# Patient Record
Sex: FEMALE | Employment: OTHER | URBAN - METROPOLITAN AREA
[De-identification: names, ages, dates, MRNs, and addresses within clinical notes are randomized per-mention and may not be internally consistent; named-entity substitution may affect disease eponyms.]

---

## 2017-07-12 PROBLEM — C92.12 CML (CHRONIC MYELOID LEUKEMIA) IN RELAPSE (HCC): Status: ACTIVE | Noted: 2017-07-12

## 2018-10-16 ENCOUNTER — HOSPITAL ENCOUNTER (OUTPATIENT)
Dept: NON INVASIVE DIAGNOSTICS | Age: 68
Discharge: HOME | End: 2018-10-16
Payer: MEDICARE

## 2018-10-16 LAB
LV EF: 63 %
LVEF MODALITY: NORMAL

## 2018-10-16 PROCEDURE — 93306 TTE W/DOPPLER COMPLETE: CPT

## 2018-10-19 ENCOUNTER — HOSPITAL ENCOUNTER (OUTPATIENT)
Dept: ULTRASOUND IMAGING | Age: 68
Discharge: HOME | End: 2018-10-19
Payer: MEDICARE

## 2018-10-19 DIAGNOSIS — C92.12 CHRONIC MYELOID LEUKEMIA IN RELAPSE (HCC): ICD-10-CM

## 2018-10-19 PROCEDURE — 76705 ECHO EXAM OF ABDOMEN: CPT

## 2019-09-17 ENCOUNTER — OFFICE VISIT (OUTPATIENT)
Dept: FAMILY MEDICINE CLINIC | Age: 69
End: 2019-09-17
Payer: MEDICARE

## 2019-09-17 VITALS
HEART RATE: 57 BPM | HEIGHT: 63 IN | WEIGHT: 147 LBS | TEMPERATURE: 98.7 F | DIASTOLIC BLOOD PRESSURE: 68 MMHG | OXYGEN SATURATION: 97 % | BODY MASS INDEX: 26.05 KG/M2 | SYSTOLIC BLOOD PRESSURE: 104 MMHG

## 2019-09-17 DIAGNOSIS — K64.9 HEMORRHOIDS, UNSPECIFIED HEMORRHOID TYPE: ICD-10-CM

## 2019-09-17 DIAGNOSIS — Z00.00 PHYSICAL EXAM: Primary | ICD-10-CM

## 2019-09-17 DIAGNOSIS — C92.10 CML (CHRONIC MYELOCYTIC LEUKEMIA) (HCC): ICD-10-CM

## 2019-09-17 PROBLEM — C80.1 CANCER (HCC): Status: ACTIVE | Noted: 2019-09-17

## 2019-09-17 PROBLEM — E78.5 HYPERLIPIDEMIA: Status: ACTIVE | Noted: 2019-09-17

## 2019-09-17 PROBLEM — K22.70 BARRETT ESOPHAGUS: Status: ACTIVE | Noted: 2019-09-17

## 2019-09-17 PROBLEM — R10.30 LOWER ABDOMINAL PAIN: Status: ACTIVE | Noted: 2017-08-02

## 2019-09-17 PROBLEM — T37.0X5A ADVERSE REACTION TO SULFA ANTIBIOTIC: Status: ACTIVE | Noted: 2017-05-02

## 2019-09-17 PROBLEM — Z85.828 HISTORY OF SKIN CANCER: Status: ACTIVE | Noted: 2019-05-10

## 2019-09-17 PROBLEM — K44.9 HIATAL HERNIA: Status: ACTIVE | Noted: 2019-09-17

## 2019-09-17 PROBLEM — E78.00 PURE HYPERCHOLESTEROLEMIA: Status: ACTIVE | Noted: 2017-08-11

## 2019-09-17 PROBLEM — E04.1 THYROID NODULE: Status: ACTIVE | Noted: 2019-09-17

## 2019-09-17 PROBLEM — E03.9 ACQUIRED HYPOTHYROIDISM: Status: ACTIVE | Noted: 2018-09-10

## 2019-09-17 PROBLEM — I50.33 ACUTE ON CHRONIC DIASTOLIC HEART FAILURE (HCC): Status: ACTIVE | Noted: 2017-04-11

## 2019-09-17 PROBLEM — K28.9 ANASTOMOTIC ULCER: Status: ACTIVE | Noted: 2019-09-17

## 2019-09-17 PROBLEM — R07.9 CHEST PAIN: Status: ACTIVE | Noted: 2017-08-02

## 2019-09-17 PROCEDURE — 1123F ACP DISCUSS/DSCN MKR DOCD: CPT | Performed by: NURSE PRACTITIONER

## 2019-09-17 PROCEDURE — 4040F PNEUMOC VAC/ADMIN/RCVD: CPT | Performed by: NURSE PRACTITIONER

## 2019-09-17 PROCEDURE — 99203 OFFICE O/P NEW LOW 30 MIN: CPT | Performed by: NURSE PRACTITIONER

## 2019-09-17 PROCEDURE — G8400 PT W/DXA NO RESULTS DOC: HCPCS | Performed by: NURSE PRACTITIONER

## 2019-09-17 PROCEDURE — 3017F COLORECTAL CA SCREEN DOC REV: CPT | Performed by: NURSE PRACTITIONER

## 2019-09-17 PROCEDURE — G8419 CALC BMI OUT NRM PARAM NOF/U: HCPCS | Performed by: NURSE PRACTITIONER

## 2019-09-17 PROCEDURE — 1036F TOBACCO NON-USER: CPT | Performed by: NURSE PRACTITIONER

## 2019-09-17 PROCEDURE — G8427 DOCREV CUR MEDS BY ELIG CLIN: HCPCS | Performed by: NURSE PRACTITIONER

## 2019-09-17 PROCEDURE — 1090F PRES/ABSN URINE INCON ASSESS: CPT | Performed by: NURSE PRACTITIONER

## 2019-09-17 RX ORDER — VITAMIN B COMPLEX
1 CAPSULE ORAL DAILY
COMMUNITY
End: 2019-10-14

## 2019-09-17 RX ORDER — METHYLPREDNISOLONE 4 MG
500 TABLET, DOSE PACK ORAL DAILY
COMMUNITY
End: 2020-06-05 | Stop reason: ALTCHOICE

## 2019-09-17 RX ORDER — ASCORBIC ACID 1000 MG
TABLET ORAL 2 TIMES DAILY
COMMUNITY
End: 2022-05-02

## 2019-09-17 SDOH — HEALTH STABILITY: PHYSICAL HEALTH: ON AVERAGE, HOW MANY MINUTES DO YOU ENGAGE IN EXERCISE AT THIS LEVEL?: 30 MIN

## 2019-09-17 SDOH — HEALTH STABILITY: PHYSICAL HEALTH: ON AVERAGE, HOW MANY DAYS PER WEEK DO YOU ENGAGE IN MODERATE TO STRENUOUS EXERCISE (LIKE A BRISK WALK)?: 3 DAYS

## 2019-09-17 ASSESSMENT — ENCOUNTER SYMPTOMS
CONSTIPATION: 1
SORE THROAT: 1
SHORTNESS OF BREATH: 1

## 2019-09-17 NOTE — PROGRESS NOTES
Patient ID: Robb Levi is a 77 y.o. female who presents today for a Physical Exam.      HPI-this is a 77-year-old female patient new to the clinic presenting today for a physical exam.  She has a history of CML in which she takes chemotherapy for that was in remission but now it is not. Her previous PCP wanted her to go on hospice and she refuses. She drives she is alert she is oriented she exercises at least 3 times a week works out in the yard has a garden so I encouraged her to keep moving and keep fighting. She also sees a homeopathic doctor and I encouraged her to continue with him if that is what she wants to do. Besides the cancer diagnosis she states she has IBS with constipation but manages this with MiraLAX and a Ducosate. She also states she has issues with hemorrhoids and she takes a suppository for this. She also stated she does have a history of anal fissure. She is wanting a GI consult to be looked at for the hemorrhoids and she has had this for years she states. She denies no active bleeding at this time. She told me that she leaves for [place] for the winter and that she gets back end of March 1 April. I put future labs in for that time and then I told her we would follow-up at that time. We discussed her health maintenance she stated she has had her mammogram this year so she is not due until next year she is unable to get the shingles vaccine due to the cancer diagnosis. And she states she had a colonoscopy a couple years ago  Noted in care everywhere she is had her hep C screen back in August 2018 and she stated she is already had her flu shot for this year. Depression screening and SUZANNE essentially negative.   She has a very positive attitude and is not willing to go on hospice and I encouraged her to keep fighting  Past Medical History:   Diagnosis Date    Anal fissure     Anemia     Arthritis     Asthma     Bowel dysfunction     Cancer ([address]     Hyperlipidemia     reviewed and are negative. Physical Exam   Constitutional: She is oriented to person, place, and time. She appears well-developed and well-nourished. HENT:   Head: Normocephalic. Right Ear: External ear normal.   Left Ear: External ear normal.   Nose: Nose normal.   Mouth/Throat: Oropharynx is clear and moist.   Eyes: Pupils are equal, round, and reactive to light. Conjunctivae and EOM are normal.   Cataracts present    Neck: Normal range of motion. Neck supple. Carotid bruit is not present. No thyromegaly present. Tenderness lymph glands   Cardiovascular: Normal rate, regular rhythm and normal heart sounds. Carotid negative for bruits bilateral   Pulmonary/Chest: Effort normal and breath sounds normal.   Abdominal: Soft. Bowel sounds are normal. There is tenderness. Musculoskeletal: Normal range of motion. She exhibits no edema. Lymphadenopathy:     She has cervical adenopathy. Neurological: She is alert and oriented to person, place, and time. Skin: Skin is warm and dry. Rash noted. Psychiatric: She has a normal mood and affect. Her behavior is normal. Judgment and thought content normal.   Nursing note and vitals reviewed. Assessment:  Encounter Diagnoses   Name Primary?  Physical exam Yes    Hemorrhoids, unspecified hemorrhoid type        Controlled Substances Monitoring:  NA    Plan:  1. Physical exam  - Lipid Panel; Future  - Comprehensive Metabolic Panel; Future  - Hemoglobin A1C; Future  - TSH with Reflex; Future  - Vitamin D 25 Hydroxy; Future  - Vitamin B12 & Folate; Future  RTC in 6 months for lab draw and I will see you after lab draw    2. Hemorrhoids, unspecified hemorrhoid type  - Annemarie Crisostomo MD, Gastroenterology, [facility]    3.) CML  Follow up with Oncologist and continue with Chemo as previously directed by them    Encouraged healthy diet, regular exercise and multivitamin daily. Labs checked per orders. Optho visit q 1-2 years.

## 2019-09-20 ENCOUNTER — OFFICE VISIT (OUTPATIENT)
Dept: GASTROENTEROLOGY | Age: 69
End: 2019-09-20
Payer: MEDICARE

## 2019-09-20 VITALS
WEIGHT: 148 LBS | SYSTOLIC BLOOD PRESSURE: 120 MMHG | HEIGHT: 63 IN | BODY MASS INDEX: 26.22 KG/M2 | DIASTOLIC BLOOD PRESSURE: 74 MMHG

## 2019-09-20 DIAGNOSIS — K59.00 CONSTIPATION, UNSPECIFIED CONSTIPATION TYPE: ICD-10-CM

## 2019-09-20 DIAGNOSIS — Z80.0 FAMILY HISTORY OF COLON CANCER: ICD-10-CM

## 2019-09-20 DIAGNOSIS — Z86.010 PERSONAL HISTORY OF COLONIC POLYPS: Primary | ICD-10-CM

## 2019-09-20 DIAGNOSIS — K62.5 RECTAL BLEEDING: ICD-10-CM

## 2019-09-20 PROCEDURE — 3017F COLORECTAL CA SCREEN DOC REV: CPT | Performed by: INTERNAL MEDICINE

## 2019-09-20 PROCEDURE — 1036F TOBACCO NON-USER: CPT | Performed by: INTERNAL MEDICINE

## 2019-09-20 PROCEDURE — 4040F PNEUMOC VAC/ADMIN/RCVD: CPT | Performed by: INTERNAL MEDICINE

## 2019-09-20 PROCEDURE — G8419 CALC BMI OUT NRM PARAM NOF/U: HCPCS | Performed by: INTERNAL MEDICINE

## 2019-09-20 PROCEDURE — 99204 OFFICE O/P NEW MOD 45 MIN: CPT | Performed by: INTERNAL MEDICINE

## 2019-09-20 PROCEDURE — 1123F ACP DISCUSS/DSCN MKR DOCD: CPT | Performed by: INTERNAL MEDICINE

## 2019-09-20 PROCEDURE — G8427 DOCREV CUR MEDS BY ELIG CLIN: HCPCS | Performed by: INTERNAL MEDICINE

## 2019-09-20 PROCEDURE — 1090F PRES/ABSN URINE INCON ASSESS: CPT | Performed by: INTERNAL MEDICINE

## 2019-09-20 PROCEDURE — G8400 PT W/DXA NO RESULTS DOC: HCPCS | Performed by: INTERNAL MEDICINE

## 2019-09-20 RX ORDER — POLYETHYLENE GLYCOL 3350 17 G/17G
238 POWDER ORAL DAILY
Qty: 255 G | Refills: 0 | Status: ON HOLD | OUTPATIENT
Start: 2019-09-20 | End: 2019-10-18 | Stop reason: HOSPADM

## 2019-09-20 NOTE — PROGRESS NOTES
[address] [facility],  [address] [place] [address] Ave  [place], [address] [place] [address]  Phone: [phone]   [facility]     Chief Complaint   Patient presents with    New Patient     constipation/ hemorrhoids       HPI     Thank you DANTE Olivares - CNP for asking me to see Juanpablochris Ba in consultation. She is a white 77 y.o. Danya Calhoun female seen independently who presents with the following GI complaints:  . Jodi Phelan  Complains of Complains of painless intermittent rectal bleeding x months. Blood is mixed with the stool. Blood is present with wiping. Denies rectal pain or swelling. Has constipation (including straining, hard/large stools, incomplete evacuation) but no diarrhea. She takes colace and often a single dose of miralax daily. Says it is a chore the have a BM and takes a long time. No associated abdominal pain. Rectal exam has not been performed and reported. She reportedly has hemorrhoids and h/o fissures. She has had colon polyps in the past.  Her father and sister had colon cancer. There is not a history of rectal injury. Patient has had similar episodes of rectal bleeding in the past.   She has not had a transfusion. High-risk GI Bleeding History: The patient has a known history of: no known risk factors   She is not on Blood thinners and is not On NSAIDs for pain/inflammation in the last 6 months? Living with CML indicating she nearly  related to chemo related pleural effusion [facility]. States she won't go back to that hospital.  Not anemic and cbc normal at [place] 2 weeks ago. HPI elements: location, severity, timing, modifying factors, quality, duration, context and associated signs/symptoms. Last Encounter Reviewed:   Pertinent PMH, FH, SH is reviewed below.   Last EGD: 2017 in [address] [address], [address] [facility] [place], operated by [facility],  2015 portal gastropathy, fundic gland polyps, hiatal hernia, irregular GEJ, schatski's ring dilated  Last Colonoscopy: 2015

## 2019-09-20 NOTE — LETTER
Coumadin may be held 4 days prior to the procedure unless:        Mechanical mitral valve replacement (requires heparin bridge while Coumadin held and is managed by pharmacy)      Pradaxa, Xarelto, Eliquis may be held 2-3 days prior to procedure. According to pharmacokinetics of the drug, package insert, cardiology practice patterns, and T1/2 of theses drugs (12 hrs), Eliquis and Xarelto are held 48hrs prior to any procedure, including major surgical procedures w/o       increased bleeding.  That is usually the standard of care, as coagulation would/should be normalized at 48hrs. Every attempt should be made to maintain ASA 81mg per day throughout the desi-operative period in patients with diagnosis of ASHD. These recommendations may need to be modified by the provider/ based on risk /benefit analysis of the procedure and the patients history. If anticoagulation can not be held because recent cardiac stent, elective endoscopic procedures should be delayed until they have received the minimum duration of recommended antiplatlet therapy and it can safely be held. Again if unsure, patient should discuss with prescribing physician/service. If anticoagulation can not be stopped, endoscopic procedures can still be performed either diagnostically at a somewhat higher risk. Understand that any therapeutic procedure where anything beyond looking is performed, carries higher risks. For this reason without overt bleeding other testing       such as cologuard may be more appropriate. High risk endoscopic procedures that require stopping antiplatelet and anticoagulation therapy include polypectomy, biliary or pancreatic sphincterotomy, pneumatic or bougie dilation, PEG placement, therapeutic balloon-assisted enteroscopy, EUS and FNA, tumor ablation by any technique,       cystogastrostomy,and treatment of varices.

## 2019-09-23 ENCOUNTER — TELEPHONE (OUTPATIENT)
Dept: GASTROENTEROLOGY | Age: 69
End: 2019-09-23

## 2019-09-23 NOTE — TELEPHONE ENCOUNTER
[phone] (home)     Spoke with pt. Informed her of Dr. Tasha Mchugh reasoning for the colonoscopy. Pt stated she is still trying to find a ride/ person to bring her for the procedure. Stated she would call back if she needed to cancel or reschedule.

## 2019-09-23 NOTE — TELEPHONE ENCOUNTER
Yes I am recommending it because of the bleeding and rectal complaints. Please request last report. She told me in the office it was 2017 but not [facility].

## 2019-09-23 NOTE — TELEPHONE ENCOUNTER
[phone] ([place])     Pt called in stating that her last colonoscopy was 9/13/2017 and was NOT in 2014. States that her colonoscopy was completed by a Dr. Jose Crisostomo with [facility]. States that she did have 3 small polyps that were precancerous. Wants to know if Dr. Garcia Ortiz would still like her to have a colonoscopy on 10/18/19 as scheduled. Please advise.

## 2019-10-02 ENCOUNTER — NURSE ONLY (OUTPATIENT)
Dept: FAMILY MEDICINE CLINIC | Age: 69
End: 2019-10-02
Payer: MEDICARE

## 2019-10-02 DIAGNOSIS — Z00.00 PHYSICAL EXAM: ICD-10-CM

## 2019-10-02 LAB
A/G RATIO: 1.7 (ref 1.1–2.2)
ALBUMIN SERPL-MCNC: 4.4 G/DL (ref 3.4–5)
ALP BLD-CCNC: 120 U/L (ref 40–129)
ALT SERPL-CCNC: 39 U/L (ref 10–40)
ANION GAP SERPL CALCULATED.3IONS-SCNC: 14 MMOL/L (ref 3–16)
AST SERPL-CCNC: 32 U/L (ref 15–37)
BILIRUB SERPL-MCNC: 1.2 MG/DL (ref 0–1)
BUN BLDV-MCNC: 17 MG/DL (ref 7–20)
CALCIUM SERPL-MCNC: 9.3 MG/DL (ref 8.3–10.6)
CHLORIDE BLD-SCNC: 103 MMOL/L (ref 99–110)
CHOLESTEROL, TOTAL: 214 MG/DL (ref 0–199)
CO2: 27 MMOL/L (ref 21–32)
CREAT SERPL-MCNC: 0.7 MG/DL (ref 0.6–1.2)
FOLATE: >20 NG/ML (ref 4.78–24.2)
GFR AFRICAN AMERICAN: >60
GFR NON-AFRICAN AMERICAN: >60
GLOBULIN: 2.6 G/DL
GLUCOSE BLD-MCNC: 108 MG/DL (ref 70–99)
HDLC SERPL-MCNC: 88 MG/DL (ref 40–60)
LDL CHOLESTEROL CALCULATED: 111 MG/DL
POTASSIUM SERPL-SCNC: 3.8 MMOL/L (ref 3.5–5.1)
SODIUM BLD-SCNC: 144 MMOL/L (ref 136–145)
T4 FREE: 1.1 NG/DL (ref 0.9–1.8)
TOTAL PROTEIN: 7 G/DL (ref 6.4–8.2)
TRIGL SERPL-MCNC: 77 MG/DL (ref 0–150)
TSH REFLEX: 7.29 UIU/ML (ref 0.27–4.2)
VITAMIN B-12: 1054 PG/ML (ref 211–911)
VITAMIN D 25-HYDROXY: 44.8 NG/ML
VLDLC SERPL CALC-MCNC: 15 MG/DL

## 2019-10-02 PROCEDURE — 36415 COLL VENOUS BLD VENIPUNCTURE: CPT | Performed by: NURSE PRACTITIONER

## 2019-10-03 LAB
ESTIMATED AVERAGE GLUCOSE: 114 MG/DL
HBA1C MFR BLD: 5.6 %

## 2019-10-18 ENCOUNTER — ANESTHESIA (OUTPATIENT)
Dept: ENDOSCOPY | Age: 69
End: 2019-10-18
Payer: MEDICARE

## 2019-10-18 ENCOUNTER — HOSPITAL ENCOUNTER (OUTPATIENT)
Age: 69
Setting detail: OUTPATIENT SURGERY
Discharge: HOSPICE | End: 2019-10-18
Attending: INTERNAL MEDICINE | Admitting: INTERNAL MEDICINE
Payer: MEDICARE

## 2019-10-18 ENCOUNTER — ANESTHESIA EVENT (OUTPATIENT)
Dept: ENDOSCOPY | Age: 69
End: 2019-10-18
Payer: MEDICARE

## 2019-10-18 VITALS
WEIGHT: 148 LBS | HEIGHT: 62 IN | HEART RATE: 58 BPM | OXYGEN SATURATION: 100 % | BODY MASS INDEX: 27.23 KG/M2 | DIASTOLIC BLOOD PRESSURE: 68 MMHG | TEMPERATURE: 97 F | SYSTOLIC BLOOD PRESSURE: 172 MMHG | RESPIRATION RATE: 18 BRPM

## 2019-10-18 VITALS — SYSTOLIC BLOOD PRESSURE: 135 MMHG | OXYGEN SATURATION: 100 % | DIASTOLIC BLOOD PRESSURE: 52 MMHG

## 2019-10-18 DIAGNOSIS — K59.00 CONSTIPATION, UNSPECIFIED CONSTIPATION TYPE: ICD-10-CM

## 2019-10-18 DIAGNOSIS — K62.5 RECTAL BLEEDING: ICD-10-CM

## 2019-10-18 PROCEDURE — 7100000010 HC PHASE II RECOVERY - FIRST 15 MIN: Performed by: INTERNAL MEDICINE

## 2019-10-18 PROCEDURE — 88305 TISSUE EXAM BY PATHOLOGIST: CPT

## 2019-10-18 PROCEDURE — 45380 COLONOSCOPY AND BIOPSY: CPT | Performed by: INTERNAL MEDICINE

## 2019-10-18 PROCEDURE — 2500000003 HC RX 250 WO HCPCS: Performed by: NURSE ANESTHETIST, CERTIFIED REGISTERED

## 2019-10-18 PROCEDURE — 3609010700 HC COLONOSCOPY POLYPECTOMY REMOVAL SNARE/STOMA: Performed by: INTERNAL MEDICINE

## 2019-10-18 PROCEDURE — 3700000000 HC ANESTHESIA ATTENDED CARE: Performed by: INTERNAL MEDICINE

## 2019-10-18 PROCEDURE — 3700000001 HC ADD 15 MINUTES (ANESTHESIA): Performed by: INTERNAL MEDICINE

## 2019-10-18 PROCEDURE — 7100000011 HC PHASE II RECOVERY - ADDTL 15 MIN: Performed by: INTERNAL MEDICINE

## 2019-10-18 PROCEDURE — 2580000003 HC RX 258: Performed by: ANESTHESIOLOGY

## 2019-10-18 PROCEDURE — 6360000002 HC RX W HCPCS: Performed by: NURSE ANESTHETIST, CERTIFIED REGISTERED

## 2019-10-18 PROCEDURE — 2580000003 HC RX 258: Performed by: INTERNAL MEDICINE

## 2019-10-18 PROCEDURE — 2709999900 HC NON-CHARGEABLE SUPPLY: Performed by: INTERNAL MEDICINE

## 2019-10-18 PROCEDURE — 45385 COLONOSCOPY W/LESION REMOVAL: CPT | Performed by: INTERNAL MEDICINE

## 2019-10-18 RX ORDER — LIDOCAINE HYDROCHLORIDE 10 MG/ML
0.3 INJECTION, SOLUTION EPIDURAL; INFILTRATION; INTRACAUDAL; PERINEURAL
Status: DISCONTINUED | OUTPATIENT
Start: 2019-10-18 | End: 2019-10-18 | Stop reason: HOSPADM

## 2019-10-18 RX ORDER — SODIUM CHLORIDE, SODIUM LACTATE, POTASSIUM CHLORIDE, CALCIUM CHLORIDE 600; 310; 30; 20 MG/100ML; MG/100ML; MG/100ML; MG/100ML
INJECTION, SOLUTION INTRAVENOUS CONTINUOUS
Status: DISCONTINUED | OUTPATIENT
Start: 2019-10-18 | End: 2019-10-18 | Stop reason: HOSPADM

## 2019-10-18 RX ORDER — PROPOFOL 10 MG/ML
INJECTION, EMULSION INTRAVENOUS PRN
Status: DISCONTINUED | OUTPATIENT
Start: 2019-10-18 | End: 2019-10-18 | Stop reason: SDUPTHER

## 2019-10-18 RX ORDER — OXYCODONE HYDROCHLORIDE AND ACETAMINOPHEN 5; 325 MG/1; MG/1
2 TABLET ORAL PRN
Status: DISCONTINUED | OUTPATIENT
Start: 2019-10-18 | End: 2019-10-18 | Stop reason: HOSPADM

## 2019-10-18 RX ORDER — HYDRALAZINE HYDROCHLORIDE 20 MG/ML
5 INJECTION INTRAMUSCULAR; INTRAVENOUS EVERY 10 MIN PRN
Status: DISCONTINUED | OUTPATIENT
Start: 2019-10-18 | End: 2019-10-18 | Stop reason: HOSPADM

## 2019-10-18 RX ORDER — ONDANSETRON 2 MG/ML
4 INJECTION INTRAMUSCULAR; INTRAVENOUS
Status: DISCONTINUED | OUTPATIENT
Start: 2019-10-18 | End: 2019-10-18 | Stop reason: HOSPADM

## 2019-10-18 RX ORDER — DIPHENHYDRAMINE HYDROCHLORIDE 50 MG/ML
12.5 INJECTION INTRAMUSCULAR; INTRAVENOUS
Status: DISCONTINUED | OUTPATIENT
Start: 2019-10-18 | End: 2019-10-18 | Stop reason: HOSPADM

## 2019-10-18 RX ORDER — MORPHINE SULFATE 2 MG/ML
1 INJECTION, SOLUTION INTRAMUSCULAR; INTRAVENOUS EVERY 5 MIN PRN
Status: DISCONTINUED | OUTPATIENT
Start: 2019-10-18 | End: 2019-10-18 | Stop reason: HOSPADM

## 2019-10-18 RX ORDER — LABETALOL HYDROCHLORIDE 5 MG/ML
5 INJECTION, SOLUTION INTRAVENOUS EVERY 10 MIN PRN
Status: DISCONTINUED | OUTPATIENT
Start: 2019-10-18 | End: 2019-10-18 | Stop reason: HOSPADM

## 2019-10-18 RX ORDER — PROMETHAZINE HYDROCHLORIDE 25 MG/ML
6.25 INJECTION, SOLUTION INTRAMUSCULAR; INTRAVENOUS
Status: DISCONTINUED | OUTPATIENT
Start: 2019-10-18 | End: 2019-10-18 | Stop reason: HOSPADM

## 2019-10-18 RX ORDER — MORPHINE SULFATE 2 MG/ML
2 INJECTION, SOLUTION INTRAMUSCULAR; INTRAVENOUS EVERY 5 MIN PRN
Status: DISCONTINUED | OUTPATIENT
Start: 2019-10-18 | End: 2019-10-18 | Stop reason: HOSPADM

## 2019-10-18 RX ORDER — LIDOCAINE HYDROCHLORIDE 10 MG/ML
INJECTION, SOLUTION INFILTRATION; PERINEURAL PRN
Status: DISCONTINUED | OUTPATIENT
Start: 2019-10-18 | End: 2019-10-18 | Stop reason: SDUPTHER

## 2019-10-18 RX ORDER — SODIUM CHLORIDE 0.9 % (FLUSH) 0.9 %
10 SYRINGE (ML) INJECTION EVERY 12 HOURS SCHEDULED
Status: DISCONTINUED | OUTPATIENT
Start: 2019-10-18 | End: 2019-10-18 | Stop reason: HOSPADM

## 2019-10-18 RX ORDER — SODIUM CHLORIDE 0.9 % (FLUSH) 0.9 %
10 SYRINGE (ML) INJECTION PRN
Status: DISCONTINUED | OUTPATIENT
Start: 2019-10-18 | End: 2019-10-18 | Stop reason: HOSPADM

## 2019-10-18 RX ORDER — OXYCODONE HYDROCHLORIDE AND ACETAMINOPHEN 5; 325 MG/1; MG/1
1 TABLET ORAL PRN
Status: DISCONTINUED | OUTPATIENT
Start: 2019-10-18 | End: 2019-10-18 | Stop reason: HOSPADM

## 2019-10-18 RX ORDER — MEPERIDINE HYDROCHLORIDE 50 MG/ML
12.5 INJECTION INTRAMUSCULAR; INTRAVENOUS; SUBCUTANEOUS EVERY 5 MIN PRN
Status: DISCONTINUED | OUTPATIENT
Start: 2019-10-18 | End: 2019-10-18 | Stop reason: HOSPADM

## 2019-10-18 RX ADMIN — SODIUM CHLORIDE, POTASSIUM CHLORIDE, SODIUM LACTATE AND CALCIUM CHLORIDE: 600; 310; 30; 20 INJECTION, SOLUTION INTRAVENOUS at 09:12

## 2019-10-18 RX ADMIN — LIDOCAINE HYDROCHLORIDE 60 MG: 10 INJECTION, SOLUTION INFILTRATION; PERINEURAL at 09:15

## 2019-10-18 RX ADMIN — PROPOFOL 70 MG: 10 INJECTION, EMULSION INTRAVENOUS at 09:15

## 2019-10-18 RX ADMIN — LIDOCAINE HYDROCHLORIDE 60 MG: 10 INJECTION, SOLUTION INFILTRATION; PERINEURAL at 09:27

## 2019-10-18 RX ADMIN — SODIUM CHLORIDE, POTASSIUM CHLORIDE, SODIUM LACTATE AND CALCIUM CHLORIDE: 600; 310; 30; 20 INJECTION, SOLUTION INTRAVENOUS at 08:51

## 2019-10-18 RX ADMIN — LIDOCAINE HYDROCHLORIDE 60 MG: 10 INJECTION, SOLUTION INFILTRATION; PERINEURAL at 09:21

## 2019-10-18 ASSESSMENT — PAIN SCALES - GENERAL
PAINLEVEL_OUTOF10: 0
PAINLEVEL_OUTOF10: 0
PAINLEVEL_OUTOF10: 2

## 2019-10-18 ASSESSMENT — PAIN - FUNCTIONAL ASSESSMENT: PAIN_FUNCTIONAL_ASSESSMENT: 0-10

## 2019-10-18 ASSESSMENT — PAIN DESCRIPTION - LOCATION: LOCATION: ABDOMEN

## 2019-10-18 ASSESSMENT — PAIN DESCRIPTION - DESCRIPTORS
DESCRIPTORS: CRAMPING
DESCRIPTORS: CRAMPING

## 2019-10-18 ASSESSMENT — ENCOUNTER SYMPTOMS: SHORTNESS OF BREATH: 1

## 2019-11-13 ENCOUNTER — OFFICE VISIT (OUTPATIENT)
Dept: FAMILY MEDICINE CLINIC | Age: 69
End: 2019-11-13
Payer: MEDICARE

## 2019-11-13 VITALS
WEIGHT: 149 LBS | TEMPERATURE: 98.2 F | OXYGEN SATURATION: 98 % | DIASTOLIC BLOOD PRESSURE: 76 MMHG | HEART RATE: 66 BPM | SYSTOLIC BLOOD PRESSURE: 128 MMHG | BODY MASS INDEX: 26.4 KG/M2 | HEIGHT: 63 IN

## 2019-11-13 DIAGNOSIS — H04.129 DRY EYE: Primary | ICD-10-CM

## 2019-11-13 PROCEDURE — 99213 OFFICE O/P EST LOW 20 MIN: CPT | Performed by: NURSE PRACTITIONER

## 2019-11-13 RX ORDER — CYCLOSPORINE 0.5 MG/ML
1 EMULSION OPHTHALMIC EVERY 12 HOURS
Qty: 30 VIAL | Refills: 2 | Status: SHIPPED | OUTPATIENT
Start: 2019-11-13 | End: 2020-06-04 | Stop reason: SDUPTHER

## 2019-11-13 ASSESSMENT — PATIENT HEALTH QUESTIONNAIRE - PHQ9
SUM OF ALL RESPONSES TO PHQ9 QUESTIONS 1 & 2: 0
2. FEELING DOWN, DEPRESSED OR HOPELESS: 0
SUM OF ALL RESPONSES TO PHQ QUESTIONS 1-9: 0
SUM OF ALL RESPONSES TO PHQ QUESTIONS 1-9: 0
1. LITTLE INTEREST OR PLEASURE IN DOING THINGS: 0

## 2019-11-15 ENCOUNTER — TELEPHONE (OUTPATIENT)
Dept: FAMILY MEDICINE CLINIC | Age: 69
End: 2019-11-15

## 2019-11-25 PROBLEM — H40.033 ANATOMICAL NARROW ANGLE, BILATERAL: Status: ACTIVE | Noted: 2019-11-25

## 2019-11-25 PROBLEM — H25.13 NUCLEAR SENILE CATARACT OF BOTH EYES: Status: ACTIVE | Noted: 2019-11-25

## 2020-06-03 ENCOUNTER — TELEPHONE (OUTPATIENT)
Dept: FAMILY MEDICINE CLINIC | Age: 70
End: 2020-06-03

## 2020-06-04 RX ORDER — CYCLOSPORINE 0.5 MG/ML
1 EMULSION OPHTHALMIC EVERY 12 HOURS
Qty: 30 VIAL | Refills: 2 | Status: SHIPPED | OUTPATIENT
Start: 2020-06-04 | End: 2020-06-05 | Stop reason: SDUPTHER

## 2020-06-04 RX ORDER — EPINEPHRINE 0.3 MG/.3ML
INJECTION SUBCUTANEOUS
Qty: 1 EACH | Refills: 0 | Status: SHIPPED | OUTPATIENT
Start: 2020-06-04 | End: 2020-06-05 | Stop reason: SDUPTHER

## 2020-06-05 ENCOUNTER — OFFICE VISIT (OUTPATIENT)
Dept: FAMILY MEDICINE CLINIC | Age: 70
End: 2020-06-05
Payer: MEDICARE

## 2020-06-05 VITALS
DIASTOLIC BLOOD PRESSURE: 85 MMHG | BODY MASS INDEX: 24.59 KG/M2 | TEMPERATURE: 97.9 F | SYSTOLIC BLOOD PRESSURE: 148 MMHG | HEART RATE: 66 BPM | HEIGHT: 63 IN | OXYGEN SATURATION: 98 % | WEIGHT: 138.8 LBS

## 2020-06-05 PROCEDURE — 1090F PRES/ABSN URINE INCON ASSESS: CPT | Performed by: FAMILY MEDICINE

## 2020-06-05 PROCEDURE — 99214 OFFICE O/P EST MOD 30 MIN: CPT | Performed by: FAMILY MEDICINE

## 2020-06-05 PROCEDURE — G8400 PT W/DXA NO RESULTS DOC: HCPCS | Performed by: FAMILY MEDICINE

## 2020-06-05 PROCEDURE — G8420 CALC BMI NORM PARAMETERS: HCPCS | Performed by: FAMILY MEDICINE

## 2020-06-05 PROCEDURE — 4040F PNEUMOC VAC/ADMIN/RCVD: CPT | Performed by: FAMILY MEDICINE

## 2020-06-05 PROCEDURE — 1036F TOBACCO NON-USER: CPT | Performed by: FAMILY MEDICINE

## 2020-06-05 PROCEDURE — G8427 DOCREV CUR MEDS BY ELIG CLIN: HCPCS | Performed by: FAMILY MEDICINE

## 2020-06-05 PROCEDURE — 1123F ACP DISCUSS/DSCN MKR DOCD: CPT | Performed by: FAMILY MEDICINE

## 2020-06-05 PROCEDURE — 3017F COLORECTAL CA SCREEN DOC REV: CPT | Performed by: FAMILY MEDICINE

## 2020-06-05 RX ORDER — CYCLOSPORINE 0.5 MG/ML
1 EMULSION OPHTHALMIC EVERY 12 HOURS
Qty: 30 VIAL | Refills: 2 | Status: SHIPPED | OUTPATIENT
Start: 2020-06-05 | End: 2021-12-10 | Stop reason: SDUPTHER

## 2020-06-05 RX ORDER — EPINEPHRINE 0.3 MG/.3ML
INJECTION SUBCUTANEOUS
Qty: 1 EACH | Refills: 0 | Status: SHIPPED | OUTPATIENT
Start: 2020-06-05 | End: 2021-05-10 | Stop reason: SDUPTHER

## 2020-06-05 SDOH — HEALTH STABILITY: MENTAL HEALTH
STRESS IS WHEN SOMEONE FEELS TENSE, NERVOUS, ANXIOUS, OR CAN'T SLEEP AT NIGHT BECAUSE THEIR MIND IS TROUBLED. HOW STRESSED ARE YOU?: RATHER MUCH

## 2020-06-05 SDOH — SOCIAL STABILITY: SOCIAL NETWORK: IN A TYPICAL WEEK, HOW MANY TIMES DO YOU TALK ON THE PHONE WITH FAMILY, FRIENDS, OR NEIGHBORS?: TWICE A WEEK

## 2020-06-05 SDOH — ECONOMIC STABILITY: TRANSPORTATION INSECURITY
IN THE PAST 12 MONTHS, HAS LACK OF TRANSPORTATION KEPT YOU FROM MEETINGS, WORK, OR FROM GETTING THINGS NEEDED FOR DAILY LIVING?: NO

## 2020-06-05 SDOH — SOCIAL STABILITY: SOCIAL NETWORK: HOW OFTEN DO YOU ATTENT MEETINGS OF THE CLUB OR ORGANIZATION YOU BELONG TO?: MORE THAN 4 TIMES PER YEAR

## 2020-06-05 SDOH — ECONOMIC STABILITY: INCOME INSECURITY: HOW HARD IS IT FOR YOU TO PAY FOR THE VERY BASICS LIKE FOOD, HOUSING, MEDICAL CARE, AND HEATING?: SOMEWHAT HARD

## 2020-06-05 SDOH — SOCIAL STABILITY: SOCIAL NETWORK
DO YOU BELONG TO ANY CLUBS OR ORGANIZATIONS SUCH AS CHURCH GROUPS UNIONS, FRATERNAL OR ATHLETIC GROUPS, OR SCHOOL GROUPS?: YES

## 2020-06-05 SDOH — SOCIAL STABILITY: SOCIAL NETWORK: HOW OFTEN DO YOU ATTEND CHURCH OR RELIGIOUS SERVICES?: MORE THAN 4 TIMES PER YEAR

## 2020-06-05 SDOH — SOCIAL STABILITY: SOCIAL INSECURITY: WITHIN THE LAST YEAR, HAVE YOU BEEN AFRAID OF YOUR PARTNER OR EX-PARTNER?: PATIENT DECLINED

## 2020-06-05 SDOH — SOCIAL STABILITY: SOCIAL INSECURITY
WITHIN THE LAST YEAR, HAVE YOU BEEN KICKED, HIT, SLAPPED, OR OTHERWISE PHYSICALLY HURT BY YOUR PARTNER OR EX-PARTNER?: PATIENT DECLINED

## 2020-06-05 SDOH — ECONOMIC STABILITY: FOOD INSECURITY: WITHIN THE PAST 12 MONTHS, YOU WORRIED THAT YOUR FOOD WOULD RUN OUT BEFORE YOU GOT MONEY TO BUY MORE.: NEVER TRUE

## 2020-06-05 SDOH — SOCIAL STABILITY: SOCIAL NETWORK: ARE YOU MARRIED, WIDOWED, DIVORCED, SEPARATED, NEVER MARRIED, OR LIVING WITH A PARTNER?: DIVORCED

## 2020-06-05 SDOH — SOCIAL STABILITY: SOCIAL INSECURITY
WITHIN THE LAST YEAR, HAVE TO BEEN RAPED OR FORCED TO HAVE ANY KIND OF SEXUAL ACTIVITY BY YOUR PARTNER OR EX-PARTNER?: PATIENT DECLINED

## 2020-06-05 SDOH — SOCIAL STABILITY: SOCIAL NETWORK: HOW OFTEN DO YOU GET TOGETHER WITH FRIENDS OR RELATIVES?: TWICE A WEEK

## 2020-06-05 SDOH — ECONOMIC STABILITY: FOOD INSECURITY: WITHIN THE PAST 12 MONTHS, THE FOOD YOU BOUGHT JUST DIDN'T LAST AND YOU DIDN'T HAVE MONEY TO GET MORE.: NEVER TRUE

## 2020-06-05 SDOH — SOCIAL STABILITY: SOCIAL INSECURITY
WITHIN THE LAST YEAR, HAVE YOU BEEN HUMILIATED OR EMOTIONALLY ABUSED IN OTHER WAYS BY YOUR PARTNER OR EX-PARTNER?: PATIENT DECLINED

## 2020-06-05 SDOH — ECONOMIC STABILITY: TRANSPORTATION INSECURITY
IN THE PAST 12 MONTHS, HAS THE LACK OF TRANSPORTATION KEPT YOU FROM MEDICAL APPOINTMENTS OR FROM GETTING MEDICATIONS?: NO

## 2020-06-05 ASSESSMENT — PATIENT HEALTH QUESTIONNAIRE - PHQ9
1. LITTLE INTEREST OR PLEASURE IN DOING THINGS: 0
5. POOR APPETITE OR OVEREATING: 0
9. THOUGHTS THAT YOU WOULD BE BETTER OFF DEAD, OR OF HURTING YOURSELF: 0
8. MOVING OR SPEAKING SO SLOWLY THAT OTHER PEOPLE COULD HAVE NOTICED. OR THE OPPOSITE, BEING SO FIGETY OR RESTLESS THAT YOU HAVE BEEN MOVING AROUND A LOT MORE THAN USUAL: 1
10. IF YOU CHECKED OFF ANY PROBLEMS, HOW DIFFICULT HAVE THESE PROBLEMS MADE IT FOR YOU TO DO YOUR WORK, TAKE CARE OF THINGS AT HOME, OR GET ALONG WITH OTHER PEOPLE: 1
6. FEELING BAD ABOUT YOURSELF - OR THAT YOU ARE A FAILURE OR HAVE LET YOURSELF OR YOUR FAMILY DOWN: 0
SUM OF ALL RESPONSES TO PHQ9 QUESTIONS 1 & 2: 0
7. TROUBLE CONCENTRATING ON THINGS, SUCH AS READING THE NEWSPAPER OR WATCHING TELEVISION: 1
4. FEELING TIRED OR HAVING LITTLE ENERGY: 1
3. TROUBLE FALLING OR STAYING ASLEEP: 0
2. FEELING DOWN, DEPRESSED OR HOPELESS: 0
SUM OF ALL RESPONSES TO PHQ QUESTIONS 1-9: 3
SUM OF ALL RESPONSES TO PHQ QUESTIONS 1-9: 3

## 2020-06-05 ASSESSMENT — ENCOUNTER SYMPTOMS
BACK PAIN: 0
SORE THROAT: 0
BLOOD IN STOOL: 0
EYE DISCHARGE: 0
SHORTNESS OF BREATH: 1
EYE PAIN: 0
ABDOMINAL PAIN: 0
WHEEZING: 0
RHINORRHEA: 0
DIARRHEA: 0
CONSTIPATION: 0
COLOR CHANGE: 0

## 2020-06-05 NOTE — PROGRESS NOTES
Somewhat hard    Food insecurity     Worry: Never true     Inability: Never true    Transportation needs     Medical: No     Non-medical: No   Tobacco Use    Smoking status: Never Smoker    Smokeless tobacco: Never Used   Substance and Sexual Activity    Alcohol use: No    Drug use: No    Sexual activity: Not Currently   Lifestyle    Physical activity     Days per week: 3 days     Minutes per session: 30 min    Stress: Rather much   Relationships    Social connections     Talks on phone: Twice a week     Gets together: Twice a week     Attends [employer] service: More than 4 times per year     Active member of club or organization: Yes     Attends meetings of clubs or organizations: More than 4 times per year     Relationship status:     Intimate partner violence     Fear of current or ex partner: Patient refused     Emotionally abused: Patient refused     Physically abused: Patient refused     Forced sexual activity: Patient refused   Other Topics Concern    Not on file   Social History Narrative    Babysits    Son in [place] got  late in life    2 grandchildren 1y/o and 6 m/o    Loves to take care of them     Immunization History   Administered Date(s) Administered    Hepatitis A Adult (Havrix, Vaqta) 01/23/2015    Influenza Virus Vaccine 09/06/2017    Influenza, High Dose (Fluzone 65 yrs and older) 09/18/2015, 09/01/2016, 09/02/2019    Influenza, Quadv, IM, PF (6 mo and older Fluzone, Flulaval, Fluarix, and 3 yrs and older Afluria) 09/09/2014    Pneumococcal Conjugate 13-valent ([id number]) 04/23/2015    Pneumococcal Polysaccharide ([id number]) 04/19/2016    Tdap (Boostrix, Adacel) 11/04/2014     Past medical, surgical, and social history reviewed and updated. Medications, immunizations, and allergies reviewed and updated     Review of Systems   Constitutional: Negative for chills, fever and unexpected weight change. HENT: Negative for congestion, rhinorrhea and sore throat. Extraocular movements intact. Conjunctiva/sclera:      Right eye: Right conjunctiva is not injected. No chemosis or exudate. Left eye: Left conjunctiva is not injected. No chemosis or exudate. Pupils: Pupils are equal, round, and reactive to light. Funduscopic exam:     Right eye: No hemorrhage or exudate. Left eye: No hemorrhage or exudate. Neck:      Musculoskeletal: Normal range of motion and neck supple. Trachea: No tracheal deviation. Cardiovascular:      Rate and Rhythm: Normal rate and regular rhythm. Pulses: Normal pulses. Pulmonary:      Effort: Pulmonary effort is normal. No respiratory distress. Breath sounds: Normal breath sounds. No wheezing, rhonchi or rales. Abdominal:      General: Bowel sounds are normal. There is no distension. Palpations: Abdomen is soft. There is no hepatomegaly. Tenderness: There is no abdominal tenderness. Lymphadenopathy:      Cervical: No cervical adenopathy. Skin:     Findings: No rash. Neurological:      Mental Status: She is alert and oriented to person, place, and time. Cranial Nerves: No cranial nerve deficit. Psychiatric:         Thought Content: Thought content does not include homicidal or suicidal ideation. ASSESSMENT/PLAN:  Donell Olszewski is 67 y/o female with concerns for cataracts. Possibly dx in past, had issue with driving recently in past week or so. opthlamoscope today with no concerns. Refer to ophthalmology for time being. Exam unremarkable otherwise. Complex past medical history and will follow up in 2-3 months. Refilled epipen and eye drops for patient. 1. Encounter for medical examination to establish care  -chart/records reviewed  -full history and physical performed    2. Age-related cataract of both eyes, unspecified age-related cataract type  - [facility] - Luther Shin MD, Ophthalmology, [facility]    3.  Dry eye  - cycloSPORINE (RESTASIS) 0.05 % ophthalmic emulsion; Place 1 drop into both eyes every 12 hours  Dispense: 30 vial; Refill: 2    4. Anaphylaxis, sequela  - EPINEPHrine (EPIPEN 2-BRENNAN) 0.3 MG/0.3ML SOAJ injection; Use as directed for allergic reaction  Dispense: 1 each; Refill: 0    Spent >25 minutes of face to face interaction with patient counseling on diagnoses and treatment plan    Reviewed treatment plan with patient. Patient verbalized understanding to treatment plan and questions were answered. Return in about 3 months (around 9/5/2020) for blood pressure, med check, follow up. Osmin Soliz.  Teddy Rey.      6/5/2020

## 2020-06-05 NOTE — PATIENT INSTRUCTIONS
See the ophthalmologist for cataracts  [address], [place]  [address] [place] [address]., [address] [place] [address]   Phone: [phone]  Fax: [phone]    Return in 2 months for blood pressure and routine follow u    See your oncologist as planned in August and then come see us after that in august or opal

## 2020-06-08 ENCOUNTER — TELEPHONE (OUTPATIENT)
Dept: FAMILY MEDICINE CLINIC | Age: 70
End: 2020-06-08

## 2020-07-13 ENCOUNTER — OFFICE VISIT (OUTPATIENT)
Dept: PRIMARY CARE CLINIC | Age: 70
End: 2020-07-13
Payer: MEDICARE

## 2020-07-13 PROCEDURE — G8428 CUR MEDS NOT DOCUMENT: HCPCS | Performed by: NURSE PRACTITIONER

## 2020-07-13 PROCEDURE — 99211 OFF/OP EST MAY X REQ PHY/QHP: CPT | Performed by: NURSE PRACTITIONER

## 2020-07-13 PROCEDURE — G8420 CALC BMI NORM PARAMETERS: HCPCS | Performed by: NURSE PRACTITIONER

## 2020-07-13 NOTE — PROGRESS NOTES
Ruchi Flaherty received a viral test for COVID-19. They were educated on isolation and quarantine as appropriate. For any symptoms, they were directed to seek care from their PCP, given contact information to establish with a doctor, directed to an urgent care or the emergency room.

## 2020-07-14 LAB
SARS-COV-2: NOT DETECTED
SOURCE: NORMAL

## 2020-07-15 ENCOUNTER — TELEPHONE (OUTPATIENT)
Dept: FAMILY MEDICINE CLINIC | Age: 70
End: 2020-07-15

## 2020-07-15 NOTE — TELEPHONE ENCOUNTER
Patient calling for Covid 19 results. Kathryn read results, printed off results, I read to patient. She understood.

## 2020-07-17 ENCOUNTER — OFFICE VISIT (OUTPATIENT)
Dept: FAMILY MEDICINE CLINIC | Age: 70
End: 2020-07-17
Payer: MEDICARE

## 2020-07-17 VITALS
OXYGEN SATURATION: 97 % | HEART RATE: 64 BPM | BODY MASS INDEX: 25.69 KG/M2 | TEMPERATURE: 98.8 F | WEIGHT: 145 LBS | SYSTOLIC BLOOD PRESSURE: 154 MMHG | HEIGHT: 63 IN | DIASTOLIC BLOOD PRESSURE: 76 MMHG

## 2020-07-17 PROCEDURE — 1123F ACP DISCUSS/DSCN MKR DOCD: CPT | Performed by: PHYSICIAN ASSISTANT

## 2020-07-17 PROCEDURE — 4040F PNEUMOC VAC/ADMIN/RCVD: CPT | Performed by: PHYSICIAN ASSISTANT

## 2020-07-17 PROCEDURE — G8427 DOCREV CUR MEDS BY ELIG CLIN: HCPCS | Performed by: PHYSICIAN ASSISTANT

## 2020-07-17 PROCEDURE — 1090F PRES/ABSN URINE INCON ASSESS: CPT | Performed by: PHYSICIAN ASSISTANT

## 2020-07-17 PROCEDURE — 3017F COLORECTAL CA SCREEN DOC REV: CPT | Performed by: PHYSICIAN ASSISTANT

## 2020-07-17 PROCEDURE — 99213 OFFICE O/P EST LOW 20 MIN: CPT | Performed by: PHYSICIAN ASSISTANT

## 2020-07-17 PROCEDURE — G8400 PT W/DXA NO RESULTS DOC: HCPCS | Performed by: PHYSICIAN ASSISTANT

## 2020-07-17 PROCEDURE — 1036F TOBACCO NON-USER: CPT | Performed by: PHYSICIAN ASSISTANT

## 2020-07-17 PROCEDURE — G8417 CALC BMI ABV UP PARAM F/U: HCPCS | Performed by: PHYSICIAN ASSISTANT

## 2020-07-17 RX ORDER — LUTEIN 6 MG
TABLET ORAL
COMMUNITY
End: 2021-05-10

## 2020-07-17 RX ORDER — PREDNISOLONE ACETATE 10 MG/ML
SUSPENSION/ DROPS OPHTHALMIC
COMMUNITY
Start: 2020-07-10 | End: 2021-05-10

## 2020-07-17 RX ORDER — MOXIFLOXACIN 5 MG/ML
SOLUTION/ DROPS OPHTHALMIC
COMMUNITY
Start: 2020-07-10 | End: 2021-05-10

## 2020-07-17 NOTE — PROGRESS NOTES
[facility]   Pre-operative History and Physical      DIAGNOSIS:     Diagnosis Orders   1.  Preop examination     2. Age-related cataract of both eyes, unspecified age-related cataract type           PROCEDURE:  Cataract removal      History Obtained From:  patient    HISTORY OF PRESENT ILLNESS:    Sariah Soriano 1950 is a 68 y.o. female who presents for pre-operative evaluation     Past Medical History:    Past Medical History:   Diagnosis Date    Anemia     Arthritis     Asthma     Bowel dysfunction     Cancer ([address] 01/2006    leukemia    Hyperlipidemia     Hypertension     Obesity     Skin cancer of face     left cheek, squamous    Thyroid disease     TIA (transient ischemic attack)     mini ones-from chemo     Past Surgical History:    Past Surgical History:   Procedure Laterality Date    ANUS SURGERY  1998    fissure    BREAST BIOPSY      BREAST LUMPECTOMY      benign    COLONOSCOPY      numerous polyps    ELBOW SURGERY  1960    removal of foreign body ([place])    FINE NEEDLE ASPIRATION      PARACENTESIS      x 2 fluid in lung from Chemo    TONSILLECTOMY Bilateral        Current Medication  Current Outpatient Medications   Medication Sig Dispense Refill    cycloSPORINE (RESTASIS) 0.05 % ophthalmic emulsion Place 1 drop into both eyes every 12 hours 30 vial 2    Cholecalciferol (VITAMIN D3) 1000 units CAPS Take by mouth daily      UNABLE TO FIND Take by mouth daily thytrophin--1/2 pill      PHYTOSTEROLS PO Take by mouth daily      CINNAMON PO Take by mouth daily      TURMERIC CURCUMIN PO Take by mouth daily      CALCIUM-MAGNESIUM-ZINC PO Take by mouth daily      Coenzyme Q10 (CO Q 10) 10 MG CAPS Take by mouth 2 times daily       KRILL OIL PO Take by mouth daily       polyethylene glycol (GLYCOLAX) powder Take 17 g by mouth daily      nilotinib (TASIGNA) 150 MG CAPS capsule Take 2 capsules by mouth 2 times daily 120 capsule 3    aspirin 81 MG tablet Take 81 mg by mouth       Probiotic Product (PROBIOTIC & ACIDOPHILUS EX ST PO) Take by mouth daily       Lutein 20 MG TABS 1 tablet by oral route everyday      moxifloxacin (VIGAMOX) 0.5 % ophthalmic solution       prednisoLONE acetate (PRED FORTE) 1 % ophthalmic suspension       EPINEPHrine (EPIPEN 2-BRENNAN) 0.3 MG/0.3ML SOAJ injection Use as directed for allergic reaction (Patient not taking: Reported on 7/17/2020) 1 each 0    acetaminophen (TYLENOL) 325 MG tablet Take 2 tablets by mouth every 6 hours as needed for Pain (Patient not taking: Reported on 7/17/2020) 120 tablet 0     No current facility-administered medications for this visit. Allergies:  Latex; Krystina Pedro; Penicillin g; Shellfish-derived products; Sulfa antibiotics; Grapefruit concentrate; and Shrimp extract allergy skin test  History of allergic reaction to anesthesia:  No  Teeth: bridge    ** Latex Allergy    Social History:   Social History     Tobacco Use   Smoking Status Never Smoker   Smokeless Tobacco Never Used     The patient states she drinks 0 per week. Family History:   Family History   Problem Relation Age of Onset    Cancer Mother         non hodgkins lymphoma    Cancer Father         colon    Cancer Sister         colon    Cancer Brother         mesothelioma    Arrhythmia Sister        REVIEW OF SYSTEMS:    CONSTITUTIONAL:  negative  EYES:  negative  HEENT:  negative  RESPIRATORY:  negative  CARDIOVASCULAR:  negative  GASTROINTESTINAL:  negative  GENITOURINARY:  negative  INTEGUMENT/BREAST:  negative  HEMATOLOGIC/LYMPHATIC:  negative  ALLERGIC/IMMUNOLOGIC:  negative  ENDOCRINE:  negative  MUSCULOSKELETAL:  negative  NEUROLOGICAL:  negative    PHYSICAL EXAM:      BP (!) 154/76 (Site: Left Upper Arm, Position: Sitting)   Pulse 64   Temp 98.8 °F (37.1 °C)   Ht 5' 2.5\" (1.588 m)   Wt 145 lb (65.8 kg)   SpO2 97%   BMI 26.10 kg/m² Body mass index is 26.1 kg/m².     Eyes:  Lids and lashes normal, pupils equal, round and reactive to light, extra ocular muscles intact, sclera clear, conjunctiva normal  Head/ENT:  Normocephalic, without obvious abnormality, atraumatic, sinuses nontender on palpation, external ears without lesions, oral pharynx with moist mucus membranes, tonsils without erythema or exudates, gums normal and good dentition. Neck:  Supple, symmetrical, trachea midline, no adenopathy, thyroid symmetric, not enlarged and no tenderness, skin normal  Heart:  Normal apical impulse, regular rate and rhythm, normal S1 and S2, no S3 or S4, and no murmur noted  Lungs:  No increased work of breathing, good air exchange, clear to auscultation bilaterally, no crackles or wheezing  Abdomen:  No scars, normal bowel sounds, soft, non-distended, non-tender, no masses palpated, no hepatosplenomegally  Extremities:  No clubbing, cyanosis, or edema    DATA:  No PAT required     ASSESSMENT AND PLAN:    1. Patient is a 68 y.o. female with above specified procedure planned on 7/20/20 and 8/5/20 with Dr. Julia Hummel at [facility]. Martine Garnica is Medically stable for surgery. Report will be faxed.

## 2020-07-21 ENCOUNTER — TELEPHONE (OUTPATIENT)
Dept: FAMILY MEDICINE CLINIC | Age: 70
End: 2020-07-21

## 2020-07-21 NOTE — TELEPHONE ENCOUNTER
Patient called to see if you received her mammogram results from [facility]. E's that was done on 7-13-20. I did not see them in media, asked her to call and have them fax them. After hanging up I did see them in Care Everywhere.

## 2020-07-21 NOTE — TELEPHONE ENCOUNTER
I received it  Results area as follows  IMPRESSION:  Negative  (ACR-Category-1)  ~  RECOMMENDATION:  Routine screening mammogram in 1 year.   Tomosynthesis recommended    Please let pt know

## 2020-07-22 ENCOUNTER — TELEPHONE (OUTPATIENT)
Dept: FAMILY MEDICINE CLINIC | Age: 70
End: 2020-07-22

## 2020-07-22 NOTE — TELEPHONE ENCOUNTER
Patient would like to know what her weight should be? On her pre op it has that she is obese. She says she works out and watches what she eats. I went over her BMI from After Summary, but she would like a weight number.

## 2020-07-22 NOTE — TELEPHONE ENCOUNTER
Ht Readings from Last 3 Encounters:   07/17/20 5' 2.5\" (1.588 m)   06/05/20 5' 2.5\" (1.588 m)   11/13/19 5' 2.5\" (1.588 m)     Wt Readings from Last 3 Encounters:   07/17/20 145 lb (65.8 kg)   06/05/20 138 lb 12.8 oz (63 kg)   11/13/19 149 lb (67.6 kg)       Idealweight 136 lbs for bmi of 24.9    Last bmi 26.1 kg/m2 overweight not obese; was typo if so

## 2020-08-04 ENCOUNTER — OFFICE VISIT (OUTPATIENT)
Dept: FAMILY MEDICINE CLINIC | Age: 70
End: 2020-08-04
Payer: MEDICARE

## 2020-08-04 VITALS
HEIGHT: 63 IN | SYSTOLIC BLOOD PRESSURE: 142 MMHG | OXYGEN SATURATION: 98 % | DIASTOLIC BLOOD PRESSURE: 88 MMHG | WEIGHT: 143.2 LBS | TEMPERATURE: 98.2 F | HEART RATE: 67 BPM | BODY MASS INDEX: 25.37 KG/M2

## 2020-08-04 PROCEDURE — 1090F PRES/ABSN URINE INCON ASSESS: CPT | Performed by: PHYSICIAN ASSISTANT

## 2020-08-04 PROCEDURE — 99213 OFFICE O/P EST LOW 20 MIN: CPT | Performed by: PHYSICIAN ASSISTANT

## 2020-08-04 PROCEDURE — G8400 PT W/DXA NO RESULTS DOC: HCPCS | Performed by: PHYSICIAN ASSISTANT

## 2020-08-04 PROCEDURE — G8417 CALC BMI ABV UP PARAM F/U: HCPCS | Performed by: PHYSICIAN ASSISTANT

## 2020-08-04 PROCEDURE — G8427 DOCREV CUR MEDS BY ELIG CLIN: HCPCS | Performed by: PHYSICIAN ASSISTANT

## 2020-08-04 PROCEDURE — 1036F TOBACCO NON-USER: CPT | Performed by: PHYSICIAN ASSISTANT

## 2020-08-04 PROCEDURE — 1123F ACP DISCUSS/DSCN MKR DOCD: CPT | Performed by: PHYSICIAN ASSISTANT

## 2020-08-04 PROCEDURE — 3017F COLORECTAL CA SCREEN DOC REV: CPT | Performed by: PHYSICIAN ASSISTANT

## 2020-08-04 PROCEDURE — 4040F PNEUMOC VAC/ADMIN/RCVD: CPT | Performed by: PHYSICIAN ASSISTANT

## 2020-08-04 ASSESSMENT — ENCOUNTER SYMPTOMS: RESPIRATORY NEGATIVE: 1

## 2020-08-04 NOTE — PROGRESS NOTES
Subjective:      Patient ID: Dedra Washburn is a 68 y.o. female. HPI  Patient presents with right shoulder pain that started yesterday after reaching back behind her while in the car. She heard a pop in the shoulder and started with pain. Worse today even than yesterday. Restricted ROM, no weakness. The pain is constant but varied in intensity. Review of Systems   Constitutional: Positive for fatigue. Respiratory: Negative. Cardiovascular: Negative. Musculoskeletal:        Right shoulder pain       Objective:   Physical Exam  Constitutional:       Appearance: Normal appearance. Cardiovascular:      Rate and Rhythm: Normal rate. Pulmonary:      Breath sounds: Normal breath sounds. Musculoskeletal:      Right shoulder: She exhibits decreased range of motion, tenderness, pain and decreased strength. Neurological:      Mental Status: She is alert. Assessment:       Diagnosis Orders   1. Acute pain of right shoulder             Plan:      Referred to [facility] urgent care for more emergent treatment.          MCKAY Vasquez

## 2020-09-10 ENCOUNTER — VIRTUAL VISIT (OUTPATIENT)
Dept: FAMILY MEDICINE CLINIC | Age: 70
End: 2020-09-10
Payer: MEDICARE

## 2020-09-10 ENCOUNTER — TELEPHONE (OUTPATIENT)
Dept: FAMILY MEDICINE CLINIC | Age: 70
End: 2020-09-10

## 2020-09-10 VITALS — WEIGHT: 143 LBS | HEIGHT: 63 IN | TEMPERATURE: 98.2 F | BODY MASS INDEX: 25.34 KG/M2

## 2020-09-10 PROCEDURE — G0439 PPPS, SUBSEQ VISIT: HCPCS | Performed by: FAMILY MEDICINE

## 2020-09-10 PROCEDURE — 4040F PNEUMOC VAC/ADMIN/RCVD: CPT | Performed by: FAMILY MEDICINE

## 2020-09-10 PROCEDURE — 1123F ACP DISCUSS/DSCN MKR DOCD: CPT | Performed by: FAMILY MEDICINE

## 2020-09-10 PROCEDURE — 3017F COLORECTAL CA SCREEN DOC REV: CPT | Performed by: FAMILY MEDICINE

## 2020-09-10 RX ORDER — METHYLPREDNISOLONE 4 MG/1
TABLET ORAL
Qty: 21 TABLET | Refills: 0 | Status: SHIPPED | OUTPATIENT
Start: 2020-09-10 | End: 2020-09-16 | Stop reason: ALTCHOICE

## 2020-09-10 ASSESSMENT — PATIENT HEALTH QUESTIONNAIRE - PHQ9
SUM OF ALL RESPONSES TO PHQ QUESTIONS 1-9: 2
2. FEELING DOWN, DEPRESSED OR HOPELESS: 2
SUM OF ALL RESPONSES TO PHQ9 QUESTIONS 1 & 2: 2
1. LITTLE INTEREST OR PLEASURE IN DOING THINGS: 0
SUM OF ALL RESPONSES TO PHQ QUESTIONS 1-9: 2

## 2020-09-10 ASSESSMENT — LIFESTYLE VARIABLES: HOW OFTEN DO YOU HAVE A DRINK CONTAINING ALCOHOL: 0

## 2020-09-10 NOTE — TELEPHONE ENCOUNTER
Patient completed awv with lpn on phone states she has bee sting that is having a reaction is not getting any better, very itchy, rash broke out, also swollen eyes and itchy, unable to access my chart due to Internet issues, spoke to Dr Barb Perez he states will send in medication advised patient that it will be sent this afternoon.

## 2020-09-10 NOTE — PROGRESS NOTES
Medicare Annual Wellness Visit  Name: Germania First Date: 9/10/2020   MRN: <[id number]> Sex: Female   Age: 68 y.o. Ethnicity: Declined   : 1950 Race: Moses Jaime is here for Medicare AWV    Screenings for behavioral, psychosocial and functional/safety risks, and cognitive dysfunction are all negative except as indicated below. These results, as well as other patient data from the [address] form, are documented in Flowsheets linked to this Encounter. Allergies   Allergen Reactions    Latex Swelling and Rash     Rubber gloves    Mangifera Indica Anaphylaxis     [place]-- Throat closes    Penicillin G Hives    Shellfish-Derived Products Hives    Sulfa Antibiotics Hives    Grapefruit Concentrate      Due to chemo    Shrimp Extract Allergy Skin Test Rash       Prior to Visit Medications    Medication Sig Taking? Authorizing Provider   Lutein 20 MG TABS 1 tablet by oral route everyday Yes Historical Provider, MD   moxifloxacin (VIGAMOX) 0.5 % ophthalmic solution  Yes Historical Provider, MD   prednisoLONE acetate (PRED FORTE) 1 % ophthalmic suspension  Yes Historical Provider, MD   EPINEPHrine (EPIPEN 2-BRENNAN) 0.3 MG/0.3ML SOAJ injection Use as directed for allergic reaction Yes Cori Courtney. Jenifer Eli., DO   cycloSPORINE (RESTASIS) 0.05 % ophthalmic emulsion Place 1 drop into both eyes every 12 hours Yes Ivory Valdez.  Jenifer Eli., DO   Cholecalciferol (VITAMIN D3) 1000 units CAPS Take by mouth daily Yes Historical Provider, MD   UNABLE TO FIND Take by mouth daily thytrophin--1/2 pill Yes Historical Provider, MD   PHYTOSTEROLS PO Take by mouth daily Yes Historical Provider, MD   CINNAMON PO Take by mouth daily Yes Historical Provider, MD   TURMERIC CURCUMIN PO Take by mouth daily Yes Historical Provider, MD   CALCIUM-MAGNESIUM-ZINC PO Take by mouth daily Yes Historical Provider, MD   Coenzyme Q10 (CO Q 10) 10 MG CAPS Take by mouth 2 times daily  Yes Historical Provider, MD colonoscopy  10/18/2024    DTaP/Tdap/Td vaccine (2 - Td) 11/04/2024    Pneumococcal 65+ yrs at Risk Vaccine  Completed    Hepatitis C screen  Completed    Hepatitis A vaccine  Aged Out    Hepatitis B vaccine  Aged Out    Hib vaccine  Aged Out    Meningococcal (ACWY) vaccine  Aged Out     Recommendations for [place] Due: see orders and patient instructions/AVS.  . Recommended screening schedule for the next 5-10 years is provided to the patient in written form: see Patient Instructions/AVS.    IKrish LPN, 5/76/4132, performed the documented evaluation under the direct supervision of the attending physician. Ruchi Flaherty is a 68 y.o. female being evaluated by a Virtual Visit (video visit) encounter to address concerns as mentioned above. A caregiver was present when appropriate. Due to this being a TeleHealth encounter (During [facility]- public health emergency), evaluation of the following organ systems was limited: Vitals/Constitutional/EENT/Resp/CV/GI//MS/Neuro/Skin/Heme-Lymph-Imm. Pursuant to the emergency declaration under the [address] [place] [address], [address] [place] [address] authority and the [employer] Act, this Virtual Visit was conducted with patient's (and/or legal guardian's) consent, to reduce the patient's risk of exposure to COVID-19 and provide necessary medical care. The patient (and/or legal guardian) has also been advised to contact this office for worsening conditions or problems, and seek emergency medical treatment and/or call 911 if deemed necessary. Patient identification was verified at the start of the visit: Yes    Total time spent for this encounter: 10 minutes    Services were provided through a video synchronous discussion virtually to substitute for in-person clinic visit. Patient and provider were located at their individual homes.     --Krish Chung LPN on 9/58/8217 at 10:30 AM    An electronic signature was used to authenticate this note. Attending Supervising Physicians Attestation Statement  The patient met the criteria for direct supervision. I discussed the findings and plans with the LPN and agree as documented in her note . Electronically signed by Eufemia Lundy.  Andrea Dutton DO on 9/10/20 at 11:24 PM EDT

## 2020-09-10 NOTE — TELEPHONE ENCOUNTER
Medrol dose pack sent and recommend following      Local reaction treatment -- To reduce pain and swelling after an insect sting, you can try the following:    ? Apply a cold compress (a cold, damp washcloth or damp cloth wrapped around an ice pack) to the area. ?If you develop itching, you can take a nonprescription antihistamine, such as cetirizine (Zyrtec). ?A pain reliever, such as ibuprofen (sold as Advil, Motrin, and store brands), may help reduce pain.

## 2020-09-10 NOTE — PATIENT INSTRUCTIONS
Personalized Preventive Plan for Charline Lu - 9/10/2020  Medicare offers a range of preventive health benefits. Some of the tests and screenings are paid in full while other may be subject to a deductible, co-insurance, and/or copay. Some of these benefits include a comprehensive review of your medical history including lifestyle, illnesses that may run in your family, and various assessments and screenings as appropriate. After reviewing your medical record and screening and assessments performed today your provider may have ordered immunizations, labs, imaging, and/or referrals for you. A list of these orders (if applicable) as well as your Preventive Care list are included within your After Visit Summary for your review. Other Preventive Recommendations:    · A preventive eye exam performed by an eye specialist is recommended every 1-2 years to screen for glaucoma; cataracts, macular degeneration, and other eye disorders. · A preventive dental visit is recommended every 6 months. · Try to get at least 150 minutes of exercise per week or 10,000 steps per day on a pedometer . · Order or download the FREE \"Exercise & Physical Activity: Your Everyday Guide\" from [employer]. Call 9-519.287.3350 or search [employer] online. · You need 1296-2809 mg of calcium and 7642-4544 IU of vitamin D per day. It is possible to meet your calcium requirement with diet alone, but a vitamin D supplement is usually necessary to meet this goal.  · When exposed to the sun, use a sunscreen that protects against both UVA and UVB radiation with an SPF of 30 or greater. Reapply every 2 to 3 hours or after sweating, drying off with a towel, or swimming. · Always wear a seat belt when traveling in a car. Always wear a helmet when riding a bicycle or motorcycle. Heart-Healthy Diet   Sodium, Fat, and Cholesterol Controlled Diet       What Is a Heart Healthy Diet?    A heart-healthy diet is one that limits sodium , certain types of fat , and cholesterol . This type of diet is recommended for:   People with any form of cardiovascular disease (eg, coronary heart disease , peripheral vascular disease , previous heart attack , previous stroke )   People with risk factors for cardiovascular disease, such as high blood pressure , high cholesterol , or diabetes   Anyone who wants to lower their risk of developing cardiovascular disease   Sodium    Sodium is a mineral found in many foods. In general, most people consume much more sodium than they need. Diets high in sodium can increase blood pressure and lead to edema (water retention). On a heart-healthy diet, you should consume no more than 2,300 mg (milligrams) of sodium per dayabout the amount in one teaspoon of table salt. The foods highest in sodium include table salt (about 50% sodium), processed foods, convenience foods, and preserved foods. Cholesterol    Cholesterol is a fat-like, waxy substance in your blood. Our bodies make some cholesterol. It is also found in animal products, with the highest amounts in fatty meat, egg yolks, whole milk, cheese, shellfish, and organ meats. On a heart-healthy diet, you should limit your cholesterol intake to less than 200 mg per day. It is normal and important to have some cholesterol in your bloodstream. But too much cholesterol can cause plaque to build up within your arteries, which can eventually lead to a heart attack or stroke. The two types of cholesterol that are most commonly referred to are:   Low-density lipoprotein (LDL) cholesterol  Also known as bad cholesterol, this is the cholesterol that tends to build up along your arteries. Bad cholesterol levels are increased by eating fats that are saturated or hydrogenated. Optimal level of this cholesterol is less than 100. Over 130 starts to get risky for heart disease.    High-density lipoprotein (HDL) cholesterol  Also known as good cholesterol, this type of cholesterol actually carries cholesterol away from your arteries and may, therefore, help lower your risk of having a heart attack. You want this level to be high (ideally greater than 60). It is a risk to have a level less than 40. You can raise this good cholesterol by eating olive oil, canola oil, avocados, or nuts. Exercise raises this level, too. Fat    Fat is calorie dense and packs a lot of calories into a small amount of food. Even though fats should be limited due to their high calorie content, not all fats are bad. In fact, some fats are quite healthful. Fat can be broken down into four main types. The good-for-you fats are:   Monounsaturated fat  found in oils such as olive and canola, avocados, and nuts and natural nut butters; can decrease cholesterol levels, while keeping levels of HDL cholesterol high   Polyunsaturated fat  found in oils such as safflower, sunflower, soybean, corn, and sesame; can decrease total cholesterol and LDL cholesterol   Omega-3 fatty acids  particularly those found in fatty fish (such as salmon, trout, tuna, mackerel, herring, and sardines); can decrease risk of arrhythmias, decrease triglyceride levels, and slightly lower blood pressure   The fats that you want to limit are:   Saturated fat  found in animal products, many fast foods, and a few vegetables; increases total blood cholesterol, including LDL levels   Animal fats that are saturated include: butter, lard, whole-milk dairy products, meat fat, and poultry skin   Vegetable fats that are saturated include: hydrogenated shortening, palm oil, coconut oil, cocoa butter   Hydrogenated or trans fat  found in margarine and vegetable shortening, most shelf stable snack foods, and fried foods; increases LDL and decreases HDL     It is generally recommended that you limit your total fat for the day to less than 30% of your total calories.  If you follow an 1800-calorie heart healthy diet, for example, this would mean 60 grams of fat or less per day. Saturated fat and trans fat in your diet raises your blood cholesterol the most, much more than dietary cholesterol does. For this reason, on a heart-healthy diet, less than 7% of your calories should come from saturated fat and ideally 0% from trans fat. On an 1800-calorie diet, this translates into less than 14 grams of saturated fat per day, leaving 46 grams of fat to come from mono- and polyunsaturated fats.    Food Choices on a Heart Healthy Diet   Food Category   Foods Recommended   Foods to Avoid   Grains   Breads and rolls without salted tops Most dry and cooked cereals Unsalted crackers and breadsticks Low-sodium or homemade breadcrumbs or stuffing All rice and pastas   Breads, rolls, and crackers with salted tops High-fat baked goods (eg, muffins, donuts, pastries) Quick breads, self-rising flour, and biscuit mixes Regular bread crumbs Instant hot cereals Commercially prepared rice, pasta, or stuffing mixes   Vegetables   Most fresh, frozen, and low-sodium canned vegetables Low-sodium and salt-free vegetable juices Canned vegetables if unsalted or rinsed   Regular canned vegetables and juices, including sauerkraut and pickled vegetables Frozen vegetables with sauces Commercially prepared potato and vegetable mixes   Fruits   Most fresh, frozen, and canned fruits All fruit juices   Fruits processed with salt or sodium   Milk   Nonfat or low-fat (1%) milk Nonfat or low-fat yogurt Cottage cheese, low-fat ricotta, cheeses labeled as low-fat and low-sodium   Whole milk Reduced-fat (2%) milk Malted and chocolate milk Full fat yogurt Most cheeses (unless low-fat and low salt) Buttermilk (no more than 1 cup per week)   Meats and Beans   Lean cuts of fresh or frozen beef, veal, lamb, or pork (look for the word loin) Fresh or frozen poultry without the skin Fresh or frozen fish and some shellfish Egg whites and egg substitutes (Limit whole eggs to three per week) Tofu Nuts or seeds (unsalted, dry-roasted), low-sodium peanut butter Dried peas, beans, and lentils   Any smoked, cured, salted, or canned meat, fish, or poultry (including naranjo, chipped beef, cold cuts, hot dogs, sausages, sardines, and anchovies) Poultry skins Breaded and/or fried fish or meats Canned peas, beans, and lentils Salted nuts   Fats and Oils   Olive oil and canola oil Low-sodium, low-fat salad dressings and mayonnaise   Butter, margarine, coconut and palm oils, naranjo fat   Snacks, Sweets, and Condiments   Low-sodium or unsalted versions of broths, soups, soy sauce, and condiments Pepper, herbs, and spices; vinegar, lemon, or lime juice Low-fat frozen desserts (yogurt, sherbet, fruit bars) Sugar, cocoa powder, honey, syrup, jam, and preserves Low-fat, trans-fat free cookies, cakes, and pies Chu and animal crackers, fig bars, rula snaps   High-fat desserts Broth, soups, gravies, and sauces, made from instant mixes or other high-sodium ingredients Salted snack foods Canned olives Meat tenderizers, seasoning salt, and most flavored vinegars   Beverages   Low-sodium carbonated beverages Tea and coffee in moderation Soy milk   Commercially softened water   Suggestions   Make whole grains, fruits, and vegetables the base of your diet. Choose heart-healthy fats such as canola, olive, and flaxseed oil, and foods high in heart-healthy fats, such as nuts, seeds, soybeans, tofu, and fish. Eat fish at least twice per week; the fish highest in omega-3 fatty acids and lowest in mercury include salmon, herring, mackerel, sardines, and canned chunk light tuna. If you eat fish less than twice per week or have high triglycerides, talk to your doctor about taking fish oil supplements. Read food labels.    For products low in fat and cholesterol, look for fat free, low-fat, cholesterol free, saturated fat free, and trans fat freeAlso scan the Nutrition Facts Label, which lists saturated fat, trans fat, and cholesterol amounts. For products low in sodium, look for sodium free, very low sodium, low sodium, no added salt, and unsalted   Skip the salt when cooking or at the table; if food needs more flavor, get creative and try out different herbs and spices. Garlic and onion also add substantial flavor to foods. Trim any visible fat off meat and poultry before cooking, and drain the fat off after bonner. Use cooking methods that require little or no added fat, such as grilling, boiling, baking, poaching, broiling, roasting, steaming, stir-frying, and sauting. Avoid fast food and convenience food. They tend to be high in saturated and trans fat and have a lot of added salt. Talk to a registered dietitian for individualized diet advice. Last Reviewed: March 2011 Tatianna See MS, MPH, RD   Updated: 3/29/2011   ·     Keep Your Memory [place]       Many factors can affect your ability to remembera hectic lifestyle, aging, stress, chronic disease, and certain medicines. But, there are steps you can take to sharpen your mind and help preserve your memory. Challenge Your Brain   Regularly challenging your mind may help keeps it in top shape. Good mental exercises include:   Crossword puzzlesUse a dictionary if you need it; you will learn more that way. Brainteasers Try some! Crafts, such as wood working and sewing   Hobbies, such as gardening and building model airplanes   SocializingVisit old friends or join groups to meet new ones. Reading   Learning a new language   Taking a class, whether it be art history or denise chi   TravelingExperience the food, history, and culture of your destination   Learning to use a computer   Going to museums, the theater, or thought-provoking movies   Changing things in your daily life, such as reversing your pattern in the grocery store or brushing your teeth using your nondominant hand   Use Memory Aids   There is no need to remember every detail on your own.  These memory aids can help:   Calendars and day planners   Electronic organizers to store all sorts of helpful informationThese devices can \"beep\" to remind you of appointments. A book of days to record birthdays, anniversaries, and other occasions that occur on the same date every year   Detailed \"to-do\" lists and strategically placed sticky notes   Quick \"study\" sessionsBefore a gathering, review who will be there so their names will be fresh in your mind. Establish routinesFor example, keep your keys, wallet, and umbrella in the same place all the time or take medicine with your 8:00 AM glass of juice   Live a Healthy Life   Many actions that will keep your body strong will do the same for your mind. For example:   Talk to Your Doctor About Herbs and Supplements    Malnutrition and vitamin deficiencies can impair your mental function. For example, vitamin B12 deficiency can cause a range of symptoms, including confusion. But, what if your nutritional needs are being met? Can herbs and supplements still offer a benefit? Researchers have investigated a range of natural remedies, such as ginkgo , ginseng , and the supplement phosphatidylserine (PS). So far, though, the evidence is inconsistent as to whether these products can improve memory or thinking. If you are interested in taking herbs and supplements, talk to your doctor first because they may interact with other medicines that you are taking. Exercise Regularly    Among the many benefits of regular exercise are increased blood flow to the brain and decreased risk of certain diseases that can interfere with memory function. One study found that even moderate exercise has a beneficial effect. Examples of \"moderate\" exercise include:   Playing 18 holes of golf once a week, without a cart   Playing tennis twice a week   Walking one mile per day   Manage Stress    It can be tough to remember what is important when your mind is cluttered.  Make time for relaxation. Choose activities that calm you down, and make it routine. Manage Chronic Conditions    Side effects of high blood pressure , diabetes, and heart disease can interfere with mental function. Many of the lifestyle steps discussed here can help manage these conditions. Strive to eat a healthy diet, exercise regularly, get stress under control, and follow your doctor's advice for your condition. Minimize Medications    Talk to your doctor about the medicines that you take. Some may be unnecessary. Also, healthy lifestyle habits may lower the need for certain drugs.      Last Reviewed: April 2010 Alysha Guerrero MD   Updated: 4/13/2010   ·

## 2020-09-16 ENCOUNTER — OFFICE VISIT (OUTPATIENT)
Dept: FAMILY MEDICINE CLINIC | Age: 70
End: 2020-09-16
Payer: MEDICARE

## 2020-09-16 VITALS
WEIGHT: 146.4 LBS | TEMPERATURE: 98.2 F | OXYGEN SATURATION: 99 % | HEART RATE: 78 BPM | SYSTOLIC BLOOD PRESSURE: 155 MMHG | HEIGHT: 63 IN | BODY MASS INDEX: 25.94 KG/M2 | DIASTOLIC BLOOD PRESSURE: 78 MMHG

## 2020-09-16 LAB
A/G RATIO: 1.7 (ref 1.1–2.2)
ALBUMIN SERPL-MCNC: 4.4 G/DL (ref 3.4–5)
ALP BLD-CCNC: 127 U/L (ref 40–129)
ALT SERPL-CCNC: 42 U/L (ref 10–40)
ANION GAP SERPL CALCULATED.3IONS-SCNC: 11 MMOL/L (ref 3–16)
AST SERPL-CCNC: 27 U/L (ref 15–37)
BILIRUB SERPL-MCNC: 0.7 MG/DL (ref 0–1)
BUN BLDV-MCNC: 24 MG/DL (ref 7–20)
CALCIUM SERPL-MCNC: 10.3 MG/DL (ref 8.3–10.6)
CHLORIDE BLD-SCNC: 98 MMOL/L (ref 99–110)
CHOLESTEROL, TOTAL: 240 MG/DL (ref 0–199)
CO2: 32 MMOL/L (ref 21–32)
CREAT SERPL-MCNC: 0.9 MG/DL (ref 0.6–1.2)
GFR AFRICAN AMERICAN: >60
GFR NON-AFRICAN AMERICAN: >60
GLOBULIN: 2.6 G/DL
GLUCOSE BLD-MCNC: 144 MG/DL (ref 70–99)
HDLC SERPL-MCNC: 107 MG/DL (ref 40–60)
LDL CHOLESTEROL CALCULATED: 111 MG/DL
POTASSIUM SERPL-SCNC: 4.4 MMOL/L (ref 3.5–5.1)
SODIUM BLD-SCNC: 141 MMOL/L (ref 136–145)
T4 FREE: 1.1 NG/DL (ref 0.9–1.8)
TOTAL PROTEIN: 7 G/DL (ref 6.4–8.2)
TRIGL SERPL-MCNC: 108 MG/DL (ref 0–150)
TSH SERPL DL<=0.05 MIU/L-ACNC: 7.32 UIU/ML (ref 0.27–4.2)
VLDLC SERPL CALC-MCNC: 22 MG/DL

## 2020-09-16 PROCEDURE — 36415 COLL VENOUS BLD VENIPUNCTURE: CPT | Performed by: FAMILY MEDICINE

## 2020-09-16 PROCEDURE — G8417 CALC BMI ABV UP PARAM F/U: HCPCS | Performed by: FAMILY MEDICINE

## 2020-09-16 PROCEDURE — 90694 VACC AIIV4 NO PRSRV 0.5ML IM: CPT | Performed by: FAMILY MEDICINE

## 2020-09-16 PROCEDURE — 4040F PNEUMOC VAC/ADMIN/RCVD: CPT | Performed by: FAMILY MEDICINE

## 2020-09-16 PROCEDURE — G0008 ADMIN INFLUENZA VIRUS VAC: HCPCS | Performed by: FAMILY MEDICINE

## 2020-09-16 PROCEDURE — 3017F COLORECTAL CA SCREEN DOC REV: CPT | Performed by: FAMILY MEDICINE

## 2020-09-16 PROCEDURE — 99213 OFFICE O/P EST LOW 20 MIN: CPT | Performed by: FAMILY MEDICINE

## 2020-09-16 PROCEDURE — 1090F PRES/ABSN URINE INCON ASSESS: CPT | Performed by: FAMILY MEDICINE

## 2020-09-16 PROCEDURE — G8427 DOCREV CUR MEDS BY ELIG CLIN: HCPCS | Performed by: FAMILY MEDICINE

## 2020-09-16 PROCEDURE — 1123F ACP DISCUSS/DSCN MKR DOCD: CPT | Performed by: FAMILY MEDICINE

## 2020-09-16 PROCEDURE — 1036F TOBACCO NON-USER: CPT | Performed by: FAMILY MEDICINE

## 2020-09-16 PROCEDURE — G8400 PT W/DXA NO RESULTS DOC: HCPCS | Performed by: FAMILY MEDICINE

## 2020-09-16 ASSESSMENT — ENCOUNTER SYMPTOMS
SHORTNESS OF BREATH: 0
ABDOMINAL PAIN: 0

## 2020-09-16 NOTE — PATIENT INSTRUCTIONS
Consider hydrochlorothiazide for blood pressure control  Lifestyle modification with yard work and physical therapy  Low salt/sodium diet recommended  Follow up in next  6 months  DASH Diet: Care Instructions  Your Care Instructions     The DASH diet is an eating plan that can help lower your blood pressure. DASH stands for Dietary Approaches to Stop Hypertension. Hypertension is high blood pressure. The DASH diet focuses on eating foods that are high in calcium, potassium, and magnesium. These nutrients can lower blood pressure. The foods that are highest in these nutrients are fruits, vegetables, low-fat dairy products, nuts, seeds, and legumes. But taking calcium, potassium, and magnesium supplements instead of eating foods that are high in those nutrients does not have the same effect. The DASH diet also includes whole grains, fish, and poultry. The DASH diet is one of several lifestyle changes your doctor may recommend to lower your high blood pressure. Your doctor may also want you to decrease the amount of sodium in your diet. Lowering sodium while following the DASH diet can lower blood pressure even further than just the DASH diet alone. Follow-up care is a key part of your treatment and safety. Be sure to make and go to all appointments, and call your doctor if you are having problems. It's also a good idea to know your test results and keep a list of the medicines you take. How can you care for yourself at home? Following the DASH diet  · Eat 4 to 5 servings of fruit each day. A serving is 1 medium-sized piece of fruit, ½ cup chopped or canned fruit, 1/4 cup dried fruit, or 4 ounces (½ cup) of fruit juice. Choose fruit more often than fruit juice. · Eat 4 to 5 servings of vegetables each day. A serving is 1 cup of lettuce or raw leafy vegetables, ½ cup of chopped or cooked vegetables, or 4 ounces (½ cup) of vegetable juice. Choose vegetables more often than vegetable juice.   · Get 2 to 3 servings of low-fat and fat-free dairy each day. A serving is 8 ounces of milk, 1 cup of yogurt, or 1 ½ ounces of cheese. · Eat 6 to 8 servings of grains each day. A serving is 1 slice of bread, 1 ounce of dry cereal, or ½ cup of cooked rice, pasta, or cooked cereal. Try to choose whole-grain products as much as possible. · Limit lean meat, poultry, and fish to 2 servings each day. A serving is 3 ounces, about the size of a deck of cards. · Eat 4 to 5 servings of nuts, seeds, and legumes (cooked dried beans, lentils, and split peas) each week. A serving is 1/3 cup of nuts, 2 tablespoons of seeds, or ½ cup of cooked beans or peas. · Limit fats and oils to 2 to 3 servings each day. A serving is 1 teaspoon of vegetable oil or 2 tablespoons of salad dressing. · Limit sweets and added sugars to 5 servings or less a week. A serving is 1 tablespoon jelly or jam, ½ cup sorbet, or 1 cup of lemonade. · Eat less than 2,300 milligrams (mg) of sodium a day. If you limit your sodium to 1,500 mg a day, you can lower your blood pressure even more. Tips for success  · Start small. Do not try to make dramatic changes to your diet all at once. You might feel that you are missing out on your favorite foods and then be more likely to not follow the plan. Make small changes, and stick with them. Once those changes become habit, add a few more changes. · Try some of the following:  ? Make it a goal to eat a fruit or vegetable at every meal and at snacks. This will make it easy to get the recommended amount of fruits and vegetables each day. ? Try yogurt topped with fruit and nuts for a snack or healthy dessert. ? Add lettuce, tomato, cucumber, and onion to sandwiches. ? Combine a ready-made pizza crust with low-fat mozzarella cheese and lots of vegetable toppings. Try using tomatoes, squash, spinach, broccoli, carrots, cauliflower, and onions. ?  Have a variety of cut-up vegetables with a low-fat dip as an appetizer instead of chips and dip.  ? Sprinkle sunflower seeds or chopped almonds over salads. Or try adding chopped walnuts or almonds to cooked vegetables. ? Try some vegetarian meals using beans and peas. Add garbanzo or kidney beans to salads. Make burritos and tacos with mashed dyson beans or black beans. Where can you learn more? Go to https://[employer].BookingNest. org and sign in to your [employer] account. Enter B838 in the [facility] box to learn more about \"DASH Diet: Care Instructions. \"     If you do not have an account, please click on the \"Sign Up Now\" link. Current as of: December 16, 2019               Content Version: 12.5  © 9758-3184 [employer]. Care instructions adapted under license by [facility] ([facility]). If you have questions about a medical condition or this instruction, always ask your healthcare professional. [place] [address] any warranty or liability for your use of this information. Patient Education        hydrochlorothiazide  Pronunciation:  ALFREDO palacios  Brand:  Microzide  What is the most important information I should know about hydrochlorothiazide? You should not use this medicine if you are unable to urinate. What is hydrochlorothiazide? Hydrochlorothiazide is a thiazide diuretic (water pill) that helps prevent your body from absorbing too much salt, which can cause fluid retention. Hydrochlorothiazide is used to treat high blood pressure (hypertension). Hydrochlorothiazide may also be used for purposes not listed in this medication guide. What should I discuss with my healthcare provider before taking hydrochlorothiazide? You should not use hydrochlorothiazide if you are allergic to it, or if you are unable to urinate.   To make sure hydrochlorothiazide is safe for you, tell your doctor if you have:  · kidney disease;  · liver disease;  · gout;  · glaucoma;  · low levels of potassium or sodium in your blood;  · high levels of calcium in your blood;  · a parathyroid gland disorder;  · diabetes; or  · an allergy to sulfa drugs or penicillin. Hydrochlorothiazide is not expected to be harmful to an unborn baby. However, if you take this medicine during pregnancy, your  baby may develop jaundice or other problems. Tell your doctor if you are pregnant or plan to become pregnant while taking hydrochlorothiazide. Hydrochlorothiazide can pass into breast milk and may harm a nursing baby. You should not breast-feed while using this medicine. Hydrochlorothiazide is not approved for use by anyone younger than 25years old. How should I take hydrochlorothiazide? Follow all directions on your prescription label. Your doctor may occasionally change your dose to make sure you get the best results. Do not use this medicine in larger or smaller amounts or for longer than recommended. Hydrochlorothiazide is usually taken once per day. Follow your doctor's dosing instructions very carefully. Call your doctor if you have ongoing vomiting or diarrhea, or if you are sweating more than usual. You can easily become dehydrated while taking this medicine, which can lead to severely low blood pressure or a serious electrolyte imbalance. While using hydrochlorothiazide, you may need frequent medical tests and blood pressure be checks. Your blood and urine may both be tested if you have been vomiting or are dehydrated. If you need surgery, tell the surgeon ahead of time that you are using hydrochlorothiazide. You may need to stop using the medicine for a short time. Keep using this medicine as directed, even if you feel well. High blood pressure often has no symptoms. You may need to use blood pressure medicine for the rest of your life. Store at room temperature away from moisture, heat, and freezing. Keep the bottle tightly closed when not in use. What happens if I miss a dose? Take the missed dose as soon as you remember.  Skip the missed dose if it is almost time for your next scheduled dose. Do not take extra medicine to make up the missed dose. What happens if I overdose? Seek emergency medical attention or call the Poison Help line at 1-479.821.6082. Overdose symptoms may include nausea, weakness, dizziness, dry mouth, thirst, and muscle pain or weakness. What should I avoid while taking hydrochlorothiazide? Drinking alcohol with this medicine can cause side effects. Avoid becoming overheated or dehydrated during exercise, in hot weather, or by not drinking enough fluids. Follow your doctor's instructions about the type and amount of liquids you should drink. In some cases, drinking too much liquid can be as unsafe as not drinking enough. What are the possible side effects of hydrochlorothiazide? Get emergency medical help if you have signs of an allergic reaction: hives; difficulty breathing; swelling of your face, lips, tongue, or throat. Call your doctor at once if you have:  · a light-headed feeling, like you might pass out;  · eye pain, vision problems;  · jaundice (yellowing of the skin or eyes);  · pale skin, easy bruising, unusual bleeding (nose, mouth, vagina, or rectum);  · shortness of breath, wheezing, cough with foamy mucus, chest pain;  · signs of electrolyte imbalance --dry mouth, thirst, drowsiness, lack of energy, restlessness, muscle pain or weakness, fast heart rate, nausea and vomiting, little or no urine; or  · severe skin reaction --fever, sore throat, swelling in your face or tongue, burning in your eyes, skin pain followed by a red or purple skin rash that spreads (especially in the face or upper body) and causes blistering and peeling. Common side effects may include:  · nausea, vomiting, loss of appetite;  · diarrhea, constipation;  · muscle spasm; or  · dizziness, headache. This is not a complete list of side effects and others may occur. Call your doctor for medical advice about side effects.  You may report side effects to FDA at 0-159-FDA-2324. What other drugs will affect hydrochlorothiazide? Taking this medicine with other drugs that make you light-headed can worsen this effect. Ask your doctor before taking a sleeping pill, narcotic pain medicine, muscle relaxer, or medicine for anxiety, depression, or seizures. Tell your doctor about all your current medicines and any you start or stop using, especially:  · cholestyramine, colestipol;  · insulin or oral diabetes medicine;  · lithium;  · other blood pressure medications;  · steroid medicine; or  · NSAIDs (nonsteroidal anti-inflammatory drugs) --aspirin, ibuprofen (Advil, Motrin), naproxen (Aleve), celecoxib, diclofenac, indomethacin, meloxicam, and others. This list is not complete. Other drugs may interact with hydrochlorothiazide, including prescription and over-the-counter medicines, vitamins, and herbal products. Not all possible interactions are listed in this medication guide. Where can I get more information? Your pharmacist can provide more information about hydrochlorothiazide. Remember, keep this and all other medicines out of the reach of children, never share your medicines with others, and use this medication only for the indication prescribed. Every effort has been made to ensure that the information provided by [address] is accurate, up-to-date, and complete, but no guarantee is made to that effect. Drug information contained herein may be time sensitive. [facility] information has been compiled for use by healthcare practitioners and consumers in the [place] and therefore [facility] does not warrant that uses outside of the [place] are appropriate, unless specifically indicated otherwise. [facility]'s drug information does not endorse drugs, diagnose patients or recommend therapy.  [facility] drug information is an informational resource designed to assist licensed healthcare practitioners in caring for their patients and/or to serve consumers viewing this service as a supplement to, and not a substitute for, the expertise, skill, knowledge and judgment of healthcare practitioners. The absence of a warning for a given drug or drug combination in no way should be construed to indicate that the drug or drug combination is safe, effective or appropriate for any given patient. [facility] does not assume any responsibility for any aspect of healthcare administered with the aid of information [facility] provides. The information contained herein is not intended to cover all possible uses, directions, precautions, warnings, drug interactions, allergic reactions, or adverse effects. If you have questions about the drugs you are taking, check with your doctor, nurse or pharmacist.  Copyright 0714-2935 [address]: 12.01. Revision date: 8/9/2016. Care instructions adapted under license by [facility] ([facility]). If you have questions about a medical condition or this instruction, always ask your healthcare professional. [place] [address] any warranty or liability for your use of this information.

## 2020-09-16 NOTE — PROGRESS NOTES
Vaccine Information Sheet, \"Influenza - Inactivated\"  given to Nataliia Seen, or parent/legal guardian of  Nataliia Seen and verbalized understanding. Patient responses:    Have you ever had a reaction to a flu vaccine? No  Do you have any current illness? No  Have you ever had Guillian [place] Syndrome? No  Do you have a serious allergy to any of the follow: Neomycin, Polymyxin, Thimerosal, eggs or egg products? No    Flu vaccine given per order. Please see immunization tab. Risks and benefits explained. Current VIS given.

## 2020-09-16 NOTE — PROGRESS NOTES
SUBJECTIVE:  Chief Complaint   Patient presents with    Hypertension     3 MONTH F/U    Flu Vaccine    Health Maintenance    Hypothyroidism     HPI    Jl Singh is a 68 y. o.female that presents today for high blood pressure follow up:  SINCE LAST ov. Had been sting  Had right shoulder pain and tore right rotator cuff and going to physical therapy. Had cataract surgery  Moving back to [place] around [facility] for Winter and will return next Summer in June.     HTN:  -Diet/lifestyle controlled previously  -dries sensitive to light, will go away  -no vision change other ways  -has always had floaters  -bee sting had headache  -otherwise not issues  -had tightness in her chest while on medicine  - no excess salt  -no alcohol  -no soft drinks  -oatmeal, nuts, grains, fruits 1 day, eggs and veggies next day for breakfast  -lunch and supper meat and vegetable   -Weds eats out  -does exercise bike at home    Vitals:    09/16/20 1341 09/16/20 1411   BP: (!) 168/72 (!) 155/78   Site:  Right Upper Arm   Position:  Sitting   Cuff Size:  Medium Adult   Pulse: 68 78   Temp: 98.2 °F (36.8 °C)    TempSrc: Temporal    SpO2: 99%    Weight: 146 lb 6.4 oz (66.4 kg)    Height: 5' 2.5\" (1.588 m)      BP Readings from Last 3 Encounters:   09/16/20 (!) 155/78   08/04/20 (!) 142/88   07/17/20 (!) 154/76     Past Medical History:   Diagnosis Date    Anemia     Arthritis     Asthma     Bowel dysfunction     Cancer ([facility] [address] 01/2006    leukemia    Hyperlipidemia     Hypertension     Obesity     Skin cancer of face     left cheek, squamous    Thyroid disease     TIA (transient ischemic attack)     mini ones-from chemo     Past Surgical History:   Procedure Laterality Date    ANUS SURGERY  1998    fissure    BREAST BIOPSY      BREAST LUMPECTOMY      benign    COLONOSCOPY      numerous polyps    ELBOW SURGERY  1960    removal of foreign body ([place])    FINE NEEDLE ASPIRATION      PARACENTESIS      x 2 fluid in lung from Chemo    TONSILLECTOMY Bilateral        Family History   Problem Relation Age of Onset   [facility] Cancer Mother         non hodgkins lymphoma    Cancer Father         colon    Cancer Sister         colon    Cancer Brother         mesothelioma    Arrhythmia Sister      Current Outpatient Medications   Medication Sig Dispense Refill    Lutein 20 MG TABS 1 tablet by oral route everyday      moxifloxacin (VIGAMOX) 0.5 % ophthalmic solution       prednisoLONE acetate (PRED FORTE) 1 % ophthalmic suspension       EPINEPHrine (EPIPEN 2-BRENNAN) 0.3 MG/0.3ML SOAJ injection Use as directed for allergic reaction 1 each 0    cycloSPORINE (RESTASIS) 0.05 % ophthalmic emulsion Place 1 drop into both eyes every 12 hours 30 vial 2    Cholecalciferol (VITAMIN D3) 1000 units CAPS Take by mouth daily      UNABLE TO FIND Take by mouth daily thytrophin--1/2 pill      PHYTOSTEROLS PO Take by mouth daily      CINNAMON PO Take by mouth daily      TURMERIC CURCUMIN PO Take by mouth daily      CALCIUM-MAGNESIUM-ZINC PO Take by mouth daily      Coenzyme Q10 (CO Q 10) 10 MG CAPS Take by mouth 2 times daily       KRILL OIL PO Take by mouth daily       polyethylene glycol (GLYCOLAX) powder Take 17 g by mouth daily      acetaminophen (TYLENOL) 325 MG tablet Take 2 tablets by mouth every 6 hours as needed for Pain 120 tablet 0    nilotinib (TASIGNA) 150 MG CAPS capsule Take 2 capsules by mouth 2 times daily 120 capsule 3    aspirin 81 MG tablet Take 81 mg by mouth       Probiotic Product (PROBIOTIC & ACIDOPHILUS EX ST PO) Take by mouth daily        No current facility-administered medications for this visit.       Allergies   Allergen Reactions    Latex Swelling and Rash     Rubber gloves    Mangifera Indica Anaphylaxis     Francisco-- Throat closes    Penicillin G Hives    Shellfish-Derived Products Hives    Sulfa Antibiotics Hives    Grapefruit Concentrate      Due to chemo    Shrimp Extract Allergy Skin Test Rash     Social sounds: Normal heart sounds. No murmur. No friction rub. No gallop. Pulmonary:      Effort: Pulmonary effort is normal. No respiratory distress. Breath sounds: Normal breath sounds. No wheezing or rales. Abdominal:      General: Bowel sounds are normal. There is no distension. Palpations: Abdomen is soft. Tenderness: There is no abdominal tenderness. There is no rebound. Musculoskeletal: Normal range of motion. General: No tenderness. Lymphadenopathy:      Cervical: No cervical adenopathy. Skin:     General: Skin is warm and dry. Findings: No erythema or rash. Neurological:      Mental Status: She is alert and oriented to person, place, and time. Cranial Nerves: No cranial nerve deficit. Psychiatric:         Speech: Speech normal.         Thought Content: Thought content does not include homicidal or suicidal ideation. ASSESSMENT/PLAN:  Governessie Gaming is 64-year-old female here today for blood pressure follow-up after being elevated the last few office visits. Patient declines any medications that were offered today. Consider HCTZ. Given multiple handouts today on lowering blood pressure naturally, however I think she may require pharmacological aid in lowering her blood pressure to reduce risk of stroke or heart attack. Patient will consider. Patient with history of hypothyroidism but prefers not to take Synthroid. Takes over-the-counter thyroid supplement. Check thyroid today. Kidney liver electrolytes with blood pressure concerns and if we are to start medication. High-dose flu vaccine given to patient today as well. Follow-up in 6 months. 1. Benign essential HTN  -pt declines all Rx  -consider DASH diet/lifestyle changes, AFP handouts given to patient  - Comprehensive Metabolic Panel    2. Need for influenza vaccination  - INFLUENZA, QUADV, ADJUVANTED, 65 YRS =, IM, PF, PREFILL SYR, 0.5ML (FLUAD)    3.  Acquired hypothyroidism  -takes OTC replacement; prefers not to take synthroid due to labile thyroid levels in past when on Rx  - TSH without Reflex  - T4, Free    4. Lipid screening  - Lipid Panel    Reviewed treatment plan with patient. Patient verbalized understanding to treatment plan and questions were answered. Return in about 6 months (around 3/16/2021). Kameron Dallas.  Ena Urban.      9/16/2020

## 2020-11-04 RX ORDER — TRIAMCINOLONE ACETONIDE 1 MG/G
CREAM TOPICAL
Qty: 1 TUBE | Refills: 5 | Status: SHIPPED | OUTPATIENT
Start: 2020-11-04 | End: 2021-05-10

## 2020-11-04 NOTE — TELEPHONE ENCOUNTER
Due to patient having leukemia, she  uses this cream to make sure if she gets a scrape on her leg, she does not get an infection. Was written by previous physician. Udaycindy Sifuentes [phone] (home)    is requesting refill(s) of medication Triamcinolone .  1 % cream to preferred pharmacy [facility]    Last OV 09-16-20 (pertaining to medication)   Last refill ???   Written by previous physician   (per medication requested)  Next office visit scheduled or attempted Yes  Date 03-17-21  If No, reason made

## 2021-04-09 PROBLEM — Z87.2 HX OF ACTINIC KERATOSIS: Status: ACTIVE | Noted: 2020-09-17

## 2021-05-10 ENCOUNTER — VIRTUAL VISIT (OUTPATIENT)
Dept: PRIMARY CARE CLINIC | Age: 71
End: 2021-05-10
Payer: MEDICARE

## 2021-05-10 DIAGNOSIS — E78.00 PURE HYPERCHOLESTEROLEMIA: ICD-10-CM

## 2021-05-10 DIAGNOSIS — C92.10 CML (CHRONIC MYELOCYTIC LEUKEMIA) (HCC): ICD-10-CM

## 2021-05-10 DIAGNOSIS — E04.1 THYROID NODULE: ICD-10-CM

## 2021-05-10 DIAGNOSIS — C80.1 CANCER (HCC): ICD-10-CM

## 2021-05-10 DIAGNOSIS — M25.561 CHRONIC PAIN OF BOTH KNEES: ICD-10-CM

## 2021-05-10 DIAGNOSIS — I10 ESSENTIAL HYPERTENSION: ICD-10-CM

## 2021-05-10 DIAGNOSIS — E03.9 ACQUIRED HYPOTHYROIDISM: ICD-10-CM

## 2021-05-10 DIAGNOSIS — G89.29 CHRONIC PAIN OF BOTH KNEES: ICD-10-CM

## 2021-05-10 DIAGNOSIS — M17.0 PRIMARY OSTEOARTHRITIS OF BOTH KNEES: Primary | ICD-10-CM

## 2021-05-10 DIAGNOSIS — R73.9 HYPERGLYCEMIA: ICD-10-CM

## 2021-05-10 DIAGNOSIS — M25.562 CHRONIC PAIN OF BOTH KNEES: ICD-10-CM

## 2021-05-10 DIAGNOSIS — T78.2XXS ANAPHYLAXIS, SEQUELA: ICD-10-CM

## 2021-05-10 PROBLEM — I50.33 ACUTE ON CHRONIC DIASTOLIC HEART FAILURE (HCC): Status: RESOLVED | Noted: 2017-04-11 | Resolved: 2021-05-10

## 2021-05-10 PROCEDURE — G8400 PT W/DXA NO RESULTS DOC: HCPCS | Performed by: FAMILY MEDICINE

## 2021-05-10 PROCEDURE — 1090F PRES/ABSN URINE INCON ASSESS: CPT | Performed by: FAMILY MEDICINE

## 2021-05-10 PROCEDURE — 1123F ACP DISCUSS/DSCN MKR DOCD: CPT | Performed by: FAMILY MEDICINE

## 2021-05-10 PROCEDURE — 3017F COLORECTAL CA SCREEN DOC REV: CPT | Performed by: FAMILY MEDICINE

## 2021-05-10 PROCEDURE — 99213 OFFICE O/P EST LOW 20 MIN: CPT | Performed by: FAMILY MEDICINE

## 2021-05-10 PROCEDURE — G8427 DOCREV CUR MEDS BY ELIG CLIN: HCPCS | Performed by: FAMILY MEDICINE

## 2021-05-10 PROCEDURE — 4040F PNEUMOC VAC/ADMIN/RCVD: CPT | Performed by: FAMILY MEDICINE

## 2021-05-10 RX ORDER — EPINEPHRINE 0.3 MG/.3ML
INJECTION SUBCUTANEOUS
Qty: 1 EACH | Refills: 0 | Status: SHIPPED | OUTPATIENT
Start: 2021-05-10 | End: 2022-01-26

## 2021-05-10 ASSESSMENT — ENCOUNTER SYMPTOMS
CONSTIPATION: 0
ABDOMINAL PAIN: 0
RHINORRHEA: 1
CHEST TIGHTNESS: 0
COUGH: 0
VOMITING: 0
NAUSEA: 0
BLOOD IN STOOL: 1
DIARRHEA: 0
SHORTNESS OF BREATH: 1
ANAL BLEEDING: 1
WHEEZING: 1

## 2021-05-10 ASSESSMENT — PATIENT HEALTH QUESTIONNAIRE - PHQ9
SUM OF ALL RESPONSES TO PHQ QUESTIONS 1-9: 0
1. LITTLE INTEREST OR PLEASURE IN DOING THINGS: 0

## 2021-05-10 NOTE — PATIENT INSTRUCTIONS
-see Dr. Gelacio Rosario for your knees    -get blood work for thyroid and sugar levels    -consider seeing the colorectal surgeon to band or ligate the hemorrhoids    -for now POAL, sitz baths, fiber, miralax, as needed preparation h when flared    -see Dr. Nir Muhammad for your [facility] treatment/follow up    -I'll see you in about 6 weeks

## 2021-05-10 NOTE — PROGRESS NOTES
5/10/2021  TELEHEALTH EVALUATION -- Audio/Visual (During KYUGQ-48 public health emergency)  Chief Complaint   Patient presents with    6 Month Follow-Up    Knee Pain    Arthritis    Hypothyroidism    Leukemia    Hypertension    Hyperlipidemia    Hearing Problem    Hyperglycemia    Allergic Reaction     HPI:    Jovon Shah (:  1950) has requested an audio/video evaluation for the following concern(s):    Recently was .  to [facility] on [address] in [place], in [place]    -OA of knees:  Arthritis in her knees. Was diagnosed in [place] shortly before moving back to [place]  Not the type you can take medicine for it. Was told there are 2 types    -Cataracts: has surgery on both eyes in the past year; doing much much better    -CML: takes Tasigna (nilitonib) 200 mg BID  Sees Dr. Lazara Sanchez in [place]  and Dr. Reyna Dill in [place]  Dx 2006    -HTN: not at goal last 3 OV;  Patient declined RX    HPL:  Does krill oil  Lab Results   Component Value Date    CHOL 240 (H) 2020    CHOL 214 (H) 10/02/2019     Lab Results   Component Value Date    TRIG 108 2020    TRIG 77 10/02/2019     Lab Results   Component Value Date     (H) 2020    HDL 88 (H) 10/02/2019     Lab Results   Component Value Date    LDLCALC 111 (H) 2020    LDLCALC 111 (H) 10/02/2019     Lab Results   Component Value Date    LABVLDL 22 2020    LABVLDL 15 10/02/2019     No results found for: CHOLHDLRATIO    The ASCVD Risk score (Omaira Fuller et al., 2013) failed to calculate for the following reasons:     The valid HDL cholesterol range is 20 to 100 mg/dL    Unable to determine if patient is Non-     -Neuropathy: comes and goes    -Hypothyroidism: TSH 9 now; patient was taking thyroid supplement; would like rechecked    Past Medical History:   Diagnosis Date    Anemia     Arthritis     Asthma     Bowel dysfunction     CML (chronic myelocytic leukemia) ([facility] [address] 01/2006    leukemia    Hyperlipidemia     Hypertension     Obesity     Skin cancer of face     left cheek, squamous    Thyroid disease     TIA (transient ischemic attack)     mini ones-from chemo     Past Surgical History:   Procedure Laterality Date    ANUS SURGERY  1998    fissure    BREAST BIOPSY      BREAST LUMPECTOMY      benign    COLONOSCOPY      numerous polyps    ELBOW SURGERY  1960    removal of foreign body ([place])    FINE NEEDLE ASPIRATION      PARACENTESIS      x 2 fluid in lung from Chemo    TONSILLECTOMY Bilateral      Current Outpatient Medications   Medication Sig Dispense Refill    TASIGNA 200 MG capsule       triamcinolone (KENALOG) 0.1 % cream Apply topically 2 times daily.  1 Tube 5    Lutein 20 MG TABS 1 tablet by oral route everyday      moxifloxacin (VIGAMOX) 0.5 % ophthalmic solution       prednisoLONE acetate (PRED FORTE) 1 % ophthalmic suspension       EPINEPHrine (EPIPEN 2-BRENNAN) 0.3 MG/0.3ML SOAJ injection Use as directed for allergic reaction 1 each 0    cycloSPORINE (RESTASIS) 0.05 % ophthalmic emulsion Place 1 drop into both eyes every 12 hours 30 vial 2    Cholecalciferol (VITAMIN D3) 1000 units CAPS Take by mouth daily      UNABLE TO FIND Take by mouth daily thytrophin--1/2 pill      PHYTOSTEROLS PO Take by mouth daily      CINNAMON PO Take by mouth daily      TURMERIC CURCUMIN PO Take by mouth daily      CALCIUM-MAGNESIUM-ZINC PO Take by mouth daily      Coenzyme Q10 (CO Q 10) 10 MG CAPS Take by mouth 2 times daily       KRILL OIL PO Take by mouth daily       polyethylene glycol (GLYCOLAX) powder Take 17 g by mouth daily      acetaminophen (TYLENOL) 325 MG tablet Take 2 tablets by mouth every 6 hours as needed for Pain 120 tablet 0    nilotinib (TASIGNA) 150 MG CAPS capsule Take 2 capsules by mouth 2 times daily 120 capsule 3    aspirin 81 MG tablet Take 81 mg by mouth       Probiotic Product (PROBIOTIC & ACIDOPHILUS EX ST PO) Take by mouth daily        No current facility-administered medications for this visit.       Allergies   Allergen Reactions    Latex Swelling and Rash     Rubber gloves    Mangifera Indica Anaphylaxis     [place]-- Throat closes    Penicillin G Hives    Shellfish-Derived Products Hives    Sulfa Antibiotics Hives    Grapefruit Concentrate      Due to chemo    Shrimp Extract Allergy Skin Test Rash     Family History   Problem Relation Age of Onset    Cancer Mother         non hodgkins lymphoma    Cancer Father         colon    Cancer Sister         colon    Cancer Brother         mesothelioma    Arrhythmia Sister      Social History     Socioeconomic History    Marital status:      Spouse name: Not on file    Number of children: 2    Years of education: Not on file    Highest education level: Not on file   Occupational History     Employer: RETIRED    Occupation: [employer] | [employer]    Occupation: [employer]    Occupation: [employer]   Social Needs    Financial resource strain: Somewhat hard    Food insecurity     Worry: Never true     Inability: Never true    Transportation needs     Medical: No     Non-medical: No   Tobacco Use    Smoking status: Never Smoker    Smokeless tobacco: Never Used   Substance and Sexual Activity    Alcohol use: No    Drug use: No    Sexual activity: Not Currently   Lifestyle    Physical activity     Days per week: 3 days     Minutes per session: 30 min    Stress: Rather much   Relationships    Social connections     Talks on phone: Twice a week     Gets together: Twice a week     Attends Confucianist service: More than 4 times per year     Active member of club or organization: Yes     Attends meetings of clubs or organizations: More than 4 times per year     Relationship status:     Intimate partner violence     Fear of current or ex partner: Patient refused     Emotionally abused: Patient refused     Physically abused: Patient refused     Forced sexual activity: Patient refused   Other Topics Concern    Not on file   Social History Narrative    [de-identified]    Son in [place] got  late in life    2 grandchildren 1y/o and 6 m/o    Loves to take care of them       Review of Systems   Constitutional: Negative for activity change, appetite change, chills, fever and unexpected weight change. HENT: Positive for hearing loss and rhinorrhea. Negative for tinnitus. Eyes: Negative for visual disturbance. Respiratory: Positive for shortness of breath and wheezing. Negative for cough and chest tightness. Cardiovascular: Positive for chest pain. Gastrointestinal: Positive for anal bleeding and blood in stool. Negative for abdominal pain, constipation, diarrhea, nausea and vomiting. Endocrine: Negative for polydipsia and polyuria. Genitourinary: Negative for dysuria and hematuria. Musculoskeletal: Positive for arthralgias. Negative for joint swelling. Rotator cuff right arm; exercises   Skin: Negative for rash. Allergic/Immunologic: Negative for environmental allergies and food allergies. Neurological: Positive for numbness. Negative for weakness and headaches. Psychiatric/Behavioral: Negative for dysphoric mood and sleep disturbance. The patient is not nervous/anxious. Patient-Reported Vitals 5/10/2021   Patient-Reported Weight 148lb   Patient-Reported Height 5 2.5      Physical Exam  Constitutional:       Appearance: Normal appearance. She is not ill-appearing. HENT:      Head: Normocephalic and atraumatic. Eyes:      Extraocular Movements: Extraocular movements intact. Pulmonary:      Effort: Pulmonary effort is normal.   Neurological:      General: No focal deficit present. Mental Status: She is alert and oriented to person, place, and time. Mental status is at baseline. Psychiatric:         Mood and Affect: Mood normal.         Behavior: Behavior normal.         Thought Content:  Thought content normal.       ASSESSMENT/PLAN:  Mc Lemus is 64 y/o female who  was seen today virtually for 6 month follow-up, knee pain, arthritis, hypothyroidism, leukemia, hypertension, hyperlipidemia, hearing problem, hyperglycemia and allergic reaction. 1. Primary osteoarthritis of both knees  2. Chronic pain of both knees  -Pt told that she cannot take RX by Primary [place] [address]; Pt would like to see orthopedics to discuss options such as CSI, gels/synvisc/etc  -offered eval and tx in office, pt prefers to see orthopedics  - Shannan Patrick MD, Orthopedic Surgery, [facility]    3. CML (chronic myelocytic leukemia) ([facility] [address]  [facility]  -follows with [facility] Yeni [facility]avaleta locally [facility]  On Tasigna 200 mg BID    5. Thyroid nodule  6. Acquired hypothyroidism  Lab Results   Component Value Date    TSH 7.32 (H) 09/16/2020    T4FREE 1.1 09/16/2020     Pt not been on Rx synthroid; was taking naturopathic thyroid supplement; TSH in 9's in [address]; repeat and consider starting RX; now having symptoms of hypothyroid with temperature intolerance and hair loss  - TSH without Reflex; Future  - T4, FREE; Future    7. Essential hypertension  High at all OV; pt refused all RX  Consider RX at next visit    8. Pure hypercholesterolemia  Total >200 but LDL not >190  ; does krill oil currently  Lab Results   Component Value Date    CHOL 240 (H) 09/16/2020    CHOL 214 (H) 10/02/2019     Lab Results   Component Value Date    TRIG 108 09/16/2020    TRIG 77 10/02/2019     Lab Results   Component Value Date     (H) 09/16/2020    HDL 88 (H) 10/02/2019     Lab Results   Component Value Date    LDLCALC 111 (H) 09/16/2020    LDLCALC 111 (H) 10/02/2019     Lab Results   Component Value Date    LABVLDL 22 09/16/2020    LABVLDL 15 10/02/2019     No results found for: CHOLHDLRATIO      9. Hyperglycemia  - Hemoglobin A1C; Future    10.  Anaphylaxis, sequela  - EPINEPHrine (EPIPEN 2-BRENNAN) 0.3 MG/0.3ML SOAJ injection; Use as directed for allergic reaction  Dispense: 1 each; Refill: 0      Return in about 6 weeks (around 6/21/2021) for get blood work at same time as  appointment. [facility], was evaluated through a synchronous (real-time) audio-video encounter. The patient (or guardian if applicable) is aware that this is a billable service. Verbal consent to proceed has been obtained within the past 12 months. The visit was conducted pursuant to the emergency declaration under the [address] [place] [address], [address] [place] [address] authority and the [employer] General Act. Patient identification was verified, and a caregiver was present when appropriate. The patient was located in a state where the provider was credentialed to provide care. \"THIS VISIT WAS COMPLETED VIRTUALLY USING [employer] ME\"    Total time spent on this encounter: Not billed by time    --Nicole Staff. Chintan Barrow.,  on 5/10/2021 at 10:24 AM    An electronic signature was used to authenticate this note.

## 2021-05-17 ENCOUNTER — OFFICE VISIT (OUTPATIENT)
Dept: ORTHOPEDIC SURGERY | Age: 71
End: 2021-05-17
Payer: MEDICARE

## 2021-05-17 VITALS — TEMPERATURE: 97.6 F | WEIGHT: 140 LBS | BODY MASS INDEX: 24.8 KG/M2 | HEIGHT: 63 IN

## 2021-05-17 DIAGNOSIS — M25.562 LEFT KNEE PAIN, UNSPECIFIED CHRONICITY: ICD-10-CM

## 2021-05-17 DIAGNOSIS — M25.561 RIGHT KNEE PAIN, UNSPECIFIED CHRONICITY: ICD-10-CM

## 2021-05-17 DIAGNOSIS — M17.0 PRIMARY OSTEOARTHRITIS OF BOTH KNEES: Primary | ICD-10-CM

## 2021-05-17 PROCEDURE — 3017F COLORECTAL CA SCREEN DOC REV: CPT | Performed by: ORTHOPAEDIC SURGERY

## 2021-05-17 PROCEDURE — 4040F PNEUMOC VAC/ADMIN/RCVD: CPT | Performed by: ORTHOPAEDIC SURGERY

## 2021-05-17 PROCEDURE — 1090F PRES/ABSN URINE INCON ASSESS: CPT | Performed by: ORTHOPAEDIC SURGERY

## 2021-05-17 PROCEDURE — 99204 OFFICE O/P NEW MOD 45 MIN: CPT | Performed by: ORTHOPAEDIC SURGERY

## 2021-05-17 PROCEDURE — G8427 DOCREV CUR MEDS BY ELIG CLIN: HCPCS | Performed by: ORTHOPAEDIC SURGERY

## 2021-05-17 PROCEDURE — 1123F ACP DISCUSS/DSCN MKR DOCD: CPT | Performed by: ORTHOPAEDIC SURGERY

## 2021-05-17 PROCEDURE — G8417 CALC BMI ABV UP PARAM F/U: HCPCS | Performed by: ORTHOPAEDIC SURGERY

## 2021-05-17 PROCEDURE — G8400 PT W/DXA NO RESULTS DOC: HCPCS | Performed by: ORTHOPAEDIC SURGERY

## 2021-05-17 PROCEDURE — 1036F TOBACCO NON-USER: CPT | Performed by: ORTHOPAEDIC SURGERY

## 2021-05-17 NOTE — LETTER
Physical Therapy Rehabilitation Referral    Patient Name: Antoni German      YOB: 1950    Diagnosis:    Bilateral knee arthritis   Precautions:   Exercises exacerbating PF arthritis  Date of Prescription:  5/17/21    [x] Evaluate and Treat        Modalities:    [x] Of Choice        [] Ultrasound       [] Iontophoresis      [] Moist heat     [] Massage         [] Cryotherapy      [] Electrical stimulation     [] Paraffin  [] Whirlpool  [] TENS    [x] Home exercise program (copy to patient).         Perform exercises for:  15   minutes    2-3   times/day  [x] Supervised physical therapy  Frequency: []  1x week  [x] 2x week  [] 3x week  [] Other:   Duration: [] 2 weeks   [] 4 weeks  [x] 6 weeks  [] Other:     Additional Instructions:   Quad,abductor strength, hamstring/IT stretches    Lg Kauffman MD  Orthopaedic Fellow  [facility] and [address] [place] [address]

## 2021-05-17 NOTE — PROGRESS NOTES
Date:  2021    Name:  Gurpreet Deshpande  Address:  [address] [place] [address]    :  1950      Age:   65 y.o.    SSN:  [id number]      Medical Record Number:  [id number]    Reason for Visit:    Chief Complaint    Knee Pain (new patient bilateral knees )      DOS:2021     HPI: Noaln Dao is a 65 y.o. female here today for complaints of bilateral knee pain. She has had knee pain for approximately 2 to 3 months. She did not have any associated injury or falls. The right side she experiences popping and generalized soreness more anteriorly with ambulation. The left side she experiences anterior/lateral sided pain. At times she feels like her knee is going to give out on her. Stairs are bothersome for her, kneeling is bothersome as well. She has had no previous treatment, no physical therapy. She has had no steroid injections in the knees either. She is unable to take NSAIDs. Currently she is undergoing treatment for CML, oral treatment daily. Pain Assessment  Location of Pain: Knee  Location Modifiers: Left, Right  Severity of Pain: 2  Quality of Pain: Sharp, Aching  Duration of Pain: Persistent  Frequency of Pain: Intermittent  Aggravating Factors: Stairs  Limiting Behavior: Some  Relieving Factors: Rest  Result of Injury: No  Work-Related Injury: No  Are there other pain locations you wish to document?: No  ROS: All systems reviewed on patient intake form. Pertinent items are noted in HPI.         Past Medical History:   Diagnosis Date    Anemia     Arthritis     Asthma     Bowel dysfunction     CML (chronic myelocytic leukemia) ([facility] [address] 2006    leukemia    Hyperlipidemia     Hypertension     Obesity     Skin cancer of face     left cheek, squamous    Thyroid disease     TIA (transient ischemic attack)     mini ones-from chemo        Past Surgical History:   Procedure Laterality Date    ANUS SURGERY      fissure    BREAST BIOPSY      BREAST LUMPECTOMY and Family: Twice a week    Attends Confucianism Services: More than 4 times per year    Active Member of Clubs or Organizations: Yes    Attends Club or Organization Meetings: More than 4 times per year    Marital Status:    Intimate Partner Violence: Unknown    Fear of Current or Ex-Partner: Patient refused    Emotionally Abused: Patient refused    Physically Abused: Patient refused    Sexually Abused: Patient refused       Current Outpatient Medications   Medication Sig Dispense Refill    EPINEPHrine (EPIPEN 2-BRENNAN) 0.3 MG/0.3ML SOAJ injection Use as directed for allergic reaction 1 each 0    TASIGNA 200 MG capsule       cycloSPORINE (RESTASIS) 0.05 % ophthalmic emulsion Place 1 drop into both eyes every 12 hours 30 vial 2    Cholecalciferol (VITAMIN D3) 1000 units CAPS Take by mouth daily      UNABLE TO FIND Take by mouth daily thytrophin--1/2 pill      PHYTOSTEROLS PO Take by mouth daily      CINNAMON PO Take by mouth daily      TURMERIC CURCUMIN PO Take by mouth daily      CALCIUM-MAGNESIUM-ZINC PO Take by mouth daily      Coenzyme Q10 (CO Q 10) 10 MG CAPS Take by mouth 2 times daily       KRILL OIL PO Take by mouth daily       polyethylene glycol (GLYCOLAX) powder Take 17 g by mouth daily      acetaminophen (TYLENOL) 325 MG tablet Take 2 tablets by mouth every 6 hours as needed for Pain 120 tablet 0    aspirin 81 MG tablet Take 81 mg by mouth       Probiotic Product (PROBIOTIC & ACIDOPHILUS EX ST PO) Take by mouth daily        No current facility-administered medications for this visit.        Allergies   Allergen Reactions    Latex Swelling and Rash     Rubber gloves    Mangifera Indica Anaphylaxis     [place]-- Throat closes    Penicillin G Hives    Shellfish-Derived Products Hives    Sulfa Antibiotics Hives    Grapefruit Concentrate      Due to chemo    Shrimp Extract Allergy Skin Test Rash       Vital signs:  Temp 97.6 °F (36.4 °C)   Ht 5' 2.5\" (1.588 m)   Wt 140 lb (63.5 kg)   BMI 25.20 kg/m²        Neuro: Alert & oriented x 3,  normal,  no focal deficits noted. Normal affect. Eyes: sclera clear  Ears: Normal external ear  Mouth:  No perioral lesions  Pulm: Respirations unlabored and regular  Pulse: Regular rate    Skin: Warm, well perfused        L knee exam    Gait: No use of assistive devices. No antalgic gait. Alignment: normal alignment. Inspection/skin: Skin is intact without erythema or ecchymosis. No gross deformity. Palpation: PF crepitus. Lateral joint     Range of Motion: There is full range of motion. Strength: Normal quadriceps development. Effusion: No effusion or swelling present. Ligamentous stability: No cruciate or collateral ligament instability. Neurologic and vascular: Skin is warm and well-perfused. Sensation is intact to light-touch. Special tests: Negative Justo sign. Patellar grind +      R knee exam    Gait: No use of assistive devices. No antalgic gait. Alignment: normal alignment. Inspection/skin: Skin is intact without erythema or ecchymosis. No gross deformity. Palpation: no crepitus. no joint line tenderness present. Range of Motion: There is full range of motion. Strength: Normal quadriceps development. Effusion: No effusion or swelling present. Ligamentous stability: No cruciate or collateral ligament instability. Neurologic and vascular: Skin is warm and well-perfused. Sensation is intact to light-touch. Special tests: Negative Justo sign. Patellar grind with mild discomfort          Diagnostics:  Radiology:     Radiographs were obtained and reviewed in the office; 4 views: bilateral PA, bilateral Nash, bilateral Merchants AND bilateral lateral    Impression: No fractures. No dislocations. Patellofemoral joint space narrowing noted on the left. Maintained joint spaces noted otherwise. Mild medial joint space narrowing on the right knee.       Assessment:

## 2021-05-18 ENCOUNTER — HOSPITAL ENCOUNTER (OUTPATIENT)
Age: 71
Discharge: HOME | End: 2021-05-18
Payer: MEDICARE

## 2021-05-18 DIAGNOSIS — E04.1 THYROID NODULE: ICD-10-CM

## 2021-05-18 DIAGNOSIS — E03.9 ACQUIRED HYPOTHYROIDISM: ICD-10-CM

## 2021-05-18 DIAGNOSIS — R73.9 HYPERGLYCEMIA: ICD-10-CM

## 2021-05-18 DIAGNOSIS — I10 ESSENTIAL HYPERTENSION: Primary | ICD-10-CM

## 2021-05-18 LAB
ALBUMIN SERPL-MCNC: 4.4 G/DL (ref 3.4–5)
ALP BLD-CCNC: 84 U/L (ref 40–129)
ALT SERPL-CCNC: 24 U/L (ref 10–40)
AST SERPL-CCNC: 27 U/L (ref 15–37)
BILIRUB SERPL-MCNC: 1.3 MG/DL (ref 0–1)
BILIRUBIN DIRECT: <0.2 MG/DL (ref 0–0.3)
BILIRUBIN, INDIRECT: ABNORMAL MG/DL (ref 0–1)
T4 FREE: 0.9 NG/DL (ref 0.9–1.8)
TOTAL PROTEIN: 7.2 G/DL (ref 6.4–8.2)
TSH SERPL DL<=0.05 MIU/L-ACNC: 4.34 UIU/ML (ref 0.27–4.2)

## 2021-05-18 PROCEDURE — 84443 ASSAY THYROID STIM HORMONE: CPT

## 2021-05-18 PROCEDURE — 83036 HEMOGLOBIN GLYCOSYLATED A1C: CPT

## 2021-05-18 PROCEDURE — 84439 ASSAY OF FREE THYROXINE: CPT

## 2021-05-18 PROCEDURE — 36415 COLL VENOUS BLD VENIPUNCTURE: CPT

## 2021-05-18 PROCEDURE — 80076 HEPATIC FUNCTION PANEL: CPT

## 2021-05-19 LAB
ESTIMATED AVERAGE GLUCOSE: 108.3 MG/DL
HBA1C MFR BLD: 5.4 %

## 2021-07-29 ENCOUNTER — VIRTUAL VISIT (OUTPATIENT)
Dept: PRIMARY CARE CLINIC | Age: 71
End: 2021-07-29
Payer: MEDICARE

## 2021-07-29 DIAGNOSIS — R07.89 CHEST PAIN, ATYPICAL: ICD-10-CM

## 2021-07-29 DIAGNOSIS — E03.9 ACQUIRED HYPOTHYROIDISM: ICD-10-CM

## 2021-07-29 DIAGNOSIS — M17.0 BILATERAL PRIMARY OSTEOARTHRITIS OF KNEE: ICD-10-CM

## 2021-07-29 DIAGNOSIS — G89.29 CHRONIC PAIN OF BOTH KNEES: Primary | ICD-10-CM

## 2021-07-29 DIAGNOSIS — M25.561 CHRONIC PAIN OF BOTH KNEES: Primary | ICD-10-CM

## 2021-07-29 DIAGNOSIS — Z00.00 HEALTHCARE MAINTENANCE: ICD-10-CM

## 2021-07-29 DIAGNOSIS — M25.562 CHRONIC PAIN OF BOTH KNEES: Primary | ICD-10-CM

## 2021-07-29 DIAGNOSIS — C92.12 CML (CHRONIC MYELOID LEUKEMIA) IN RELAPSE (HCC): ICD-10-CM

## 2021-07-29 PROCEDURE — 99423 OL DIG E/M SVC 21+ MIN: CPT

## 2021-07-29 SDOH — ECONOMIC STABILITY: FOOD INSECURITY: WITHIN THE PAST 12 MONTHS, THE FOOD YOU BOUGHT JUST DIDN'T LAST AND YOU DIDN'T HAVE MONEY TO GET MORE.: NEVER TRUE

## 2021-07-29 SDOH — ECONOMIC STABILITY: FOOD INSECURITY: WITHIN THE PAST 12 MONTHS, YOU WORRIED THAT YOUR FOOD WOULD RUN OUT BEFORE YOU GOT MONEY TO BUY MORE.: NEVER TRUE

## 2021-07-29 ASSESSMENT — SOCIAL DETERMINANTS OF HEALTH (SDOH): HOW HARD IS IT FOR YOU TO PAY FOR THE VERY BASICS LIKE FOOD, HOUSING, MEDICAL CARE, AND HEATING?: NOT HARD AT ALL

## 2021-07-29 ASSESSMENT — ENCOUNTER SYMPTOMS
CHEST TIGHTNESS: 1
VOMITING: 0
TROUBLE SWALLOWING: 1
NAUSEA: 1
ABDOMINAL PAIN: 0
SHORTNESS OF BREATH: 0

## 2021-07-29 NOTE — PROGRESS NOTES
[facility] [facility]                                         [address] [place] [address]. [place] [address]         Phone: [phone]      Name:  Kathe Moreno  :    1950    Consultants:   Patient Care Team:  Margarite Lanes. Rodgers Bali., DO as PCP - General (Family Medicine)  Margarite Lanes. Rodgers Bali., DO as PCP - [facility]  René Luna MD as Referring Physician (Hematology and Oncology)  Uvaldo Michelle MD as Consulting Physician (Gastroenterology)    Chief Complaint:     Chief Complaint   Patient presents with    Knee Pain     bilateral, weakness and cramping     Kathe Moreno, was evaluated through a synchronous (real-time) audio-video encounter. The patient (or guardian if applicable) is aware that this is a billable service. Verbal consent to proceed has been obtained within the past 12 months. The visit was conducted pursuant to the emergency declaration under the [address] [place] [address] authority and the [employer] General Act. Patient identification was verified, and a caregiver was present when appropriate. The patient was located in a state where the provider was credentialed to provide care. Total time spent for this encounter: 40 minutes    --Zaina Barraza MD on 2021 at 11:54 AM    An electronic signature was used to authenticate this note. HPI:     Kathe Moreno is a 65 y.o. female with CML undergoing oral chemo treatment of tasigna, who presents in a video visit concerning her bilateral knee pain that has been going on since earlier this year. She thinks she has been taking this medication for the last 2 years. Bilateral Knee Pain:  She did not have any associated injuries or falls.  Both knees feel like they're not going to work and she hears a snap-like noise coming from both as she walks. She states she has a high pain tolerance and will typically ignore pain. She has a constant aching uneasiness pain in her legs that is 1-2 on pain scale. She has unpredictable cramping that is very sharp, 9 on pain scale. The sharpness doesn't last long, but will happen multiple times a day for days at a time. Then she may have a respite from cramping for 2-3 days before it comes back. She is unsure if these symptoms may be side effects of her Charlann Richvilma, as she has heard of various side effects, such as MS, with its use. She has trouble sleeping at times due to this. She has not been able to use anything to help her pain, she cannot take NSAIDs. She states she has a headache when she feels severe pain all over her body. They happen rarely as she is typically able to block out her pain. One of her biggest concerns are stairs. They are very bothersome to her, she lives in a home where her bedroom and bathroom are upstairs, but all other rooms are downstairs. She consulted with ortho in May and had imaging of both knees. She was diagnosed with bilateral knee primary osteoarthritis, left knee primarily patellofemoral arthritis, and possible left lateral meniscal tear. They recommended physical therapy, she has not attended this due to her busy schedule. She is a newlywed, doing household chores and yard work, both of which are difficult. She complains of chest pain 1 week ago that lasted for 8-10 minutes. She described this as tight, going down her right arm to her elbow, and then up to her right ear. She notes her SBP was 185, but did not go to the ED. No problems since.         Patient Active Problem List   Diagnosis    [facility]chronic myeloid leukemia) [facility] relapse ([facility]    Acquired hypothyroidism    Adverse reaction to sulfa antibiotic    Chemotherapy adverse reaction    Anastomotic ulcer    Anemia    Anxiety    Campbell esophagus    Cancer ([facility]    Cellulitis of left lower extremity    Chest pain    DNR (do not resuscitate)    Family history of colon cancer    Hiatal hernia    History of skin cancer    Hyperlipidemia    Localized edema    Lower abdominal pain    Lower extremity edema    Pericardial effusion    Pleural effusion    Pure hypercholesterolemia    SOB (shortness of breath)    Thyroid nodule    CML (chronic myelocytic leukemia) (HCC)    Rectal bleeding    Polyp of colon    Anatomical narrow angle, bilateral    Nuclear senile cataract of both eyes    Hx of actinic keratosis    Hypertension         Past Medical History:    Past Medical History:   Diagnosis Date    Anemia     Arthritis     Asthma     Bowel dysfunction     CML [facility]chronic myelocytic leukemia) ([facility] 01/2006    leukemia    Hyperlipidemia     Hypertension     Obesity     Skin cancer of face     left cheek, squamous    Thyroid disease     TIA (transient ischemic attack)     mini ones-from chemo       Past Surgical History:  Past Surgical History:   Procedure Laterality Date    ANUS SURGERY  1998    fissure    BREAST BIOPSY      BREAST LUMPECTOMY      benign    COLONOSCOPY      numerous polyps    ELBOW SURGERY  1960    removal of foreign body ([place])    FINE NEEDLE ASPIRATION      PARACENTESIS      x 2 fluid in lung from Chemo    TONSILLECTOMY Bilateral        Home Meds:  Prior to Visit Medications    Medication Sig Taking? Authorizing Provider   EPINEPHrine (EPIPEN 2-BRENNAN) 0.3 MG/0.3ML SOAJ injection Use as directed for allergic reaction  Mike Obando. Deann Li., DO   TASIGNA 200 MG capsule   Historical Provider, MD   cycloSPORINE (RESTASIS) 0.05 % ophthalmic emulsion Place 1 drop into both eyes every 12 hours  [facility] chong Li., DO   Cholecalciferol (VITAMIN D3) 1000 units CAPS Take by mouth daily  Historical Provider, MD   UNABLE TO FIND Take by mouth daily thytrophin--1/2 pill  Historical Provider, MD   PHYTOSTEROLS PO Take by mouth daily  Historical Provider, MD   CINNAMON PO Take by mouth daily  Historical Provider, MD   TURMERIC CURCUMIN PO Take by mouth daily  Historical Provider, MD   CALCIUM-MAGNESIUM-ZINC PO Take by mouth daily  Historical Provider, MD   Coenzyme Q10 (CO Q 10) 10 MG CAPS Take by mouth 2 times daily   Historical Provider, MD   KRILL OIL PO Take by mouth daily   Historical Provider, MD   polyethylene glycol (GLYCOLAX) powder Take 17 g by mouth daily  Historical Provider, MD   acetaminophen (TYLENOL) 325 MG tablet Take 2 tablets by mouth every 6 hours as needed for Pain  Lorri Mcgarry MD   aspirin 81 MG tablet Take 81 mg by mouth   Historical Provider, MD   Probiotic Product (PROBIOTIC & ACIDOPHILUS EX ST PO) Take by mouth daily   Historical Provider, MD       Allergies:    Latex, Mangifera indica, Penicillin g, Shellfish-derived products, Sulfa antibiotics, Grapefruit concentrate, and Shrimp extract allergy skin test    Family History:       Problem Relation Age of Onset    Cancer Mother         non hodgkins lymphoma    Cancer Father         colon    Cancer Sister         colon    Cancer Brother         mesothelioma    Arrhythmia Sister          Health Maintenance Completed:  Health Maintenance   Topic Date Due    COVID-19 Vaccine (1) Never done    Shingles Vaccine (1 of 2) Never done    Breast cancer screen  07/13/2021    DEXA (modify frequency per FRAX score)  11/15/2023 (Originally 12/24/2003)    Flu vaccine (1) 09/01/2021    Annual Wellness Visit (AWV)  09/11/2021    TSH testing  05/18/2022    Colon cancer screen colonoscopy  10/18/2024    DTaP/Tdap/Td vaccine (2 - Td or Tdap) 11/04/2024    Lipid screen  09/16/2025    Pneumococcal 65+ yrs at Risk Vaccine  Completed    Hepatitis C screen  Completed    Hepatitis A vaccine  Aged Out    Hepatitis B vaccine  Aged Out    Hib vaccine  Aged Out    Meningococcal (ACWY) vaccine  Aged [employer] History   Administered Date(s) Administered    Hepatitis A Adult (Havrix, Vaqta) 01/23/2015    Influenza Virus Vaccine 09/06/2017    Influenza, High Dose (Fluzone 65 yrs and older) 09/18/2015, 09/01/2016, 09/02/2019    Influenza, Quadv, IM, PF (6 mo and older Fluzone, Flulaval, Fluarix, and 3 yrs and older Afluria) 09/09/2014    Influenza, Quadv, adjuvanted, 65 yrs +, IM, PF (Fluad) 09/16/2020    Pneumococcal Conjugate 13-valent ([id number]) 04/23/2015    Pneumococcal Polysaccharide ([id number]) 04/19/2016    Tdap (Boostrix, Adacel) 11/04/2014         Review of Systems   Constitutional: Positive for activity change (secondary to pain). HENT: Positive for congestion and trouble swallowing. Negative for hearing loss. Eyes: Positive for visual disturbance (states her vision is worse, even with glasses). Respiratory: Positive for chest tightness. Negative for shortness of breath. Cardiovascular: Positive for chest pain. Gastrointestinal: Positive for nausea (side effect of chemo). Negative for abdominal pain and vomiting. Has had bowel issues all her life, no change in them   Genitourinary: Positive for frequency. Negative for difficulty urinating and dysuria.        +Nocturia   Musculoskeletal: Positive for arthralgias (bilateral knees), myalgias and neck pain (back of neck, last 2 weeks). Neurological: Positive for weakness (bilateral knees), numbness (bilateral legs) and headaches. Psychiatric/Behavioral: Positive for sleep disturbance (2/2 cramping pain at night). The patient is not nervous/anxious. Wt Readings from Last 3 Encounters:   05/17/21 140 lb (63.5 kg)   09/16/20 146 lb 6.4 oz (66.4 kg)   09/10/20 143 lb (64.9 kg)       BP Readings from Last 3 Encounters:   09/16/20 (!) 155/78   08/04/20 (!) 142/88   07/17/20 (!) 154/76       Physical Exam  Constitutional:       General: She is not in acute distress. Appearance: Normal appearance. She is not ill-appearing. HENT:      Head: Normocephalic and atraumatic.    Eyes:      Extraocular Movements: Extraocular movements intact. Pulmonary:      Effort: Pulmonary effort is normal.   Neurological:      General: No focal deficit present. Mental Status: She is alert and oriented to person, place, and time. Psychiatric:         Mood and Affect: Mood normal.         Behavior: Behavior normal.         Thought Content: Thought content normal.         Judgment: Judgment normal.        Assessment/Plan:  Birgit Diamond is a 65 y.o. female with CML undergoing oral chemo treatment of tasigna, who presents in a video visit concerning her bilateral knee pain. She thinks she has been taking this medication for the last 2 years. Diagnoses and all orders for this visit:    1. Chronic pain of both knees  2. Bilateral primary osteoarthritis of knee  Patient has seen by ortho in May who has recommended PT for her pain in her knees. Emphasized how the exercises can help with both her pain and weakness of the knees. She is open to this, but concerned of fitting in appointments with her busy schedule as a newlywed. Will refer to PT, may relocate to therapy close to home. - [facility]    3. CML (chronic myeloid leukemia) in relapse [facility])  Sees Dr. Kelly Contreras in [place]  and Dr. Charity Galindo in [facility] her tasigna 200 mg PO BID. Unsure if this medication has side effects contributing to her condition. Patient also has complaints of neck pain which she will need to be in office to be evaluated properly. 4. Acquired hypothyroidism  Thyroid labs taken in May:  TSH w/o Reflex: 4.34  T4, Free: 0.9  Will continue to monitor yearly    5. Chest pain, atypical  Patient has complained of chest pain in the past. Due to the nature of most recent chest pain, she has been recommended to come for a office visit to get an EKG.     6. Healthcare maintenance  Will address at in office visit      Health Maintenance Due:  Health Maintenance Due   Topic Date Due    COVID-19 Vaccine (1) Never done    Shingles Vaccine (1 of 2) Never done    Breast cancer screen  07/13/2021          Health Maintenance: (USPSTF Recommendations)  (F) Breast Cancer Screen: (40-49 (C), 50-74 biennial screening mammogram (B))  CRC/Colonoscopy Screening: (adults 39-53 (B), 50-75 (A))  Lung Ca Screening: Annual LDCT (+smoker age 49-80, smoked within 15 years, total of 20 pack yr history (B)):  DEXA Screen: (women >65 and older, <65 if at risk/postmenopausal (B))    RTC:  No follow-ups on file. EMR Dragon/transcription disclaimer:  Much of this encounter note is electronic transcription/translation of spoken language to printed texts. The electronic translation of spoken language may be erroneous, or at times, nonsensical words or phrases may be inadvertently transcribed.   Although I have reviewed the note for such errors, some may still exist.

## 2021-08-20 ENCOUNTER — TELEPHONE (OUTPATIENT)
Dept: PRIMARY CARE CLINIC | Age: 71
End: 2021-08-20

## 2021-08-20 DIAGNOSIS — K06.9: Primary | ICD-10-CM

## 2021-08-20 DIAGNOSIS — K06.8 GUMS, BLEEDING: ICD-10-CM

## 2021-08-20 NOTE — TELEPHONE ENCOUNTER
----- Message from Jose Alberto Snow sent at 8/20/2021 10:23 AM EDT -----  Subject: Referral Request    QUESTIONS   Reason for referral request? Pt needs referral from Dr Letty Mcghee to a dentist   due to her hurting gums-medicare advsd her to call her pcp for the   referral   Has the physician seen you for this condition before? No   Preferred Specialist (if applicable)? Do you already have an appointment scheduled? No  Additional Information for Provider?   ---------------------------------------------------------------------------  --------------  CALL BACK INFO  What is the best way for the office to contact you? OK to leave message on   voicemail  Preferred Call Back Phone Number?  [phone]

## 2021-08-20 NOTE — TELEPHONE ENCOUNTER
Medicare does not cover dental services primarily for the health of teeth, including but not limited to:  Routine checkups  Cleanings  Fillings  Dentures (complete or partial/bridge)  Tooth extractions (having your teeth pulled) in most cases  If gonzalez receive dental services, you will be responsible for the full cost of your care unless you have private dental coverage or are utilizing a low-cost dental resource. Again, Medicare will not pay for or reimburse you for dental services you receive primarily for the health of your teeth. Some Medicare Advantage Plans cover routine dental services, such as checkups or cleanings. If you have a Medicare Advantage Plan, contact your plan to learn about dental services that may be covered    1. Acute gingival disease  2. Gums, bleeding  I can put paper referral using CPT code  on Monday but cannot guarantee that medicare will cover any dental costs for patient.   She also may want office visit to discuss problem

## 2021-09-09 ENCOUNTER — OFFICE VISIT (OUTPATIENT)
Dept: PRIMARY CARE CLINIC | Age: 71
End: 2021-09-09
Payer: MEDICARE

## 2021-09-09 VITALS
BODY MASS INDEX: 26.72 KG/M2 | DIASTOLIC BLOOD PRESSURE: 76 MMHG | SYSTOLIC BLOOD PRESSURE: 134 MMHG | HEART RATE: 60 BPM | WEIGHT: 150.8 LBS | TEMPERATURE: 97.9 F | OXYGEN SATURATION: 96 % | HEIGHT: 63 IN

## 2021-09-09 DIAGNOSIS — E04.1 THYROID NODULE: ICD-10-CM

## 2021-09-09 DIAGNOSIS — Z13.220 LIPID SCREENING: ICD-10-CM

## 2021-09-09 DIAGNOSIS — C92.12 CML (CHRONIC MYELOID LEUKEMIA) IN RELAPSE (HCC): ICD-10-CM

## 2021-09-09 DIAGNOSIS — I10 ESSENTIAL HYPERTENSION: Primary | ICD-10-CM

## 2021-09-09 DIAGNOSIS — E03.9 ACQUIRED HYPOTHYROIDISM: ICD-10-CM

## 2021-09-09 DIAGNOSIS — M25.562 CHRONIC PAIN OF BOTH KNEES: ICD-10-CM

## 2021-09-09 DIAGNOSIS — M25.561 CHRONIC PAIN OF BOTH KNEES: ICD-10-CM

## 2021-09-09 DIAGNOSIS — G89.29 CHRONIC PAIN OF BOTH KNEES: ICD-10-CM

## 2021-09-09 DIAGNOSIS — Z23 NEED FOR INFLUENZA VACCINATION: ICD-10-CM

## 2021-09-09 PROBLEM — H25.13 NUCLEAR SENILE CATARACT OF BOTH EYES: Status: RESOLVED | Noted: 2019-11-25 | Resolved: 2021-09-09

## 2021-09-09 PROBLEM — R07.9 CHEST PAIN: Status: RESOLVED | Noted: 2017-08-02 | Resolved: 2021-09-09

## 2021-09-09 PROBLEM — E78.00 PURE HYPERCHOLESTEROLEMIA: Status: RESOLVED | Noted: 2017-08-11 | Resolved: 2021-09-09

## 2021-09-09 PROCEDURE — 3017F COLORECTAL CA SCREEN DOC REV: CPT | Performed by: FAMILY MEDICINE

## 2021-09-09 PROCEDURE — 1090F PRES/ABSN URINE INCON ASSESS: CPT | Performed by: FAMILY MEDICINE

## 2021-09-09 PROCEDURE — 90694 VACC AIIV4 NO PRSRV 0.5ML IM: CPT | Performed by: FAMILY MEDICINE

## 2021-09-09 PROCEDURE — 1036F TOBACCO NON-USER: CPT | Performed by: FAMILY MEDICINE

## 2021-09-09 PROCEDURE — 4040F PNEUMOC VAC/ADMIN/RCVD: CPT | Performed by: FAMILY MEDICINE

## 2021-09-09 PROCEDURE — G0008 ADMIN INFLUENZA VIRUS VAC: HCPCS | Performed by: FAMILY MEDICINE

## 2021-09-09 PROCEDURE — 1123F ACP DISCUSS/DSCN MKR DOCD: CPT | Performed by: FAMILY MEDICINE

## 2021-09-09 PROCEDURE — G8417 CALC BMI ABV UP PARAM F/U: HCPCS | Performed by: FAMILY MEDICINE

## 2021-09-09 PROCEDURE — G8400 PT W/DXA NO RESULTS DOC: HCPCS | Performed by: FAMILY MEDICINE

## 2021-09-09 PROCEDURE — G8427 DOCREV CUR MEDS BY ELIG CLIN: HCPCS | Performed by: FAMILY MEDICINE

## 2021-09-09 PROCEDURE — 99214 OFFICE O/P EST MOD 30 MIN: CPT | Performed by: FAMILY MEDICINE

## 2021-09-09 RX ORDER — HYDROCHLOROTHIAZIDE 12.5 MG/1
12.5 CAPSULE, GELATIN COATED ORAL EVERY MORNING
Qty: 90 CAPSULE | Refills: 1 | Status: SHIPPED | OUTPATIENT
Start: 2021-09-09 | End: 2021-09-28 | Stop reason: SDUPTHER

## 2021-09-09 ASSESSMENT — ENCOUNTER SYMPTOMS
WHEEZING: 0
CONSTIPATION: 0
SHORTNESS OF BREATH: 0
ABDOMINAL PAIN: 0
BACK PAIN: 0
RHINORRHEA: 0
DIARRHEA: 0
SORE THROAT: 0
COLOR CHANGE: 0
EYE DISCHARGE: 0
EYE PAIN: 0
BLOOD IN STOOL: 0

## 2021-09-09 NOTE — PROGRESS NOTES
Efrain Doroteo Phelan  1950    Consultants:   Patient Care Team:  Grisel Linder DO as PCP - General (Family Medicine)  Grisel Linder DO as PCP - [facility]  Faustino Springer MD as Referring Physician (Hematology and Oncology)  Khurram Menon MD as Consulting Physician (Gastroenterology)    Chief Complaint:     Chief Complaint   Patient presents with    Follow-up     6 week f/u    Knee Pain    Hypertension    Hypothyroidism    Hyperlipidemia    Leukemia    Health Maintenance    Immunizations       HPI:     Tejas Cruz is a 65 y.o. female  is an established patient who presents for knee pain, htn, hypothyroid, cancer, hyperlipidemia follow up, and need for flu shot    Knee pain/arthrtiis:  Diagnosed in [place]  Followed up with me in May and Dr. Claritza Stone in July  Participated in physical therapy for 3 weeks  Improved her pain and graduated    Hypothyroidism:  Does not like levothyyroxine due to lability of thyroid levels  Takes OTC thyroid supplement  Lab Results   Component Value Date    TSH 4.34 (H) 05/18/2021    T4FREE 0.9 05/18/2021       HPL:  No statin indicated  The ASCVD Risk score (Noelle Montez et al., 2013) failed to calculate for the following reasons:     The valid HDL cholesterol range is 20 to 100 mg/dL    Unable to determine if patient is Non-     Lab Results   Component Value Date    CHOL 240 (H) 09/16/2020    CHOL 214 (H) 10/02/2019     Lab Results   Component Value Date    TRIG 108 09/16/2020    TRIG 77 10/02/2019     Lab Results   Component Value Date     (H) 09/16/2020    HDL 88 (H) 10/02/2019     Lab Results   Component Value Date    LDLCALC 111 (H) 09/16/2020    LDLCALC 111 (H) 10/02/2019     Lab Results   Component Value Date    LABVLDL 22 09/16/2020    LABVLDL 15 10/02/2019     No results found for: CHOLHDLRATIO    HTN:  At goal today  Pt declined medications in past  Worried about side effects  Brother had bad side effects; he started exercising and improved, so she'd like to do that  Goes to [employer]  BP high at home, bring cuff to calibrate next time  Pt open to considering HCTZ if remains high in ambulatory setting    BP Readings from Last 3 Encounters:   09/09/21 134/76   09/16/20 (!) 155/78   08/04/20 (!) 142/88     CML:  followed by Dr. Isis Lucio and Dr. Darlene Velazquez in [place] and [place] respectively  Taking tasigna, states they decreased dose    On multivitamin/supplement regimen  Tylenol PRN for pain    Immunizations:  Flu shot today  Defers shingles vaccine    Patient Active Problem List   Diagnosis    CML (chronic myeloid leukemia) in relapse ([address]    Acquired hypothyroidism    Adverse reaction to sulfa antibiotic    Chemotherapy adverse reaction    Anastomotic ulcer    Anemia    Anxiety    Campbell esophagus    Cancer ([address]    Cellulitis of left lower extremity    Chest pain    DNR (do not resuscitate)    Family history of colon cancer    Hiatal hernia    History of skin cancer    Hyperlipidemia    Localized edema    Lower abdominal pain    Lower extremity edema    Pericardial effusion    Pleural effusion    Pure hypercholesterolemia    SOB (shortness of breath)    Thyroid nodule    CML (chronic myelocytic leukemia) ([address]    Rectal bleeding    Polyp of colon    Anatomical narrow angle, bilateral    Nuclear senile cataract of both eyes    Hx of actinic keratosis    Hypertension    Bilateral primary osteoarthritis of knee    Chronic pain of both knees         Past Medical History:    Past Medical History:   Diagnosis Date    Anemia     Arthritis     Asthma     Bowel dysfunction     CML (chronic myelocytic leukemia) ([address] 01/2006    leukemia    Hyperlipidemia     Hypertension     Obesity     Skin cancer of face     left cheek, squamous    Thyroid disease     TIA (transient ischemic attack)     mini ones-from chemo       Past Surgical History:  Past Surgical History:   Procedure Laterality Date    ANUS SURGERY  1998    fissure    BREAST BIOPSY      BREAST LUMPECTOMY      benign    COLONOSCOPY      numerous polyps    ELBOW SURGERY  1960    removal of foreign body ([place])    FINE NEEDLE ASPIRATION      PARACENTESIS      x 2 fluid in lung from Chemo    TONSILLECTOMY Bilateral        Home Meds:  Prior to Visit Medications    Medication Sig Taking? Authorizing Provider   EPINEPHrine (EPIPEN 2-BRENNAN) 0.3 MG/0.3ML SOAJ injection Use as directed for allergic reaction Yes Josee Leal File., DO   TASIGNA 200 MG capsule  Yes Historical Provider, MD   Cholecalciferol (VITAMIN D3) 1000 units CAPS Take by mouth daily Yes Historical Provider, MD   UNABLE TO FIND Take by mouth daily thytrophin--1/2 pill Yes Historical Provider, MD   PHYTOSTEROLS PO Take by mouth daily Yes Historical Provider, MD   CINNAMON PO Take by mouth daily Yes Historical Provider, MD   TURMERIC CURCUMIN PO Take by mouth daily Yes Historical Provider, MD   CALCIUM-MAGNESIUM-ZINC PO Take by mouth daily Yes Historical Provider, MD   Coenzyme Q10 (CO Q 10) 10 MG CAPS Take by mouth 2 times daily  Yes Historical Provider, MD   KRILL OIL PO Take by mouth daily  Yes Historical Provider, MD   polyethylene glycol (GLYCOLAX) powder Take 17 g by mouth daily Yes Historical Provider, MD   acetaminophen (TYLENOL) 325 MG tablet Take 2 tablets by mouth every 6 hours as needed for Pain Yes Suki Campbell MD   aspirin 81 MG tablet Take 81 mg by mouth  Yes Historical Provider, MD   Probiotic Product (PROBIOTIC & ACIDOPHILUS EX ST PO) Take by mouth daily  Yes Historical Provider, MD   cycloSPORINE (RESTASIS) 0.05 % ophthalmic emulsion Place 1 drop into both eyes every 12 hours  Josee Leal File., DO       Allergies:    Latex, Mangifera indica, Penicillin g, Shellfish-derived products, Sulfa antibiotics, Grapefruit concentrate, and Shrimp extract allergy skin test    Family History:       Problem Relation Age of Onset    Cancer Mother         non hodgkins lymphoma    Cancer Father         colon    Cancer Sister         colon    Cancer Brother         mesothelioma    Arrhythmia Sister        Social History:   Social History     Socioeconomic History    Marital status:      Spouse name: Kira Ardon    Number of children: 2    Years of education: Not on file    Highest education level: Not on file   Occupational History     Employer: RETIRED    Occupation: [employer] 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    Occupation: [employer] [place]   Tobacco Use    Smoking status: Never Smoker    Smokeless tobacco: Never Used   Vaping Use    Vaping Use: Never used   Substance and Sexual Activity    Alcohol use: No    Drug use: No    Sexual activity: Not Currently   Other Topics Concern    Not on file   Social History Narrative    [de-identified]    Son in [place] got  late in life    2 grandchildren    Bess Juarez 3 y/o    Williams Rosales 2 y/o    Loves to take care of them     Social Determinants of Health     Financial Resource Strain: Low Risk     Difficulty of Paying Living Expenses: Not hard at all   Food Insecurity: No Food Insecurity    Worried About [address] in the Last Year: Never true    [address] in the Last Year: Never true   Transportation Needs:     Lack of Transportation (Medical):      Lack of Transportation (Non-Medical):    Physical Activity:     Days of Exercise per Week:     Minutes of Exercise per Session:    Stress:     Feeling of Stress :    Social Connections:     Frequency of Communication with Friends and Family:     Frequency of Social Gatherings with Friends and Family:     Attends Druze Services:     Active Member of Clubs or Organizations:     Attends Club or Organization Meetings:     Marital Status:    Intimate Partner Violence:     Fear of Current or Ex-Partner:     Emotionally Abused:     Physically Abused:     Sexually Abused:        Health Maintenance Completed:  Health Maintenance   Topic Date Due    Shingles Vaccine (1 of 2) Never done   [employer] Visit (AWV)  09/11/2021    DEXA (modify frequency per FRAX score)  11/15/2023 (Originally 12/24/2003)    TSH testing  05/18/2022    Breast cancer screen  07/13/2022    Colon cancer screen colonoscopy  10/18/2024    DTaP/Tdap/Td vaccine (2 - Td or Tdap) 11/04/2024    Lipid screen  09/16/2025    Flu vaccine  Completed    Pneumococcal 65+ yrs at Risk Vaccine  Completed    COVID-19 Vaccine  Completed    Hepatitis C screen  Completed    Hepatitis A vaccine  Aged Out    Hepatitis B vaccine  Aged Out    Hib vaccine  Aged Out    Meningococcal (ACWY) vaccine  Aged [employer] History   Administered Date(s) Administered    COVID-19, Pfizer, PF, 30mcg/0.3mL 02/18/2021, 03/10/2021, 08/24/2021    Hepatitis A Adult (Havrix, Vaqta) 01/23/2015    Influenza Virus Vaccine 09/06/2017    Influenza, High Dose (Fluzone 65 yrs and older) 09/18/2015, 09/01/2016, 09/02/2019    Influenza, Quadv, IM, PF (6 mo and older Fluzone, Flulaval, Fluarix, and 3 yrs and older Afluria) 09/09/2014    Influenza, Quadv, adjuvanted, 65 yrs +, IM, PF (Fluad) 09/16/2020, 09/09/2021    Pneumococcal Conjugate 13-valent ([id number]) 04/23/2015    Pneumococcal Polysaccharide ([id number]) 04/19/2016    Tdap (Boostrix, Adacel) 11/04/2014     Review of Systems   Constitutional: Negative for chills, fever and unexpected weight change. HENT: Negative for congestion, rhinorrhea and sore throat. Eyes: Negative for pain, discharge and visual disturbance. Respiratory: Negative for shortness of breath and wheezing. Cardiovascular: Negative for chest pain and leg swelling. Gastrointestinal: Negative for abdominal pain, blood in stool, constipation and diarrhea. Endocrine: Positive for cold intolerance. Negative for polyuria. Genitourinary: Negative for dysuria and flank pain. Musculoskeletal: Positive for arthralgias.  Negative for back pain and neck pain. Skin: Negative for color change, rash and wound. Allergic/Immunologic: Negative for environmental allergies, food allergies and immunocompromised state. Neurological: Negative for dizziness, speech difficulty, weakness, light-headedness and headaches. Hematological: Negative for adenopathy. Does not bruise/bleed easily. Psychiatric/Behavioral: Negative for dysphoric mood and sleep disturbance. The patient is not nervous/anxious. Physical Exam:   Vitals:    09/09/21 1111   BP: 134/76   Pulse: 60   Temp: 97.9 °F (36.6 °C)   TempSrc: Temporal   SpO2: 96%   Weight: 150 lb 12.8 oz (68.4 kg)   Height: 5' 2.5\" (1.588 m)     Body mass index is 27.14 kg/m². Wt Readings from Last 3 Encounters:   09/09/21 150 lb 12.8 oz (68.4 kg)   05/17/21 140 lb (63.5 kg)   09/16/20 146 lb 6.4 oz (66.4 kg)       BP Readings from Last 3 Encounters:   09/09/21 134/76   09/16/20 (!) 155/78   08/04/20 (!) 142/88       Physical Exam  Constitutional:       General: She is not in acute distress. Appearance: She is well-developed. HENT:      Head: Normocephalic and atraumatic. Right Ear: Tympanic membrane normal.      Left Ear: Tympanic membrane normal.      Nose: Nose normal. No rhinorrhea. Mouth/Throat:      Pharynx: Uvula midline. Eyes:      Pupils: Pupils are equal, round, and reactive to light. Neck:      Thyroid: Thyroid mass and thyroid tenderness present. Trachea: No tracheal deviation. Cardiovascular:      Rate and Rhythm: Normal rate and regular rhythm. Heart sounds: Normal heart sounds. No murmur heard. No friction rub. No gallop. Pulmonary:      Effort: Pulmonary effort is normal. No respiratory distress. Breath sounds: Normal breath sounds. No wheezing or rales. Abdominal:      General: Bowel sounds are normal. There is no distension. Palpations: Abdomen is soft. Tenderness: There is no abdominal tenderness. There is no rebound. Musculoskeletal:         General: No tenderness. Normal range of motion. Lymphadenopathy:      Cervical: No cervical adenopathy. Skin:     General: Skin is warm and dry. Findings: No erythema or rash. Neurological:      Mental Status: She is alert and oriented to person, place, and time. Cranial Nerves: No cranial nerve deficit. Psychiatric:         Speech: Speech normal.         Thought Content: Thought content does not include homicidal or suicidal ideation. Lab Review:  Lab Results   Component Value Date    TSH 4.34 (H) 05/18/2021    T4FREE 0.9 05/18/2021     Lab Results   Component Value Date     09/16/2020    K 4.4 09/16/2020    CL 98 (L) 09/16/2020    CO2 32 09/16/2020    BUN 24 (H) 09/16/2020    CREATININE 0.9 09/16/2020    GLUCOSE 144 (H) 09/16/2020    CALCIUM 10.3 09/16/2020    PROT 7.2 05/18/2021    LABALBU 4.4 05/18/2021    BILITOT 1.3 (H) 05/18/2021    ALKPHOS 84 05/18/2021    AST 27 05/18/2021    ALT 24 05/18/2021    LABGLOM >60 09/16/2020    GFRAA >60 09/16/2020    AGRATIO 1.7 09/16/2020    GLOB 2.6 09/16/2020       Lab Results   Component Value Date    CHOL 240 (H) 09/16/2020    CHOL 214 (H) 10/02/2019     Lab Results   Component Value Date    TRIG 108 09/16/2020    TRIG 77 10/02/2019     Lab Results   Component Value Date     (H) 09/16/2020    HDL 88 (H) 10/02/2019     Lab Results   Component Value Date    LDLCALC 111 (H) 09/16/2020    LDLCALC 111 (H) 10/02/2019     Lab Results   Component Value Date    LABVLDL 22 09/16/2020    LABVLDL 15 10/02/2019     No results found for: CHOLHDLRATIO  The ASCVD Risk score (Cherri Lentz, et al., 2013) failed to calculate for the following reasons:     The valid HDL cholesterol range is 20 to 100 mg/dL    Unable to determine if patient is Non-       Assessment/Plan:  Chance Glover is 64 y/o female who was seen today for follow-up, knee pain, hypertension, hypothyroidism, hyperlipidemia, leukemia, health maintenance and immunizations. 1. Essential hypertension  At goal in office today, elevated/urgency in ambulatory setting  BP Readings from Last 3 Encounters:   09/09/21 134/76   09/16/20 (!) 155/78   08/04/20 (!) 142/88     Headache and chest pain associated when high  workedup in past  Pt declined RX in past; open to them today, discussed ARB, HCTZ, and CCB  Pt elected to take HCTZ if remains high in weeks to come at ambulatory/home monitoring  DASH DIET  150  MINS CV ACTIVITY  Avoid tobacco/ETOH    - hydroCHLOROthiazide (MICROZIDE) 12.5 MG capsule; Take 1 capsule by mouth every morning  Dispense: 90 capsule; Refill: 1    2. Acquired hypothyroidism  3. Thyroid nodule  Pt does not take synthroid; subclinical at this point, had FNA in past; discuss further treatment if TSH/FT4 abnormal  - TSH without Reflex; Future  - T4, Free; Future    4. Chronic pain of both knees  Improved with PT and tylenol    5. CML (chronic myeloid leukemia) in relapse (HCC)  Continue Tasigna and q 6 mo f/u with oncology    6. Lipid screening  - Lipid Panel; Future    7. Need for influenza vaccination  - INFLUENZA, QUADV, ADJUVANTED, 72 YRS =, IM, PF, PREFILL SYR, 0.5ML (FLUAD)      Health Maintenance Due:  Health Maintenance Due   Topic Date Due    Shingles Vaccine (1 of 2) Never done   [employer] Visit (AWV)  09/11/2021      Health Maintenance:  Immunizations:  FLU SHOT TODAY  Defers shingles vaccine      Spent 30-39 minutes of face to face interaction with patient counseling on diagnoses and treatment plan; including but not limited to pre visit planning, chart/lab review, new orders, instructions, charting    Return in about 6 weeks (around 10/21/2021) for AWV, then 6 months for check up. EMR Dragon/transcription disclaimer:  Much of this encounter note is electronic transcription/translation of spoken language to printed texts.   The electronic translation of spoken language may be erroneous, or at times,

## 2021-09-09 NOTE — PATIENT INSTRUCTIONS
Limit sodium to 1500 mg daily    Check blood pressure in the ambulatory setting sitting at rest first thing in the morning before you eat none recorded in the next 3 weeks  Greater than 140/90 consistently take hydrochlorothiazide 12.5 mg    Work and have faxed back over to our office [phone]      Number work including your living will advance care planning to our office at your earliest [address] time in [place]    Patient Education        DASH Diet: Care Instructions  Your Care Instructions     The DASH diet is an eating plan that can help lower your blood pressure. DASH stands for Dietary Approaches to Stop Hypertension. Hypertension is high blood pressure. The DASH diet focuses on eating foods that are high in calcium, potassium, and magnesium. These nutrients can lower blood pressure. The foods that are highest in these nutrients are fruits, vegetables, low-fat dairy products, nuts, seeds, and legumes. But taking calcium, potassium, and magnesium supplements instead of eating foods that are high in those nutrients does not have the same effect. The DASH diet also includes whole grains, fish, and poultry. The DASH diet is one of several lifestyle changes your doctor may recommend to lower your high blood pressure. Your doctor may also want you to decrease the amount of sodium in your diet. Lowering sodium while following the DASH diet can lower blood pressure even further than just the DASH diet alone. Follow-up care is a key part of your treatment and safety. Be sure to make and go to all appointments, and call your doctor if you are having problems. It's also a good idea to know your test results and keep a list of the medicines you take. How can you care for yourself at home? Following the DASH diet  · Eat 4 to 5 servings of fruit each day. A serving is 1 medium-sized piece of fruit, ½ cup chopped or canned fruit, 1/4 cup dried fruit, or 4 ounces (½ cup) of fruit juice.  Choose fruit more make it easy to get the recommended amount of fruits and vegetables each day. ? Try yogurt topped with fruit and nuts for a snack or healthy dessert. ? Add lettuce, tomato, cucumber, and onion to sandwiches. ? Combine a ready-made pizza crust with low-fat mozzarella cheese and lots of vegetable toppings. Try using tomatoes, squash, spinach, broccoli, carrots, cauliflower, and onions. ? Have a variety of cut-up vegetables with a low-fat dip as an appetizer instead of chips and dip. ? Sprinkle sunflower seeds or chopped almonds over salads. Or try adding chopped walnuts or almonds to cooked vegetables. ? Try some vegetarian meals using beans and peas. Add garbanzo or kidney beans to salads. Make burritos and tacos with mashed dyson beans or black beans. Where can you learn more? Go to https://ObjectVideopeReclog.Leiyoo. org and sign in to your [employer] account. Enter H339 in the Splashscore box to learn more about \"DASH Diet: Care Instructions. \"     If you do not have an account, please click on the \"Sign Up Now\" link. Current as of: August 31, 2020               Content Version: 12.9  © 7106-4491 Healthwise, Incorporated. Care instructions adapted under license by [facility] ([facility]). If you have questions about a medical condition or this instruction, always ask your healthcare professional. [place] [address] any warranty or liability for your use of this information.

## 2021-09-09 NOTE — Clinical Note
We missed pt AWV by 2 days; perhaps before 1/1/22 we could have Ty do this virtually with patient if agreeable, however patient moving to [place] until April; this was just a missed opportunity with AWV; if pt willing to come in again before moving back to [place] for winter I or resident can do AWV is due 9/11/21

## 2021-09-13 ENCOUNTER — TELEPHONE (OUTPATIENT)
Dept: PRIMARY CARE CLINIC | Age: 71
End: 2021-09-13

## 2021-09-13 DIAGNOSIS — Z91.89 RISK OF MYOCARDIAL INFARCTION OR STROKE 7.5% OR GREATER IN NEXT 10 YEARS: ICD-10-CM

## 2021-09-13 DIAGNOSIS — R74.01 TRANSAMINITIS: Primary | ICD-10-CM

## 2021-09-13 DIAGNOSIS — C92.10 CML (CHRONIC MYELOCYTIC LEUKEMIA) (HCC): ICD-10-CM

## 2021-09-13 DIAGNOSIS — I10 ESSENTIAL HYPERTENSION: ICD-10-CM

## 2021-09-13 NOTE — TELEPHONE ENCOUNTER
Patient is calling she had her blood work done at [address] and I going to have the results sent to us.  As well as patient would like to have her liver levels checked she states that she has had bad results in the past.  Please advise  Eleuterio Fairchild [phone] (home)

## 2021-09-15 PROBLEM — Z91.89 RISK OF MYOCARDIAL INFARCTION OR STROKE 7.5% OR GREATER IN NEXT 10 YEARS: Status: ACTIVE | Noted: 2021-09-15

## 2021-09-15 RX ORDER — ATORVASTATIN CALCIUM 40 MG/1
40 TABLET, FILM COATED ORAL DAILY
Qty: 90 TABLET | Refills: 1 | Status: SHIPPED | OUTPATIENT
Start: 2021-09-15 | End: 2022-05-02

## 2021-09-15 NOTE — TELEPHONE ENCOUNTER
PT calling again about results. She also wants orders put in to check her liver levels as they have been low before due to her chemo.      Please call PT when orders are put in. [phone]

## 2021-09-15 NOTE — TELEPHONE ENCOUNTER
TSH 5.7  ft4 0.97  Subclinical hypothyroidism no medication recommended      Total cholesterol 225  Triglycerides 111  HDL 71      ASCVD risk 12.7%    Please scan into chart      Recommend patient take statin medication Lipitor 40 mg daily    Can send to pharmacy if patient open to taking    Goal is to lower ASCVD risk to lower risk of stroke or heart attack      Will order CMP to check liver function for patient and can fax to [facility].  lab for her      1. Transaminitis  2. Essential hypertension  3. CML (chronic myelocytic leukemia) ([facility]    - Comprehensive Metabolic Panel; Future          Mark Posadas.  Arturo Bacon., DO  9/15/2021

## 2021-09-15 NOTE — TELEPHONE ENCOUNTER
Pt informed. Pt is ok with taking statin medication.   Medication cam be sent in to [address] listed in chart

## 2021-09-28 ENCOUNTER — TELEPHONE (OUTPATIENT)
Dept: PRIMARY CARE CLINIC | Age: 71
End: 2021-09-28

## 2021-09-28 DIAGNOSIS — I10 ESSENTIAL HYPERTENSION: ICD-10-CM

## 2021-09-28 RX ORDER — HYDROCHLOROTHIAZIDE 12.5 MG/1
12.5 CAPSULE, GELATIN COATED ORAL EVERY MORNING
Qty: 90 CAPSULE | Refills: 1 | Status: SHIPPED | OUTPATIENT
Start: 2021-09-28 | End: 2022-05-02

## 2021-09-28 NOTE — TELEPHONE ENCOUNTER
Smallest dose lipitor comes in is 10 mg  Pt could take half of a 10 mg tablets if she'd like but not having as much benefit from taking medication at that dose    Please advise/inquire

## 2021-09-28 NOTE — TELEPHONE ENCOUNTER
----- Message from Nile Gomes sent at 9/27/2021 10:55 AM EDT -----  Subject: Medication Problem    QUESTIONS  Name of Medication? atorvastatin (LIPITOR) 40 MG tablet  Patient-reported dosage and instructions? Take 1 tablet by mouth daily  What question or problem do you have with the medication? Patient wants to   know if the medication can be a 2.5mg RX. States it has a reaction with   her chemo, but they would approve it for a 2.5mg. Preferred Pharmacy? [facility] [address] [facility], [address] phone number (if available)? [phone]  Additional Information for Provider? If it can be done in 2hrs, please   send to [facility].  ---------------------------------------------------------------------------  --------------  CALL BACK INFO  What is the best way for the office to contact you? OK to leave message on   voicemail  Preferred Call Back Phone Number? [phone]  ---------------------------------------------------------------------------  --------------  SCRIPT ANSWERS  Relationship to Patient?  Self

## 2021-12-10 DIAGNOSIS — H04.129 DRY EYE: ICD-10-CM

## 2021-12-10 RX ORDER — CYCLOSPORINE 0.5 MG/ML
1 EMULSION OPHTHALMIC EVERY 12 HOURS
Qty: 8 EACH | Refills: 11 | Status: SHIPPED | OUTPATIENT
Start: 2021-12-10 | End: 2021-12-15 | Stop reason: SDUPTHER

## 2021-12-15 RX ORDER — CYCLOSPORINE 0.5 MG/ML
1 EMULSION OPHTHALMIC EVERY 12 HOURS
Qty: 8 EACH | Refills: 11 | Status: SHIPPED | OUTPATIENT
Start: 2021-12-15 | End: 2022-12-10

## 2021-12-15 NOTE — TELEPHONE ENCOUNTER
Sent to incorrect pharm. Please send to [facility] order.   Patient is wintering in [place] currently    Last office visit 9/9/2021     Last written sent to incorrect pharm    Next office visit scheduled Visit date not found    Requested Prescriptions     Pending Prescriptions Disp Refills    cycloSPORINE (RESTASIS) 0.05 % ophthalmic emulsion 8 each 11     Sig: Place 1 drop into both eyes every 12 hours     Signed Prescriptions Disp Refills    cycloSPORINE (RESTASIS) 0.05 % ophthalmic emulsion 8 each 11     Sig: Place 1 drop into both eyes every 12 hours     Authorizing Provider: Adriel Villaseñor.

## 2022-01-26 DIAGNOSIS — T78.2XXS ANAPHYLAXIS, SEQUELA: ICD-10-CM

## 2022-01-26 RX ORDER — EPINEPHRINE 0.3 MG/.3ML
INJECTION SUBCUTANEOUS
Qty: 2 EACH | Refills: 1 | Status: SHIPPED | OUTPATIENT
Start: 2022-01-26

## 2022-01-26 NOTE — TELEPHONE ENCOUNTER
Last OV - 9/9  Next OV - 5/2
Number Of Freeze-Thaw Cycles: 1 freeze-thaw cycle
Duration Of Freeze Thaw-Cycle (Seconds): 0
Detail Level: Detailed
Render Post-Care Instructions In Note?: no
Post-Care Instructions: I reviewed with the patient in detail post-care instructions. Patient is to wear sunprotection, and avoid picking at any of the treated lesions. Pt may apply Vaseline to crusted or scabbing areas.
Consent: The patient's consent was obtained including but not limited to risks of crusting, scabbing, blistering, scarring, darker or lighter pigmentary change, recurrence, incomplete removal and infection.

## 2022-04-08 ENCOUNTER — TELEPHONE (OUTPATIENT)
Dept: PRIMARY CARE CLINIC | Age: 72
End: 2022-04-08

## 2022-04-08 NOTE — TELEPHONE ENCOUNTER
----- Message from [address] [facility] sent at 4/8/2022  8:39 AM EDT -----  Subject: Appointment Request    Reason for Call: Routine Existing Condition Follow Up    QUESTIONS  Type of Appointment? Established Patient  Reason for appointment request? Available appointments did not meet   patient need  Additional Information for Provider? Pt is in [place] and will be back on   the 16th of April. Pt would like appt with Dr. Gracie Mitchell, she had a stroke   when she was in [place] on 03/27/2022. Pt states that they put her on new   meds so she needs lab work done and her glucose was high, they told her   she needs a glucose tolerance test. Please call pt with appt. She would   like 10am or later. ---------------------------------------------------------------------------  --------------  Peewee Blocker INFO  What is the best way for the office to contact you? OK to leave message on   voicemail  Preferred Call Back Phone Number? [phone]  ---------------------------------------------------------------------------  --------------  SCRIPT ANSWERS  Relationship to Patient? Self  Is this follow up request related to routine Diabetes Management? No  Have you been diagnosed with, awaiting test results for, or told that you   are suspected of having COVID-19 (Coronavirus)? (If patient has tested   negative or was tested as a requirement for work, school, or travel and   not based on symptoms, answer no)? No  Within the past 10 days have you developed any of the following symptoms   (answer no if symptoms have been present longer than 10 days or began   more than 10 days ago)? Fever or Chills, Cough, Shortness of breath or   difficulty breathing, Loss of taste or smell, Sore throat, Nasal   congestion, Sneezing or runny nose, Fatigue or generalized body aches   (answer no if pain is specific to a body part e.g. back pain), Diarrhea,   Headache? No  Have you had close contact with someone with COVID-19 in the last 7 days?    No  (Service Expert  click yes below to proceed with [employer] As Usual   Scheduling)?  Yes

## 2022-05-02 ENCOUNTER — OFFICE VISIT (OUTPATIENT)
Dept: PRIMARY CARE CLINIC | Age: 72
End: 2022-05-02
Payer: MEDICARE

## 2022-05-02 ENCOUNTER — HOSPITAL ENCOUNTER (OUTPATIENT)
Age: 72
Discharge: HOME | DRG: 682 | End: 2022-05-02
Payer: MEDICARE

## 2022-05-02 VITALS
TEMPERATURE: 97.3 F | HEIGHT: 63 IN | OXYGEN SATURATION: 96 % | RESPIRATION RATE: 16 BRPM | BODY MASS INDEX: 25.45 KG/M2 | SYSTOLIC BLOOD PRESSURE: 126 MMHG | WEIGHT: 143.6 LBS | HEART RATE: 61 BPM | DIASTOLIC BLOOD PRESSURE: 72 MMHG

## 2022-05-02 DIAGNOSIS — R31.9 HEMATURIA, UNSPECIFIED TYPE: ICD-10-CM

## 2022-05-02 DIAGNOSIS — I10 UNCONTROLLED HYPERTENSION: ICD-10-CM

## 2022-05-02 DIAGNOSIS — Z13.1 DIABETES MELLITUS SCREENING: ICD-10-CM

## 2022-05-02 DIAGNOSIS — Z91.89 RISK OF MYOCARDIAL INFARCTION OR STROKE 7.5% OR GREATER IN NEXT 10 YEARS: ICD-10-CM

## 2022-05-02 DIAGNOSIS — C92.10 CML (CHRONIC MYELOCYTIC LEUKEMIA) (HCC): ICD-10-CM

## 2022-05-02 DIAGNOSIS — N90.89 VULVAR LESION: ICD-10-CM

## 2022-05-02 DIAGNOSIS — I63.9 CEREBROVASCULAR ACCIDENT (CVA), UNSPECIFIED MECHANISM (HCC): Primary | ICD-10-CM

## 2022-05-02 DIAGNOSIS — E03.9 ACQUIRED HYPOTHYROIDISM: ICD-10-CM

## 2022-05-02 DIAGNOSIS — N31.9 NEUROGENIC BLADDER AS LATE EFFECT OF CEREBROVASCULAR ACCIDENT (CVA): ICD-10-CM

## 2022-05-02 DIAGNOSIS — N31.9 NEUROGENIC BLADDER DUE TO OLD STROKE: ICD-10-CM

## 2022-05-02 DIAGNOSIS — I69.398 NEUROGENIC BLADDER AS LATE EFFECT OF CEREBROVASCULAR ACCIDENT (CVA): ICD-10-CM

## 2022-05-02 DIAGNOSIS — R73.9 HYPERGLYCEMIA: ICD-10-CM

## 2022-05-02 DIAGNOSIS — E78.00 PURE HYPERCHOLESTEROLEMIA: ICD-10-CM

## 2022-05-02 DIAGNOSIS — N18.31 CHRONIC KIDNEY DISEASE, STAGE 3A (HCC): ICD-10-CM

## 2022-05-02 DIAGNOSIS — I63.9 CEREBROVASCULAR ACCIDENT (CVA), UNSPECIFIED MECHANISM (HCC): ICD-10-CM

## 2022-05-02 DIAGNOSIS — I69.398 NEUROGENIC BLADDER DUE TO OLD STROKE: ICD-10-CM

## 2022-05-02 DIAGNOSIS — C92.12 CML (CHRONIC MYELOID LEUKEMIA) IN RELAPSE (HCC): ICD-10-CM

## 2022-05-02 DIAGNOSIS — Z13.31 POSITIVE DEPRESSION SCREENING: ICD-10-CM

## 2022-05-02 LAB
A/G RATIO: 1.5 (ref 1.1–2.2)
ALBUMIN SERPL-MCNC: 4.4 G/DL (ref 3.4–5)
ALP BLD-CCNC: 129 U/L (ref 40–129)
ALT SERPL-CCNC: 152 U/L (ref 10–40)
ANION GAP SERPL CALCULATED.3IONS-SCNC: 15 MMOL/L (ref 3–16)
AST SERPL-CCNC: 227 U/L (ref 15–37)
BILIRUB SERPL-MCNC: 0.9 MG/DL (ref 0–1)
BILIRUBIN URINE: NEGATIVE
BLOOD, URINE: ABNORMAL
BUN BLDV-MCNC: 46 MG/DL (ref 7–20)
CALCIUM SERPL-MCNC: 9.7 MG/DL (ref 8.3–10.6)
CHLORIDE BLD-SCNC: 105 MMOL/L (ref 99–110)
CHOLESTEROL, TOTAL: 139 MG/DL (ref 0–199)
CLARITY: CLEAR
CO2: 26 MMOL/L (ref 21–32)
COARSE CASTS, UA: ABNORMAL /LPF (ref 0–2)
COLOR: YELLOW
CREAT SERPL-MCNC: 1.9 MG/DL (ref 0.6–1.2)
GFR AFRICAN AMERICAN: 31
GFR NON-AFRICAN AMERICAN: 26
GLUCOSE BLD-MCNC: 105 MG/DL (ref 70–99)
GLUCOSE URINE: NEGATIVE MG/DL
HDLC SERPL-MCNC: 46 MG/DL (ref 40–60)
KETONES, URINE: NEGATIVE MG/DL
LDL CHOLESTEROL CALCULATED: 64 MG/DL
LEUKOCYTE ESTERASE, URINE: NEGATIVE
MICROSCOPIC EXAMINATION: YES
NITRITE, URINE: NEGATIVE
PH UA: 5.5 (ref 5–8)
POTASSIUM SERPL-SCNC: 4 MMOL/L (ref 3.5–5.1)
PROTEIN UA: 100 MG/DL
RBC UA: ABNORMAL /HPF (ref 0–4)
RENAL EPITHELIAL, UA: ABNORMAL /HPF (ref 0–1)
SODIUM BLD-SCNC: 146 MMOL/L (ref 136–145)
SPECIFIC GRAVITY UA: 1.02 (ref 1–1.03)
T4 FREE: 1.2 NG/DL (ref 0.9–1.8)
TOTAL PROTEIN: 7.3 G/DL (ref 6.4–8.2)
TRIGL SERPL-MCNC: 144 MG/DL (ref 0–150)
TSH SERPL DL<=0.05 MIU/L-ACNC: 4.83 UIU/ML (ref 0.27–4.2)
URINE TYPE: ABNORMAL
UROBILINOGEN, URINE: 0.2 E.U./DL
VLDLC SERPL CALC-MCNC: 29 MG/DL
WBC UA: ABNORMAL /HPF (ref 0–5)

## 2022-05-02 PROCEDURE — 36415 COLL VENOUS BLD VENIPUNCTURE: CPT

## 2022-05-02 PROCEDURE — G8400 PT W/DXA NO RESULTS DOC: HCPCS | Performed by: FAMILY MEDICINE

## 2022-05-02 PROCEDURE — 81001 URINALYSIS AUTO W/SCOPE: CPT

## 2022-05-02 PROCEDURE — 3017F COLORECTAL CA SCREEN DOC REV: CPT | Performed by: FAMILY MEDICINE

## 2022-05-02 PROCEDURE — 84443 ASSAY THYROID STIM HORMONE: CPT

## 2022-05-02 PROCEDURE — G8427 DOCREV CUR MEDS BY ELIG CLIN: HCPCS | Performed by: FAMILY MEDICINE

## 2022-05-02 PROCEDURE — 80061 LIPID PANEL: CPT

## 2022-05-02 PROCEDURE — 80053 COMPREHEN METABOLIC PANEL: CPT

## 2022-05-02 PROCEDURE — 1036F TOBACCO NON-USER: CPT | Performed by: FAMILY MEDICINE

## 2022-05-02 PROCEDURE — 87086 URINE CULTURE/COLONY COUNT: CPT

## 2022-05-02 PROCEDURE — 1090F PRES/ABSN URINE INCON ASSESS: CPT | Performed by: FAMILY MEDICINE

## 2022-05-02 PROCEDURE — 4040F PNEUMOC VAC/ADMIN/RCVD: CPT | Performed by: FAMILY MEDICINE

## 2022-05-02 PROCEDURE — 84439 ASSAY OF FREE THYROXINE: CPT

## 2022-05-02 PROCEDURE — 1123F ACP DISCUSS/DSCN MKR DOCD: CPT | Performed by: FAMILY MEDICINE

## 2022-05-02 PROCEDURE — 83036 HEMOGLOBIN GLYCOSYLATED A1C: CPT

## 2022-05-02 PROCEDURE — 99215 OFFICE O/P EST HI 40 MIN: CPT | Performed by: FAMILY MEDICINE

## 2022-05-02 PROCEDURE — G8417 CALC BMI ABV UP PARAM F/U: HCPCS | Performed by: FAMILY MEDICINE

## 2022-05-02 RX ORDER — THIOTHIXENE 5 MG/1
5 CAPSULE ORAL 3 TIMES DAILY
COMMUNITY
End: 2022-05-02

## 2022-05-02 RX ORDER — LEVOTHYROXINE SODIUM 0.05 MG/1
TABLET ORAL
COMMUNITY
Start: 2022-04-06

## 2022-05-02 RX ORDER — POTASSIUM CHLORIDE 20 MEQ/1
20 TABLET, EXTENDED RELEASE ORAL
Status: ON HOLD | COMMUNITY
End: 2022-06-06 | Stop reason: HOSPADM

## 2022-05-02 RX ORDER — ROSUVASTATIN CALCIUM 40 MG/1
TABLET, COATED ORAL
Status: ON HOLD | COMMUNITY
Start: 2022-03-30 | End: 2022-06-06 | Stop reason: HOSPADM

## 2022-05-02 RX ORDER — LISINOPRIL 20 MG/1
20 TABLET ORAL DAILY
Status: ON HOLD | COMMUNITY
End: 2022-06-06 | Stop reason: HOSPADM

## 2022-05-02 RX ORDER — CLOPIDOGREL BISULFATE 75 MG/1
TABLET ORAL
COMMUNITY
Start: 2022-03-30

## 2022-05-02 RX ORDER — POTASSIUM CHLORIDE 20 MEQ/1
TABLET, EXTENDED RELEASE ORAL
COMMUNITY
Start: 2022-03-30 | End: 2022-05-02

## 2022-05-02 RX ORDER — ONDANSETRON HYDROCHLORIDE 8 MG/1
TABLET, FILM COATED ORAL
COMMUNITY
Start: 2022-04-28 | End: 2022-05-02

## 2022-05-02 RX ORDER — LOSARTAN POTASSIUM 50 MG/1
TABLET ORAL
COMMUNITY
Start: 2022-02-04 | End: 2022-05-02

## 2022-05-02 RX ORDER — OMEPRAZOLE 40 MG/1
CAPSULE, DELAYED RELEASE ORAL
COMMUNITY
Start: 2022-03-29 | End: 2022-05-02

## 2022-05-02 RX ORDER — DICYCLOMINE HYDROCHLORIDE 10 MG/1
CAPSULE ORAL
COMMUNITY
Start: 2022-03-01 | End: 2022-05-02

## 2022-05-02 RX ORDER — LISINOPRIL AND HYDROCHLOROTHIAZIDE 20; 12.5 MG/1; MG/1
TABLET ORAL
COMMUNITY
Start: 2022-03-30 | End: 2022-05-02

## 2022-05-02 RX ORDER — METOPROLOL SUCCINATE 25 MG/1
TABLET, EXTENDED RELEASE ORAL
COMMUNITY
Start: 2022-03-30 | End: 2022-05-02

## 2022-05-02 ASSESSMENT — PATIENT HEALTH QUESTIONNAIRE - PHQ9
SUM OF ALL RESPONSES TO PHQ QUESTIONS 1-9: 12
SUM OF ALL RESPONSES TO PHQ9 QUESTIONS 1 & 2: 3
9. THOUGHTS THAT YOU WOULD BE BETTER OFF DEAD, OR OF HURTING YOURSELF: 0
3. TROUBLE FALLING OR STAYING ASLEEP: 0
SUM OF ALL RESPONSES TO PHQ QUESTIONS 1-9: 12
1. LITTLE INTEREST OR PLEASURE IN DOING THINGS: 3
5. POOR APPETITE OR OVEREATING: 0
6. FEELING BAD ABOUT YOURSELF - OR THAT YOU ARE A FAILURE OR HAVE LET YOURSELF OR YOUR FAMILY DOWN: 2
2. FEELING DOWN, DEPRESSED OR HOPELESS: 0
8. MOVING OR SPEAKING SO SLOWLY THAT OTHER PEOPLE COULD HAVE NOTICED. OR THE OPPOSITE, BEING SO FIGETY OR RESTLESS THAT YOU HAVE BEEN MOVING AROUND A LOT MORE THAN USUAL: 3
SUM OF ALL RESPONSES TO PHQ QUESTIONS 1-9: 12
4. FEELING TIRED OR HAVING LITTLE ENERGY: 3
SUM OF ALL RESPONSES TO PHQ QUESTIONS 1-9: 12
7. TROUBLE CONCENTRATING ON THINGS, SUCH AS READING THE NEWSPAPER OR WATCHING TELEVISION: 1
10. IF YOU CHECKED OFF ANY PROBLEMS, HOW DIFFICULT HAVE THESE PROBLEMS MADE IT FOR YOU TO DO YOUR WORK, TAKE CARE OF THINGS AT HOME, OR GET ALONG WITH OTHER PEOPLE: 1

## 2022-05-02 ASSESSMENT — ANXIETY QUESTIONNAIRES
1. FEELING NERVOUS, ANXIOUS, OR ON EDGE: 1
7. FEELING AFRAID AS IF SOMETHING AWFUL MIGHT HAPPEN: 2
IF YOU CHECKED OFF ANY PROBLEMS ON THIS QUESTIONNAIRE, HOW DIFFICULT HAVE THESE PROBLEMS MADE IT FOR YOU TO DO YOUR WORK, TAKE CARE OF THINGS AT HOME, OR GET ALONG WITH OTHER PEOPLE: VERY DIFFICULT
6. BECOMING EASILY ANNOYED OR IRRITABLE: 0
GAD7 TOTAL SCORE: 6
4. TROUBLE RELAXING: 2
5. BEING SO RESTLESS THAT IT IS HARD TO SIT STILL: 0
3. WORRYING TOO MUCH ABOUT DIFFERENT THINGS: 1
2. NOT BEING ABLE TO STOP OR CONTROL WORRYING: 0

## 2022-05-02 ASSESSMENT — ENCOUNTER SYMPTOMS
ABDOMINAL PAIN: 1
SHORTNESS OF BREATH: 0
TROUBLE SWALLOWING: 1
BLOOD IN STOOL: 0
CONSTIPATION: 0
RHINORRHEA: 1
DIARRHEA: 0
CHEST TIGHTNESS: 1

## 2022-05-02 ASSESSMENT — LIFESTYLE VARIABLES: HOW OFTEN DO YOU HAVE A DRINK CONTAINING ALCOHOL: NEVER

## 2022-05-02 NOTE — Clinical Note
I think this patient would be someone that could benefit from care coordination; CVA left AMA no PT/OT/follow up in 6 weeks proceeding

## 2022-05-02 NOTE — PROGRESS NOTES
Kathe Moreno     1950    Consultants:     Patient Care Team:  Monica Mejia MD as PCP - General (Family Medicine)  René Luna MD as Referring Physician (Hematology and Oncology)  Uvaldo Michelle MD as Consulting Physician (Gastroenterology)    Chief Complaint:     Chief Complaint   Patient presents with    Cerebrovascular Accident    Hypertension     pt had a stroke last month    Chronic Kidney Disease    Hematuria    Incontinence    Hypothyroidism    Hyperlipidemia    Vaginal Pain     HPI:       Kathe Moreno is a 66 y.o. female  is an established patient who presents for hospital follow up for CVA 3/28/22, and for previously uncontrolled hypertesnion and CVA, new issue of urinary incontinence and urgency,     -CVA:  March 2022 was admitted to [facility], operated by [facility]  CT of head showed old stroke  MRI showed new nonhemorrhagic stroke  Treated with asa, plavix, lipitor 80 mg daily  Left hospital against medical advice due to competency of the hospital per patient. ..   Has since followed up with cardiology now at [facility] Dr. Kina Ortiz  Patient complaint of aphasia, difficulty swallowing, and weakness  After leaving AMA patient did not receive speech, occupational, or physical therapy  Patient is open to having these services  Patient is currently now taking:  Metoprolol 25 mg daily  Lisinopril 20 mg daily  Crestor 40 mg daily  Lisinopril 20 mg daily  ASA 81 mg daily  Plavix 75 mg daily  K+20 mEq 0.5 tablet BID    -Uncontrolled Hypertension:  Seeing cardiology now at [facility] Dr. Kina Ortiz  BP at goal today  BP Readings from Last 3 Encounters:   05/02/22 126/72   09/09/21 134/76   09/16/20 (!) 155/78   Metoprolol 25 mg daily  Lisinopril 20 mg daily  Crestor 40 mg daily  Lisinopril 20 mg daily  ASA 81 mg daily  Plavix 75 mg daily  K+20 mEq 0.5 tablet BID    -Urinary incontinence and Hematuria and Vaginal lesion:  Urge incontinence  Started after the CVA about 6 weeks ago  Increasing over past week  Bladder pain/spasm  Thinks she has some blood in her urine  Has pimple like lesion on her vulvar area or follicular lesion   used needle to ken  No fever or chills  No flank pain  Chronic abdominal pain secondary to abdominal cramping  History of interna hemorrhoids    Hypothryoidism:  Patient now taking Rx;  Levothyroxine 50 mcg daily for which she had declined at our previous office visits  Lab Results   Component Value Date    TSH 4.34 (H) 05/18/2021    T4FREE 0.9 05/18/2021       Depression:  New symptoms per patient  More disinterested because cannot do things she previously wanted to do due to her physical health deteriorating since the stroke in March 2022    PHQ-9 Total Score: 12 (5/2/2022 10:12 AM)  Thoughts that you would be better off dead, or of hurting yourself in some way: 0 (5/2/2022 10:12 AM)    SUZANNE 7 SCORE 5/2/2022   SUZANNE-7 Total Score 6       Health maintenance:  Recommend COVID 19 4th dose booster  Recommend shingrix vaccine  Due for AWV  Patient Active Problem List   Diagnosis    CML (chronic myeloid leukemia) in relapse ([facility] [address]    Acquired hypothyroidism    Adverse reaction to sulfa antibiotic    Chemotherapy adverse reaction    Anastomotic ulcer    Anemia    Anxiety    Campbell esophagus    Cancer ([facility] [address] [facility]    Cellulitis of left lower extremity    DNR (do not resuscitate)    Family history of colon cancer    Hiatal hernia    History of skin cancer    Hyperlipidemia    Localized edema    Lower abdominal pain    Lower extremity edema    Pleural effusion    Thyroid nodule    CML (chronic myelocytic leukemia) (HCC)    Rectal bleeding    Polyp of colon    Anatomical narrow angle, bilateral    Hx of actinic keratosis    Hypertension    Bilateral primary osteoarthritis of knee    Chronic pain of both knees    Risk of myocardial infarction or stroke 7.5% or greater in next 10 years         Past Medical History:    Past Medical History: Diagnosis Date    Anemia     Arthritis     Asthma     Bowel dysfunction     CML (chronic myelocytic leukemia) ([facility] [address] 01/2006    leukemia    Hyperlipidemia     Hypertension     Nuclear senile cataract of both eyes 11/25/2019    Obesity     Skin cancer of face     left cheek, squamous    Stroke (cerebrum) ([facility] [address] [facility]     Thyroid disease     TIA (transient ischemic attack)     mini ones-from chemo       Past Surgical History:  Past Surgical History:   Procedure Laterality Date    ANUS SURGERY  1998    fissure    BREAST BIOPSY      BREAST LUMPECTOMY      benign    COLONOSCOPY      numerous polyps    ELBOW SURGERY  1960    removal of foreign body ([place])    FINE NEEDLE ASPIRATION      PARACENTESIS      x 2 fluid in lung from Chemo    TONSILLECTOMY Bilateral        Home Meds:  Prior to Visit Medications    Medication Sig Taking?  Authorizing Provider   clopidogrel (PLAVIX) 75 MG tablet TAKE 1 TABLET BY MOUTH EVERY DAY Yes Historical Provider, MD   dicyclomine (BENTYL) 10 MG capsule TAKE 1 CAPSULE BY MOUTH THREE TIMES DAILY AS NEEDED FOR ABDOMINAL PAIN Yes Historical Provider, MD   levothyroxine (SYNTHROID) 50 MCG tablet TAKE 1 TABLET BY MOUTH EVERY DAY Yes Historical Provider, MD   lisinopril-hydroCHLOROthiazide (PRINZIDE;ZESTORETIC) 20-12.5 MG per tablet TAKE 1 TABLET BY MOUTH EVERY DAY IN THE MORNING Yes Historical Provider, MD   losartan (COZAAR) 50 MG tablet TAKE 1 TABLET BY MOUTH EVERY DAY Yes Historical Provider, MD   metoprolol succinate (TOPROL XL) 25 MG extended release tablet TAKE 1 TABLET BY MOUTH EVERY NIGHT AT BEDTIME Yes Historical Provider, MD   ondansetron (ZOFRAN) 8 MG tablet  Yes Historical Provider, MD   potassium chloride (KLOR-CON M) 20 MEQ extended release tablet TAKE 1 TABLET BY MOUTH EVERY DAY Yes Historical Provider, MD   rosuvastatin (CRESTOR) 40 MG tablet TAKE 1 TABLET BY MOUTH EVERY DAY Yes Historical Provider, MD   thiothixene (NAVANE) 5 MG capsule Take 5 mg by mouth 3 times daily Yes Historical Provider, MD   EPINEPHrine (EPIPEN) 0.3 MG/0.3ML SOAJ injection USE AS DIRECTED FOR ALLERGIC REACTION Yes Rosales Frye,    cycloSPORINE (RESTASIS) 0.05 % ophthalmic emulsion Place 1 drop into both eyes every 12 hours Yes Rosales Jones.,    hydroCHLOROthiazide (MICROZIDE) 12.5 MG capsule Take 1 capsule by mouth every morning Yes Rosales Frye DO   atorvastatin (LIPITOR) 40 MG tablet Take 1 tablet by mouth daily Yes Rosales Frye,    TASIGNA 200 MG capsule  Yes Historical Provider, MD   Cholecalciferol (VITAMIN D3) 1000 units CAPS Take by mouth daily Yes Historical Provider, MD   UNABLE TO FIND Take by mouth daily thytrophin--1/2 pill Yes Historical Provider, MD   PHYTOSTEROLS PO Take by mouth daily Yes Historical Provider, MD   CINNAMON PO Take by mouth daily Yes Historical Provider, MD   TURMERIC CURCUMIN PO Take by mouth daily Yes Historical Provider, MD   CALCIUM-MAGNESIUM-ZINC PO Take by mouth daily Yes Historical Provider, MD   Coenzyme Q10 (CO Q 10) 10 MG CAPS Take by mouth 2 times daily  Yes Historical Provider, MD   KRILL OIL PO Take by mouth daily  Yes Historical Provider, MD   polyethylene glycol (GLYCOLAX) powder Take 17 g by mouth daily Yes Historical Provider, MD   acetaminophen (TYLENOL) 325 MG tablet Take 2 tablets by mouth every 6 hours as needed for Pain Yes Britany Perez MD   aspirin 81 MG tablet Take 81 mg by mouth  Yes Historical Provider, MD   Probiotic Product (PROBIOTIC & ACIDOPHILUS EX ST PO) Take by mouth daily  Yes Historical Provider, MD       Allergies:    Latex, Mangifera indica, Penicillin g, Shellfish-derived products, Sulfa antibiotics, Grapefruit concentrate, Omeprazole, and Shrimp extract allergy skin test    Family History:       Problem Relation Age of Onset    Cancer Mother         non hodgkins lymphoma    Cancer Father         colon    Cancer Sister         colon    Cancer Brother         mesothelioma    Arrhythmia Sister        Social History:   Social History     Socioeconomic History    Marital status:      Spouse name: Austin Cote    Number of children: 2    Years of education: Not on file    Highest education level: Not on file   Occupational History     Employer: RETIRED    Occupation: [employer] 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    Occupation: [employer] [place]   Tobacco Use    Smoking status: Never Smoker    Smokeless tobacco: Never Used   Vaping Use    Vaping Use: Never used   Substance and Sexual Activity    Alcohol use: No    Drug use: No    Sexual activity: Not Currently   Other Topics Concern    Not on file   Social History Narrative    [de-identified]    Son in [place] got  late in life    2 grandchildren    Aresterling Baires 3 y/o    Clemente Bhandariing 2 y/o    Loves to take care of them     Social Determinants of Health     Financial Resource Strain: Low Risk     Difficulty of Paying Living Expenses: Not hard at all   Food Insecurity: No Food Insecurity    Worried About [address] [employer] [employer] in the Last Year: Never true    [address] in the Last Year: Never true   Transportation Needs: No Transportation Needs    Lack of Transportation (Medical): No    Lack of Transportation (Non-Medical):  No   Physical Activity:     Days of Exercise per Week: Not on file    Minutes of Exercise per Session: Not on file   Stress:     Feeling of Stress : Not on file   Social Connections:     Frequency of Communication with Friends and Family: Not on file    Frequency of Social Gatherings with Friends and Family: Not on file    Attends Mandaen Services: Not on file    Active Member of Clubs or Organizations: Not on file    Attends Club or Organization Meetings: Not on file    Marital Status: Not on file   Intimate Partner Violence:     Fear of Current or Ex-Partner: Not on file    Emotionally Abused: Not on file    Physically Abused: Not on file    Sexually Abused: Not on file   Housing Stability:     Unable to Pay for Housing in the Last Year: Not on file    Number of Places Lived in the Last Year: Not on file    Unstable Housing in the Last Year: Not on file       Health Maintenance Completed:  Health Maintenance   Topic Date Due    Shingles vaccine (1 of 2) Never done   [employer] Visit (AWV)  09/11/2021    COVID-19 Vaccine (4 - Booster for Pfizer series) 11/24/2021    Depression Monitoring  05/10/2022    DEXA (modify frequency per FRAX score)  11/15/2023 (Originally 12/24/2003)    Breast cancer screen  07/13/2022    Lipids  09/09/2022    TSH  09/09/2022    Potassium  09/15/2022    Creatinine  09/15/2022    Colorectal Cancer Screen  10/18/2024    DTaP/Tdap/Td vaccine (2 - Td or Tdap) 11/04/2024    Flu vaccine  Completed    Pneumococcal 65+ years Vaccine  Completed    Hepatitis C screen  Completed    Hepatitis A vaccine  Aged Out    Hepatitis B vaccine  Aged Out    Hib vaccine  Aged Out    Meningococcal (ACWY) vaccine  Aged Out          Immunization History   Administered Date(s) Administered    COVID-19, Pfizer Purple top, DILUTE for use, 12+ yrs, 30mcg/0.3mL dose 02/18/2021, 03/10/2021, 08/24/2021    Hepatitis A Adult (Havrix, Vaqta) 01/23/2015    Influenza Virus Vaccine 09/06/2017    Influenza, High Dose (Fluzone 65 yrs and older) 09/18/2015, 09/01/2016, 09/02/2019    Influenza, Quadv, IM, PF (6 mo and older Fluzone, Flulaval, Fluarix, and 3 yrs and older Afluria) 09/09/2014    Influenza, Quadv, adjuvanted, 65 yrs +, IM, PF (Fluad) 09/16/2020, 09/09/2021    Pneumococcal Conjugate 13-valent ([id number]) 04/23/2015    Pneumococcal Polysaccharide ([id number]) 04/19/2016    Tdap (Boostrix, Adacel) 11/04/2014     Review of Systems   Constitutional: Positive for activity change (less active) and fatigue. Negative for appetite change, chills, fever (98.3F) and unexpected weight change.    HENT: Positive for congestion, hearing loss, postnasal drip, rhinorrhea and trouble swallowing. Aphasia and trouble swallowing since the stroke   Eyes: Positive for visual disturbance (like screen coming down over her eyes). Respiratory: Positive for chest tightness. Negative for shortness of breath. Cardiovascular: Negative for chest pain. Gastrointestinal: Positive for abdominal pain (pain around wasteline). Negative for blood in stool, constipation and diarrhea. Endocrine: Negative for polyuria. Genitourinary: Positive for difficulty urinating and hematuria. Negative for dysuria. Urinary urge inctoninence   Musculoskeletal: Positive for arthralgias (weak, hard time standing/going down steps). Skin: Negative for rash. Lesion on vulva, looks like a little pimple;  lanced it with puss and blood   Neurological: Positive for speech difficulty and weakness. Negative for syncope, numbness and headaches. Psychiatric/Behavioral: Negative for dysphoric mood and sleep disturbance. The patient is not nervous/anxious. Vitals:    05/02/22 1037   BP: 126/72   Site: Left Upper Arm   Position: Sitting   Cuff Size: Medium Adult   Pulse: 61   Resp: 16   Temp: 97.3 °F (36.3 °C)   TempSrc: Infrared   SpO2: 96%   Weight: 143 lb 9.6 oz (65.1 kg)   Height: 5' 2.5\" (1.588 m)     Body mass index is 25.85 kg/m². Wt Readings from Last 3 Encounters:   05/02/22 143 lb 9.6 oz (65.1 kg)   09/09/21 150 lb 12.8 oz (68.4 kg)   05/17/21 140 lb (63.5 kg)       BP Readings from Last 3 Encounters:   05/02/22 126/72   09/09/21 134/76   09/16/20 (!) 155/78       Physical Exam  Chaperone present: Juan Russell MA. Constitutional:       General: She is not in acute distress. Appearance: She is well-developed. HENT:      Head: Normocephalic and atraumatic. Right Ear: Tympanic membrane normal.      Left Ear: Tympanic membrane normal.      Nose: Nose normal. No rhinorrhea. Mouth/Throat:      Pharynx: Uvula midline.    Eyes:      Pupils: Pupils are equal, round, and reactive to light. Neck:      Trachea: No tracheal deviation. Cardiovascular:      Rate and Rhythm: Normal rate and regular rhythm. Heart sounds: Normal heart sounds. No murmur heard. No friction rub. No gallop. Pulmonary:      Effort: Pulmonary effort is normal. No respiratory distress. Breath sounds: Normal breath sounds. No wheezing or rales. Abdominal:      General: Bowel sounds are normal. There is no distension. Palpations: Abdomen is soft. Tenderness: There is no abdominal tenderness. There is no rebound. Genitourinary:     Pubic Area: No rash. Labia:         Right: Rash (follicular pustule) present. Rectum: External hemorrhoid present. Musculoskeletal:         General: No tenderness. Normal range of motion. Lymphadenopathy:      Cervical: No cervical adenopathy. Skin:     General: Skin is warm and dry. Findings: No erythema or rash. Neurological:      Mental Status: She is alert and oriented to person, place, and time. Cranial Nerves: No cranial nerve deficit. Deep Tendon Reflexes:      Reflex Scores:       Tricep reflexes are 2+ on the right side and 2+ on the left side. Bicep reflexes are 2+ on the right side and 2+ on the left side. Brachioradialis reflexes are 2+ on the right side and 2+ on the left side. Patellar reflexes are 2+ on the right side and 2+ on the left side. Psychiatric:         Mood and Affect: Mood is depressed. Affect is flat. Speech: Speech normal.         Behavior: Behavior is slowed. Thought Content: Thought content does not include homicidal or suicidal ideation. Lab Review:   Reviewed hospital records including imaging, labs  See media tab     Assessment/Plan:  Addison Stubbs is 67 y/o female who was seen today for follow up of hospitalization/cerebrovascular accident, hypertension, CKD 3A, urinary incontinence, hematuria, vulvar esion(s) and hypothyroidism.     1. Cerebrovascular accident (CVA), unspecified mechanism ([facility] [facility]  Not at goal; needs carotid US, Echocardiogram still after 3/28/22 event; left hospital in [address]  PT/OT/home health ordered  Continue high intensity statin Crestor 40 mg daily  ASA 81 and Plavix 75 mg; determine need for continued dual anticoagulation  Lisinopril 20 mg and metoprolol 25 mg daily for BP control  - Lipid Panel; Future  - External Referral To Home Health    2. Acquired hypothyroidism  On levothyroxine 50 mcg daily since leaving hospital; previously would not take synthroid in my time caring for patient  Continue synthroid 50 mcg daily; titrate to goal  - TSH; Future  - T4, Free; Future    3. Uncontrolled hypertension  BP at goal; continue lisinopril 20 mg and Metoprolol 25 mg daily  F/u with cardiology as planned    4. Chronic kidney disease, stage 3a (HCC)  BUN and creatine normal in hospital  Has nephro appointment for next month  Not sure if necessary  GFR 55 during hospitalization  No true EDYTA  Avoid Nephrotoxins like NSAID's  Repeat kidney, liver, electrolyte panel; determine further work up from there, review imaging to see if hydronephrosis present during hospitalization for any reason  - Comprehensive Metabolic Panel; Future    5. Pure hypercholesterolemia  Continue crestor 40 mg daily  - Lipid Panel; Future    6. [facility]  7. CML (chronic myelocytic leukemia) (HCC)  Not at goal  Follow with Dr. Kelly Contreras here and oncology in [address] 200 mg daily    8. Hematuria, unspecified type  9. Neurogenic bladder as late effect of cerebrovascular accident (CVA)  Unclear etiology; UA/CX today; labial legion appears to be folliculitis; rule out infection with serology, clean/dry area and warm compress for vulvar lesion; consider gynecology or urology follow up if persists  - Urinalysis with Microscopic; Future  - Culture, Urine; Future    10.  Vulvar lesion  Appears to be folliculitis or pustule  Warm compress  Keep area clean with soap and water  Avoid home drainage as previously done as increases risk of infection    11. Positive depression screening  PHQ-9 Total Score: 12 (5/2/2022 10:12 AM)  Thoughts that you would be better off dead, or of hurting yourself in some way: 0 (5/2/2022 10:12 AM)    SUZANNE 7 SCORE 5/2/2022   SUZANNE-7 Total Score 6     On the basis of positive PHQ-9 screening (PHQ-9 Total Score: 12), the following plan was implemented: referral to social work provided and referral to PT/OT; due to to time constraints did not address starting anti-depressant; patient hopeful that if physical and occupational healht improves her mood will feel better. Patient will follow-up in 3 month(s) with PCP. 12. Hyperglycemia  13. Diabetes mellitus screening  - Hemoglobin A1C; Future  - Comprehensive Metabolic Panel; Future      Health Maintenance Due:  Health Maintenance Due   Topic Date Due    Shingles vaccine (1 of 2) Never done   [employer] Visit (AWV)  09/11/2021    COVID-19 Vaccine (4 - Booster for Pfizer series) 11/24/2021    Depression Monitoring  05/10/2022          -Health Maintenance:  Due for Shingrix and Covid 19 booster 4th dose  AWV in future    Return in about 3 months (around 8/2/2022). Spent >60  minutes of face to face interaction with patient counseling on diagnoses and treatment plan; including but not limited to pre visit planning, chart/lab review, new orders, instructions, charting    EMR Dragon/transcription disclaimer:  Much of this encounter note is electronic transcription/translation of spoken language to printed texts. The electronic translation of spoken language may be erroneous, or at times, nonsensical words or phrases may be inadvertently transcribed. Although I have reviewed the note for such errors, some may still exist.       May Palaicos.  Kimmie Gonzalez., DO  5/2/2022

## 2022-05-02 NOTE — PATIENT INSTRUCTIONS
-Warm compress for vulvar lesion and keep clean and dry    -Blood work and urinalysis today    -Continue current medication on list    -If urinary urge incontinence persists consider medication to help    -Would you be interested in occupational and physical therapy post stroke

## 2022-05-03 ENCOUNTER — CARE COORDINATION (OUTPATIENT)
Dept: CARE COORDINATION | Age: 72
End: 2022-05-03

## 2022-05-03 ENCOUNTER — APPOINTMENT (OUTPATIENT)
Dept: CT IMAGING | Age: 72
DRG: 682 | End: 2022-05-03
Payer: MEDICARE

## 2022-05-03 ENCOUNTER — APPOINTMENT (OUTPATIENT)
Dept: ULTRASOUND IMAGING | Age: 72
DRG: 682 | End: 2022-05-03
Payer: MEDICARE

## 2022-05-03 ENCOUNTER — HOSPITAL ENCOUNTER (INPATIENT)
Age: 72
LOS: 7 days | Discharge: NURSING, REHAB OR LONG-TERM CARE | DRG: 682 | End: 2022-05-10
Attending: EMERGENCY MEDICINE | Admitting: INTERNAL MEDICINE
Payer: MEDICARE

## 2022-05-03 DIAGNOSIS — T79.6XXA TRAUMATIC RHABDOMYOLYSIS, INITIAL ENCOUNTER (HCC): ICD-10-CM

## 2022-05-03 DIAGNOSIS — N17.9 AKI (ACUTE KIDNEY INJURY) (HCC): Primary | ICD-10-CM

## 2022-05-03 DIAGNOSIS — R74.01 TRANSAMINITIS: ICD-10-CM

## 2022-05-03 PROBLEM — M62.82 RHABDOMYOLYSIS: Status: ACTIVE | Noted: 2022-05-03

## 2022-05-03 PROBLEM — R77.8 TROPONIN LEVEL ELEVATED: Status: ACTIVE | Noted: 2022-05-03

## 2022-05-03 PROBLEM — R79.89 TROPONIN LEVEL ELEVATED: Status: ACTIVE | Noted: 2022-05-03

## 2022-05-03 LAB
A/G RATIO: 1.2 (ref 1.1–2.2)
ALBUMIN SERPL-MCNC: 4.4 G/DL (ref 3.4–5)
ALP BLD-CCNC: 129 U/L (ref 40–129)
ALT SERPL-CCNC: 205 U/L (ref 10–40)
AMORPHOUS: ABNORMAL /HPF
ANION GAP SERPL CALCULATED.3IONS-SCNC: 13 MMOL/L (ref 3–16)
AST SERPL-CCNC: 335 U/L (ref 15–37)
BASOPHILS ABSOLUTE: 0.1 K/UL (ref 0–0.2)
BASOPHILS RELATIVE PERCENT: 0.7 %
BILIRUB SERPL-MCNC: 0.9 MG/DL (ref 0–1)
BILIRUBIN URINE: NEGATIVE
BLOOD, URINE: ABNORMAL
BUN BLDV-MCNC: 50 MG/DL (ref 7–20)
CALCIUM SERPL-MCNC: 10.3 MG/DL (ref 8.3–10.6)
CHLORIDE BLD-SCNC: 102 MMOL/L (ref 99–110)
CLARITY: CLEAR
CO2: 28 MMOL/L (ref 21–32)
COARSE CASTS, UA: ABNORMAL /LPF (ref 0–2)
COLOR: YELLOW
CREAT SERPL-MCNC: 1.9 MG/DL (ref 0.6–1.2)
EOSINOPHILS ABSOLUTE: 0.4 K/UL (ref 0–0.6)
EOSINOPHILS RELATIVE PERCENT: 3.8 %
EPITHELIAL CELLS, UA: ABNORMAL /HPF (ref 0–5)
ESTIMATED AVERAGE GLUCOSE: 142.7 MG/DL
GFR AFRICAN AMERICAN: 31
GFR NON-AFRICAN AMERICAN: 26
GLUCOSE BLD-MCNC: 119 MG/DL (ref 70–99)
GLUCOSE BLD-MCNC: 139 MG/DL (ref 70–99)
GLUCOSE URINE: NEGATIVE MG/DL
HBA1C MFR BLD: 6.6 %
HCT VFR BLD CALC: 37.8 % (ref 36–48)
HEMOGLOBIN: 12.8 G/DL (ref 12–16)
HYALINE CASTS: ABNORMAL /LPF (ref 0–2)
KETONES, URINE: NEGATIVE MG/DL
LEUKOCYTE ESTERASE, URINE: ABNORMAL
LIPASE: 57 U/L (ref 13–60)
LYMPHOCYTES ABSOLUTE: 1.3 K/UL (ref 1–5.1)
LYMPHOCYTES RELATIVE PERCENT: 12.9 %
MAGNESIUM: 2.4 MG/DL (ref 1.8–2.4)
MCH RBC QN AUTO: 31.8 PG (ref 26–34)
MCHC RBC AUTO-ENTMCNC: 33.9 G/DL (ref 31–36)
MCV RBC AUTO: 93.6 FL (ref 80–100)
MICROSCOPIC EXAMINATION: YES
MONOCYTES ABSOLUTE: 0.7 K/UL (ref 0–1.3)
MONOCYTES RELATIVE PERCENT: 6.8 %
NEUTROPHILS ABSOLUTE: 7.7 K/UL (ref 1.7–7.7)
NEUTROPHILS RELATIVE PERCENT: 75.8 %
NITRITE, URINE: NEGATIVE
PDW BLD-RTO: 13 % (ref 12.4–15.4)
PERFORMED ON: ABNORMAL
PH UA: 5 (ref 5–8)
PLATELET # BLD: 189 K/UL (ref 135–450)
PMV BLD AUTO: 9.7 FL (ref 5–10.5)
POTASSIUM REFLEX MAGNESIUM: 3.4 MMOL/L (ref 3.5–5.1)
PROTEIN UA: 100 MG/DL
RBC # BLD: 4.04 M/UL (ref 4–5.2)
RBC UA: ABNORMAL /HPF (ref 0–4)
SODIUM BLD-SCNC: 143 MMOL/L (ref 136–145)
SPECIFIC GRAVITY UA: 1.02 (ref 1–1.03)
TOTAL CK: ABNORMAL U/L (ref 26–192)
TOTAL PROTEIN: 8 G/DL (ref 6.4–8.2)
TROPONIN: 0.05 NG/ML
URINE CULTURE, ROUTINE: NORMAL
URINE TYPE: ABNORMAL
UROBILINOGEN, URINE: 0.2 E.U./DL
WBC # BLD: 10.2 K/UL (ref 4–11)
WBC UA: ABNORMAL /HPF (ref 0–5)

## 2022-05-03 PROCEDURE — 2580000003 HC RX 258: Performed by: INTERNAL MEDICINE

## 2022-05-03 PROCEDURE — 84484 ASSAY OF TROPONIN QUANT: CPT

## 2022-05-03 PROCEDURE — 80053 COMPREHEN METABOLIC PANEL: CPT

## 2022-05-03 PROCEDURE — 99285 EMERGENCY DEPT VISIT HI MDM: CPT

## 2022-05-03 PROCEDURE — 2580000003 HC RX 258: Performed by: PHYSICIAN ASSISTANT

## 2022-05-03 PROCEDURE — 96360 HYDRATION IV INFUSION INIT: CPT

## 2022-05-03 PROCEDURE — 93005 ELECTROCARDIOGRAM TRACING: CPT | Performed by: INTERNAL MEDICINE

## 2022-05-03 PROCEDURE — 76705 ECHO EXAM OF ABDOMEN: CPT

## 2022-05-03 PROCEDURE — 82550 ASSAY OF CK (CPK): CPT

## 2022-05-03 PROCEDURE — 74176 CT ABD & PELVIS W/O CONTRAST: CPT

## 2022-05-03 PROCEDURE — 81001 URINALYSIS AUTO W/SCOPE: CPT

## 2022-05-03 PROCEDURE — 85025 COMPLETE CBC W/AUTO DIFF WBC: CPT

## 2022-05-03 PROCEDURE — 83735 ASSAY OF MAGNESIUM: CPT

## 2022-05-03 PROCEDURE — 6360000002 HC RX W HCPCS: Performed by: INTERNAL MEDICINE

## 2022-05-03 PROCEDURE — 1200000000 HC SEMI PRIVATE

## 2022-05-03 PROCEDURE — 6370000000 HC RX 637 (ALT 250 FOR IP): Performed by: INTERNAL MEDICINE

## 2022-05-03 PROCEDURE — 96361 HYDRATE IV INFUSION ADD-ON: CPT

## 2022-05-03 PROCEDURE — 83690 ASSAY OF LIPASE: CPT

## 2022-05-03 RX ORDER — SODIUM CHLORIDE 9 MG/ML
INJECTION, SOLUTION INTRAVENOUS PRN
Status: DISCONTINUED | OUTPATIENT
Start: 2022-05-03 | End: 2022-05-10 | Stop reason: HOSPADM

## 2022-05-03 RX ORDER — ACETAMINOPHEN 650 MG/1
650 SUPPOSITORY RECTAL EVERY 6 HOURS PRN
Status: DISCONTINUED | OUTPATIENT
Start: 2022-05-03 | End: 2022-05-10 | Stop reason: HOSPADM

## 2022-05-03 RX ORDER — SODIUM CHLORIDE 9 MG/ML
INJECTION, SOLUTION INTRAVENOUS CONTINUOUS
Status: DISCONTINUED | OUTPATIENT
Start: 2022-05-03 | End: 2022-05-08

## 2022-05-03 RX ORDER — HEPARIN SODIUM 5000 [USP'U]/ML
5000 INJECTION, SOLUTION INTRAVENOUS; SUBCUTANEOUS 3 TIMES DAILY
Status: DISCONTINUED | OUTPATIENT
Start: 2022-05-03 | End: 2022-05-10 | Stop reason: HOSPADM

## 2022-05-03 RX ORDER — LEVOTHYROXINE SODIUM 0.05 MG/1
50 TABLET ORAL DAILY
Status: DISCONTINUED | OUTPATIENT
Start: 2022-05-04 | End: 2022-05-10 | Stop reason: HOSPADM

## 2022-05-03 RX ORDER — ACETAMINOPHEN 325 MG/1
650 TABLET ORAL EVERY 6 HOURS PRN
Status: DISCONTINUED | OUTPATIENT
Start: 2022-05-03 | End: 2022-05-10 | Stop reason: HOSPADM

## 2022-05-03 RX ORDER — CLOPIDOGREL BISULFATE 75 MG/1
75 TABLET ORAL DAILY
Status: DISCONTINUED | OUTPATIENT
Start: 2022-05-04 | End: 2022-05-10 | Stop reason: HOSPADM

## 2022-05-03 RX ORDER — SODIUM CHLORIDE 0.9 % (FLUSH) 0.9 %
5-40 SYRINGE (ML) INJECTION EVERY 12 HOURS SCHEDULED
Status: DISCONTINUED | OUTPATIENT
Start: 2022-05-03 | End: 2022-05-10 | Stop reason: HOSPADM

## 2022-05-03 RX ORDER — ONDANSETRON 4 MG/1
4 TABLET, ORALLY DISINTEGRATING ORAL EVERY 8 HOURS PRN
Status: DISCONTINUED | OUTPATIENT
Start: 2022-05-03 | End: 2022-05-10 | Stop reason: HOSPADM

## 2022-05-03 RX ORDER — METOPROLOL SUCCINATE 50 MG/1
25 TABLET, EXTENDED RELEASE ORAL DAILY
Status: DISCONTINUED | OUTPATIENT
Start: 2022-05-04 | End: 2022-05-05

## 2022-05-03 RX ORDER — INSULIN LISPRO 100 [IU]/ML
0-3 INJECTION, SOLUTION INTRAVENOUS; SUBCUTANEOUS NIGHTLY
Status: DISCONTINUED | OUTPATIENT
Start: 2022-05-03 | End: 2022-05-10 | Stop reason: HOSPADM

## 2022-05-03 RX ORDER — SODIUM CHLORIDE 0.9 % (FLUSH) 0.9 %
5-40 SYRINGE (ML) INJECTION PRN
Status: DISCONTINUED | OUTPATIENT
Start: 2022-05-03 | End: 2022-05-10 | Stop reason: HOSPADM

## 2022-05-03 RX ORDER — ONDANSETRON 2 MG/ML
4 INJECTION INTRAMUSCULAR; INTRAVENOUS EVERY 6 HOURS PRN
Status: DISCONTINUED | OUTPATIENT
Start: 2022-05-03 | End: 2022-05-10 | Stop reason: HOSPADM

## 2022-05-03 RX ORDER — INSULIN LISPRO 100 [IU]/ML
0-6 INJECTION, SOLUTION INTRAVENOUS; SUBCUTANEOUS
Status: DISCONTINUED | OUTPATIENT
Start: 2022-05-04 | End: 2022-05-10 | Stop reason: HOSPADM

## 2022-05-03 RX ORDER — 0.9 % SODIUM CHLORIDE 0.9 %
1000 INTRAVENOUS SOLUTION INTRAVENOUS ONCE
Status: COMPLETED | OUTPATIENT
Start: 2022-05-03 | End: 2022-05-03

## 2022-05-03 RX ORDER — DEXTROSE MONOHYDRATE 50 MG/ML
100 INJECTION, SOLUTION INTRAVENOUS PRN
Status: DISCONTINUED | OUTPATIENT
Start: 2022-05-03 | End: 2022-05-10 | Stop reason: HOSPADM

## 2022-05-03 RX ORDER — ASPIRIN 81 MG/1
81 TABLET ORAL DAILY
Status: DISCONTINUED | OUTPATIENT
Start: 2022-05-04 | End: 2022-05-10 | Stop reason: HOSPADM

## 2022-05-03 RX ORDER — DEXTROSE MONOHYDRATE 25 G/50ML
12.5 INJECTION, SOLUTION INTRAVENOUS PRN
Status: DISCONTINUED | OUTPATIENT
Start: 2022-05-03 | End: 2022-05-03 | Stop reason: ALTCHOICE

## 2022-05-03 RX ORDER — CARBOXYMETHYLCELLULOSE SODIUM 10 MG/ML
1 GEL OPHTHALMIC EVERY 12 HOURS
Status: DISCONTINUED | OUTPATIENT
Start: 2022-05-03 | End: 2022-05-10 | Stop reason: HOSPADM

## 2022-05-03 RX ORDER — NICOTINE POLACRILEX 4 MG
15 LOZENGE BUCCAL PRN
Status: DISCONTINUED | OUTPATIENT
Start: 2022-05-03 | End: 2022-05-10 | Stop reason: HOSPADM

## 2022-05-03 RX ADMIN — HEPARIN SODIUM 5000 UNITS: 5000 INJECTION INTRAVENOUS; SUBCUTANEOUS at 22:22

## 2022-05-03 RX ADMIN — SODIUM CHLORIDE 1000 ML: 9 INJECTION, SOLUTION INTRAVENOUS at 17:43

## 2022-05-03 RX ADMIN — CARBOXYMETHYLCELLULOSE SODIUM 1 DROP: 10 GEL OPHTHALMIC at 22:22

## 2022-05-03 RX ADMIN — SODIUM CHLORIDE, PRESERVATIVE FREE 10 ML: 5 INJECTION INTRAVENOUS at 22:22

## 2022-05-03 RX ADMIN — SODIUM CHLORIDE: 9 INJECTION, SOLUTION INTRAVENOUS at 22:25

## 2022-05-03 ASSESSMENT — ENCOUNTER SYMPTOMS
COUGH: 0
SHORTNESS OF BREATH: 0
NAUSEA: 0
ABDOMINAL PAIN: 1
DIARRHEA: 0
EYES NEGATIVE: 1
BACK PAIN: 0
VOMITING: 0
COLOR CHANGE: 0

## 2022-05-03 ASSESSMENT — PAIN SCALES - GENERAL
PAINLEVEL_OUTOF10: 5
PAINLEVEL_OUTOF10: 6
PAINLEVEL_OUTOF10: 6

## 2022-05-03 ASSESSMENT — PAIN DESCRIPTION - DESCRIPTORS: DESCRIPTORS: ACHING

## 2022-05-03 ASSESSMENT — PAIN DESCRIPTION - LOCATION: LOCATION: ABDOMEN;FLANK

## 2022-05-03 ASSESSMENT — PAIN DESCRIPTION - ORIENTATION: ORIENTATION: RIGHT

## 2022-05-03 NOTE — PROGRESS NOTES
Hi Dr Diana Monahan,     I will call to see if I can offer care coordination and to see if there are any needs I can assist with.   Thank you,  Rachel ePrdue RN   Ambulatory Care Manager  [phone]

## 2022-05-03 NOTE — ED PROVIDER NOTES
I independently performed a history and physical on Mel Balderas. All diagnostic, treatment, and disposition decisions were made by myself in conjunction with the advanced practice provider. Patient sent in for abnormal labs, specifically mild elevation of LFTs as well as EDYTA. Ultimately found to be in rhabdomyolysis possibly due to recent high intensity statin medication. Plan for hospitalist admission. For further details of [address] [place] [address] emergency department encounter, please see Sammie BASHIR's documentation.              Sharon Lai MD  05/03/22 [id number]

## 2022-05-03 NOTE — ED PROVIDER NOTES
[address] [facility]  ED  EMERGENCY DEPARTMENT ENCOUNTER        Pt Name: Dane Wiggins  MRN: [id number]  [place] 1950  Date of evaluation: 5/3/2022  Provider: Jessy Mcneil PA-C  PCP: Bandar Palmer MD  ED Attending: Bienvenido Sorensen MD      This patient was seen by the attending provider Bienvenido Sorensen MD    History provided by the patient    CHIEF COMPLAINT:     Chief Complaint   Patient presents with    Abdominal Pain     abd pain under right breast, pain with palpation.  Discuss Labs     fmd called told to come in due to liver and kidney failure lab results. HISTORY OF PRESENT ILLNESS:      Dane Wiggins is a 66 y.o. female who arrives to the ED by private vehicle. Patient sent here by PCP after having lab work done as an outpatient yesterday. She was given a message that her kidney and liver function tests were abnormal.  Patient arrives to the ED after having been shopping at the grocery store with her . They received the call from the doctor and came straight here without going home. She admits to being generally fatigued and weak, but would not be here had her doctor not told her. Only on further direct questioning that she admits to some right upper quadrant pain. She states this has been going on for about a month. It is no worse than usual today. Her oral intake has been normal.  No vomiting or diarrhea. No fevers or chills. No other complaints or concerns. Nursing Notes were reviewed     REVIEW OF SYSTEMS:     Review of Systems   Constitutional: Negative for appetite change, chills and fever. HENT: Negative. Eyes: Negative. Respiratory: Negative for cough and shortness of breath. Cardiovascular: Negative for chest pain. Gastrointestinal: Positive for abdominal pain. Negative for diarrhea, nausea and vomiting. Genitourinary: Negative for difficulty urinating and dysuria.    Musculoskeletal: Negative for back pain, gait problem and neck pain. Skin: Negative for color change. Neurological: Negative for headaches. All other systems reviewed and are negative. Except as noted above in the ROS, all other systems were reviewed and negative.          PAST MEDICAL HISTORY:     Past Medical History:   Diagnosis Date    Anemia     Arthritis     Asthma     Bowel dysfunction     CML (chronic myelocytic leukemia) ([facility] [address] 01/2006    leukemia    Hyperlipidemia     Hypertension     Nuclear senile cataract of both eyes 11/25/2019    Obesity     Risk of myocardial infarction or stroke 7.5% or greater in next 10 years 9/15/2021    Skin cancer of face     left cheek, squamous    Stroke (cerebrum) ([facility] [address]     Thyroid disease     TIA (transient ischemic attack)     mini ones-from chemo         SURGICAL HISTORY:      Past Surgical History:   Procedure Laterality Date    ANUS SURGERY  1998    fissure    BREAST BIOPSY      BREAST LUMPECTOMY      benign    COLONOSCOPY      numerous polyps    ELBOW SURGERY  1960    removal of foreign body ([place])    FINE NEEDLE ASPIRATION      PARACENTESIS      x 2 fluid in lung from Chemo    TONSILLECTOMY Bilateral          CURRENT MEDICATIONS:       Previous Medications    ACETAMINOPHEN (TYLENOL) 325 MG TABLET    Take 2 tablets by mouth every 6 hours as needed for Pain    ASPIRIN 81 MG TABLET    Take 81 mg by mouth     CLOPIDOGREL (PLAVIX) 75 MG TABLET    TAKE 1 TABLET BY MOUTH EVERY DAY    CYCLOSPORINE (RESTASIS) 0.05 % OPHTHALMIC EMULSION    Place 1 drop into both eyes every 12 hours    EPINEPHRINE (EPIPEN) 0.3 MG/0.3ML SOAJ INJECTION    USE AS DIRECTED FOR ALLERGIC REACTION    LEVOTHYROXINE (SYNTHROID) 50 MCG TABLET    TAKE 1 TABLET BY MOUTH EVERY DAY    LISINOPRIL (PRINIVIL;ZESTRIL) 20 MG TABLET    Take 20 mg by mouth daily    METOPROLOL SUCCINATE 25 MG CS24    Take 25 mg by mouth daily    POLYETHYLENE GLYCOL (GLYCOLAX) POWDER    Take 17 g by mouth daily    POTASSIUM CHLORIDE (KLOR-CON M) 20 MEQ EXTENDED RELEASE TABLET    Take 20 mEq by mouth    ROSUVASTATIN (CRESTOR) 40 MG TABLET    TAKE 1 TABLET BY MOUTH EVERY DAY    TASIGNA 200 MG CAPSULE             ALLERGIES:    Latex, Mangifera indica, Penicillin g, Shellfish-derived products, Sulfa antibiotics, Grapefruit concentrate, Omeprazole, and Shrimp extract allergy skin test    FAMILY HISTORY:       Family History   Problem Relation Age of Onset    Cancer Mother         non hodgkins lymphoma    Cancer Father         colon    Cancer Sister         colon    Cancer Brother         mesothelioma    Arrhythmia Sister           SOCIAL HISTORY:       Social History     Socioeconomic History    Marital status:      Spouse name: Tali Strange    Number of children: 2    Years of education: None    Highest education level: None   Occupational History     Employer: RETIRED    Occupation: [address] [place] [address] 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    Occupation: [employer]   Tobacco Use    Smoking status: Never Smoker    Smokeless tobacco: Never Used   Vaping Use    Vaping Use: Never used   Substance and Sexual Activity    Alcohol use: No    Drug use: No    Sexual activity: Not Currently   Other Topics Concern    None   Social History Narrative    Babysits    Son in [place] got  late in life    2 grandchildren    Tobi Dahl 3 y/o    Christine Coffman 2 y/o    Loves to take care of them     Social Determinants of Health     Financial Resource Strain: Low Risk     Difficulty of Paying Living Expenses: Not hard at all   Food Insecurity: No Food Insecurity    Worried About [facility] in the Last Year: Never true    [facility] in the Last Year: Never true   Transportation Needs: No Transportation Needs    Lack of Transportation (Medical): No    Lack of Transportation (Non-Medical):  No   Physical Activity:     Days of Exercise per Week: Not on file    Minutes of Exercise per Session: Not on file   Stress:     Feeling of Stress : Not on file   Social Connections:     Frequency of Communication with Friends and Family: Not on file    Frequency of Social Gatherings with Friends and Family: Not on file    Attends Baptism Services: Not on file    Active Member of Clubs or Organizations: Not on file    Attends Club or Organization Meetings: Not on file    Marital Status: Not on file   Intimate Partner Violence:     Fear of Current or Ex-Partner: Not on file    Emotionally Abused: Not on file    Physically Abused: Not on file    Sexually Abused: Not on file   Housing Stability:     Unable to Pay for Housing in the Last Year: Not on file    Number of [place] in the Last Year: Not on file    Unstable Housing in the Last Year: Not on file       SCREENINGS:    Lon Coma Scale  Eye Opening: Spontaneous  Best Verbal Response: Oriented  Best Motor Response: Obeys commands  [place] Coma Scale Score: 15        PHYSICAL EXAM:       ED Triage Vitals   BP Temp Temp Source Pulse Resp SpO2 Height Weight   05/03/22 1615 05/03/22 1545 05/03/22 1545 05/03/22 1615 05/03/22 1615 05/03/22 1615 -- --   139/60 98.2 °F (36.8 °C) Oral 76 19 95 %         Physical Exam    CONSTITUTIONAL: Awake and alert. Cooperative. Well-developed. Well-nourished. Non-toxic. No acute distress. HENT: Normocephalic. Atraumatic. External ears normal, without discharge. No nasal discharge. Oropharynx clear. Mucous membranes moist.  EYES: Conjunctiva non-injected. No scleral icterus. PERRL. EOM's grossly intact. NECK: Supple. Normal ROM. CARDIOVASCULAR: RRR. No [place]. Intact distal pulses. PULMONARY/CHEST WALL: Effort normal. No tachypnea. Lungs clear to ausculation. ABDOMEN: Normal BS. Soft. Nondistended. RUQ tenderness to palpate. No guarding. /ANORECTAL: Not assessed  MUSKULOSKELETAL: Normal ROM. No acute deformities. No edema. No tenderness to palpate. SKIN: Warm and dry. No rash. NEUROLOGICAL: Alert and oriented x 3. GCS 15. CN II-XII grossly intact. Strength is 5/5 in all extremities and sensation is intact. Normal gait.    PSYCHIATRIC: Normal affect        DIAGNOSTICRESULTS:     LABS:    Results for orders placed or performed during the hospital encounter of 05/03/22   CBC with Auto Differential   Result Value Ref Range    WBC 10.2 4.0 - 11.0 K/uL    RBC 4.04 4.00 - 5.20 M/uL    Hemoglobin 12.8 12.0 - 16.0 g/dL    Hematocrit 37.8 36.0 - 48.0 %    MCV 93.6 80.0 - 100.0 fL    MCH 31.8 26.0 - 34.0 pg    MCHC 33.9 31.0 - 36.0 g/dL    RDW 13.0 12.4 - 15.4 %    Platelets 239 778 - 395 K/uL    MPV 9.7 5.0 - 10.5 fL    Neutrophils % 75.8 %    Lymphocytes % 12.9 %    Monocytes % 6.8 %    Eosinophils % 3.8 %    Basophils % 0.7 %    Neutrophils Absolute 7.7 1.7 - 7.7 K/uL    Lymphocytes Absolute 1.3 1.0 - 5.1 K/uL    Monocytes Absolute 0.7 0.0 - 1.3 K/uL    Eosinophils Absolute 0.4 0.0 - 0.6 K/uL    Basophils Absolute 0.1 0.0 - 0.2 K/uL   Comprehensive Metabolic Panel w/ Reflex to MG   Result Value Ref Range    Sodium 143 136 - 145 mmol/L    Potassium reflex Magnesium 3.4 (L) 3.5 - 5.1 mmol/L    Chloride 102 99 - 110 mmol/L    CO2 28 21 - 32 mmol/L    Anion Gap 13 3 - 16    Glucose 139 (H) 70 - 99 mg/dL    BUN 50 (H) 7 - 20 mg/dL    CREATININE 1.9 (H) 0.6 - 1.2 mg/dL    GFR Non-African American 26 (A) >60    GFR  31 (A) >60    Calcium 10.3 8.3 - 10.6 mg/dL    Total Protein 8.0 6.4 - 8.2 g/dL    Albumin 4.4 3.4 - 5.0 g/dL    Albumin/Globulin Ratio 1.2 1.1 - 2.2    Total Bilirubin 0.9 0.0 - 1.0 mg/dL    Alkaline Phosphatase 129 40 - 129 U/L     (H) 10 - 40 U/L     (H) 15 - 37 U/L   Lipase   Result Value Ref Range    Lipase 57.0 13.0 - 60.0 U/L   Magnesium   Result Value Ref Range    Magnesium 2.40 1.80 - 2.40 mg/dL   Urinalysis   Result Value Ref Range    Color, UA Yellow Straw/Yellow    Clarity, UA Clear Clear    Glucose, Ur Negative Negative mg/dL    Bilirubin Urine Negative Negative    Ketones, Urine Negative Negative mg/dL    Specific Gravity, UA 1.020 1.005 - 1.030    Blood, Urine LARGE (A) Negative    pH, UA 5.0 5.0 - 8.0    Protein,  (A) Negative mg/dL    Urobilinogen, Urine 0.2 <2.0 E.U./dL    Nitrite, Urine Negative Negative    Leukocyte Esterase, Urine TRACE (A) Negative    Microscopic Examination YES     Urine Type NotGiven    Microscopic Urinalysis   Result Value Ref Range    Hyaline Casts, UA 0-2 0 - 2 /LPF    Coarse Casts, UA 3-5 (A) 0 - 2 /LPF    WBC, UA 3-5 0 - 5 /HPF    RBC, UA 5-10 (A) 0 - 4 /HPF    Epithelial Cells, UA 2-5 0 - 5 /HPF    Amorphous, UA 4+ /HPF         RADIOLOGY:  All x-ray studies areviewed/reviewed by me. Formal interpretations per the radiologist are as follows:      CT ABDOMEN PELVIS WO CONTRAST Additional Contrast? None    Result Date: 5/3/2022  EXAMINATION: CT OF THE ABDOMEN AND PELVIS WITHOUT CONTRAST 5/3/2022 5:13 pm TECHNIQUE: CT of the abdomen and pelvis was performed without the administration of intravenous contrast. Multiplanar reformatted images are provided for review. Dose modulation, iterative reconstruction, and/or weight based adjustment of the mA/kV was utilized to reduce the radiation dose to as low as reasonably achievable. COMPARISON: None. HISTORY: ORDERING SYSTEM PROVIDED HISTORY: RUQ pain, transaminitis TECHNOLOGIST PROVIDED HISTORY: Reason for exam:->RUQ pain, transaminitis Additional Contrast?->None Decision Support Exception - unselect if not a suspected or confirmed emergency medical condition->Emergency Medical Condition (MA) Reason for Exam: RUQ Pain x 1 month-hematuria-primary DR sent patient in for liver/kidney failure Additional signs and symptoms: frequency-nausea-diarrhea Relevant Medical/Surgical History: no abd surg FINDINGS: Lower Chest: Trace pericardial and small bilateral pleural effusions are present. Old granulomatous disease is noted. Organs: The liver is homogeneous and normal in attenuation. No gallbladder stones are seen.   The pancreas, spleen and adrenal glands are unremarkable. The kidneys are symmetrical in size. There is a simple 2 cm right renal cyst; no follow-up recommended. No obstructing calculus or hydronephrosis. GI/Bowel: The stomach and small bowel are unremarkable. The appendix is normal.  No focal mural thickening of the colon is identified. There is incompletely formed stool in the rectum. Pelvis: Urinary bladder and uterus are unremarkable. Peritoneum/Retroperitoneum: There is mild aortic calcification. No adenopathy is identified. Bones/Soft Tissues: No acute osseous abnormality is appreciated. There is disproportionate spondylosis at T11-12. There is a small umbilical hernia with fat content. No acute abdominopelvic abnormality. Trace pericardial and small bilateral pleural effusions. US GALLBLADDER RUQ    Result Date: 5/3/2022  EXAMINATION: RIGHT UPPER QUADRANT ULTRASOUND 5/3/2022 4:20 pm COMPARISON: None. HISTORY: ORDERING SYSTEM PROVIDED HISTORY: RUQ pain TECHNOLOGIST PROVIDED HISTORY: Reason for exam:->RUQ pain FINDINGS: LIVER:  The liver demonstrates normal echogenicity without evidence of intrahepatic biliary ductal dilatation. BILIARY SYSTEM:  No gallbladder stones are visualized. The gallbladder wall is 1.2 mm. There is no pericholecystic fluid. There is focal tenderness is noted over the gallbladder. Common bile duct is within normal limits measuring 4.9 mm. RIGHT KIDNEY: The right kidney is grossly unremarkable without evidence of hydronephrosis. It measures 10.7 x 3.5 x 5.6 cm, with cortical thickness of 14 mm. There is a simple 2.2 cm right renal cyst; no follow-up recommended. PANCREAS:  Visualized portions of the pancreas are unremarkable. OTHER: No evidence of right upper quadrant ascites. No cholelithiasis or significant dilation of the common duct. Focal tenderness is noted over the gallbladder. Additional clinical correlation for cholecystitis recommended.        PROCEDURES:   N/A    CRITICAL CARE TIME: None      CONSULTS:  IP CONSULT TO HOSPITALIST      EMERGENCY DEPARTMENT COURSE and DIFFERENTIAL DIAGNOSIS/MDM:   Vitals:    Vitals:    05/03/22 1700 05/03/22 1715 05/03/22 1730 05/03/22 1757   BP: 139/60      Pulse: 63 57 67 59   Resp: 19 18 23 16   Temp:       TempSrc:       SpO2: 97% 99%  97%       Patient was given the following medications:  Medications   0.9 % sodium chloride bolus (1,000 mLs IntraVENous New Bag 5/3/22 0663)         Patient was evaluated by both myself and Jose Dickson MD.   Old records were reviewed. Patient sent by primary care after having abnormal renal function and LFTs on outpatient lab work done yesterday. Patient herself is without any specific complaints today but ultimately does admit to some right upper quadrant pain. No fevers. No GI upset. Work-up initiated on patient based on history provided. CBC normal white count of 10.2 with H&H 12.8 and 37.8  CMP reveals elevated BUN of 50, creatinine 1.9.  K to slightly low at 3.4. Mag normal 2.4. ALT is 205 and   Lipase 57  CK (which was added on by hospitalist) 10,308  Urinalysis large blood, 100 protein, trace leukocytes, 3-5 WBCs, 5-10 RBCs  Right upper quadrant ultrasound shows no cholelithiasis or significant dilatation of the common duct. Focal tenderness noted over gallbladder. Additional clinical correlation for cholecystitis recommended. CT abdomen and pelvis shows no acute abdominal pelvic abnormality. Trace pericardial and small bilateral pleural effusions. Patient will 1 L normal saline IV in the ED secondary to EDYTA. Her work-up consistent with acute kidney injury and transaminitis. She warrants hospitalization for further evaluation. While in the ED, patient has been hemodynamically. I spoke with Dr. Laz Moon. We thoroughly discussed the history, physical exam, laboratory and imaging studies, as well as, emergency department course.  Based upon that discussion, we've decided to admit David Hernandez Tapan for further observation and evaluation of Kenyon Phelan's EDYTA, transaminitis, rhabdo, abdominal pain. As I have deemed necessary from their history, physical and studies, I have considered and evaluated Tejas Cruz for the following diagnoses:  ACUTE APPENDICITIS, CHOLECYSTITIS, DIVERTICULITIS, PANCREATITIS, PYELONEPHRITIS, BOWEL OBSTRUCTION, INCARCERATED HERNIA, ISCHEMIC GUT, GI BLEED, PERFORATED BOWEL or ULCER. FINAL IMPRESSION:      1. EDYTA (acute kidney injury) ([facility] [address]    2. Transaminitis    3.  Traumatic rhabdomyolysis, initial encounter [facility])          DISPOSITION/PLAN:   DISPOSITION Decision To Admit                 (Please note thatportions of this note were completed with a voice recognition program.  Efforts were made to edit the dictations, but occasionally words are mis-transcribed.)    Rock Sharonda PA-C (electronicallysigned)              [place], [place]  05/03/22 3508

## 2022-05-03 NOTE — CARE COORDINATION
Ambulatory Care Coordination Note  5/3/2022  CM Risk Score: 1  [place] 10 Year Mortality Risk Score: 100%     ACC: Sherley Stevenson RN    Summary Note: ACM outreach to patient (PCP Referral) to introduce her to CC and discuss the role of the ACM. Patient is a very pleasant 66year old female who suffered a stroke 3-28. 22. Patient left the hospital AMA and did not get therapy. Patient is hopeful to get PT/OT and speech therapy at home. Patient noted that she lost her balance earlier today and almost fell. Patient does not have any DME at home. During the discussion the patient stated that PCP had just called and instructed her to go to the ED for abnormal kidney,liver labs and blood in her urine. ACM advised she will monitor and call back when the patient is released from the hospital.   Patient in agreement to this plan. Plan     Follow up when patient is released from the hospital  Complete CC protocol  PCAM  [facility]  Goals  Education      Ambulatory Care Coordination Assessment    Care Coordination Protocol  Referral from Primary Care Provider: No  Week 1 - Initial Assessment     Do you have all of your prescriptions and are they filled?: Yes  Barriers to medication adherence: None  Are you able to afford your medications?: Yes     Do you have Home O2 Therapy?: No      Ability to seek help/take action for Emergent Urgent situations i.e. fire, crime, inclement weather or health crisis. : Independent  Ability to ambulate to restroom: Independent  Ability handle personal hygeine needs (bathing/dressing/grooming): Independent  Ability to manage Medications: Independent  Ability to prepare Food Preparation: Independent  Ability to maintain home (clean home, laundry): Independent  Ability to drive and/or has transportation: Needs Assistance  Ability to do shopping: Independent  Ability to manage finances:  Independent  Is patient able to live independently?: Yes                    Suggested Interventions and [facility] Resources                  Prior to Admission medications    Medication Sig Start Date End Date Taking? Authorizing Provider   clopidogrel (PLAVIX) 75 MG tablet TAKE 1 TABLET BY MOUTH EVERY DAY 3/30/22   Historical Provider, MD   levothyroxine (SYNTHROID) 50 MCG tablet TAKE 1 TABLET BY MOUTH EVERY DAY 4/6/22   Historical Provider, MD   rosuvastatin (CRESTOR) 40 MG tablet TAKE 1 TABLET BY MOUTH EVERY DAY 3/30/22   Historical Provider, MD   lisinopril (PRINIVIL;ZESTRIL) 20 MG tablet Take 20 mg by mouth daily    Historical Provider, MD   Metoprolol Succinate 25 MG CS24 Take 25 mg by mouth daily    Historical Provider, MD   potassium chloride (KLOR-CON M) 20 MEQ extended release tablet Take 20 mEq by mouth    Historical Provider, MD   EPINEPHrine (EPIPEN) 0.3 MG/0.3ML SOAJ injection USE AS DIRECTED FOR ALLERGIC REACTION 1/26/22   Alexis Peguero., DO   cycloSPORINE (RESTASIS) 0.05 % ophthalmic emulsion Place 1 drop into both eyes every 12 hours 12/15/21 12/10/22  Alexis Peguero., DO   TASIGNA 200 MG capsule  3/26/21   Historical Provider, MD   polyethylene glycol (GLYCOLAX) powder Take 17 g by mouth daily    Historical Provider, MD   acetaminophen (TYLENOL) 325 MG tablet Take 2 tablets by mouth every 6 hours as needed for Pain 7/17/17   Debi Clemetn MD   aspirin 81 MG tablet Take 81 mg by mouth     Historical Provider, MD       Future Appointments   Date Time Provider [place]   8/2/2022 11:00 AM Janessa Jefferson MD [facility]

## 2022-05-04 ENCOUNTER — CARE COORDINATION (OUTPATIENT)
Dept: CARE COORDINATION | Age: 72
End: 2022-05-04

## 2022-05-04 ENCOUNTER — APPOINTMENT (OUTPATIENT)
Dept: CT IMAGING | Age: 72
DRG: 682 | End: 2022-05-04
Payer: MEDICARE

## 2022-05-04 LAB
ALBUMIN SERPL-MCNC: 3.2 G/DL (ref 3.4–5)
ALBUMIN SERPL-MCNC: 3.2 G/DL (ref 3.4–5)
ALP BLD-CCNC: 92 U/L (ref 40–129)
ALT SERPL-CCNC: 164 U/L (ref 10–40)
ANION GAP SERPL CALCULATED.3IONS-SCNC: 9 MMOL/L (ref 3–16)
AST SERPL-CCNC: 278 U/L (ref 15–37)
BILIRUB SERPL-MCNC: 0.7 MG/DL (ref 0–1)
BILIRUBIN DIRECT: <0.2 MG/DL (ref 0–0.3)
BILIRUBIN, INDIRECT: ABNORMAL MG/DL (ref 0–1)
BUN BLDV-MCNC: 46 MG/DL (ref 7–20)
CALCIUM SERPL-MCNC: 9.2 MG/DL (ref 8.3–10.6)
CHLORIDE BLD-SCNC: 108 MMOL/L (ref 99–110)
CO2: 25 MMOL/L (ref 21–32)
CREAT SERPL-MCNC: 2.1 MG/DL (ref 0.6–1.2)
EKG ATRIAL RATE: 62 BPM
EKG DIAGNOSIS: NORMAL
EKG P AXIS: 42 DEGREES
EKG P-R INTERVAL: 172 MS
EKG Q-T INTERVAL: 418 MS
EKG QRS DURATION: 96 MS
EKG QTC CALCULATION (BAZETT): 424 MS
EKG R AXIS: -18 DEGREES
EKG T AXIS: 121 DEGREES
EKG VENTRICULAR RATE: 62 BPM
GFR AFRICAN AMERICAN: 28
GFR NON-AFRICAN AMERICAN: 23
GLUCOSE BLD-MCNC: 111 MG/DL (ref 70–99)
GLUCOSE BLD-MCNC: 112 MG/DL (ref 70–99)
GLUCOSE BLD-MCNC: 114 MG/DL (ref 70–99)
GLUCOSE BLD-MCNC: 114 MG/DL (ref 70–99)
GLUCOSE BLD-MCNC: 146 MG/DL (ref 70–99)
HAV IGM SER IA-ACNC: NORMAL
HEPATITIS B CORE IGM ANTIBODY: NORMAL
HEPATITIS B SURFACE ANTIGEN INTERPRETATION: NORMAL
HEPATITIS C ANTIBODY INTERPRETATION: NORMAL
PERFORMED ON: ABNORMAL
PHOSPHORUS: 3.1 MG/DL (ref 2.5–4.9)
POTASSIUM SERPL-SCNC: 3.1 MMOL/L (ref 3.5–5.1)
SODIUM BLD-SCNC: 142 MMOL/L (ref 136–145)
TOTAL CK: 8215 U/L (ref 26–192)
TOTAL PROTEIN: 6.1 G/DL (ref 6.4–8.2)
TROPONIN: 0.05 NG/ML
TROPONIN: 0.05 NG/ML

## 2022-05-04 PROCEDURE — 83516 IMMUNOASSAY NONANTIBODY: CPT

## 2022-05-04 PROCEDURE — 82550 ASSAY OF CK (CPK): CPT

## 2022-05-04 PROCEDURE — 1200000000 HC SEMI PRIVATE

## 2022-05-04 PROCEDURE — 97535 SELF CARE MNGMENT TRAINING: CPT

## 2022-05-04 PROCEDURE — 97166 OT EVAL MOD COMPLEX 45 MIN: CPT

## 2022-05-04 PROCEDURE — 70450 CT HEAD/BRAIN W/O DYE: CPT

## 2022-05-04 PROCEDURE — 6370000000 HC RX 637 (ALT 250 FOR IP): Performed by: INTERNAL MEDICINE

## 2022-05-04 PROCEDURE — 93010 ELECTROCARDIOGRAM REPORT: CPT | Performed by: INTERNAL MEDICINE

## 2022-05-04 PROCEDURE — 92526 ORAL FUNCTION THERAPY: CPT

## 2022-05-04 PROCEDURE — 92610 EVALUATE SWALLOWING FUNCTION: CPT

## 2022-05-04 PROCEDURE — 2580000003 HC RX 258: Performed by: INTERNAL MEDICINE

## 2022-05-04 PROCEDURE — 36415 COLL VENOUS BLD VENIPUNCTURE: CPT

## 2022-05-04 PROCEDURE — 84484 ASSAY OF TROPONIN QUANT: CPT

## 2022-05-04 PROCEDURE — 6360000002 HC RX W HCPCS: Performed by: INTERNAL MEDICINE

## 2022-05-04 PROCEDURE — 80074 ACUTE HEPATITIS PANEL: CPT

## 2022-05-04 PROCEDURE — 80069 RENAL FUNCTION PANEL: CPT

## 2022-05-04 PROCEDURE — 80076 HEPATIC FUNCTION PANEL: CPT

## 2022-05-04 RX ORDER — FAMOTIDINE 20 MG/1
10 TABLET, FILM COATED ORAL DAILY
Status: DISCONTINUED | OUTPATIENT
Start: 2022-05-04 | End: 2022-05-10 | Stop reason: HOSPADM

## 2022-05-04 RX ADMIN — CARBOXYMETHYLCELLULOSE SODIUM 1 DROP: 10 GEL OPHTHALMIC at 22:13

## 2022-05-04 RX ADMIN — CLOPIDOGREL BISULFATE 75 MG: 75 TABLET, FILM COATED ORAL at 08:35

## 2022-05-04 RX ADMIN — SODIUM CHLORIDE: 9 INJECTION, SOLUTION INTRAVENOUS at 17:38

## 2022-05-04 RX ADMIN — SODIUM CHLORIDE: 9 INJECTION, SOLUTION INTRAVENOUS at 05:42

## 2022-05-04 RX ADMIN — CARBOXYMETHYLCELLULOSE SODIUM 1 DROP: 10 GEL OPHTHALMIC at 08:35

## 2022-05-04 RX ADMIN — ACETAMINOPHEN 650 MG: 325 TABLET ORAL at 15:33

## 2022-05-04 RX ADMIN — HEPARIN SODIUM 5000 UNITS: 5000 INJECTION INTRAVENOUS; SUBCUTANEOUS at 22:13

## 2022-05-04 RX ADMIN — POTASSIUM BICARBONATE 40 MEQ: 782 TABLET, EFFERVESCENT ORAL at 09:17

## 2022-05-04 RX ADMIN — HEPARIN SODIUM 5000 UNITS: 5000 INJECTION INTRAVENOUS; SUBCUTANEOUS at 08:35

## 2022-05-04 RX ADMIN — LEVOTHYROXINE SODIUM 50 MCG: 0.05 TABLET ORAL at 08:35

## 2022-05-04 RX ADMIN — SODIUM CHLORIDE: 9 INJECTION, SOLUTION INTRAVENOUS at 01:46

## 2022-05-04 RX ADMIN — ASPIRIN 81 MG: 81 TABLET, COATED ORAL at 08:35

## 2022-05-04 RX ADMIN — HEPARIN SODIUM 5000 UNITS: 5000 INJECTION INTRAVENOUS; SUBCUTANEOUS at 15:22

## 2022-05-04 RX ADMIN — FAMOTIDINE 10 MG: 20 TABLET, FILM COATED ORAL at 17:50

## 2022-05-04 RX ADMIN — ACETAMINOPHEN 650 MG: 325 TABLET ORAL at 00:55

## 2022-05-04 RX ADMIN — INSULIN LISPRO 1 UNITS: 100 INJECTION, SOLUTION INTRAVENOUS; SUBCUTANEOUS at 17:47

## 2022-05-04 RX ADMIN — SODIUM CHLORIDE, PRESERVATIVE FREE 10 ML: 5 INJECTION INTRAVENOUS at 22:13

## 2022-05-04 ASSESSMENT — PAIN SCALES - GENERAL
PAINLEVEL_OUTOF10: 6
PAINLEVEL_OUTOF10: 0

## 2022-05-04 ASSESSMENT — PAIN DESCRIPTION - LOCATION
LOCATION: ABDOMEN;BACK;NECK
LOCATION: ABDOMEN
LOCATION: ABDOMEN;BACK

## 2022-05-04 ASSESSMENT — PAIN DESCRIPTION - ONSET: ONSET: ON-GOING

## 2022-05-04 ASSESSMENT — PAIN DESCRIPTION - FREQUENCY: FREQUENCY: CONTINUOUS

## 2022-05-04 ASSESSMENT — PAIN DESCRIPTION - DESCRIPTORS: DESCRIPTORS: ACHING

## 2022-05-04 ASSESSMENT — PAIN DESCRIPTION - PAIN TYPE: TYPE: CHRONIC PAIN;ACUTE PAIN

## 2022-05-04 NOTE — H&P
Hospital Medicine History & Physical      PCP: Chidi Stallworth MD    Date of Admission: 5/3/2022    Date of Service: Pt seen/examined on 5/3/2022 and Admitted to Inpatient with expected LOS greater than two midnights due to medical therapy. Chief Complaint: Abnormal lab results, asked to come to the ED by PCP      History Of Present Illness:    66 y.o. female who presented to [facility] with above complaints  Patient with PMH of CML, HTN, HLD, hypothyroidism, who was recently diagnosed with a stroke when she was in [place] back in March 2022 presented to the ED today for abnormal lab results from blood test done yesterday by her PCP. Patient was admitted to a [facility] in [place] in late March with symptoms of slurred speech, CT scan showed old right basal ganglia stroke, this was confirmed on MRI with an acute infarct in the right corona radiata and right parietal lobe. She was treated with aspirin, Plavix and started on atorvastatin 80 mg. She ultimately left the hospital AM and came back to [address] [place] [address] in April. She has been taking her statin as prescribed including aspirin and Plavix. Currently taking Crestor 40 Mg daily. She had a PCP appointment yesterday and had routine labs done which revealed EDYTA and elevated liver enzymes and patient was asked to come to the ER today. She reports she has lower extremity weakness ever since the stroke. Some right shoulder pain that is chronic. She is still able to ambulate and perform her ADLs, but reports her vision and speech is still recovering from the stroke. She reports her urine has been dark in color, sometimes with blood in it. She had a boil in the vaginal area that her  lanced about 3 days ago. She reports that area has been healing well. Denied any fevers or chills. Denied any trauma or falls. She does complain of right upper quadrant pain over the last couple of weeks. Nonradiating, constant, dull aching in nature.   No particular aggravating or relieving factors. Past Medical History:          Diagnosis Date    Anemia     Arthritis     Asthma     Bowel dysfunction     CML (chronic myelocytic leukemia) ([facility] [address] 01/2006    leukemia    Hyperlipidemia     Hypertension     Nuclear senile cataract of both eyes 11/25/2019    Obesity     Risk of myocardial infarction or stroke 7.5% or greater in next 10 years 9/15/2021    Skin cancer of face     left cheek, squamous    Stroke (cerebrum) [facility] [address]     Thyroid disease     TIA (transient ischemic attack)     mini ones-from chemo       Past Surgical History:          Procedure Laterality Date    ANUS SURGERY  1998    fissure    BREAST BIOPSY      BREAST LUMPECTOMY      benign    COLONOSCOPY      numerous polyps    ELBOW SURGERY  1960    removal of foreign body ([place])    FINE NEEDLE ASPIRATION      PARACENTESIS      x 2 fluid in lung from Chemo    TONSILLECTOMY Bilateral        Medications Prior to Admission:      Prior to Admission medications    Medication Sig Start Date End Date Taking? Authorizing Provider   clopidogrel (PLAVIX) 75 MG tablet TAKE 1 TABLET BY MOUTH EVERY DAY 3/30/22   Historical Provider, MD   levothyroxine (SYNTHROID) 50 MCG tablet TAKE 1 TABLET BY MOUTH EVERY DAY 4/6/22   Historical Provider, MD   rosuvastatin (CRESTOR) 40 MG tablet TAKE 1 TABLET BY MOUTH EVERY DAY 3/30/22   Historical Provider, MD   lisinopril (PRINIVIL;ZESTRIL) 20 MG tablet Take 20 mg by mouth daily    Historical Provider, MD   Metoprolol Succinate 25 MG CS24 Take 25 mg by mouth daily    Historical Provider, MD   potassium chloride (KLOR-CON M) 20 MEQ extended release tablet Take 20 mEq by mouth    Historical Provider, MD   EPINEPHrine (EPIPEN) 0.3 MG/0.3ML SOAJ injection USE AS DIRECTED FOR ALLERGIC REACTION 1/26/22   Melissa Fish., DO   cycloSPORINE (RESTASIS) 0.05 % ophthalmic emulsion Place 1 drop into both eyes every 12 hours 12/15/21 12/10/22  Melissa Fish., DO   TASIGNA 200 MG capsule  3/26/21   Historical Provider, MD   polyethylene glycol (GLYCOLAX) powder Take 17 g by mouth daily    Historical Provider, MD   acetaminophen (TYLENOL) 325 MG tablet Take 2 tablets by mouth every 6 hours as needed for Pain 7/17/17   Leonel Luevano MD   aspirin 81 MG tablet Take 81 mg by mouth     Historical Provider, MD       Allergies:  Latex, Mangifera indica, Penicillin g, Shellfish-derived products, Sulfa antibiotics, Grapefruit concentrate, Omeprazole, and Shrimp extract allergy skin test    Social History:      The patient currently lives at home    TOBACCO:   reports that she has never smoked. She has never used smokeless tobacco.  ETOH:   reports no history of alcohol use. E-Cigarettes/Vaping Use     Questions Responses    E-Cigarette/Vaping Use Never User    Start Date     Passive Exposure     Quit Date     Counseling Given     Comments             Family History:       Positive as follows:        Problem Relation Age of Onset    Cancer Mother         non hodgkins lymphoma    Cancer Father         colon    Cancer Sister         colon    Cancer Brother         mesothelioma    Arrhythmia Sister        REVIEW OF SYSTEMS COMPLETED:   Pertinent positives as noted in the HPI. All other systems reviewed and negative. PHYSICAL EXAM PERFORMED:    BP (!) 134/55   Pulse 62   Temp 98.2 °F (36.8 °C) (Oral)   Resp 16   SpO2 96%     General appearance:  No apparent distress, appears stated age and cooperative. HEENT:  Normal cephalic, atraumatic without obvious deformity. Pupils equal, round, and reactive to light. Extra ocular muscles intact. Conjunctivae/corneas clear. Neck: Supple, with full range of motion. No jugular venous distention. Trachea midline. Respiratory:  Normal respiratory effort. Clear to auscultation, bilaterally without Rales/Wheezes/Rhonchi. Cardiovascular:  Regular rate and rhythm with normal S1/S2 without murmurs, rubs or gallops.   Abdomen: Soft, mild tenderness in the RUQ area, non-distended with normal bowel sounds. Musculoskeletal:  No clubbing, cyanosis or edema bilaterally. Full range of motion without deformity. Skin: Skin color, texture, turgor normal.  No rashes or lesions. Neurologic:  Neurovascularly intact without any focal sensory/motor deficits. Cranial nerves: II-XII intact, grossly non-focal.  Psychiatric:  Alert and oriented, thought content appropriate, normal insight  Capillary Refill: Brisk,3 seconds, normal  Peripheral Pulses: +2 palpable, equal bilaterally       Labs:     Recent Labs     05/03/22  1552   WBC 10.2   HGB 12.8   HCT 37.8        Recent Labs     05/02/22  1336 05/03/22  1552   * 143   K 4.0 3.4*    102   CO2 26 28   BUN 46* 50*   CREATININE 1.9* 1.9*   CALCIUM 9.7 10.3     Recent Labs     05/02/22  1336 05/03/22  1552   * 335*   * 205*   BILITOT 0.9 0.9   ALKPHOS 129 129     No results for input(s): INR in the last 72 hours. Recent Labs     05/03/22  1552 05/03/22  1848   CKTOTAL 10,308*  --    TROPONINI  --  0.05*       Urinalysis:      Lab Results   Component Value Date    NITRU Negative 05/03/2022    WBCUA 3-5 05/03/2022    RBCUA 5-10 05/03/2022    BLOODU LARGE 05/03/2022    SPECGRAV 1.020 05/03/2022    GLUCOSEU Negative 05/03/2022       Radiology:     I personally reviewed the CT abdomen pelvis, US gallbladder and reviewed radiologist reports        [address] [employer]   Final Result   No cholelithiasis or significant dilation of the common duct. Focal tenderness is noted over the gallbladder. Additional clinical   correlation for cholecystitis recommended. CT ABDOMEN PELVIS WO CONTRAST Additional Contrast? None   Final Result   No acute abdominopelvic abnormality. Trace pericardial and small bilateral pleural effusions. ASSESSMENT:PLAN:    Rhabdomyolysis   Likely due to statin induced myopathy, recently started on high intensity statin 6 weeks ago.   No history of trauma, falls or prolonged immobile state. UA showing large blood, but only 5-10 RBCs consistent with a urine myoglobinuria  CPK elevated at [address] with EDYTA, elevated liver enzymes and troponinemia consistent with rhabdomyolysis  -Aggressive IV fluids  -Monitor electrolytes, renal panel  -Check CK level daily  -Discontinue statin    EDYTA (acute kidney injury)   Likely ATN from pigment induced tubular injury from rhabdomyolysis  -Rx as outlined above  -Avoid nephrotoxic medications [hold statin, lisinopril, potassium]  -Monitor daily renal panel    Troponin level elevated   -Due to rhabdomyolysis. No anginal symptoms. Follow troponin trend. Will check EKG    Elevated liver enzymes  Due to rhabdomyolysis. Without hyperbilirubinemia. US gallbladder unremarkable. Hold statin  Check daily hepatic panel    Hx of stroke in March 2022  -Left AMA from [facility]  -Management per PCP, outpatient echo, carotid US ordered  -Continue DAPT  -Hold statin    HLD  5/2/2022 lipid panel showed total cholesterol 139, LDL 64. Within goal.  Continue to hold statin therapy and manage with diet control. Can consider adding fenofibrate/ezetimibe    DM type II -newly diagnosed  Hemoglobin A1c 6.6 checked on 5/2/2022  -Low-dose SSI while inpatient  -If renal functions improve, consider discharging on metformin  -Diabetic educator referral    Hypothyroidism -continue Synthroid. TSH slightly elevated but free T4 normal.    HTN -controlled. Continue metoprolol for now, holding lisinopril due to EDYTA and monitor BP    Hx of CML -on Tasigna daily currently on hold, followed by Dr. Jennifer Wu. DVT Prophylaxis: Heparin SQ  Diet: ADULT DIET; Regular  Code Status: Full Code    PT/OT Eval Status: Not consulted    Dispo -IP stay       Carlitos Gaspar MD    Thank you Dileep Conner MD for the opportunity to be involved in this patient's care.  If you have any questions or concerns please feel free to contact me at [phone].

## 2022-05-04 NOTE — ED NOTES
Pt transported via stretcher with all belongings in stable condition at this time     Ronal Dan, CARMEN  05/03/22 2043

## 2022-05-04 NOTE — CARE COORDINATION
Patient currently admitted to the hospital. [facility] will continue to monitor and follow up after DC.

## 2022-05-04 NOTE — PROGRESS NOTES
Occupational Therapy  Facility/Department: [place] [address] - MED SURG  Occupational Therapy Initial Assessment and Treatment Note     Name: German Mcguire  : 1950  MRN: [id number]  Date of Service: 2022    Discharge Recommendations:  IP Rehab      Patient Diagnosis(es): The primary encounter diagnosis was EDYTA (acute kidney injury) ([facility] [address] Diagnoses of Transaminitis and Traumatic rhabdomyolysis, initial encounter [facility]) were also pertinent to this visit. Past Medical History:  has a past medical history of Anemia, Arthritis, Asthma, Bowel dysfunction, CML (chronic myelocytic leukemia) ([facility] [address] Hyperlipidemia, Hypertension, Nuclear senile cataract of both eyes, Obesity, Risk of myocardial infarction or stroke 7.5% or greater in next 10 years, Skin cancer of face, Stroke (cerebrum) ([facility] [address] Thyroid disease, and TIA (transient ischemic attack). Past Surgical History:  has a past surgical history that includes Breast biopsy; fine needle aspiration; Breast lumpectomy; Tonsillectomy (Bilateral); Paracentesis; Anus surgery (); Elbow surgery (); and Colonoscopy. Assessment   Performance deficits / Impairments: Decreased functional mobility ; Decreased ADL status; Decreased safe awareness;Decreased cognition;Decreased balance;Decreased coordination;Decreased posture;Decreased endurance;Decreased vision/visual deficit; Decreased strength;Decreased fine motor control  Assessment: Pt admitted for rhabdomyolysis. She lives with spouse who has been her primary caregiver since she had a CVA in 3/2022. Pt did not receive therapy. Per pt, she requires extensive assist for ADLs and mobility. He holds her RUE as she ambulates in the house. Pt lacks L side visual perception. She is at high risk for falls and reports multiple \"near falls\". Prior to CVA, pt reports that she was independent. Highly recommend IP rehab to improve pt's level of function. cont OT in acute care.   Prognosis: Good  Decision Making: Medium Complexity  REQUIRES OT FOLLOW-UP: Yes  Activity Tolerance  Activity Tolerance: Patient limited by fatigue      Plan   Plan  Times per Week: 3-5x  Current Treatment Recommendations: Strengthening,ROM,Balance training,Functional mobility training,Endurance training,Safety education & training,Neuromuscular re-education,Patient/Caregiver education & training,Equipment evaluation, education, & procurement,Self-Care / ADL,Cognitive/Perceptual training     Restrictions  Restrictions/Precautions  Restrictions/Precautions: Fall Risk,Contact Precautions,Up as Tolerated  Position Activity Restriction  Other position/activity restrictions: Contact precautions--double glove d/t chemo tx. Subjective   General  Chart Reviewed: Yes  Patient assessed for rehabilitation services?: Yes  Additional Pertinent Hx: Patient was admitted to a [facility] in [place] in late March with symptoms of slurred speech, CT scan showed old right basal ganglia stroke, this was confirmed on MRI with an acute infarct in the right corona radiata and right parietal lobe  Family / Caregiver Present: Yes (spouse)  Referring Practitioner: Tigre Otero  Diagnosis: rhabdomyolysis  Subjective  Subjective: Pt agreeable to OT. Reports pain over R clavicle area and headache at 7/10. Pt just received pain med from RN. Pt repositioned in bed for comfort. No further c/o's. General Comment  Comments: RN approved therapy     Social/Functional History  Social/Functional History  Lives With: Spouse  Type of Home: House  Home Layout: One level (Finished level with family room, computer and laundry room. Pt uses lower level often.  Handrail on steps go 1/2 way down.)  Home Access: Stairs to enter without rails  Entrance Stairs - Number of Steps: 1  Bathroom Shower/Tub: Tub/Shower unit,Walk-in shower  Bathroom Toilet: Standard  Bathroom Equipment:  (no DME)  Home Equipment:  (no DME)  Has the patient had two or more falls in the past year or any fall with injury in the past year?: No (Pt reports multiple \"near falls\". )  ADL Assistance: Needs assistance (Pt has been sponge bathing with bath wipes. Spouse helps with bathing, dressing and toileting since CVA in 3/2022)  Homemaking Assistance: Needs assistance (Spouse has been performing IADLs since 3/2022.)  Ambulation Assistance: Needs assistance (Spouse holds her R arm for support as she walks.)  Transfer Assistance: Needs assistance  Active : No  Patient's  Info: Spouse drives. Pt was able to drive until CVA in 7/5470. Occupation: Retired  Type of Occupation: caretaker       Objective   Pulse: 58  Heart Rate Source: Monitor  BP: (!) 154/57  BP Location: Left upper arm  Patient Position: Semi fowlers  MAP (Calculated): 89.33  Resp: 16  SpO2: 94 %  O2 Device: None (Room air)  Vision Exceptions: Wears glasses at all times; Visual field cut (Pt c/o feeling like a curtain is over her L eye.)  Hearing: Within functional limits          Safety Devices  Type of Devices: All fall risk precautions in place; Bed alarm in place;Call light within reach; Left in bed;Nurse notified;Gait belt  Restraints  Restraints Initially in Place: No        AROM: Generally decreased, functional  PROM: Within functional limits  Strength: Generally decreased, functional (RUE is WFL. LUE 3+/5)  Coordination: Generally decreased, functional  Tone: Normal  Sensation: Impaired (Diminished sensation in L hand)  ADL  Grooming: Minimal assistance  Grooming Skilled Clinical Factors: wash hands  UE Dressing: Moderate assistance  LE Dressing: Maximum assistance  LE Dressing Skilled Clinical Factors: Limited reach to BLE to adjust socks.   Additional Comments: Limited use of LUE during ADLs d/t impaired coordination and strength         Transfers  Stand Step Transfers: Minimal assistance (Pt declined transfer to chair d/t headache and R clavicle discomfort.)  Sit to stand: Minimal assistance  Stand to sit: Minimal assistance     Cognition  Overall Cognitive Status: Exceptions  Arousal/Alertness: Appropriate responses to stimuli  Following Commands: Follows one step commands consistently  Attention Span: Attends with cues to redirect  Safety Judgement: Decreased awareness of need for safety  Problem Solving: Assistance required to correct errors made;Assistance required to identify errors made  Insights: Decreased awareness of deficits  Perception  Overall Perceptual Status: Impaired  Unilateral Attention: Cues to attend to left side of body;Cues to attend left visual field;Cues to maintain midline in sitting          Education Given To: Patient; Family  Education Provided: Role of Therapy;Plan of Care;ADL Adaptive Strategies  Education Method: Verbal  Barriers to Learning: Vision;Cognition  Education Outcome: Verbalized understanding;Continued education needed  Disease Specific Education: Pt educated on importance of OOB mobility, prevention of complications of bedrest, and general safety during hospitalization.  Pt verbalized understanding  Safety Device - Type of devices:  [x]  All fall risk precautions in place [x] Bed alarm in place  [x] Call light within reach [] Chair alarm in place [] Positioning belt [x] Gait belt [] Patient at risk for falls [x] Left in bed [] Left in chair [] Telesitter in use [] Sitter present [x] Nurse notified []  None    LUE AROM (degrees)  LUE General AROM: L shoulder 0-75* (not isolated flexion), L elbow 0-90*, Lforearm 0-40*, L hand WFL with vc's to fully close fingers  RUE AROM (degrees)  RUE AROM : WFL        Hand Dominance  Hand Dominance: Right     AM-PAC Score        AM-[facility] Inpatient Daily Activity Raw Score: 14 (05/04/22 1722)  AM-PAC Inpatient ADL T-Scale Score : 33.39 (05/04/22 1722)  ADL Inpatient CMS 0-100% Score: 59.67 (05/04/22 1722)  ADL Inpatient CMS G-Code Modifier : CK (05/04/22 1722)    Goals  Short Term Goals  Time Frame for Short term goals: 1 week (5/11) unless noted  Short Term Goal 1: Perform functional transfers with SBA and RW  Short Term Goal 2: Perform toileting with min A-ongoing 5/4  Short Term Goal 3: Perform LUE exercises 15x each to improve ADL skills by 5/8-ongoing 5/4  Short Term Goal 4: Locate 3/5 objects in L visual field with min cues-ongoing, max cues to locate 1 object 5/4  Patient Goals   Patient goals : \"Go home\"       Therapy Time   Individual Concurrent Group Co-treatment   Time In 1539         Time Out 1607         Minutes 28         Timed Code Treatment Minutes: 18 Minutes (10 min eval)    If pt is discharged prior to next OT session, this note will serve as the discharge summary.   Leandro Arauz OT

## 2022-05-04 NOTE — DISCHARGE INSTR - COC
Continuity of Care Form    Patient Name: Tatianna Bourgeois   :  1950  MRN:  [id number]    Admit date:  5/3/2022  Discharge date:  ***    Code Status Order: Full Code   Advance Directives:      Admitting Physician:  Ron Suarez MD  PCP: Wes Wilson MD    Discharging Nurse: [facility] Unit/Room#: 0820/4479-77  Discharging Unit Phone Number: ***    Emergency Contact:   Extended Emergency Contact Information  Primary Emergency Contact: Mariaelena [address] Phone: [phone]  Relation: Child  Secondary Emergency Contact: [address] [facility]  Mobile Phone: [phone]  Relation: Spouse  Preferred language: English   needed? No    Past Surgical History:  Past Surgical History:   Procedure Laterality Date    ANUS SURGERY  1998    fissure    BREAST BIOPSY      BREAST LUMPECTOMY      benign    COLONOSCOPY      numerous polyps    ELBOW SURGERY  1960    removal of foreign body ([place])    FINE NEEDLE ASPIRATION      PARACENTESIS      x 2 fluid in lung from Chemo    TONSILLECTOMY Bilateral        Immunization History:   Immunization History   Administered Date(s) Administered    COVID-19, Pfizer Purple top, DILUTE for use, 12+ yrs, 30mcg/0.3mL dose 2021, 03/10/2021, 2021    Hepatitis A Adult (Havrix, Vaqta) 2015    Influenza Virus Vaccine 2017    Influenza, High Dose (Fluzone 65 yrs and older) 2015, 2016, 2019    Influenza, Quadv, IM, PF (6 mo and older Fluzone, Flulaval, Fluarix, and 3 yrs and older Afluria) 2014    Influenza, Quadv, adjuvanted, 65 yrs +, IM, PF (Fluad) 2020, 2021    Pneumococcal Conjugate 13-valent ([id number]) 2015    Pneumococcal Polysaccharide ([id number]) 2016    Tdap (Boostrix, Adacel) 2014       Active Problems:  Patient Active Problem List   Diagnosis Code    Acquired hypothyroidism E03.9    Adverse reaction to sulfa antibiotic T37. 2H1O    Chemotherapy adverse reaction T45.1X5A    Anastomotic ulcer K28.9    Anemia D64.9    Anxiety F41.9    Campbell esophagus K22.70    Cancer (HCC) C80.1    Cellulitis of left lower extremity L03. 116    DNR (do not resuscitate) Z66    Family history of colon cancer Z80.0    Hiatal hernia K44.9    History of skin cancer Z85.828    Hyperlipidemia E78.5    Localized edema R60.0    Lower abdominal pain R10.30    Lower extremity edema R60.0    Pleural effusion J90    Thyroid nodule E04.1    CML (chronic myelocytic leukemia) ([facility]) C92.10    Rectal bleeding K62.5    Polyp of colon K63.5    Anatomical narrow angle, bilateral H40.033    Hx of actinic keratosis Z87.2    HTN (hypertension) I10    Bilateral primary osteoarthritis of knee M17.0    Chronic pain of both knees M25.561, M25.562, G89.29    Neurogenic bladder as late effect of cerebrovascular accident (CVA) I69.398, N31.9    Chronic kidney disease, stage 3a ([facility]) N18.31    Cerebrovascular accident (CVA) ([facility] [address] I63.9    Hematuria R31.9    Positive depression screening Z13.31    Rhabdomyolysis M62.82    EDYTA (acute kidney injury) ([facility] [address] N17.9    Troponin level elevated R77.8       Isolation/Infection:   Isolation            No Isolation          Patient Infection Status       None to display            Nurse Assessment:  Last Vital Signs: BP (!) 132/57   Pulse (!) 48   Temp 97.9 °F (36.6 °C) (Oral)   Resp 16   SpO2 95%     Last documented pain score (0-10 scale): Pain Level: 6  Last Weight:   Wt Readings from Last 1 Encounters:   22 143 lb 9.6 oz (65.1 kg)     Mental Status:  {IP PT MENTAL STATUS:87986}    IV Access:  { MO IV ACCESS:091495459}    Nursing Mobility/ADLs:  Walking   {CHP DME SHKD:189836301}  Transfer  {CHP DME UAHO:399717351}  Bathing  {CHP DME ODMO:668917344}  Dressing  {CHP DME SSVD:010460014}  Toileting  {CHP DME UJHL:038539678}  Feeding  {CHP DME FDC}  Med Admin  {CHP DME [facility]:845683038}  Med Delivery   { MO MED Delivery:248940513}    Wound Care Documentation and Therapy:        Elimination:  Continence:    Bowel: {YES / VX:73043}  Bladder: {YES / LZ:42603}  Urinary Catheter: {Urinary Catheter:011960743}   Colostomy/Ileostomy/Ileal Conduit: {YES / P}       Date of Last BM: ***    Intake/Output Summary (Last 24 hours) at 2022 1429  Last data filed at 2022 1204  Gross per 24 hour   Intake 120 ml   Output 845 ml   Net -725 ml     I/O last 3 completed shifts:  In: -   Out: 375 [Urine:375]    Safety Concerns:     [address] [employer] Safety Concerns:351707686}    Impairments/Disabilities:      [address] [employer] Impairments/Disabilities:119886688}    Nutrition Therapy:  Current Nutrition Therapy:   [employer] Diet List:890483468}    Routes of Feeding: {CHP DME Other Feedings:749992337}  Liquids: {Slp liquid thickness:25936}  Daily Fluid Restriction: {CHP DME Yes amt example:942498517}  Last Modified Barium Swallow with Video (Video Swallowing Test): {Done Not Done DODQ:921022325}    Treatments at the Time of Hospital Discharge:   Respiratory Treatments: ***  Oxygen Therapy:  {Therapy; copd oxygen:93573}  Ventilator:    {[facility] Vent UIIP:972442892}    Rehab Therapies: {THERAPEUTIC INTERVENTION:8050379934}  Weight Bearing Status/Restrictions: {[facility] Weight Bearin}  Other Medical Equipment (for information only, NOT a DME order):  {EQUIPMENT:010018367}  Other Treatments: ***    Patient's personal belongings (please select all that are sent with patient):  {CHP DME Belongings:797900002}    RN SIGNATURE:  {Esignature:222442953}    CASE MANAGEMENT/SOCIAL WORK SECTION    Inpatient Status Date: 5/3/22    Readmission Risk Assessment Score:  Readmission Risk              Risk of Unplanned Readmission:  19           Discharging to Facility/ Agency   Name:   Address:  Phone:  Fax:    / signature: {Esignature:163101435}    PHYSICIAN SECTION    Prognosis: Fair    Condition at Discharge: Stable    Rehab Potential (if transferring to Rehab): Fair    Recommended Labs or Other Treatments After Discharge: ARU to arrange f/up with PCP in 1 week at DC   -Resumed home dose of lisinopril at discharge. Continue blood pressure monitoring while in rehab and consider restarting Toprol-XL 25 mg daily if patient remains hypertensive. Physician Certification: I certify the above information and transfer of Kathe Moreno  is necessary for the continuing treatment of the diagnosis listed and that she requires Acute Rehab for LESS than  30 days.      Update Admission H&P: No change in H&P    PHYSICIAN SIGNATURE:  Electronically signed by Moshe Cuadra MD on 5/10/22 at 11:24 AM EDT

## 2022-05-04 NOTE — CARE COORDINATION
[facility]    Referral received from CM to follow for home care services.      Out of service area for [address] home care    Will find alternate agency; pt has no preference    [facility] out of service area    [facility] home care [phone] serves pt's zipcode  request referral fax for review  Fax [phone] attn: Cande Jimenez RN, BSN CTN  [facility] [phone]

## 2022-05-04 NOTE — PROGRESS NOTES
Hospitalist Progress Note      PCP: Wes Wilson MD    Date of Admission: 5/3/2022    Chief Complaint: Abnormal lab results, asked to come to the ED by PCP    Hospital Course: h and p reviewed       Subjective:   No new c/o  Feel tired   Over all weak   Left UE tremors   No N . V, D or fever       Medications:  Reviewed    Infusion Medications    sodium chloride      sodium chloride 150 mL/hr at 05/04/22 0542    dextrose       Scheduled Medications    potassium bicarb-citric acid  40 mEq Oral Once    aspirin  81 mg Oral Daily    clopidogrel  75 mg Oral Daily    carboxymethylcellulose PF  1 drop Both Eyes Q12H    levothyroxine  50 mcg Oral Daily    metoprolol succinate  25 mg Oral Daily    sodium chloride flush  5-40 mL IntraVENous 2 times per day    heparin (porcine)  5,000 Units SubCUTAneous TID    insulin lispro  0-6 Units SubCUTAneous TID WC    insulin lispro  0-3 Units SubCUTAneous Nightly     PRN Meds: sodium chloride flush, sodium chloride, ondansetron **OR** ondansetron, acetaminophen **OR** acetaminophen, glucose, glucagon (rDNA), dextrose, dextrose bolus (hypoglycemia) **OR** dextrose bolus (hypoglycemia)      Intake/Output Summary (Last 24 hours) at 5/4/2022 0916  Last data filed at 5/4/2022 0830  Gross per 24 hour   Intake --   Output 495 ml   Net -495 ml       Physical Exam Performed:    BP (!) 132/57   Pulse (!) 48   Temp 97.9 °F (36.6 °C) (Oral)   Resp 16   SpO2 95%     General appearance: No apparent distress, appears stated age and cooperative. Looks tired   HEENT: Pupils equal, round, and reactive to light. Conjunctivae/corneas clear. Neck: Supple, with full range of motion. No jugular venous distention. Trachea midline. Respiratory:  Normal respiratory effort. Clear to auscultation, bilaterally without Rales/Wheezes/Rhonchi. Cardiovascular: Regular rate and rhythm with normal S1/S2 without murmurs, rubs or gallops.   Abdomen: Soft, non-tender, non-distended with normal bowel sounds. Musculoskeletal: No clubbing, cyanosis or edema bilaterally. Full range of motion without deformity. Skin: Skin color, texture, turgor normal.  No rashes or lesions. Neurologic:   Mild wellness over LUE/ mild tremors , speech is little slurred but much better per pt      Psychiatric: Alert and oriented   Capillary Refill: Brisk,3 seconds, normal   Peripheral Pulses: +2 palpable, equal bilaterally       Labs:   Recent Labs     05/03/22  1552   WBC 10.2   HGB 12.8   HCT 37.8        Recent Labs     05/02/22  1336 05/03/22  1552 05/04/22  0540   * 143 142   K 4.0 3.4* 3.1*    102 108   CO2 26 28 25   BUN 46* 50* 46*   CREATININE 1.9* 1.9* 2.1*   CALCIUM 9.7 10.3 9.2   PHOS  --   --  3.1     Recent Labs     05/02/22  1336 05/03/22  1552   * 335*   * 205*   BILITOT 0.9 0.9   ALKPHOS 129 129     No results for input(s): INR in the last 72 hours. Recent Labs     05/03/22  1552 05/03/22  1848 05/04/22  0118 05/04/22  0539 05/04/22  0540   CKTOTAL 10,308*  --   --   --  8,215*   TROPONINI  --  0.05* 0.05* 0.05*  --        Urinalysis:      Lab Results   Component Value Date    NITRU Negative 05/03/2022    WBCUA 3-5 05/03/2022    RBCUA 5-10 05/03/2022    BLOODU LARGE 05/03/2022    SPECGRAV 1.020 05/03/2022    GLUCOSEU Negative 05/03/2022       Radiology:  US GALLBLADDER RUQ   Final Result   No cholelithiasis or significant dilation of the common duct. Focal tenderness is noted over the gallbladder. Additional clinical   correlation for cholecystitis recommended. CT ABDOMEN PELVIS WO CONTRAST Additional Contrast? None   Final Result   No acute abdominopelvic abnormality. Trace pericardial and small bilateral pleural effusions.                  Assessment/Plan:    Active Hospital Problems    Diagnosis     Rhabdomyolysis [M62.82]      Priority: Medium    EDYTA (acute kidney injury) ([facility] [address] [N17.9]      Priority: Medium    Troponin level elevated [R77.8] Priority: Medium    Cerebrovascular accident (CVA) ([facility] [address] [I63.9]      Priority: Medium    HTN (hypertension) [I10]     Hyperlipidemia [E78.5]     Acquired hypothyroidism [E03.9]      Rhabdomyolysis   Likely due to statin induced myopathy, recently started on high intensity statin 6 weeks ago. No history of trauma, falls or prolonged immobile state. UA showing large blood, but only 5-10 RBCs consistent with a urine myoglobinuria  CPK elevated at 10,308- rending down   Labs with EDYTA, elevated liver enzymes and troponinemia consistent with rhabdomyolysis( pt told that her kidney function was not normal when she was  in [address] [place] [address] 153 - month ago )  -Aggressive IV fluids- 150 cc/ hr   -Monitor electrolytes, renal panel  -Check CK level daily  -Discontinued statin     EDYTA (acute kidney injury)   Likely ATN from pigment induced tubular injury from rhabdomyolysis  -Rx as outlined above  -Avoid nephrotoxic medications [hold statin, lisinopril, potassium]  -Monitor daily renal panel  -consider Nephro     Troponin level elevated   -Due to rhabdomyolysis. No anginal symptoms. Follow troponin trend. no chest pain or ekg changes suggestive if acute ischemia      Elevated liver enzymes  Due to rhabdomyolysis. Without hyperbilirubinemia. US gallbladder unremarkable. Hold statin  Check daily hepatic panel     Hx of stroke in March 2022? Or APril  -Left AMA from [facility]  -Management per PCP, outpatient echo, carotid US ordered  -Continue DAPT  -Hold statin  - slurred speech and extremity weakness ? Left - improved - PT/ OT/SLP dangelo MONTILLA  5/2/2022 lipid panel showed total cholesterol 139, LDL 64. Within goal.  Continue to hold statin therapy and manage with diet control.   Can consider adding fenofibrate/ezetimibe     DM type II -newly diagnosed  Hemoglobin A1c 6.6 checked on 5/2/2022  -Low-dose SSI while inpatient  -If renal functions improve, consider discharging on metformin  -Diabetic educator -continue Synthroid. TSH slightly elevated but free T4 normal.     HTN -controlled. Continue metoprolol for now, holding lisinopril due to EDYTA and monitor BP     Hx of CML -on Tasigna daily currently on hold, followed by Dr. Kaci Ayala.      Hypokalemia - replace           DVT Prophylaxis: heparin   Diet: ADULT DIET; low CHO  Code Status: Full Code    PT/OT Eval Status: ordered     Dispo - pending improvement - 2 days     Deric Nieto MD

## 2022-05-04 NOTE — CONSULTS
Interval History and plan: On flight IV fluids 150 mL/h  Creatinine went up to 2.1-from 1.9 on admission  Plan:  Continue with IV fluids  Potassium supplement                   Assessment :     Acute Kidney Injury  EDYTA likely due to -rhabdomyolysis/poor p.o. intake  Cr on consultation 2.1  Baseline Cr-0.8 on 9/21  No recent baseline available-she was admitted to [facility] March 2022 with the stroke and was told that she has high creatinine    UA-5/22-large blood, trace LE  Renal Imaging:-5/22-right-10.7 cm, simple 2.2 cm right renal cyst  No mention of left kidney  Echo: 10/18-EF normal, no mention of diastolic dysfunction    Hypertension   BP: (132)/(57)  Pulse:  [48]   BP goal inpatient [id number] systolic inpatient    Rhabdomyolysis  CPK more than 10,000 on arrival-down to 8.2   Thought to be induced by statin  Has elevated AST and ALT    CML  Diabetes mellitus type 2 new    [facility] would like to thank Tatiana Willingham MD   for opportunity to serve this patient      Please call with questions at-   24 Hrs Answering service [phone] or  7 am- 5 pm via Perfect serve or cell phone  Raymon Anton MD          CC/reason for consult :     EDYTA     HPI :     Aron Casas is a 66 y.o. female presented to   the hospital on 5/3/2022 with abnormal lab result from blood test done by PCP the day before admission. Also complains of pain right upper quadrant. She had a basal ganglia stroke during March 2022 hospital for which she was admitted at [facility] from where she went [facility]. She has been taking Crestor 40 mg a day. Also complaining of lower extremity weakness. No falls are  In the ground for long time. No other complaints    She was found to have rhabdomyolysis and is getting fluids.   Her Crestor has been stopped  ROS:     Seen with-no family    positives in bold   Constitutional:  fever, chills, weakness, weight change, fatigue  Skin:  rash, pruritus, hair loss, bruising, dry skin, petechiae  Head, Face, Neck   headaches, swelling,  cervical adenopathy  Respiratory: shortness of breath, cough, or wheezing  Cardiovascular: chest pain, palpitations, dizzy, edema  Gastrointestinal: nausea, vomiting, diarrhea, constipation,belly pain    Yellow skin, blood in stool  Musculoskeletal:  back pain, muscle weakness, gait problems,       joint pain or swelling. Genitourinary:  dysuria, poor urine flow, flank pain, blood in urine  Neurologic:  vertigo, TIA'S, syncope, seizures, focal weakness  Psychosocial:  insomnia, anxiety, or depression. Additional positive findings:                    All other remaining systems are negative or unable to obtain        PMH/PSH/SH/Family History:     Past Medical History:   Diagnosis Date    Anemia     Arthritis     Asthma     Bowel dysfunction     CML (chronic myelocytic leukemia) ([facility] [address] 01/2006    leukemia    Hyperlipidemia     Hypertension     Nuclear senile cataract of both eyes 11/25/2019    Obesity     Risk of myocardial infarction or stroke 7.5% or greater in next 10 years 9/15/2021    Skin cancer of face     left cheek, squamous    Stroke (cerebrum) ([facility] [address]     Thyroid disease     TIA (transient ischemic attack)     mini ones-from chemo       Past Surgical History:   Procedure Laterality Date    ANUS SURGERY  1998    fissure    BREAST BIOPSY      BREAST LUMPECTOMY      benign    COLONOSCOPY      numerous polyps    ELBOW SURGERY  1960    removal of foreign body ([place])    FINE NEEDLE ASPIRATION      PARACENTESIS      x 2 fluid in lung from Chemo    TONSILLECTOMY Bilateral         reports that she has never smoked. She has never used smokeless tobacco. She reports that she does not drink alcohol and does not use drugs. family history includes Arrhythmia in her sister; Cancer in her brother, father, mother, and sister.          Medication:     Current Facility-Administered Medications: aspirin EC tablet 81 mg, 81 mg, Oral, Daily  clopidogrel (PLAVIX) tablet 75 mg, 75 mg, Oral, Daily  carboxymethylcellulose PF (THERATEARS) 1 % ophthalmic gel 1 drop, 1 drop, Both Eyes, Q12H  levothyroxine (SYNTHROID) tablet 50 mcg, 50 mcg, Oral, Daily  metoprolol succinate (TOPROL XL) extended release tablet 25 mg, 25 mg, Oral, Daily  sodium chloride flush 0.9 % injection 5-40 mL, 5-40 mL, IntraVENous, 2 times per day  sodium chloride flush 0.9 % injection 5-40 mL, 5-40 mL, IntraVENous, PRN  0.9 % sodium chloride infusion, , IntraVENous, PRN  ondansetron (ZOFRAN-ODT) disintegrating tablet 4 mg, 4 mg, Oral, Q8H PRN **OR** ondansetron (ZOFRAN) injection 4 mg, 4 mg, IntraVENous, Q6H PRN  acetaminophen (TYLENOL) tablet 650 mg, 650 mg, Oral, Q6H PRN **OR** acetaminophen (TYLENOL) suppository 650 mg, 650 mg, Rectal, Q6H PRN  0.9 % sodium chloride infusion, , IntraVENous, Continuous  heparin (porcine) injection 5,000 Units, 5,000 Units, SubCUTAneous, TID  glucose (GLUTOSE) 40 % oral gel 15 g, 15 g, Oral, PRN  glucagon (rDNA) injection 1 mg, 1 mg, IntraMUSCular, PRN  dextrose 5 % solution, 100 mL/hr, IntraVENous, PRN  insulin lispro (HUMALOG) injection vial 0-6 Units, 0-6 Units, SubCUTAneous, TID WC  insulin lispro (HUMALOG) injection vial 0-3 Units, 0-3 Units, SubCUTAneous, Nightly  dextrose bolus (hypoglycemia) 10% 125 mL, 125 mL, IntraVENous, PRN **OR** dextrose bolus (hypoglycemia) 10% 250 mL, 250 mL, IntraVENous, PRN       Vitals :     Vitals:    05/04/22 0831   BP: (!) 132/57   Pulse: (!) 48   Resp: 16   Temp: 97.9 °F (36.6 °C)   SpO2: 95%       I & O :       Intake/Output Summary (Last 24 hours) at 5/4/2022 1500  Last data filed at 5/4/2022 1204  Gross per 24 hour   Intake 120 ml   Output 845 ml   Net -725 ml        Physical Examination :     General appearance: Anxious- no, distressed- no, in good spirits-  Yes  HEENT: Lips- normal, teeth- ok , oral mucosa- moist  Neck : Mass- no, appears symmetrical, JVD- not visible  Respiratory: Respiratory effort- normal, wheeze- no, crackles -   Cardiovascular:  Ausculation- No M/R/G, Edema none  Abdomen: visible mass- no, distention- no, scar- no, tenderness- no                            hepatosplenomegaly-  no  Musculoskeletal:  clubbing no,cyanosis- no , digital ischemia- no                           muscle strength- grossly normal , tone - grossly normal  Skin: rashes- no , ulcers- no, induration- no, tightening - no  Psychiatric:  Judgement and insight- normal           AAO X 3  Additional finding:      LABS:     Recent Labs     05/03/22  1552   WBC 10.2   HGB 12.8   HCT 37.8        Recent Labs     05/02/22  1336 05/03/22  1552 05/04/22  0540   * 143 142   K 4.0 3.4* 3.1*    102 108   CO2 26 28 25   BUN 46* 50* 46*   CREATININE 1.9* 1.9* 2.1*   GLUCOSE 105* 139* 114*   MG  --  2.40  --    PHOS  --   --  3.1

## 2022-05-04 NOTE — CONSULTS
Thanks for consulting [facility] for the care of Tatianna Bourgeois. Full consult will follow  Please call with questions at-  24 Hrs Answering service [phone]  Perfect serve, or cell phone 7 am - [address] [facility], MD   [facility]  [facility]

## 2022-05-04 NOTE — PROGRESS NOTES
Speech Language Pathology  Facility/Department: [facility]   CLINICAL BEDSIDE SWALLOW EVALUATION    NAME: Renita Draper  : 1950  MRN: [id number]    ADMISSION DATE: 5/3/2022  ADMITTING DIAGNOSIS: has Acquired hypothyroidism; Adverse reaction to sulfa antibiotic; Chemotherapy adverse reaction; Anastomotic ulcer; Anemia; Anxiety; Campbell esophagus; Cancer ([facility] [address] Cellulitis of left lower extremity; DNR (do not resuscitate); Family history of colon cancer; Hiatal hernia; History of skin cancer; Hyperlipidemia; Localized edema; Lower abdominal pain; Lower extremity edema; Pleural effusion; Thyroid nodule; CML (chronic myelocytic leukemia) ([address] Rectal bleeding; Polyp of colon; Anatomical narrow angle, bilateral; Hx of actinic keratosis; HTN (hypertension); Bilateral primary osteoarthritis of knee; Chronic pain of both knees; Neurogenic bladder as late effect of cerebrovascular accident (CVA); Chronic kidney disease, stage 3a ([facility] [address] Cerebrovascular accident (CVA) ([facility] [address] Hematuria; Positive depression screening; Rhabdomyolysis; EDYTA (acute kidney injury) ([facility] [address] and Troponin level elevated on their problem list.  ONSET DATE: 5/3/22    Recent Chest Xray/CT of Chest:   None this admission    CT at OSH: old Right basal ganglia infarct, MRI at OSH (in [place] in 2022): acute Right corona radiata and Right parietal infarcts    Date of Eval: 2022  Evaluating Therapist: CYNTHIA Zaidi    Current Diet level:  Current Diet : Regular, thin liquids      History Of Present Illness per Dr Yuki Braxton 5/3/22:    66 y.o. female who presented to [facility] with above complaints  Patient with PMH of CML, HTN, HLD, hypothyroidism, who was recently diagnosed with a stroke when she was in [place] back in 2022 presented to the ED today for abnormal lab results from blood test done yesterday by her PCP.   Patient was admitted to a [facility] in [place] in late March with symptoms of slurred speech, CT scan showed old right basal ganglia stroke, this was confirmed on MRI with an acute infarct in the right corona radiata and right parietal lobe. She was treated with aspirin, Plavix and started on atorvastatin 80 mg. She ultimately left the hospital AMA and came back to [place] in April. She has been taking her statin as prescribed including aspirin and Plavix. Currently taking Crestor 40 Mg daily. She had a PCP appointment yesterday and had routine labs done which revealed EDYTA and elevated liver enzymes and patient was asked to come to the ER today. She reports she has lower extremity weakness ever since the stroke. Some right shoulder pain that is chronic. She is still able to ambulate and perform her ADLs, but reports her vision and speech is still recovering from the stroke. She reports her urine has been dark in color, sometimes with blood in it. She had a boil in the vaginal area that her  lanced about 3 days ago. She reports that area has been healing well. Denied any fevers or chills. Denied any trauma or falls. She does complain of right upper quadrant pain over the last couple of weeks. Nonradiating, constant, dull aching in nature. Pt reports vision changes since CVA, as well as acute Right facial tingling. Pt read her clock backwards (eg 4:00 vs 12:20). She omitted words on the Left side of printed material. She did not initially see her purse which was on her Left side. Speech is fluent without dysarthria or obvious word-finding errors. She is oriented to place and situation and able to share her medical hx. She left the hospital in [address] ([facility]) (per chart) and reports she did not receive any CVA rehabilitation.      Pain:  Pain Assessment: 0-10  Pain Level: 6  Patient's Stated Pain Goal: 2  Pain Location: Abdomen,Back,Neck  Pain Orientation: Right  Pain Descriptors: Aching  Pain Type: Chronic pain,Acute pain  Pain Frequency: Continuous  Pain Onset: On-going  Non-Pharmaceutical Pain Intervention(s): Emotional support,Therapeutic presence,None - Patient Satisfied  Response to Pain Intervention: Patient satisfied      Reason for Referral  Joe Oropeza was referred for a bedside swallow evaluation to assess the efficiency of her swallow function, identify signs and symptoms of aspiration and make recommendations regarding safe dietary consistencies, effective compensatory strategies, and safe eating environment. Impression  Dysphagia Diagnosis: Concerns for esophageal stage dysphagia  Dysphagia Impression : Primary esophageal dysphagia symptoms. Oropharyngeal swallow function supports continued regular diet textures. She may benefit from smaller meals, keeping HOB elevated at all times (at least 30 degrees with sleep and 90 degrees during PO and 1 hour following meals). Avoid straws to reduce belching. Trial crushing PO meds as pt reports baseline discomfort when swallowing them whole. Dysphagia Outcome Severity Scale: Level 6: Within functional limits/Modified independence     Treatment Plan  Requires SLP Intervention: Yes  Duration of Treatment: 1 week  D/C Recommendations: Ongoing speech therapy is recommended during this hospitalization   (ST will follow for cognitive-communication eval, diet tolerance)       Recommended Diet and Intervention   Regular diet, Thin liquids   Recommended Form of Meds: Crushed in puree as able     Therapeutic Interventions: Patient/Family education    Compensatory Swallowing Strategies  Compensatory Swallowing Strategies : Alternate solids and liquids;Upright as possible for all oral intake;Remain upright for 30-45 minutes after meals;Small bites/sips; Lingual sweep; No straws    Treatment/Goals  Short-term Goals  Timeframe for Short-term Goals: 5 days (5/9/22)  Goal 1: Pt will participate in cognitive-communication evaluation  Long-term Goals  Timeframe for Long-term Goals: 7 days (5/11/22)  Goal 1: Pt will demonstrate clinically safe swallow of regular textures and thin liquids with use of compensatory strategies as appropriate (reflux precautions, smaller meals, alternate bites/sips, small sips by cup)  Dysphagia Goals: The patient will tolerate recommended diet without observed clinical signs of aspiration; The patient will recall and perform compensatory strategies, with no cues. General  Chart Reviewed: Yes  Behavior/Cognition: Alert; Cooperative;Pleasant mood  Respiratory Status: Room air  O2 Device: None (Room air)  Communication Observation: Functional  Follows Directions: Complex  Dentition: Adequate  Patient Positioning: Upright in bed  Baseline Vocal Quality: Normal  Volitional Cough: Strong  Prior Dysphagia History: Hx esophageal dysphagia with Campbell's esophagus and hiatal hernia, previously felt she benefitted from omeprozale but reports she stopped due to interference with her chemotherapy (leukemia). Reports sensation of food \"coming up\" in her chest/mid esophageal area. Onset of Right facial tingling/numbness (upper and lower) since CVA in March 2022. Consistencies Administered: Regular; Thin - cup; Thin - straw;Pureed    Vision/Hearing  Vision  Vision: Impaired  Vision Exceptions: Wears glasses at all times; Visual field cut  Hearing  Hearing: Within functional limits    Oral Motor Deficits  Oral/Motor  Oral Hygiene: Moist;Clean    Oral Phase Dysfunction  Oral Phase  Oral Phase: WNL  Oral Phase  Oral Phase - Comment: Minimal bilateral food residues which fully cleared with cued oral swish/swallow. Right facial tingling but movement appears symmetrical with full ROM. Pt took small sips of liquid to avoid anterior liquid loss (not observed). Indicators of Pharyngeal Phase Dysfunction   Pharyngeal Phase   Pharyngeal Phase: Appears grossly timely and coordinated. Immediate onset of belching with use of straw even in small sips. Avoids straws at baseline re Campbell's esophagus.  Reports sensation of reduced esophageal clearance after small amount of PO (1 cracker and 3 tsp puree, ~2 oz liquid) in mid esophageal area.     Prognosis  Individuals consulted  Consulted and agree with results and recommendations: Patient;RN  RN Name: Oscar Santoyo    Education  Patient Education: SLP educated the patient re: Role of SLP, rationale for completion of assessment, reflux precautions, results of assessment, recommendations and POC    Patient Education Response: Verbalizes understanding;Needs reinforcement  Safety Devices in place: Yes  Type of devices: Left in bed;Call light within reach;Nurse notified       Therapy Time  SLP Individual Minutes  Time In: 1915  Time Out: [address]  Minutes: [address] [place], [place]  5/4/2022 12:45 PM

## 2022-05-04 NOTE — CARE COORDINATION
CASE MANAGEMENT INITIAL ASSESSMENT    Reviewed chart and completed assessment with patient at bedside  Family present:  Yes, spouse  Explained Case Management role/services. yes    Primary contact information: spouse    Health Care Decision Maker :   Primary Decision Maker: [address] - [phone]    Secondary Decision Maker: Michaela Zamora Child - [phone]        Admit date/status: IPA 5/3/22  Diagnosis: rhabdomyolysis     Is this a Readmission?:  No      Insurance: Medicare     Precert required for SNF: No       3 night stay required: No    Living arrangements, Adls, care needs, prior to admission: lives with spouse in ranch style home with basement. IPTA. Rarely drives. Spouse assist with adls    Durable Medical Equipment at home:  Pt denies    Services in the home and/or outpatient, prior to admission: none    Current PCP: Chadwick Mccollum MD         Medications: Prescription coverage? Yes Will pt require financial assistance with medications No     Transportation needs:  TBD      PT/OT recs: ordered and pending    Hospital Exemption Notification (HEN): not started    Barriers to discharge: pt adamently refusing SNF at this time. Appears very frail and weak with intense tremors when trying to use her cell phone    Plan/comments: pt only agreeable to home care services - spouse in agreement. No preference for agency. Referral to [facility] home care to find agency in network and where pt resides (in [place]). CM team will continue to follow.     ECOC on chart for MD zaina Michelle, RN

## 2022-05-05 ENCOUNTER — APPOINTMENT (OUTPATIENT)
Dept: CT IMAGING | Age: 72
DRG: 682 | End: 2022-05-05
Payer: MEDICARE

## 2022-05-05 PROBLEM — E11.29 TYPE 2 DIABETES MELLITUS WITH KIDNEY COMPLICATION, WITHOUT LONG-TERM CURRENT USE OF INSULIN (HCC): Status: ACTIVE | Noted: 2022-05-05

## 2022-05-05 PROBLEM — R74.8 ELEVATED LIVER ENZYMES: Status: ACTIVE | Noted: 2022-05-05

## 2022-05-05 LAB
ALBUMIN SERPL-MCNC: 3.3 G/DL (ref 3.4–5)
ALP BLD-CCNC: 94 U/L (ref 40–129)
ALT SERPL-CCNC: 173 U/L (ref 10–40)
ANION GAP SERPL CALCULATED.3IONS-SCNC: 9 MMOL/L (ref 3–16)
AST SERPL-CCNC: 282 U/L (ref 15–37)
BASE EXCESS VENOUS: 4.2 MMOL/L (ref -3–3)
BILIRUB SERPL-MCNC: 0.4 MG/DL (ref 0–1)
BILIRUBIN DIRECT: <0.2 MG/DL (ref 0–0.3)
BILIRUBIN, INDIRECT: ABNORMAL MG/DL (ref 0–1)
BUN BLDV-MCNC: 38 MG/DL (ref 7–20)
CALCIUM SERPL-MCNC: 9.1 MG/DL (ref 8.3–10.6)
CARBOXYHEMOGLOBIN: 0.3 % (ref 0–1.5)
CHLORIDE BLD-SCNC: 112 MMOL/L (ref 99–110)
CO2: 25 MMOL/L (ref 21–32)
CREAT SERPL-MCNC: 1.7 MG/DL (ref 0.6–1.2)
GFR AFRICAN AMERICAN: 36
GFR NON-AFRICAN AMERICAN: 29
GLUCOSE BLD-MCNC: 113 MG/DL (ref 70–99)
GLUCOSE BLD-MCNC: 120 MG/DL (ref 70–99)
GLUCOSE BLD-MCNC: 128 MG/DL (ref 70–99)
GLUCOSE BLD-MCNC: 136 MG/DL (ref 70–99)
GLUCOSE BLD-MCNC: 292 MG/DL (ref 70–99)
HCO3 VENOUS: 27.1 MMOL/L (ref 23–29)
MAGNESIUM: 1.9 MG/DL (ref 1.8–2.4)
METHEMOGLOBIN VENOUS: 0.6 %
O2 SAT, VEN: 90 %
O2 THERAPY: ABNORMAL
PCO2, VEN: 34.7 MMHG (ref 40–50)
PERFORMED ON: ABNORMAL
PH VENOUS: 7.51 (ref 7.35–7.45)
PO2, VEN: 51.1 MMHG (ref 25–40)
POTASSIUM REFLEX MAGNESIUM: 3.4 MMOL/L (ref 3.5–5.1)
SODIUM BLD-SCNC: 146 MMOL/L (ref 136–145)
TCO2 CALC VENOUS: 28 MMOL/L
TOTAL CK: 7780 U/L (ref 26–192)
TOTAL PROTEIN: 6.1 G/DL (ref 6.4–8.2)

## 2022-05-05 PROCEDURE — 2580000003 HC RX 258: Performed by: INTERNAL MEDICINE

## 2022-05-05 PROCEDURE — 80048 BASIC METABOLIC PNL TOTAL CA: CPT

## 2022-05-05 PROCEDURE — 92523 SPEECH SOUND LANG COMPREHEN: CPT

## 2022-05-05 PROCEDURE — 82550 ASSAY OF CK (CPK): CPT

## 2022-05-05 PROCEDURE — 6370000000 HC RX 637 (ALT 250 FOR IP): Performed by: INTERNAL MEDICINE

## 2022-05-05 PROCEDURE — 92526 ORAL FUNCTION THERAPY: CPT

## 2022-05-05 PROCEDURE — 97535 SELF CARE MNGMENT TRAINING: CPT

## 2022-05-05 PROCEDURE — 97530 THERAPEUTIC ACTIVITIES: CPT

## 2022-05-05 PROCEDURE — 1200000000 HC SEMI PRIVATE

## 2022-05-05 PROCEDURE — 6360000002 HC RX W HCPCS: Performed by: INTERNAL MEDICINE

## 2022-05-05 PROCEDURE — 6370000000 HC RX 637 (ALT 250 FOR IP)

## 2022-05-05 PROCEDURE — 70450 CT HEAD/BRAIN W/O DYE: CPT

## 2022-05-05 PROCEDURE — 83735 ASSAY OF MAGNESIUM: CPT

## 2022-05-05 PROCEDURE — 97162 PT EVAL MOD COMPLEX 30 MIN: CPT

## 2022-05-05 PROCEDURE — 97110 THERAPEUTIC EXERCISES: CPT

## 2022-05-05 PROCEDURE — 82803 BLOOD GASES ANY COMBINATION: CPT

## 2022-05-05 PROCEDURE — 36415 COLL VENOUS BLD VENIPUNCTURE: CPT

## 2022-05-05 PROCEDURE — 80076 HEPATIC FUNCTION PANEL: CPT

## 2022-05-05 RX ORDER — POLYETHYLENE GLYCOL 3350 17 G/17G
17 POWDER, FOR SOLUTION ORAL DAILY
Status: DISCONTINUED | OUTPATIENT
Start: 2022-05-05 | End: 2022-05-10 | Stop reason: HOSPADM

## 2022-05-05 RX ORDER — METOPROLOL SUCCINATE 50 MG/1
25 TABLET, EXTENDED RELEASE ORAL DAILY
Status: DISCONTINUED | OUTPATIENT
Start: 2022-05-05 | End: 2022-05-10 | Stop reason: HOSPADM

## 2022-05-05 RX ADMIN — FAMOTIDINE 10 MG: 20 TABLET, FILM COATED ORAL at 08:09

## 2022-05-05 RX ADMIN — ACETAMINOPHEN 650 MG: 325 TABLET ORAL at 21:49

## 2022-05-05 RX ADMIN — SODIUM CHLORIDE: 9 INJECTION, SOLUTION INTRAVENOUS at 09:45

## 2022-05-05 RX ADMIN — INSULIN LISPRO 3 UNITS: 100 INJECTION, SOLUTION INTRAVENOUS; SUBCUTANEOUS at 13:15

## 2022-05-05 RX ADMIN — POLYETHYLENE GLYCOL 3350 17 G: 17 POWDER, FOR SOLUTION ORAL at 09:43

## 2022-05-05 RX ADMIN — HEPARIN SODIUM 5000 UNITS: 5000 INJECTION INTRAVENOUS; SUBCUTANEOUS at 21:23

## 2022-05-05 RX ADMIN — CARBOXYMETHYLCELLULOSE SODIUM 1 DROP: 10 GEL OPHTHALMIC at 21:24

## 2022-05-05 RX ADMIN — POTASSIUM BICARBONATE 40 MEQ: 782 TABLET, EFFERVESCENT ORAL at 11:57

## 2022-05-05 RX ADMIN — CLOPIDOGREL BISULFATE 75 MG: 75 TABLET, FILM COATED ORAL at 08:09

## 2022-05-05 RX ADMIN — HEPARIN SODIUM 5000 UNITS: 5000 INJECTION INTRAVENOUS; SUBCUTANEOUS at 15:21

## 2022-05-05 RX ADMIN — SODIUM CHLORIDE: 9 INJECTION, SOLUTION INTRAVENOUS at 01:36

## 2022-05-05 RX ADMIN — ASPIRIN 81 MG: 81 TABLET, COATED ORAL at 08:09

## 2022-05-05 RX ADMIN — CARBOXYMETHYLCELLULOSE SODIUM 1 DROP: 10 GEL OPHTHALMIC at 08:09

## 2022-05-05 RX ADMIN — SODIUM CHLORIDE, PRESERVATIVE FREE 10 ML: 5 INJECTION INTRAVENOUS at 21:36

## 2022-05-05 RX ADMIN — SODIUM CHLORIDE: 9 INJECTION, SOLUTION INTRAVENOUS at 21:23

## 2022-05-05 RX ADMIN — METOPROLOL SUCCINATE 25 MG: 50 TABLET, EXTENDED RELEASE ORAL at 14:02

## 2022-05-05 RX ADMIN — LEVOTHYROXINE SODIUM 50 MCG: 0.05 TABLET ORAL at 08:09

## 2022-05-05 RX ADMIN — HEPARIN SODIUM 5000 UNITS: 5000 INJECTION INTRAVENOUS; SUBCUTANEOUS at 08:14

## 2022-05-05 ASSESSMENT — PAIN SCALES - GENERAL
PAINLEVEL_OUTOF10: 0
PAINLEVEL_OUTOF10: 0

## 2022-05-05 NOTE — PROGRESS NOTES
Speech Language Pathology  Facility/Department: [facility] B3 - MED SURG  Dysphagia Daily Treatment Note        Recommendations:  Solid Consistency: Downgrade to Soft and Bite Sized (IDDSI 6)  Liquid Consistency: Thin liquids (IDDSI 0)  Medication: Meds whole or crushed in puree  Pt will require set-up assistance with meals; significant left visual field cut      NAME: Renita Draper  : 1950  MRN: [id number]    Patient Diagnosis(es):   Patient Active Problem List    Diagnosis Date Noted    Rhabdomyolysis 2022    EDYTA (acute kidney injury) ([facility] [address] 2022    Troponin level elevated 2022    Neurogenic bladder as late effect of cerebrovascular accident (CVA) 2022    Chronic kidney disease, stage 3a ([address] 2022    Cerebrovascular accident (CVA) ([facility] [address] 2022    Hematuria 2022    Positive depression screening 2022    Bilateral primary osteoarthritis of knee 2021    Chronic pain of both knees 2021    HTN (hypertension)     Hx of actinic keratosis 2020    Anatomical narrow angle, bilateral 2019    Rectal bleeding     Polyp of colon     Anastomotic ulcer 2019    Campbell esophagus 2019    Cancer ([facility] [address] 2019    Hiatal hernia 2019    Hyperlipidemia 2019    Thyroid nodule 2019    History of skin cancer 05/10/2019    Acquired hypothyroidism 09/10/2018    Lower abdominal pain 2017    Adverse reaction to sulfa antibiotic 2017    Cellulitis of left lower extremity 10/21/2016    Localized edema 10/21/2016    Lower extremity edema 10/10/2016    Anemia 2016    Chemotherapy adverse reaction 2015    Pleural effusion 2015    CML (chronic myelocytic leukemia) ([facility] [address] 2015    Anxiety 2015    DNR (do not resuscitate) 2015    Family history of colon cancer 2015     Allergies:    Allergies   Allergen Reactions    Latex Swelling and Rash     Rubber gloves    Mangifera Indica Anaphylaxis     Francisco-- Throat closes    Penicillin G Hives    Shellfish-Derived Products Hives    Sulfa Antibiotics Hives    Grapefruit Concentrate      Due to chemo    Omeprazole Other (See Comments)     Due to chemo    Shrimp Extract Allergy Skin Test Rash     Subjective: Pt seen upright in bed, leaning to L side (requiring cues for midline positioning), alert and cooperative. RN OK'd SLP entry and therapy. Pain: no c/o pain    Current Diet: ADULT DIET; Dysphagia - Soft and Bite Sized; 3 carb choices (45 gm/meal)    Diet Tolerance:  Patient tolerating current diet level without signs/symptoms of penetration / aspiration per chart. P.O. Trials: Thin   x Thin liquid via consecutive straw sip   Nectar / Mildly Thick       Honey / Moderately Thick       Pudding / Extremely Thick       Puree       Solid   x Bites of omelette     Dysphagia Treatment and Impressions:  Pt on RA. Pt's breakfast tray present, however, untouched. Pt required set-up assistance from SLP, consistent max cues to scan to left side to find items on breakfast tray. SLP cut omelette into small, bite-sized pieces for pt. When asked about dysphagia/pt's previous meals pt stated that \"more of it ended up on me that I actually ate\". She reported improved swallow function with meds crushed in puree vs whole with water. Suspected left labial weakness with labial residue noted with solid that pt required cues from SLP to clear. Left-sided pocketing noted with bites of omelette with pt benefiting from intermittent cues for use of lingual sweep and liquid was to clear. No clinical s/s of aspiration noted throughout observed PO trials. Diet modified to soft and bite sized this date. Recommend set-up assistance with meals, cues for pt to scan to left side to ID target items on meal trays. ST to continue to follow.      Dysphagia Goals:  Timeframe for Long-term Goals: 7 days (5/11/22)  Goal 1: Pt will demonstrate clinically safe swallow of regular textures and thin liquids with use of compensatory strategies as appropriate (reflux precautions, smaller meals, alternate bites/sips, small sips by cup). 05/05: ongoing, progressing     Short-term Goals  Timeframe for Short-term Goals: 5 days (5/9/22)  GOAL MET, see separate eval note  Goal 2: The patient will tolerate recommended diet without observed clinical signs of aspiration. 05/05: ongoing, see above  2) The patient will recall and perform compensatory strategies, with no cues. 05/05: ongoing, pt needs continued reinforcement    Speech/Language/Cog Goals: speech/lang/cog evaluation completed this date, see separate eval note. Timeframe for Long-term Goals: 5 days (5/10/22)  Goal 1: The pt will effectively use compensatory visual strategies for improved attention to left side during structured tasks in 10/10 trials, min cues. Short-term Goals  Timeframe for Short-term Goals: 3 days (5/8/22)  Goal 1: The pt will effectively use compensatory visual strategies for improved attention to left side during structured tasks in 8/10 trials, mod cues. Goal 2: The pt will complete graded recall tasks with 90% accuracy, min cues. Goal 3: The pt will complete graded executive function tasks with 90% accuracy, min cues. Recommendations:  Solid Consistency: Downgrade to Soft and Bite Sized (IDDSI 6)  Liquid Consistency: Thin liquids (IDDSI 0)  Medication: Meds whole or crushed in puree  Pt will require set-up assistance with meals; significant left visual field cut    Patient/Family/Caregiver Education:  SLP re: compensatory swallow strategies, role of ST. Pt needs ongoing reinforcement. Compensatory Strategies:  HOB 90* and 30\" after meals; small bites/sips; alternate solids/liquids every 3-5 bites; oral care after every meal; lingual sweep; check for left-sided pocketing    Plan:    Continued Dysphagia treatment with goals per plan of care.     Discharge Recommendations: IP Rehab; per PT/OT recs    If pt discharges from hospital prior to Speech/Swallowing discharge, this note serves as tx and discharge summary.      Total Treatment Time / Charges     Time in Time out Total Time / units   Cognitive Tx         Speech Tx      Dysphagia Tx 0911 0939 10 min / 1 unit     Signature:  Lamont Rich M.S. [address] [facility], [place]  Speech-language pathologist  [id number]  Phone: [phone] / [phone]

## 2022-05-05 NOTE — PROGRESS NOTES
Interval History and plan: On flight IV fluids 150 mL/h  Cr down to 1.7- from peak of 2.1  No edema    Plan:  Decrease fluids  K supplement ordered                   Assessment :     Acute Kidney Injury  EDYTA likely due to -rhabdomyolysis/poor p.o. intake  Cr on consultation 2.1  Baseline Cr-0.8 on 9/21  No recent baseline available-she was admitted to [facility] March 2022 with the stroke and was told that she has high creatinine    UA-5/22-large blood, trace LE  Renal Imaging:-5/22-right-10.7 cm, simple 2.2 cm right renal cyst  No mention of left kidney  Echo: 10/18-EF normal, no mention of diastolic dysfunction    Hypertension   BP: (143)/(59)  Pulse:  [45]   BP goal inpatient 021-991 systolic inpatient    Rhabdomyolysis  CPK more than 10,000 on arrival-down to 8.2   Thought to be induced by statin  Has elevated AST and ALT    CML  Diabetes mellitus type 2 [facility] would like to thank James Llanos MD   for opportunity to serve this patient      Please call with questions at-   24 Hrs Answering service [phone] or  7 am- 5 pm via Perfect serve or cell phone  Luis Hardwick MD          CC/reason for consult :     EDYTA     HPI :     Kathe Moreno is a 66 y.o. female presented to   the hospital on 5/3/2022 with abnormal lab result from blood test done by PCP the day before admission. Also complains of pain right upper quadrant. She had a basal ganglia stroke during March 2022 hospital for which she was admitted at [facility] from where she went [facility]. She has been taking Crestor 40 mg a day. Also complaining of lower extremity weakness. No falls are  In the ground for long time. No other complaints    She was found to have rhabdomyolysis and is getting fluids.   Her Crestor has been stopped  ROS:     Seen with-no family    positives in bold   Constitutional:  fever, chills, weakness, weight change, fatigue  Skin:  rash, pruritus, hair loss, bruising, dry skin, petechiae  Head, Face, Neck   headaches, swelling,  cervical adenopathy  Respiratory: shortness of breath, cough, or wheezing  Cardiovascular: chest pain, palpitations, dizzy, edema  Gastrointestinal: nausea, vomiting, diarrhea, constipation,belly pain    Yellow skin, blood in stool  Musculoskeletal:  back pain, muscle weakness, gait problems,       joint pain or swelling. Genitourinary:  dysuria, poor urine flow, flank pain, blood in urine  Neurologic:  vertigo, TIA'S, syncope, seizures, focal weakness  Psychosocial:  insomnia, anxiety, or depression. Additional positive findings:                    All other remaining systems are negative or unable to obtain        PMH/PSH/SH/Family History:     Past Medical History:   Diagnosis Date    Anemia     Arthritis     Asthma     Bowel dysfunction     CML (chronic myelocytic leukemia) ([facility] [address] 01/2006    leukemia    Hyperlipidemia     Hypertension     Nuclear senile cataract of both eyes 11/25/2019    Obesity     Risk of myocardial infarction or stroke 7.5% or greater in next 10 years 9/15/2021    Skin cancer of face     left cheek, squamous    Stroke (cerebrum) ([facility] [address] [facility]     Thyroid disease     TIA (transient ischemic attack)     mini ones-from chemo       Past Surgical History:   Procedure Laterality Date    ANUS SURGERY  1998    fissure    BREAST BIOPSY      BREAST LUMPECTOMY      benign    COLONOSCOPY      numerous polyps    ELBOW SURGERY  1960    removal of foreign body ([place])    FINE NEEDLE ASPIRATION      PARACENTESIS      x 2 fluid in lung from Chemo    TONSILLECTOMY Bilateral         reports that she has never smoked. She has never used smokeless tobacco. She reports that she does not drink alcohol and does not use drugs. family history includes Arrhythmia in her sister; Cancer in her brother, father, mother, and sister.          Medication:     Current Facility-Administered Medications: polyethylene glycol (GLYCOLAX) packet 17 g, 17 g, Oral, Daily  famotidine (PEPCID) tablet 10 mg, 10 mg, Oral, Daily  nilotinib (TASIGNA) capsule 200 mg, 200 mg, Oral, Q12H  aspirin EC tablet 81 mg, 81 mg, Oral, Daily  clopidogrel (PLAVIX) tablet 75 mg, 75 mg, Oral, Daily  carboxymethylcellulose PF (THERATEARS) 1 % ophthalmic gel 1 drop, 1 drop, Both Eyes, Q12H  levothyroxine (SYNTHROID) tablet 50 mcg, 50 mcg, Oral, Daily  metoprolol succinate (TOPROL XL) extended release tablet 25 mg, 25 mg, Oral, Daily  sodium chloride flush 0.9 % injection 5-40 mL, 5-40 mL, IntraVENous, 2 times per day  sodium chloride flush 0.9 % injection 5-40 mL, 5-40 mL, IntraVENous, PRN  0.9 % sodium chloride infusion, , IntraVENous, PRN  ondansetron (ZOFRAN-ODT) disintegrating tablet 4 mg, 4 mg, Oral, Q8H PRN **OR** ondansetron (ZOFRAN) injection 4 mg, 4 mg, IntraVENous, Q6H PRN  acetaminophen (TYLENOL) tablet 650 mg, 650 mg, Oral, Q6H PRN **OR** acetaminophen (TYLENOL) suppository 650 mg, 650 mg, Rectal, Q6H PRN  0.9 % sodium chloride infusion, , IntraVENous, Continuous  heparin (porcine) injection 5,000 Units, 5,000 Units, SubCUTAneous, TID  glucose (GLUTOSE) 40 % oral gel 15 g, 15 g, Oral, PRN  glucagon (rDNA) injection 1 mg, 1 mg, IntraMUSCular, PRN  dextrose 5 % solution, 100 mL/hr, IntraVENous, PRN  insulin lispro (HUMALOG) injection vial 0-6 Units, 0-6 Units, SubCUTAneous, TID WC  insulin lispro (HUMALOG) injection vial 0-3 Units, 0-3 Units, SubCUTAneous, Nightly  dextrose bolus (hypoglycemia) 10% 125 mL, 125 mL, IntraVENous, PRN **OR** dextrose bolus (hypoglycemia) 10% 250 mL, 250 mL, IntraVENous, PRN       Vitals :     Vitals:    05/05/22 0804   BP: (!) 143/59   Pulse: (!) 45   Resp: 16   Temp: 97.3 °F (36.3 °C)   SpO2: 94%       I & O :       Intake/Output Summary (Last 24 hours) at 5/5/2022 1118  Last data filed at 5/5/2022 1014  Gross per 24 hour   Intake --   Output 650 ml   Net -650 ml        Physical Examination :     General appearance: Anxious- no, distressed- no, in good spirits-  Yes  HEENT: Lips- normal, teeth- ok , oral mucosa- moist  Neck : Mass- no, appears symmetrical, JVD- not visible  Respiratory: Respiratory effort-  normal, wheeze- no, crackles -   Cardiovascular:  Ausculation- No M/R/G, Edema none  Abdomen: visible mass- no, distention- no, scar- no, tenderness- no                            hepatosplenomegaly-  no  Musculoskeletal:  clubbing no,cyanosis- no , digital ischemia- no                           muscle strength- grossly normal , tone - grossly normal  Skin: rashes- no , ulcers- no, induration- no, tightening - no  Psychiatric:  Judgement and insight- normal           AAO X 3  Additional finding:      LABS:     Recent Labs     05/03/22  1552   WBC 10.2   HGB 12.8   HCT 37.8        Recent Labs     05/03/22  1552 05/04/22  0540 05/05/22  0646    142 146*   K 3.4* 3.1* 3.4*    108 112*   CO2 28 25 25   BUN 50* 46* 38*   CREATININE 1.9* 2.1* 1.7*   GLUCOSE 139* 114* 120*   MG 2.40  --  1.90   PHOS  --  3.1  --

## 2022-05-05 NOTE — PROGRESS NOTES
Speech Language Pathology  Facility/Department: [facility] B3 - MED SURG  Initial Speech/Language/Cognitive Assessment      · Pt would benefit from continued ST during acute stay and after discharge to address speech/lang/cog deficits. Pt demonstrates significant left visual field cue with consistent max cues required to attend/scan left side during both structured and unstructured tasks        NAME: German Mcguire  : 1950   MRN: [id number]  ADMISSION DATE: 5/3/2022  ADMITTING DIAGNOSIS: has Acquired hypothyroidism; Adverse reaction to sulfa antibiotic; Chemotherapy adverse reaction; Anastomotic ulcer; Anemia; Anxiety; Campbell esophagus; Cancer ([address] Cellulitis of left lower extremity; DNR (do not resuscitate); Family history of colon cancer; Hiatal hernia; History of skin cancer; Hyperlipidemia; Localized edema; Lower abdominal pain; Lower extremity edema; Pleural effusion; Thyroid nodule; CML (chronic myelocytic leukemia) ([address] Rectal bleeding; Polyp of colon; Anatomical narrow angle, bilateral; Hx of actinic keratosis; HTN (hypertension); Bilateral primary osteoarthritis of knee; Chronic pain of both knees; Neurogenic bladder as late effect of cerebrovascular accident (CVA); Chronic kidney disease, stage 3a ([address] Cerebrovascular accident (CVA) ([address] Hematuria; Positive depression screening; Rhabdomyolysis; EDYTA (acute kidney injury) ([facility] [address] and Troponin level elevated on their problem list.  DATE ONSET: Pt admitted to [facility] on 5/3/22    Date of Eval: 2022   Evaluating Therapist: CYNTHIA Gonzalez    RECENT RESULTS  CT OF HEAD (22): Impression   No acute intracranial abnormality. Primary Complaint: Pt reported speech changes per family report, muscle weakness.       Per MD H&P, \"Patient with PMH of CML, HTN, HLD, hypothyroidism, who was recently diagnosed with a stroke when she was in [place] back in 2022 presented to the ED today for abnormal lab results from blood test done yesterday by her PCP. Patient was admitted to a [facility] in [place] in late March with symptoms of slurred speech, CT scan showed old right basal ganglia stroke, this was confirmed on MRI with an acute infarct in the right corona radiata and right parietal lobe. She was treated with aspirin, Plavix and started on atorvastatin 80 mg. She ultimately left the hospital AMA and came back to [place] in April. She has been taking her statin as prescribed including aspirin and Plavix. Currently taking Crestor 40 Mg daily. She had a PCP appointment yesterday and had routine labs done which revealed EDYTA and elevated liver enzymes and patient was asked to come to the ER today. She reports she has lower extremity weakness ever since the stroke. Some right shoulder pain that is chronic. She is still able to ambulate and perform her ADLs, but reports her vision and speech is still recovering from the stroke. She reports her urine has been dark in color, sometimes with blood in it. She had a boil in the vaginal area that her  lanced about 3 days ago. She reports that area has been healing well. Denied any fevers or chills. Denied any trauma or falls. She does complain of right upper quadrant pain over the last couple of weeks. Nonradiating, constant, dull aching in nature. No particular aggravating or relieving factors\".     Pain:  Pain Assessment  Pain Assessment: 0-10  Pain Level: 0  Patient's Stated Pain Goal: 2  Pain Location: Abdomen,Back  Pain Orientation: Right  Pain Descriptors: Aching  Pain Type: Chronic pain,Acute pain  Pain Frequency: Continuous  Pain Onset: On-going  Non-Pharmaceutical Pain Intervention(s): Emotional support,Therapeutic presence,None - Patient Satisfied  Response to Pain Intervention: Patient satisfied  Side Effects: No reported side effects  Multiple Pain Sites: Yes  Pain Assessment in Advanced Dementia (PAINAD)  Non-Pharmaceutical Pain Intervention(s): Emotional support,Therapeutic presence,None - Patient Satisfied  Response to Pain Intervention: Patient satisfied  Side Effects: No reported side effects  Faces, Legs, Activity, Cry, and Consolability (FLACC)  Non-Pharmaceutical Pain Intervention(s): Emotional support,Therapeutic presence,None - Patient Satisfied  Response to Pain Intervention: Patient satisfied  Side Effects: No reported side effects    Assessment:  Cognitive Diagnosis: L visual field cut with pt requiring consistent cues to scan to L side throughout evaluation; mild cognitive deficit, needs ongoing assessment  Speech Diagnosis: Mild dysarthria although speech considered 100% intelligible in conversation as judged by SLP. Left-sided labial weakness noted with OME. Pt demonstrated speech and vision changes s/p recent CVA (March 2022 per pt and chart). She endorsed requiring significant assistance from her  at home. The pt was administered the [address] ([address] [facility]) Examination this date to assess cognition. The pt scored a /30 with a score of 27 or greater considered WNL per the [facility]. See pt's scores on each subtest below:    [address] [facility] Status ([facility]) Examination   Section / subtest   Score Comments   Orientation   3/3    Short-term recall   4/5    Calculations   3/3    Naming   2/3 14 items named   Attention: number repetition   1/2 Single error with 4-digit number   Visuospatial tasks: Clock drawing and shape identification   0/4; scored incorrectly d/t max cues required to attend/scan to left Clock drawing task modified-- SLP had pt ID target time on clock on wall (scored out of 2 vs 4 total points); increased time required to complete with cues to scan left     Auditory comprehension 8/8     Total score: 21/28  (*modified to total of 28)        The pt demonstrated difficulty with visuo-spatial tasks requiring max cues during structured & unstructured tasks to attend/scan to left side.   The pt's strengths included orientation, calculations, and auditory comprehension. Recommendations:  Requires SLP Intervention: Yes  Duration of Treatment: 1 week (5/11/22)  D/C Recommendations: Ongoing speech therapy is recommended during this hospitalization; Ongoing speech therapy is recommended at next level of care       Plan:   Goals:  Short-term Goals  Timeframe for Short-term Goals: 3 days (5/8/22)  Goal 1: The pt will effectively use compensatory visual strategies for improved attention to left side during structured tasks in 8/10 trials, mod cues. Goal 2: The pt will complete graded recall tasks with 90% accuracy, min cues. Goal 3: The pt will complete graded executive function tasks with 90% accuracy, min cues. Long-term Goals  Timeframe for Long-term Goals: 5 days (5/10/22)  Goal 1: The pt will effectively use compensatory visual strategies for improved attention to left side during structured tasks in 10/10 trials, min cues. Patient/family involved in developing goals and treatment plan: Yes    Subjective:  General  Chart Reviewed: Yes  Patient assessed for rehabilitation services?: Yes  Family / Caregiver Present: No  General Comment  Comments: Pt admitted s/p fall, rhabdomyolysis. Pt s/p recent CVA (March 2022 per pt, chart). Subjective  Subjective: Pt alert and cooperative throughout evaluation, RN OK'd SLP entry and evaluation/placed order for SLP Eval and Treat (BSE completed yesterday only, order completed). Social/Functional History  Lives With: Spouse  Type of Home: House  Vision  Vision: Impaired  Vision Exceptions: Wears glasses at all times; Visual field cut           Objective:     Oral/Motor  Oral Hygiene: Moist;Clean    Auditory Comprehension  Comprehension: Exceptions  Complex/Abstract Commands:  (Needs ongoing assessment)    Reading Comprehension  Reading Status: Exceptions to [facility]  Scanning/Tracking Impairment Severity: Severe  Interfering Components: Left neglect; Visual perceptual;Visual acuity  Effective Techniques: Tactile/visual cueing    Expression  Primary Mode of Expression: Verbal    Verbal Expression  Verbal Expression: Exceptions to functional limits  Divergent: Mild    Written Expression  Written Expression: Exceptions to [facility] (Did not formally assess this date; suspect will likely be impacted d/t severity of pt's L visual field cut)  Interfering Components: Left neglect         Pragmatics/Social Functioning  Pragmatics: Exceptions to [facility]  Affect: Mild  Eye Contact: Mild  Monotone: Mild    Cognition:      Orientation  Overall Orientation Status: Within Functional Limits  Attention  Attention: Exceptions to [facility] (Attention appears WFL during today's evaluation)  Alternating Attention:  (did not formally assess)  Divided Attention:  (did not formally assess)  Memory  Memory: Exceptions to [facility]  Short-term Memory: Mild  Problem Solving  Problem Solving: Exceptions to [facility]  Simple Functional Tasks: Mild  Numeric Reasoning  Numeric Reasoning: Exceptions to [facility]   Time: Mild (d/t L visual field cut)  Abstract Reasoning  Abstract Reasoning: Exceptions to [facility]  Divergent Thinking: Mild  Safety/Judgment  Safety/Judgment: Exceptions to [facility]  Unable to Self-monitor and Self-correct Consistently: Moderate      Prognosis:  Speech Therapy Prognosis  Prognosis: Good  Prognosis Considerations: Age; Co-Morbidities; Potential;Previous Level of Function; Family/Community Support;Severity of Impairments;Medical Diagnosis;Participation Level  Individuals consulted  Consulted and agree with results and recommendations: Patient;RN  RN Name: María Elliott    Education:  Patient Education:Pt educated on reason for referral, role of ST, assessment results and recommendations.   Patient Education Response: Verbalizes understanding;Needs reinforcement  Safety Devices in place: Yes  Type of devices: Left in bed;Call light within reach;Nurse notified    Therapy Time:   Individual Concurrent Group Co-treatment   Time In 2793         Time Out  1008         Minutes  24          Eval speech sound lang nalini Flaherty M.S. [id number] [facility]  Speech-language pathologist  [id number]  Phone: [phone] / [phone]

## 2022-05-05 NOTE — CARE COORDINATION
[facility] - Acute Rehab Unit   After review, this patient is felt to be:       []  Appropriate for Acute Inpatient Rehab    []  Appropriate for Acute Inpatient Rehab Pending Insurance Authorization    []  Not appropriate for Acute Inpatient Rehab    [x]  Referral received and ARU reviewing patient; Evaluation ongoing. Evaluating patient for ARU, pending review with MD.    Will notify DCP with further updates.  Thank you for the referral.  Chastity Saldivar MPH, RRT  Clinical Liaison [address] [place] [address]  [phone] ([phone]   Electronically signed by Chastity Saldivar on 5/5/2022 at 3:53 PM

## 2022-05-05 NOTE — PROGRESS NOTES
Bed alarm sounding. Staff entered room. Pt's spouse found attempting to put pt into a wheelchair. Staff asked why he was doing that. Spouse reports, \"I'm trying to take her to the nurses station to give her nurse her medicine\". Staff instructed spouse not to attempt to get pt up without staff.

## 2022-05-05 NOTE — PROGRESS NOTES
Occupational Therapy  Facility/Department: [place] [address] - MED SURG  Occupational Therapy Initial Assessment and Treatment Note     Name: Kathe Moreno  : 1950  MRN: [id number]  Date of Service: 2022    Discharge Recommendations:  IP Rehab     Patient Diagnosis(es): The primary encounter diagnosis was EDYTA (acute kidney injury) ([facility] [address] Diagnoses of Transaminitis and Traumatic rhabdomyolysis, initial encounter [facility]) were also pertinent to this visit. Past Medical History:  has a past medical history of Anemia, Arthritis, Asthma, Bowel dysfunction, CML (chronic myelocytic leukemia) ([facility] [address] Hyperlipidemia, Hypertension, Nuclear senile cataract of both eyes, Obesity, Risk of myocardial infarction or stroke 7.5% or greater in next 10 years, Skin cancer of face, Stroke (cerebrum) ([facility] [address] Thyroid disease, and TIA (transient ischemic attack). Past Surgical History:  has a past surgical history that includes Breast biopsy; fine needle aspiration; Breast lumpectomy; Tonsillectomy (Bilateral); Paracentesis; Anus surgery (); Elbow surgery (); and Colonoscopy. Assessment     Pt seen for OT tx at bedside. Pt and son report decline in function. Son reports that pt was ambulatory at home and he is concerned regarding change in status. Pt presently demo's L neglect and visual deficit (field cut past midline), LUE weakness, poor sitting balance. RN notified of change in LUE AROM and strength as well as balance. Recommend IP rehab for skilled OT to address ADLs, mobility, LUE function and visual perception. Cont OT in acute care.            Plan   Plan  Times per Week: 3-5x  Current Treatment Recommendations: Strengthening,ROM,Balance training,Functional mobility training,Endurance training,Safety education & training,Neuromuscular re-education,Patient/Caregiver education & training,Equipment evaluation, education, & procurement,Self-Care / ADL,Cognitive/Perceptual training Restrictions  Restrictions/Precautions  Restrictions/Precautions: Fall Risk,Contact Precautions,Up as Tolerated  Position Activity Restriction  Other position/activity restrictions: Contact precautions--double glove d/t chemo tx. Subjective   General  Chart Reviewed: Yes  Patient assessed for rehabilitation services?: Yes  Additional Pertinent Hx: Patient was admitted to a [facility] hospital in [place] in late March with symptoms of slurred speech, CT scan showed old right basal ganglia stroke, this was confirmed on MRI with an acute infarct in the right corona radiata and right parietal lobe  Family / Caregiver Present: Yes (spouse)  Referring Practitioner: Brian Solis  Diagnosis: rhabdomyolysis  Subjective  Subjective: Pt agreeable to OT. Reports pain over R clavicle area and headache at 7/10. Pt just received pain med from RN. Pt repositioned in bed for comfort. No further c/o's. General Comment  Comments: RN approved therapy  Pain: Pt reports 6/10 pain in abdomen, offered repositioning, pt satisfied and able to continue with therapy  Social/Functional History  Social/Functional History  Lives With: Spouse  Type of Home: House  Home Layout: One level (Finished level with family room, computer and laundry room. Pt uses lower level often. Handrail on steps go 1/2 way down.)  Home Access: Stairs to enter without rails  Entrance Stairs - Number of Steps: 1  Bathroom Shower/Tub: Tub/Shower unit,Walk-in shower  Bathroom Toilet: Standard  Bathroom Equipment:  (no DME)  Home Equipment:  (no DME)  Has the patient had two or more falls in the past year or any fall with injury in the past year?: No (Pt reports multiple \"near falls\". )  ADL Assistance: Needs assistance (Pt has been sponge bathing with bath wipes.  Spouse helps with bathing, dressing and toileting since CVA in 3/2022)  Homemaking Assistance: Needs assistance (Spouse has been performing IADLs since 3/2022.)  Ambulation Assistance: Needs assistance (Spouse holds her R arm for support as she walks.)  Transfer Assistance: Needs assistance  Active : No  Patient's  Info: Spouse drives. Pt was able to drive until CVA in 9/2055. Occupation: Retired  Type of Occupation: caretaker       Objective   Pulse: 72  Heart Rate Source: Monitor  BP: (!) 150/53  BP Location: Left upper arm  BP Method: Automatic  Patient Position: Semi fowlers  MAP (Calculated): 85.33  Resp: 16  SpO2: 95 %  O2 Device: None (Room air)          Observation/Palpation  Posture: Poor  Safety Devices  Type of Devices: All fall risk precautions in place; Bed alarm in place;Call light within reach; Left in bed;Nurse notified;Gait belt;Patient at risk for falls  Bed Mobility Training  Bed Mobility Training: Yes  Overall Level of Assistance: Maximum assistance  Interventions: Safety awareness training;Manual cues; Weight shifting training/pressure relief; Tactile cues; Verbal cues; Visual cues  Rolling: Maximum assistance  Supine to Sit: Maximum assistance  Sit to Supine: Maximum assistance  Scooting: Total assistance (to boost up to [facility] with hercules bed)  Balance  Sitting: Impaired (Poor sitting)  Sitting - Static: Poor (constant support); Supported sitting  Sitting - Dynamic: Poor (constant support)  Standing:  (unsafe to attempt this date)  Transfer Training  Transfer Training: No (unsafe to attempt sit to stand transfer, grossly max A/dependnt for sitting balance)        ADL  Feeding: Supervision; Beverage management  Grooming: Maximum assistance;Verbal cueing  UE Dressing: Maximum assistance  LE Dressing: Dependent/Total  Toileting: Dependent/Total  Additional Comments: Pt was able to use LUE yesterday as a gross assist. Today, pt had delayed and significantly impaired AROM in LUE. Poor sensory awareness. RN notified.      Activity Tolerance  Activity Tolerance: Patient limited by fatigue           Cognition  Overall Cognitive Status: Exceptions  Arousal/Alertness: Appropriate responses to stimuli  Following Commands: Follows one step commands with repetition  Attention Span: Attends with cues to redirect  Safety Judgement: Decreased awareness of need for safety  Problem Solving: Assistance required to correct errors made;Assistance required to identify errors made  Insights: Decreased awareness of deficits  Initiation: Requires cues for some  Sequencing: Requires cues for some       Exercise Treatment: Performed 10x each with pt in semi-fowlers  PROM Exercises: Performed AAROM exer for L shoulder and elbow. Pt had delayed L hand flex and partial extension. Limited by L perceptual impairment. Education Given To: Patient; Family  Education Provided: Role of Therapy;Plan of Care;ADL Adaptive Strategies; Home Exercise Program  Education Method: Verbal;Demonstration  Education Outcome: Verbalized understanding;Continued education needed     AM-PAC Score        AM-PAC Inpatient Daily Activity Raw Score: 10 (05/05/22 1635)  AM-PAC Inpatient ADL T-Scale Score : 27.31 (05/05/22 1635)  ADL Inpatient CMS 0-100% Score: 74.7 (05/05/22 1635)  ADL Inpatient CMS G-Code Modifier : CL (05/05/22 1635)    Goals  Short Term Goals  Time Frame for Short term goals: 1 week (5/11) unless noted  Short Term Goal 1: Perform functional transfers with SBA and RW--SERGIO 5/5 d/t poor balance  Short Term Goal 2: Perform toileting with min A-ongoing 5/5  Short Term Goal 3: Perform LUE exercises 15x each to improve ADL skills by 5/8-ongoing 5/5, AAROM/PROM 5/5  Short Term Goal 4: Locate 3/5 objects in L visual field with min cues-ongoing, max cues and unable to look past midline 5/5  Patient Goals   Patient goals : \"Go home\"       Therapy Time   Individual Concurrent Group Co-treatment   Time In 1507         Time Out 1545         Minutes 38         Timed Code Treatment Minutes: 45 Minutes    If pt is discharged prior to next OT session, this note will serve as the discharge summary.   Jem Ayon OT

## 2022-05-05 NOTE — PROGRESS NOTES
Physical Therapy  Facility/Department: [place] [address] - MED SURG  Physical Therapy Initial Assessment    Name: Daly Sifuentes  : 1950  MRN: [id number]  Date of Service: 2022    Discharge Recommendations:  IP Rehab   PT Equipment Recommendations  Equipment Needed: No (defer)      Patient Diagnosis(es): The primary encounter diagnosis was EDYTA (acute kidney injury) ([facility] [address] Diagnoses of Transaminitis and Traumatic rhabdomyolysis, initial encounter [facility]) were also pertinent to this visit. Past Medical History:  has a past medical history of Anemia, Arthritis, Asthma, Bowel dysfunction, CML (chronic myelocytic leukemia) ([facility] [address] Hyperlipidemia, Hypertension, Nuclear senile cataract of both eyes, Obesity, Risk of myocardial infarction or stroke 7.5% or greater in next 10 years, Skin cancer of face, Stroke (cerebrum) ([facility] [address] Thyroid disease, and TIA (transient ischemic attack). Past Surgical History:  has a past surgical history that includes Breast biopsy; fine needle aspiration; Breast lumpectomy; Tonsillectomy (Bilateral); Paracentesis; Anus surgery (); Elbow surgery (); and Colonoscopy. Assessment   Body Structures, Functions, Activity Limitations Requiring Skilled Therapeutic Intervention: Decreased functional mobility ; Decreased safe awareness;Decreased fine motor control;Decreased coordination;Decreased ROM; Decreased body mechanics; Decreased posture;Decreased balance;Decreased vision/visual deficit; Decreased strength  Assessment: Pt referred for PT evaluation during current hospital stay with a diagnosis of rhabdomyolysis. Pt is currently functioning below baseline, with new onset of  increased weakness and deficits since OT evaluation yesterday, and appears to be new CVA symptoms. Pt required max A for bed mobility and sitting balance due to weakness in LLE and pushing with RLE to left and posteriorly, unable to remain upright for more than a second with less than max A and max cues.   Pt would benefit from skilled acute PT to address deficits. Recommend IP rehab at [facility] from acute setting. Safety Device - Type of devices:  [x]  All fall risk precautions in place [x] Bed alarm in place  [x] Call light within reach [] Chair alarm in place [] Positioning belt [] Gait belt [x] Patient at risk for falls [x] Left in bed [] Left in chair [] Telesitter in use [] Sitter present [x] Nurse notified []  None    Treatment Diagnosis: impaired mobility  Therapy Prognosis: Good  Decision Making: Medium Complexity  Barriers to Learning: no  Requires PT Follow-Up: Yes  Activity Tolerance  Activity Tolerance: Patient limited by fatigue     Plan   Plan  Plan: 3-5 times per week  Current Treatment Recommendations: Strengthening,ROM,Balance training,Functional mobility training,Transfer training,Endurance training,Gait training,Therapeutic activities,Home exercise program  Safety Devices  Type of Devices: All fall risk precautions in place,Bed alarm in place,Call light within reach,Left in bed,Nurse notified,Gait belt,Patient at risk for falls  Restraints  Restraints Initially in Place: No     Restrictions  Restrictions/Precautions  Restrictions/Precautions: Fall Risk,Contact Precautions,Up as Tolerated  Position Activity Restriction  Other position/activity restrictions: Contact precautions--double glove d/t chemo tx.      Subjective   Pain: Pt reports 6/10 pain in abdomen, offered repositioning, pt satisfied and able to continue with therapy  General  Chart Reviewed: Yes  Patient assessed for rehabilitation services?: Yes  Response To Previous Treatment: Not applicable  Family / Caregiver Present: Yes (son)  Referring Practitioner: Zechariah Hogan MD  Referral Date : 05/04/22  Diagnosis: Rhabdomyolysis  Follows Commands: Within Functional Limits  General Comment  Comments: Pt resting in bed upon entry, RN cleared pt for therapy  Subjective  Subjective: Pt agreeable to PT         Social/Functional History  Social/Functional History  Lives With: Spouse  Type of Home: House  Home Layout: One level (Finished level with family room, computer and laundry room. Pt uses lower level often. Handrail on steps go 1/2 way down.)  Home Access: Stairs to enter without rails  Entrance Stairs - Number of Steps: 1  Bathroom Shower/Tub: Tub/Shower unit,Walk-in shower  Bathroom Toilet: Standard  Bathroom Equipment:  (no DME)  Home Equipment:  (no DME)  Has the patient had two or more falls in the past year or any fall with injury in the past year?: No (Pt reports multiple \"near falls\". )  ADL Assistance: Needs assistance (Pt has been sponge bathing with bath wipes. Spouse helps with bathing, dressing and toileting since CVA in 3/2022)  Homemaking Assistance: Needs assistance (Spouse has been performing IADLs since 3/2022.)  Ambulation Assistance: Needs assistance (Spouse holds her R arm for support as she walks.)  Transfer Assistance: Needs assistance  Active : No  Patient's  Info: Spouse drives. Pt was able to drive until CVA in 2/6120. Occupation: Retired  Type of Occupation: caretaker  Vision/Hearing  Vision Exceptions: Wears glasses at all times; Visual field cut  Hearing: Within functional limits    Cognition   Orientation  Overall Orientation Status: Within Functional Limits  Cognition  Overall Cognitive Status: Exceptions  Arousal/Alertness: Appropriate responses to stimuli  Following Commands:  Follows one step commands consistently  Attention Span: Attends with cues to redirect  Safety Judgement: Decreased awareness of need for safety  Problem Solving: Assistance required to correct errors made;Assistance required to identify errors made  Insights: Decreased awareness of deficits  Initiation: Requires cues for some  Sequencing: Requires cues for some     Objective   Pulse: 65  Heart Rate Source: Monitor  BP: (!) 150/53  BP Location: Left upper arm  BP Method: Automatic  Patient Position: Semi wlers  MAP (Calculated): 85.33  Resp: 16  SpO2: 95 %  O2 Device: None (Room air)     Observation/Palpation  Posture: Poor  Gross Assessment  AROM: Grossly decreased, non-functional (due to LLE weakness)  PROM: Within functional limits  Strength: Grossly decreased, non-functional (grossly 1 to 2/5 throughout)  Coordination: Grossly decreased, non-functional  Tone: Abnormal (decreased LLE)                 Bed Mobility Training  Bed Mobility Training: Yes  Overall Level of Assistance: Maximum assistance  Interventions: Safety awareness training;Manual cues; Weight shifting training/pressure relief; Tactile cues; Verbal cues; Visual cues  Rolling: Maximum assistance  Supine to Sit: Maximum assistance  Sit to Supine: Maximum assistance  Scooting:  Total assistance (to boost up to [address] with hercules bed)  Balance  Sitting: Impaired  Sitting - Static: Poor (constant support) (pt actively pushing to left with RUE and posteriorly at times, briefly able to maintain midline (approx 1 second) with Mita)  Sitting - Dynamic: Poor (constant support)  Standing:  (unsafe to attempt this date)  Transfer Training  Transfer Training: No (unsafe to attempt sit to stand transfer, grossly max A/dependnt for sitting balance)  Gait Training  Gait Training: No              Balance  Posture: Poor  Sitting - Static: Poor  Sitting - Dynamic: Poor  Exercise Treatment: x 10 B ankle pumps with decreased AROM LLE, heels slides max A LLE, LAQ with max A LLE, hip flexion with max A LLE          AM-PAC Score  AM-PAC Inpatient Mobility Raw Score : 8 (05/05/22 1546)  AM-PAC Inpatient T-Scale Score : 28.52 (05/05/22 1546)  Mobility Inpatient CMS 0-100% Score: 86.62 (05/05/22 1546)  Mobility Inpatient CMS G-Code Modifier : CM (05/05/22 1546)          Goals  Short Term Goals  Time Frame for Short term goals: 5/12/22 unless noted  Short term goal 1: Pt will perform bed mobility with mod A by 5/11/22  Short term goal 2: Pt will sit EOB for 5 min with grossly mod A for ADL's and ther ex  Short term goal 3: PT will assess and set goal for transfers when appropriate  Short term goal 4: PT will assess and set goal for gait when appropriate  Patient Goals   Patient goals : \"to be able to move my leg better\"       Therapy Time   Individual Concurrent Group Co-treatment   Time In 1333         Time Out 1407         Minutes 34         Timed Code Treatment Minutes: 24 Minutes (10 min eval)    If pt is discharged prior to next therapy session, this note will serve as discharge summary.     Joel Hewitt, PT

## 2022-05-05 NOTE — PROGRESS NOTES
Progress Note      PCP: Sherita Wallace MD    Date of Admission: 5/3/2022    Chief Complaint:   Patient presents with    Abdominal Pain     abd pain under right breast, pain with palpation.  Discuss Labs     fmd called told to come in due to liver and kidney failure lab results. Hospital Course:   Kris Gonzalez is a 66 y.o. female with PMH of CML following with Dr. Almita Amaya, HTN, HLD, hypothyroidism, who was recently diagnosed with a stroke when she was in [place] in March 2022 presented to the ED for abnormal lab results from blood test after a routine visit with PCP. Labs concerning for EDYTA and elevated liver enzymes. In [place], MRI confirmed acute infarct in right corona radiata and right parietal lobe, initiated on aspirin, plavix, and atorvastatin 80 mg. She left the hospital AMA to come back to [place] in April. Patient reports being compliant with medications since, but atorvastatin changed to rosuvastatin 40 mg. Pt reports left-sided weakness since the stroke, but weakness has worsened more recently. Pt stated that she was able to ambulate and perform her ADLs. Patient reports her urine had been very dark red more recently. Subjective:   Pt fall risk due to CVA, had a fall last night when her  tried to help her get out of bed. She was unable to sleep last night, but was conversational during initial evaluation today. She is more tired later in the morning. Pt is hungry but has difficulty eating with using utensils on her own. Speech pathology aided with cutting breakfast up, but patient reports being unable to feed herself. Denies fever, chills, cp, or sob. Continues with RUQ abdominal pain, patient without BM since Sunday and feels constipated. Urinating well with adequate hydration, reports urine is clearing up.       Medications:  Reviewed    Infusion Medications    sodium chloride      sodium chloride 150 mL/hr at 05/05/22 0136    dextrose       Scheduled Medications    famotidine  10 mg Oral Daily    nilotinib  200 mg Oral Q12H    aspirin  81 mg Oral Daily    clopidogrel  75 mg Oral Daily    carboxymethylcellulose PF  1 drop Both Eyes Q12H    levothyroxine  50 mcg Oral Daily    metoprolol succinate  25 mg Oral Daily    sodium chloride flush  5-40 mL IntraVENous 2 times per day    heparin (porcine)  5,000 Units SubCUTAneous TID    insulin lispro  0-6 Units SubCUTAneous TID WC    insulin lispro  0-3 Units SubCUTAneous Nightly     PRN Meds: sodium chloride flush, sodium chloride, ondansetron **OR** ondansetron, acetaminophen **OR** acetaminophen, glucose, glucagon (rDNA), dextrose, dextrose bolus (hypoglycemia) **OR** dextrose bolus (hypoglycemia)      Intake/Output Summary (Last 24 hours) at 5/5/2022 0817  Last data filed at 5/4/2022 1520  Gross per 24 hour   Intake 120 ml   Output 670 ml   Net -550 ml       Physical Exam Performed:    BP (!) 143/59   Pulse (!) 45   Temp 97.3 °F (36.3 °C) (Axillary)   Resp 16   SpO2 94%     General appearance: No apparent distress, appears stated age and cooperative. HEENT: Pupils equal, round, and reactive to light. Conjunctivae/corneas clear. Neck: Supple, with full range of motion. No jugular venous distention. Trachea midline. Respiratory:  Normal respiratory effort. Clear to auscultation, bilaterally without Rales/Wheezes/Rhonchi. Cardiovascular: Regular rate and rhythm with normal S1/S2 without murmurs, rubs or gallops. Abdomen: Soft, slightly tender in RUQ, non-distended with normal bowel sounds. Musculoskeletal: No clubbing, cyanosis or edema bilaterally. Full range of motion without deformity. Skin: Skin color, texture, turgor normal.  No rashes or lesions. Neurologic:  Left-sided facial droop noted, weakness of LE, L > R. Weakness of LUE with mild tremors. Speech is intelligible. L visual field cut, pt required multiple cues to follow directions regarding left side of body.    Psychiatric: Alert and oriented, thought content appropriate, normal insight  Capillary Refill: Brisk,3 seconds, normal   Peripheral Pulses: +2 palpable, equal bilaterally       Labs:   Recent Labs     05/03/22  1552   WBC 10.2   HGB 12.8   HCT 37.8        Recent Labs     05/03/22  1552 05/04/22  0540 05/05/22  0646    142 146*   K 3.4* 3.1* 3.4*    108 112*   CO2 28 25 25   BUN 50* 46* 38*   CREATININE 1.9* 2.1* 1.7*   CALCIUM 10.3 9.2 9.1   PHOS  --  3.1  --      Recent Labs     05/02/22  1336 05/03/22  1552 05/04/22  0539   * 335* 278*   * 205* 164*   BILIDIR  --   --  <0.2   BILITOT 0.9 0.9 0.7   ALKPHOS 129 129 92     No results for input(s): INR in the last 72 hours. Recent Labs     05/03/22  1552 05/03/22  1848 05/04/22  0118 05/04/22  0539 05/04/22  0540 05/05/22  0646   CKTOTAL 10,308*  --   --   --  8,215* 7,780*   TROPONINI  --  0.05* 0.05* 0.05*  --   --        Urinalysis:      Lab Results   Component Value Date    NITRU Negative 05/03/2022    WBCUA 3-5 05/03/2022    RBCUA 5-10 05/03/2022    BLOODU LARGE 05/03/2022    SPECGRAV 1.020 05/03/2022    GLUCOSEU Negative 05/03/2022       Radiology:  CT HEAD WO CONTRAST   Final Result   No acute intracranial abnormality. US GALLBLADDER RUQ   Final Result   No cholelithiasis or significant dilation of the common duct. Focal tenderness is noted over the gallbladder. Additional clinical   correlation for cholecystitis recommended. CT ABDOMEN PELVIS WO CONTRAST Additional Contrast? None   Final Result   No acute abdominopelvic abnormality. Trace pericardial and small bilateral pleural effusions.                  Assessment/Plan:    Active Hospital Problems    Diagnosis     Rhabdomyolysis [M62.82]      Priority: Medium    EDYTA (acute kidney injury) ([address] [N17.9]      Priority: Medium    Troponin level elevated [R77.8]      Priority: Medium    Cerebrovascular accident (CVA) ([facility] [address] [I63.9]      Priority: Medium    HTN (hypertension) [I10]     Hyperlipidemia [E78.5]     Acquired hypothyroidism [E03.9]      Slava Tompkins is a 66 y.o. female with PMH of CML following with Dr. Rose Marie Michel, HTN, HLD, hypothyroidism, who was recently diagnosed with a stroke when she was in [place] in March 2022 presented to the ED for abnormal lab results from blood test after a routine visit with PCP. Labs concerning for EDYTA and elevated liver enzymes. Rhabdomyolysis   - Likely statin-induced myopathy after recent initiation of high-intensity statin ~ 6 weeks ago. No history of trauma, falls or prolonged immobile state.   - UA showing large blood, but only 5-10 RBCs consistent with a urine myoglobinuria  - CPK elevated at 10,308, trending down and order dc'd  - Labs with EDYTA, elevated liver enzymes and troponinemia consistent with rhabdomyolysis (pt told that her kidney function was not normal when she was in [place])  - Aggressive IV fluids- 150 cc/ hr   - Monitor electrolytes, renal panel  - Discontinued statin     EDYTA (acute kidney injury)   - Baseline likely ~ 0.9.   - Likely ATN from pigment induced tubular injury from rhabdomyolysis  - Treatment as above  - Avoid nephrotoxic medications [hold statin, lisinopril, potassium]  - Monitor daily renal panel  - Nephrology consulted with Cr worsening to 2.1, but has improved with fluids     Troponin level elevated   - 2/2 rhabdomyolysis. - Denies anginal symptoms, no changes in EKG.  Troponin trend negative      Elevated liver enzymes  - 2/2 rhabdomyolysis  - Without hyperbilirubinemia.    - Addressed RUQ pain, US gallbladder unremarkable  - Monitor daily hepatic panel     Hx of stroke in March 2022  - Left AMA from [facility]  - Management per PCP, outpatient echo, carotid US ordered  - Continue DAPT  - Hold statin  - Slurred speech and extremity weakness of Left - improved   - PT/ OT/SLP dangelo     HLD  - At goal: 5/2/2022 lipid panel showed total cholesterol 139, LDL 64  - Continue to hold statin therapy and manage with diet control  - Consider adding fenofibrate/ezetimibe     DM type II   - Newly diagnosed, HbA1c 6.6 on 5/2/2022  - Low-dose SSI while inpatient  - If renal functions improve, consider discharging on metformin  - Diabetic educator referral     Hypothyroidism -continue Synthroid.  TSH slightly elevated but free T4 normal.     HTN   - Controlled  - Continue metoprolol for now, has been help due to low HR. These were done when asleep, will change hold parameters   holding lisinopril due to EDYTA and monitor BP     Hx of CML - on Tasigna daily currently on hold, followed by Dr. Prachi Archibald     Hypokalemia - replace       DVT Prophylaxis: heparin  Diet: ADULT DIET;  Regular; 3 carb choices (45 gm/meal)  Code Status: Full Code    PT/OT Eval Status: ordered    Dispo - 1-2 days pending improvement    Chichi Junior MD  PGY-1

## 2022-05-05 NOTE — CARE COORDINATION
CM met with pt. She is agreeable for referral to [address] [place] [address]. Cm contacted [address] [place] [address] with referral. Order has been placed.

## 2022-05-06 ENCOUNTER — APPOINTMENT (OUTPATIENT)
Dept: VASCULAR LAB | Age: 72
DRG: 682 | End: 2022-05-06
Payer: MEDICARE

## 2022-05-06 ENCOUNTER — APPOINTMENT (OUTPATIENT)
Dept: MRI IMAGING | Age: 72
DRG: 682 | End: 2022-05-06
Payer: MEDICARE

## 2022-05-06 PROBLEM — I63.231 ARTERIAL ISCHEMIC STROKE, ICA, RIGHT, ACUTE (HCC): Status: ACTIVE | Noted: 2022-05-02

## 2022-05-06 LAB
A/G RATIO: 1 (ref 1.1–2.2)
ALBUMIN SERPL-MCNC: 3.2 G/DL (ref 3.4–5)
ALP BLD-CCNC: 98 U/L (ref 40–129)
ALT SERPL-CCNC: 157 U/L (ref 10–40)
ANION GAP SERPL CALCULATED.3IONS-SCNC: 11 MMOL/L (ref 3–16)
ANISOCYTOSIS: ABNORMAL
AST SERPL-CCNC: 210 U/L (ref 15–37)
ATYPICAL LYMPHOCYTE RELATIVE PERCENT: 1 % (ref 0–6)
BANDED NEUTROPHILS RELATIVE PERCENT: 2 % (ref 0–7)
BASOPHILS ABSOLUTE: 0 K/UL (ref 0–0.2)
BASOPHILS RELATIVE PERCENT: 0 %
BILIRUB SERPL-MCNC: 0.6 MG/DL (ref 0–1)
BUN BLDV-MCNC: 30 MG/DL (ref 7–20)
CALCIUM SERPL-MCNC: 9.1 MG/DL (ref 8.3–10.6)
CHLORIDE BLD-SCNC: 110 MMOL/L (ref 99–110)
CO2: 24 MMOL/L (ref 21–32)
CREAT SERPL-MCNC: 1.5 MG/DL (ref 0.6–1.2)
EKG ATRIAL RATE: 58 BPM
EKG DIAGNOSIS: NORMAL
EKG P AXIS: 48 DEGREES
EKG P-R INTERVAL: 164 MS
EKG Q-T INTERVAL: 434 MS
EKG QRS DURATION: 78 MS
EKG QTC CALCULATION (BAZETT): 426 MS
EKG R AXIS: -13 DEGREES
EKG T AXIS: 78 DEGREES
EKG VENTRICULAR RATE: 58 BPM
EOSINOPHILS ABSOLUTE: 0.2 K/UL (ref 0–0.6)
EOSINOPHILS RELATIVE PERCENT: 2 %
GFR AFRICAN AMERICAN: 41
GFR NON-AFRICAN AMERICAN: 34
GLUCOSE BLD-MCNC: 105 MG/DL (ref 70–99)
GLUCOSE BLD-MCNC: 108 MG/DL (ref 70–99)
GLUCOSE BLD-MCNC: 140 MG/DL (ref 70–99)
GLUCOSE BLD-MCNC: 177 MG/DL (ref 70–99)
GLUCOSE BLD-MCNC: 78 MG/DL (ref 70–99)
HCT VFR BLD CALC: 33 % (ref 36–48)
HEMOGLOBIN: 11.1 G/DL (ref 12–16)
LYMPHOCYTES ABSOLUTE: 1.8 K/UL (ref 1–5.1)
LYMPHOCYTES RELATIVE PERCENT: 17 %
MAGNESIUM: 1.7 MG/DL (ref 1.8–2.4)
MCH RBC QN AUTO: 31.6 PG (ref 26–34)
MCHC RBC AUTO-ENTMCNC: 33.5 G/DL (ref 31–36)
MCV RBC AUTO: 94.3 FL (ref 80–100)
MISCELLANEOUS LAB TEST ORDER: NORMAL
MONOCYTES ABSOLUTE: 0.5 K/UL (ref 0–1.3)
MONOCYTES RELATIVE PERCENT: 5 %
NEUTROPHILS ABSOLUTE: 7.4 K/UL (ref 1.7–7.7)
NEUTROPHILS RELATIVE PERCENT: 73 %
PDW BLD-RTO: 13 % (ref 12.4–15.4)
PERFORMED ON: ABNORMAL
PERFORMED ON: NORMAL
PLATELET # BLD: 139 K/UL (ref 135–450)
PLATELET SLIDE REVIEW: ABNORMAL
PMV BLD AUTO: 10.5 FL (ref 5–10.5)
POIKILOCYTES: ABNORMAL
POTASSIUM REFLEX MAGNESIUM: 3.3 MMOL/L (ref 3.5–5.1)
RBC # BLD: 3.5 M/UL (ref 4–5.2)
SLIDE REVIEW: ABNORMAL
SODIUM BLD-SCNC: 145 MMOL/L (ref 136–145)
TOTAL CK: 3539 U/L (ref 26–192)
TOTAL PROTEIN: 6.5 G/DL (ref 6.4–8.2)
WBC # BLD: 9.9 K/UL (ref 4–11)

## 2022-05-06 PROCEDURE — 97112 NEUROMUSCULAR REEDUCATION: CPT

## 2022-05-06 PROCEDURE — 93005 ELECTROCARDIOGRAM TRACING: CPT | Performed by: STUDENT IN AN ORGANIZED HEALTH CARE EDUCATION/TRAINING PROGRAM

## 2022-05-06 PROCEDURE — 6370000000 HC RX 637 (ALT 250 FOR IP): Performed by: STUDENT IN AN ORGANIZED HEALTH CARE EDUCATION/TRAINING PROGRAM

## 2022-05-06 PROCEDURE — 83735 ASSAY OF MAGNESIUM: CPT

## 2022-05-06 PROCEDURE — 2700000000 HC OXYGEN THERAPY PER DAY

## 2022-05-06 PROCEDURE — 70551 MRI BRAIN STEM W/O DYE: CPT

## 2022-05-06 PROCEDURE — 97129 THER IVNTJ 1ST 15 MIN: CPT

## 2022-05-06 PROCEDURE — 6370000000 HC RX 637 (ALT 250 FOR IP): Performed by: INTERNAL MEDICINE

## 2022-05-06 PROCEDURE — 99223 1ST HOSP IP/OBS HIGH 75: CPT | Performed by: PSYCHIATRY & NEUROLOGY

## 2022-05-06 PROCEDURE — 97530 THERAPEUTIC ACTIVITIES: CPT

## 2022-05-06 PROCEDURE — 6360000002 HC RX W HCPCS: Performed by: INTERNAL MEDICINE

## 2022-05-06 PROCEDURE — 82550 ASSAY OF CK (CPK): CPT

## 2022-05-06 PROCEDURE — 6370000000 HC RX 637 (ALT 250 FOR IP)

## 2022-05-06 PROCEDURE — 1200000000 HC SEMI PRIVATE

## 2022-05-06 PROCEDURE — 85025 COMPLETE CBC W/AUTO DIFF WBC: CPT

## 2022-05-06 PROCEDURE — 93880 EXTRACRANIAL BILAT STUDY: CPT

## 2022-05-06 PROCEDURE — 92526 ORAL FUNCTION THERAPY: CPT

## 2022-05-06 PROCEDURE — 93010 ELECTROCARDIOGRAM REPORT: CPT | Performed by: INTERNAL MEDICINE

## 2022-05-06 PROCEDURE — 80053 COMPREHEN METABOLIC PANEL: CPT

## 2022-05-06 PROCEDURE — 93308 TTE F-UP OR LMTD: CPT

## 2022-05-06 PROCEDURE — 36415 COLL VENOUS BLD VENIPUNCTURE: CPT

## 2022-05-06 RX ORDER — LORAZEPAM 0.5 MG/1
0.5 TABLET ORAL
Status: COMPLETED | OUTPATIENT
Start: 2022-05-06 | End: 2022-05-06

## 2022-05-06 RX ORDER — NIFEDIPINE 30 MG/1
30 TABLET, EXTENDED RELEASE ORAL DAILY
Status: DISCONTINUED | OUTPATIENT
Start: 2022-05-06 | End: 2022-05-10 | Stop reason: HOSPADM

## 2022-05-06 RX ORDER — LANOLIN ALCOHOL/MO/W.PET/CERES
400 CREAM (GRAM) TOPICAL DAILY
Status: DISCONTINUED | OUTPATIENT
Start: 2022-05-06 | End: 2022-05-10 | Stop reason: HOSPADM

## 2022-05-06 RX ADMIN — METOPROLOL SUCCINATE 25 MG: 50 TABLET, EXTENDED RELEASE ORAL at 08:34

## 2022-05-06 RX ADMIN — CARBOXYMETHYLCELLULOSE SODIUM 1 DROP: 10 GEL OPHTHALMIC at 21:00

## 2022-05-06 RX ADMIN — HEPARIN SODIUM 5000 UNITS: 5000 INJECTION INTRAVENOUS; SUBCUTANEOUS at 08:35

## 2022-05-06 RX ADMIN — ASPIRIN 81 MG: 81 TABLET, COATED ORAL at 08:33

## 2022-05-06 RX ADMIN — LEVOTHYROXINE SODIUM 50 MCG: 0.05 TABLET ORAL at 08:34

## 2022-05-06 RX ADMIN — POLYETHYLENE GLYCOL 3350 17 G: 17 POWDER, FOR SOLUTION ORAL at 08:36

## 2022-05-06 RX ADMIN — POTASSIUM BICARBONATE 40 MEQ: 782 TABLET, EFFERVESCENT ORAL at 08:34

## 2022-05-06 RX ADMIN — FAMOTIDINE 10 MG: 20 TABLET, FILM COATED ORAL at 08:34

## 2022-05-06 RX ADMIN — Medication 400 MG: at 08:34

## 2022-05-06 RX ADMIN — INSULIN LISPRO 1 UNITS: 100 INJECTION, SOLUTION INTRAVENOUS; SUBCUTANEOUS at 21:16

## 2022-05-06 RX ADMIN — NIFEDIPINE 30 MG: 30 TABLET, FILM COATED, EXTENDED RELEASE ORAL at 13:09

## 2022-05-06 RX ADMIN — HEPARIN SODIUM 5000 UNITS: 5000 INJECTION INTRAVENOUS; SUBCUTANEOUS at 21:08

## 2022-05-06 RX ADMIN — ACETAMINOPHEN 650 MG: 325 TABLET ORAL at 20:06

## 2022-05-06 RX ADMIN — LORAZEPAM 0.5 MG: 0.5 TABLET ORAL at 13:41

## 2022-05-06 RX ADMIN — CARBOXYMETHYLCELLULOSE SODIUM 1 DROP: 10 GEL OPHTHALMIC at 08:35

## 2022-05-06 RX ADMIN — HEPARIN SODIUM 5000 UNITS: 5000 INJECTION INTRAVENOUS; SUBCUTANEOUS at 16:17

## 2022-05-06 RX ADMIN — CLOPIDOGREL BISULFATE 75 MG: 75 TABLET, FILM COATED ORAL at 08:34

## 2022-05-06 ASSESSMENT — PAIN DESCRIPTION - ORIENTATION
ORIENTATION: LEFT
ORIENTATION: LEFT

## 2022-05-06 ASSESSMENT — PAIN DESCRIPTION - DESCRIPTORS: DESCRIPTORS: ACHING

## 2022-05-06 ASSESSMENT — PAIN DESCRIPTION - LOCATION
LOCATION: ARM
LOCATION: ARM

## 2022-05-06 ASSESSMENT — PAIN SCALES - GENERAL
PAINLEVEL_OUTOF10: 3
PAINLEVEL_OUTOF10: 3
PAINLEVEL_OUTOF10: 8

## 2022-05-06 NOTE — PROGRESS NOTES
Occupational Therapy  Facility/Department: [facility]  - MED SURG  Treatment Note    Name: Alfie Wild  : 1950  MRN: [id number]  Date of Service: 2022    Discharge Recommendations:  IP Rehab          Patient Diagnosis(es): The primary encounter diagnosis was EDYTA (acute kidney injury) ([facility] [address] Diagnoses of Transaminitis and Traumatic rhabdomyolysis, initial encounter [facility]) were also pertinent to this visit. Past Medical History:  has a past medical history of Anemia, Arthritis, Asthma, Bowel dysfunction, CML (chronic myelocytic leukemia) ([facility] [address] Hyperlipidemia, Hypertension, Nuclear senile cataract of both eyes, Obesity, Risk of myocardial infarction or stroke 7.5% or greater in next 10 years, Skin cancer of face, Stroke (cerebrum) ([facility] [address] Thyroid disease, and TIA (transient ischemic attack). Past Surgical History:  has a past surgical history that includes Breast biopsy; fine needle aspiration; Breast lumpectomy; Tonsillectomy (Bilateral); Paracentesis; Anus surgery (); Elbow surgery (); and Colonoscopy. Assessment   Performance deficits / Impairments: Decreased functional mobility ; Decreased ADL status; Decreased safe awareness;Decreased cognition;Decreased balance;Decreased coordination;Decreased posture;Decreased endurance;Decreased vision/visual deficit; Decreased strength;Decreased fine motor control  Assessment: Pt admitted for rhabdomyolysis. She lives with spouse who has been her primary caregiver since she had a CVA in 3/2022. Pt did not receive therapy. Per pt, she requires extensive assist for ADLs and mobility. He holds her RUE as she ambulates in the house. Pt lacks L side visual perception. She is at high risk for falls and reports multiple \"near falls\". Prior to CVA, pt reports that she was independent. Highly recommend IP rehab to improve pt's level of function. cont OT in acute care.   Activity Tolerance  Activity Tolerance: Patient Tolerated treatment well        Plan Plan  Times per Week: 3-5x  Current Treatment Recommendations: Strengthening,ROM,Balance training,Functional mobility training,Endurance training,Safety education & training,Neuromuscular re-education,Patient/Caregiver education & training,Equipment evaluation, education, & procurement,Self-Care / ADL,Cognitive/Perceptual training     Restrictions  Restrictions/Precautions  Restrictions/Precautions: Fall Risk,Contact Precautions,Up as Tolerated  Position Activity Restriction  Other position/activity restrictions: Contact precautions--purewick, telemetry, 2 L O 2    Subjective   General  Chart Reviewed: Yes  Patient assessed for rehabilitation services?: Yes  Additional Pertinent Hx: Patient was admitted to a [facility] in [place] in late March with symptoms of slurred speech, CT scan showed old right basal ganglia stroke, this was confirmed on MRI with an acute infarct in the right corona radiata and right parietal lobe  Family / Caregiver Present: Yes (spouse)  Referring Practitioner: Rodney Mark  Diagnosis: rhabdomyolysis  Subjective  Subjective: \"don't let me fall\"  General Comment  Comments: RN cleared pt for OT              Objective   Pulse: 65  Heart Rate Source: Monitor  BP: (!) 176/66  BP Location: Right upper arm  BP Method: Automatic  MAP (Calculated): 102.67  Resp: 20  SpO2: 90 %  O2 Device: None (Room air)             Safety Devices  Type of Devices: All fall risk precautions in place; Bed alarm in place;Call light within reach; Left in bed;Nurse notified;Gait belt;Patient at risk for falls (assisted RN and transport with transfer to MRI stretcher)  Restraints  Restraints Initially in Place: No        AROM: Generally decreased, functional (RUE)  PROM: Within functional limits (LUE)  Coordination: Generally decreased, functional (no balance reaction/stabilization of LUE)  Tone: Abnormal (flaccid/low tone throughout LUE; WFL RUE)  Sensation: Impaired (absent light touch localization Left UE)  ADL  Grooming: Maximum assistance (to brush hair seated EOB)  UE Dressing: Maximum assistance  Toileting: Dependent/Total (purewick)        Bed mobility  Rolling to Left: Maximum assistance  Rolling to Right: Dependent/Total  Supine to Sit: 2 Person assistance (max assist of 2 to Left)  Sit to Supine: 2 Person assistance (max assist of 2)  Scooting: Dependent/Total (of 2 in supine to [facility])  Transfers  Transfer Comments: SERGIO due to poor static sitting balance(max assist for 12 minutes EOB)  Vision - Basic Assessment  Prior Vision: Wears glasses all the time  Visual Field Cut: Left  Cognition  Overall Cognitive Status: Exceptions  Arousal/Alertness: Appropriate responses to stimuli  Following Commands: Follows one step commands with repetition  Attention Span: Attends with cues to redirect  Memory: Decreased recall of precautions  Safety Judgement: Decreased awareness of need for safety;Decreased awareness of need for assistance  Problem Solving: Assistance required to correct errors made;Assistance required to identify errors made  Insights: Decreased awareness of deficits  Initiation: Requires cues for some  Sequencing: Requires cues for some  Perception  Overall Perceptual Status: Impaired  Unilateral Attention: Cues to attend left visual field;Cues to attend to left side of body;Cues to maintain midline in sitting            Exercise Treatment: PROM Left UE in sitting EOB  Education Given To: Patient; Family  Education Provided: Role of Therapy;Plan of Care;Precautions  Education Provided Comments: disease specific: importance of not using CBD products in  hospital without in hospital medical orders; use of RED/nurse call light for assist with positioning, ADL needs  Education Method: Verbal  Barriers to Learning: Vision;Cognition  Education Outcome: Verbalized understanding;Continued education needed           AM-PAC Score        AM-PAC Inpatient Daily Activity Raw Score: 10 (05/06/22 2252)  AM-PAC Inpatient ADL T-Scale Score : 27.31 (05/06/22 1446)  ADL Inpatient CMS 0-100% Score: 74.7 (05/06/22 1446)  ADL Inpatient CMS G-Code Modifier : CL (05/06/22 1446)    Goals  Short Term Goals  Time Frame for Short term goals: 1 week (5/11) unless noted  Short Term Goal 1: Perform functional transfers with SBA and RW--SERGIO 5/6 d/t poor static sitting balance  Short Term Goal 2: Perform toileting with min A-5/06 dependent  Short Term Goal 3: Perform LUE exercises 15x each to improve ADL skills by 5/8-5/06 dependent with PROM 10 reps distally LUE  Short Term Goal 4: Locate 3/5 objects in L visual field with min cues-5/06 max cues to attend to midline & Left of midline static sitting EOB  Patient Goals   Patient goals :  \"Go home\"       Therapy Time   Individual Concurrent Group Co-treatment   Time In 1320         Time Out 1350         Minutes [address] [place], [place]

## 2022-05-06 NOTE — PLAN OF CARE
Problem: Discharge Planning  Goal: Discharge to home or other facility with appropriate resources  Outcome: Not Progressing     Problem: Pain  Goal: Verbalizes/displays adequate comfort level or baseline comfort level  Outcome: Not Progressing     Problem: Safety - Adult  Goal: Free from fall injury  Outcome: Not Progressing     Problem: ABCDS Injury Assessment  Goal: Absence of physical injury  Outcome: Not Progressing     Problem: Skin/Tissue Integrity  Goal: Absence of new skin breakdown  Description: 1. Monitor for areas of redness and/or skin breakdown  2. Assess vascular access sites hourly  3. Every 4-6 hours minimum:  Change oxygen saturation probe site  4. Every 4-6 hours:  If on nasal continuous positive airway pressure, respiratory therapy assess nares and determine need for appliance change or resting period.   Outcome: Not Progressing     Problem: Chronic Conditions and Co-morbidities  Goal: Patient's chronic conditions and co-morbidity symptoms are monitored and maintained or improved  Outcome: Not Progressing

## 2022-05-06 NOTE — PROGRESS NOTES
Speech Language Pathology  Facility/Department: [facility] - MED SURG  Dysphagia Daily Treatment Note      Recommendations:  Solid Consistency: Soft and Bite Sized (IDDSI 6)  Liquid Consistency:  Thin liquids (IDDSI 0)  Medication: Meds whole or crushed in puree  · Pt benefits from cues for use of lingual sweep and liquid wash to clear possible left-sided pocketing with solid PO       NAME: Mel Balderas  : 1950  MRN: [id number]    Patient Diagnosis(es):   Patient Active Problem List    Diagnosis Date Noted    Elevated liver enzymes 2022    Type 2 diabetes mellitus with kidney complication, without long-term current use of insulin ([address] 2022    Rhabdomyolysis 2022    EDYTA (acute kidney injury) ([address] 2022    Troponin level elevated 2022    Neurogenic bladder as late effect of cerebrovascular accident (CVA) 2022    Chronic kidney disease, stage 3a ([address] 2022    Cerebrovascular accident (CVA) ([address] 2022    Hematuria 2022    Positive depression screening 2022    Bilateral primary osteoarthritis of knee 2021    Chronic pain of both knees 2021    HTN (hypertension)     Hx of actinic keratosis 2020    Anatomical narrow angle, bilateral 2019    Rectal bleeding     Polyp of colon     Anastomotic ulcer 2019    Campbell esophagus 2019    Cancer ([facility] [address] 2019    Hiatal hernia 2019    Hyperlipidemia 2019    Thyroid nodule 2019    History of skin cancer 05/10/2019    Acquired hypothyroidism 09/10/2018    Lower abdominal pain 2017    Adverse reaction to sulfa antibiotic 2017    Cellulitis of left lower extremity 10/21/2016    Localized edema 10/21/2016    Lower extremity edema 10/10/2016    Anemia 2016    Chemotherapy adverse reaction 2015    Pleural effusion 2015    CML (chronic myelocytic leukemia) ([address] 2015    Anxiety 2015  DNR (do not resuscitate) 02/26/2015    Family history of colon cancer 01/06/2015     Allergies: Allergies   Allergen Reactions    Latex Swelling and Rash     Rubber gloves    Mangifera Indica Anaphylaxis     Francisco-- Throat closes    Penicillin G Hives    Shellfish-Derived Products Hives    Sulfa Antibiotics Hives    Grapefruit Concentrate      Due to chemo    Omeprazole Other (See Comments)     Due to chemo    Shrimp Extract Allergy Skin Test Rash     Subjective: Pt seen upright in bed with breakfast tray present, alert and cooperative. Pain: no c/o pain    Current Diet: ADULT DIET; Dysphagia - Soft and Bite Sized; 3 carb choices (45 gm/meal)    Diet Tolerance:  Patient tolerating current diet level without signs/symptoms of penetration / aspiration per chart, pt. P.O. Trials: Thin   x Sips from water bottle   Nectar / Mildly Thick       Honey / Moderately Thick       Pudding / Extremely Thick       Puree       Solid   x Bites of scrambled egg     Dysphagia Treatment and Impressions:  Pt on 2 L O2 NC upon SLP entry with O2 sats 96%. RN present during session and removed NC, pt on RA. RN reported that pt's O2 sats were 88% earlier this AM.   Pt independently using lingual sweep upon SLP entry. She endorsed occasional left-sided pocketing when asked by SLP. Pt cued to utilize liquid wash as well. Moistened swab used after pt completing strategies to assess for residual-- trace noted in left-side of oral cavity. No clinical s/s of aspiration observed with PO trials. Minimal solid PO observed during today's session -- will continue to monitor and pt may require further solid downgrade. SLP wrote compensatory strategies of use of lingual sweep and liquid wash on pt's whiteboard in room. Pt verbalized understanding of all strategies, would benefit from continued reinforcement.    SLP also briefly reviewed several OMEs and encouraged daily completion of exercises to target reduced strength and ROM on left-side. SLP also discussed possible initiation of NMES in the future in addition to completion of OMEs -- she verbalized understanding. Dysphagia Goals:  Timeframe for Long-term Goals: 7 days (5/11/22)  Goal 1: Pt will demonstrate clinically safe swallow of regular textures and thin liquids with use of compensatory strategies as appropriate (reflux precautions, smaller meals, alternate bites/sips, small sips by cup). 05/06: ongoing, continue current diet. Short-term Goals  Timeframe for Short-term Goals: 5 days (5/9/22)  Goal 1: Pt will participate in cognitive-communication evaluation. GOAL MET 5/5  2) The patient will tolerate recommended diet without observed clinical signs of aspiration. 05/06: ongoing, see above  3) The patient will recall and perform compensatory strategies, with no cues. 05/06: pt would benefit from continued reinforcement    Speech/Language/Cog Goals:  Timeframe for Long-term Goals: 5 days (5/10/22)  Goal 1: The pt will effectively use compensatory visual strategies for improved attention to left side during structured tasks in 10/10 trials, min cues. 05/06: ongoing; SLP and pt discussed continued importance of scanning left to ID/locate items on breakfast tray, in room, etc.  SLP and pt also discussed rationale for SLP standing on pt's left side throughout treatment -- encourage pt to scan to left side. She verbalized understanding. She continued to benefit from intermittent cues during session to ID/locate target items. SLP encouraged continued ST during acute stay and after discharge. Short-term Goals  Timeframe for Short-term Goals: 3 days (5/8/22)  Goal 1: The pt will effectively use compensatory visual strategies for improved attention to left side during structured tasks in 8/10 trials, mod cues. 05/06: ongoing, see above. Goal 2: The pt will complete graded recall tasks with 90% accuracy, min cues.  05/06: indirectly targeted during session; pt stated that she had worked with PT/OT earlier this date, although SLP spoke with assigned OT/PT after tx session who have not yet seen pt. Pt able to independently recall being transferred to different room on floor, now located closer to nurses station. Goal 3: The pt will complete graded executive function tasks with 90% accuracy, min cues. 05/06: did not directly target      Recommendations:  Solid Consistency: Soft and Bite Sized (IDDSI 6)  Liquid Consistency: Thin liquids (IDDSI 0)  Medication: Meds whole or crushed in puree  · Pt benefits from cues for use of lingual sweep and liquid wash to clear possible left-sided pocketing with solid PO     Patient/Family/Caregiver Education:  See above    Compensatory Strategies:  HOB 90* and 30\" after meals; small bites/sips; alternate solids/liquids every 3-5 bites; oral care after every meal; check for possible left-sided pocketing; lingual sweep; set-up assistance; liquid wash     Plan:    Continued Dysphagia treatment with goals per plan of care. Discharge Recommendations: IP Rehab; per PT/OT recs    If pt discharges from hospital prior to Speech/Swallowing discharge, this note serves as tx and discharge summary.      Total Treatment Time / Charges     Time in Time out Total Time / units   Cognitive Tx  1100 1108 8 min / 1 unit   Speech Tx      Dysphagia Tx 1045 1100 15 min / 1 unit     Signature:  Jaki Hansen M.S. [id number] [facility]  Speech-language pathologist  [id number]  Phone: [phone] / [phone]

## 2022-05-06 NOTE — PROGRESS NOTES
Interval History and plan:      Cr getting better  K is low at 3.3  BP high  CK 7,780- repeat not available  Off of crestor    Plan:  Continue fluids  Add on CPK  Start nifedipine for low BP    K supplement ordered                   Assessment :     Acute Kidney Injury  EDYTA likely due to -rhabdomyolysis/poor p.o. intake  Cr on consultation 2.1  Baseline Cr-0.8 on 9/21  No recent baseline available-she was admitted to [facility] March 2022 with the stroke and was told that she has high creatinine    UA-5/22-large blood, trace LE  Renal Imaging:-5/22-right-10.7 cm, simple 2.2 cm right renal cyst  No mention of left kidney  Echo: 10/18-EF normal, no mention of diastolic dysfunction    Hypertension   BP: (165)/(58)  Pulse:  [56]   BP goal inpatient [phone] systolic inpatient    Rhabdomyolysis  CPK more than 10,000 on arrival-down to 8.2   Thought to be induced by statin  Has elevated AST and ALT    CML  Diabetes mellitus type 2 [facility] would like to thank Becki Sharp MD   for opportunity to serve this patient      Please call with questions at-   24 Hrs Answering service [phone] or  7 am- 5 pm via Perfect serve or cell phone  Sterling Archuleta MD          CC/reason for consult :     EDYTA     HPI :     Birgit Diamond is a 66 y.o. female presented to   the hospital on 5/3/2022 with abnormal lab result from blood test done by PCP the day before admission. Also complains of pain right upper quadrant. She had a basal ganglia stroke during March 2022 hospital for which she was admitted at [facility] from where she went [facility]. She has been taking Crestor 40 mg a day. Also complaining of lower extremity weakness. No falls are  In the ground for long time. No other complaints    She was found to have rhabdomyolysis and is getting fluids.   Her Crestor has been stopped  ROS:     Seen with-no family    positives in bold   Constitutional:  fever, chills, weakness, weight change, fatigue  Skin:  rash, pruritus, hair loss, bruising, dry skin, petechiae  Head, Face, Neck   headaches, swelling,  cervical adenopathy  Respiratory: shortness of breath, cough, or wheezing  Cardiovascular: chest pain, palpitations, dizzy, edema  Gastrointestinal: nausea, vomiting, diarrhea, constipation,belly pain    Yellow skin, blood in stool  Musculoskeletal:  back pain, muscle weakness, gait problems,       joint pain or swelling. Genitourinary:  dysuria, poor urine flow, flank pain, blood in urine  Neurologic:  vertigo, TIA'S, syncope, seizures, focal weakness  Psychosocial:  insomnia, anxiety, or depression. Additional positive findings:                    All other remaining systems are negative or unable to obtain        PMH/PSH/SH/Family History:     Past Medical History:   Diagnosis Date    Anemia     Arthritis     Asthma     Bowel dysfunction     CML (chronic myelocytic leukemia) ([facility] [address] 01/2006    leukemia    Hyperlipidemia     Hypertension     Nuclear senile cataract of both eyes 11/25/2019    Obesity     Risk of myocardial infarction or stroke 7.5% or greater in next 10 years 9/15/2021    Skin cancer of face     left cheek, squamous    Stroke (cerebrum) ([facility] [address]     Thyroid disease     TIA (transient ischemic attack)     mini ones-from chemo       Past Surgical History:   Procedure Laterality Date    ANUS SURGERY  1998    fissure    BREAST BIOPSY      BREAST LUMPECTOMY      benign    COLONOSCOPY      numerous polyps    ELBOW SURGERY  1960    removal of foreign body ([place])    FINE NEEDLE ASPIRATION      PARACENTESIS      x 2 fluid in lung from Chemo    TONSILLECTOMY Bilateral         reports that she has never smoked. She has never used smokeless tobacco. She reports that she does not drink alcohol and does not use drugs. family history includes Arrhythmia in her sister; Cancer in her brother, father, mother, and sister.          Medication:     Current Facility-Administered Medications: perflutren lipid microspheres (DEFINITY) injection 1.65 mg, 1.5 mL, IntraVENous, ONCE PRN  magnesium oxide (MAG-OX) tablet 400 mg, 400 mg, Oral, Daily  LORazepam (ATIVAN) tablet 0.5 mg, 0.5 mg, Oral, Once PRN  polyethylene glycol (GLYCOLAX) packet 17 g, 17 g, Oral, Daily  metoprolol succinate (TOPROL XL) extended release tablet 25 mg, 25 mg, Oral, Daily  famotidine (PEPCID) tablet 10 mg, 10 mg, Oral, Daily  nilotinib (TASIGNA) capsule 200 mg, 200 mg, Oral, Q12H  aspirin EC tablet 81 mg, 81 mg, Oral, Daily  clopidogrel (PLAVIX) tablet 75 mg, 75 mg, Oral, Daily  carboxymethylcellulose PF (THERATEARS) 1 % ophthalmic gel 1 drop, 1 drop, Both Eyes, Q12H  levothyroxine (SYNTHROID) tablet 50 mcg, 50 mcg, Oral, Daily  sodium chloride flush 0.9 % injection 5-40 mL, 5-40 mL, IntraVENous, 2 times per day  sodium chloride flush 0.9 % injection 5-40 mL, 5-40 mL, IntraVENous, PRN  0.9 % sodium chloride infusion, , IntraVENous, PRN  ondansetron (ZOFRAN-ODT) disintegrating tablet 4 mg, 4 mg, Oral, Q8H PRN **OR** ondansetron (ZOFRAN) injection 4 mg, 4 mg, IntraVENous, Q6H PRN  acetaminophen (TYLENOL) tablet 650 mg, 650 mg, Oral, Q6H PRN **OR** acetaminophen (TYLENOL) suppository 650 mg, 650 mg, Rectal, Q6H PRN  0.9 % sodium chloride infusion, , IntraVENous, Continuous  heparin (porcine) injection 5,000 Units, 5,000 Units, SubCUTAneous, TID  glucose (GLUTOSE) 40 % oral gel 15 g, 15 g, Oral, PRN  glucagon (rDNA) injection 1 mg, 1 mg, IntraMUSCular, PRN  dextrose 5 % solution, 100 mL/hr, IntraVENous, PRN  insulin lispro (HUMALOG) injection vial 0-6 Units, 0-6 Units, SubCUTAneous, TID WC  insulin lispro (HUMALOG) injection vial 0-3 Units, 0-3 Units, SubCUTAneous, Nightly  dextrose bolus (hypoglycemia) 10% 125 mL, 125 mL, IntraVENous, PRN **OR** dextrose bolus (hypoglycemia) 10% 250 mL, 250 mL, IntraVENous, PRN       Vitals :     Vitals:    05/06/22 0815   BP:    Pulse:    Resp:    Temp:    SpO2: 94%       I & O : Intake/Output Summary (Last 24 hours) at 5/6/2022 1154  Last data filed at 5/6/2022 1027  Gross per 24 hour   Intake 900 ml   Output 2075 ml   Net -1175 ml        Physical Examination :     General appearance: Anxious- no, distressed- no, in good spirits-  Yes  HEENT: Lips- normal, teeth- ok , oral mucosa- moist  Neck : Mass- no, appears symmetrical, JVD- not visible  Respiratory: Respiratory effort-  normal, wheeze- no, crackles -   Cardiovascular: Ausculation- No M/R/G, Edema none  Abdomen: visible mass- no, distention- no, scar- no, tenderness- no                            hepatosplenomegaly-  no  Musculoskeletal:  clubbing no,cyanosis- no , digital ischemia- no                           muscle strength- grossly normal , tone - grossly normal  Skin: rashes- no , ulcers- no, induration- no, tightening - no  Psychiatric:  Judgement and insight- normal           AAO X 3  Additional finding:      LABS:     Recent Labs     05/03/22  1552   WBC 10.2   HGB 12.8   HCT 37.8        Recent Labs     05/03/22  1552 05/03/22  1552 05/04/22  0540 05/05/22  0646 05/06/22  0639      < > 142 146* 145   K 3.4*  --  3.1* 3.4* 3.3*      < > 108 112* 110   CO2 28   < > 25 25 24   BUN 50*   < > 46* 38* 30*   CREATININE 1.9*   < > 2.1* 1.7* 1.5*   GLUCOSE 139*   < > 114* 120* 108*   MG 2.40  --   --  1.90 1.70*   PHOS  --   --  3.1  --   --     < > = values in this interval not displayed.

## 2022-05-06 NOTE — CONSULTS
Consult placed    Who:Dr. Ada [facility] AM        Electronically signed by Dario Oleary on 5/6/2022 at 8:07 AM

## 2022-05-06 NOTE — PROGRESS NOTES
Physical Therapy  Facility/Department: [facility] - MED SURG  Daily Treatment Note  NAME: Kim Curtis  : 1950  MRN: [id number]    Date of Service: 2022    Discharge Recommendations:  IP Rehab   PT Equipment Recommendations  Equipment Needed: No    Patient Diagnosis(es): The primary encounter diagnosis was EDYTA (acute kidney injury) ([facility] [address] Diagnoses of Transaminitis and Traumatic rhabdomyolysis, initial encounter [facility]) were also pertinent to this visit. Assessment   Assessment: Pt seen this date for follow up PT treatment. Pt continues to require mod-max(A)x2 for bed mobility and max(A) for sitting balance while participating in scanning task and reaching task. Pt would benefit from continued skilled PT to address deficits. Recommend IPR upon d/c  Activity Tolerance: Patient limited by fatigue  Equipment Needed: No    Disease Specific Education: Pt educated on importance of OOB mobility, prevention of complications of bedrest, and general safety during hospitalization. Pt verbalized understanding    Plan    Plan  Plan: 3-5 times per week  Current Treatment Recommendations: Strengthening;ROM;Balance training;Functional mobility training;Transfer training; Endurance training;Gait training; Therapeutic activities; Home exercise program     Restrictions  Restrictions/Precautions  Restrictions/Precautions: Fall Risk,Contact Precautions,Up as Tolerated  Position Activity Restriction  Other position/activity restrictions: Contact precautions--purewick, telemetry, 2 L O 2     Subjective    Subjective  Subjective: Pt supine in bed upon arrival, agreeable to PT.  at bedside  Pain: Pt reports 6/10 pain in abdomen and back  Orientation  Overall Orientation Status: Within Functional Limits  Cognition  Overall Cognitive Status: Exceptions  Arousal/Alertness: Appropriate responses to stimuli  Following Commands:  Follows one step commands with repetition  Attention Span: Attends with cues to redirect  Memory: Decreased recall of precautions  Safety Judgement: Decreased awareness of need for safety;Decreased awareness of need for assistance  Problem Solving: Assistance required to correct errors made;Assistance required to identify errors made  Insights: Decreased awareness of deficits  Initiation: Requires cues for some  Sequencing: Requires cues for some     Objective      Bed Mobility Training  Bed Mobility Training: Yes  Overall Level of Assistance: Maximum assistance (VC for attention to L side)  Interventions: Safety awareness training;Manual cues; Weight shifting training/pressure relief; Tactile cues; Verbal cues; Visual cues  Rolling: Maximum assistance  Supine to Sit: Maximum assistance; Moderate assistance;Assist X2  Sit to Supine: Maximum assistance; Moderate assistance;Assist X2  Scooting: Total assistance  Balance  Sitting: Impaired (max(A) for sitting balance)  Sitting - Static: Poor (constant support); Supported sitting        Other Specialty Interventions  Other Treatments/Modalities: Pt performed scanning to L for  and instructed to state number of fingers he's holding up. Pt continues to has L sided visual deficits and inattention/neglect.  Pt performed alternating reaching with RUE to target at shoulder height x10 reps at 90 degrees and x10 reps at 120 degrees     Safety Devices  Type of Devices: Nurse notified (Pt with transport at end of session)  Restraints  Restraints Initially in Place: No     [facility] 6 Clicks Inpatient Mobility:  AM-PAC Mobility Inpatient   How much difficulty turning over in bed?: A Lot  How much difficulty sitting down on / standing up from a chair with arms?: Unable  How much difficulty moving from lying on back to sitting on side of bed?: A Lot  How much help from another person moving to and from a bed to a chair?: Total  How much help from another person needed to walk in hospital room?: Total  How much help from another person for climbing 3-5 steps with a railing?: Total  AM-PAC Inpatient Mobility Raw Score : 8  AM-PAC Inpatient T-Scale Score : 28.52  Mobility Inpatient CMS 0-100% Score: 86.62  Mobility Inpatient CMS G-Code Modifier : CM      Goals  Short Term Goals  Time Frame for Short term goals: 5/12/22 unless noted  Short term goal 1: Pt will perform bed mobility with mod A by 5/11/22  Short term goal 2: Pt will sit EOB for 5 min with grossly mod A for ADL's and ther ex  Short term goal 3: PT will assess and set goal for transfers when appropriate  Short term goal 4: PT will assess and set goal for gait when appropriate  Patient Goals   Patient goals : \"to be able to move my leg better\"      Therapy Time   Individual Concurrent Group Co-treatment   Time In 1320         Time Out 1348         Minutes 28         Timed Code Treatment Minutes: 28 Minutes     If pt is unable to be seen after this session, please let this note serve as discharge summary. Please see case management note for discharge disposition. Thank you.     Uvaldo Lou, PT
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
## 2022-05-06 NOTE — PROGRESS NOTES
Progress Note      PCP: Nonda Goodell, MD    Date of Admission: 5/3/2022    Chief Complaint:   Patient presents with    Abdominal Pain     abd pain under right breast, pain with palpation.  Discuss Labs     fmd called told to come in due to liver and kidney failure lab results. Hospital Course:   Kim Curtis is a 66 y.o. female with PMH of CML following with Dr. Bess Russ, HTN, HLD, hypothyroidism, who was recently diagnosed with a stroke when she was in [place] in March 2022 presented to the ED for abnormal lab results from blood test after a routine visit with PCP. Labs concerning for EDYTA and elevated liver enzymes. In [place], MRI confirmed acute infarct in right corona radiata and right parietal lobe, initiated on aspirin, plavix, and atorvastatin 80 mg. She left the hospital AM to come back to [place] in April. Patient reports being compliant with medications since, but atorvastatin changed to rosuvastatin 40 mg. Pt reports left-sided weakness since the stroke, but weakness has worsened more recently. Pt stated that she was able to ambulate and perform her ADLs. Patient reports her urine had been very dark red more recently. Subjective:   Usually recovers quickly from strokes and hopes this is the case again. Symptoms acutely worsened on Sunday constipation, abdominal pain and lower extremity progressive weakening. Painful and distressing to attempt to move lower extremity.     Medications:  Reviewed    Infusion Medications    sodium chloride      sodium chloride 100 mL/hr at 05/05/22 2123    dextrose       Scheduled Medications    potassium bicarb-citric acid  40 mEq Oral Once    magnesium oxide  400 mg Oral Daily    polyethylene glycol  17 g Oral Daily    metoprolol succinate  25 mg Oral Daily    famotidine  10 mg Oral Daily    nilotinib  200 mg Oral Q12H    aspirin  81 mg Oral Daily    clopidogrel  75 mg Oral Daily    carboxymethylcellulose PF  1 drop Both Eyes Q12H    levothyroxine 50 mcg Oral Daily    sodium chloride flush  5-40 mL IntraVENous 2 times per day    heparin (porcine)  5,000 Units SubCUTAneous TID    insulin lispro  0-6 Units SubCUTAneous TID WC    insulin lispro  0-3 Units SubCUTAneous Nightly     PRN Meds: perflutren lipid microspheres, sodium chloride flush, sodium chloride, ondansetron **OR** ondansetron, acetaminophen **OR** acetaminophen, glucose, glucagon (rDNA), dextrose, dextrose bolus **OR** dextrose bolus      Intake/Output Summary (Last 24 hours) at 5/6/2022 0801  Last data filed at 5/6/2022 0657  Gross per 24 hour   Intake 780 ml   Output 1675 ml   Net -895 ml       Physical Exam Performed:    BP (!) 165/62   Pulse 53   Temp 98.3 °F (36.8 °C) (Oral)   Resp 15   SpO2 93%     General appearance: No apparent distress, appears stated age and cooperative. HEENT: Pupils equal, round, and reactive to light. Conjunctivae/corneas clear. Neck: Supple, with full range of motion. No jugular venous distention. Trachea midline. Respiratory:  Normal respiratory effort. Clear to auscultation, bilaterally without Rales/Wheezes/Rhonchi. Cardiovascular: Regular rate and rhythm with normal S1/S2 without murmurs, rubs or gallops. Abdomen: Soft, slightly tender in RUQ, non-distended with normal bowel sounds. Musculoskeletal: No clubbing, cyanosis or edema bilaterally. Skin: Skin color, texture, turgor normal.  No rashes or lesions. Neurologic:  Left-sided facial droop noted, complete loss of motor and sensation of the left upper and lower extremity.  Loss of left visual field   Psychiatric: Alert and oriented, thought content appropriate, normal insight, tearful affect  Capillary Refill: Brisk,3 seconds, normal   Peripheral Pulses: +2 palpable, equal bilaterally       Labs:   Recent Labs     05/03/22  1552   WBC 10.2   HGB 12.8   HCT 37.8        Recent Labs     05/03/22  1552 05/03/22  1552 05/04/22  0540 05/05/22  0646 05/06/22  0639      < > 142 146* 145   K 3.4*  --  3.1* 3.4* 3.3*      < > 108 112* 110   CO2 28   < > 25 25 24   BUN 50*  --  46* 38*  --    CREATININE 1.9*   < > 2.1* 1.7* 1.5*   CALCIUM 10.3   < > 9.2 9.1 9.1   PHOS  --   --  3.1  --   --     < > = values in this interval not displayed. Recent Labs     05/04/22  0539 05/05/22  0646 05/06/22  0639   * 282* 210*   * 173* 157*   BILIDIR <0.2 <0.2  --    BILITOT 0.7 0.4 0.6   ALKPHOS 92 94 98     No results for input(s): INR in the last 72 hours. Recent Labs     05/03/22  1552 05/03/22  1848 05/04/22  0118 05/04/22  0539 05/04/22  0540 05/05/22  0646   CKTOTAL 10,308*  --   --   --  8,215* 7,780*   TROPONINI  --  0.05* 0.05* 0.05*  --   --        Urinalysis:      Lab Results   Component Value Date    NITRU Negative 05/03/2022    WBCUA 3-5 05/03/2022    RBCUA 5-10 05/03/2022    BLOODU LARGE 05/03/2022    SPECGRAV 1.020 05/03/2022    GLUCOSEU Negative 05/03/2022       Radiology:  CT HEAD WO CONTRAST   Final Result   1. Increased conspicuity of ill-defined hypodensity in the right frontal   corona radiata white matter concerning for evolving acute to subacute   ischemic infarct. 2. Additional ill-defined hypodense foci in the anterior right frontal and   right parietal white matter are unchanged and are consistent with infarcts of   indeterminate chronicity. 3. No intracranial hemorrhage. 4. Chronic appearing lacunar infarcts in the right basal ganglia and right   ken. RECOMMENDATIONS:   MRI of the brain. CT HEAD WO CONTRAST   Final Result   No acute intracranial abnormality. US GALLBLADDER RUQ   Final Result   No cholelithiasis or significant dilation of the common duct. Focal tenderness is noted over the gallbladder. Additional clinical   correlation for cholecystitis recommended. CT ABDOMEN PELVIS WO CONTRAST Additional Contrast? None   Final Result   No acute abdominopelvic abnormality.       Trace pericardial and small bilateral pleural effusions. MRI BRAIN WO CONTRAST    (Results Pending)   VL DUP CAROTID BILATERAL    (Results Pending)           Assessment/Plan:    Active Hospital Problems    Diagnosis     Elevated liver enzymes [R74.8]      Priority: Medium    Type 2 diabetes mellitus with kidney complication, without long-term current use of insulin (HCC) [E11.29]      Priority: Medium    Rhabdomyolysis [M62.82]      Priority: Medium    EDYTA (acute kidney injury) ([facility] [address] [N17.9]      Priority: Medium    Troponin level elevated [R77.8]      Priority: Medium    Cerebrovascular accident (CVA) ([facility] [address] [I63.9]      Priority: Medium    HTN (hypertension) [I10]     Hyperlipidemia [E78.5]     Acquired hypothyroidism [E03.9]      Nicole Flores is a 66 y.o. female with PMH of CML and stroke who was sent from PCP office for abnormal labs and was admitted for rhabdomyolysis suspected to be secondary to statin initiation    1. Acute ischemic stroke, new FND, recent stroke in march MRI, CUS, ECHO, neuro following   2. Rhabdomyolysis - decreased fluids from 150 to 100 per nephrology, CPK downtrending  3. EDYTA - downtrending  Baseline likely ~ 0.9. - Avoid nephrotoxic medications [hold statin, lisinopril, potassium]  4. Hypokalemia, low mg - replaced   5. Hypertension - Holding lisinopril due to EDYTA and monitor BP, Continue metoprolol with hold parameters start nifedipine today  6. Troponin level elevated but stable- Denies anginal symptoms, no changes in EKG. Troponin trend negative   7. Elevated liver enzymes - improving, Addressed RUQ pain, US gallbladder unremarkable  8. DM type II - Newly diagnosed, HbA1c 6.6 on 5/2/2022 Low-dose SSI while inpatient  9. Hypothyroidism -continue Synthroid.  TSH slightly elevated but free T4 normal.  10. CML - on nilotinib daily at home currently on hold, followed by Dr. Doug Whitehead           DVT Prophylaxis: heparin  Diet: ADULT DIET;  Dysphagia - Soft and Bite Sized; 3 carb choices (45 gm/meal)  Code Status: Full Code    PT/OT Eval Status: Inpatient rehab    Dispo - 1-2 days pending improvement    Gabriel Orozco DO  PGY-1

## 2022-05-06 NOTE — CONSULTS
In patient Neurology consult        [facility] Neurology      MD Spencer Zhu  1950    Date of Service: 5/6/2022    Referring Physician: Brandi Faulkner MD      Reason for the consult and CC: Acute CVA    HPI:   The patient is a 66 y.o.   female, with a PMH of HTN, HLD, recent right corona radiate and and parietal lobe stroke, who presented to [facility] with elevated Cr and liver enzymes. The patient recently was in [place] and had stroke leaving her with left sided weakness. She left the hospital [facility] and returned home to [place] in April. She complained of dysarthria and vision changes, but was able to perform her ADLs. She was sent to the ED by her PCP after having hospital f/u with lab work. Her Cr was noted to be 1.9 and her liver enzymes were elevated. Apparently, she fell in her room yesterday and a stat head CT was obtained, which revealed a possible right frontal corona radiata stroke. [facility] Stroke team was notified as well. At the time of my exam, she has left-sided neglect and dysarthria.       Family History   Problem Relation Age of Onset   Guerra Cancer Mother         non hodgkins lymphoma    Cancer Father         colon    Cancer Sister         colon    Cancer Brother         mesothelioma    Arrhythmia Sister      Past Surgical History:   Procedure Laterality Date    ANUS SURGERY  1998    fissure    BREAST BIOPSY      BREAST LUMPECTOMY      benign    COLONOSCOPY      numerous polyps    ELBOW SURGERY  1960    removal of foreign body ([place]    FINE NEEDLE ASPIRATION      PARACENTESIS      x 2 fluid in lung from Chemo    TONSILLECTOMY Bilateral         Past Medical History:   Diagnosis Date    Anemia     Arthritis     Asthma     Bowel dysfunction     CML [facility]chronic myelocytic leukemia) ([facility] [address] 01/2006    leukemia    Hyperlipidemia     Hypertension     Nuclear senile cataract of both eyes 11/25/2019    Obesity     Risk of myocardial infarction or stroke 7.5% or greater in next 10 years 9/15/2021    Skin cancer of face     left cheek, squamous    Stroke (cerebrum) (HCC)     Thyroid disease     TIA (transient ischemic attack)     mini ones-from chemo     Social History     Tobacco Use    Smoking status: Never Smoker    Smokeless tobacco: Never Used   Vaping Use    Vaping Use: Never used   Substance Use Topics    Alcohol use: No    Drug use: No     Allergies   Allergen Reactions    Latex Swelling and Rash     Rubber gloves    Mangifera Indica Anaphylaxis     [place]-- Throat closes    Penicillin G Hives    Shellfish-Derived Products Hives    Sulfa Antibiotics Hives    Grapefruit Concentrate      Due to chemo    Omeprazole Other (See Comments)     Due to chemo    Shrimp Extract Allergy Skin Test Rash     Current Facility-Administered Medications   Medication Dose Route Frequency Provider Last Rate Last Admin    perflutren lipid microspheres (DEFINITY) injection 1.65 mg  1.5 mL IntraVENous ONCE PRN Kimberly Kwan MD        magnesium oxide (MAG-OX) tablet 400 mg  400 mg Oral Daily Kimberly Kwan MD   400 mg at 05/06/22 0834    LORazepam (ATIVAN) tablet 0.5 mg  0.5 mg Oral Once PRN Kimberly Kwan MD        polyethylene glycol (GLYCOLAX) packet 17 g  17 g Oral Daily Aye Sharma MD   17 g at 05/06/22 0836    metoprolol succinate (TOPROL XL) extended release tablet 25 mg  25 mg Oral Daily Kimberly Kwan MD   25 mg at 05/06/22 0834    famotidine (PEPCID) tablet 10 mg  10 mg Oral Daily Leon Corrales MD   10 mg at 05/06/22 0834    nilotinib (TASIGNA) capsule 200 mg  200 mg Oral Q12H DANTE Cade CNP   200 mg at 05/05/22 2342    aspirin EC tablet 81 mg  81 mg Oral Daily Deidre Apodaca MD   81 mg at 05/06/22 6011    clopidogrel (PLAVIX) tablet 75 mg  75 mg Oral Daily Deidre Apodaca MD   75 mg at 05/06/22 0834    carboxymethylcellulose PF (THERATEARS) 1 % ophthalmic gel 1 drop  1 drop Both Eyes Q12H Deidre Apodaca MD   1 drop at 05/06/22 0835    levothyroxine (SYNTHROID) tablet 50 mcg  50 mcg Oral Daily Arron Cooney MD   50 mcg at 05/06/22 0834    sodium chloride flush 0.9 % injection 5-40 mL  5-40 mL IntraVENous 2 times per day Arron Cooney MD   10 mL at 05/05/22 2136    sodium chloride flush 0.9 % injection 5-40 mL  5-40 mL IntraVENous PRN Arron Cooney MD        0.9 % sodium chloride infusion   IntraVENous PRN Arron Cooney MD        ondansetron (ZOFRAN-ODT) disintegrating tablet 4 mg  4 mg Oral Q8H PRN Arron Cooney MD        Or    ondansetron (ZOFRAN) injection 4 mg  4 mg IntraVENous Q6H PRN Arron Cooney MD        acetaminophen (TYLENOL) tablet 650 mg  650 mg Oral Q6H PRN Arron Cooney MD   650 mg at 05/05/22 2149    Or    acetaminophen (TYLENOL) suppository 650 mg  650 mg Rectal Q6H PRN Arron Cooney MD        0.9 % sodium chloride infusion   IntraVENous Continuous Ezalesia Mederos  mL/hr at 05/05/22 2123 New Bag at 05/05/22 2123    heparin (porcine) injection 5,000 Units  5,000 Units SubCUTAneous TID Arron Cooney MD   5,000 Units at 05/06/22 0835    glucose (GLUTOSE) 40 % oral gel 15 g  15 g Oral PRN Arron Cooney MD        glucagon (rDNA) injection 1 mg  1 mg IntraMUSCular PRN Arron Cooney MD        dextrose 5 % solution  100 mL/hr IntraVENous PRN Arron Cooney MD        insulin lispro (HUMALOG) injection vial 0-6 Units  0-6 Units SubCUTAneous TID WC Arron Cooney MD   3 Units at 05/05/22 1315    insulin lispro (HUMALOG) injection vial 0-3 Units  0-3 Units SubCUTAneous Nightly Arron Cooney MD        dextrose bolus (hypoglycemia) 10% 125 mL  125 mL IntraVENous PRN Arron Cooney MD        Or    dextrose bolus (hypoglycemia) 10% 250 mL  250 mL IntraVENous PRN Arron Cooney MD           ROS : A 10-14 system review of constitutional, cardiovascular, respiratory, eyes, musculoskeletal, endocrine, GI, ENT, skin, hematological, genitourinary, psychiatric and neurologic systems was obtained and updated today and is unremarkable except as mentioned in my HPI      Exam:     Constitutional:   Vitals:    05/05/22 1934 05/05/22 2345 05/06/22 0814 05/06/22 0815   BP: (!) 163/61 (!) 165/62 (!) 165/58    Pulse: (!) 45 53 56    Resp: 17 15 16    Temp: 98.9 °F (37.2 °C) 98.3 °F (36.8 °C) 100 °F (37.8 °C)    TempSrc: Oral Oral Oral    SpO2: 93% 93% (!) 88% 94%       General appearance and observation: Normal development and appear in no acute distress. Neck: supple  Cardiovascular: No lower leg edema with good pulsation. Mental Status:   Oriented to person, place, problem, and time. Memory: Good immediate recall. Intact remote memory  Normal attention span and concentration. Language: intact naming, repeating and fluency   Good fund of Knowledge. Aware of current events and vocabulary   Cranial Nerves:   II: Visual fields: Left visual field neglect. Pupils: equal, round, reactive to light  III,IV,VI: Extra Ocular Movements are intact. No nystagmus  V: Facial sensation left facial paraesthesias  VII: Facial strength and movements: left sided facial droop  VIII: Hearing: Intact  IX: Palate elevation is symmetric  XI: Shoulder shrug is intact  XII: Tongue movements are normal  Musculoskeletal: 1/5 LUE and LLE. Tone: Normal tone. Planters: flexor bilaterally. Coordination: Unable to perform pronator or FTN on left.   Sensation: left sided paraesthesias  Gait/Posture: unable to test gait    Data:  LABS:   Lab Results   Component Value Date     05/06/2022    K 3.3 05/06/2022     05/06/2022    CO2 24 05/06/2022    BUN 30 05/06/2022    CREATININE 1.5 05/06/2022    GFRAA 41 05/06/2022    LABGLOM 34 05/06/2022    GLUCOSE 108 05/06/2022    GLUCOSE 129 07/26/2017    PHOS 3.1 05/04/2022    MG 1.70 05/06/2022    CALCIUM 9.1 05/06/2022     Lab Results   Component Value Date    WBC 10.2 05/03/2022 RBC 4.04 05/03/2022    RBC 3.65 08/09/2017    HGB 12.8 05/03/2022    HCT 37.8 05/03/2022    MCV 93.6 05/03/2022    RDW 13.0 05/03/2022     05/03/2022   No results found for: INR, PROTIME    Neuroimaging was independently reviewed by myself and discussed results with the patient and family  Reviewed notes from different physicians  Reviewed lab and blood testing    Impression:    Recent right corona radiata and parietal lobe infarct and concern for new/worsening infarct. HTN, uncontrolled. HLD  Rhabdomyolysis - possibly due to statin. EDYTA    Recommendation:     MRI of brain, CUS, and ECHO ordered. Monitor on tele. Continue ASA, Plavix. Statin on hold for rhabdo/transaminitis. Continue home BP meds. Goal inpatient 160-180. F/u A1c, lipid panel. Continue IVF  PT/OT/SLP  Follow CPK and Cr. Thank you for referring such patient. If you have any questions regarding my consult note, please don't hesitate to call me. Rose Marie Boswell CNP    Attending Supervising [de-identified] Attestation Statement      The patient was seen 5/6/2022 in conjunction with the nurse practitioner with independent history, evaluation and examination. I agree with the note which has been adjusted to reflect my findings, with the addition of the following: The patient is 66 y.o.  female  who was admitted for acute left-sided weakness and possible new stroke. Onset was 2 to 3 days ago. Description sudden dysarthria left-sided weakness. Degree was severe. Duration was persistent. No other relieving or aggravating factors. Patient left AMA few days ago. She came back because of fix-it deficit. She was [facility]. Further work-up revealed acute right ischemic MCA with mild effacement. She was taking aspirin at home. Blood pressure today remains elevated. On examination:  No acute distress  Awake and alert x3. Fluent speech.   Appears appropriate with intact recent and remote memory. Pupil reactive and symmetric, extraocular motor intact, no ophthalmoplegia, face is symmetric and tongue is midline   left-sided weakness 2+/5 compared to right  Left drift  Left-sided neglect  Normal tone  Gait cannot be tested    Impression:   Acute right ischemic MCA stroke with residual deficit. Thromboembolic versus cardioembolic  Hypertension, not controlled  Hyperlipidemia  rhabdomyolysis, could be traumatic versus drug-induced  Acute kidney injury  DM    Recommendation:  MRI results discussed with the patient  PT, OT and speech  Hydration follow CK  Aspirin  Hold statin  Blood pressure control. Goal inpatient 160/90  Stroke education was provided today in details with the patient and her   Will need inpatient rehab  Telemetry  DVT and GI prophylaxis  Insulin sliding scale  Blood sugar monitor  A1c  Lipid panel  Echo     MDM: High        Electronically signed by Lindon Halsted, MD on 5/6/22 at 4:15 PM EDT        This dictation was generated by voice recognition computer software.  Although all attempts are made to edit the dictation for accuracy, there may be errors in the  transcription that are not intended

## 2022-05-06 NOTE — CARE COORDINATION
[facility] - Acute Rehab Unit   After review, this patient is felt to be:       []  Appropriate for Acute Inpatient Rehab    []  Appropriate for Acute Inpatient Rehab Pending Insurance Authorization    []  Not appropriate for Acute Inpatient Rehab    [x]  Referral received and ARU reviewing patient; Evaluation ongoing. ARU continues to evaluate patient. MRI pending. Will need MRI and Echo completed before admission to ARU if accepted. Will notify DCP with further updates.  Thank you for the referral.    Chastity Saldivar MPH, RRT  Clinical Liaison [address] [place] [address]  (j)[phone] (h)[phone]   Electronically signed by Chastity Saldivar on 5/6/2022 at 10:34 AM

## 2022-05-06 NOTE — PROGRESS NOTES
CTH findings from today's scan were reviewed by Dr. Chrystal Bradshaw, noted area of likely new stroke, onset >24h, instructed me to contact  Stroke Team to discuss patient's case and new CT findings to ensure best suitable plan has been implement. Spoke with Dr. Renetta Ha who did not recommend anything further aside from DAPT if tolerable and continue with MRI brain tomorrow. He also noted he will review scans from today and yesterday and will reach out to me if he feels further intervention is necessary.

## 2022-05-07 ENCOUNTER — APPOINTMENT (OUTPATIENT)
Dept: GENERAL RADIOLOGY | Age: 72
DRG: 682 | End: 2022-05-07
Payer: MEDICARE

## 2022-05-07 LAB
A/G RATIO: 1 (ref 1.1–2.2)
ALBUMIN SERPL-MCNC: 3.2 G/DL (ref 3.4–5)
ALP BLD-CCNC: 89 U/L (ref 40–129)
ALT SERPL-CCNC: 144 U/L (ref 10–40)
ANION GAP SERPL CALCULATED.3IONS-SCNC: 13 MMOL/L (ref 3–16)
AST SERPL-CCNC: 150 U/L (ref 15–37)
BILIRUB SERPL-MCNC: 0.9 MG/DL (ref 0–1)
BUN BLDV-MCNC: 24 MG/DL (ref 7–20)
CALCIUM SERPL-MCNC: 8.5 MG/DL (ref 8.3–10.6)
CHLORIDE BLD-SCNC: 103 MMOL/L (ref 99–110)
CO2: 26 MMOL/L (ref 21–32)
CREAT SERPL-MCNC: 1.3 MG/DL (ref 0.6–1.2)
GFR AFRICAN AMERICAN: 49
GFR NON-AFRICAN AMERICAN: 40
GLUCOSE BLD-MCNC: 120 MG/DL (ref 70–99)
GLUCOSE BLD-MCNC: 130 MG/DL (ref 70–99)
GLUCOSE BLD-MCNC: 170 MG/DL (ref 70–99)
GLUCOSE BLD-MCNC: 207 MG/DL (ref 70–99)
GLUCOSE BLD-MCNC: 97 MG/DL (ref 70–99)
HCT VFR BLD CALC: 32.9 % (ref 36–48)
HEMOGLOBIN: 11.3 G/DL (ref 12–16)
MAGNESIUM: 1.6 MG/DL (ref 1.8–2.4)
PERFORMED ON: ABNORMAL
PERFORMED ON: NORMAL
POTASSIUM REFLEX MAGNESIUM: 3.4 MMOL/L (ref 3.5–5.1)
RAPID INFLUENZA  B AGN: NEGATIVE
RAPID INFLUENZA A AGN: NEGATIVE
SARS-COV-2, NAAT: NOT DETECTED
SODIUM BLD-SCNC: 142 MMOL/L (ref 136–145)
TOTAL PROTEIN: 6.5 G/DL (ref 6.4–8.2)

## 2022-05-07 PROCEDURE — 1200000000 HC SEMI PRIVATE

## 2022-05-07 PROCEDURE — 6370000000 HC RX 637 (ALT 250 FOR IP): Performed by: INTERNAL MEDICINE

## 2022-05-07 PROCEDURE — 2700000000 HC OXYGEN THERAPY PER DAY

## 2022-05-07 PROCEDURE — 85014 HEMATOCRIT: CPT

## 2022-05-07 PROCEDURE — 6370000000 HC RX 637 (ALT 250 FOR IP): Performed by: STUDENT IN AN ORGANIZED HEALTH CARE EDUCATION/TRAINING PROGRAM

## 2022-05-07 PROCEDURE — 87635 SARS-COV-2 COVID-19 AMP PRB: CPT

## 2022-05-07 PROCEDURE — 80053 COMPREHEN METABOLIC PANEL: CPT

## 2022-05-07 PROCEDURE — 83735 ASSAY OF MAGNESIUM: CPT

## 2022-05-07 PROCEDURE — 6370000000 HC RX 637 (ALT 250 FOR IP)

## 2022-05-07 PROCEDURE — 36415 COLL VENOUS BLD VENIPUNCTURE: CPT

## 2022-05-07 PROCEDURE — 99233 SBSQ HOSP IP/OBS HIGH 50: CPT | Performed by: PSYCHIATRY & NEUROLOGY

## 2022-05-07 PROCEDURE — 99222 1ST HOSP IP/OBS MODERATE 55: CPT | Performed by: SURGERY

## 2022-05-07 PROCEDURE — 2580000003 HC RX 258: Performed by: INTERNAL MEDICINE

## 2022-05-07 PROCEDURE — 85018 HEMOGLOBIN: CPT

## 2022-05-07 PROCEDURE — 6360000002 HC RX W HCPCS: Performed by: INTERNAL MEDICINE

## 2022-05-07 PROCEDURE — 87804 INFLUENZA ASSAY W/OPTIC: CPT

## 2022-05-07 PROCEDURE — 71045 X-RAY EXAM CHEST 1 VIEW: CPT

## 2022-05-07 RX ORDER — POTASSIUM CHLORIDE 20 MEQ/1
40 TABLET, EXTENDED RELEASE ORAL ONCE
Status: COMPLETED | OUTPATIENT
Start: 2022-05-07 | End: 2022-05-07

## 2022-05-07 RX ORDER — LANOLIN ALCOHOL/MO/W.PET/CERES
400 CREAM (GRAM) TOPICAL ONCE
Status: COMPLETED | OUTPATIENT
Start: 2022-05-07 | End: 2022-05-07

## 2022-05-07 RX ADMIN — CLOPIDOGREL BISULFATE 75 MG: 75 TABLET, FILM COATED ORAL at 09:07

## 2022-05-07 RX ADMIN — POTASSIUM CHLORIDE 40 MEQ: 20 TABLET, EXTENDED RELEASE ORAL at 11:01

## 2022-05-07 RX ADMIN — ACETAMINOPHEN 650 MG: 325 TABLET ORAL at 15:14

## 2022-05-07 RX ADMIN — SODIUM CHLORIDE, PRESERVATIVE FREE 10 ML: 5 INJECTION INTRAVENOUS at 09:06

## 2022-05-07 RX ADMIN — SODIUM CHLORIDE, PRESERVATIVE FREE 10 ML: 5 INJECTION INTRAVENOUS at 00:20

## 2022-05-07 RX ADMIN — SODIUM CHLORIDE: 9 INJECTION, SOLUTION INTRAVENOUS at 17:42

## 2022-05-07 RX ADMIN — METOPROLOL SUCCINATE 25 MG: 50 TABLET, EXTENDED RELEASE ORAL at 09:08

## 2022-05-07 RX ADMIN — INSULIN LISPRO 1 UNITS: 100 INJECTION, SOLUTION INTRAVENOUS; SUBCUTANEOUS at 17:12

## 2022-05-07 RX ADMIN — ACETAMINOPHEN 650 MG: 325 TABLET ORAL at 21:22

## 2022-05-07 RX ADMIN — INSULIN LISPRO 2 UNITS: 100 INJECTION, SOLUTION INTRAVENOUS; SUBCUTANEOUS at 13:01

## 2022-05-07 RX ADMIN — ASPIRIN 81 MG: 81 TABLET, COATED ORAL at 09:08

## 2022-05-07 RX ADMIN — LEVOTHYROXINE SODIUM 50 MCG: 0.05 TABLET ORAL at 09:08

## 2022-05-07 RX ADMIN — FAMOTIDINE 10 MG: 20 TABLET, FILM COATED ORAL at 09:00

## 2022-05-07 RX ADMIN — CARBOXYMETHYLCELLULOSE SODIUM 1 DROP: 10 GEL OPHTHALMIC at 09:07

## 2022-05-07 RX ADMIN — POLYETHYLENE GLYCOL 3350 17 G: 17 POWDER, FOR SOLUTION ORAL at 09:07

## 2022-05-07 RX ADMIN — SODIUM CHLORIDE, PRESERVATIVE FREE 10 ML: 5 INJECTION INTRAVENOUS at 21:24

## 2022-05-07 RX ADMIN — CARBOXYMETHYLCELLULOSE SODIUM 1 DROP: 10 GEL OPHTHALMIC at 21:23

## 2022-05-07 RX ADMIN — Medication 400 MG: at 12:30

## 2022-05-07 RX ADMIN — HEPARIN SODIUM 5000 UNITS: 5000 INJECTION INTRAVENOUS; SUBCUTANEOUS at 09:07

## 2022-05-07 RX ADMIN — SODIUM CHLORIDE: 9 INJECTION, SOLUTION INTRAVENOUS at 07:36

## 2022-05-07 RX ADMIN — HEPARIN SODIUM 5000 UNITS: 5000 INJECTION INTRAVENOUS; SUBCUTANEOUS at 15:14

## 2022-05-07 RX ADMIN — Medication 400 MG: at 09:08

## 2022-05-07 RX ADMIN — HEPARIN SODIUM 5000 UNITS: 5000 INJECTION INTRAVENOUS; SUBCUTANEOUS at 21:22

## 2022-05-07 ASSESSMENT — PAIN SCALES - GENERAL
PAINLEVEL_OUTOF10: 2
PAINLEVEL_OUTOF10: 4
PAINLEVEL_OUTOF10: 6
PAINLEVEL_OUTOF10: 3
PAINLEVEL_OUTOF10: 6
PAINLEVEL_OUTOF10: 7
PAINLEVEL_OUTOF10: 3
PAINLEVEL_OUTOF10: 3
PAINLEVEL_OUTOF10: 0

## 2022-05-07 ASSESSMENT — PAIN DESCRIPTION - LOCATION
LOCATION: ABDOMEN
LOCATION: ABDOMEN
LOCATION: NECK
LOCATION: ABDOMEN
LOCATION: ABDOMEN
LOCATION: NECK

## 2022-05-07 NOTE — CONSULTS
VASCULAR SURGERY CONSULTATION    Loren Owens is a 66 y.o. female admitted with abnormal labs from her PCP office S/P R hemispheric CVA March 2022 while visiting in [place]. Symptoms of speech difficulties and L sided paralysis are residual from the stroke event. Asked to see pt by Carol Oneill MD for recommendations re: CDS that showed ANGEL occlusion. Records reviewed from [place] and apparently no carotid imaging was obtained at that time.     Past Medical History:   Diagnosis Date    Anemia     Arthritis     Asthma     Bowel dysfunction     CML (chronic myelocytic leukemia) ([facility] [address] 01/2006    leukemia    Hyperlipidemia     Hypertension     Nuclear senile cataract of both eyes 11/25/2019    Obesity     Risk of myocardial infarction or stroke 7.5% or greater in next 10 years 9/15/2021    Skin cancer of face     left cheek, squamous    Stroke (cerebrum) ([facility] [address]     Thyroid disease     TIA (transient ischemic attack)     mini ones-from chemo     Past Surgical History:   Procedure Laterality Date    ANUS SURGERY  1998    fissure    BREAST BIOPSY      BREAST LUMPECTOMY      benign    COLONOSCOPY      numerous polyps    ELBOW SURGERY  1960    removal of foreign body ([place])    FINE NEEDLE ASPIRATION      PARACENTESIS      x 2 fluid in lung from Chemo    TONSILLECTOMY Bilateral      Family History   Problem Relation Age of Onset    Cancer Mother         non hodgkins lymphoma    Cancer Father         colon    Cancer Sister         colon    Cancer Brother         mesothelioma    Arrhythmia Sister      Social History     Socioeconomic History    Marital status:      Spouse name: Oksana Jeffries    Number of children: 2    Years of education: None    Highest education level: None   Occupational History     Employer: RETIRED    Occupation: [employer] 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    Occupation: [employer] [place]   Tobacco Use    Smoking status: Never Smoker    Smokeless tobacco: Never Used   Vaping Use    Vaping Use: Never used   Substance and Sexual Activity    Alcohol use: No    Drug use: No    Sexual activity: Not Currently   Other Topics Concern    None   Social History Narrative    Babysits    Son in [place] got  late in life    2 grandchildren    Jameson Baires 1 y/o    Clemente Bhandariing 2 y/o    Loves to take care of them     Social Determinants of Health     Financial Resource Strain: Low Risk     Difficulty of Paying Living Expenses: Not hard at all   Food Insecurity: No Food Insecurity    Worried About Running Out of Food in the Last Year: Never true    [place] of Food in the Last Year: Never true   Transportation Needs: No Transportation Needs    Lack of Transportation (Medical): No    Lack of Transportation (Non-Medical):  No   Physical Activity:     Days of Exercise per Week: Not on file    Minutes of Exercise per Session: Not on file   Stress:     Feeling of Stress : Not on file   Social Connections:     Frequency of Communication with Friends and Family: Not on file    Frequency of Social Gatherings with Friends and Family: Not on file    Attends Religion Services: Not on file    Active Member of [address] [place] [address] or Organizations: Not on file    Attends Club or Organization Meetings: Not on file    Marital Status: Not on file   Intimate Partner Violence:     Fear of Current or Ex-Partner: Not on file    Emotionally Abused: Not on file    Physically Abused: Not on file    Sexually Abused: Not on file   Housing Stability:     Unable to Pay for Housing in the Last Year: Not on file    Number of [place] in the Last Year: Not on file    Unstable Housing in the Last Year: Not on file     Current Facility-Administered Medications   Medication Dose Route Frequency Provider Last Rate Last Admin    perflutren lipid microspheres (DEFINITY) injection 1.65 mg  1.5 mL IntraVENous ONCE PRN Henry Vargas MD        magnesium oxide (MAG-OX) tablet 400 mg  400 mg Oral Daily Ortega Gusman MD   400 mg at 05/07/22 0908    NIFEdipine (PROCARDIA XL) extended release tablet 30 mg  30 mg Oral Daily Esperanza Cox MD   30 mg at 05/06/22 1309    polyethylene glycol (GLYCOLAX) packet 17 g  17 g Oral Daily Homar Trinidad MD   17 g at 05/07/22 2627    metoprolol succinate (TOPROL XL) extended release tablet 25 mg  25 mg Oral Daily Ortega Gusman MD   25 mg at 05/07/22 0908    famotidine (PEPCID) tablet 10 mg  10 mg Oral Daily Tatiana Henning MD   10 mg at 05/07/22 0900    nilotinib (TASIGNA) capsule 200 mg  200 mg Oral Q12H DANTE Dempsey - CNP   200 mg at 05/07/22 1101    aspirin EC tablet 81 mg  81 mg Oral Daily Leopold Crandall, MD   81 mg at 05/07/22 0908    clopidogrel (PLAVIX) tablet 75 mg  75 mg Oral Daily Leopold Crandall, MD   75 mg at 05/07/22 0907    carboxymethylcellulose PF (THERATEARS) 1 % ophthalmic gel 1 drop  1 drop Both Eyes Q12H Leopold Crandall, MD   1 drop at 05/07/22 0907    levothyroxine (SYNTHROID) tablet 50 mcg  50 mcg Oral Daily Leopold Crandall, MD   50 mcg at 05/07/22 0908    sodium chloride flush 0.9 % injection 5-40 mL  5-40 mL IntraVENous 2 times per day Leopold Crandall, MD   10 mL at 05/07/22 0906    sodium chloride flush 0.9 % injection 5-40 mL  5-40 mL IntraVENous PRN Leopold Crandall, MD        0.9 % sodium chloride infusion   IntraVENous PRN Leopold Crandall, MD        ondansetron (ZOFRAN-ODT) disintegrating tablet 4 mg  4 mg Oral Q8H PRN Leopold Crandall, MD        Or    ondansetron (ZOFRAN) injection 4 mg  4 mg IntraVENous Q6H PRN Leopold Crandall, MD        acetaminophen (TYLENOL) tablet 650 mg  650 mg Oral Q6H PRN Leopold Crandall, MD   650 mg at 05/06/22 2006    Or    acetaminophen (TYLENOL) suppository 650 mg  650 mg Rectal Q6H PRN Leopold Crandall, MD        0.9 % sodium chloride infusion   IntraVENous Continuous Esperanza Cox  mL/hr at 05/07/22 0736 New Bag at 05/07/22 9470    heparin (porcine) injection 5,000 Units  5,000 Units SubCUTAneous TID Vel Chavira MD   5,000 Units at 05/07/22 0907    glucose (GLUTOSE) 40 % oral gel 15 g  15 g Oral PRN Vel Chavira MD        glucagon (rDNA) injection 1 mg  1 mg IntraMUSCular PRN Vel Chavira MD        dextrose 5 % solution  100 mL/hr IntraVENous PRN Vel Chavira MD        insulin lispro (HUMALOG) injection vial 0-6 Units  0-6 Units SubCUTAneous TID WC Vel Chavira MD   3 Units at 05/05/22 1315    insulin lispro (HUMALOG) injection vial 0-3 Units  0-3 Units SubCUTAneous Nightly Vel Chavira MD   1 Units at 05/06/22 2116    dextrose bolus (hypoglycemia) 10% 125 mL  125 mL IntraVENous PRN Vel Chavira MD        Or    dextrose bolus (hypoglycemia) 10% 250 mL  250 mL IntraVENous PRN Vel Chavira MD         No current facility-administered medications on file prior to encounter.      Current Outpatient Medications on File Prior to Encounter   Medication Sig Dispense Refill    clopidogrel (PLAVIX) 75 MG tablet TAKE 1 TABLET BY MOUTH EVERY DAY      levothyroxine (SYNTHROID) 50 MCG tablet TAKE 1 TABLET BY MOUTH EVERY DAY      rosuvastatin (CRESTOR) 40 MG tablet TAKE 1 TABLET BY MOUTH EVERY DAY      lisinopril (PRINIVIL;ZESTRIL) 20 MG tablet Take 20 mg by mouth daily      Metoprolol Succinate 25 MG CS24 Take 25 mg by mouth daily      potassium chloride (KLOR-CON M) 20 MEQ extended release tablet Take 20 mEq by mouth      EPINEPHrine (EPIPEN) 0.3 MG/0.3ML SOAJ injection USE AS DIRECTED FOR ALLERGIC REACTION 2 each 1    cycloSPORINE (RESTASIS) 0.05 % ophthalmic emulsion Place 1 drop into both eyes every 12 hours 8 each 11    TASIGNA 200 MG capsule       polyethylene glycol (GLYCOLAX) powder Take 17 g by mouth daily      acetaminophen (TYLENOL) 325 MG tablet Take 2 tablets by mouth every 6 hours as needed for Pain 120 tablet 0    aspirin 81 MG tablet Take 81 mg by mouth        Allergies   Allergen Reactions    Latex Swelling and Rash     Rubber gloves    Mangifera Indica Anaphylaxis     [place]-- Throat closes    Penicillin G Hives    Shellfish-Derived Products Hives    Sulfa Antibiotics Hives    Grapefruit Concentrate      Due to chemo    Omeprazole Other (See Comments)     Due to chemo    Shrimp Extract Allergy Skin Test Rash       Review of Systems  Pertinent items are noted in HPI. Objective:     BP (!) 141/49   Pulse 70   Temp 98 °F (36.7 °C) (Axillary)   Resp 16   SpO2 96%     General:  alert, appears stated age and cooperative   Skin:  normal   Eyes: conjunctivae/corneas clear. PERRL, EOM's intact. Fundi benign.    Mouth: MMM no lesions   Lymph Nodes:  Cervical, supraclavicular, and axillary nodes normal.   Lungs:  clear to auscultation bilaterally   Heart:  regular rate and rhythm, S1, S2 normal, no murmur, click, rub or gallop   Abdomen: soft, non-tender; bowel sounds normal; no masses,  no organomegaly   CVA:  absent   Genitourinary: defer exam   Extremities:  extremities normal, atraumatic, no cyanosis or edema    Pulses:   R bruit  L bruit   2   carotid 2    2   brachial 2    2   radial 2    2   femoral 2    2   popliteal 0    0   posterior tibial 0    0   dorsalis pedis 0       bypass graft          Neurologic:  Speech clear and appropriate  L arm/leg motor 0/5   Psychiatric:  non focal       Carotid duplex scan 5/6/2022 - personally reviewed: agree with interpretation - ANGEL occlusion: luminal filling with some echogenic changes; images show flash of color in the proximal ICA without distal filling  Summary        The right internal carotid artery appears occluded.    The right external carotid artery reveals a >50% diameter reducing stenosis    by velocity criteria.    The bilateral vertebral arteries demonstrate abnormal antegrade flow,    velocities are > 100, indicating possible vertebral stenosis.        Signature      ------------------------------------------------------------------    Electronically signed by Zonia Diez MD (Interpreting    [id number]) on 05/06/2022 at 07:39 PM    ------------------------------------------------------------------       Blood Pressure:Right arm 206/70 mmHg. Left arm 206/68 mmHg. Patient Status:Routine. Study [facility] - Vascular Lab. Technical Quality:Adequate visualization.         - Results were reported to:CARMEN Soriano on B3 @ 2045 .       Risk Factors         - The patient's risk factor(s) include: diabetes mellitus, dyslipidemia and       arterial hypertension.       Plaque     - A plaque was found in the Left Prox ICA.       The plaque characteristics are: medium echogenicity, minimal severity and   heterogeneous texture.         - A plaque was found in the Left Dist CCA.       The plaque characteristics are: medium echogenicity and minimal severity.       Velocities are measured in cm/s ; Diameters are measured in mm       Carotid Right Measurements   +---------------+----+----+-----+----+   ! Location       !PSV ! EDV ! Angle! RI  !   +---------------+----+----+-----+----+   ! Prox CCA       !79. 5!8.15! 60   !0.9 ! +---------------+----+----+-----+----+   ! Mid CCA        !72. 1!11. 8! 60   !0.84!   +---------------+----+----+-----+----+   ! Dist CCA       !76.8!9.51! 60   !0.88!   +---------------+----+----+-----+----+   ! Prox ICA       !85. 5!15. 4! 60   !0.82! +---------------+----+----+-----+----+   ! Mid ICA        !0   !0   !60   !    !   +---------------+----+----+-----+----+   ! Dist ICA       !0   !0   !60   !    !   +---------------+----+----+-----+----+   ! Prox ECA       !248 !24. 7! 60   !0.9 ! +---------------+----+----+-----+----+   ! Vertebral      !324 !30. 7! 60   !0.74!   +---------------+----+----+-----+----+   ! Prox Subclavian! 140 !0   !44   !1   !   +---------------+----+----+-----+----+         - There is antegrade vertebral flow noted on the right side.         - Additional Measurements:ICAPSV/CCAPSV 1.19. ICAEDV/CCAEDV 1.89.       Carotid Left Measurements   +---------------+---+----+-----+----+   ! Location       !PSV! EDV ! Angle! RI  !   +---------------+---+----+-----+----+   ! Prox CCA       ![id number]. 6! 40   !0.8 ! +---------------+---+----+-----+----+   ! Mid CCA       [id number]. 8! 60   !0.74!   +---------------+---+----+-----+----+   ! Dist CCA       !101!29. 6! 60   !0.71! +---------------+---+----+-----+----+   ! Prox ICA       !108!23. 1! 60   !0.79!   +---------------+---+----+-----+----+   ! Mid ICA        !798!67. 8! 60   !0.61!   +---------------+---+----+-----+----+   ! Dist ICA       !168!52. 9!54   !0.69!   +---------------+---+----+-----+----+   ! Prox ECA       !190!0   !60   !1   !   +---------------+---+----+-----+----+   ! Vertebral     [id number]. 7! 60   !0.81!   +---------------+---+----+-----+----+   ! Prox Subclavian!198!0   !58   !1   !   +---------------+---+----+-----+----+         - There is antegrade vertebral flow noted on the left side.         - Additional Measurements:ICAPSV/CCAPSV 1.85. ICAEDV/CCAEDV 3.72.         CT head 5/5/2022  Impression   1. Increased conspicuity of ill-defined hypodensity in the right frontal   corona radiata white matter concerning for evolving acute to subacute   ischemic infarct. 2. Additional ill-defined hypodense foci in the anterior right frontal and   right parietal white matter are unchanged and are consistent with infarcts of   indeterminate chronicity. 3. No intracranial hemorrhage. 4. Chronic appearing lacunar infarcts in the right basal ganglia and right   ken. MRI 5/6/2022  Impression   1. Areas of acute infarct involving the right cerebral hemisphere   predominantly in a somewhat watershed distribution.  Sulcal effacement is   seen without significant mass effect or midline shift. 2. Otherwise, no acute intracranial abnormality seen.  No acute hemorrhage.    3. Chronic lacunar infarct within the right ken. Assessment:     1) R hemispheric CVA  2) ANGEL occlusion by duplex - likely longer standing possibly occurred in [place] with CVA (no neck imaging in [place]). Small possibility of ICA patency (1-2%) with these duplex findings  3) CRI (creat 1.3)     Rec:     Would consider CTA neck to definitively confirm ANGEL occlusion and r/o \"pseudoocclusion\" which might prompt intervention in time. Will get CTA once creat recovers and/or stabilizes. Continue maximal medical management of CVA. Will follow.     Dimitri Gomez

## 2022-05-07 NOTE — PROGRESS NOTES
Hospitalist Progress Note      PCP: Jt Cash MD    Date of Admission: 5/3/2022    Chief Complaint: abdominal pain and abnormal labs    Hospital Course: In brief this is a 66 yoF with a recent history of right parietal stroke with residual neuro deficits who presented with generalized weakness and myalgias in the setting of abnormal labs (abilio, and elevated liver enzymes)noted in her pcp office found to have rhabdomyolysis. Hosp course complicated by acute ischemic stroke now with left sided deficits. Subjective: the pt feels stronger. Overall feels like her mobility on the left side comes and goes (stronger at some points during the day) as does her speech. Today she has some increased pain on her left side. She also endorses a sore throat. She says she does not feel her mouth is dry, no pain. She has been eating and drinking well no aspiration events. Placed on oxygen overnight (sats 88 noted). On 2L. Denies cough, congestion or dyspnea. Having BM and with good uop.       Medications:  Reviewed    Infusion Medications    sodium chloride      sodium chloride 100 mL/hr at 05/07/22 1742    dextrose       Scheduled Medications    magnesium oxide  400 mg Oral Daily    NIFEdipine  30 mg Oral Daily    polyethylene glycol  17 g Oral Daily    metoprolol succinate  25 mg Oral Daily    famotidine  10 mg Oral Daily    nilotinib  200 mg Oral Q12H    aspirin  81 mg Oral Daily    clopidogrel  75 mg Oral Daily    carboxymethylcellulose PF  1 drop Both Eyes Q12H    levothyroxine  50 mcg Oral Daily    sodium chloride flush  5-40 mL IntraVENous 2 times per day    heparin (porcine)  5,000 Units SubCUTAneous TID    insulin lispro  0-6 Units SubCUTAneous TID WC    insulin lispro  0-3 Units SubCUTAneous Nightly     PRN Meds: perflutren lipid microspheres, sodium chloride flush, sodium chloride, ondansetron **OR** ondansetron, acetaminophen **OR** acetaminophen, glucose, glucagon (rDNA), dextrose, dextrose bolus **OR** dextrose bolus      Intake/Output Summary (Last 24 hours) at 5/7/2022 1825  Last data filed at 5/7/2022 1810  Gross per 24 hour   Intake 1080 ml   Output 1500 ml   Net -420 ml       Physical Exam Performed:    BP (!) 135/56   Pulse 76   Temp 99.3 °F (37.4 °C) (Oral)   Resp 16   SpO2 94%     General appearance: No apparent distress, appears stated age and cooperative. HEENT: Pupils equal, round, and reactive to light. Conjunctivae/corneas clear. Neck: Supple, with full range of motion. No jugular venous distention. Trachea midline. Respiratory:  Normal respiratory effort. Clear to auscultation, bilaterally without Rales/Wheezes/Rhonchi. Cardiovascular: Regular rate and rhythm with normal S1/S2 without murmurs, rubs or gallops. Abdomen: Soft, non-tender, non-distended with normal bowel sounds. MusculoskeletalNeuro: left facial droop still appreciated. Speech fluent. Left sided neglect. LUE and LLE 1/5. Unable to assess gait or hip flexor very well. Left arm slightly edematous. Sensory deficits in both upper and lower left extremities. Skin: Skin color, texture, turgor normal.  No rashes or lesions. Psychiatric: Alert and oriented, thought content appropriate, normal insight  Capillary Refill: Brisk,3 seconds, normal   Peripheral Pulses: +2 palpable, equal bilaterally   RRR on tele with episodes of sinus bradycardia. Labs:   Recent Labs     05/06/22  0639 05/07/22  0701   WBC 9.9  --    HGB 11.1* 11.3*   HCT 33.0* 32.9*     --      Recent Labs     05/05/22  0646 05/06/22  0639 05/07/22  0701   * 145 142   K 3.4* 3.3* 3.4*   * 110 103   CO2 25 24 26   BUN 38* 30* 24*   CREATININE 1.7* 1.5* 1.3*   CALCIUM 9.1 9.1 8.5     Recent Labs     05/05/22  0646 05/06/22  0639 05/07/22  0701   * 210* 150*   * 157* 144*   BILIDIR <0.2  --   --    BILITOT 0.4 0.6 0.9   ALKPHOS 94 98 89     No results for input(s): INR in the last 72 hours.   Recent Labs     05/05/22  1530 05/06/22  0639   CKTOTAL 7,780* 3,539*       Urinalysis:      Lab Results   Component Value Date    NITRU Negative 05/03/2022    WBCUA 3-5 05/03/2022    RBCUA 5-10 05/03/2022    BLOODU LARGE 05/03/2022    SPECGRAV 1.020 05/03/2022    GLUCOSEU Negative 05/03/2022       Radiology:  XR CHEST PORTABLE   Final Result   No acute cardiopulmonary disease is noted, noting limitations in the lung   bases. RECOMMENDATION:   If there is persistent concern, follow-up PA and lateral chest is recommended. VL DUP CAROTID BILATERAL   Final Result      MRI BRAIN WO CONTRAST   Final Result   1. Areas of acute infarct involving the right cerebral hemisphere   predominantly in a somewhat watershed distribution. Sulcal effacement is   seen without significant mass effect or midline shift. 2. Otherwise, no acute intracranial abnormality seen. No acute hemorrhage. 3. Chronic lacunar infarct within the right ken. These results were sent to the [facility] [address] ([address] [place] [address]) on 5/6/2022   at 2:33 pm to be communicated to the referring/covering health care   provider/office. CT HEAD WO CONTRAST   Final Result   1. Increased conspicuity of ill-defined hypodensity in the right frontal   corona radiata white matter concerning for evolving acute to subacute   ischemic infarct. 2. Additional ill-defined hypodense foci in the anterior right frontal and   right parietal white matter are unchanged and are consistent with infarcts of   indeterminate chronicity. 3. No intracranial hemorrhage. 4. Chronic appearing lacunar infarcts in the right basal ganglia and right   ken. RECOMMENDATIONS:   MRI of the brain. CT HEAD WO CONTRAST   Final Result   No acute intracranial abnormality. US GALLBLADDER RUQ   Final Result   No cholelithiasis or significant dilation of the common duct. Focal tenderness is noted over the gallbladder.   Additional clinical   correlation for cholecystitis recommended. CT ABDOMEN PELVIS WO CONTRAST Additional Contrast? None   Final Result   No acute abdominopelvic abnormality. Trace pericardial and small bilateral pleural effusions. Assessment/Plan:    Active Hospital Problems    Diagnosis     DM type 2, controlled, with complication ([facility] [address] [S84.8]      Priority: Medium    Elevated liver enzymes [R74.8]      Priority: Medium    Type 2 diabetes mellitus with kidney complication, without long-term current use of insulin (HCC) [E11.29]      Priority: Medium    Rhabdomyolysis [M62.82]      Priority: Medium    EDYTA (acute kidney injury) ([facility] [address] [facility] [N17.9]      Priority: Medium    Troponin level elevated [R77.8]      Priority: Medium    Arterial ischemic stroke, ICA, right, acute (HCC) [I63.231]      Priority: Medium    HTN (hypertension), benign [I10]     Dyslipidemia [E78.5]     Acquired hypothyroidism [E03.9]      Acute right hemispheric MCA stroke with left sided weakness and neglect  - CT head increased ill defined hypodensity in right frontal corona radiata c/f evolving acute to subacute infarct  - MRI acute infarct of right cerebral hemisphere in watershed distribution, chronic lacunar ken infarct  - echo w/o shunting  - carotid u/s demonstrate occlusion of right internal carotid artery. Consulted vascular surgery. Obtain CTA once renal recovery to ensure no intervention needed   -Continue stroke mgmt DAPT (need to clarify time coarse if pt will remain on monotherapy after 21 days), statin  - continue tele, May need continued tele monitor while in [facility] versus cardiac monitor. HTN- continue bb, and ccb    Pharyngitis hypoxia. Testing rapid covid 19 and flu. CXR to assess for aspiration. Already seen by SLP. RhabdomyolysisAcute renal failure- improving. Good uop. No clinical signs of compartment syndrome. Monitoring closely. BMP pending    Liver injury- secondary to statin induced rhabdo.     TIIDM A1c 6.6. once renal recovery may consider metformin. Hypothyroidism -continue Synthroid.  TSH slightly elevated but free T4 normal.    CML - on nilotinib daily at home currently on hold, followed by Dr. Fannie Maria      DVT Prophylaxis: heaprin  Diet: ADULT DIET;  Dysphagia - Soft and Bite Sized; 3 carb choices (45 gm/meal)  Code Status: Full Code    PT/OT Eval Status: ordered    [address] once medical work up above complete maybe able to go to [facility] likely Monday due to need for CTA    Ora Taylor MD

## 2022-05-07 NOTE — PROGRESS NOTES
Tatianna Bourgeois  Neurology Follow-up  [facility]    Date of Service: 5/7/2022    Subjective:   CC: Follow up today regarding: Acute ischemic stroke    Events noted. Chart and lab reviewed. The patient denies any new symptoms today. The same left-sided weakness. Awaiting rehab. No dysphagia or chest pain. Other review of system was unremarkable. ROS : A 10-12 system review obtained and updated today and is unremarkable except as mentioned  in my interval history. family history includes Arrhythmia in her sister; Cancer in her brother, father, mother, and sister.     Past Medical History:   Diagnosis Date    Anemia     Arthritis     Asthma     Bowel dysfunction     CML (chronic myelocytic leukemia) ([facility] 01/2006    leukemia    Hyperlipidemia     Hypertension     Nuclear senile cataract of both eyes 11/25/2019    Obesity     Risk of myocardial infarction or stroke 7.5% or greater in next 10 years 9/15/2021    Skin cancer of face     left cheek, squamous    Stroke (cerebrum) ([facility]     Thyroid disease     TIA (transient ischemic attack)     mini ones-from chemo     Current Facility-Administered Medications   Medication Dose Route Frequency Provider Last Rate Last Admin    perflutren lipid microspheres (DEFINITY) injection 1.65 mg  1.5 mL IntraVENous ONCE PRN Shaggy Armstrong MD        magnesium oxide (MAG-OX) tablet 400 mg  400 mg Oral Daily Shaggy Armstrong MD   400 mg at 05/07/22 0908    NIFEdipine (PROCARDIA XL) extended release tablet 30 mg  30 mg Oral Daily Otilio Suazo MD   30 mg at 05/06/22 1309    polyethylene glycol (GLYCOLAX) packet 17 g  17 g Oral Daily Jordan Perez MD   17 g at 05/07/22 6960    metoprolol succinate (TOPROL XL) extended release tablet 25 mg  25 mg Oral Daily Shaggy Armstrong MD   25 mg at 05/07/22 0908    famotidine (PEPCID) tablet 10 mg  10 mg Oral Daily Michelle Sherman MD   10 mg at 05/07/22 0900    nilotinib (TASIGNA) capsule 200 mg  200 mg Oral Q12H Alexandr Mendoza APRTANYA - CNP   200 mg at 05/07/22 1101    aspirin EC tablet 81 mg  81 mg Oral Daily Polo Faust MD   81 mg at 05/07/22 0908    clopidogrel (PLAVIX) tablet 75 mg  75 mg Oral Daily Polo Faust MD   75 mg at 05/07/22 0907    carboxymethylcellulose PF (THERATEARS) 1 % ophthalmic gel 1 drop  1 drop Both Eyes Q12H Polo Faust MD   1 drop at 05/07/22 0907    levothyroxine (SYNTHROID) tablet 50 mcg  50 mcg Oral Daily Polo Faust MD   50 mcg at 05/07/22 0908    sodium chloride flush 0.9 % injection 5-40 mL  5-40 mL IntraVENous 2 times per day Polo Faust MD   10 mL at 05/07/22 0906    sodium chloride flush 0.9 % injection 5-40 mL  5-40 mL IntraVENous PRN Polo Faust MD        0.9 % sodium chloride infusion   IntraVENous PRN Polo Faust MD        ondansetron (ZOFRAN-ODT) disintegrating tablet 4 mg  4 mg Oral Q8H PRN Polo Faust MD        Or    ondansetron (ZOFRAN) injection 4 mg  4 mg IntraVENous Q6H PRN Polo Faust MD        acetaminophen (TYLENOL) tablet 650 mg  650 mg Oral Q6H PRN Polo Faust MD   650 mg at 05/06/22 2006    Or    acetaminophen (TYLENOL) suppository 650 mg  650 mg Rectal Q6H PRN Polo Faust MD        0.9 % sodium chloride infusion   IntraVENous Continuous Hallie Mendieta  mL/hr at 05/07/22 0736 New Bag at 05/07/22 0736    heparin (porcine) injection 5,000 Units  5,000 Units SubCUTAneous TID Polo Faust MD   5,000 Units at 05/07/22 0907    glucose (GLUTOSE) 40 % oral gel 15 g  15 g Oral PRN Polo Faust MD        glucagon (rDNA) injection 1 mg  1 mg IntraMUSCular PRN Polo Faust MD        dextrose 5 % solution  100 mL/hr IntraVENous PRN Polo Faust MD        insulin lispro (HUMALOG) injection vial 0-6 Units  0-6 Units SubCUTAneous TID  Polo Faust MD   2 Units at 05/07/22 1301    insulin lispro (HUMALOG) injection vial 0-3 Units  0-3 Units SubCUTAneous Nightly Deandre Mendez MD   1 Units at 05/06/22 2116    dextrose bolus (hypoglycemia) 10% 125 mL  125 mL IntraVENous PRN Deandre Mendez MD        Or    dextrose bolus (hypoglycemia) 10% 250 mL  250 mL IntraVENous PRN Deandre Mendez MD         Allergies   Allergen Reactions    Latex Swelling and Rash     Rubber gloves    Mangifera Indica Anaphylaxis     Francisco-- Throat closes    Penicillin G Hives    Shellfish-Derived Products Hives    Sulfa Antibiotics Hives    Grapefruit Concentrate      Due to chemo    Omeprazole Other (See Comments)     Due to chemo    Shrimp Extract Allergy Skin Test Rash      reports that she has never smoked. She has never used smokeless tobacco. She reports that she does not drink alcohol and does not use drugs. Objective:  Exam:   Constitutional:   Vitals:    05/07/22 0017 05/07/22 0445 05/07/22 0903 05/07/22 1105   BP: (!) 126/53 134/63 (!) 169/66 (!) 141/49   Pulse: (!) 44 (!) 49 70    Resp: 14 16 16 16   Temp: 97.8 °F (36.6 °C) 98.9 °F (37.2 °C) 98.9 °F (37.2 °C) 98 °F (36.7 °C)   TempSrc: Oral Oral Oral Axillary   SpO2: 97% 96% 98% 96%     General appearance:  Normal development and appear in no acute distress. Mental Status:   Oriented to person, place, problem, and time. Memory: Good immediate recall. Intact remote memory  Normal attention span and concentration. Language: intact naming, repeating and fluency   Good fund of Knowledge. Cranial Nerves:   II: Visual fields: Full. Pupils: equal, round, reactive to light  III,IV,VI: Extra Ocular Movements are intact.  No nystagmus  V: Facial sensation is intact  VII: Facial strength and movements: Left facial asymmetry  IX: Palate elevation is symmetric  XI: Shoulder shrug is intact  XII: Tongue movements are normal  Musculoskeletal: Left-sided weakness 2/5 with extensor plantar  Brisker DTRs on left compared to the right not left-sided neglect  No tremors  Gait cannot be tested due to weakness        Data:  LABS:   Lab Results   Component Value Date     05/07/2022    K 3.4 05/07/2022     05/07/2022    CO2 26 05/07/2022    BUN 24 05/07/2022    CREATININE 1.3 05/07/2022    GFRAA 49 05/07/2022    LABGLOM 40 05/07/2022    GLUCOSE 120 05/07/2022    GLUCOSE 129 07/26/2017    PHOS 3.1 05/04/2022    MG 1.60 05/07/2022    CALCIUM 8.5 05/07/2022     Lab Results   Component Value Date    WBC 9.9 05/06/2022    RBC 3.50 05/06/2022    RBC 3.65 08/09/2017    HGB 11.3 05/07/2022    HCT 32.9 05/07/2022    MCV 94.3 05/06/2022    RDW 13.0 05/06/2022     05/06/2022   No results found for: INR, PROTIME    Neuroimaging was independently reviewed by me and discussed results with the patient  I reviewed blood testing and other test results and discussed results with the patient      Impression:  Acute right hemispheric MCA stroke with significant deficits and left-sided weakness with neglect. Thromboembolic from right ICA occlusion versus cardioembolic  Right ICA occlusion  Hypertension, not controlled  Rhabdomyolysis  Hyperlipidemia  Acute kidney injury      Recommendation  Hydration and follow CK  PT, OT and speech  Avoid hypotension and blood pressure below 140/90 with her severe occlusion  CTA head and neck when creatinine is back to normal to exclude possibility of stenosis versus occlusion  Will need inpatient rehab  Aspiration precautions  Telemetry  DVT and GI prophylaxis  Aspirin for now  Statin  Insulin sliding scale  Blood pressure monitor and control  Blood sugar control  DC planning after the above work-up  Discussed outcome from stroke with the patient and family    MDM: [address] [facility] MD [place]   [phone]      This dictation was generated by voice recognition computer software. Although all attempts are made to edit the dictation for accuracy, there may be errors in the transcription that are not intended.

## 2022-05-07 NOTE — PROGRESS NOTES
[facility]                                               Progress note    Summary:   Tejas Cruz is being seen by nephrology for EDYTA. Admitted with abnormal labs. RUQ abdominal pain. Dx with rhabdomyolysis thought to be statin induced. Interval History  Seen at bedside. No volume overload. Having ongoing shoulder and abdominal pain. Not new. Using tylenol for pain. On NS at 100 cc/hr   PO intake is low    /66   on 2 L NC  Afebrile    cc yesterday     Labs reviewed. Na 142  K 3.4  Bicarb 26  Cr 1.5 > 1.3  CK 7780 > 3539    Plan:   - continue NS at 100 cc/hr   Renal function improved. Replace potassium   Resume nifedipine , BP elevated today       Gladis Camargo MD  [facility]  Office: [phone]    Assessment:     Acute Kidney Injury  EDYTA likely due to -rhabdomyolysis/poor p.o. intake  Cr on consultation 2.1  Baseline Cr-0.8 on 9/21  No recent baseline available-she was admitted to [facility] March 2022 with the stroke and was told that she has high creatinine    UA-5/22-large blood, trace LE  Renal Imaging:-5/22-right-10.7 cm, simple 2.2 cm right renal cyst  No mention of left kidney  Echo: 10/18-EF normal, no mention of diastolic dysfunction    Hypertension   BP: (143)/(59)  Pulse:  [45]   BP goal inpatient [phone] systolic inpatient    Rhabdomyolysis  CPK more than 10,000 on arrival  Thought to be induced by statin  Has elevated AST and ALT    CML  Diabetes mellitus type 2 new      ROS:     Positives Listed Bold. All other remaining systems are negative. Constitutional:  fever, chills, weakness, weight change, fatigue,      Skin:  rash, pruritus, hair loss, bruising, dry skin, petechiae. Head, Face, Neck   headaches, swelling,  cervical adenopathy.      Respiratory: shortness of breath, cough, or wheezing  Cardiovascular: chest pain, palpitations, dizzy, edema  Gastrointestinal: nausea, vomiting, diarrhea, constipation,belly pain    Yellow skin, blood in stool  Musculoskeletal:  back pain, muscle weakness, gait problems,       joint pain or swelling. Genitourinary:  dysuria, poor urine flow, flank pain, blood in urine  Neurologic:  vertigo, TIA'S, syncope, seizures, focal weakness  Psychosocial:  insomnia, anxiety, or depression. Additional positive findings: -     PMH:   Past medical history, surgical history, social history, family history are reviewed and updated as appropriate. Reviewed current medication list.   Allergies reviewed and updated as needed. PE:   Vitals:    05/07/22 0903   BP: (!) 169/66   Pulse: 70   Resp: 16   Temp: 98.9 °F (37.2 °C)   SpO2: 98%       General appearance:  in NAD, fully alert and oriented. Comfortable. HEENT: EOM intact, no icterus. Trachea is midline. Neck : No masses, appears symmetrical, no JVD  Respiratory: Respiratory effort appears normal, bilateral equal chest rise, no wheeze, no crackles   Cardiovascular: Ausculation shows RRR no edema  Abdomen: No visible mass or tenderness, non distended. Musculoskeletal:  Joints with no swelling or deformity. Skin:no rashes, ulcers, induration, no jaundice. Neuro: face symmetric, no focal deficits. Appropriate responses.        Lab Results   Component Value Date    CREATININE 1.3 (H) 05/07/2022    BUN 24 (H) 05/07/2022     05/07/2022    K 3.4 (L) 05/07/2022     05/07/2022    CO2 26 05/07/2022      Lab Results   Component Value Date    WBC 9.9 05/06/2022    HGB 11.3 (L) 05/07/2022    HCT 32.9 (L) 05/07/2022    MCV 94.3 05/06/2022     05/06/2022     Lab Results   Component Value Date    CALCIUM 8.5 05/07/2022    PHOS 3.1 05/04/2022

## 2022-05-08 LAB
A/G RATIO: 1 (ref 1.1–2.2)
ALBUMIN SERPL-MCNC: 3 G/DL (ref 3.4–5)
ALP BLD-CCNC: 82 U/L (ref 40–129)
ALT SERPL-CCNC: 122 U/L (ref 10–40)
ANION GAP SERPL CALCULATED.3IONS-SCNC: 10 MMOL/L (ref 3–16)
AST SERPL-CCNC: 98 U/L (ref 15–37)
BILIRUB SERPL-MCNC: 0.8 MG/DL (ref 0–1)
BUN BLDV-MCNC: 24 MG/DL (ref 7–20)
CALCIUM SERPL-MCNC: 9 MG/DL (ref 8.3–10.6)
CHLORIDE BLD-SCNC: 108 MMOL/L (ref 99–110)
CO2: 24 MMOL/L (ref 21–32)
CREAT SERPL-MCNC: 1.3 MG/DL (ref 0.6–1.2)
GFR AFRICAN AMERICAN: 49
GFR NON-AFRICAN AMERICAN: 40
GLUCOSE BLD-MCNC: 114 MG/DL (ref 70–99)
GLUCOSE BLD-MCNC: 123 MG/DL (ref 70–99)
GLUCOSE BLD-MCNC: 127 MG/DL (ref 70–99)
GLUCOSE BLD-MCNC: 131 MG/DL (ref 70–99)
GLUCOSE BLD-MCNC: 152 MG/DL (ref 70–99)
PERFORMED ON: ABNORMAL
POTASSIUM REFLEX MAGNESIUM: 3.7 MMOL/L (ref 3.5–5.1)
SODIUM BLD-SCNC: 142 MMOL/L (ref 136–145)
TOTAL PROTEIN: 5.9 G/DL (ref 6.4–8.2)

## 2022-05-08 PROCEDURE — 6370000000 HC RX 637 (ALT 250 FOR IP): Performed by: INTERNAL MEDICINE

## 2022-05-08 PROCEDURE — 36415 COLL VENOUS BLD VENIPUNCTURE: CPT

## 2022-05-08 PROCEDURE — 80053 COMPREHEN METABOLIC PANEL: CPT

## 2022-05-08 PROCEDURE — 6360000002 HC RX W HCPCS: Performed by: INTERNAL MEDICINE

## 2022-05-08 PROCEDURE — 2580000003 HC RX 258: Performed by: STUDENT IN AN ORGANIZED HEALTH CARE EDUCATION/TRAINING PROGRAM

## 2022-05-08 PROCEDURE — 2580000003 HC RX 258: Performed by: INTERNAL MEDICINE

## 2022-05-08 PROCEDURE — 1200000000 HC SEMI PRIVATE

## 2022-05-08 PROCEDURE — 6370000000 HC RX 637 (ALT 250 FOR IP)

## 2022-05-08 PROCEDURE — 6370000000 HC RX 637 (ALT 250 FOR IP): Performed by: STUDENT IN AN ORGANIZED HEALTH CARE EDUCATION/TRAINING PROGRAM

## 2022-05-08 PROCEDURE — 99231 SBSQ HOSP IP/OBS SF/LOW 25: CPT | Performed by: SURGERY

## 2022-05-08 RX ORDER — SODIUM CHLORIDE, SODIUM LACTATE, POTASSIUM CHLORIDE, CALCIUM CHLORIDE 600; 310; 30; 20 MG/100ML; MG/100ML; MG/100ML; MG/100ML
INJECTION, SOLUTION INTRAVENOUS CONTINUOUS
Status: DISCONTINUED | OUTPATIENT
Start: 2022-05-08 | End: 2022-05-10 | Stop reason: HOSPADM

## 2022-05-08 RX ADMIN — POLYETHYLENE GLYCOL 3350 17 G: 17 POWDER, FOR SOLUTION ORAL at 07:35

## 2022-05-08 RX ADMIN — INSULIN LISPRO 1 UNITS: 100 INJECTION, SOLUTION INTRAVENOUS; SUBCUTANEOUS at 17:34

## 2022-05-08 RX ADMIN — HEPARIN SODIUM 5000 UNITS: 5000 INJECTION INTRAVENOUS; SUBCUTANEOUS at 20:48

## 2022-05-08 RX ADMIN — ASPIRIN 81 MG: 81 TABLET, COATED ORAL at 07:36

## 2022-05-08 RX ADMIN — SODIUM CHLORIDE, PRESERVATIVE FREE 10 ML: 5 INJECTION INTRAVENOUS at 07:36

## 2022-05-08 RX ADMIN — LEVOTHYROXINE SODIUM 50 MCG: 0.05 TABLET ORAL at 07:36

## 2022-05-08 RX ADMIN — SODIUM CHLORIDE, POTASSIUM CHLORIDE, SODIUM LACTATE AND CALCIUM CHLORIDE: 600; 310; 30; 20 INJECTION, SOLUTION INTRAVENOUS at 13:00

## 2022-05-08 RX ADMIN — SODIUM CHLORIDE: 9 INJECTION, SOLUTION INTRAVENOUS at 03:53

## 2022-05-08 RX ADMIN — HEPARIN SODIUM 5000 UNITS: 5000 INJECTION INTRAVENOUS; SUBCUTANEOUS at 15:50

## 2022-05-08 RX ADMIN — DICLOFENAC SODIUM 4 G: 10 GEL TOPICAL at 17:44

## 2022-05-08 RX ADMIN — FAMOTIDINE 10 MG: 20 TABLET, FILM COATED ORAL at 07:36

## 2022-05-08 RX ADMIN — CARBOXYMETHYLCELLULOSE SODIUM 1 DROP: 10 GEL OPHTHALMIC at 20:48

## 2022-05-08 RX ADMIN — Medication 400 MG: at 07:35

## 2022-05-08 RX ADMIN — CLOPIDOGREL BISULFATE 75 MG: 75 TABLET, FILM COATED ORAL at 07:36

## 2022-05-08 RX ADMIN — HEPARIN SODIUM 5000 UNITS: 5000 INJECTION INTRAVENOUS; SUBCUTANEOUS at 07:35

## 2022-05-08 RX ADMIN — DICLOFENAC SODIUM 4 G: 10 GEL TOPICAL at 20:48

## 2022-05-08 RX ADMIN — CARBOXYMETHYLCELLULOSE SODIUM 1 DROP: 10 GEL OPHTHALMIC at 07:36

## 2022-05-08 ASSESSMENT — PAIN DESCRIPTION - LOCATION
LOCATION: ABDOMEN
LOCATION: ABDOMEN
LOCATION: ARM
LOCATION: ABDOMEN

## 2022-05-08 ASSESSMENT — PAIN SCALES - GENERAL
PAINLEVEL_OUTOF10: 0
PAINLEVEL_OUTOF10: 2
PAINLEVEL_OUTOF10: 2
PAINLEVEL_OUTOF10: 0
PAINLEVEL_OUTOF10: 2

## 2022-05-08 ASSESSMENT — PAIN DESCRIPTION - ORIENTATION: ORIENTATION: RIGHT

## 2022-05-08 NOTE — PROGRESS NOTES
[facility]                                               Progress note    Summary:   Omega Erazo is being seen by nephrology for EDYTA. Admitted with abnormal labs. RUQ abdominal pain. Dx with rhabdomyolysis thought to be statin induced. Interval History  Seen at bedside. No volume overload. No complaints this AM.   No bowel movement for 3 days, getting miralax  Taking PO better  On NS at 100 cc/hr     /51   on 2 L NC  Afebrile   UO 1500 cc past day     Labs reviewed. Na 142  K 3.7  Bicarb 24  Cr 1.5 > 1.3 > 1.3  CK 7780 > 3539    Plan:   - d/c fluids  Renal function improved. BP acceptable. Cassy Ansari MD  [facility]  Office: [phone]    Assessment:     Acute Kidney Injury  EDYTA likely due to -rhabdomyolysis/poor p.o. intake  Cr on consultation 2.1  Baseline Cr-0.8 on 9/21  No recent baseline available-she was admitted to [facility] March 2022 with the stroke and was told that she has high creatinine    UA-5/22-large blood, trace LE  Renal Imaging:-5/22-right-10.7 cm, simple 2.2 cm right renal cyst  No mention of left kidney  Echo: 10/18-EF normal, no mention of diastolic dysfunction    Hypertension   BP: (143)/(59)  Pulse:  [45]   BP goal inpatient [phone] systolic inpatient    Rhabdomyolysis  CPK more than 10,000 on arrival  Thought to be induced by statin  Has elevated AST and ALT    CML  Diabetes mellitus type 2 new      ROS:     Positives Listed Bold. All other remaining systems are negative. Constitutional:  fever, chills, weakness, weight change, fatigue,      Skin:  rash, pruritus, hair loss, bruising, dry skin, petechiae. Head, Face, Neck   headaches, swelling,  cervical adenopathy.      Respiratory: shortness of breath, cough, or wheezing  Cardiovascular: chest pain, palpitations, dizzy, edema  Gastrointestinal: nausea, vomiting, diarrhea, constipation,belly pain    Yellow skin, blood in stool  Musculoskeletal:  back pain, muscle weakness, gait problems,       joint pain or swelling. Genitourinary:  dysuria, poor urine flow, flank pain, blood in urine  Neurologic:  vertigo, TIA'S, syncope, seizures, focal weakness  Psychosocial:  insomnia, anxiety, or depression. Additional positive findings: -     PMH:   Past medical history, surgical history, social history, family history are reviewed and updated as appropriate. Reviewed current medication list.   Allergies reviewed and updated as needed. PE:   Vitals:    05/08/22 0732   BP: (!) 141/51   Pulse: 56   Resp: 16   Temp: 98.8 °F (37.1 °C)   SpO2: 94%       General appearance:  in NAD, fully alert and oriented. Comfortable. HEENT: EOM intact, no icterus. Trachea is midline. Neck : No masses, appears symmetrical, no JVD  Respiratory: Respiratory effort appears normal, bilateral equal chest rise, no wheeze, no crackles   Cardiovascular: Ausculation shows RRR no edema  Abdomen: No visible mass or tenderness, non distended. Musculoskeletal:  Joints with no swelling or deformity. Skin:no rashes, ulcers, induration, no jaundice. Neuro: face symmetric, no focal deficits. Appropriate responses.        Lab Results   Component Value Date    CREATININE 1.3 (H) 05/08/2022    BUN 24 (H) 05/08/2022     05/08/2022    K 3.7 05/08/2022     05/08/2022    CO2 24 05/08/2022      Lab Results   Component Value Date    WBC 9.9 05/06/2022    HGB 11.3 (L) 05/07/2022    HCT 32.9 (L) 05/07/2022    MCV 94.3 05/06/2022     05/06/2022     Lab Results   Component Value Date    CALCIUM 9.0 05/08/2022    PHOS 3.1 05/04/2022

## 2022-05-08 NOTE — PROGRESS NOTES
Hospitalist Progress Note      PCP: Lawyer Radames MD    Date of Admission: 5/3/2022    Chief Complaint: abdominal pain and abnormal labs    Hospital Course: In brief this is a 66 yoF with a recent history of right parietal stroke with residual neuro deficits who presented with generalized weakness and myalgias in the setting of abnormal labs (abilio, and elevated liver enzymes)noted in her pcp office found to have rhabdomyolysis. Hosp course complicated by acute ischemic stroke now with left sided deficits. Awaiting renal recovery for CTA likely 05/09    Subjective: pt feels well. Overall ROS negative except for her chronic right shoulder pain. No GI sxs. Tolerating PO. Having BM (cahrted and per pt). Does not feel like her neuro deficits are worsening.  continues to endorse some degree of waxing and waning to sxs    Medications:  Reviewed    Infusion Medications    sodium chloride      dextrose       Scheduled Medications    magnesium oxide  400 mg Oral Daily    NIFEdipine  30 mg Oral Daily    polyethylene glycol  17 g Oral Daily    metoprolol succinate  25 mg Oral Daily    famotidine  10 mg Oral Daily    nilotinib  200 mg Oral Q12H    aspirin  81 mg Oral Daily    clopidogrel  75 mg Oral Daily    carboxymethylcellulose PF  1 drop Both Eyes Q12H    levothyroxine  50 mcg Oral Daily    sodium chloride flush  5-40 mL IntraVENous 2 times per day    heparin (porcine)  5,000 Units SubCUTAneous TID    insulin lispro  0-6 Units SubCUTAneous TID WC    insulin lispro  0-3 Units SubCUTAneous Nightly     PRN Meds: perflutren lipid microspheres, sodium chloride flush, sodium chloride, ondansetron **OR** ondansetron, acetaminophen **OR** acetaminophen, glucose, glucagon (rDNA), dextrose, dextrose bolus **OR** dextrose bolus      Intake/Output Summary (Last 24 hours) at 5/8/2022 1245  Last data filed at 5/8/2022 0942  Gross per 24 hour   Intake 1480 ml   Output 1600 ml   Net -120 ml       Physical Exam Performed:    BP (!) 133/51   Pulse 62   Temp 98.8 °F (37.1 °C) (Oral)   Resp 16   SpO2 94%   Exam unchanged since 05/07  General appearance: No apparent distress, appears stated age and cooperative. HEENT: Pupils equal, round, and reactive to light. Conjunctivae/corneas clear. Neck: Supple, with full range of motion. No jugular venous distention. Trachea midline. Respiratory:  Normal respiratory effort. Clear to auscultation, bilaterally without Rales/Wheezes/Rhonchi. Cardiovascular: Regular rate and rhythm with normal S1/S2 without murmurs, rubs or gallops. Abdomen: Soft, non-tender, non-distended with normal bowel sounds. MusculoskeletalNeuro: left facial droop still appreciated. Speech fluent. Left sided neglect. LUE and LLE 1/5. Unable to assess gait or hip flexor very well. Left arm slightly edematous. Sensory deficits in both upper and lower left extremities. Skin: Skin color, texture, turgor normal.  No rashes or lesions. Psychiatric: Alert and oriented, thought content appropriate, normal insight  Capillary Refill: Brisk,3 seconds, normal   Peripheral Pulses: +2 palpable, equal bilaterally   RRR on tele with episodes of sinus bradycardia. p waves noted    Labs:   Recent Labs     05/06/22  0639 05/07/22  0701   WBC 9.9  --    HGB 11.1* 11.3*   HCT 33.0* 32.9*     --      Recent Labs     05/06/22  0639 05/07/22  0701 05/08/22  0631    142 142   K 3.3* 3.4* 3.7    103 108   CO2 24 26 24   BUN 30* 24* 24*   CREATININE 1.5* 1.3* 1.3*   CALCIUM 9.1 8.5 9.0     Recent Labs     05/06/22  0639 05/07/22  0701 05/08/22  0631   * 150* 98*   * 144* 122*   BILITOT 0.6 0.9 0.8   ALKPHOS 98 89 82     No results for input(s): INR in the last 72 hours.   Recent Labs     05/06/22  0639   CKTOTAL 3,539*       Urinalysis:      Lab Results   Component Value Date    NITRU Negative 05/03/2022    WBCUA 3-5 05/03/2022    RBCUA 5-10 05/03/2022    BLOODU LARGE 05/03/2022    SPECGRAV 1.020 05/03/2022    GLUCOSEU Negative 05/03/2022       Radiology:  XR CHEST PORTABLE   Final Result   No acute cardiopulmonary disease is noted, noting limitations in the lung   bases. RECOMMENDATION:   If there is persistent concern, follow-up PA and lateral chest is recommended. VL DUP CAROTID BILATERAL   Final Result      MRI BRAIN WO CONTRAST   Final Result   1. Areas of acute infarct involving the right cerebral hemisphere   predominantly in a somewhat watershed distribution. Sulcal effacement is   seen without significant mass effect or midline shift. 2. Otherwise, no acute intracranial abnormality seen. No acute hemorrhage. 3. Chronic lacunar infarct within the right ken. These results were sent to the [facility] [id number] ([facility]) on 5/6/2022   at 2:33 pm to be communicated to the referring/covering health care   provider/office. CT HEAD WO CONTRAST   Final Result   1. Increased conspicuity of ill-defined hypodensity in the right frontal   corona radiata white matter concerning for evolving acute to subacute   ischemic infarct. 2. Additional ill-defined hypodense foci in the anterior right frontal and   right parietal white matter are unchanged and are consistent with infarcts of   indeterminate chronicity. 3. No intracranial hemorrhage. 4. Chronic appearing lacunar infarcts in the right basal ganglia and right   ken. RECOMMENDATIONS:   MRI of the brain. CT HEAD WO CONTRAST   Final Result   No acute intracranial abnormality. US GALLBLADDER RUQ   Final Result   No cholelithiasis or significant dilation of the common duct. Focal tenderness is noted over the gallbladder. Additional clinical   correlation for cholecystitis recommended. CT ABDOMEN PELVIS WO CONTRAST Additional Contrast? None   Final Result   No acute abdominopelvic abnormality. Trace pericardial and small bilateral pleural effusions. Assessment/Plan:    Active Hospital Problems    Diagnosis     DM type 2, controlled, with complication ([facility] [address] [L11.6]      Priority: Medium    Elevated liver enzymes [R74.8]      Priority: Medium    Type 2 diabetes mellitus with kidney complication, without long-term current use of insulin (HCC) [E11.29]      Priority: Medium    Rhabdomyolysis [M62.82]      Priority: Medium    EDYTA (acute kidney injury) ([facility] [address] [N17.9]      Priority: Medium    Troponin level elevated [R77.8]      Priority: Medium    Arterial ischemic stroke, ICA, right, acute (HCC) [I63.231]      Priority: Medium    HTN (hypertension), benign [I10]     Dyslipidemia [E78.5]     Acquired hypothyroidism [E03.9]      Acute right hemispheric MCA stroke with left sided weakness and neglect  - CT head increased ill defined hypodensity in right frontal corona radiata c/f evolving acute to subacute infarct  - MRI acute infarct of right cerebral hemisphere in watershed distribution, chronic lacunar ken infarct  - echo w/o shunting  - carotid u/s demonstrate occlusion of right internal carotid artery. Consulted vascular surgery. Obtain CTA once renal recovery to ensure no intervention needed maybe 05/09, awaiting morning labs before ordering. Recommend hydration after imaging obtained (I continued gentle fluids in preparation for CTA)  -Continue stroke mgmt DAPT (need to clarify with neurology time coarse if pt will remain on monotherapy after 21 days), statin  - continue tele, May need continued tele monitor while in [facility] versus cardiac monitor. HTN- continue bb, and ccb    Pharyngitis hypoxia. Testing rapid covid 19 and flu. CXR to assess for aspiration. Already seen by SLP. RhabdomyolysisAcute renal failure- improving. Good uop. No clinical signs of compartment syndrome. Monitoring closely. Liver injury- secondary to statin induced rhabdo. Improving.     TIIDM A1c 6.6. once renal recovery may consider metformin. Hypothyroidism -continue Synthroid.  TSH slightly elevated but free T4 normal.    CML - on nilotinib daily at home currently on hold, followed by Dr. Laura Millard    Chronic right shoulder painrotator cuff injury- diclofenac topical    DVT Prophylaxis: heaprin  Diet: ADULT DIET; Dysphagia - Soft and Bite Sized; 3 carb choices (45 gm/meal)  Code Status: Full Code    PT/OT Eval Status: ordered    I updated her  Maddy Dorantes.  Unable to reach [address] once medical work up above complete maybe able to go to [id number] likely Tuesday due to need for CTA    Yosi Tenorio MD

## 2022-05-08 NOTE — PROGRESS NOTES
VASCULAR    Seen for ANGEL occlusion and CVA. No new developments or complaints. VSS afeb  Exam unchanged from yesterday    Creat 1.3    A/P: R hemispheric CVA   Occlusion ANGEL by duplex - high likelihood it is occluded throughout its course but will obtain CTA for confirmation. ATN - creat decreased/stable at 1.3. Could proceed with CTA in next few days after hydration. Dr. Nicholson Mess to follow up Monday.      Nir Saliva

## 2022-05-09 ENCOUNTER — APPOINTMENT (OUTPATIENT)
Dept: CT IMAGING | Age: 72
DRG: 682 | End: 2022-05-09
Payer: MEDICARE

## 2022-05-09 LAB
A/G RATIO: 1 (ref 1.1–2.2)
ALBUMIN SERPL-MCNC: 3 G/DL (ref 3.4–5)
ALP BLD-CCNC: 94 U/L (ref 40–129)
ALT SERPL-CCNC: 109 U/L (ref 10–40)
ANION GAP SERPL CALCULATED.3IONS-SCNC: 9 MMOL/L (ref 3–16)
AST SERPL-CCNC: 75 U/L (ref 15–37)
BILIRUB SERPL-MCNC: 0.6 MG/DL (ref 0–1)
BUN BLDV-MCNC: 23 MG/DL (ref 7–20)
CALCIUM SERPL-MCNC: 9.5 MG/DL (ref 8.3–10.6)
CHLORIDE BLD-SCNC: 108 MMOL/L (ref 99–110)
CO2: 24 MMOL/L (ref 21–32)
CREAT SERPL-MCNC: 1.3 MG/DL (ref 0.6–1.2)
GFR AFRICAN AMERICAN: 49
GFR NON-AFRICAN AMERICAN: 40
GLUCOSE BLD-MCNC: 103 MG/DL (ref 70–99)
GLUCOSE BLD-MCNC: 112 MG/DL (ref 70–99)
GLUCOSE BLD-MCNC: 130 MG/DL (ref 70–99)
GLUCOSE BLD-MCNC: 131 MG/DL (ref 70–99)
GLUCOSE BLD-MCNC: 144 MG/DL (ref 70–99)
GLUCOSE BLD-MCNC: 161 MG/DL (ref 70–99)
PERFORMED ON: ABNORMAL
POTASSIUM REFLEX MAGNESIUM: 3.7 MMOL/L (ref 3.5–5.1)
SODIUM BLD-SCNC: 141 MMOL/L (ref 136–145)
TOTAL CK: 723 U/L (ref 26–192)
TOTAL PROTEIN: 6.1 G/DL (ref 6.4–8.2)

## 2022-05-09 PROCEDURE — 70450 CT HEAD/BRAIN W/O DYE: CPT

## 2022-05-09 PROCEDURE — 2580000003 HC RX 258: Performed by: INTERNAL MEDICINE

## 2022-05-09 PROCEDURE — 6360000004 HC RX CONTRAST MEDICATION: Performed by: PSYCHIATRY & NEUROLOGY

## 2022-05-09 PROCEDURE — 6370000000 HC RX 637 (ALT 250 FOR IP): Performed by: NURSE PRACTITIONER

## 2022-05-09 PROCEDURE — 99233 SBSQ HOSP IP/OBS HIGH 50: CPT | Performed by: PSYCHIATRY & NEUROLOGY

## 2022-05-09 PROCEDURE — 6370000000 HC RX 637 (ALT 250 FOR IP): Performed by: INTERNAL MEDICINE

## 2022-05-09 PROCEDURE — 80053 COMPREHEN METABOLIC PANEL: CPT

## 2022-05-09 PROCEDURE — 6360000002 HC RX W HCPCS: Performed by: INTERNAL MEDICINE

## 2022-05-09 PROCEDURE — 6370000000 HC RX 637 (ALT 250 FOR IP): Performed by: STUDENT IN AN ORGANIZED HEALTH CARE EDUCATION/TRAINING PROGRAM

## 2022-05-09 PROCEDURE — 70498 CT ANGIOGRAPHY NECK: CPT

## 2022-05-09 PROCEDURE — 1200000000 HC SEMI PRIVATE

## 2022-05-09 PROCEDURE — 82550 ASSAY OF CK (CPK): CPT

## 2022-05-09 PROCEDURE — 36415 COLL VENOUS BLD VENIPUNCTURE: CPT

## 2022-05-09 RX ORDER — METHYLPREDNISOLONE SODIUM SUCCINATE 125 MG/2ML
125 INJECTION, POWDER, LYOPHILIZED, FOR SOLUTION INTRAMUSCULAR; INTRAVENOUS
Status: ACTIVE | OUTPATIENT
Start: 2022-05-09 | End: 2022-05-09

## 2022-05-09 RX ORDER — HYDROCORTISONE ACETATE 25 MG/1
25 SUPPOSITORY RECTAL 2 TIMES DAILY
Status: DISCONTINUED | OUTPATIENT
Start: 2022-05-09 | End: 2022-05-10

## 2022-05-09 RX ADMIN — DICLOFENAC SODIUM 4 G: 10 GEL TOPICAL at 23:36

## 2022-05-09 RX ADMIN — CARBOXYMETHYLCELLULOSE SODIUM 1 DROP: 10 GEL OPHTHALMIC at 09:42

## 2022-05-09 RX ADMIN — CLOPIDOGREL BISULFATE 75 MG: 75 TABLET, FILM COATED ORAL at 09:41

## 2022-05-09 RX ADMIN — DICLOFENAC SODIUM 4 G: 10 GEL TOPICAL at 17:18

## 2022-05-09 RX ADMIN — SODIUM CHLORIDE, PRESERVATIVE FREE 10 ML: 5 INJECTION INTRAVENOUS at 23:35

## 2022-05-09 RX ADMIN — CARBOXYMETHYLCELLULOSE SODIUM 1 DROP: 10 GEL OPHTHALMIC at 23:33

## 2022-05-09 RX ADMIN — HYDROCORTISONE ACETATE 25 MG: 25 SUPPOSITORY RECTAL at 05:58

## 2022-05-09 RX ADMIN — HEPARIN SODIUM 5000 UNITS: 5000 INJECTION INTRAVENOUS; SUBCUTANEOUS at 23:34

## 2022-05-09 RX ADMIN — FAMOTIDINE 10 MG: 20 TABLET, FILM COATED ORAL at 09:42

## 2022-05-09 RX ADMIN — LEVOTHYROXINE SODIUM 50 MCG: 0.05 TABLET ORAL at 09:43

## 2022-05-09 RX ADMIN — IOPAMIDOL 75 ML: 755 INJECTION, SOLUTION INTRAVENOUS at 08:59

## 2022-05-09 RX ADMIN — HEPARIN SODIUM 5000 UNITS: 5000 INJECTION INTRAVENOUS; SUBCUTANEOUS at 09:41

## 2022-05-09 RX ADMIN — HEPARIN SODIUM 5000 UNITS: 5000 INJECTION INTRAVENOUS; SUBCUTANEOUS at 14:38

## 2022-05-09 RX ADMIN — INSULIN LISPRO 1 UNITS: 100 INJECTION, SOLUTION INTRAVENOUS; SUBCUTANEOUS at 23:37

## 2022-05-09 RX ADMIN — Medication 400 MG: at 09:41

## 2022-05-09 RX ADMIN — ASPIRIN 81 MG: 81 TABLET, COATED ORAL at 09:41

## 2022-05-09 RX ADMIN — NIFEDIPINE 30 MG: 30 TABLET, FILM COATED, EXTENDED RELEASE ORAL at 09:41

## 2022-05-09 RX ADMIN — METOPROLOL SUCCINATE 25 MG: 50 TABLET, EXTENDED RELEASE ORAL at 09:42

## 2022-05-09 ASSESSMENT — PAIN SCALES - GENERAL
PAINLEVEL_OUTOF10: 0
PAINLEVEL_OUTOF10: 0
PAINLEVEL_OUTOF10: 3
PAINLEVEL_OUTOF10: 3
PAINLEVEL_OUTOF10: 4

## 2022-05-09 ASSESSMENT — PAIN DESCRIPTION - PAIN TYPE: TYPE: CHRONIC PAIN

## 2022-05-09 ASSESSMENT — PAIN DESCRIPTION - LOCATION
LOCATION: HEAD
LOCATION: RECTUM

## 2022-05-09 NOTE — PROGRESS NOTES
Occupational Therapy/Physical Therapy  Attempted OT/PT 2x this am. Pt was at CT scan on first attempt and then was eating breakfast on second attempt.  Cont OT/PT  7 Magali Oakes

## 2022-05-09 NOTE — PROGRESS NOTES
Patient complaining of pain from [address], repositioned several times.   Removed per patient request.

## 2022-05-09 NOTE — CARE COORDINATION
Call received from [facility]. She states they will accept pt when determined to be medically stable by Neuro and Nephro. Possibly tomorrow.   Rigo Fuelling W

## 2022-05-09 NOTE — PROGRESS NOTES
Kris Gonzalez  Neurology Follow-up  [facility] Neurology    Date of Service: 5/9/2022    Subjective:   CC: Follow up today regarding: Acute ischemic stroke    Events noted. Chart and lab reviewed. Events over the weekend noted. Repeat CT head showed the same evolution of her right hemispheric CVA. CTA showed severe right ICA stenosis. Discussed with Dr. Itzel Perez. She is about the same today. left-sided weakness, flaccid with facial droop. Loralfa Spicer ROS : A 10-12 system review obtained and updated today and is unremarkable except as mentioned  in my interval history. family history includes Arrhythmia in her sister; Cancer in her brother, father, mother, and sister.     Past Medical History:   Diagnosis Date    Anemia     Arthritis     Asthma     Bowel dysfunction     CML (chronic myelocytic leukemia) ([facility] [address] 01/2006    leukemia    Hyperlipidemia     Hypertension     Nuclear senile cataract of both eyes 11/25/2019    Obesity     Risk of myocardial infarction or stroke 7.5% or greater in next 10 years 9/15/2021    Skin cancer of face     left cheek, squamous    Stroke (cerebrum) ([facility] [address]     Thyroid disease     TIA (transient ischemic attack)     mini ones-from chemo     Current Facility-Administered Medications   Medication Dose Route Frequency Provider Last Rate Last Admin    hydrocortisone (ANUSOL-HC) suppository 25 mg  25 mg Rectal BID DANTE Braswell - CNP   25 mg at 05/09/22 0558    methylPREDNISolone sodium (SOLU-MEDROL) injection 125 mg  125 mg IntraVENous Once PRN Jeffery Delvalle MD        diclofenac sodium (VOLTAREN) 1 % gel 4 g  4 g Topical 4x Daily Tia Alicia MD   4 g at 05/08/22 2048    lactated ringers infusion   IntraVENous Continuous Jeffery Delvalle  mL/hr at 05/09/22 0944 Rate Change at 05/09/22 0944    perflutren lipid microspheres (DEFINITY) injection 1.65 mg  1.5 mL IntraVENous ONCE PRN Tia Alicia MD        magnesium oxide (MAG-OX) tablet 400 mg  400 mg Oral Daily Frank Palacio MD   400 mg at 05/09/22 0941    [Held by provider] NIFEdipine (PROCARDIA XL) extended release tablet 30 mg  30 mg Oral Daily Klaudia Cho MD   30 mg at 05/09/22 0941    polyethylene glycol (GLYCOLAX) packet 17 g  17 g Oral Daily Otilio Iraheta MD   17 g at 05/08/22 0735    [Held by provider] metoprolol succinate (TOPROL XL) extended release tablet 25 mg  25 mg Oral Daily Frank Palacio MD   25 mg at 05/09/22 0942    famotidine (PEPCID) tablet 10 mg  10 mg Oral Daily Carl Melara MD   10 mg at 05/09/22 0942    nilotinib (TASIGNA) capsule 200 mg  200 mg Oral Q12H DANTE Jane CNP   200 mg at 05/09/22 1226    aspirin EC tablet 81 mg  81 mg Oral Daily Amisha Donohue MD   81 mg at 05/09/22 0941    clopidogrel (PLAVIX) tablet 75 mg  75 mg Oral Daily Amisha Donohue MD   75 mg at 05/09/22 0941    carboxymethylcellulose PF (THERATEARS) 1 % ophthalmic gel 1 drop  1 drop Both Eyes Q12H Amisha Donohue MD   1 drop at 05/09/22 0942    levothyroxine (SYNTHROID) tablet 50 mcg  50 mcg Oral Daily Amisha Donohue MD   50 mcg at 05/09/22 0943    sodium chloride flush 0.9 % injection 5-40 mL  5-40 mL IntraVENous 2 times per day Amisha Donohue MD   10 mL at 05/08/22 0736    sodium chloride flush 0.9 % injection 5-40 mL  5-40 mL IntraVENous PRN Amisha Donohue MD        0.9 % sodium chloride infusion   IntraVENous PRN Amisha Donohue MD        ondansetron (ZOFRAN-ODT) disintegrating tablet 4 mg  4 mg Oral Q8H PRN Amisha Donohue MD        Or    ondansetron (ZOFRAN) injection 4 mg  4 mg IntraVENous Q6H PRN Amisha Donohue MD        acetaminophen (TYLENOL) tablet 650 mg  650 mg Oral Q6H PRN Amisha Donohue MD   650 mg at 05/07/22 2122    Or    acetaminophen (TYLENOL) suppository 650 mg  650 mg Rectal Q6H PRN Amisha Donohue MD        heparin (porcine) injection 5,000 Units  5,000 Units SubCUTAneous TID Amisha Donohue, MD   5,000 Units at 05/09/22 0941    glucose (GLUTOSE) 40 % oral gel 15 g  15 g Oral PRN Yaritza Lynn MD        glucagon (rDNA) injection 1 mg  1 mg IntraMUSCular PRN Yaritza Lynn MD        dextrose 5 % solution  100 mL/hr IntraVENous PRN Yaritza Lynn MD        insulin lispro (HUMALOG) injection vial 0-6 Units  0-6 Units SubCUTAneous TID WC Yaritza Lynn MD   1 Units at 05/08/22 1734    insulin lispro (HUMALOG) injection vial 0-3 Units  0-3 Units SubCUTAneous Nightly Yaritza Lynn MD   1 Units at 05/06/22 2116    dextrose bolus (hypoglycemia) 10% 125 mL  125 mL IntraVENous PRTANYA Lynn MD        Or    dextrose bolus (hypoglycemia) 10% 250 mL  250 mL IntraVENous PRTANYA Lynn MD         Allergies   Allergen Reactions    Latex Swelling and Rash     Rubber gloves    Mangifera Indica Anaphylaxis     Francisco-- Throat closes    Penicillin G Hives    Shellfish-Derived Products Hives    Sulfa Antibiotics Hives    Grapefruit Concentrate      Due to chemo    Omeprazole Other (See Comments)     Due to chemo    Shrimp Extract Allergy Skin Test Rash      reports that she has never smoked. She has never used smokeless tobacco. She reports that she does not drink alcohol and does not use drugs. Objective:  Exam:   Constitutional:   Vitals:    05/09/22 0745 05/09/22 0756 05/09/22 1122 05/09/22 1152   BP: (!) 175/64 (!) 165/60 (!) 99/56 105/66   Pulse: 52 55 84 80   Resp: 16  16 16   Temp: 98.6 °F (37 °C)   98 °F (36.7 °C)   TempSrc: Oral   Oral   SpO2: 94%  94% 95%     General appearance:  Normal development and appear in no acute distress. Mental Status:   Oriented to person, place, problem, and time. Memory: Good immediate recall. Intact remote memory  Normal attention span and concentration. Language: intact naming, repeating and fluency   Good fund of Knowledge. Cranial Nerves:   II: Visual fields: Full.    Pupils: equal, round, reactive to light  III,IV,VI: Extra Ocular Movements are intact. No nystagmus  V: Facial sensation is intact  VII: Facial strength and movements: Left facial asymmetry  IX: Palate elevation is symmetric  XI: Shoulder shrug is intact  XII: Tongue movements are normal  Musculoskeletal: Left-sided weakness 2/5 with extensor plantar  Brisker DTRs on left compared to the right not left-sided neglect  No tremors  Gait cannot be tested due to weakness  Exam unchanged compared to Saturday      Data:  LABS:   Lab Results   Component Value Date     05/09/2022    K 3.7 05/09/2022     05/09/2022    CO2 24 05/09/2022    BUN 23 05/09/2022    CREATININE 1.3 05/09/2022    GFRAA 49 05/09/2022    LABGLOM 40 05/09/2022    GLUCOSE 112 05/09/2022    GLUCOSE 129 07/26/2017    PHOS 3.1 05/04/2022    MG 1.60 05/07/2022    CALCIUM 9.5 05/09/2022     Lab Results   Component Value Date    WBC 9.9 05/06/2022    RBC 3.50 05/06/2022    RBC 3.65 08/09/2017    HGB 11.3 05/07/2022    HCT 32.9 05/07/2022    MCV 94.3 05/06/2022    RDW 13.0 05/06/2022     05/06/2022   No results found for: INR, PROTIME    Neuroimaging was independently reviewed by me and discussed results with the patient  I reviewed blood testing and other test results and discussed results with the patient      Impression:  Acute right hemispheric MCA stroke with significant deficits and left-sided weakness with neglect. Thromboembolic from right ICA severe stenosis.   Right ICA symptomatic stenosis  Hypertension, not controlled  Rhabdomyolysis  Hyperlipidemia  Acute kidney injury      Recommendation    Discussed CT results with the patient and her   Discussed pros and cons of CEA with the patient  Given the size of the stroke and significant deficit, I feel risk of urgent CEA outweighs the benefit given high risk for reperfusion injury or hemorrhagic conversion  Discussed with Dr. Silvio Wright and he agreed to postpone any urgent vascular intervention for the next month  Patient fully understand risk of having a second stroke which is high given her significant stenosis  Continue AP therapy  Statin  Avoid hypotension  Insulin sliding scale  Neurochecks  Ready for rehab  Please call for questions    MDM: Paris Wooten MD   [phone]      This dictation was generated by voice recognition computer software. Although all attempts are made to edit the dictation for accuracy, there may be errors in the transcription that are not intended.

## 2022-05-09 NOTE — PLAN OF CARE
Skin assessment complete. Pt at risk for skin breakdown. See Graham score. Unable to reposition self in bed. Heels elevated off bed. Will continue to turn and reposition patient every two hours and as needed. Will continue to keep patient clean and dry, applying skin care cream as needed. Pillows used for positioning. Will continue to monitor and assess for skin breakdown.

## 2022-05-09 NOTE — PROGRESS NOTES
Interval History and plan:      Creatinine is stable at 1.3  Electrolytes are stable  Getting IV fluids  Plan for CT angiogram  CPK coming up down significantly to 723  Has no pain on the muscle weakness on the right-hand side  Though she has weakness of the left side of the body after her CVA recently  Blood pressure is high this morning was normal yesterday  Plan:  Agree with IV fluids prior to contrast  Blood pressure was controlled yesterday-high today morning, not sure why  Will continue to monitor before adding another agent  She has rhabdomyolysis-statin induced but no evidence of myositis and CPK going down rapidly so no work-up for myositis is indicated                   Assessment :     Acute Kidney Injury  EDYTA likely due to -rhabdomyolysis/poor p.o. intake  Cr on consultation 2.1  Baseline Cr-0.8 on 9/21  No recent baseline available-she was admitted to [facility] March 2022 with the stroke and was told that she has high creatinine    UA-5/22-large blood, trace LE  Renal Imaging:-5/22-right-10.7 cm, simple 2.2 cm right renal cyst  No mention of left kidney  Echo: 10/18-EF normal, no mention of diastolic dysfunction    Hypertension   BP: (165-175)/(60-64)  Pulse:  [52-55]   BP goal inpatient [id number] systolic inpatient    Rhabdomyolysis  CPK more than 10,000 on arrival-down to 8.2   Thought to be induced by statin  Has elevated AST and ALT    CML  Diabetes mellitus type 2 new    [facility] Nephrology would like to thank Ashley Padilla MD   for opportunity to serve this patient      Please call with questions at-   24 Hrs Answering service [phone] or  7 am- 5 pm via Perfect serve or cell phone  Zoila Valle MD          CC/reason for consult :     EDYTA     HPI :     Kim Curtis is a 66 y.o. female presented to   the hospital on 5/3/2022 with abnormal lab result from blood test done by PCP the day before admission. Also complains of pain right upper quadrant.   She had a basal ganglia stroke during March 2022 hospital for which she was admitted at [facility] from where she went [facility]. She has been taking Crestor 40 mg a day. Also complaining of lower extremity weakness. No falls are  In the ground for long time. No other complaints    She was found to have rhabdomyolysis and is getting fluids. Her Crestor has been stopped  ROS:     Seen with-no family    positives in bold   Constitutional:  fever, chills, weakness, weight change, fatigue  Skin:  rash, pruritus, hair loss, bruising, dry skin, petechiae  Head, Face, Neck   headaches, swelling,  cervical adenopathy  Respiratory: shortness of breath, cough, or wheezing  Cardiovascular: chest pain, palpitations, dizzy, edema  Gastrointestinal: nausea, vomiting, diarrhea, constipation,belly pain    Yellow skin, blood in stool  Musculoskeletal:  back pain, muscle weakness, gait problems,       joint pain or swelling. Genitourinary:  dysuria, poor urine flow, flank pain, blood in urine  Neurologic:  vertigo, TIA'S, syncope, seizures, focal weakness  Psychosocial:  insomnia, anxiety, or depression.   Additional positive findings:                    All other remaining systems are negative or unable to obtain        PMH/PSH/SH/Family History:     Past Medical History:   Diagnosis Date    Anemia     Arthritis     Asthma     Bowel dysfunction     CML (chronic myelocytic leukemia) ([address] 01/2006    leukemia    Hyperlipidemia     Hypertension     Nuclear senile cataract of both eyes 11/25/2019    Obesity     Risk of myocardial infarction or stroke 7.5% or greater in next 10 years 9/15/2021    Skin cancer of face     left cheek, squamous    Stroke (cerebrum) ([address]     Thyroid disease     TIA (transient ischemic attack)     mini ones-from chemo       Past Surgical History:   Procedure Laterality Date    ANUS SURGERY  1998    fissure    BREAST BIOPSY      BREAST LUMPECTOMY      benign    COLONOSCOPY      numerous polyps    ELBOW SURGERY  1960    removal of foreign body ([place]    FINE NEEDLE ASPIRATION      PARACENTESIS      x 2 fluid in lung from Chemo    TONSILLECTOMY Bilateral         reports that she has never smoked. She has never used smokeless tobacco. She reports that she does not drink alcohol and does not use drugs. family history includes Arrhythmia in her sister; Cancer in her brother, father, mother, and sister.          Medication:     Current Facility-Administered Medications: hydrocortisone (ANUSOL-HC) suppository 25 mg, 25 mg, Rectal, BID  methylPREDNISolone sodium (SOLU-MEDROL) injection 125 mg, 125 mg, IntraVENous, Once PRN  diclofenac sodium (VOLTAREN) 1 % gel 4 g, 4 g, Topical, 4x Daily  lactated ringers infusion, , IntraVENous, Continuous  perflutren lipid microspheres (DEFINITY) injection 1.65 mg, 1.5 mL, IntraVENous, ONCE PRN  magnesium oxide (MAG-OX) tablet 400 mg, 400 mg, Oral, Daily  NIFEdipine (PROCARDIA XL) extended release tablet 30 mg, 30 mg, Oral, Daily  polyethylene glycol (GLYCOLAX) packet 17 g, 17 g, Oral, Daily  metoprolol succinate (TOPROL XL) extended release tablet 25 mg, 25 mg, Oral, Daily  famotidine (PEPCID) tablet 10 mg, 10 mg, Oral, Daily  nilotinib (TASIGNA) capsule 200 mg, 200 mg, Oral, Q12H  aspirin EC tablet 81 mg, 81 mg, Oral, Daily  clopidogrel (PLAVIX) tablet 75 mg, 75 mg, Oral, Daily  carboxymethylcellulose PF (THERATEARS) 1 % ophthalmic gel 1 drop, 1 drop, Both Eyes, Q12H  levothyroxine (SYNTHROID) tablet 50 mcg, 50 mcg, Oral, Daily  sodium chloride flush 0.9 % injection 5-40 mL, 5-40 mL, IntraVENous, 2 times per day  sodium chloride flush 0.9 % injection 5-40 mL, 5-40 mL, IntraVENous, PRN  0.9 % sodium chloride infusion, , IntraVENous, PRN  ondansetron (ZOFRAN-ODT) disintegrating tablet 4 mg, 4 mg, Oral, Q8H PRN **OR** ondansetron (ZOFRAN) injection 4 mg, 4 mg, IntraVENous, Q6H PRN  acetaminophen (TYLENOL) tablet 650 mg, 650 mg, Oral, Q6H PRN **OR** acetaminophen (TYLENOL) suppository 650 mg, 650 mg, Rectal, Q6H PRN  heparin (porcine) injection 5,000 Units, 5,000 Units, SubCUTAneous, TID  glucose (GLUTOSE) 40 % oral gel 15 g, 15 g, Oral, PRN  glucagon (rDNA) injection 1 mg, 1 mg, IntraMUSCular, PRN  dextrose 5 % solution, 100 mL/hr, IntraVENous, PRN  insulin lispro (HUMALOG) injection vial 0-6 Units, 0-6 Units, SubCUTAneous, TID WC  insulin lispro (HUMALOG) injection vial 0-3 Units, 0-3 Units, SubCUTAneous, Nightly  dextrose bolus (hypoglycemia) 10% 125 mL, 125 mL, IntraVENous, PRN **OR** dextrose bolus (hypoglycemia) 10% 250 mL, 250 mL, IntraVENous, PRN       Vitals :     Vitals:    05/09/22 0756   BP: (!) 165/60   Pulse: 55   Resp:    Temp:    SpO2:        I & O :       Intake/Output Summary (Last 24 hours) at 5/9/2022 1035  Last data filed at 5/9/2022 1015  Gross per 24 hour   Intake 360 ml   Output 300 ml   Net 60 ml        Physical Examination :     General appearance: Anxious- no, distressed- no, in good spirits-  Yes  HEENT: Lips- normal, teeth- ok , oral mucosa- moist  Neck : Mass- no, appears symmetrical, JVD- not visible  Respiratory: Respiratory effort-  normal, wheeze- no, crackles -   Cardiovascular:  Ausculation- No M/R/G, Edema none  Abdomen: visible mass- no, distention- no, scar- no, tenderness- no                            hepatosplenomegaly-  no  Musculoskeletal:  clubbing no,cyanosis- no , digital ischemia- no                           muscle strength- grossly normal , tone - grossly normal  Skin: rashes- no , ulcers- no, induration- no, tightening - no  Psychiatric:  Judgement and insight- normal           AAO X 3  Additional finding:      LABS:     Recent Labs     05/07/22  0701   HGB 11.3*   HCT 32.9*     Recent Labs     05/07/22  0701 05/08/22  0631 05/09/22  0625    142 141   K 3.4* 3.7 3.7    108 108   CO2 26 24 24   BUN 24* 24* 23*   CREATININE 1.3* 1.3* 1.3*   GLUCOSE 120* 127* 112*   MG 1.60*  --   --

## 2022-05-09 NOTE — CARE COORDINATION
Chart reviewed by CM. Triggered by LOS - day # 6. Patient being followed by internal medicine, neurology and vascular. Current discharge plan is to go to [address] [place] [address]. No precert needed. CM verified with ARU team they are still following. Disposition pending sign off by neurology. Pt Code Stroke this morning. CM team will continue to follow.

## 2022-05-09 NOTE — SIGNIFICANT EVENT
Code stroke called for new rt sided deficits. Concern for change in speech and rt facial droop. Concern for drop in BP from medications(hold all for now). Physical exam appears improved. CThead ordered now. Informed attending physician.

## 2022-05-09 NOTE — PLAN OF CARE
Problem: Discharge Planning  Goal: Discharge to home or other facility with appropriate resources  Outcome: Progressing     Problem: Pain  Goal: Verbalizes/displays adequate comfort level or baseline comfort level  Outcome: Progressing     Problem: Safety - Adult  Goal: Free from fall injury  Outcome: Progressing     Problem: ABCDS Injury Assessment  Goal: Absence of physical injury  Outcome: Progressing     Problem: Skin/Tissue Integrity  Goal: Absence of new skin breakdown  Description: 1. Monitor for areas of redness and/or skin breakdown  2. Assess vascular access sites hourly  3. Every 4-6 hours minimum:  Change oxygen saturation probe site  4. Every 4-6 hours:  If on nasal continuous positive airway pressure, respiratory therapy assess nares and determine need for appliance change or resting period.   5/9/2022 0755 by Osmel Mohr RN  Outcome: Progressing  5/9/2022 0059 by Darlene Rhoades RN  Outcome: Not Progressing     Problem: Chronic Conditions and Co-morbidities  Goal: Patient's chronic conditions and co-morbidity symptoms are monitored and maintained or improved  Outcome: Progressing

## 2022-05-09 NOTE — PROGRESS NOTES
Hospitalist Progress Note      PCP: Sherita Wallace MD    Date of Admission: 5/3/2022    Chief Complaint: Abnormal labs     Hospital Course:   Kris Gonzalez 66 y.o.  female with past medical history of right parietal stroke with left upper and lower extremities residual neurological deficits (in March 2022) presented to the ED on 5/3/2022 complaining of generalized weakness in the setting of rhabdomyolysis, EDYTA and elevated liver enzymes found during PCP office visit. Hospital course was complicated by acute ischemic stroke with left sided deficits. CTA obtained today. Subjective:   Patient was seen and examined this morning at bedside. Patient has no concerns or complaints. Patient denies any SOB, cough, chest pain, abdominal pain, fevers, or N/V/D. At noon, code stroke called for presyncope, drop in blood pressure and change in speech per nurse. Nurse notified me that prior to this episode, patient received hydrocortisone suppository for hemorrhoids, patient then developed these symptoms. Patient headed to radiology department for CT head which showed decreased attenuation in right corona radiata with progression consistent with evolving infarct, not associated with hemorrhage or mass effect.        Medications:  Reviewed    Infusion Medications    lactated ringers 100 mL/hr at 05/09/22 0944    sodium chloride      dextrose       Scheduled Medications    hydrocortisone  25 mg Rectal BID    diclofenac sodium  4 g Topical 4x Daily    magnesium oxide  400 mg Oral Daily    [Held by provider] NIFEdipine  30 mg Oral Daily    polyethylene glycol  17 g Oral Daily    [Held by provider] metoprolol succinate  25 mg Oral Daily    famotidine  10 mg Oral Daily    nilotinib  200 mg Oral Q12H    aspirin  81 mg Oral Daily    clopidogrel  75 mg Oral Daily    carboxymethylcellulose PF  1 drop Both Eyes Q12H    levothyroxine  50 mcg Oral Daily    sodium chloride flush  5-40 mL IntraVENous 2 times per day  heparin (porcine)  5,000 Units SubCUTAneous TID    insulin lispro  0-6 Units SubCUTAneous TID     insulin lispro  0-3 Units SubCUTAneous Nightly     PRN Meds: methylPREDNISolone, perflutren lipid microspheres, sodium chloride flush, sodium chloride, ondansetron **OR** ondansetron, acetaminophen **OR** acetaminophen, glucose, glucagon (rDNA), dextrose, dextrose bolus **OR** dextrose bolus      Intake/Output Summary (Last 24 hours) at 5/9/2022 1340  Last data filed at 5/9/2022 1015  Gross per 24 hour   Intake 120 ml   Output 200 ml   Net -80 ml       Physical Exam Performed:    /66   Pulse 80   Temp 98 °F (36.7 °C) (Oral)   Resp 16   SpO2 95%     General appearance: No apparent distress, appears stated age and cooperative. HEENT: Pupils equal, round, and reactive to light. Conjunctivae/corneas clear. Neck: Supple, with full range of motion. No jugular venous distention. Trachea midline. Respiratory:  Normal respiratory effort. Clear to auscultation, bilaterally without Rales/Wheezes/Rhonchi. Cardiovascular: Regular rate and rhythm with normal S1/S2 without murmurs, rubs or gallops. Abdomen: Soft, non-tender, non-distended with normal bowel sounds. Musculoskeletal/neuro: left lower extremity:   Inspection: no swelling, no bruises rashes or lesions. Range of motion: active and passive range of motion grossly impaired. Strengths: Hip flexion 1/5, knee extension 1/5, foot dorsiflexion1/5 and foot plantar flexion 1/5, big toe dorsiflexion 1/5. Sensation impaired. Dorsalis pedis intact. Speech fluent, mild facial droop. Skin: Skin color, texture, turgor normal.  No rashes or lesions.   Psychiatric: Alert and oriented, thought content appropriate, normal insight  Capillary Refill: Brisk,3 seconds, normal   Peripheral Pulses: +2 palpable, equal bilaterally       Labs:   Recent Labs     05/07/22  0701   HGB 11.3*   HCT 32.9*     Recent Labs     05/07/22  0701 05/08/22  0631 05/09/22  0625   NA 142 142 141   K 3.4* 3.7 3.7    108 108   CO2 26 24 24   BUN 24* 24* 23*   CREATININE 1.3* 1.3* 1.3*   CALCIUM 8.5 9.0 9.5     Recent Labs     05/07/22  0701 05/08/22  0631 05/09/22  0625   * 98* 75*   * 122* 109*   BILITOT 0.9 0.8 0.6   ALKPHOS 89 82 94     No results for input(s): INR in the last 72 hours. Recent Labs     05/09/22  0625   CKTOTAL 723*       Urinalysis:      Lab Results   Component Value Date    NITRU Negative 05/03/2022    WBCUA 3-5 05/03/2022    RBCUA 5-10 05/03/2022    BLOODU LARGE 05/03/2022    SPECGRAV 1.020 05/03/2022    GLUCOSEU Negative 05/03/2022       Radiology:  CT HEAD WO CONTRAST   Preliminary Result   Decreased attenuation in the right corona radiata with progression consistent   with evolving infarct. No associated hemorrhage or mass effect. Stable decreased attenuation in the right temporal and parietal lobes. Stable lacunar infarcts in the right basal ganglia and ken. Findings were discussed with Dr. Sandhya Mcguire at 11:58 a.m. on 5/9/2022. CTA NECK W CONTRAST   Final Result   1. Hemodynamically significant stenosis within the right proximal cervical   ICA with approximately 90-95% stenosis per NASCET criteria. 2. Approximately 50% stenosis within the left cervical ICA, 1.5 cm distal to   the bifurcation. 3. High-grade stenoses throughout the bilateral cervical vertebral arteries,   as above. 4.  Mildly enlarged, heterogeneous appearance of the thyroid gland. RECOMMENDATIONS:   Nonemergent thyroid ultrasound is recommended. XR CHEST PORTABLE   Final Result   No acute cardiopulmonary disease is noted, noting limitations in the lung   bases. RECOMMENDATION:   If there is persistent concern, follow-up PA and lateral chest is recommended. VL DUP CAROTID BILATERAL   Final Result      MRI BRAIN WO CONTRAST   Final Result   1.  Areas of acute infarct involving the right cerebral hemisphere   predominantly in a somewhat watershed distribution. Sulcal effacement is   seen without significant mass effect or midline shift. 2. Otherwise, no acute intracranial abnormality seen. No acute hemorrhage. 3. Chronic lacunar infarct within the right ken. These results were sent to the [facility] [id number] ([address] [facility]) on 5/6/2022   at 2:33 pm to be communicated to the referring/covering health care   provider/office. CT HEAD WO CONTRAST   Final Result   1. Increased conspicuity of ill-defined hypodensity in the right frontal   corona radiata white matter concerning for evolving acute to subacute   ischemic infarct. 2. Additional ill-defined hypodense foci in the anterior right frontal and   right parietal white matter are unchanged and are consistent with infarcts of   indeterminate chronicity. 3. No intracranial hemorrhage. 4. Chronic appearing lacunar infarcts in the right basal ganglia and right   ken. RECOMMENDATIONS:   MRI of the brain. CT HEAD WO CONTRAST   Final Result   No acute intracranial abnormality. US GALLBLADDER RUQ   Final Result   No cholelithiasis or significant dilation of the common duct. Focal tenderness is noted over the gallbladder. Additional clinical   correlation for cholecystitis recommended. CT ABDOMEN PELVIS WO CONTRAST Additional Contrast? None   Final Result   No acute abdominopelvic abnormality. Trace pericardial and small bilateral pleural effusions.                  Assessment/Plan:    Active Hospital Problems    Diagnosis     DM type 2, controlled, with complication ([address] [C32.4]      Priority: Medium    Elevated liver enzymes [R74.8]      Priority: Medium    Type 2 diabetes mellitus with kidney complication, without long-term current use of insulin (HCC) [E11.29]      Priority: Medium    Rhabdomyolysis [M62.82]      Priority: Medium    EDYTA (acute kidney injury) ([address] [N17.9]      Priority: Medium    Troponin level elevated [R77.8] Priority: Medium    Arterial ischemic stroke, ICA, right, acute (HCC) [I63.231]      Priority: Medium    HTN (hypertension), benign [I10]     Dyslipidemia [E78.5]     Acquired hypothyroidism [E03.9]      Acute right hemispheric MCA stroke with left sided weakness and neglect  - CT head 5/5/2022 showed increased conspicuity of ill-defined hypodensity in the right frontal corona radiata white matter concerning for evolving acute to subacute ischemic infarct.   - MRI brain 5/6/2022 showed areas of acute infarct involving the right cerebral hemisphere predominantly in a somewhat watershed distribution.  - CTA neck 5/9/2022 showed significant stenosis within the right proximal cervical ICA with approximately 90-95% stenosis per NASCET criteria. It also showed high grade stenoses throughout bilateral cervical vertebral arteries. - neurology and vascular consulted and both feel like risk of urgent CEA outweighs the benefit given high risk for reperfusion injury or hemorrhagic conversion. Urgent vascular intervention postponed for next months. Patient will need to follow up with vascular in 4 weeks for further management. - Will continue stroke management DAPT  - Continue tele monitoring. Rhabdomyolysis   Acute renal injury  - CPK trending down   - Creatinine level 1.3 with good urinary output.   - Nephrology consulted and following. Hypertension  - blood pressure medications held for now as patient had an episode of hypotension at noon. - Hold hydralazyne, isosorbid mononitrate. - Will continue monitoring,     Presyncope   - New onset 5/9/2022  - Most likely vasovagal as patient had presyncope episode after nurse administered suppository hydrocortisone.    - Will continue monitoring     Pharyngitis   - Resolved   - COVID-19 rapid and flu negative.  - SLP consulted     Liver injury with elevated LFT's   - most likely due to statin induced rhabdo  - Stopped statin for now  - Improving with LFTs trending down.     CML   - Stable  - Patient follows Dr. Edgardo Javier  - on nilotinib daily at home currently on hold    Enlarged heterogeneous appearance of the thyroid gland  As shown on CTA neck  - Thyroid ultrasound is recommended  - Consider following up with PCP after discharge. DVT Prophylaxis: heparin   Diet: ADULT DIET; Dysphagia - Soft and Bite Sized; 3 carb choices (45 gm/meal)  Code Status: Full Code    PT/OT Eval Status: Consulted, recommended IP rehab. Dispo - most likely tomorrow, disposition to . Sandhya Evans [address],    5/9/2022     Addendum to Resident Progress note:  Pt seen,examined and evaluated with resident and discussed regarding POC . I have reviewed the current history, physical findings, labs and assessment and plan and agree with note as documented by resident DO ( Dr. Dana Yusuf)    Patient seen and examined . Noted code stroke called this AM . Repeat CT head w/o no acute changes - evolving infarct. Noted vascular consult noted - CTA neck noted show 90-95% stenosis in R ICA . Noted vascular surgery recs -they discussed with Neurology and planned for CEA in delayed fashion in 4 weeks. [id number] [address] for patient to go to [facility] likely tomorrow .      Severa Filippo, MD  Hospitalist Physician

## 2022-05-09 NOTE — FLOWSHEET NOTE
05/09/22 1152   Vital Signs   Temp 98 °F (36.7 °C)   Temp Source Oral   Pulse 80   Heart Rate Source Monitor   Resp 16   /66   BP Location Right upper arm   MAP (Calculated) 79   Level of Consciousness Alert (0)   MEWS Score 1   Pain Assessment   Pain Assessment None - Denies Pain   Pain Level 0   Care Plan - Pain Goals   Verbalizes/displays adequate comfort level or baseline comfort level Encourage patient to monitor pain and request assistance   Oxygen Therapy   SpO2 95 %   O2 Device None (Room air)   post code stroke vitals and assessment  call pt returned from CT , new onset defiticts to right side resolved , speech clear , pt caox3, strong strengths no drift to right side .  Pt follows darlin , no changes to left side flaccid from previous CVA ,  at [facility]

## 2022-05-09 NOTE — PROGRESS NOTES
Vascular f/u-  CTA reviewed. ANGEL patent beyond a very high grade stenosis. Discussed with Neurology- Dr Yahir Castelan, in light of recent severe CVA. We are in agreement that CEA is necessary but would be best done in a delayed fashion. I will see patient in f/u in 4 weeks to discuss and schedule at that time.

## 2022-05-09 NOTE — PROGRESS NOTES
Vascular    Creat stable at 1.3 for several days. Will increase fluids to 100 x next 5 hours and obtain CTA Neck to completely assess ANGEL.

## 2022-05-09 NOTE — PROGRESS NOTES
Code stroke called, pt. Experiencing facial numbness and droopage to the right, speech nonverbal at time of stroke, NIH being obtained right now. 93/55, p 80, 02 91%, r 18. Pt. Speech clearing. Pt. A&O.

## 2022-05-09 NOTE — FLOWSHEET NOTE
05/09/22 1530   Vital Signs   Temp 98 °F (36.7 °C)   Temp Source Oral   Pulse 70   Heart Rate Source Monitor   Resp 16   /60   BP Location Right upper arm   MAP (Calculated) 76.67   Level of Consciousness Alert (0)   MEWS Score 1   Pain Assessment   Pain Assessment 0-10   Pain Level 4   Pain Location Rectum   Oxygen Therapy   SpO2 95 %   O2 Device None (Room air)   NIHSS completed right side and speech at baseline from AM assessment , left side flaccid from previous stroke , baseline assessment left side flaccid, no peripheral vision on left side , left side neglect noted , vascualr surgeon at [facility] reports follow up in 4 weeks with him no surgery at this time

## 2022-05-10 ENCOUNTER — HOSPITAL ENCOUNTER (INPATIENT)
Age: 72
LOS: 28 days | Discharge: NURSING, REHAB OR LONG-TERM CARE | DRG: 056 | End: 2022-06-07
Attending: STUDENT IN AN ORGANIZED HEALTH CARE EDUCATION/TRAINING PROGRAM | Admitting: STUDENT IN AN ORGANIZED HEALTH CARE EDUCATION/TRAINING PROGRAM
Payer: MEDICARE

## 2022-05-10 VITALS
DIASTOLIC BLOOD PRESSURE: 64 MMHG | BODY MASS INDEX: 25.85 KG/M2 | SYSTOLIC BLOOD PRESSURE: 142 MMHG | RESPIRATION RATE: 18 BRPM | HEIGHT: 63 IN | HEART RATE: 95 BPM | TEMPERATURE: 99.7 F | OXYGEN SATURATION: 92 %

## 2022-05-10 PROBLEM — I63.9 ACUTE CVA (CEREBROVASCULAR ACCIDENT) (HCC): Status: ACTIVE | Noted: 2022-05-10

## 2022-05-10 PROBLEM — R58 HEMORRHAGE: Status: ACTIVE | Noted: 2022-05-10

## 2022-05-10 PROBLEM — R55 PRE-SYNCOPE: Status: ACTIVE | Noted: 2022-05-10

## 2022-05-10 LAB
A/G RATIO: 1 (ref 1.1–2.2)
ALBUMIN SERPL-MCNC: 2.9 G/DL (ref 3.4–5)
ALP BLD-CCNC: 89 U/L (ref 40–129)
ALT SERPL-CCNC: 85 U/L (ref 10–40)
ANION GAP SERPL CALCULATED.3IONS-SCNC: 9 MMOL/L (ref 3–16)
AST SERPL-CCNC: 54 U/L (ref 15–37)
BILIRUB SERPL-MCNC: 0.6 MG/DL (ref 0–1)
BUN BLDV-MCNC: 24 MG/DL (ref 7–20)
CALCIUM SERPL-MCNC: 8.7 MG/DL (ref 8.3–10.6)
CHLORIDE BLD-SCNC: 106 MMOL/L (ref 99–110)
CO2: 25 MMOL/L (ref 21–32)
CREAT SERPL-MCNC: 1.1 MG/DL (ref 0.6–1.2)
GFR AFRICAN AMERICAN: 59
GFR NON-AFRICAN AMERICAN: 49
GLUCOSE BLD-MCNC: 102 MG/DL (ref 70–99)
GLUCOSE BLD-MCNC: 103 MG/DL (ref 70–99)
GLUCOSE BLD-MCNC: 115 MG/DL (ref 70–99)
GLUCOSE BLD-MCNC: 118 MG/DL (ref 70–99)
GLUCOSE BLD-MCNC: 122 MG/DL (ref 70–99)
HCT VFR BLD CALC: 29.5 % (ref 36–48)
HEMOGLOBIN: 10.1 G/DL (ref 12–16)
MAGNESIUM: 1.8 MG/DL (ref 1.8–2.4)
MCH RBC QN AUTO: 31.6 PG (ref 26–34)
MCHC RBC AUTO-ENTMCNC: 34.2 G/DL (ref 31–36)
MCV RBC AUTO: 92.2 FL (ref 80–100)
PDW BLD-RTO: 13.5 % (ref 12.4–15.4)
PERFORMED ON: ABNORMAL
PLATELET # BLD: 163 K/UL (ref 135–450)
PMV BLD AUTO: 10.4 FL (ref 5–10.5)
POTASSIUM REFLEX MAGNESIUM: 3.4 MMOL/L (ref 3.5–5.1)
RBC # BLD: 3.2 M/UL (ref 4–5.2)
SARS-COV-2, NAAT: NOT DETECTED
SODIUM BLD-SCNC: 140 MMOL/L (ref 136–145)
TOTAL PROTEIN: 5.9 G/DL (ref 6.4–8.2)
WBC # BLD: 9.6 K/UL (ref 4–11)

## 2022-05-10 PROCEDURE — 97530 THERAPEUTIC ACTIVITIES: CPT

## 2022-05-10 PROCEDURE — 2580000003 HC RX 258: Performed by: SURGERY

## 2022-05-10 PROCEDURE — 6370000000 HC RX 637 (ALT 250 FOR IP): Performed by: INTERNAL MEDICINE

## 2022-05-10 PROCEDURE — 85027 COMPLETE CBC AUTOMATED: CPT

## 2022-05-10 PROCEDURE — 80053 COMPREHEN METABOLIC PANEL: CPT

## 2022-05-10 PROCEDURE — 6360000002 HC RX W HCPCS: Performed by: INTERNAL MEDICINE

## 2022-05-10 PROCEDURE — 6360000002 HC RX W HCPCS: Performed by: STUDENT IN AN ORGANIZED HEALTH CARE EDUCATION/TRAINING PROGRAM

## 2022-05-10 PROCEDURE — 6370000000 HC RX 637 (ALT 250 FOR IP)

## 2022-05-10 PROCEDURE — 36415 COLL VENOUS BLD VENIPUNCTURE: CPT

## 2022-05-10 PROCEDURE — 83735 ASSAY OF MAGNESIUM: CPT

## 2022-05-10 PROCEDURE — 87635 SARS-COV-2 COVID-19 AMP PRB: CPT

## 2022-05-10 PROCEDURE — 6370000000 HC RX 637 (ALT 250 FOR IP): Performed by: STUDENT IN AN ORGANIZED HEALTH CARE EDUCATION/TRAINING PROGRAM

## 2022-05-10 PROCEDURE — 1280000000 HC REHAB R&B

## 2022-05-10 PROCEDURE — 97110 THERAPEUTIC EXERCISES: CPT

## 2022-05-10 PROCEDURE — 2580000003 HC RX 258: Performed by: INTERNAL MEDICINE

## 2022-05-10 RX ORDER — LANOLIN ALCOHOL/MO/W.PET/CERES
400 CREAM (GRAM) TOPICAL DAILY
Status: CANCELLED | OUTPATIENT
Start: 2022-05-10

## 2022-05-10 RX ORDER — POLYETHYLENE GLYCOL 3350 17 G/17G
17 POWDER, FOR SOLUTION ORAL DAILY
Status: CANCELLED | OUTPATIENT
Start: 2022-05-10

## 2022-05-10 RX ORDER — ACETAMINOPHEN 650 MG/1
650 SUPPOSITORY RECTAL EVERY 6 HOURS PRN
Status: DISCONTINUED | OUTPATIENT
Start: 2022-05-10 | End: 2022-05-11

## 2022-05-10 RX ORDER — LISINOPRIL 20 MG/1
20 TABLET ORAL DAILY
Status: DISCONTINUED | OUTPATIENT
Start: 2022-05-10 | End: 2022-05-10 | Stop reason: HOSPADM

## 2022-05-10 RX ORDER — DEXTROSE MONOHYDRATE 50 MG/ML
100 INJECTION, SOLUTION INTRAVENOUS PRN
Status: CANCELLED | OUTPATIENT
Start: 2022-05-10

## 2022-05-10 RX ORDER — HEPARIN SODIUM 5000 [USP'U]/ML
5000 INJECTION, SOLUTION INTRAVENOUS; SUBCUTANEOUS 3 TIMES DAILY
Status: CANCELLED | OUTPATIENT
Start: 2022-05-10

## 2022-05-10 RX ORDER — DIAPER,BRIEF,INFANT-TODD,DISP
EACH MISCELLANEOUS 2 TIMES DAILY
Status: CANCELLED | OUTPATIENT
Start: 2022-05-10

## 2022-05-10 RX ORDER — MAGNESIUM SULFATE IN WATER 40 MG/ML
2000 INJECTION, SOLUTION INTRAVENOUS ONCE
Status: COMPLETED | OUTPATIENT
Start: 2022-05-10 | End: 2022-05-10

## 2022-05-10 RX ORDER — CLOPIDOGREL BISULFATE 75 MG/1
75 TABLET ORAL DAILY
Status: DISCONTINUED | OUTPATIENT
Start: 2022-05-10 | End: 2022-06-07 | Stop reason: HOSPADM

## 2022-05-10 RX ORDER — BISACODYL 10 MG
10 SUPPOSITORY, RECTAL RECTAL DAILY PRN
Status: CANCELLED | OUTPATIENT
Start: 2022-05-10

## 2022-05-10 RX ORDER — LEVOTHYROXINE SODIUM 0.05 MG/1
50 TABLET ORAL DAILY
Status: DISCONTINUED | OUTPATIENT
Start: 2022-05-10 | End: 2022-06-07 | Stop reason: HOSPADM

## 2022-05-10 RX ORDER — MAGNESIUM HYDROXIDE/ALUMINUM HYDROXICE/SIMETHICONE 120; 1200; 1200 MG/30ML; MG/30ML; MG/30ML
30 SUSPENSION ORAL EVERY 6 HOURS PRN
Status: DISCONTINUED | OUTPATIENT
Start: 2022-05-10 | End: 2022-06-07 | Stop reason: HOSPADM

## 2022-05-10 RX ORDER — ACETAMINOPHEN 325 MG/1
650 TABLET ORAL EVERY 6 HOURS PRN
Status: CANCELLED | OUTPATIENT
Start: 2022-05-10

## 2022-05-10 RX ORDER — INSULIN LISPRO 100 [IU]/ML
0-6 INJECTION, SOLUTION INTRAVENOUS; SUBCUTANEOUS
Status: DISCONTINUED | OUTPATIENT
Start: 2022-05-10 | End: 2022-05-16

## 2022-05-10 RX ORDER — DEXTROSE MONOHYDRATE 50 MG/ML
100 INJECTION, SOLUTION INTRAVENOUS PRN
Status: DISCONTINUED | OUTPATIENT
Start: 2022-05-10 | End: 2022-06-07 | Stop reason: HOSPADM

## 2022-05-10 RX ORDER — ONDANSETRON 2 MG/ML
4 INJECTION INTRAMUSCULAR; INTRAVENOUS EVERY 6 HOURS PRN
Status: CANCELLED | OUTPATIENT
Start: 2022-05-10

## 2022-05-10 RX ORDER — ONDANSETRON 4 MG/1
4 TABLET, ORALLY DISINTEGRATING ORAL EVERY 8 HOURS PRN
Status: CANCELLED | OUTPATIENT
Start: 2022-05-10

## 2022-05-10 RX ORDER — HEPARIN SODIUM 5000 [USP'U]/ML
5000 INJECTION, SOLUTION INTRAVENOUS; SUBCUTANEOUS 3 TIMES DAILY
Status: DISCONTINUED | OUTPATIENT
Start: 2022-05-10 | End: 2022-06-07 | Stop reason: HOSPADM

## 2022-05-10 RX ORDER — ONDANSETRON 4 MG/1
4 TABLET, ORALLY DISINTEGRATING ORAL EVERY 8 HOURS PRN
Status: DISCONTINUED | OUTPATIENT
Start: 2022-05-10 | End: 2022-06-07 | Stop reason: HOSPADM

## 2022-05-10 RX ORDER — BISACODYL 10 MG
10 SUPPOSITORY, RECTAL RECTAL DAILY PRN
Status: DISCONTINUED | OUTPATIENT
Start: 2022-05-10 | End: 2022-06-07 | Stop reason: HOSPADM

## 2022-05-10 RX ORDER — DIAPER,BRIEF,INFANT-TODD,DISP
EACH MISCELLANEOUS 2 TIMES DAILY
Status: DISCONTINUED | OUTPATIENT
Start: 2022-05-10 | End: 2022-06-07 | Stop reason: HOSPADM

## 2022-05-10 RX ORDER — DIAPER,BRIEF,INFANT-TODD,DISP
EACH MISCELLANEOUS 2 TIMES DAILY
Status: DISCONTINUED | OUTPATIENT
Start: 2022-05-10 | End: 2022-05-10 | Stop reason: HOSPADM

## 2022-05-10 RX ORDER — LANOLIN ALCOHOL/MO/W.PET/CERES
400 CREAM (GRAM) TOPICAL DAILY
Status: DISCONTINUED | OUTPATIENT
Start: 2022-05-10 | End: 2022-06-07 | Stop reason: HOSPADM

## 2022-05-10 RX ORDER — LEVOTHYROXINE SODIUM 0.05 MG/1
50 TABLET ORAL DAILY
Status: CANCELLED | OUTPATIENT
Start: 2022-05-10

## 2022-05-10 RX ORDER — NICOTINE POLACRILEX 4 MG
15 LOZENGE BUCCAL PRN
Status: DISCONTINUED | OUTPATIENT
Start: 2022-05-10 | End: 2022-06-07 | Stop reason: HOSPADM

## 2022-05-10 RX ORDER — ACETAMINOPHEN 650 MG/1
650 SUPPOSITORY RECTAL EVERY 6 HOURS PRN
Status: CANCELLED | OUTPATIENT
Start: 2022-05-10

## 2022-05-10 RX ORDER — FAMOTIDINE 20 MG/1
10 TABLET, FILM COATED ORAL DAILY
Status: CANCELLED | OUTPATIENT
Start: 2022-05-10

## 2022-05-10 RX ORDER — FAMOTIDINE 20 MG/1
10 TABLET, FILM COATED ORAL DAILY
Status: DISCONTINUED | OUTPATIENT
Start: 2022-05-10 | End: 2022-05-31

## 2022-05-10 RX ORDER — ONDANSETRON 2 MG/ML
4 INJECTION INTRAMUSCULAR; INTRAVENOUS EVERY 6 HOURS PRN
Status: DISCONTINUED | OUTPATIENT
Start: 2022-05-10 | End: 2022-06-07 | Stop reason: HOSPADM

## 2022-05-10 RX ORDER — ASPIRIN 81 MG/1
81 TABLET ORAL DAILY
Status: CANCELLED | OUTPATIENT
Start: 2022-05-10

## 2022-05-10 RX ORDER — MAGNESIUM HYDROXIDE/ALUMINUM HYDROXICE/SIMETHICONE 120; 1200; 1200 MG/30ML; MG/30ML; MG/30ML
30 SUSPENSION ORAL EVERY 6 HOURS PRN
Status: CANCELLED | OUTPATIENT
Start: 2022-05-10

## 2022-05-10 RX ORDER — NICOTINE POLACRILEX 4 MG
15 LOZENGE BUCCAL PRN
Status: CANCELLED | OUTPATIENT
Start: 2022-05-10

## 2022-05-10 RX ORDER — CLOPIDOGREL BISULFATE 75 MG/1
75 TABLET ORAL DAILY
Status: CANCELLED | OUTPATIENT
Start: 2022-05-10

## 2022-05-10 RX ORDER — INSULIN LISPRO 100 [IU]/ML
0-3 INJECTION, SOLUTION INTRAVENOUS; SUBCUTANEOUS NIGHTLY
Status: DISCONTINUED | OUTPATIENT
Start: 2022-05-10 | End: 2022-05-16

## 2022-05-10 RX ORDER — ACETAMINOPHEN 325 MG/1
650 TABLET ORAL EVERY 6 HOURS PRN
Status: DISCONTINUED | OUTPATIENT
Start: 2022-05-10 | End: 2022-05-11

## 2022-05-10 RX ORDER — ASPIRIN 81 MG/1
81 TABLET ORAL DAILY
Status: DISCONTINUED | OUTPATIENT
Start: 2022-05-10 | End: 2022-06-07 | Stop reason: HOSPADM

## 2022-05-10 RX ORDER — CARBOXYMETHYLCELLULOSE SODIUM 10 MG/ML
1 GEL OPHTHALMIC EVERY 12 HOURS
Status: DISCONTINUED | OUTPATIENT
Start: 2022-05-10 | End: 2022-06-07 | Stop reason: HOSPADM

## 2022-05-10 RX ORDER — LISINOPRIL 20 MG/1
20 TABLET ORAL DAILY
Status: CANCELLED | OUTPATIENT
Start: 2022-05-10

## 2022-05-10 RX ORDER — INSULIN LISPRO 100 [IU]/ML
0-6 INJECTION, SOLUTION INTRAVENOUS; SUBCUTANEOUS
Status: CANCELLED | OUTPATIENT
Start: 2022-05-10

## 2022-05-10 RX ORDER — DIAPER,BRIEF,INFANT-TODD,DISP
EACH MISCELLANEOUS
Qty: 30 G | Refills: 0 | Status: ON HOLD | OUTPATIENT
Start: 2022-05-10 | End: 2022-06-06 | Stop reason: HOSPADM

## 2022-05-10 RX ORDER — INSULIN LISPRO 100 [IU]/ML
0-3 INJECTION, SOLUTION INTRAVENOUS; SUBCUTANEOUS NIGHTLY
Status: CANCELLED | OUTPATIENT
Start: 2022-05-10

## 2022-05-10 RX ORDER — CARBOXYMETHYLCELLULOSE SODIUM 10 MG/ML
1 GEL OPHTHALMIC EVERY 12 HOURS
Status: CANCELLED | OUTPATIENT
Start: 2022-05-10

## 2022-05-10 RX ORDER — LISINOPRIL 20 MG/1
20 TABLET ORAL DAILY
Status: DISCONTINUED | OUTPATIENT
Start: 2022-05-10 | End: 2022-05-13

## 2022-05-10 RX ORDER — POLYETHYLENE GLYCOL 3350 17 G/17G
17 POWDER, FOR SOLUTION ORAL DAILY
Status: DISCONTINUED | OUTPATIENT
Start: 2022-05-10 | End: 2022-05-16

## 2022-05-10 RX ADMIN — CARBOXYMETHYLCELLULOSE SODIUM 1 DROP: 10 GEL OPHTHALMIC at 21:28

## 2022-05-10 RX ADMIN — SODIUM CHLORIDE, POTASSIUM CHLORIDE, SODIUM LACTATE AND CALCIUM CHLORIDE: 600; 310; 30; 20 INJECTION, SOLUTION INTRAVENOUS at 03:10

## 2022-05-10 RX ADMIN — ASPIRIN 81 MG: 81 TABLET, COATED ORAL at 09:18

## 2022-05-10 RX ADMIN — DICLOFENAC SODIUM 4 G: 10 GEL TOPICAL at 14:45

## 2022-05-10 RX ADMIN — HYDROCORTISONE: 1 CREAM TOPICAL at 10:51

## 2022-05-10 RX ADMIN — HYDROCORTISONE: 1 CREAM TOPICAL at 21:27

## 2022-05-10 RX ADMIN — POTASSIUM BICARBONATE 40 MEQ: 782 TABLET, EFFERVESCENT ORAL at 10:51

## 2022-05-10 RX ADMIN — Medication 400 MG: at 09:18

## 2022-05-10 RX ADMIN — SODIUM CHLORIDE, PRESERVATIVE FREE 10 ML: 5 INJECTION INTRAVENOUS at 09:18

## 2022-05-10 RX ADMIN — DICLOFENAC SODIUM 4 G: 10 GEL TOPICAL at 21:28

## 2022-05-10 RX ADMIN — CARBOXYMETHYLCELLULOSE SODIUM 1 DROP: 10 GEL OPHTHALMIC at 09:18

## 2022-05-10 RX ADMIN — DICLOFENAC SODIUM 4 G: 10 GEL TOPICAL at 09:19

## 2022-05-10 RX ADMIN — HEPARIN SODIUM 5000 UNITS: 5000 INJECTION INTRAVENOUS; SUBCUTANEOUS at 21:28

## 2022-05-10 RX ADMIN — POLYETHYLENE GLYCOL 3350 17 G: 17 POWDER, FOR SOLUTION ORAL at 09:18

## 2022-05-10 RX ADMIN — WITCH HAZEL 40 EACH: 500 SOLUTION RECTAL; TOPICAL at 09:23

## 2022-05-10 RX ADMIN — FAMOTIDINE 10 MG: 20 TABLET, FILM COATED ORAL at 09:18

## 2022-05-10 RX ADMIN — ACETAMINOPHEN 650 MG: 325 TABLET ORAL at 21:28

## 2022-05-10 RX ADMIN — HEPARIN SODIUM 5000 UNITS: 5000 INJECTION INTRAVENOUS; SUBCUTANEOUS at 16:08

## 2022-05-10 RX ADMIN — MAGNESIUM SULFATE HEPTAHYDRATE 2000 MG: 40 INJECTION, SOLUTION INTRAVENOUS at 10:48

## 2022-05-10 RX ADMIN — LISINOPRIL 20 MG: 20 TABLET ORAL at 10:51

## 2022-05-10 RX ADMIN — LEVOTHYROXINE SODIUM 50 MCG: 0.05 TABLET ORAL at 09:18

## 2022-05-10 RX ADMIN — HEPARIN SODIUM 5000 UNITS: 5000 INJECTION INTRAVENOUS; SUBCUTANEOUS at 09:17

## 2022-05-10 RX ADMIN — CLOPIDOGREL BISULFATE 75 MG: 75 TABLET, FILM COATED ORAL at 09:18

## 2022-05-10 ASSESSMENT — PAIN SCALES - GENERAL
PAINLEVEL_OUTOF10: 5
PAINLEVEL_OUTOF10: 6
PAINLEVEL_OUTOF10: 0
PAINLEVEL_OUTOF10: 4

## 2022-05-10 ASSESSMENT — PAIN DESCRIPTION - ORIENTATION
ORIENTATION: RIGHT
ORIENTATION: RIGHT

## 2022-05-10 ASSESSMENT — PAIN DESCRIPTION - FREQUENCY
FREQUENCY: CONTINUOUS
FREQUENCY: INTERMITTENT

## 2022-05-10 ASSESSMENT — PAIN DESCRIPTION - LOCATION
LOCATION: SHOULDER;RECTUM
LOCATION: RECTUM

## 2022-05-10 ASSESSMENT — PAIN DESCRIPTION - DESCRIPTORS
DESCRIPTORS: ACHING;SORE
DESCRIPTORS: ACHING

## 2022-05-10 ASSESSMENT — PAIN DESCRIPTION - PAIN TYPE
TYPE: CHRONIC PAIN
TYPE: ACUTE PAIN

## 2022-05-10 ASSESSMENT — PAIN DESCRIPTION - ONSET: ONSET: ON-GOING

## 2022-05-10 NOTE — PLAN OF CARE
Problem: Safety - Adult  Goal: Free from fall injury  Outcome: Progressing   Patient made aware of bed alarms and call light system. Patient instructed to use call light before attempting to get out of bed.

## 2022-05-10 NOTE — PROGRESS NOTES
Comprehensive Nutrition Assessment    Type and Reason for Visit:  Initial    Nutrition Recommendations/Plan:   1. Continue soft and bite sized, carb control diet  2. Diet texture/liquid level per SLP recommendations  3. Encourage po intakes  4. Obtain current weight   5. Monitor po intakes, nutrition adequacy, weights, pertinent labs, BMs     Malnutrition Assessment:  Malnutrition Status:  No malnutrition (05/10/22 1217)      Nutrition Assessment:    LOS assessment. Pt admitted with c/o generalized weakness in the setting of rhabdomyolysis, EDYTA and elevated liver enzymes found during PCP office visit. Currently on a soft and bite sized, carb controlled diet with po intakes % per EMR. Tolerating diet well. Pt endorses good appetite and denied need for ONS. SERGIO weight changes d/t lack of CBW. Weight ordered on 5/10. Pt reports that her last weight was 143.5 lb. Pt denied need for any diet education or any nutrition questions at this time. Plans to d/c to [facility]. Will monitor. Nutrition Related Findings:    BM x3 on 5/9 Wound Type: None       Current Nutrition Intake & Therapies:    Average Meal Intake: %  Average Supplements Intake: None Ordered  ADULT DIET; Dysphagia - Soft and Bite Sized; 3 carb choices (45 gm/meal)    Anthropometric Measures:  Height: 5' 2.5\" (158.8 cm)  Ideal Body Weight (IBW): 113 lbs (51 kg)       Current Body Weight:  (None recorded)   Usual Body Weight: 143 lb (64.9 kg)                       BMI Categories: Overweight (BMI 25.0-29. 9)    Estimated Daily Nutrient Needs:  Energy Requirements Based On: Kcal/kg  Weight Used for Energy Requirements: Usual  Energy (kcal/day): 0326-0121  Weight Used for Protein Requirements: Usual  Protein (g/day): 65-78  Method Used for Fluid Requirements: 1 ml/kcal  Fluid (ml/day): 7412-1281    Nutrition Diagnosis:   No nutrition diagnosis at this time     Nutrition Interventions:   Food and/or Nutrient Delivery: Continue Current Diet  Nutrition Education/Counseling: Education declined  Coordination of Nutrition Care: Continue to monitor while inpatient,Speech Therapy  Plan of Care discussed with: Patient    Goals:     Goals: PO intake 75% or greater,prior to discharge       Nutrition Monitoring and Evaluation:   Behavioral-Environmental Outcomes: None Identified  Food/Nutrient Intake Outcomes: Food and Nutrient Intake  Physical Signs/Symptoms Outcomes: Biochemical Data,Weight,Chewing or Swallowing    Discharge Planning:    Continue current diet     Anamika Leonard RD, LD  Contact: [phone]

## 2022-05-10 NOTE — DISCHARGE SUMMARY
Hospital Medicine Discharge Summary    Patient ID: [facility]      Patient's PCP: Bandar Palmer MD    Admit Date: 5/3/2022     Discharge Date:   5/10/2022    Admitting Provider: Eri Conley MD     Discharge Provider: Rommel Molina MD     Discharge Diagnoses: Acute arterial ischemic stroke   Rhabdomyolysis     Active Hospital Problems    Diagnosis     Pre-syncope [R55]      Priority: Medium    Hemorrhage [R58]      Priority: Medium    DM type 2, controlled, with complication ([facility] [A20.3]      Priority: Medium    Elevated liver enzymes [R74.8]      Priority: Medium    Type 2 diabetes mellitus with kidney complication, without long-term current use of insulin ([facility] [E11.29]      Priority: Medium    Rhabdomyolysis [M62.82]      Priority: Medium    EDYTA (acute kidney injury) ([facility] [N17.9]      Priority: Medium    Troponin level elevated [R77.8]      Priority: Medium    Arterial ischemic stroke, ICA, right, acute ([facility] [I63.231]      Priority: Medium    HTN (hypertension), benign [I10]     Dyslipidemia [E78.5]     Acquired hypothyroidism [E03.9]     CML (chronic myelocytic leukemia) ([facility] [C92.10]        The patient was seen and examined on day of discharge and this discharge summary is in conjunction with any daily progress note from day of discharge. Hospital Course: [facility] 66 y.o.  female with past medical history of right parietal stroke with left upper and lower extremities residual neurological deficits (in March 2022) presented to the ED on 5/3/2022 complaining of generalized weakness in the setting of rhabdomyolysis, EDYTA and elevated liver enzymes found during PCP office visit. Hospital course was complicated by acute ischemic stroke with left sided deficits, and by vasovagal episode. Patient was seen and examined at bedside this morning.  Patient was complaining of hemorrhoids pain and requested cream as she had a presyncope reaction yesterday from the hydrocortisone suppository. Patient had no other concerns. Patient denies any SOB, cough, chest pain, abdominal pain, fevers, or N/V/D. Hospital course by problems list:    Acute right hemispheric MCA stroke with left sided weakness and neglect  - CT head 5/5/2022 showed increased conspicuity of ill-defined hypodensity in the right frontal corona radiata white matter concerning for evolving acute to subacute ischemic infarct.   - MRI brain 5/6/2022 showed areas of acute infarct involving the right cerebral hemisphere predominantly in a somewhat watershed distribution.  - CTA neck 5/9/2022 showed significant stenosis within the right proximal cervical ICA with approximately 90-95% stenosis per NASCET criteria. It also showed high grade stenoses throughout bilateral cervical vertebral arteries. - neurology and vascular consulted and both feel like risk of urgent CEA outweighs the benefit given high risk for reperfusion injury or hemorrhagic conversion. Vascular intervention postponed for next month. Patient will need to follow up with vascular in 4 weeks for further management. - Continue stroke management DAPT, plavix and aspirin.   - Follow up with PCP after discharge.      Rhabdomyolysis   Acute renal injury, resolved  - CPK trending down   - Creatinine level 1.1 with good urinary output.   - Nephrology consulted. - Follow up with PCP after discharge.      Hypertension  - blood pressure medications held for now as patient had an episode of hypotension at noon. - Held hydralazyne, isosorbid mononitrate on 5/9/2022  - Continue lisinopril 20mg daily. - Consider giving metoprolol 25mg if blood pressure is not well controlled at acute rehab unit. - Follow up with PCP after discharge.      Diabetes   - Recently diagnosed with hemoglobin a1c 6.6 on 5/2/2022  - managed with insulin Humalog.  - Follow up with PCP for further management in outpatient setting.      Presyncope episode   - Most likely vasovagal as patient had presyncope episode after nurse administered suppository hydrocortisone on 5/9/2022  - Follow up with PCP after discharge.      Pharyngitis   - Resolved   - COVID-19 rapid and flu negative.  - SLP consulted      Liver injury with elevated LFT's   - most likely due to statin induced rhabdo  - Stopped statin for now  - Improving with LFTs trending down. - Follow up with PCP after discharge to repeat CMP.    CML   - Stable  - Patient follows Dr. Nidia Irvin  - on nilotinib daily at home, held initially and resumed as able   - Follow up with heme/onc as scheduled.      Enlarged heterogeneous appearance of the thyroid gland  As shown on CTA neck  - Thyroid ultrasound is recommended  - Consider following up with PCP after discharge. Hemorrhoids  - Hydrocortisone suppository stopped as patient had presyncope reaction after administration on 5/9/2022  -  hydrocortisone cream applied.   - Follow up with PCP. Physical Exam Performed:   BP (!) 162/61   Pulse 66   Temp 99.3 °F (37.4 °C) (Oral)   Resp 18   SpO2 92%       General appearance:  No apparent distress, appears stated age and cooperative. HEENT:  Normal cephalic, atraumatic without obvious deformity. Pupils equal, round, and reactive to light. Extra ocular muscles intact. Conjunctivae/corneas clear. Neck: Supple, with full range of motion. No jugular venous distention. Trachea midline. Respiratory:  Normal respiratory effort. Clear to auscultation, bilaterally without Rales/Wheezes/Rhonchi. Cardiovascular:  Regular rate and rhythm with normal S1/S2 without murmurs, rubs or gallops. Abdomen: Soft, non-tender, non-distended with normal bowel sounds. Musculoskeletal:   left lower extremity:   Inspection: no swelling, no bruises rashes or lesions. Range of motion: active and passive range of motion grossly impaired.    Strengths: Hip flexion 1/5, knee extension 1/5, foot dorsiflexion1/5 and foot plantar flexion 1/5, big toe dorsiflexion 1/5. Sensation impaired. Dorsalis pedis intact. Left upper extremity  Left upper extremity examination:  Inspection: no abraisons, no acute bleeding. Palpation: Tender to touch on palmar aspect of left hand. Range of motion: passive and active range of motion grossly impaired. Strengths: Biceps flexion1 out of 5, triceps flexion1 out of 5, wrist extension1 out of 5, finger flexion1 out of 5, Finger abduction 1 out of 5  Sensation deficit. Speech fluent, mild facial droop. No clubbing, cyanosis or edema bilaterally. Skin: Skin color, texture, turgor normal.  No rashes or lesions. Neurologic:  Neurovascularly intact without any focal sensory/motor deficits. Cranial nerves: II-XII intact, grossly non-focal.  Psychiatric:  Alert and oriented, thought content appropriate, normal insight  Capillary Refill: Brisk,< 3 seconds   Peripheral Pulses: +2 palpable, equal bilaterally       Labs: For convenience and continuity at follow-up the following most recent labs are provided:      CBC:    Lab Results   Component Value Date    WBC 9.6 05/10/2022    HGB 10.1 05/10/2022    HCT 29.5 05/10/2022     05/10/2022       Renal:    Lab Results   Component Value Date     05/10/2022    K 3.4 05/10/2022     05/10/2022    CO2 25 05/10/2022    BUN 24 05/10/2022    CREATININE 1.1 05/10/2022    CALCIUM 8.7 05/10/2022    PHOS 3.1 05/04/2022         Significant Diagnostic Studies    Radiology:   CT HEAD WO CONTRAST   Final Result   Decreased attenuation in the right corona radiata with progression consistent   with evolving infarct. No associated hemorrhage or mass effect. Stable decreased attenuation in the right temporal and parietal lobes. Stable lacunar infarcts in the right basal ganglia and ken. Findings were discussed with Dr. Alexandr Robbins at 11:58 a.m. on 5/9/2022. CTA NECK W CONTRAST   Final Result   1.  Hemodynamically significant stenosis within the right proximal cervical   ICA with approximately 90-95% stenosis per NASCET criteria. 2. Approximately 50% stenosis within the left cervical ICA, 1.5 cm distal to   the bifurcation. 3. High-grade stenoses throughout the bilateral cervical vertebral arteries,   as above. 4.  Mildly enlarged, heterogeneous appearance of the thyroid gland. RECOMMENDATIONS:   Nonemergent thyroid ultrasound is recommended. XR CHEST PORTABLE   Final Result   No acute cardiopulmonary disease is noted, noting limitations in the lung   bases. RECOMMENDATION:   If there is persistent concern, follow-up PA and lateral chest is recommended. VL DUP CAROTID BILATERAL   Final Result      MRI BRAIN WO CONTRAST   Final Result   1. Areas of acute infarct involving the right cerebral hemisphere   predominantly in a somewhat watershed distribution. Sulcal effacement is   seen without significant mass effect or midline shift. 2. Otherwise, no acute intracranial abnormality seen. No acute hemorrhage. 3. Chronic lacunar infarct within the right ken. These results were sent to the [facility] [id number] ([address] [place] [address]) on 5/6/2022   at 2:33 pm to be communicated to the referring/covering health care   provider/office. CT HEAD WO CONTRAST   Final Result   1. Increased conspicuity of ill-defined hypodensity in the right frontal   corona radiata white matter concerning for evolving acute to subacute   ischemic infarct. 2. Additional ill-defined hypodense foci in the anterior right frontal and   right parietal white matter are unchanged and are consistent with infarcts of   indeterminate chronicity. 3. No intracranial hemorrhage. 4. Chronic appearing lacunar infarcts in the right basal ganglia and right   ken. RECOMMENDATIONS:   MRI of the brain. CT HEAD WO CONTRAST   Final Result   No acute intracranial abnormality. US GALLBLADDER RUQ   Final Result   No cholelithiasis or significant dilation of the common duct. Focal tenderness is noted over the gallbladder. Additional clinical   correlation for cholecystitis recommended. CT ABDOMEN PELVIS WO CONTRAST Additional Contrast? None   Final Result   No acute abdominopelvic abnormality. Trace pericardial and small bilateral pleural effusions. Consults:   IP CONSULT TO HOSPITALIST  IP CONSULT TO DIABETES EDUCATOR  IP CONSULT TO NEPHROLOGY  IP CONSULT TO PHYSICAL MEDICINE REHAB  IP CONSULT TO NEUROLOGY  IP CONSULT TO VASCULAR SURGERY    Disposition: [facility]     Condition at Discharge: Stable    Discharge Instructions/Follow-up:  Follow up with PCP    Code Status:  Full Code     Activity: activity as tolerated    Diet: cardiac diet      Discharge Medications:   Current Discharge Medication List           Details   hydrocortisone (ALA-DMITRY) 1 % cream Apply topically 2 times daily.   Qty: 30 g, Refills: 0              Details   clopidogrel (PLAVIX) 75 MG tablet TAKE 1 TABLET BY MOUTH EVERY DAY      levothyroxine (SYNTHROID) 50 MCG tablet TAKE 1 TABLET BY MOUTH EVERY DAY      rosuvastatin (CRESTOR) 40 MG tablet TAKE 1 TABLET BY MOUTH EVERY DAY      lisinopril (PRINIVIL;ZESTRIL) 20 MG tablet Take 20 mg by mouth daily      potassium chloride (KLOR-CON M) 20 MEQ extended release tablet Take 20 mEq by mouth      EPINEPHrine (EPIPEN) 0.3 MG/0.3ML SOAJ injection USE AS DIRECTED FOR ALLERGIC REACTION  Qty: 2 each, Refills: 1    Associated Diagnoses: Anaphylaxis, sequela      cycloSPORINE (RESTASIS) 0.05 % ophthalmic emulsion Place 1 drop into both eyes every 12 hours  Qty: 8 each, Refills: 11    Associated Diagnoses: Dry eye      TASIGNA 200 MG capsule       polyethylene glycol (GLYCOLAX) powder Take 17 g by mouth daily      acetaminophen (TYLENOL) 325 MG tablet Take 2 tablets by mouth every 6 hours as needed for Pain  Qty: 120 tablet, Refills: 0      aspirin 81 MG tablet Take 81 mg by mouth              Time Spent on discharge is more than 45 minutes in the examination, evaluation, counseling and review of medications and discharge plan. Signed:    ARMEN Parisi DO   5/10/2022      Thank you Lilia Akhtar MD for the opportunity to be involved in this patient's care. If you have any questions or concerns, please feel free to contact me at [phone]. Addendum to Resident discharge summary note:  Pt seen,examined and evaluated with resident and discussed regarding POC . I have reviewed the current history, physical findings, labs and assessment and plan and agree with note as documented by resident DO ( Dr. Zoila Bishop )    patient seen and evaluated on the day of discharge. Patient informed about following up with appointments. Patient verbalized understanding for follow-up appointments. The patient was informed of the results of tests, a time was given to answer questions, a plan was proposed and she  agreed with plan. Medical reconciliation performed. Patient discharged in  stable condition.     Roro Keenan MD  Hospitalist Physician

## 2022-05-10 NOTE — PLAN OF CARE
Problem: Discharge Planning  Goal: Discharge to home or other facility with appropriate resources  Outcome: Completed     Problem: Pain  Goal: Verbalizes/displays adequate comfort level or baseline comfort level  Outcome: Completed     Problem: Safety - Adult  Goal: Free from fall injury  Outcome: Completed     Problem: ABCDS Injury Assessment  Goal: Absence of physical injury  Outcome: Completed     Problem: Skin/Tissue Integrity  Goal: Absence of new skin breakdown  Description: 1. Monitor for areas of redness and/or skin breakdown  2. Assess vascular access sites hourly  3. Every 4-6 hours minimum:  Change oxygen saturation probe site  4. Every 4-6 hours:  If on nasal continuous positive airway pressure, respiratory therapy assess nares and determine need for appliance change or resting period.   Outcome: Completed     Problem: Chronic Conditions and Co-morbidities  Goal: Patient's chronic conditions and co-morbidity symptoms are monitored and maintained or improved  Outcome: Completed

## 2022-05-10 NOTE — PLAN OF CARE
ARU PATIENT TREATMENT PLAN  [facility], [address]   [phone]    James Doing    : 1950  Acct #: [de-identified]  MRN: [id number]   PHYSICIAN:  Tatum Fritz MD  Primary Problem    Patient Active Problem List   Diagnosis    Acquired hypothyroidism    Adverse reaction to sulfa antibiotic    Chemotherapy adverse reaction    Anastomotic ulcer    Anemia    Anxiety    Campbell esophagus    Cancer ([address]    Cellulitis of left lower extremity    DNR (do not resuscitate)    Family history of colon cancer    Hiatal hernia    History of skin cancer    Dyslipidemia    Localized edema    Lower abdominal pain    Lower extremity edema    Pleural effusion    Thyroid nodule    CML (chronic myelocytic leukemia) (HCC)    Rectal bleeding    Polyp of colon    Anatomical narrow angle, bilateral    Hx of actinic keratosis    HTN (hypertension), benign    Bilateral primary osteoarthritis of knee    Chronic pain of both knees    Neurogenic bladder as late effect of cerebrovascular accident (CVA)    Chronic kidney disease, stage 3a ([address]    Arterial ischemic stroke, ICA, right, acute ([address]    Hematuria    Positive depression screening    Rhabdomyolysis    EDYTA (acute kidney injury) ([address]    Troponin level elevated    Elevated liver enzymes    Type 2 diabetes mellitus with kidney complication, without long-term current use of insulin ([address]    DM type 2, controlled, with complication ([address]    Pre-syncope    Hemorrhage    Acute [facility]A [facility]ce[facility]bro[facility]) [facility])       Rehabilitation Diagnosis:     Acute CVA (cerebrovascular accident) ([address] [I63.9]       ADMIT DATE:5/10/2022    Patient Goals: \"Go home\" \"Get stronger\" \"walk better\"    Admitting Impairments: Pt. Admitted s/p acute ischemic stroke with left sided deficits resulting in Decreased functional mobility ; Decreased ADL status; Decreased safe awareness;Decreased cognition;Decreased balance;Decreased coordination;Decreased posture;Decreased endurance;Decreased vision/visual deficit; Decreased strength;Decreased fine motor control  Barriers: comorbidities, level of assistance  Participation: Good     CARE PLAN     NURSING:  Sarah Anne while on this unit will:     [] Be continent of bowel and bladder     [x] Have an adequate number of bowel movements  [x] Urinate with no urinary retention >300ml in bladder  [] Complete bladder protocol with becker removal  [x] Maintain O2 SATs at _90__%  [x] Have pain managed while on ARU       [] Be pain free by discharge   [x] Have no skin breakdown while on ARU  [] Have improved skin integrity via wound measurements  [] Have no signs/symptoms of infection at the wound site  [x] Be free from injury during hospitalization   [] Complete education with patient/family with understanding demonstrated for:  [] Adjustment   [] Other:   Nursing interventions may include bowel/bladder training, education for medical assistive devices, medication education, O2 saturation management, energy conservation, stress management techniques, fall prevention, alarms protocol, seating and positioning, skin/wound care, pressure relief instruction,dressing changes,  infection protection, DVT prophylaxis, and/or assistance with in room safety with transfers to bed, toilet, wheelchair, shower as well as bathroom activities and hygiene. Patient/caregiver education for:   [] Disease/sustained injury/management      [] Medication Use   [] Surgical intervention   [x] Safety   [x] Body mechanics and or joint protection   [x] Health maintenance         PHYSICAL THERAPY:  Goals:                  Short Term Goals  Time Frame for Short term goals: 11 days 5/20  Short term goal 1: pt will complete bed mobility with mod A. Short term goal 2: pt will complete functional transfer with max A. Short term goal 3: pt will tolerate gait assessment and set goal when appropriate.   Short term goal 4: pt will tolerate stair assessment and set goal when appropriate  Short term goal 5: pt will propel w/c x 150 ft with supv. Long Term Goals  Time Frame for Long term goals : 21 days 5/31  Long term goal 1: pt will complete bed mobility with CGA. Long term goal 2: pt will complete functional transfer with min a and LRAD. Long term goal 3: pt will propel w/c x 150 ft with CGA  These goals were reviewed with this patient at the time of assessment and Otis Brand is in agreement.      Plan of Care: Pt to be seen 5 out of 7 days per week,60  mins (exact) per day for 21 days (exact)                   Current Treatment Recommendations: Strengthening,ROM,Balance training,Functional mobility training,Transfer training,Endurance training,Gait training,Therapeutic activities,Home exercise program,Wheelchair mobility training,Equipment evaluation, education, & procurement,Cognitive reorientation,Stair training,Positioning,Safety education & training,Patient/Caregiver education & training,Cognitive/Perceptual training,ADL/Self-care training,IADL training,Pain management      OCCUPATIONAL THERAPY:  Goals:             Short Term Goals  Time Frame for Short term goals: 10 days (5/20/22)  Short Term Goal 1: Pt will conmplete functional transfers with Mita x2 with LRAD  Short Term Goal 2: Pt will complete toileting/transfer modA x2 with LRAD and DME PRN  Short Term Goal 3: Pt will complete LUE AAROM/AROM exercises to LUE to increase strength/endurance for ADLs/transfers  Short Term Goal 4: Pt will be able to locate 3/5 object in L visual field with min cues during ADLs/activities  Additional Goals?: No :  Long Term Goals  Time Frame for Long term goals : 21 days (3/31/22)  Long Term Goal 1: Pt will complete functional transfers/mobility SPV with LRAD  Long Term Goal 2: Pt will complete UE dressing/bathing sitting setup with min cues for papo techniques  Long Term Goal 3: Pt will complete toileting/transfers with LRAD and DME PRN SPV  Long Term Goal 4: Pt will complete opening 3/5 containers with compensatory techniques sitting setup/SPV :    These goals were reviewed with this patient at the time of assessment and Renita Draper is in agreement    Plan of Care:  Pt to be seen 5 out of 7 days per week, 60   mins (exact) per day for 21 days (exact)  Current Treatment Recommendations: Strengthening,ROM,Balance training,Functional mobility training,Endurance training,Safety education & training,Neuromuscular re-education,Patient/Caregiver education & training,Equipment evaluation, education, & procurement,Self-Care / ADL,Cognitive/Perceptual training      SPEECH THERAPY: Goals will be left blank if speech is not following this patient. Dysphagia Goals:             Short-term Goals  Timeframe for Short-term Goals: 18 days (05/28/2022)  Goal 1: The patient will tolerate recommended diet with no clinical s/s of aspiration  Goal 2: The patient/caregiver will demonstrate understanding of compensatory swallow strategies, for improved swallow safety  Goal 3: The patient will tolerate instrumental assessment when able  Goal 4: The pt will complete oral motor exercises to improve labial and lingual strength, ROM, and coordination in 10/10 opportunities given min cues   Timeframe for Long Term Goals: 21 days (05/31/2022)  Goal 1: The patient will tolerate least restrictive diet with no clinical s/s of aspiration or worsening respiratory/pulmonary status    Cognitive-linguistic Goals  Goals:             Short-term Goals  Timeframe for Short-term Goals: 18 days (05/28/2022)  Goal 1: Pt will effectively use compensatory visual strategies for improved attention to left side during structured tasks with 80% acc given mod cues.   Goal 2: Pt will complete graded recall tasks using compensatory strategies with 90% acc given min cues  Goal 3: Pt will complete executive function tasks (e.g. meds, time, money, etc) with 90% acc givgen min cues  Goal 4: Pt will complete problem solving and thought organization tasks with 90% acc given min cues  Goal 5: Pt will complete verbal and visual reasoning tasks with 90% acc given min cues              Timeframe for Short-term Goals: 18 days (05/28/2022)   Long-term Goals  Timeframe for Long-term Goals: 21 days (05/31/2022)  Goal 1: Pt will improve overall cognitive-linguistic abilities to promote safe and independent return home PLOF     These goals were reviewed with this patient at the time of assessment and Renita Draper is in agreement    Plan of Care: Pt to be seen 5 out of 7 days per week, 60 mins (exact) per day for 21 days (exact)             Dysphagia:  OMEs,PO trials, edu           Speech/Language/Cognition: Compensatory strategy training and carryover, recall/STM, problem solving, reasoning, exec function, thought organization, attention. CASE MANAGEMENT:  Goals:   Assist patient/family with discharge planning, patient/family counseling,   and coordination with insurance during ARU stay.     QIM / IRF JENNIFER SCORES:  ITEM CURRENT SCORE GOAL   Eating CARE Score: 3 Discharge Goal: Set-up or clean-up assistance   Oral Hygiene CARE Score: 2 Discharge Goal: Set-up or clean-up assistance   Toileting Hygiene CARE Score: 1 Discharge Goal: Set-up or clean-up assistance   Shower/Bathe Self CARE Score: 1 Discharge Goal: Set-up or clean-up assistance   Upper Body Dressing CARE Score: 2 Discharge Goal: Set-up or clean-up assistance   Lower Body Dressing CARE Score: 1 Discharge Goal: Set-up or clean-up assistance   On/Off Footwear CARE Score: 1 Discharge Goal: Set-up or clean-up assistance   Roll Left & Right CARE Score: 1 Discharge Goal: Supervision or touching assistance   Sit to Lying  CARE Score: 1 Discharge Goal: Supervision or touching assistance   Lying to Sitting EOB CARE Score: 1 Discharge Goal: Supervision or touching assistance   Sit to Stand CARE Score: 88 Discharge Goal: Partial/moderate assistance   Chair/Bed to Chair Transfer CARE Score: 88 Discharge Goal: Partial/moderate assistance   Toilet Transfer CARE Score: 1 Discharge Goal: Set-up or clean-up assistance   Car Transfer CARE Score: 88 Discharge Goal: Partial/moderate assistance   Walk 10 Feet CARE Score: 88 Discharge Goal: Partial/moderate assistance   Walk 50 Feet, 2 Turns CARE Score: 88 Discharge Goal: Dependent   Walk 150 Feet CARE Score: 88 Discharge Goal: Dependent   Walk 10 Feet, Uneven Surface CARE Score: 88 Discharge Goal: Dependent   1 Step (Curb)   Discharge Goal: Substantial/maximal assistance   4 Steps CARE Score: 88 Discharge Goal: Substantial/maximal assistance   12 Steps CARE Score: 88 Discharge Goal: Dependent    Object CARE Score: 88 Discharge Goal: Partial/moderate assistance   Wheel 50 feet, 2 turns CARE Score: 3 Discharge Goal: Independent   Wheel 150 Feet CARE Score: 3 Discharge Goal: Independent          [facility] will be seen a minimum of 3 hours of therapy per day, a minimum of 5 out of 7 days per week. [] In this rare instance due to the nature of this patient's medical involvement, this patient will be seen 15 hours per week (900 minutes within a 7 day period). Treatments may include therapeutic exercises, gait training, neuromuscular re-ed, transfer training, community reintegration, bed mobility, w/c mobility and training, self care, home mgmt, cognitive training, energy conservation,dysphagia tx, speech/language/communication therapy, group therapy, and patient/family education. In addition, dietician/nutritionist may monitor calorie count as well as intake and collaboratively work with SLP on dietary upgrades. Neuropsychology/Psychology may evaluate and provide necessary support.     Medical issues being managed closely and that require 24 hour availability of a physician:   [x] Swallowing Precautions  [x] Bowel/Bladder Fx  [] Weight bearing precautions   [] Wound Care    [x] Pain Mgmt   [x] Infection Protection   [x] DVT Prophylaxis   [x] Fall Precautions  [x] Fluid/Electrolyte/Nutrition Balance   [] Voice Protection   [] Respiratory  [] Other:    Medical Prognosis: [x] Good  [] Fair    [] Guarded   Total expected IRF days 21  Anticipated discharge destination:    [] Home Independently   [] Home Modified Independent  [x] Home with supervision    []SNF     [] Other                                           Physician anticipated functional outcomes:  Home w/ supervision and home health services. IPOC brief synthesis: Mauricio Soulier is a 66year old female with a past medical history significant for recent CVA with left hemiparesis, CML, HTN, and HLD who presented to [facility] on 5/3/22 with abnormal labs, admitted with rhabdomyolysis and ARF, found to have acute CVA. She was admitted to [facility] on 5/10/22 due to functional deficits below her baseline. This plan has been reviewed with Machelle Vieyra on 5/11 in a language the patient understands. Machelle Vieyra has had the opportunity to include input with the therapy team.      I have reviewed this initial plan of care and agree with its contents:    Title   Name    Date    Time    Physician: Madonna Ibrahim.  Sylvain Valdez MD    Case Mgmt: Joe Chavez    OT: Alyssa Ortega OTR/L    PT: Jonathan Cornejo PT, DPT     RN: Natalia Duong RN    ST: Laurie Sigala MA CCC-SLP    : Claudene Seminole, OTR/L    Other:

## 2022-05-10 NOTE — PROGRESS NOTES
Interval History and plan:      Creatinine getting better at 1.1  Potassium 3.4  Mag 1.8  Blood pressure is high  CT neck angiogram showed 90 to 95% stenosis right proximal cervical ICA, 50% within the left cervical ICA, high-grade stenosis throughout bilateral cervical vertebral arteries  Plan:  Hold off on fluids and monitor blood pressure  Supplement potassium and magnesium  Nifedipine on hold for permissive hypertension  Ok to resume from renal perspective  Suspect she has CKD, will follow up in office                    Assessment :     Acute Kidney Injury  EDYTA likely due to -rhabdomyolysis/poor p.o. intake  Cr on consultation 2.1  Baseline Cr-0.8 on 9/21  No recent baseline available-she was admitted to [facility] March 2022 with the stroke and was told that she has high creatinine    UA-5/22-large blood, trace LE  Renal Imaging:-5/22-right-10.7 cm, simple 2.2 cm right renal cyst  No mention of left kidney  Echo: 10/18-EF normal, no mention of diastolic dysfunction    Hypertension   BP: (146-162)/(52-61)  Pulse:  [60-66]   BP goal inpatient 085-377 systolic inpatient    Rhabdomyolysis  CPK more than 10,000 on arrival-down to 8.2   Thought to be induced by statin  Has elevated AST and ALT    CML  Diabetes mellitus type 2 [facility] would like to thank Alma Benites MD   for opportunity to serve this patient      Please call with questions at-   24 Hrs Answering service [phone] or  7 am- 5 pm via Perfect serve or cell phone  Josh Scott MD          CC/reason for consult :     EDYTA     HPI :     Reese Kitchen is a 66 y.o. female presented to   the hospital on 5/3/2022 with abnormal lab result from blood test done by PCP the day before admission. Also complains of pain right upper quadrant. She had a basal ganglia stroke during March 2022 hospital for which she was admitted at [facility] from where she went [facility]. She has been taking Crestor 40 mg a day. Also complaining of lower extremity weakness. No falls are  In the ground for long time. No other complaints    She was found to have rhabdomyolysis and is getting fluids. Her Crestor has been stopped  ROS:     Seen with-no family    positives in bold   Constitutional:  fever, chills, weakness, weight change, fatigue  Skin:  rash, pruritus, hair loss, bruising, dry skin, petechiae  Head, Face, Neck   headaches, swelling,  cervical adenopathy  Respiratory: shortness of breath, cough, or wheezing  Cardiovascular: chest pain, palpitations, dizzy, edema  Gastrointestinal: nausea, vomiting, diarrhea, constipation,belly pain    Yellow skin, blood in stool  Musculoskeletal:  back pain, muscle weakness, gait problems,       joint pain or swelling. Genitourinary:  dysuria, poor urine flow, flank pain, blood in urine  Neurologic:  vertigo, TIA'S, syncope, seizures, focal weakness  Psychosocial:  insomnia, anxiety, or depression.   Additional positive findings:                    All other remaining systems are negative or unable to obtain        PMH/PSH/SH/Family History:     Past Medical History:   Diagnosis Date    Anemia     Arthritis     Asthma     Bowel dysfunction     CML (chronic myelocytic leukemia) ([facility] 01/2006    leukemia    Hyperlipidemia     Hypertension     Nuclear senile cataract of both eyes 11/25/2019    Obesity     Risk of myocardial infarction or stroke 7.5% or greater in next 10 years 9/15/2021    Skin cancer of face     left cheek, squamous    Stroke (cerebrum) ([facility]     Thyroid disease     TIA (transient ischemic attack)     mini ones-from chemo       Past Surgical History:   Procedure Laterality Date    ANUS SURGERY  1998    fissure    BREAST BIOPSY      BREAST LUMPECTOMY      benign    COLONOSCOPY      numerous polyps    ELBOW SURGERY  1960    removal of foreign body ([place])    FINE NEEDLE ASPIRATION      PARACENTESIS      x 2 fluid in lung from Chemo    TONSILLECTOMY Bilateral reports that she has never smoked. She has never used smokeless tobacco. She reports that she does not drink alcohol and does not use drugs. family history includes Arrhythmia in her sister; Cancer in her brother, father, mother, and sister.          Medication:     Current Facility-Administered Medications: witch hazel-glycerin (TUCKS) pad, , Topical, PRN  hydrocortisone 1 % cream, , Topical, BID  diclofenac sodium (VOLTAREN) 1 % gel 4 g, 4 g, Topical, 4x Daily  [Held by provider] lactated ringers infusion, , IntraVENous, Continuous  perflutren lipid microspheres (DEFINITY) injection 1.65 mg, 1.5 mL, IntraVENous, ONCE PRN  magnesium oxide (MAG-OX) tablet 400 mg, 400 mg, Oral, Daily  [Held by provider] NIFEdipine (PROCARDIA XL) extended release tablet 30 mg, 30 mg, Oral, Daily  polyethylene glycol (GLYCOLAX) packet 17 g, 17 g, Oral, Daily  [Held by provider] metoprolol succinate (TOPROL XL) extended release tablet 25 mg, 25 mg, Oral, Daily  famotidine (PEPCID) tablet 10 mg, 10 mg, Oral, Daily  nilotinib (TASIGNA) capsule 200 mg, 200 mg, Oral, Q12H  aspirin EC tablet 81 mg, 81 mg, Oral, Daily  clopidogrel (PLAVIX) tablet 75 mg, 75 mg, Oral, Daily  carboxymethylcellulose PF (THERATEARS) 1 % ophthalmic gel 1 drop, 1 drop, Both Eyes, Q12H  levothyroxine (SYNTHROID) tablet 50 mcg, 50 mcg, Oral, Daily  sodium chloride flush 0.9 % injection 5-40 mL, 5-40 mL, IntraVENous, 2 times per day  sodium chloride flush 0.9 % injection 5-40 mL, 5-40 mL, IntraVENous, PRN  0.9 % sodium chloride infusion, , IntraVENous, PRN  ondansetron (ZOFRAN-ODT) disintegrating tablet 4 mg, 4 mg, Oral, Q8H PRN **OR** ondansetron (ZOFRAN) injection 4 mg, 4 mg, IntraVENous, Q6H PRN  acetaminophen (TYLENOL) tablet 650 mg, 650 mg, Oral, Q6H PRN **OR** acetaminophen (TYLENOL) suppository 650 mg, 650 mg, Rectal, Q6H PRN  heparin (porcine) injection 5,000 Units, 5,000 Units, SubCUTAneous, TID  glucose (GLUTOSE) 40 % oral gel 15 g, 15 g, Oral, PRN  glucagon (rDNA) injection 1 mg, 1 mg, IntraMUSCular, PRN  dextrose 5 % solution, 100 mL/hr, IntraVENous, PRN  insulin lispro (HUMALOG) injection vial 0-6 Units, 0-6 Units, SubCUTAneous, TID WC  insulin lispro (HUMALOG) injection vial 0-3 Units, 0-3 Units, SubCUTAneous, Nightly  dextrose bolus (hypoglycemia) 10% 125 mL, 125 mL, IntraVENous, PRN **OR** dextrose bolus (hypoglycemia) 10% 250 mL, 250 mL, IntraVENous, PRN       Vitals :     Vitals:    05/10/22 0854   BP: (!) 162/61   Pulse: 66   Resp: 18   Temp: 99.3 °F (37.4 °C)   SpO2: 92%       I & O :       Intake/Output Summary (Last 24 hours) at 5/10/2022 7332  Last data filed at 5/9/2022 1015  Gross per 24 hour   Intake 120 ml   Output --   Net 120 ml        Physical Examination :     General appearance: Anxious- no, distressed- no, in good spirits-  Yes  HEENT: Lips- normal, teeth- ok , oral mucosa- moist  Neck : Mass- no, appears symmetrical, JVD- not visible  Respiratory: Respiratory effort-  normal, wheeze- no, crackles -   Cardiovascular:  Ausculation- No M/R/G, Edema none  Abdomen: visible mass- no, distention- no, scar- no, tenderness- no                            hepatosplenomegaly-  no  Musculoskeletal:  clubbing no,cyanosis- no , digital ischemia- no                           muscle strength- grossly normal , tone - grossly normal  Skin: rashes- no , ulcers- no, induration- no, tightening - no  Psychiatric:  Judgement and insight- normal           AAO X 3  Additional finding:      LABS:     Recent Labs     05/10/22  0659   WBC 9.6   HGB 10.1*   HCT 29.5*        Recent Labs     05/08/22  0631 05/09/22  0625 05/10/22  0659    141 140   K 3.7 3.7 3.4*    108 106   CO2 24 24 25   BUN 24* 23* 24*   CREATININE 1.3* 1.3* 1.1   GLUCOSE 127* 112* 118*   MG  --   --  1.80

## 2022-05-10 NOTE — PROGRESS NOTES
NURSING ASSESSMENT: [address]    [facility]     Rehab Dx/Hx: Acute CVA (cerebrovascular accident) [facility]) [I63.9]   :1950  QFE:[id number]  Date of Admit: 5/10/2022  Room #: 4166/0398-38    Subjective:   Patient admitted to room 159 @ 1700 from 366 via transport. Alert and oriented x4. Oriented to room and call light system. Oriented to rehab routine and therapy schedules. Informed about care conferences and ordering of meals with PCA. Drug / Medication Review:   Medications were reviewed by RN at time of admission  [x]  No potential or actual clinically significant medication issues were noted. []  Potential or actual clinically significant medication issues were found and MD was notified. 4 Eyes Skin Assessment   The patient is being assessed for: Admission     I agree that 2 RN's have performed a thorough Head to Toe Skin Assessment on the patient. ALL assessment sites listed below have been assessed. Areas assessed by both nurses:   [x]   Head, Face, and Ears   [x]   Shoulders, Back, and Chest, Abdomen  [x]   Arms, Elbows, and Hands   [x]   Coccyx, Sacrum, and Ischium  [x]   Legs, Feet, and Heel     Does the Patient have Skin Breakdown?  No         Graham Prevention initiated:  Yes   Wound Care Orders initiated: / Not Applicable      [facility] nurse consulted for Pressure Injury (Stage 3,4, Unstageable, DTI, NWPT, Complex wounds)and New or Established Ostomies:  Not Applicable    Primary Nurse eSignature: Electronically signed by Randi Wilcox RN on 5/10/2022 at 5:07 PM    Co-signer eSignature:  Amebr Ortiz RN

## 2022-05-10 NOTE — CARE COORDINATION
CASE MANAGEMENT DISCHARGE SUMMARY      Discharge to: [facility]    Precertification completed: [facility] Exemption Notification (HENS) completed: na    IMM given: (date)     Transportation: per B3 staff to ARU     Confirmed discharge plan with:     Patient: yes     Family:  Yes - spouse at bedside     Facility/Agency, name:  [facility] obtained via epic access by ARU team     Phone number for report to facility: [phone]     RN, name: Keli Morales. To ARU at 1600    Note: Discharging nurse to complete MO, reconcile AVS, and place final copy with patient's discharge packet. RN to ensure that written prescriptions for  Level II medications are sent with patient to the facility as per protocol.     Christy Orantes RN

## 2022-05-10 NOTE — PROGRESS NOTES
Occupational Therapy  Facility/Department: [facility] B3 - MED SURG  Daily Treatment Note  NAME: Mark Blair  : 1950  MRN: [id number]    Date of Service: 5/10/2022    Discharge Recommendations:  IP Rehab         Patient Diagnosis(es): The primary encounter diagnosis was EDYTA (acute kidney injury) ([facility] [address] Diagnoses of Transaminitis and Traumatic rhabdomyolysis, initial encounter [facility]) were also pertinent to this visit. Assessment    Assessment: Pt tolerated treatment well. Co-tx collaboration this date to safely meet goals and will have better occupational performance outcomes with in a co-treatment than 1:1 session. Pt is currently max Ax2 for bed mobility and min-max Ax1 for static sitting balance at EOB. Pt demonstrated improved attention to L visual field however continues to require cues for visual tracking to L. Pt attending to L side of body throughout session. Unable to assess transfers at this time d/t BP. Pt returned to bed at end of session. Pt is highly motivated to participate in therapy and would benefit from IPR at discharge. Cont per POC. Activity Tolerance: Patient tolerated treatment well  Discharge Recommendations: IP Rehab      Plan   Plan  Times per Week: 3-5x  Current Treatment Recommendations: Strengthening;ROM;Balance training;Functional mobility training; Endurance training; Safety education & training;Neuromuscular re-education;Patient/Caregiver education & training;Equipment evaluation, education, & procurement;Self-Care / ADL;Cognitive/Perceptual training     Restrictions  Restrictions/Precautions  Restrictions/Precautions: Fall Risk,Contact Precautions,Up as Tolerated  Position Activity Restriction  Other position/activity restrictions: Contact precautions--purewick, telemetry, 2 L O 2    Subjective   Subjective  Subjective: Pt supine in bed and agreeable to OT/PT cotx  Pain: Pt reported no pain.   Pain: none reported  Cognition  Overall Cognitive Status: Exceptions  Arousal/Alertness: Appropriate responses to stimuli  Following Commands: Follows one step commands with repetition  Attention Span: Attends with cues to redirect  Memory: Decreased recall of precautions  Safety Judgement: Decreased awareness of need for safety;Decreased awareness of need for assistance  Problem Solving: Assistance required to correct errors made;Assistance required to identify errors made  Insights: Decreased awareness of deficits  Initiation: Requires cues for some  Sequencing: Requires cues for some        Objective    Vitals  Vitals  Pulse: 93  Heart Rate Source: Monitor  BP: (!) 181/72  BP Location: Right upper arm  BP Method: Automatic  Patient Position: Sitting  MAP (Calculated): 108.33  O2 Device: None (Room air)  Bed Mobility Training  Bed Mobility Training: Yes  Overall Level of Assistance: Maximum assistance;Assist X2 (cues for L side attention)  Interventions: Safety awareness training;Manual cues; Weight shifting training/pressure relief; Tactile cues; Verbal cues; Visual cues  Rolling: Moderate assistance;Maximum assistance (max of 2 to R, mod of 2 to L)  Supine to Sit: Maximum assistance;Assist X2  Sit to Supine: Maximum assistance;Assist X2  Scooting: Total assistance  Balance  Sitting: Impaired (L lateral lean, R UE support on bed rail, varies from min to max with fatigue or loss of focus)  Sitting - Static: Poor (constant support); Supported sitting  Sitting - Dynamic: Poor (constant support)  Standing:  (unsafe to attempt this date)  Transfer Training  Transfer Training: No  Gait Training  Gait Training: No     ADL  Grooming: Maximum assistance (brushing hair)  Grooming Skilled Clinical Factors: seated at EOB with mod-max A for balance  Additional Comments: Pt declining further ADLs  OT Exercises  PROM Exercises: LUE flaccid this date. Educated pt on self PROM with RUE assist for elbow, wrist, and digital flex/ext. Pt able to perform with good return.  Instructed pt to complete 2-3 times during the day. Static Sitting Balance Exercises: Pt tolerated sitting at EOB for ~15 mins. Initially pt requiring min A for static sitting and progressed to max A with fatigue. Postural Correction Exercises: Cues for orienting to midline. Forward trunk flexion/extension x3     Safety Devices  Type of Devices: All fall risk precautions in place; Bed alarm in place;Call light within reach; Patient at risk for falls; Left in bed;Nurse notified     Patient Education  Education Given To: Patient  Education Provided: Role of Therapy;Plan of Care;Precautions  Education Provided Comments: Pt educated on importance of OOB mobility, LUE Self PROM, purpose of OT, and OT POC. Education Method: Verbal  Barriers to Learning: Vision;Cognition  Education Outcome: Verbalized understanding;Continued education needed    AM-PAC Inpatient Daily Activity Raw Score 10   AM-PAC Inpatient ADL T-Scale Score  27.31   ADL Inpatient CMS 0-100% Score 74.7   ADL Inpatient CMS G-Code Modifier  CL       Goals  Short Term Goals  Time Frame for Short term goals: 1 week (5/11) unless noted  Short Term Goal 1: Perform functional transfers with SBA and RW--SERGIO 5/6 d/t poor static sitting balance  Short Term Goal 2: Perform toileting with min A-5/06 dependent  Short Term Goal 3: Perform LUE exercises 15x each to improve ADL skills by 5/8-5/06 dependent with PROM 10 reps distally LUE  Short Term Goal 4: Locate 3/5 objects in L visual field with min cues-5/06 max cues to attend to midline & Left of midline static sitting EOB  Patient Goals   Patient goals : \"Go home\"       Therapy Time   Individual Concurrent Group Co-treatment   Time In       1116   Time Out       1145   Minutes       29   Timed Code Treatment Minutes: 29 Minutes     If pt is unable to be seen after this session, please let this note serve as discharge summary. Please see case management note for discharge disposition. Thank you.       Yo Gabriel, OT

## 2022-05-10 NOTE — PROGRESS NOTES
Report called to [facility] on ARU, who will be assuming care of the patient when she arrives. Saline lock and Heart monitor removed. Discharge paperwork provided and reviewed with patient. All questions answered. Pt transferred to ARU with PCA's.

## 2022-05-10 NOTE — PROGRESS NOTES
Physical Therapy  Facility/Department: [facility] B3 - MED SURG  Daily Treatment Note  NAME: Laurie Nieves  : 1950  MRN: [id number]    Date of Service: 5/10/2022    Discharge Recommendations:  IP Rehab   PT Equipment Recommendations  Equipment Needed: No  Other: defer    Patient Diagnosis(es): The primary encounter diagnosis was EDYAT (acute kidney injury) ([facility] [address] Diagnoses of Transaminitis and Traumatic rhabdomyolysis, initial encounter [facility]) were also pertinent to this visit. Assessment   Assessment: pt progessing toward meeting Acute PT goals, able to tolerate sitting for 12 mins with progressively increased need for assist to maintain midline sitting, pt with L lateral lean and forward head posture, able to correct with verbal cues; pt will benefit from continued Acute Inpatient Skilled PT to address functional mobility deficits; agree with previous PT recommendation for IPR  Activity Tolerance: Patient tolerated treatment well  Equipment Needed: No  Other: defer     Plan    Plan  Plan: 3-5 times per week  Current Treatment Recommendations: Strengthening;ROM;Balance training;Functional mobility training;Transfer training; Endurance training;Gait training; Therapeutic activities; Home exercise program     Restrictions  Restrictions/Precautions  Restrictions/Precautions: Fall Risk,Contact Precautions,Up as Tolerated  Position Activity Restriction  Other position/activity restrictions: telemetry    Subjective    Subjective  Subjective: pt highly motivated to participate  Pain: none reported  Orientation  Overall Orientation Status: Within Functional Limits  Cognition  Overall Cognitive Status: Exceptions  Arousal/Alertness: Appropriate responses to stimuli  Following Commands:  Follows one step commands with repetition  Attention Span: Attends with cues to redirect  Memory: Decreased recall of precautions  Safety Judgement: Decreased awareness of need for safety;Decreased awareness of need for assistance  Problem Solving: Assistance required to correct errors made;Assistance required to identify errors made  Insights: Decreased awareness of deficits  Initiation: Requires cues for some  Sequencing: Requires cues for some     Objective   Vitals     Bed Mobility Training  Bed Mobility Training: Yes  Overall Level of Assistance: Maximum assistance;Assist X2 (cues for L side attention)  Interventions: Safety awareness training;Manual cues; Weight shifting training/pressure relief; Tactile cues; Verbal cues; Visual cues  Rolling: Moderate assistance;Maximum assistance (max of 2 to R, mod of 2 to L)  Supine to Sit: Maximum assistance;Assist X2  Sit to Supine: Maximum assistance;Assist X2  Scooting: Total assistance  Balance  Sitting: Impaired (L lateral lean, R UE support on bed rail, varies from min to max with fatigue or loss of focus)  Sitting - Static: Poor (constant support); Supported sitting  Sitting - Dynamic: Poor (constant support)  Standing:  (unsafe to attempt this date)  Transfer Training  Transfer Training: No  Gait Training  Gait Training: No  Neuromuscular Education  Neuromuscular Education: Yes  Trunk Control: sitting 12 mins edge of bed with min A to Max A  PT Exercises  Exercise Treatment: glute sets x10 supine, sitting knee ext x 10, passive stretch L DF/PF x 2 x 20 secs, bridging x 10  Other Specialty Interventions  Other Treatments/Modalities: pt performed scanning for items in room on L side  Safety Devices  Type of Devices: All fall risk precautions in place; Bed alarm in place;Call light within reach; Patient at risk for falls; Left in bed;Nurse notified     Patient Education  Education Given To: Patient  Education Provided: Role of Therapy;Plan of Care;Transfer Training; Fall Prevention Strategies  Education Method: Demonstration;Verbal  Barriers to Learning: Cognition  Education Outcome: Verbalized understanding;Continued education needed  Disease Specific Education: Pt educated on signs and symptoms of CVA and need to seek immediate medical attention should they occur. Pt verbalized understanding  Goals  Short Term Goals  Time Frame for Short term goals: 5/12/22 unless noted  Short term goal 1: Pt will perform bed mobility with mod A by 5/11/22  Short term goal 2: Pt will sit EOB for 5 min with grossly mod A for ADL's and ther ex  Short term goal 3: PT will assess and set goal for transfers when appropriate  Short term goal 4: PT will assess and set goal for gait when appropriate  Patient Goals   Patient goals : \"to be able to move my leg better\"      Therapy Time   Individual Concurrent Group Co-treatment   Time In 1116         Time Out 1144         Minutes 28               If pt is unable to be seen after this session, please let this note serve as discharge summary. Please see case management note for discharge disposition. Thank you.   Jayjay Pagan, PT

## 2022-05-11 ENCOUNTER — TELEPHONE (OUTPATIENT)
Dept: VASCULAR SURGERY | Age: 72
End: 2022-05-11

## 2022-05-11 LAB
A/G RATIO: 1 (ref 1.1–2.2)
ALBUMIN SERPL-MCNC: 3 G/DL (ref 3.4–5)
ALP BLD-CCNC: 95 U/L (ref 40–129)
ALT SERPL-CCNC: 73 U/L (ref 10–40)
ANION GAP SERPL CALCULATED.3IONS-SCNC: 9 MMOL/L (ref 3–16)
AST SERPL-CCNC: 47 U/L (ref 15–37)
BILIRUB SERPL-MCNC: 0.7 MG/DL (ref 0–1)
BUN BLDV-MCNC: 29 MG/DL (ref 7–20)
CALCIUM SERPL-MCNC: 8.7 MG/DL (ref 8.3–10.6)
CHLORIDE BLD-SCNC: 105 MMOL/L (ref 99–110)
CO2: 26 MMOL/L (ref 21–32)
CREAT SERPL-MCNC: 1.1 MG/DL (ref 0.6–1.2)
GFR AFRICAN AMERICAN: 59
GFR NON-AFRICAN AMERICAN: 49
GLUCOSE BLD-MCNC: 111 MG/DL (ref 70–99)
GLUCOSE BLD-MCNC: 116 MG/DL (ref 70–99)
GLUCOSE BLD-MCNC: 131 MG/DL (ref 70–99)
GLUCOSE BLD-MCNC: 154 MG/DL (ref 70–99)
GLUCOSE BLD-MCNC: 184 MG/DL (ref 70–99)
PERFORMED ON: ABNORMAL
POTASSIUM REFLEX MAGNESIUM: 3.9 MMOL/L (ref 3.5–5.1)
SODIUM BLD-SCNC: 140 MMOL/L (ref 136–145)
TOTAL PROTEIN: 5.9 G/DL (ref 6.4–8.2)

## 2022-05-11 PROCEDURE — 92610 EVALUATE SWALLOWING FUNCTION: CPT

## 2022-05-11 PROCEDURE — 80053 COMPREHEN METABOLIC PANEL: CPT

## 2022-05-11 PROCEDURE — 97530 THERAPEUTIC ACTIVITIES: CPT

## 2022-05-11 PROCEDURE — 1280000000 HC REHAB R&B

## 2022-05-11 PROCEDURE — 97162 PT EVAL MOD COMPLEX 30 MIN: CPT

## 2022-05-11 PROCEDURE — 36415 COLL VENOUS BLD VENIPUNCTURE: CPT

## 2022-05-11 PROCEDURE — 97535 SELF CARE MNGMENT TRAINING: CPT

## 2022-05-11 PROCEDURE — 97166 OT EVAL MOD COMPLEX 45 MIN: CPT

## 2022-05-11 PROCEDURE — 97112 NEUROMUSCULAR REEDUCATION: CPT

## 2022-05-11 PROCEDURE — 6370000000 HC RX 637 (ALT 250 FOR IP): Performed by: STUDENT IN AN ORGANIZED HEALTH CARE EDUCATION/TRAINING PROGRAM

## 2022-05-11 PROCEDURE — 92523 SPEECH SOUND LANG COMPREHEN: CPT

## 2022-05-11 PROCEDURE — 92526 ORAL FUNCTION THERAPY: CPT

## 2022-05-11 PROCEDURE — 97542 WHEELCHAIR MNGMENT TRAINING: CPT

## 2022-05-11 PROCEDURE — 6360000002 HC RX W HCPCS: Performed by: STUDENT IN AN ORGANIZED HEALTH CARE EDUCATION/TRAINING PROGRAM

## 2022-05-11 RX ORDER — TRAZODONE HYDROCHLORIDE 50 MG/1
50 TABLET ORAL NIGHTLY PRN
Status: DISCONTINUED | OUTPATIENT
Start: 2022-05-11 | End: 2022-06-07 | Stop reason: HOSPADM

## 2022-05-11 RX ORDER — ACETAMINOPHEN 325 MG/1
650 TABLET ORAL EVERY 6 HOURS PRN
Status: DISCONTINUED | OUTPATIENT
Start: 2022-05-11 | End: 2022-06-07 | Stop reason: HOSPADM

## 2022-05-11 RX ORDER — ACETAMINOPHEN 650 MG/1
650 SUPPOSITORY RECTAL EVERY 6 HOURS PRN
Status: DISCONTINUED | OUTPATIENT
Start: 2022-05-11 | End: 2022-06-07 | Stop reason: HOSPADM

## 2022-05-11 RX ADMIN — POLYETHYLENE GLYCOL 3350 17 G: 17 POWDER, FOR SOLUTION ORAL at 08:05

## 2022-05-11 RX ADMIN — HYDROCORTISONE: 1 CREAM TOPICAL at 08:06

## 2022-05-11 RX ADMIN — DICLOFENAC SODIUM 4 G: 10 GEL TOPICAL at 15:08

## 2022-05-11 RX ADMIN — Medication 400 MG: at 08:04

## 2022-05-11 RX ADMIN — DICLOFENAC SODIUM 4 G: 10 GEL TOPICAL at 20:12

## 2022-05-11 RX ADMIN — HYDROCORTISONE: 1 CREAM TOPICAL at 20:12

## 2022-05-11 RX ADMIN — HEPARIN SODIUM 5000 UNITS: 5000 INJECTION INTRAVENOUS; SUBCUTANEOUS at 20:14

## 2022-05-11 RX ADMIN — TRAZODONE HYDROCHLORIDE 50 MG: 50 TABLET ORAL at 20:14

## 2022-05-11 RX ADMIN — LISINOPRIL 20 MG: 20 TABLET ORAL at 08:04

## 2022-05-11 RX ADMIN — LEVOTHYROXINE SODIUM 50 MCG: 0.05 TABLET ORAL at 08:04

## 2022-05-11 RX ADMIN — HEPARIN SODIUM 5000 UNITS: 5000 INJECTION INTRAVENOUS; SUBCUTANEOUS at 15:08

## 2022-05-11 RX ADMIN — WITCH HAZEL 40 EACH: 500 SOLUTION RECTAL; TOPICAL at 09:08

## 2022-05-11 RX ADMIN — CLOPIDOGREL BISULFATE 75 MG: 75 TABLET, FILM COATED ORAL at 08:04

## 2022-05-11 RX ADMIN — FAMOTIDINE 10 MG: 20 TABLET, FILM COATED ORAL at 08:04

## 2022-05-11 RX ADMIN — HEPARIN SODIUM 5000 UNITS: 5000 INJECTION INTRAVENOUS; SUBCUTANEOUS at 08:05

## 2022-05-11 RX ADMIN — INSULIN LISPRO 1 UNITS: 100 INJECTION, SOLUTION INTRAVENOUS; SUBCUTANEOUS at 11:28

## 2022-05-11 RX ADMIN — ASPIRIN 81 MG: 81 TABLET, COATED ORAL at 08:04

## 2022-05-11 RX ADMIN — INSULIN LISPRO 1 UNITS: 100 INJECTION, SOLUTION INTRAVENOUS; SUBCUTANEOUS at 16:23

## 2022-05-11 RX ADMIN — CARBOXYMETHYLCELLULOSE SODIUM 1 DROP: 10 GEL OPHTHALMIC at 20:14

## 2022-05-11 RX ADMIN — DICLOFENAC SODIUM 4 G: 10 GEL TOPICAL at 11:29

## 2022-05-11 RX ADMIN — METOPROLOL TARTRATE 25 MG: 25 TABLET, FILM COATED ORAL at 20:13

## 2022-05-11 RX ADMIN — CARBOXYMETHYLCELLULOSE SODIUM 1 DROP: 10 GEL OPHTHALMIC at 08:04

## 2022-05-11 RX ADMIN — DICLOFENAC SODIUM 4 G: 10 GEL TOPICAL at 08:06

## 2022-05-11 ASSESSMENT — PAIN SCALES - GENERAL: PAINLEVEL_OUTOF10: 0

## 2022-05-11 NOTE — CONSULTS
Comprehensive Nutrition Assessment    Type and Reason for Visit:  Initial    Nutrition Recommendations/Plan:   1. Continue soft and bite sized, 3 carb choice diet  2. Encourage PO intakes  3. Monitor nutrition adequacy, pertinent labs, bowel habits, wt changes, and clinical progress     Malnutrition Assessment:  Malnutrition Status:  No malnutrition (05/11/22 1422)    Context:  Acute Illness       Nutrition Assessment:    Consulted for oral nutrition supplements. Pt newly admitted to ARU s/p acute CVA. Pt reports appetite is great, eating majority of meals. PO intakes % per EMR. Pt endorses 10# weight loss, however weight hx appears stable per EMR. Pt denied the need for ONS or diet education. Will continue to monitor. Nutrition Related Findings:    Labs reviewed. BM 5/10. +1 pitting LUE. Wound Type: None       Current Nutrition Intake & Therapies:    Average Meal Intake: %,51-75%  Average Supplements Intake: None Ordered  ADULT DIET; Dysphagia - Soft and Bite Sized; 3 carb choices (45 gm/meal)    Anthropometric Measures:  Height: 5' 2.5\" (158.8 cm)  Ideal Body Weight (IBW): 113 lbs (51 kg)       Current Body Weight: 150 lb (68 kg), 132.7 % IBW. Weight Source: Not Specified  Current BMI (kg/m2): 27                          BMI Categories: Overweight (BMI 25.0-29. 9)    Nutrition Diagnosis:   No nutrition diagnosis at this time     Nutrition Interventions:   Food and/or Nutrient Delivery: Continue Current Diet  Nutrition Education/Counseling: Education declined  Coordination of Nutrition Care: Continue to monitor while inpatient  Plan of Care discussed with: Patient    Goals:     Goals: PO intake 50% or greater,prior to discharge       Nutrition Monitoring and Evaluation:   Behavioral-Environmental Outcomes: None Identified  Food/Nutrient Intake Outcomes: Food and Nutrient Intake  Physical Signs/Symptoms Outcomes: Weight,Biochemical Data    Discharge Planning:    Continue current diet     Sharyn [address], [address]  Contact: [phone]

## 2022-05-11 NOTE — PROGRESS NOTES
Physical Therapy  Facility/Department: [facility]  Rehabilitation Physical Therapy Initial Assessment    NAME: Kathe Moreno  : 1950 (66 y.o.)  MRN: [id number]  CODE STATUS: Full Code    Date of Service: 22      Past Medical History:   Diagnosis Date    Anemia     Arthritis     Asthma     Bowel dysfunction     CML (chronic myelocytic leukemia) ([facility] [address] 2006    leukemia    Hyperlipidemia     Hypertension     Nuclear senile cataract of both eyes 2019    Obesity     Risk of myocardial infarction or stroke 7.5% or greater in next 10 years 9/15/2021    Skin cancer of face     left cheek, squamous    Stroke (cerebrum) ([facility] [address]     Thyroid disease     TIA (transient ischemic attack)     mini ones-from chemo     Past Surgical History:   Procedure Laterality Date    ANUS SURGERY      fissure    BREAST BIOPSY      BREAST LUMPECTOMY      benign    COLONOSCOPY      numerous polyps    ELBOW SURGERY      removal of foreign body ([place])    FINE NEEDLE ASPIRATION      PARACENTESIS      x 2 fluid in lung from Chemo    TONSILLECTOMY Bilateral        Chart Reviewed: Yes  Patient assessed for rehabilitation services?: Yes  Family / Caregiver Present: Yes Iesha Vogt)  Referring Practitioner: Jada Urbina MD  Referral Date : 05/10/22  Diagnosis: CVA  General Comment  Comments: Rn cleared pt to participate    Restrictions:  Restrictions/Precautions: Fall Risk;Contact Precautions; Up as Tolerated  Position Activity Restriction  Other position/activity restrictions: Contact precautions--purewick, telemetry, 2 L O 2     SUBJECTIVE  Subjective: pt reports minimal pain in lower abdomen, potentially gas pain  Pain: none reported       Post Treatment Pain Screening: none stated        Prior Level of Function:  Social/Functional History  Lives With: Spouse (1 dog)  Type of Home: House  Home Layout: Two level,Laundry in basement  Home Access: Stairs to enter without rails  Entrance Stairs - Number of Steps: 1+1 VERÓNICA  Bathroom Shower/Tub: Tub/Shower unit,Walk-in shower  Bathroom Toilet: Standard  Bathroom Accessibility: Accessible  Receives Help From: Family  ADL Assistance: Needs assistance  Homemaking Assistance: Needs assistance  Homemaking Responsibilities: Yes  Ambulation Assistance: [facility]  Transfer Assistance: Independent  Active : No  Patient's  Info: Spouse drives. Pt was able to drive until CVA in 2/7313. Mode of Transportation: Car  Occupation: Retired  Type of Occupation: caretaker  Additional Comments: Patient reports multiple near falls      OBJECTIVE  Vision  Vision: Impaired  Vision Exceptions: Wears glasses at all times; Visual field cut  Tracking: Unable to hold eye position out of midline;L eye does not track laterally;L eye does not track medially;Dec smoothness of horizontal tracking;Dec smoothness of eye movement to L superior field; Requires cues, head turns, or add eye shifts to track  Saccades: Decreased speed of pursuit between targets; Decreased speed of saccadic movement    Hearing  Hearing: Within functional limits    Cognition  Overall Cognitive Status: Exceptions  Arousal/Alertness: Appropriate responses to stimuli  Following Commands: Follows one step commands with repetition  Attention Span: Attends with cues to redirect  Memory: Decreased recall of precautions  Safety Judgement: Decreased awareness of need for safety;Decreased awareness of need for assistance  Problem Solving: Assistance required to correct errors made;Assistance required to identify errors made  Insights: Decreased awareness of deficits  Initiation: Requires cues for some  Sequencing: Requires cues for some    ROM       Strength  Strength RLE  Strength RLE: WFL  Comment: grossly 4-/5  Strength LLE  Strength LLE: Exception  Comment: grossly 0/5 MMT LLE.     Quality of Movement       Sensation  Overall Sensation Status: Impaired  Light Touch: Severe deficits in the LUE;Severe deficits in the LLE  Stereognosis: Severe deficits in the LUE;Severe deficits in the LLE    Lack proprioception to L knee/ L ankle  Functional Mobility  Bed mobility  Rolling to Left: Maximum assistance;2 Person assistance  Rolling to Right: Maximum assistance;2 Person assistance  Supine to Sit: Maximum assistance;2 Person assistance  Sit to Supine: Maximum assistance;2 Person assistance  Bed Mobility Comments: completed with bed flat and no rails  Transfers  Sit to Stand: 2 Person Assistance;Dependent/Total  Stand to sit: Dependent/Total;2 Person Assistance  Bed to Chair: Dependent/Total  Comment: sit to stand recliner to STEDY with mod A of 2. TD via STEDY from recliner to EOB, EOB <> w/c. sit to stand w/c to STEDY with mod A of 2. Balance  Sitting - Static: Poor  Sitting - Dynamic: Poor  Standing - Static: Poor  Comments: static standing in STEDY with BLEs blocked, poor standing balance exhibited with significant L lateral lean/ poor preception of midline, increase forward trunk flexion and cervical flexion. poor to fair (-) static/dyn sitting balance at EOB without trunk support, requires max a to obtain midline, can hold midline position for 5-10 sec with RUE support and max VC for positioning however fatigues quickly and returns to L lateral/forward lean    Environmental Mobility  Ambulation  More Ambulation?: No (gait deferred due to safety concerns)  W/c mobility x 150 ft with min a and cues for effective propulsion with use of LUE/LLE. At times hand over hand assist, cues to attend to L side of environment        ASSESSMENT  Vitals  Pulse: 60  Heart Rate Source: Monitor  BP: (!) 190/75  BP Location: Right upper arm  MAP (Calculated): 113.33  SpO2: 94 %  O2 Device: None (Room air)    Activity Tolerance  Activity Tolerance: Patient tolerated treatment well    Assessment  Assessment: pt is 66year old female admitted to ARU with dx of CVA.  pt oriented x4, presents with flat affect and delayed command following. pt states she was ind with no AD prior to admission. pt currently functioning significantly below baseline. requires max a of 2 for bed mobility, max A fo 2 with STEDY for transfers, min A for w/c mobility. gait and stair assessment deferred due to significnat proprioception/balance impairements. pt demo's significant L lateral/forwrad lean. lacks proprioception to L knee and hip as well as significant sensation defiicts to LLE and LUE (lack of light touch L1-S2 dermatomes). at this time, anticipate pt will require some level of physical assist upon d/c, unsure if  is able to provide. ELSO 21 days, DME: TBD. Treatment Diagnosis: impaired mobility  Therapy Prognosis: Fair  Decision Making: Medium Complexity  PT Equipment Recommendations  Equipment Needed: Yes (TBD)    CLINICAL IMPRESSION   pt is 66year old female admitted to ARU with dx of CVA. pt oriented x4, presents with flat affect and delayed command following. pt states she was ind with no AD prior to admission. pt currently functioning significantly below baseline. requires max a of 2 for bed mobility, max A fo 2 with STEDY for transfers, min A for w/c mobility. gait and stair assessment deferred due to significnat proprioception/balance impairements. pt demo's significant L lateral/forwrad lean. lacks proprioception to L knee and hip as well as significant sensation defiicts to LLE and LUE (lack of light touch L1-S2 dermatomes). at this time, anticipate pt will require some level of physical assist upon d/c, unsure if  is able to provide. ELSO 21 days, DME: TBD. GOALS  Patient Goals   Patient goals : \"go on my mission trip in [place]"  Short Term Goals  Time Frame for Short term goals: 11 days 5/20  Short term goal 1: pt will complete bed mobility with mod A. Short term goal 2: pt will complete functional transfer with max A. Short term goal 3: pt will tolerate gait assessment and set goal when appropriate.   Short term goal 4: pt will tolerate stair assessment and set goal when appropriate  Short term goal 5: pt will propel w/c x 150 ft with supv. Long Term Goals  Time Frame for Long term goals : 21 days 5/31  Long term goal 1: pt will complete bed mobility with CGA. Long term goal 2: pt will complete functional transfer with min a and LRAD. Long term goal 3: pt will propel w/c x 150 ft with CGA    PLAN OF CARE  Frequency: 1-2 treatment sessions per day, 5-7 days per week  Plan  Plan: 5-7 times per week  Current Treatment Recommendations: Strengthening;ROM;Balance training;Functional mobility training;Transfer training; Endurance training;Gait training; Therapeutic activities; Home exercise program;Wheelchair mobility training;Equipment evaluation, education, & procurement;Cognitive reorientation;Stair training;Positioning; Safety education & training;Patient/Caregiver education & training;Cognitive/Perceptual training;ADL/Self-care training;IADL training;Pain management  Safety Devices  Type of Devices: All fall risk precautions in place;Call light within reach; Patient at risk for falls;Nurse notified; All yaima prominences offloaded;Gait belt;Left in bed;Bed alarm in place  Restraints  Restraints Initially in Place: No      Therapy Time   Individual Concurrent Group Co-treatment   Time In 1400         Time Out 1500         Minutes 60           Timed Code Treatment Minutes: [facility]       Klever Fair PT, 05/11/22 at 3:16 PM

## 2022-05-11 NOTE — PLAN OF CARE
Pain is currently being managed with PO medications, see MAR. Staff assist with repositioning when needed and pillow support is provided for comfort.

## 2022-05-11 NOTE — PROGRESS NOTES
Occupational Therapy  Facility/Department: [address] [place] [address]  Rehabilitation Occupational Therapy Evaluation       Date: 22  Patient Name: Jennifer Chinchilla       Room: [facility]-  MRN: [id number]  Account: [de-identified]   : 1950  (76 y.o.) Gender: female     Referring Practitioner: Dr. Lillie Marquis  Diagnosis: rhabdomyolysis  Additional Pertinent Hx: Patient was admitted to a [facility] in [place] in late March with symptoms of slurred speech, CT scan showed old right basal ganglia stroke, this was confirmed on MRI with an acute infarct in the right corona radiata and right parietal lobe    Restrictions  Restrictions/Precautions: Fall Risk;Contact Precautions; Up as Tolerated  Other position/activity restrictions: Contact precautions--purewick, telemetry, 2 L O 2    Subjective  Subjective: Pt in bed,  present. Agreeable to OT. Anxious about falling. Comments: RN cleared pt for OT          Vitals  Temp: 97.3 °F (36.3 °C)  Pulse: 96  Resp: 18  BP: (!) 194/88  Oxygen Therapy  SpO2: 97 %  O2 Device: None (Room air)  Level of Consciousness: Alert (0)    Objective  Vision  Vision Exceptions: Wears glasses at all times; Visual field cut  Hearing  Hearing: Within functional limits  Vision - Basic Assessment  Prior Vision: Wears glasses all the time  Visual Field Cut: Left  Oculo Motor Control: Impaired  Impairments: ROM;Scanning;Tracking  Vision Comments: increased neglect to LUE for scanning L vs R during transfers and ADLs; SERGIO formal vision due to inability to follow commands consistently during but with ADLs poor ability to attend to L side  Cognition  Overall Cognitive Status: Exceptions  Arousal/Alertness: Appropriate responses to stimuli  Following Commands:  Follows one step commands with repetition  Attention Span: Attends with cues to redirect  Memory: Decreased recall of precautions  Safety Judgement: Decreased awareness of need for safety;Decreased awareness of need for assistance  Problem Solving: Assistance required to correct errors made;Assistance required to identify errors made  Insights: Decreased awareness of deficits  Initiation: Requires cues for some  Sequencing: Requires cues for some  Orientation  Overall Orientation Status: Within Functional Limits  Orientation Level: Oriented X4   Perception  Overall Perceptual Status: Impaired  Unilateral Attention: Cues to attend left visual field;Cues to attend to left side of body;Cues to maintain midline in sitting  Initiation: Cues to initiate tasks  Motor Planning: Hand over hand to sequence tasks  Perseveration: Perseverates during conversation  Sensation  Overall Sensation Status: Impaired  Light Touch: Severe deficits in the LUE;Severe deficits in the LLE  Stereognosis: Severe deficits in the LUE;Severe deficits in the LLE  Proprioception: Severe deficits in the LUE;Severe deficits in the LLE   ROM  LUE PROM (degrees)  LUE PROM: WFL  LUE AROM (degrees)  LUE General AROM: no AROM movement in LUE, increased tone to LUE  Left Hand AROM (degrees)  Left Hand AROM: Exceptions  Left Hand General AROM: flaccid LUE, educated on positioning for comfort and support  RUE AROM (degrees)  RUE AROM : WFL  Right Hand AROM (degrees)  Right Hand AROM: WFL  LUE Strength  Gross LUE Strength: Exceptions to [facility]  L Shoulder Flex: 1/5  L Shoulder Int Rotation: 1/5  L Elbow Flex: 0/5  L Forearm Pron: 0/5  L Forearm Sup: 0/5  L Wrist Flex: 0/5  L Wrist Ext: 0/5  L Hand General: 0/5  RUE Strength  Gross RUE Strength: WNL  Fine Motor Skills  Coordination  Movements Are Fluid And Coordinated: No  Coordination and Movement Description: Fine motor impairments;Gross motor impairments;Decreased speed;Decreased accuracy; Left UE   Comment: limited ROM and strength in LUE, full RUE ROM but increased FM coordination deficits noted   Hand Assessment  Hand Dominance  Hand Dominance: Right  Functional Mobility  Balance  Sitting Balance: Maximum assistance (sitting EOB vs e-tac chair vs recliner, L sided lean needing max cues for midline posture)  Standing Balance: Dependent/Total (modA x2 sit <> Stand to steady, maxA x2 with cues for midline)  Standing Balance  Time: 5x1-2 minutes, 2x30 seconds  Activity: transfers to/from steady to/from e-tac chair and then to recliner, standing multiple times for LB ADLs  Comment: poor upright posture, L sided lean and R side pushers syndrome noted; max cues for midline alignment in steady wiht limited carryover  Transfers  Sit to stand: Maximum assistance;2 Person assistance; Moderate assistance  Stand to sit: Maximum assistance;2 Person assistance; Moderate assistance  Transfer Comments: maxA x2 sit <>  steady, progressing at times to [address] X2 with max verbal/tactile and demo cues  Toilet Transfers  Toilet - Technique:  (steady to e-tac chair over toilet)  Equipment Used:  (e-tac chair)  Toilet Transfer: 2 Person assistance; Moderate assistance  Toilet Transfers Comments: maxA x2 sit <>  steady, progressing at times to [address] X2 with max verbal/tactile and demo cues  Shower Transfers  Shower - Transfer From: Jaya [employer] Antwan - Transfer Type: To (EOB to etac to recliner with steady for sit <> Stand transfers)  Shower - Transfer To:  (e-tac chair)  Shower - Technique:  (steady)  Shower Transfers: 2 Person assistance; Moderate assistance;Maximal assistance  Shower Transfers Comments: maxA x2 sit <>  steady, progressing at times to [address] X2 with max verbal/tactile and demo cues  ADL  Feeding: Supervision; Beverage management  Feeding Skilled Clinical Factors: sitting in recliner, hand over hand for initiation  Grooming: Maximum assistance;Verbal cueing; Increased time to complete  Grooming Skilled Clinical Factors: seated in recliner, maxA and hand over hand for initiation and max cues for scanning L vs R in chair for containers  UE Bathing: Increased time to complete;Maximum assistance;Verbal cueing  UE Bathing Skilled Clinical Factors: assist with RUE, chest, and thoroughness along with sitting on e-tac chair  LE Bathing: Dependent/Total;Increased time to complete;Verbal cueing  LE Bathing Skilled Clinical Factors: A x 2 sit <> Stand to steady vs sitting in e-tac chair, assist for all bathing  UE Dressing: Maximum assistance; Increased time to complete  UE Dressing Skilled Clinical Factors: assist for threading BUE through shirt, assist with around back but use of RUE to pull over head  LE Dressing: Dependent/Total  LE Dressing Skilled Clinical Factors: A x2 sit <> Stand to steady with max cues for LB dressing  Toileting: Dependent/Total  Toileting Skilled Clinical Factors: A x2 sit <> Stand to steady to e-tac chair over the toilet, A for all tasks  Additional Comments: Pt with poor upright posture at times needing maxA for upright posture in e-tac chair, leaning forward for weight shifting L vs R    Goals  Patient Goals   Patient goals :  \"Go home\" \"Get stronger\" \"walk better\"  Short Term Goals  Time Frame for Short term goals: 10 days (5/20/22)  Short Term Goal 1: Pt will conmplete functional transfers with Mita x2 with LRAD  Short Term Goal 2: Pt will complete toileting/transfer modA x2 with LRAD and DME PRN  Short Term Goal 3: Pt will complete LUE AAROM/AROM exercises to LUE to increase strength/endurance for ADLs/transfers  Short Term Goal 4: Pt will be able to locate 3/5 object in L visual field with min cues during ADLs/activities  Additional Goals?: No  Long Term Goals  Time Frame for Long term goals : 21 days (3/31/22)  Long Term Goal 1: Pt will complete functional transfers/mobility SPV with LRAD  Long Term Goal 2: Pt will complete UE dressing/bathing sitting setup with min cues for papo techniques  Long Term Goal 3: Pt will complete toileting/transfers with LRAD and DME PRN SPV  Long Term Goal 4: Pt will complete opening 3/5 containers with compensatory techniques sitting setup/SPV    Assessment  Performance deficits / Impairments: Decreased functional mobility ; Decreased ADL status; Decreased safe awareness;Decreased cognition;Decreased balance;Decreased coordination;Decreased posture;Decreased endurance;Decreased vision/visual deficit; Decreased strength;Decreased fine motor control  Assessment: Pt is a 66 y.o.  female with past medical history of right parietal stroke with left upper and lower extremities residual neurological deficits (in March 2022) presented to the ED on 5/3/2022 complaining of generalized weakness in the setting of rhabdomyolysis, EDYTA and elevated liver enzymes found during PCP office visit. Hospital course was complicated by acute ischemic stroke with left sided deficits, and by vasovagal episode. Pt recently was hospitalized in [place] s/p CVA in 3/2022 however left AMA. Prior to admission pt was with independent ADLs, IADLs, mobility/transfers using no device; per  was helping PRN for balance during mobility after 3/2022 hospitalization. Currently pt is functioning below baseline needing mod-maxA x2 with steady for tranfsers, maxA x2 for bed mobility, steady use for functional mobility, and max-depA for ADLs. OT skilled services are indicated to address above deficits and f/u with patient/caregiver education. At this time it will be recommended pt DC with 24 hour SPV with HHOT S Level 3 and aide. DME needs are TBD pending progress. Prognosis: Good  Decision Making: Medium Complexity  Discharge Recommendations: Continue to assess pending progress;24 hour supervision or assist;Home with nursing aide;Home with Home health OT;S Level 3;Home with assist PRN  Plan  Times per Week: 5 out of 7 days  Times per Day: Daily  Plan Weeks: 3 weeks  Current Treatment Recommendations: Strengthening;ROM;Balance training;Functional mobility training; Endurance training; Safety education & training;Neuromuscular re-education;Patient/Caregiver education & training;Equipment evaluation, education, & procurement;Self-Care / ADL;Cognitive/Perceptual training       Therapy Time   Individual Concurrent Group Co-treatment   Time In 0930         Time Out 1100         Minutes 90         Timed Code Treatment Minutes: 75 Minutes (15 min eval)       Aida Velez, OTR/L

## 2022-05-11 NOTE — PATIENT CARE CONFERENCE
[facility]  Inpatient Rehabilitation  Weekly Team Conference Note    Date: 2022  Patient Name: Otis Brand        MRN: [id number]    : 1950  (76 y.o.)  Gender: female             Interventions to be utilized toward barriers to discharge, per discipline:  NURSING  Nursing observed barriers to dc: Upper extremity weakness, Lower extremity weakness, Impaired vision and Incontinence of bladder  Nursing interventions: Proper body mechanics, check and change, education on call light usage  Family Education:   Fall Risk:  Yes      Physical therapy observed barriers to dc: Evaluation in progress. Goals and POC to be established this date. Occupational therapy observed barriers to dc: Evaluation in progress. Goals and POC to be established this date. Occupational Therapy interventions:  Current Treatment Recommendations: Strengthening,ROM,Balance training,Functional mobility training,Endurance training,Safety education & training,Neuromuscular re-education,Patient/Caregiver education & training,Equipment evaluation, education, & procurement,Self-Care / ADL,Cognitive/Perceptual training        SPEECH THERAPY  Evaluation in progress. Goals and POC to be established this date. NUTRITION  Weight: 150 lb 12.7 oz (68.4 kg) / Body mass index is 27.14 kg/m². Diet Order: ADULT DIET; Dysphagia - Soft and Bite Sized; 3 carb choices (45 gm/meal)  PO Meals Eaten (%): 76 - 100%  Education: Education declined    CASE MANAGEMENT  Assessment: CM acknowledged social work/CM consult for discharge planning. Interdisciplinary Goals:   1.) Therapy evaluations in progress. Goals and POC to be established this date. Discharge Plan   Estimated discharge date: 2022  Destination: home health  Pass:No  Services at Discharge: [id number], Occupational Therapy, Speech Therapy and Nursing  Equipment at Discharge: TBD pending progress in therapy.      Team Members Present at Conference:  : Norma Larose  Occupational Therapist: Elias Ruby OT  Physical Therapist: Idalia Schulte, PT  Speech Therapist: Amita Christian, [address]  Nurse: Troy Warner, RN  Dietician: Dionte Snowden RD, LD   Supervisor: Yaneth Pandey CCC-SLP  Psychiatry: N/A    Family members present at conference: No    I led this team conference and I approve the established interdisciplinary plan of care as documented within the medical record of Addison Stubbs. MD: Vu Saleh.  Ayaz Rivers MD  5/11/2022  11:40 AM

## 2022-05-11 NOTE — H&P
Patient: German Mcguire  [id number]  Date: 5/11/2022      Chief Complaint: CVA    History of Present Illness/Hospital Course:  Ye Madden is a 66year old female with a past medical history significant for recent CVA with left hemiparesis, CML, HTN, and HLD who presented to [facility] on 5/3/22 with abnormal labs. She had recently been admitted to a hospital in [place] for stroke. Per report she left the hospital AMA. She had a visit with her PCP on 5/2. Labs results from this visit showed EDYTA and transaminitis and she was admitted with rhabdomyolysis suspected to be due to statin induced myopathy. She was managed with IVF. On 5/5 she was noted to have worsening neurologic status. Stat CT head revealed possible acute to subacute CVA in the right frontal corona radiata. MRI brain reveal acute infarct in the right cerebral hemisphere. Carotid ultrasound revealed occlusion of the right internal carotid artery. Vascular surgery was consulted. CTA revealed that ANGEL patent beyond very high grade stenosis. Vascular and Neurology recommending CEA as outpatient. She was admitted to [facility] on 5/10/22 due to functional deficits below her baseline. Today she reports left sided weakness. She denies any pain. She reports difficulty swallowing, but good appetite. She is motivated to work with therapies.      Prior Level of Function:  Needed some help with self care, indoor mobility  Independent with functional cognition  Has not driven since CVA 3/7123    Current Level of Function:  Dependent for rolling left/right, sit to lying, lying to sitting on side of bed     has a past medical history of Anemia, Arthritis, Asthma, Bowel dysfunction, CML (chronic myelocytic leukemia) ([address] Hyperlipidemia, Hypertension, Nuclear senile cataract of both eyes, Obesity, Risk of myocardial infarction or stroke 7.5% or greater in next 10 years, Skin cancer of face, Stroke (cerebrum) ([address] Thyroid disease, and TIA (transient ischemic attack). has a past surgical history that includes Breast biopsy; fine needle aspiration; Breast lumpectomy; Tonsillectomy (Bilateral); Paracentesis; Anus surgery (1998); Elbow surgery (1960); and Colonoscopy. reports that she has never smoked. She has never used smokeless tobacco. She reports that she does not drink alcohol and does not use drugs. family history includes Arrhythmia in her sister; Cancer in her brother, father, mother, and sister.     Current Facility-Administered Medications   Medication Dose Route Frequency Provider Last Rate Last Admin    traZODone (DESYREL) tablet 50 mg  50 mg Oral Nightly PRN Raya Lane MD        acetaminophen (TYLENOL) tablet 650 mg  650 mg Oral Q6H PRN Raya Lane MD   650 mg at 05/10/22 2128    Or    acetaminophen (TYLENOL) suppository 650 mg  650 mg Rectal Q6H PRN Raya Lane MD        heparin (porcine) injection 5,000 Units  5,000 Units SubCUTAneous TID Raya Lane MD   5,000 Units at 05/11/22 0805    ondansetron (ZOFRAN-ODT) disintegrating tablet 4 mg  4 mg Oral Q8H PRN Raya Lane MD        Or    ondansetron [facility]) injection 4 mg  4 mg IntraVENous Q6H PRN Raya Lane MD        aluminum & magnesium hydroxide-simethicone (MAALOX) 200-200-20 MG/5ML suspension 30 mL  30 mL Oral Q6H PRN Raya Lane MD        bisacodyl (DULCOLAX) suppository 10 mg  10 mg Rectal Daily PRN Raya Lane MD        aspirin EC tablet 81 mg  81 mg Oral Daily Raya Lane MD   81 mg at 05/11/22 0804    carboxymethylcellulose PF (THERATEARS) 1 % ophthalmic gel 1 drop  1 drop Both Eyes Q12H Raya Lane MD   1 drop at 05/11/22 0804    clopidogrel (PLAVIX) tablet 75 mg  75 mg Oral Daily Raya Lane MD   75 mg at 05/11/22 0804    dextrose 5 % solution  100 mL/hr IntraVENous PRN Raya Lane MD        dextrose bolus 10% 125 mL  125 mL IntraVENous PRN Raya Lane MD Or    dextrose bolus 10% 250 mL  250 mL IntraVENous PRN Raya Lane MD        diclofenac sodium (VOLTAREN) 1 % gel 4 g  4 g Topical 4x Daily Raya Lane MD   4 g at 05/11/22 0806    famotidine (PEPCID) tablet 10 mg  10 mg Oral Daily Raya Lane MD   10 mg at 05/11/22 0804    glucagon (rDNA) injection 1 mg  1 mg IntraMUSCular PRN Raya Lane MD        glucose (GLUTOSE) 40 % oral gel 15 g  15 g Oral PRN Raya Lane MD        hydrocortisone 1 % cream   Topical BID Raya Lane MD   Given at 05/11/22 0806    insulin lispro (HUMALOG) injection vial 0-3 Units  0-3 Units SubCUTAneous Nightly Raya Lane MD        insulin lispro (HUMALOG) injection vial 0-6 Units  0-6 Units SubCUTAneous TID [facility] Raya Lane MD        levothyroxine (SYNTHROID) tablet 50 mcg  50 mcg Oral Daily Raya Lane MD   50 mcg at 05/11/22 0804    lisinopril (PRINIVIL;ZESTRIL) tablet 20 mg  20 mg Oral Daily Raya Lane MD   20 mg at 05/11/22 0804    magnesium oxide (MAG-OX) tablet 400 mg  400 mg Oral Daily Raya Lane MD   400 mg at 05/11/22 0804    nilotinib (TASIGNA) capsule 200 mg  200 mg Oral Q12H Raya Lane MD   200 mg at 05/10/22 2129    polyethylene glycol (GLYCOLAX) packet 17 g  17 g Oral Daily Raya Lane MD   17 g at 05/11/22 0805    witch hazel-glycerin (TUCKS) pad   Topical PRN Raya Lane MD   40 each at 05/11/22 0908       Allergies   Allergen Reactions    Latex Swelling and Rash     Rubber gloves    Mangifera Indica Anaphylaxis     Francisco-- Throat closes    Penicillin G Hives    Shellfish-Derived Products Hives    Sulfa Antibiotics Hives    Grapefruit Concentrate      Due to chemo    Omeprazole Other (See Comments)     Due to chemo    Shrimp Extract Allergy Skin Test Rash       Current Facility-Administered Medications   Medication Dose Route Frequency Provider Last Rate Last Admin    traZODone (DESYREL) tablet 50 mg  50 mg Oral Nightly PRN Niki Edouard MD        acetaminophen (TYLENOL) tablet 650 mg  650 mg Oral Q6H PRN Niki Edouard MD   650 mg at 05/10/22 2128    Or    acetaminophen (TYLENOL) suppository 650 mg  650 mg Rectal Q6H PRN Niki Edouard MD        heparin (porcine) injection 5,000 Units  5,000 Units SubCUTAneous TID Niki Edouard MD   5,000 Units at 05/11/22 0805    ondansetron (ZOFRAN-ODT) disintegrating tablet 4 mg  4 mg Oral Q8H PRN Niki Edouard MD        Or    ondansetron [facility]) injection 4 mg  4 mg IntraVENous Q6H PRN Niki Edouard MD        aluminum & magnesium hydroxide-simethicone (MAALOX) 200-200-20 MG/5ML suspension 30 mL  30 mL Oral Q6H PRN Niki Edouard MD        bisacodyl (DULCOLAX) suppository 10 mg  10 mg Rectal Daily PRN Niki Edouard MD        aspirin EC tablet 81 mg  81 mg Oral Daily Niki Edouard MD   81 mg at 05/11/22 0804    carboxymethylcellulose PF (THERATEARS) 1 % ophthalmic gel 1 drop  1 drop Both Eyes Q12H Niki Edouard MD   1 drop at 05/11/22 0804    clopidogrel (PLAVIX) tablet 75 mg  75 mg Oral Daily Niki Edouard MD   75 mg at 05/11/22 0804    dextrose 5 % solution  100 mL/hr IntraVENous PRN Niki Edouard MD        dextrose bolus 10% 125 mL  125 mL IntraVENous PRN Niki Edouard MD        Or    dextrose bolus 10% 250 mL  250 mL IntraVENous PRN Niki Edouard MD        diclofenac sodium (VOLTAREN) 1 % gel 4 g  4 g Topical 4x Daily Niki Edouard MD   4 g at 05/11/22 0806    famotidine (PEPCID) tablet 10 mg  10 mg Oral Daily Niki Edouard MD   10 mg at 05/11/22 0804    glucagon (rDNA) injection 1 mg  1 mg IntraMUSCular PRN Niki Edouard MD        glucose (GLUTOSE) 40 % oral gel 15 g  15 g Oral PRN Niki Edouard MD        hydrocortisone 1 % cream   Topical BID Niki Edouard MD   Given at 05/11/22 3077    insulin lispro (HUMALOG) injection vial 0-3 Units  0-3 Units SubCUTAneous Nightly Israel Prabhakar MD        insulin lispro (HUMALOG) injection vial 0-6 Units  0-6 Units SubCUTAneous TID [facility] [place] Prabhakar MD        levothyroxine (SYNTHROID) tablet 50 mcg  50 mcg Oral Daily Israel Prabhakar MD   50 mcg at 05/11/22 0804    lisinopril (PRINIVIL;ZESTRIL) tablet 20 mg  20 mg Oral Daily [place] Prabhakar MD   20 mg at 05/11/22 0804    magnesium oxide (MAG-OX) tablet 400 mg  400 mg Oral Daily [place] Prabhakar MD   400 mg at 05/11/22 0804    nilotinib (TASIGNA) capsule 200 mg  200 mg Oral Q12H [place] Prabhakar MD   200 mg at 05/10/22 2129    polyethylene glycol (GLYCOLAX) packet 17 g  17 g Oral Daily [place] Prabhakar MD   17 g at 05/11/22 0805    witch hazel-glycerin (TUCKS) pad   Topical PRN [place] Prabhakar MD   40 each at 05/11/22 0908         REVIEW OF SYSTEMS:   CONSTITUTIONAL: negative for fevers, chills, diaphoresis, activity change, appetite change, fatigue, night sweats and unexpected weight change. EYES: negative for blurred vision, eye discharge, visual disturbance and icterus. HEENT: negative for hearing loss, tinnitus, ear drainage, sinus pressure, nasal congestion, epistaxis and snoring. RESPIRATORY: Negative for hemoptysis, cough, sputum production. CARDIOVASCULAR: negative for chest pain, palpitations, exertional chest pressure/discomfort, edema, syncope. GASTROINTESTINAL: negative for nausea, vomiting, diarrhea, constipation, blood in stool and abdominal pain. GENITOURINARY: negative for frequency, dysuria, urinary incontinence, decreased urine volume, and hematuria. HEMATOLOGIC/LYMPHATIC: negative for easy bruising, bleeding and lymphadenopathy. ALLERGIC/IMMUNOLOGIC: negative for recurrent infections, angioedema, anaphylaxis and drug reactions.    ENDOCRINE: negative for weight changes and diabetic symptoms including polyuria, polydipsia and 05/11/2022    CO2 26 05/11/2022     Lab Results   Component Value Date    ALT 73 (H) 05/11/2022    AST 47 (H) 05/11/2022    ALKPHOS 95 05/11/2022    BILITOT 0.7 05/11/2022       Most recent echocardiogram 5/6/22  Limited echo for LV function/PFO with limited doppler/color. Global left ventricular function is normal with ejection fraction estimated   from 60 % to 65 %. Normal left ventricle size with mild concentric left ventricular   hypertrophy. No regional wall motion abnormalities are seen. Grade II diastolic dysfunction with elevated filling pressure. The right ventricle is normal in size and function. The left atrium appears dilated. Aortic valve appears sclerotic but opens adequately. Mitral annular calcification is present. Mild tricuspid valve regurgitation. Systolic pulmonary artery pressure (SPAP) is elevated and estimated at 49   mmHg (right atrial pressure 8 mmHg) consistent with mild pulmonary   hypertension. A bubble study was performed, both with and without Valsalva maneuver, with   no obvious evidence of shunt seen. EKG 5/6/22  Sinus bradycardiaOtherwise normal ECGWhen compared with ECG of 03-MAY-2022 23:50,T wave inversion no longer evident in Lateral leads    IMAGING    CT head 5/9/22  Decreased attenuation in the right corona radiata with progression consistent   with evolving infarct.  No associated hemorrhage or mass effect.       Stable decreased attenuation in the right temporal and parietal lobes. Stable lacunar infarcts in the right basal ganglia and ken. CTA head and neck 5/9/22  1. Hemodynamically significant stenosis within the right proximal cervical   ICA with approximately 90-95% stenosis per NASCET criteria. 2. Approximately 50% stenosis within the left cervical ICA, 1.5 cm distal to   the bifurcation. 3. High-grade stenoses throughout the bilateral cervical vertebral arteries,   as above.    4.  Mildly enlarged, heterogeneous appearance of the thyroid gland. XR chest 5/7/22  The study is under penetrated.       The cardiac silhouette and mediastinal contours are normal.  Lung volumes are   low.  Increased opacity in the lung bases is favored to be related to chest   wall attenuation.  No consolidation or pleural effusion is appreciated.         MRI Brain 5/6/22  1. Areas of acute infarct involving the right cerebral hemisphere   predominantly in a somewhat watershed distribution.  Sulcal effacement is   seen without significant mass effect or midline shift. 2. Otherwise, no acute intracranial abnormality seen.  No acute hemorrhage. 3. Chronic lacunar infarct within the right ken. The above laboratory data have been reviewed. The above imaging data have been reviewed. The above medical testing have been reviewed. Body mass index is 27.14 kg/m². Barriers to Discharge: comorbidities, level of assistance    POST ADMISSION PHYSICIAN EVALUATION  The patient has agreed to being admitted to our comprehensive inpatient rehabilitation facility consisting of at least 180 minutes of therapy a day, 5 out of 7 days a week. The patient/family has a good understanding of our discharge process. The patient has potential to make improvement and is in need of at least two of the following multidisciplinary therapies including but not limited to physical, occupational, respiratory, and speech, nutritional services, wound care, and prosthetics and orthotics. Given the patients complex condition and risk of further medical complications, rehabilitation services cannot be safely provided at a lower level of care such as a skilled nursing facility. I have compared the patients medical and functional status at the time of the preadmission screening and the same on this date, and there are no significant changes.      By signing this document, I acknowledge that I have personally performed a full physical examination on this patient within 24 hours of admission to this inpatient rehabilitation facility and have determined the patient to be able to tolerate the above course of treatment at an intensive level for a reasonable period of time. I will be completing a detailed individualized Plan of Care for this patient by day four of the patients stay based upon the Preadmission Screen, this Post-Admission Evaluation, and the therapy evaluations. Rehabilitation Diagnosis:  Stroke, 1.1, Left Body (R Brain)    Assessment and Plan:  Cyn Siddiqui is a 66year old female with a past medical history significant for recent CVA with left hemiparesis, CML, HTN, and HLD who presented to [facility] on 5/3/22 with abnormal labs, admitted with rhabdomyolysis and ARF, found to have acute CVA. She was admitted to [facility] on 5/10/22 due to functional deficits below her baseline. Acute Right CVA  - MRI showing acute infarct in right cerebral hemisphere in a watershed distribution  - CTA showing 90-95% stenosis of right ICA  - Vascular and Neurology in agreement on waiting for CEA, needs follow up in 4 weeks  - asa, plavix, statin  - PT, OT, ST    Dysphagia  - on modified diet  - ST    Rhabdomyolysis  Acute Renal Injury  - Nephrology followed during acute stay  - CPK trending down  - monitor     HTN  - patient with episode of hypotension so isosorbid mononitrate and hydralazine held  - continue lisinopril  - will add metoprolol 25 mg     DM2  - SSI    Liver Injury with elevated LFTs  - suspected to be due to statin induced rhabdo  - statin stopped  - LFTs trending down  - monitor intermittently    CML  - follows with Dr. Kory Sam  - on nilitinib at home, can consider resuming as able  - follow up with Heme/Onc OP    Enlarged heterogeneous appearance of the thyroid  - Thyroid ultrasound outpatient  - follow up with PCP    Hemorrhoids  - hydrocortisone cream     Bowels: Schedule stool softener. Follow bowel movements. Enema or suppository if needed. Bladder: Check PVR x 3. [address] if PVR > 200ml or if any volume is > 500 ml. Sleep: Trazodone provided prn. Follow up appointments: Vascular, PCP    Cheyanne Ferrer.  Olayinka Li MD 5/11/2022, 10:07 AM

## 2022-05-11 NOTE — TELEPHONE ENCOUNTER
Patient called stating Dr. Tomy Gaspar told them to call and make an appt for 6/9, but I told them that was a Thursday and the  said it was for surgery. Please Advise.     Peggy Muñoz - [phone]

## 2022-05-11 NOTE — PLAN OF CARE
SLP completed evaluation. Please refer to notes in EMR.       Stas Eli MA [id number] [facility]  Speech Language Pathologist

## 2022-05-11 NOTE — PROGRESS NOTES
Speech Language Pathology  Facility/Department: [address] [place] [address]  Initial Speech/Language/Cognitive Assessment    NAME: Reese Kitchen  : 1950   MRN: [id number]  ADMISSION DATE: 5/10/2022  ADMITTING DIAGNOSIS: has Acquired hypothyroidism; Adverse reaction to sulfa antibiotic; Chemotherapy adverse reaction; Anastomotic ulcer; Anemia; Anxiety; Campbell esophagus; Cancer ([address] Cellulitis of left lower extremity; DNR (do not resuscitate); Family history of colon cancer; Hiatal hernia; History of skin cancer; Dyslipidemia; Localized edema; Lower abdominal pain; Lower extremity edema; Pleural effusion; Thyroid nodule; CML (chronic myelocytic leukemia) ([address] Rectal bleeding; Polyp of colon; Anatomical narrow angle, bilateral; Hx of actinic keratosis; HTN (hypertension), benign; Bilateral primary osteoarthritis of knee; Chronic pain of both knees; Neurogenic bladder as late effect of cerebrovascular accident (CVA); Chronic kidney disease, stage 3a ([address] Arterial ischemic stroke, ICA, right, acute ([address] Hematuria; Positive depression screening; Rhabdomyolysis; EDYTA (acute kidney injury) ([address] Troponin level elevated; Elevated liver enzymes; Type 2 diabetes mellitus with kidney complication, without long-term current use of insulin ([address] DM type 2, controlled, with complication ([address] Pre-syncope; Hemorrhage; and Acute CVA (cerebrovascular accident) [facility]) on their problem list.  DATE ONSET:  Pt admitted to [facility] 2022    Date of Eval: 2022   Evaluating Therapist: CYNTHIA Diamond    RECENT RESULTS  CT OF HEAD/MRI: 2022  Impression   Decreased attenuation in the right corona radiata with progression consistent   with evolving infarct.  No associated hemorrhage or mass effect.       Stable decreased attenuation in the right temporal and parietal lobes. Stable lacunar infarcts in the right basal ganglia and ken.       Findings were discussed with Dr. Rosi Zeng at 11:58 a.m. on 2022.      Primary Complaint:  Pt's complaints: slurred speech     Pt's goals: \"to be able to go home and take care of myself\"    Pain: Denies    Vitals/labs:   Temp: n/a  SpO2: 95%  RR: 16  BP: 201/79 (MD and RN aware)  HR: 59      Assessment:  Cognitive Diagnosis: L visual field cut with pt requiring consistent cues to scan to L side throughout evaluation; mild-mod cognitive deficit  Speech Diagnosis: Mild dysarthria although speech considered 100% intelligible in conversation as judged by SLP. Left-sided labial weakness noted. Per MD H&P: Yolande Tabares is a 66year old female with a past medical history significant for recent CVA with left hemiparesis, CML, HTN, and HLD who presented to [facility] on 5/3/22 with abnormal labs. She had recently been admitted to a hospital in [place] for stroke. Per report she left the hospital AMA. She had a visit with her PCP on 5/2. Labs results from this visit showed EDYTA and transaminitis and she was admitted with rhabdomyolysis suspected to be due to statin induced myopathy. She was managed with IVF. On 5/5 she was noted to have worsening neurologic status. Stat CT head revealed possible acute to subacute CVA in the right frontal corona radiata. MRI brain reveal acute infarct in the right cerebral hemisphere. Carotid ultrasound revealed occlusion of the right internal carotid artery. Vascular surgery was consulted. CTA revealed that ANGEL patent beyond very high grade stenosis. Vascular and Neurology recommending CEA as outpatient. She was admitted to [facility] on 5/10/22 due to functional deficits below her baseline.      Today she reports left sided weakness. She denies any pain. She reports difficulty swallowing, but good appetite. She is motivated to work with therapies. \"    Pt alert and cooperative, agreeable to evaluation. Pt's  present at bedside.  Pt previously seen by ST during inpatient admission for both BSE and cog eval - pt scored 21/28 on SLUMS on 05/05/22 (presents with left neglect and deficits with recall, executive function). Pt with CVA in March 2022. Per chart review, imaging indicated \"old right basal ganglia stroke with acute infarct in right corona radiata and right parietal lobe\" during this admission. Pt reports she lives in a mobile home with her . Pt reports being independent with med management. PT and  share responsibility for finances, cooking, laundry, grocery shopping. Pt and  want to hire someone for cleaning. Pt answered 'yes' in regards to driving, but per chart review, pt has not driven since CVA in March. Pt worked as a caretaker. Pt enjoys sewing, taking care of her grandchildren. The pt was administered the [address] ([address] [facility]) Examination this date to assess cognition. The pt scored a 22/30 with a score of 27 or greater considered WNL per the SLUMS. See pt's scores on each subtest below:    [address] [facility] Status (SLUMS) Examination   Section / subtest   Score Comments   Orientation   2/3 Off by 1 for ASTON   Short-term recall   4/5   +1 given category cue   Calculations   3/3    Naming   3/3    Attention: number repetition   1/2    Visuospatial tasks: Clock drawing and shape identification   0/6 Significant difficulty. Noted left neglect   Auditory comprehension 8/8     Total score:  22/30       The pt demonstrated difficulty with attention to the left, recall, executive function, thought organization. The pt's strengths included orientation, naming, auditory comprehension. Pt will benefit from continued speech therapy to promote improvement in these areas and achieve safe and independent return home at [facility] if safe and able. Discussed areas of deficit, goals, POC for ST with pt and  who are in agreement for participation in therapy.      Recommendations:  Requires SLP Intervention: Yes  Duration of Treatment: 21 days, 5x/week for LOS  D/C Recommendations: Ongoing speech therapy is recommended during this hospitalization; Ongoing speech therapy is recommended at next level of care       Plan:   Goals:  Short-term Goals  Timeframe for Short-term Goals: 18 days (05/28/2022)  Goal 1: Pt will effectively use compensatory visual strategies for improved attention to left side during structured tasks with 80% acc given mod cues. Goal 2: Pt will complete graded recall tasks using compensatory strategies with 90% acc given min cues  Goal 3: Pt will complete executive function tasks (e.g. meds, time, money, etc) with 90% acc givgen min cues  Goal 4: Pt will complete problem solving and thought organization tasks with 90% acc given min cues  Goal 5: Pt will complete verbal and visual reasoning tasks with 90% acc given min cues  Long-term Goals  Timeframe for Long-term Goals: 21 days (05/31/2022)  Goal 1: Pt will improve overall cognitive-linguistic abilities to promote safe and independent return home PLOF   Patient/family involved in developing goals and treatment plan: yes    Subjective:   Previous level of function and limitations: Independent   General  Chart Reviewed: Yes  Patient assessed for rehabilitation services?: Yes  Additional Pertinent Hx: CVA March 2022  Family / Caregiver Present: Yes  General Comment  Comments: Pt admitted s/p fall, rhabdomyolysis. Pt s/p recent CVA (March 2022 per pt, chart).   Subjective  Subjective: Pt alert and cooperative, agreeable to eval.  Social/Functional History  Lives With: Spouse  Type of Home: House  Receives Help From: Family  ADL Assistance: Needs assistance  Homemaking Assistance: Needs assistance  Homemaking Responsibilities: Yes  Meal Prep Responsibility: Secondary  Laundry Responsibility: Secondary  Cleaning Responsibility: No  Bill Paying/Finance Responsibility: Secondary  Shopping Responsibility: Secondary  Health Care Management: Primary  Ambulation Assistance: Needs assistance  Transfer Assistance: Needs assistance  Active : No  Patient's  Info: Spouse drives. Pt was able to drive until CVA in 2/0273. Mode of Transportation: Car  Occupation: Retired  Type of Occupation: caretaker  Vision  Vision: Impaired  Vision Exceptions: Wears glasses at all times; Visual field cut  Hearing  Hearing: Within functional limits           Objective:     Oral/Motor  Oral Hygiene: Moist;Clean    Auditory Comprehension  Comprehension: Exceptions (pt with mild difficulty with comprehension; suspect related to reduced cognition)    Reading Comprehension  Reading Status: [facility] to [facility]  Scanning/Tracking Impairment Severity: Severe  Interfering Components: Left neglect; Visual perceptual;Visual acuity  Effective Techniques: Tactile/visual cueing    Expression  Primary Mode of Expression: Verbal    Verbal Expression  Verbal Expression: Within functional limits    Written Expression  Written Expression: Exceptions to [facility]  Interfering Components: Left neglect      Pragmatics/Social Functioning  Pragmatics: Exceptions to [facility] [place]  Affect: Mild  Eye Contact: Mild  Monotone: Mild    Cognition:      Orientation  Overall Orientation Status: Within Normal Limits  Attention  Att[facility]: [facility] to [facility] (severe left neglect)  Memory  Memory: Exceptions to [facility] [place]  Short-term Memory: WFL  Problem Solving  Problem Solving: Exceptions to [facility]  Simple Functional Tasks: Mild  Verbal Reasoning Skills: Mild  Sequencing: Mild  Executive Function Skills: Mild  Managing Finances:  (to be assessed in therapy)  Managing Medications:  (to be assessed in therapy)  Numeric Reasoning  Numeric Reasoning: Exceptions to [facility]   Calculations:  (to be assessed in therapy)  Money Management:  (to be assessed in therapy)  Time:  (to be assessed in therapy)  Abstract Reasoning  Abstract Reasoning: Exceptions to [facility]  Divergent Thinking: WFL  Safety/Judgment  Safety/Judgment: Exceptions to [facility]  Unable to Self-monitor and Self-correct Consistently:  Moderate      Prognosis:  Speech Therapy Prognosis  Prognosis: Good  Individuals consulted  Consulted and agree with results and recommendations: Patient;RN;Family member  Family member consulted: pt's     Education:  Patient Education: SLP edu pt re: Role of SLP, rationale of cog eval, results of cog eval, goals, POC  Patient Education Response: Verbalizes understanding;Needs reinforcement  Safety Devices in place: Yes  Type of devices: Call light within reach; Chair alarm in place; Left in chair; All fall risk precautions in place; Other (comment) ( present)    Therapy Time:   Individual   Time In  1230   Time Out 1300   Minutes 30 min             Sandra De Paz MA [facility]  Speech Language Pathologist

## 2022-05-11 NOTE — CARE COORDINATION
Case Management ARU Admission Assessment     Objective:  will complete initial assessment and review the role of Case Management while on the ARU. Patient will be educated on team conferences as well as discuss family training and how it is encouraged on the unit. Order for \"discharge planning\" has been addressed. Family Present: yes  Primary :jamilelman    Admit date: 05/10/22      Insurance: MEDICARE PART A AND B    Admitting diagnosis: Acute CVA    Current home situation: Independent prior level of function. Lives with spouse in a ranch style home with a basement. Home has a 2 step entry. Durable Medical Equipment at home:  Walker__Cane__RTS__ BSC__Shower Chair__  02__ HHN__ CPAP__  BiPap__  Hospital Bed__ W/C___ Other__________    Meds to Beds program: yes, if needed    Services prior to admission: none    Preference for [place] [id number] or Outpatient therapy: [facility]    Patient's goal(s): Working or Volunteering prior to admission:  __Yes _x_No                        Accessibility to community resources/transportation:  Spouse     Has patient experienced a recent loss or significant life event that would impact their care or ability to participate? _x_No  __Yes, please explain:    Has patient ever been treated for emotional disorder(s)? _x_No  __Yes, how does this affect current situation? Is patient and family coping appropriately with stressful events and this hospitalization? _x_Yes  __No, please explain:    Support system at home and in the community: Family    Who will be the one to contact for family training:nora negron    24 hour care on discharge: TBD    PCP: Henok Sommers MD    Pharmacy: [facility] meds to bed or patients pharmacy    Discharge plan/Summary:   spoke with patient at bedside to complete initial assessment and review the role of Case Management while on the ARU.  Patient educated on team conferences as well as discuss family training and how it is encouraged on the unit. Independent prior level of function. Lives with spouse in a ranch style home with a basement. [facility] 2 step entry[facility]  Case Management (CM) will continue to follow for discharge planning recommendations from the treatment team.

## 2022-05-11 NOTE — PROGRESS NOTES
Speech Language Pathology  Clinical Bedside Swallow Assessment  Facility/Department: [facility]      NAME:Jennifer Phelan  : 1950 (76 y.o.)   MRN: [id number]  ROOM: [address] [place] [address]  ADMISSION DATE: 5/10/2022  PATIENT DIAGNOSIS(ES): Acute CVA (cerebrovascular accident) ([facility] [I63.9]  No chief complaint on file.     Patient Active Problem List    Diagnosis Date Noted    Pre-syncope 05/10/2022    Hemorrhage 05/10/2022    Acute CVA (cerebrovascular accident) ([facility] 05/10/2022    DM type 2, controlled, with complication ([facility]     Elevated liver enzymes 2022    Type 2 diabetes mellitus with kidney complication, without long-term current use of insulin ([facility] 2022    Rhabdomyolysis 2022    EDYTA (acute kidney injury) ([facility] 2022    Troponin level elevated 2022    Neurogenic bladder as late effect of cerebrovascular accident (CVA) 2022    Chronic kidney disease, stage 3a ([facility] 2022    Arterial ischemic stroke, ICA, right, acute ([address] 2022    Hematuria 2022    Positive depression screening 2022    Bilateral primary osteoarthritis of knee 2021    Chronic pain of both knees 2021    HTN (hypertension), benign     Hx of actinic keratosis 2020    Anatomical narrow angle, bilateral 2019    Rectal bleeding     Polyp of colon     Anastomotic ulcer 2019    Campbell esophagus 2019    Cancer ([facility] 2019    Hiatal hernia 2019    Dyslipidemia 2019    Thyroid nodule 2019    History of skin cancer 05/10/2019    Acquired hypothyroidism 09/10/2018    Lower abdominal pain 2017    Adverse reaction to sulfa antibiotic 2017    Cellulitis of left lower extremity 10/21/2016    Localized edema 10/21/2016    Lower extremity edema 10/10/2016    Anemia 2016    Chemotherapy adverse reaction 2015    Pleural effusion 2015    CML (chronic myelocytic leukemia) ([facility] 08/04/2015    Anxiety 07/18/2015    DNR (do not resuscitate) 02/26/2015    Family history of colon cancer 01/06/2015     Past Medical History:   Diagnosis Date    Anemia     Arthritis     Asthma     Bowel dysfunction     CML (chronic myelocytic leukemia) ([facility] 01/2006    leukemia    Hyperlipidemia     Hypertension     Nuclear senile cataract of both eyes 11/25/2019    Obesity     Risk of myocardial infarction or stroke 7.5% or greater in next 10 years 9/15/2021    Skin cancer of face     left cheek, squamous    Stroke (cerebrum) ([facility]     Thyroid disease     TIA (transient ischemic attack)     mini ones-from chemo     Past Surgical History:   Procedure Laterality Date    ANUS SURGERY  1998    fissure    BREAST BIOPSY      BREAST LUMPECTOMY      benign    COLONOSCOPY      numerous polyps    ELBOW SURGERY  1960    removal of foreign body ([place])    FINE NEEDLE ASPIRATION      PARACENTESIS      x 2 fluid in lung from Chemo    TONSILLECTOMY Bilateral      Allergies   Allergen Reactions    Latex Swelling and Rash     Rubber gloves    Mangifera Indica Anaphylaxis     Francisco-- Throat closes    Penicillin G Hives    Shellfish-Derived Products Hives    Sulfa Antibiotics Hives    Grapefruit Concentrate      Due to chemo    Omeprazole Other (See Comments)     Due to chemo    Shrimp Extract Allergy Skin Test Rash       DATE ONSET: Pt admitted to [facility] 05/03/2022    Date of Evaluation: 5/11/2022   Evaluating Therapist: CYNTHIA Albert    Chart Reviewed: : [x] Yes [] No    Current Diet: ADULT DIET; Dysphagia - Soft and Bite Sized; 3 carb choices (45 gm/meal)    Recent Chest Radiography: [x] Chest XR   [] CT of Chest  Date: 05/07/2022  Impression   No acute cardiopulmonary disease is noted, noting limitations in the lung   bases.       RECOMMENDATION:   If there is persistent concern, follow-up PA and lateral chest is recommended.        Pain: denies     Reason for Referral  Alfie Wild was referred for a bedside swallow evaluation to assess the efficiency of their swallow function, identify signs and symptoms of aspiration and make recommendations regarding safe dietary consistencies, effective compensatory strategies, and safe eating environment. Assessment    Medical record review/interview: Per MD H&P: \"Jennifer Oropeza is a 66year old female with a past medical history significant for recent CVA with left hemiparesis, CML, HTN, and HLD who presented to [facility] on 5/3/22 with abnormal labs. She had recently been admitted to a hospital in [place] for stroke. Per report she left the hospital AMA. She had a visit with her PCP on 5/2. Labs results from this visit showed EDYTA and transaminitis and she was admitted with rhabdomyolysis suspected to be due to statin induced myopathy. She was managed with IVF. On 5/5 she was noted to have worsening neurologic status. Stat CT head revealed possible acute to subacute CVA in the right frontal corona radiata. MRI brain reveal acute infarct in the right cerebral hemisphere. Carotid ultrasound revealed occlusion of the right internal carotid artery. Vascular surgery was consulted. CTA revealed that ANGEL patent beyond very high grade stenosis. Vascular and Neurology recommending CEA as outpatient. She was admitted to [facility] on 5/10/22 due to functional deficits below her baseline.      Today she reports left sided weakness. She denies any pain. She reports difficulty swallowing, but good appetite. She is motivated to work with therapies. \"    Pt's goals: \"to be able to go home and take care of myself\"    Patient Complaints:  Odynophagia: [] Yes [x] No  Globus Sensation: [x] Yes [] No  SOB with PO intake: [] Yes [x] No  Increased WOB with PO intake: [] Yes [x] No  Reflux Sx's: [] Yes [x] No  Weight loss: [] Yes [x] No  Coughing/Choking with PO intake: [x] Yes [] No  Reduced Appetite: [] Yes [x] No    Additional Reported Symptoms/Complaints:   Pt only reports concerns re: taking pills - globus sensation / coughing. Otherwise, denies s/s of dysphagia     Predisposing dysphagia risk factors: Hx of CVA and Hx of dysphagia  Clinical signs of possible chronic dysphagia: hx of dysphagia  Precipitating dysphagia risk factors: reduced physical mobility and acute neurological event    Vitals/labs:   Temp: n/a  SpO2: 95%  RR: 16  BP: 201/79 (MD and RN aware)  HR: 59       CBC:   Recent Labs     05/10/22  0659   WBC 9.6   HGB 10.1*         BMP:  Recent Labs     05/11/22  0633      K 3.9      CO2 26   BUN 29*   CREATININE 1.1   GLUCOSE 116*         Cranial nerve exam:   CN V (trigeminal): ophthalmic, maxillary, and mandibular facial sensation- Impaired L  CN VII (facial): Impaired L  CN IX/X (glossopharyngeal/vagus): MPT: SERGIO; pitch range: SERGIO; vocal quality: WNL; cough: Weak- perceptually  CN XII (hypoglossal): Impaired L    Laryngeal function exam:   Secretions: WFL  Vocal quality: See CN exam above  MPT: See CN exam above  S/Z ratio: DNT  Pitch range: See CN exam above  Cough: See CN exam above    Oral Care Status:    [x] Oral Care [facility]  [] Poor oral care status  [] Edentulous  [] Upper Dentures  [] Lower Dentures  [] Missing/Broken Teeth  [] Evidence of dental cavities/carries    PO trials:    IDDSI 0 (thin)  - Straw: Pt with some difficulty with oral acceptance of straw due to left sided labial weakness. Suspect premature spillage prior to initiation of swallow and delayed trigger of pharyngeal swallow. 1x immediate post-swallow cough. No additional s/s of aspiration - no coughing, throat clearing, wet vocal quality. IDDSI 4 (puree): Adequate A-P transit and oral clearance. No overt s/s of aspiration. IDDSI 5 (minced and moist): DNT    IDDSI 6 (soft and bite sized): Slow mastication, but adequate. Adequate A-P transit and oral clearance. Pt with complaints of food stuck in teeth, but no pocketing. No overt s/s of aspiration. IDDSI 7 (regular): Slow mastication and prolonged. Minimal left sided pocketing - pt indep cleared. No overt s/s of aspiration. Impressions:    Pt presents with mild oropharyngeal dysphagia. Oral mech exam indicated left facial droop. Reduced left sided labial strength, ROM, coordination. Reduced lingual ROM, strength, coordination. Adequate dentition. Pt overall tolerated soft solid trials and puree trials. Of note, pt with oral residue in oral cavity at beginning of session (after lunch) - required liquid wash to clear. Minimal left pocketing with regular solids, but cleared with lingual sweep indep by pt. Pt with 1x immediate post-swallow cough with thin via straw. No additional s/s of aspiration. SLP recommends soft and bite sized solids, thin liquids, meds whole or crushed in puree. Pt will benefit from continue speech therapy for diet tolerance monitoring, PO trials, oral motor exercises re: safe swallow strategies. Discussed with pt and  diet recs, goals, POC - both are in agreement to participate in therapy. Instrumentation: TBD  Diet recommendation: IDDSI 6 Soft and Bite Sized Solids; IDDSI 0 Thin Liquids;  Meds whole or crushed in puree as able  Risk management: upright for all intake, stay upright for at least 30 mins after intake, small bites/sips, assist feed, oral care 2-3x/day to reduce adverse affects in the event of aspiration, alternate bites/sips, slow rate of intake   Prognosis: Fair    Recommended Intervention:  [x] Dysphagia tx  [] Videostroboscopy                      [] NPO   [] MBS       [] Speech/Cog Eval    [x] Therapeutic PO Trials     [] Ice Chips   [] Other:  [] FEES                                                 Dysphagia Therapeutic Intervention:  [x]  Bolus control Exercises  [x]  Oral Motor Exercises  []  [employer] Protocol  []  Thermal Stimulation  []  Oral Care    []  Vital Stim/NMES  []  Laryngeal Exercises  [x]  Patient/Family Education  []  Pharyngeal Exercises  [x]  Therapeutic PO trials with SLP  [x]  Diet tolerance monitoring  []  Other:     Referrals:  [] ENT    [] PT  [] Pulmonology [] GI  [] Neurology  [] RD  [] OT   []     Goals:  Short Term Goals:  Timeframe for Short Term Goals: 18 days (05/28/2022)  Goal 1: The patient will tolerate recommended diet with no clinical s/s of aspiration   Goal 2: The patient/caregiver will demonstrate understanding of compensatory swallow strategies, for improved swallow safety  Goal 3: The patient will tolerate instrumental assessment when able   Goal 4: The pt will complete oral motor exercises to improve labial and lingual strength, ROM, and coordination in 10/10 opportunities given min cues     Long Term Goals:    Timeframe for Long Term Goals: 21 days (05/31/2022)  Goal 1: The patient will tolerate least restrictive diet with no clinical s/s of aspiration or worsening respiratory/pulmonary status    Treatment:  Skilled instruction completed with patient re: evidenced based practice regarding recommendations and POC, importance of oral care to reduce adverse affects in the event of aspiration, and instruction of recommended compensatory strategies developed based upon clinical exam. Pt able to recall/demonstrate compensatory strategies with mod cues.       Pt Education: SLP educated the patient re: Role of SLP, rationale for completion of assessment, results of assessment, recommendations and POC   Pt Education Response: verbalized understanding and would benefit from ongoing education    Duration/Frequency of Tx: 5x/week for LOS    Individuals Consulted:   [x]  Patient     []  NP         [x]  RN   []  RD                   []  MD      [x]  Family Member                        []  PA    []  Other:      Safety Devices / Report:  [x]  All fall risk precautions in place []  Safety handoff completed with RN  []  Bed alarm in place  []  Left in bed     [x]  Chair alarm in place  [x]  Left in chair   [x]  Call light in reach   []  Other: Total Treatment Time / Charges       Time in Time out Total Time / units   Swallow Eval/Tx Time  1300 1330 30 min / 2 units     Signature:  Devendra Faria MA CCC-SLP #[id number]  Speech Language Pathologist

## 2022-05-11 NOTE — FLOWSHEET NOTE
05/11/22 1745   Encounter Summary   Encounter Overview/Reason  Initial Encounter   Service Provided For: Patient   Referral/Consult From: Nurse   Support System Spouse; Tenriism/susan community;Friends/neighbors   Last Encounter  05/11/22   Complexity of Encounter Low   Begin Time 1725   End Time  1745   Total Time Calculated 20 min   Spiritual/Emotional needs   Type Other (comment)  (Pt requested Bible.  delivered Bible.)   Assessment/Intervention/Outcome   Assessment Calm   Intervention Active listening;Discussed belief system/Mormonism practices/susan;Discussed death, afterlife; Discussed relationship with God;Prayer (assurance of)/Reagan;Read/Provided Scripture   Outcome Expressed Gratitude         called by nurse, as pt requested a Bible.  delivered Bible, read two favorite passages. Pt shared health struggles and coping mechanisms (susan, Scripture reading, prayer, supportive ). Concluded with prayer.

## 2022-05-12 ENCOUNTER — CARE COORDINATION (OUTPATIENT)
Dept: CARE COORDINATION | Age: 72
End: 2022-05-12

## 2022-05-12 LAB
GLUCOSE BLD-MCNC: 101 MG/DL (ref 70–99)
GLUCOSE BLD-MCNC: 112 MG/DL (ref 70–99)
GLUCOSE BLD-MCNC: 125 MG/DL (ref 70–99)
GLUCOSE BLD-MCNC: 162 MG/DL (ref 70–99)
PERFORMED ON: ABNORMAL

## 2022-05-12 PROCEDURE — 1280000000 HC REHAB R&B

## 2022-05-12 PROCEDURE — 97530 THERAPEUTIC ACTIVITIES: CPT

## 2022-05-12 PROCEDURE — 97535 SELF CARE MNGMENT TRAINING: CPT

## 2022-05-12 PROCEDURE — 6370000000 HC RX 637 (ALT 250 FOR IP): Performed by: STUDENT IN AN ORGANIZED HEALTH CARE EDUCATION/TRAINING PROGRAM

## 2022-05-12 PROCEDURE — 97129 THER IVNTJ 1ST 15 MIN: CPT

## 2022-05-12 PROCEDURE — 92526 ORAL FUNCTION THERAPY: CPT

## 2022-05-12 PROCEDURE — 6360000002 HC RX W HCPCS: Performed by: STUDENT IN AN ORGANIZED HEALTH CARE EDUCATION/TRAINING PROGRAM

## 2022-05-12 PROCEDURE — 97112 NEUROMUSCULAR REEDUCATION: CPT

## 2022-05-12 PROCEDURE — 97130 THER IVNTJ EA ADDL 15 MIN: CPT

## 2022-05-12 RX ADMIN — METOPROLOL TARTRATE 25 MG: 25 TABLET, FILM COATED ORAL at 21:49

## 2022-05-12 RX ADMIN — METOPROLOL TARTRATE 25 MG: 25 TABLET, FILM COATED ORAL at 09:08

## 2022-05-12 RX ADMIN — CLOPIDOGREL BISULFATE 75 MG: 75 TABLET, FILM COATED ORAL at 09:09

## 2022-05-12 RX ADMIN — HEPARIN SODIUM 5000 UNITS: 5000 INJECTION INTRAVENOUS; SUBCUTANEOUS at 09:08

## 2022-05-12 RX ADMIN — HYDROCORTISONE: 1 CREAM TOPICAL at 09:48

## 2022-05-12 RX ADMIN — FAMOTIDINE 10 MG: 20 TABLET, FILM COATED ORAL at 09:09

## 2022-05-12 RX ADMIN — Medication 400 MG: at 09:10

## 2022-05-12 RX ADMIN — CARBOXYMETHYLCELLULOSE SODIUM 1 DROP: 10 GEL OPHTHALMIC at 09:08

## 2022-05-12 RX ADMIN — LISINOPRIL 20 MG: 20 TABLET ORAL at 09:10

## 2022-05-12 RX ADMIN — INSULIN LISPRO 1 UNITS: 100 INJECTION, SOLUTION INTRAVENOUS; SUBCUTANEOUS at 11:40

## 2022-05-12 RX ADMIN — LEVOTHYROXINE SODIUM 50 MCG: 0.05 TABLET ORAL at 09:10

## 2022-05-12 RX ADMIN — DICLOFENAC SODIUM 4 G: 10 GEL TOPICAL at 17:27

## 2022-05-12 RX ADMIN — POLYETHYLENE GLYCOL 3350 17 G: 17 POWDER, FOR SOLUTION ORAL at 09:08

## 2022-05-12 RX ADMIN — TRAZODONE HYDROCHLORIDE 50 MG: 50 TABLET ORAL at 21:49

## 2022-05-12 RX ADMIN — ACETAMINOPHEN 650 MG: 325 TABLET ORAL at 21:49

## 2022-05-12 RX ADMIN — ASPIRIN 81 MG: 81 TABLET, COATED ORAL at 09:09

## 2022-05-12 RX ADMIN — HEPARIN SODIUM 5000 UNITS: 5000 INJECTION INTRAVENOUS; SUBCUTANEOUS at 15:45

## 2022-05-12 RX ADMIN — DICLOFENAC SODIUM 4 G: 10 GEL TOPICAL at 13:46

## 2022-05-12 RX ADMIN — DICLOFENAC SODIUM 4 G: 10 GEL TOPICAL at 09:48

## 2022-05-12 RX ADMIN — HEPARIN SODIUM 5000 UNITS: 5000 INJECTION INTRAVENOUS; SUBCUTANEOUS at 21:49

## 2022-05-12 ASSESSMENT — PAIN DESCRIPTION - ORIENTATION: ORIENTATION: RIGHT

## 2022-05-12 ASSESSMENT — PAIN SCALES - GENERAL
PAINLEVEL_OUTOF10: 0
PAINLEVEL_OUTOF10: 0
PAINLEVEL_OUTOF10: 7
PAINLEVEL_OUTOF10: 0

## 2022-05-12 ASSESSMENT — PAIN DESCRIPTION - DESCRIPTORS: DESCRIPTORS: ACHING

## 2022-05-12 ASSESSMENT — PAIN DESCRIPTION - LOCATION: LOCATION: SHOULDER

## 2022-05-12 ASSESSMENT — PAIN DESCRIPTION - ONSET: ONSET: ON-GOING

## 2022-05-12 ASSESSMENT — PAIN DESCRIPTION - FREQUENCY: FREQUENCY: CONTINUOUS

## 2022-05-12 ASSESSMENT — PAIN - FUNCTIONAL ASSESSMENT: PAIN_FUNCTIONAL_ASSESSMENT: ACTIVITIES ARE NOT PREVENTED

## 2022-05-12 ASSESSMENT — PAIN DESCRIPTION - PAIN TYPE: TYPE: CHRONIC PAIN

## 2022-05-12 NOTE — PROGRESS NOTES
Occupational Therapy  Facility/Department: [facility]  Rehabilitation Occupational Therapy Daily Treatment Note    Date: 22  Patient Name: Justine Lynn       Room: 7330/1697-71  MRN: [id number]  Account: [de-identified]   : 1950  (76 y.o.) Gender: female                    Past Medical History:  has a past medical history of Anemia, Arthritis, Asthma, Bowel dysfunction, CML (chronic myelocytic leukemia) ([facility] Hyperlipidemia, Hypertension, Nuclear senile cataract of both eyes, Obesity, Risk of myocardial infarction or stroke 7.5% or greater in next 10 years, Skin cancer of face, Stroke (cerebrum) ([facility] Thyroid disease, and TIA (transient ischemic attack). Past Surgical History:   has a past surgical history that includes Breast biopsy; fine needle aspiration; Breast lumpectomy; Tonsillectomy (Bilateral); Paracentesis; Anus surgery (); Elbow surgery (); and Colonoscopy. Restrictions  Restrictions/Precautions: Fall Risk;Contact Precautions; Up as Tolerated  Other position/activity restrictions: Contact precautions--purewick, telemetry, 2 L O 2    Subjective  Subjective: Pt supine in bed and agreeable to OT/PT cotx; PCA and RN changing patient. Pt reports PRN pain in L shoulder/wrist.  Restrictions/Precautions: Fall Risk;Contact Precautions; Up as Tolerated             Objective     Cognition  Overall Cognitive Status: Exceptions  Arousal/Alertness: Appropriate responses to stimuli  Following Commands:  Follows one step commands with repetition  Attention Span: Attends with cues to redirect  Memory: Decreased recall of precautions  Safety Judgement: Decreased awareness of need for safety;Decreased awareness of need for assistance  Problem Solving: Assistance required to correct errors made;Assistance required to identify errors made  Insights: Decreased awareness of deficits  Initiation: Requires cues for all  Sequencing: Requires cues for all  Orientation  Overall Orientation Status: Within Functional Limits  Orientation Level: Oriented X4   Perception  Overall Perceptual Status: Impaired  Unilateral Attention: Cues to attend left visual field;Cues to attend to left side of body;Cues to maintain midline in sitting  Initiation: Cues to initiate tasks  Motor Planning: Hand over hand to sequence tasks  Perseveration: Perseverates during conversation     ADL  Feeding  Assistance Level: Increased time to complete; Moderate assistance;Verbal cues  Grooming/Oral Hygiene  Assistance Level: Increased time to complete;Maximum assistance  Skilled Clinical Factors: washing face and brushing hair sitting EOB mod-maxA for balance while OT assisted  Upper Extremity Bathing  Assistance Level: Maximum assistance  Skilled Clinical Factors: maxA sitting EOB  Upper Extremity Dressing  Assistance Level: Maximum assistance; Requires x 2 assistance  Skilled Clinical Factors: maxA x1 with PT to promote upright balance, maxA x1 for OT to provide assistance/hand over hand and cues for papo techniques assist with all tasks  Lower Extremity Dressing  Assistance Level: Dependent  Skilled Clinical Factors: donning shoes sitting EOB maxA x1-2, maxA x2 sit <>s tand in steady to manage pants/brief up down  Putting On/Taking Off Footwear  Assistance Level: Dependent  Skilled Clinical Factors: donning shoes  Toileting  Assistance Level: Increased time to complete; Requires x 2 assistance;Dependent;Maximum assistance  Skilled Clinical Factors: max-depA x2 sit <>  steady for toileting tasks with poor upright posture and tolerance  Toilet Transfers  Technique:  (steady)  Equipment: Beside commode (over toilet, use of steady)  Additional Factors: Verbal cues;Cues for hand placement; Increased time to complete; With handrails  Assistance Level: Maximum assistance; Requires x 2 assistance  Skilled Clinical Factors: max-depA x2 sit <>  steady for toileting tasks with poor upright posture and tolerance     Functional Mobility  Device: Wheelchair (steady EOB to WC, WC to gym, sit <> stand to // bars from [facility], steady WC to [facility] over toilet and then to recliner)  Activity: Transport items; To/From bathroom; To/From therapy gym  Assistance Level: Maximum assistance; Requires x 2 assistance;Dependent  Skilled Clinical Factors: steady EOB to WC, WC to gym, sit <> stand to // bars from [facility], steady WC to [facility] over toilet and then to recliner; maxA x2 sit <> Stand to steady and then mod-maxA x1 with mod cues for WC mobility room to gym  Bed Mobility  Overall Assistance Level: Maximum Assistance; Requires x 2 Assistance  Additional Factors: Head of bed raised; Increased time to complete;Set-up; Verbal cues  Bridging  Assistance Level: Maximum assistance; Requires x 2 assistance  Roll Left  Assistance Level: Maximum assistance; Requires x 2 assistance  Roll Right  Assistance Level: Maximum assistance; Requires x 2 assistance  Sit to Supine  Assistance Level: Maximum assistance; Requires x 2 assistance  Supine to Sit  Assistance Level: Maximum assistance; Requires x 2 assistance  Scooting  Assistance Level: Maximum assistance; Requires x 2 assistance  Transfers  Surface: Wheelchair; To chair with arms; Bedside commode;From chair with arms;From bed  Additional Factors: With handrails;Verbal cues; Hand placement cues; Increased time to complete; Mat raised  Device: Lift equipment;Walker  Sit to Stand  Assistance Level: Maximum assistance; Requires x 2 assistance  Skilled Clinical Factors: steady EOB to WC, WC to gym, sit <> stand to // bars from [facility], steady WC to [facility] over toilet and then to recliner  Stand to Sit  Assistance Level: Maximum assistance; Requires x 2 assistance  Skilled Clinical Factors: steady EOB to WC, WC to gym, sit <> stand to // bars from [facility], steady WC to [facility] over toilet and then to recliner  Bed To/From Chair  Technique:  (steady)  Assistance Level: Maximum assistance; Requires x 2 assistance  Skilled Clinical Factors: steady EOB to WC, WC to gym, sit <> stand to // bars from WC, steady WC to [facility] over toilet and then to recliner   Neuromuscular Education  Neuromuscular education: Yes  NDT Treatment: Upper extremity; Lower extremity;Standing  Facilitation techniques: OT providing assistance for pelvic alignment/midline positioning to steady vs // bars; poor tolerance to standing and poor ability to maintain midline alignment with mirror for visual feedback; OT providing WBing through LUE in steady and // bars with poor awareness of L side despite max cues for scanning L vs R. Pt with increased pushing with fatigue and reaching for R side. Poor body awareness despite max cues and assistance for lateral weight shifting for upright posture  Weight Bearing  Weight Bearing Technique: Yes  RUE Weight Bearing: Extended arm standing  LUE Weight Bearing: Extended arm standing     Assessment  Assessment  Assessment: Pt is motivated and pleasant, agreeable to cotx to address functional transfers, standing balance, NMR, and endurance. Pt continues to need maxA x2 for sitting and standing balance with lift equipment and poor upright posture/propioception therefore cotx is appropriate at this time. OT providing assistance for pelvic alignment/midline positioning to steady vs // bars; poor tolerance to standing and poor ability to maintain midline alignment with mirror for visual feedback; OT providing WBing through LUE in steady and // bars with poor awareness of L side despite max cues for scanning L vs R. Pt with increased pushing with fatigue and reaching for R side. Poor body awareness despite max cues and assistance for lateral weight shifting for upright posture. Pt was able to tolerate multiple stands in // bars however with decreased ability to self-correct midline alignment despite cues. Pt provided with LUE lap tray for WC positioning, placed pillow under LUE in recliner for optimal comfort and positioning. Cont to assess pending progress DC recommendations.   Cont OT field with min cues during ADLs/activities  Additional Goals?: No  Long Term Goals  Time Frame for Long term goals : 21 days (3/31/22)  Long Term Goal 1: Pt will complete functional transfers/mobility SPV with LRAD  Long Term Goal 2: Pt will complete UE dressing/bathing sitting setup with min cues for papo techniques  Long Term Goal 3: Pt will complete toileting/transfers with LRAD and DME PRN SPV  Long Term Goal 4: Pt will complete opening 3/5 containers with compensatory techniques sitting setup/SPV    Therapy Time   Individual Concurrent Group Co-treatment   Time In       1000   Time Out       1100   Minutes       60   Timed Code Treatment Minutes: 400 [facility]       Rabia Moreno OTR/L

## 2022-05-12 NOTE — CARE COORDINATION
No call made to patient at this time. Patient is currently inpatient rehab at [facility].   Will continue to monitor for D/C

## 2022-05-12 NOTE — PROGRESS NOTES
Speech Language Pathology  [facility]: ACUTE REHAB UNIT  SPEECH-LANGUAGE PATHOLOGY      [x] Daily  [] Weekly Care Conference Note  [] Discharge    Patient:Jennifer Nayak      :1950  CKM:[id number]  Rehab Dx/Hx: Acute CVA (cerebrovascular accident) ([facility] [address] [I63.9]    Precautions: falls and aspirations; severe left neglect  Home situation: Lives with . Indep with med management. Share finances, cooking, laundtry, grocery shopping. Has not driven since CVA in March. ST Dx: [] Aphasia  [x] Dysarthria  [] Apraxia   [x] Oropharyngeal dysphagia [x] Cognitive Impairment  [] Other:   Date of Admit: 5/10/2022  Room #: 6854/6609-85    Current functional status (updated daily):         Pt being seen for : [x] Speech/Language Treatment  [x] Dysphagia Treatment [x] Cognitive Treatment  [] Other:  Communication: []WFL  [] Aphasia  [x] Dysarthria  [] Apraxia  [] Pragmatic Impairment [] Non-verbal  [] Hearing Loss  [] Other:   Cognition: [] WFL  [x] Mild  [] Moderate  [] Severe [] Unable to Assess  [] Other:  Memory: [] WFL  [x] Mild  [] Moderate  [] Severe [] Unable to Assess  [] Other:  Behavior: [x] Alert  [x] Cooperative  [x]  Pleasant  [] Confused  [] Agitated  [] Uncooperative  [] Distractible [] Motivated  [] Self-Limiting [] Anxious  [] Other:  Endurance:  [x] Adequate for participation in SLP sessions  [] Reduced overall  [] Lethargic  [] Other:  Safety: [] No concerns at this time  [x] Reduced insight into deficits  [x]  Reduced safety awareness [] Not following call light procedures  [] Unable to Assess  [] Other:    Current Diet Order:ADULT DIET;  Dysphagia - Soft and Bite Sized; 3 carb choices (45 gm/meal)  Swallowing Precautions: upright for all intake, stay upright for at least 30 mins after intake, small bites/sips, assist feed, oral care 2-3x/day to reduce adverse affects in the event of aspiration, alternate bites/sips, slow rate of intake         Date: 2022      Tx session 1  0800 - 0900 Tx session 2  All tx needs met in session 1   Total Timed Code Min 45 0   Total Treatment Minutes 60 0   Individual Treatment Minutes 60 0   Group Treatment Minutes 0 0   Co-Treat Minutes 0 0   Variance/Reason:  n/a n/a   Pain Denies     Pain Intervention [] RN notified  [] Repositioned  [] Intervention offered and patient declined  [x] N/A  [] Other: [] RN notified  [] Repositioned  [] Intervention offered and patient declined  [] N/A  [] Other:   Subjective     Pt alert and cooperative, agreeable to tx. Pt leaning towards left in bed, but upright. Pt's  present. Objective:     Dysphagia Goals  Short-term Goals  Timeframe for Short-term Goals: 18 days (2022)    Goal 1: The patient will tolerate recommended diet with no clinical s/s of aspiration    Thin liquid via straw  -pt tolerated with no overt s/s of aspiration   -no coughing, throat clearing, wet vocal quality  -pt with some difficulty with bolus acceptance via straw 2/2 left labial weakness - edu re: using the straw and placing on right side of oral cavity. Pt required mod cues       Goal 2: The patient/caregiver will demonstrate understanding of compensatory swallow strategies, for improved swallow safety   SLP edu pt re: small, single sips via straw. Pt will benefit from ongoing edu. Goal 3: The patient will tolerate instrumental assessment when able Not clinically indicated at this time.      Goal 4: The pt will complete oral motor exercises to improve labial and lingual strength, ROM, and coordination in 10/10 opportunities given min cues    Pt completed the following OMEs given MAX cues (pt having significant difficulty holding position for more than 1 sec)  -mouth openinx  -smile: 3x  -pucker: 5x      Cognitive-linguistic Goals:   Short-term Goals  Timeframe for Short-term Goals: 18 days (2022)    Goal 1: Pt will effectively use compensatory visual strategies for improved attention to left side during structured tasks with 80% acc given mod cues. SLP sat on pt's left for entire session. Pt made eye contact with SLP 1x given mod-max cues    Pt completed scanning task for finding all the As on a paper with 80% acc indep, improved to 100% acc given mod cues     Goal 2: Pt will complete graded recall tasks using compensatory strategies with 90% acc given min cues   Mental Manipulation: Ranking  -pt given 3 words; asked to repeat in a particular order  -e.g. nay, plane, fly. Repeat in order from smallest to largest  -pt used recall abilities to complete task with 81% acc given min cues, improved to 94% acc given mod cues       Goal 3: Pt will complete executive function tasks (e.g. meds, time, money, etc) with 90% acc givgen min cues   Money General Questions   -e.g. do 5 pennies equal 1 nickel?  -pt completed task with 87% acc indep,  Improved to 93% acc given mod cues       Goal 4: Pt will complete problem solving and thought organization tasks with 90% acc given min cues   Mental Manipulation: Ranking  -pt given 3 words; asked to repeat in a particular order  -e.g. nay, plane, fly. Repeat in order from smallest to largest  -pt used thought org abilities to complete task with 81% acc given min cues, improved to 83% acc given mod cues      Goal 5: Pt will complete verbal and visual reasoning tasks with 90% acc given min cues   Planning Task re: Meals  -pt required MAX cues in order to complete task  -significantly reduced thought org    Other areas targeted: n/a    Education:   SLP edu pt re: role of SLP, rationale of tx tasks, safe swallow strategies, OMEs, recall strategies, attention strategies     Safety Devices: [x] Call light within reach  [] Chair alarm activated  [x] Bed alarm activated  [] Other: [] Call light within reach  [] Chair alarm activated  [] Bed alarm activated  [] Other:    Assessment: Pt alert and cooperative, agreeable to tx. Flat affect.  Pt overall tolerated thin liquid via straw with no overt s/s of aspiration; difficulty with oral acceptance via straw 2/2  Left labial weakness. Pt required MAX cues to complete OMEs - significant difficulty holding an exercise (e.g. mouth opening wide) for more than 1 sec. Pt demonstrated improvement with attention to left with pen/paper task, but only made eye contact with SLP 1x during session when sitting on pt's left. SLP guided pt in completing a mental manipulation: ranking task - pt benefited from min-mod cues for recall and thought organization. Pt did well answering money general questions, but with significant difficulty with planning meal task. Continue goals above. Plan: Continue as per plan of care. Additional Information:     Barriers toward progress: Limited safety awareness, Limited insight into deficits, Decreased proprioception, Upper extremity weakness and Lower extremity weakness  Discharge recommendations:  [] Home independently  [] Home with assistance [x]  24 hour supervision  [] ECF [] Other:  Continued Tx Upon Discharge: ? [x] Yes [] No [] TBD based on progress while on ARU [] Vital Stim indicated [] Other:   Estimated discharge date: 05/31/2022    Interventions used this date:  [] Speech/Language Treatment  [] Instruction in HEP [] Group [x] Dysphagia Treatment [x] Cognitive Treatment   [] Other:       Total Time Breakdown / Charges    Time in Time out Total Time / units   Cognitive Tx 0800 0845 45 min / 3 units   Speech Tx      Dysphagia Tx 0845 0900 15 min / 1 unit       Electronically Signed by     Johanna Mccullough MA [facility]-SLP #[id number]  Speech Language Pathologist

## 2022-05-12 NOTE — PROGRESS NOTES
Alpesh Pulido  5/12/2022  [id number]    Chief Complaint: Acute CVA (cerebrovascular accident) [facility])    Subjective:   No acute events overnight. Today Gay Franco is seen in her room. She reports that she slept better with her  present last night. She denies acute complaints at this time. She asks if she is able to have her dog come in to visit. Discussed with Nurse Manager and ok. ROS: denies f/c, n/v, cp, sob  Objective:  Patient Vitals for the past 24 hrs:   BP Temp Temp src Pulse Resp SpO2 Height   05/12/22 1132 (!) 187/77 -- -- 53 -- 92 % --   05/12/22 1108 (!) 177/72 -- -- 72 -- 94 % --   05/12/22 0830 (!) 177/72 98.4 °F (36.9 °C) Oral 69 16 94 % --   05/11/22 2000 (!) 185/67 98.5 °F (36.9 °C) Oral 60 18 95 % --   05/11/22 1504 (!) 190/75 -- -- 60 -- 94 % --   05/11/22 1411 -- -- -- -- -- -- 5' 2.5\" (1.588 m)     Gen: No distress, pleasant. HEENT: Normocephalic, atraumatic. CV: Regular rate and rhythm. Resp: No respiratory distress. Abd: Soft, nontender   Ext: No edema. Neuro: Alert, oriented, appropriately interactive. Left hemiparesis.     Wt Readings from Last 3 Encounters:   05/10/22 150 lb 12.7 oz (68.4 kg)   05/02/22 143 lb 9.6 oz (65.1 kg)   09/09/21 150 lb 12.8 oz (68.4 kg)       Laboratory data:   Lab Results   Component Value Date    WBC 9.6 05/10/2022    HGB 10.1 (L) 05/10/2022    HCT 29.5 (L) 05/10/2022    MCV 92.2 05/10/2022     05/10/2022       Lab Results   Component Value Date     05/11/2022    K 3.9 05/11/2022     05/11/2022    CO2 26 05/11/2022    BUN 29 05/11/2022    CREATININE 1.1 05/11/2022    GLUCOSE 116 05/11/2022    GLUCOSE 129 07/26/2017    CALCIUM 8.7 05/11/2022        Therapy progress:  PT  Position Activity Restriction  Other position/activity restrictions: Contact precautions--purewick, telemetry, 2 L O 2  Objective     Sit to Stand: 2 Person Assistance,Dependent/Total  Stand to sit: Dependent/Total,2 Person Assistance  Bed to Chair: Dependent/Total     OT  PT Equipment Recommendations  Equipment Needed: Yes (TBD)  Toilet - Technique:  (steady to e-tac chair over toilet)  Equipment Used:  (e-tac chair)  Toilet Transfers Comments: maxA x2 sit <>  steady, progressing at times to [address] X2 with max verbal/tactile and demo cues  Assessment        SLP                Body mass index is 27.14 kg/m². Assessment and Plan:  Hector Mcclelland is a 66year old female with a past medical history significant for recent CVA with left hemiparesis, CML, HTN, and HLD who presented to [facility] on 5/3/22 with abnormal labs, admitted with rhabdomyolysis and ARF, found to have acute CVA. She was admitted to [facility] on 5/10/22 due to functional deficits below her baseline.     Acute Right CVA  - MRI showing acute infarct in right cerebral hemisphere in a watershed distribution  - CTA showing 90-95% stenosis of right ICA  - Vascular and Neurology in agreement on waiting for CEA, needs follow up in 4 weeks  - asa, plavix, statin  - PT, OT, ST     Dysphagia  - on modified diet  - ST     Rhabdomyolysis  Acute Renal Injury  - Nephrology followed during acute stay  - CPK trending down  - monitor      HTN  - patient with episode of hypotension so isosorbid mononitrate and hydralazine held  - continue lisinopril  - added metoprolol 25 mg      DM2  - SSI     Liver Injury with elevated LFTs  - suspected to be due to statin induced rhabdo  - statin stopped  - LFTs trending down  - monitor intermittently     CML  - follows with Dr. Luz Maria Burks  - on nilitinib at home, can consider resuming as able  - follow up with Heme/Onc OP     Enlarged heterogeneous appearance of the thyroid  - Thyroid ultrasound outpatient  - follow up with PCP     Hemorrhoids  - hydrocortisone cream      Bowels: Schedule stool softener. Follow bowel movements. Enema or suppository if needed.      Bladder: Check PVR x 3. [address] if PVR > 200ml or if any volume is > 500 ml.      Sleep: Trazodone provided prn.    Follow up appointments: Vascular, PCP    Sandi Solis.  Rehan Mirza MD 5/12/2022, 11:54 AM

## 2022-05-12 NOTE — PLAN OF CARE
Problem: Safety - Adult  Goal: Free from fall injury  Outcome: Progressing  Flowsheets (Taken 5/12/2022 0268)  Free From Fall Injury: Instruct family/caregiver on patient safety     Problem: Skin/Tissue Integrity  Goal: Absence of new skin breakdown  Description: 1. Monitor for areas of redness and/or skin breakdown  2. Assess vascular access sites hourly  3. Every 4-6 hours minimum:  Change oxygen saturation probe site  4. Every 4-6 hours:  If on nasal continuous positive airway pressure, respiratory therapy assess nares and determine need for appliance change or resting period.   Outcome: Progressing

## 2022-05-12 NOTE — PROGRESS NOTES
Physical Therapy  Facility/Department: [facility]  Rehabilitation Physical Therapy Treatment Note    NAME: Loren Owens  : 1950 (34 y.o.)  MRN: [id number]  CODE STATUS: Full Code    Date of Service: 22       Restrictions:  Restrictions/Precautions: Fall Risk;Contact Precautions; Up as Tolerated  Position Activity Restriction  Other position/activity restrictions: Contact precautions--purewick, telemetry, 2 L O 2     SUBJECTIVE  Subjective  Subjective: found in bed  Pain: denies pain         OBJECTIVE  Cognition  Overall Cognitive Status: Exceptions  Arousal/Alertness: Appropriate responses to stimuli  Following Commands: Follows one step commands with repetition  Attention Span: Attends with cues to redirect  Memory: Decreased recall of precautions  Safety Judgement: Decreased awareness of need for safety;Decreased awareness of need for assistance  Problem Solving: Assistance required to correct errors made;Assistance required to identify errors made  Insights: Decreased awareness of deficits  Initiation: Requires cues for all  Sequencing: Requires cues for all  Orientation  Overall Orientation Status: Within Functional Limits  Orientation Level: Oriented X4    Functional Mobility      Vitals obtained supine in bed    Supine to sit with max a of 2 and max VC for sequencing with use of bed controls    Pt sat EOB x 15-18 min while completing UE/LE dressing task. Pt utilizes RUE support on rail however pushes significantly L with little awareness of midline. Pt also with difficulty maintaining trunk/cervical extension. Max A to reposition to midline, pt at times can hold for 3-5 sec with RUE support however continually pushes L with no awareness. Sit to stand EOB to STEDY with max A of 2  Pt in high perch position in STEDY for 2-4 min with BUE support (therapit assisting LUE support due to flaccidity) with therapists providing facilitation cues for midline position with little correction on pt's part. Pt continues to present with increased forward trunk/head flexion / L lateral lean despite visual cues/ tactile cues/demonstration/ visual feedback. TD via stedy to chair    W/c mobility x 150 ft with min A. tp demo's L side neglect, often attempting to turn in R circles and difficulty maintaining linear path. Use of RUE/RLE with cues for effective propulsion. Sit to stand w/c to // bars x 2 reps with mod A of 2 via pulling on bars and facilitation for anterior/R weight shift. 1st trail: pt standing for 1 min 30 sec with BUE support with PT facilitating LLE stability, neutral pelvis/trunk posture/extension and OT facilitating LUE WBing, posture and balance. Grossly max a of 2 to maintain midline position. Pt provided mirror for visual feedback with little to no improvement in posture. 2nd trial: pt standing for approx 1 min with above stated assist and presentation before expressing need to use commode    TD via STEDY from w/c <> commode. Pt found to be incontinent of urine  Max A of 1 + min A of 1 for standing balance while therapist assisted with pericare/ clothing management with BLEs blocked in STEDY and max VC for midline posture. TD from commode to recliner via STEDY  Pt in recliner at end of session with alarm donned, call light/needs within reach and heels elevated. ASSESSMENT/PROGRESS TOWARDS GOALS  Vital Signs  Pulse: 53  Heart Rate Source: Monitor  BP: (!) 187/77  BP Location: Right upper arm  MAP (Calculated): 113.67  SpO2: 92 %  O2 Device: None (Room air)    Assessment  Assessment: pt seen as co treat with OT for increased safety progressing functional mobility. pt presents with L neglect, L pushing syndrome and poor proprioception. max a to sit EOB and in STEDY in midline position. pt incontinent of urine prior to and during session. denies pain throughout. continue to progress mobility as able. d/c plan pending progress.   Activity Tolerance: Patient tolerated treatment well  Discharge Recommendations: Continue to assess pending progress  PT Equipment Recommendations  Equipment Needed: Yes (tbd)    Goals  Patient Goals   Patient goals : \"go on my mission trip in [place]"  Short Term Goals  Time Frame for Short term goals: 11 days 5/20  Short term goal 1: pt will complete bed mobility with mod A. Short term goal 2: pt will complete functional transfer with max A. Short term goal 3: pt will tolerate gait assessment and set goal when appropriate. Short term goal 4: pt will tolerate stair assessment and set goal when appropriate  Short term goal 5: pt will propel w/c x 150 ft with supv. Long Term Goals  Time Frame for Long term goals : 21 days 5/31  Long term goal 1: pt will complete bed mobility with CGA. Long term goal 2: pt will complete functional transfer with min a and LRAD. Long term goal 3: pt will propel w/c x 150 ft with CGA    PLAN OF CARE/SAFETY  Plan  Plan: 5-7 times per week  Current Treatment Recommendations: Strengthening;ROM;Balance training;Functional mobility training;Transfer training; Endurance training;Gait training; Therapeutic activities; Home exercise program;Wheelchair mobility training;Equipment evaluation, education, & procurement;Cognitive reorientation;Stair training;Positioning; Safety education & training;Patient/Caregiver education & training;Cognitive/Perceptual training;ADL/Self-care training;IADL training;Pain management  Safety Devices  Type of Devices: All fall risk precautions in place;Call light within reach; Patient at risk for falls;Nurse notified; All yaima prominences offloaded;Gait belt;Left in bed;Bed alarm in place      Therapy Time   Individual Concurrent Group Co-treatment   Time In       1000   Time Out       1100   Minutes       60     Timed Code Treatment Minutes: [address] [facility] Janice PT, 05/12/22 at 3:06 PM

## 2022-05-12 NOTE — PLAN OF CARE
Patient checked and changed minimally every two hours. Barrier cream is applied after each incontinent episode. Pillow positioning is provided to offload all pressure areas and for comfort.

## 2022-05-13 ENCOUNTER — APPOINTMENT (OUTPATIENT)
Dept: GENERAL RADIOLOGY | Age: 72
DRG: 056 | End: 2022-05-13
Attending: STUDENT IN AN ORGANIZED HEALTH CARE EDUCATION/TRAINING PROGRAM
Payer: MEDICARE

## 2022-05-13 LAB
ANION GAP SERPL CALCULATED.3IONS-SCNC: 10 MMOL/L (ref 3–16)
BASOPHILS ABSOLUTE: 0.1 K/UL (ref 0–0.2)
BASOPHILS RELATIVE PERCENT: 0.8 %
BUN BLDV-MCNC: 23 MG/DL (ref 7–20)
CALCIUM SERPL-MCNC: 8.8 MG/DL (ref 8.3–10.6)
CHLORIDE BLD-SCNC: 105 MMOL/L (ref 99–110)
CO2: 25 MMOL/L (ref 21–32)
CREAT SERPL-MCNC: 1.1 MG/DL (ref 0.6–1.2)
EOSINOPHILS ABSOLUTE: 0.5 K/UL (ref 0–0.6)
EOSINOPHILS RELATIVE PERCENT: 6.3 %
GFR AFRICAN AMERICAN: 59
GFR NON-AFRICAN AMERICAN: 49
GLUCOSE BLD-MCNC: 102 MG/DL (ref 70–99)
GLUCOSE BLD-MCNC: 107 MG/DL (ref 70–99)
GLUCOSE BLD-MCNC: 121 MG/DL (ref 70–99)
GLUCOSE BLD-MCNC: 128 MG/DL (ref 70–99)
GLUCOSE BLD-MCNC: 129 MG/DL (ref 70–99)
HCT VFR BLD CALC: 30.3 % (ref 36–48)
HEMOGLOBIN: 10.3 G/DL (ref 12–16)
LYMPHOCYTES ABSOLUTE: 1.5 K/UL (ref 1–5.1)
LYMPHOCYTES RELATIVE PERCENT: 21 %
MCH RBC QN AUTO: 32 PG (ref 26–34)
MCHC RBC AUTO-ENTMCNC: 33.9 G/DL (ref 31–36)
MCV RBC AUTO: 94.2 FL (ref 80–100)
MONOCYTES ABSOLUTE: 0.7 K/UL (ref 0–1.3)
MONOCYTES RELATIVE PERCENT: 10.2 %
NEUTROPHILS ABSOLUTE: 4.5 K/UL (ref 1.7–7.7)
NEUTROPHILS RELATIVE PERCENT: 61.7 %
PDW BLD-RTO: 13.7 % (ref 12.4–15.4)
PERFORMED ON: ABNORMAL
PLATELET # BLD: 188 K/UL (ref 135–450)
PMV BLD AUTO: 9.6 FL (ref 5–10.5)
POTASSIUM REFLEX MAGNESIUM: 3.8 MMOL/L (ref 3.5–5.1)
RBC # BLD: 3.22 M/UL (ref 4–5.2)
SODIUM BLD-SCNC: 140 MMOL/L (ref 136–145)
WBC # BLD: 7.3 K/UL (ref 4–11)

## 2022-05-13 PROCEDURE — 97112 NEUROMUSCULAR REEDUCATION: CPT

## 2022-05-13 PROCEDURE — 97110 THERAPEUTIC EXERCISES: CPT

## 2022-05-13 PROCEDURE — 6360000002 HC RX W HCPCS: Performed by: STUDENT IN AN ORGANIZED HEALTH CARE EDUCATION/TRAINING PROGRAM

## 2022-05-13 PROCEDURE — 97535 SELF CARE MNGMENT TRAINING: CPT

## 2022-05-13 PROCEDURE — 80048 BASIC METABOLIC PNL TOTAL CA: CPT

## 2022-05-13 PROCEDURE — 97530 THERAPEUTIC ACTIVITIES: CPT

## 2022-05-13 PROCEDURE — 97129 THER IVNTJ 1ST 15 MIN: CPT

## 2022-05-13 PROCEDURE — 71045 X-RAY EXAM CHEST 1 VIEW: CPT

## 2022-05-13 PROCEDURE — 1280000000 HC REHAB R&B

## 2022-05-13 PROCEDURE — 92526 ORAL FUNCTION THERAPY: CPT

## 2022-05-13 PROCEDURE — 85025 COMPLETE CBC W/AUTO DIFF WBC: CPT

## 2022-05-13 PROCEDURE — 6370000000 HC RX 637 (ALT 250 FOR IP): Performed by: STUDENT IN AN ORGANIZED HEALTH CARE EDUCATION/TRAINING PROGRAM

## 2022-05-13 PROCEDURE — 36415 COLL VENOUS BLD VENIPUNCTURE: CPT

## 2022-05-13 PROCEDURE — 97130 THER IVNTJ EA ADDL 15 MIN: CPT

## 2022-05-13 RX ORDER — LEVOFLOXACIN 500 MG/1
750 TABLET, FILM COATED ORAL EVERY OTHER DAY
Status: COMPLETED | OUTPATIENT
Start: 2022-05-13 | End: 2022-05-17

## 2022-05-13 RX ORDER — LISINOPRIL 20 MG/1
40 TABLET ORAL DAILY
Status: DISCONTINUED | OUTPATIENT
Start: 2022-05-14 | End: 2022-05-30

## 2022-05-13 RX ORDER — LISINOPRIL 20 MG/1
20 TABLET ORAL ONCE
Status: COMPLETED | OUTPATIENT
Start: 2022-05-13 | End: 2022-05-13

## 2022-05-13 RX ADMIN — CARBOXYMETHYLCELLULOSE SODIUM 1 DROP: 10 GEL OPHTHALMIC at 21:28

## 2022-05-13 RX ADMIN — HEPARIN SODIUM 5000 UNITS: 5000 INJECTION INTRAVENOUS; SUBCUTANEOUS at 16:32

## 2022-05-13 RX ADMIN — DICLOFENAC SODIUM 4 G: 10 GEL TOPICAL at 09:37

## 2022-05-13 RX ADMIN — LEVOFLOXACIN 750 MG: 500 TABLET, FILM COATED ORAL at 16:46

## 2022-05-13 RX ADMIN — HEPARIN SODIUM 5000 UNITS: 5000 INJECTION INTRAVENOUS; SUBCUTANEOUS at 09:28

## 2022-05-13 RX ADMIN — LEVOTHYROXINE SODIUM 50 MCG: 0.05 TABLET ORAL at 09:34

## 2022-05-13 RX ADMIN — METOPROLOL TARTRATE 25 MG: 25 TABLET, FILM COATED ORAL at 21:28

## 2022-05-13 RX ADMIN — Medication 400 MG: at 09:32

## 2022-05-13 RX ADMIN — HYDROCORTISONE: 1 CREAM TOPICAL at 09:37

## 2022-05-13 RX ADMIN — DICLOFENAC SODIUM 4 G: 10 GEL TOPICAL at 16:56

## 2022-05-13 RX ADMIN — DICLOFENAC SODIUM 4 G: 10 GEL TOPICAL at 21:32

## 2022-05-13 RX ADMIN — HEPARIN SODIUM 5000 UNITS: 5000 INJECTION INTRAVENOUS; SUBCUTANEOUS at 21:28

## 2022-05-13 RX ADMIN — LISINOPRIL 20 MG: 20 TABLET ORAL at 09:33

## 2022-05-13 RX ADMIN — DICLOFENAC SODIUM 4 G: 10 GEL TOPICAL at 13:00

## 2022-05-13 RX ADMIN — CARBOXYMETHYLCELLULOSE SODIUM 1 DROP: 10 GEL OPHTHALMIC at 09:30

## 2022-05-13 RX ADMIN — WITCH HAZEL 40 EACH: 500 SOLUTION RECTAL; TOPICAL at 21:32

## 2022-05-13 RX ADMIN — FAMOTIDINE 10 MG: 20 TABLET, FILM COATED ORAL at 09:32

## 2022-05-13 RX ADMIN — CLOPIDOGREL BISULFATE 75 MG: 75 TABLET, FILM COATED ORAL at 09:34

## 2022-05-13 RX ADMIN — POLYETHYLENE GLYCOL 3350 17 G: 17 POWDER, FOR SOLUTION ORAL at 09:31

## 2022-05-13 RX ADMIN — HYDROCORTISONE: 1 CREAM TOPICAL at 21:32

## 2022-05-13 RX ADMIN — ASPIRIN 81 MG: 81 TABLET, COATED ORAL at 09:34

## 2022-05-13 ASSESSMENT — PAIN SCALES - GENERAL: PAINLEVEL_OUTOF10: 0

## 2022-05-13 NOTE — PROGRESS NOTES
Occupational Therapy  Facility/Department: [facility]  Rehabilitation Occupational Therapy Daily Treatment Note    Date: 22  Patient Name: Honey Almaraz       Room: [facility]1730-  MRN: [id number]  Account: [de-identified]   : 1950  (76 y.o.) Gender: female                    Past Medical History:  has a past medical history of Anemia, Arthritis, Asthma, Bowel dysfunction, CML (chronic myelocytic leukemia) ([facility] [address] Hyperlipidemia, Hypertension, Nuclear senile cataract of both eyes, Obesity, Risk of myocardial infarction or stroke 7.5% or greater in next 10 years, Skin cancer of face, Stroke (cerebrum) ([facility] [address] Thyroid disease, and TIA (transient ischemic attack). Past Surgical History:   has a past surgical history that includes Breast biopsy; fine needle aspiration; Breast lumpectomy; Tonsillectomy (Bilateral); Paracentesis; Anus surgery (); Elbow surgery (); and Colonoscopy. Restrictions  Restrictions/Precautions: Fall Risk;Contact Precautions; Up as Tolerated  Other position/activity restrictions: Contact precautions--purewick, telemetry, 2 L O 2    Subjective  Subjective: Pt supine in bed and agreeable to OT/PT cotx,  present but staying in room. Pt requests to use bathroom. Agreeable to go to gym. Reports no pain. Restrictions/Precautions: Fall Risk;Contact Precautions; Up as Tolerated             Objective     Cognition  Overall Cognitive Status: Exceptions  Arousal/Alertness: Appropriate responses to stimuli  Following Commands: Follows one step commands with repetition; Follows multistep commands with repitition  Attention Span: Attends with cues to redirect  Memory: Decreased recall of precautions  Safety Judgement: Decreased awareness of need for safety;Decreased awareness of need for assistance  Problem Solving: Assistance required to correct errors made;Assistance required to identify errors made  Insights: Decreased awareness of deficits  Initiation: Requires cues for all  Sequencing: Requires cues for all  Cognition Comment: L sided neglect, poor sequencing at times needing max cues for upright posture sitting BSC, WC, EOB, EOM  Orientation  Overall Orientation Status: Within Functional Limits  Orientation Level: Oriented X4   Perception  Overall Perceptual Status: Impaired  Unilateral Attention: Cues to attend left visual field;Cues to attend to left side of body;Cues to maintain midline in sitting  Initiation: Cues to initiate tasks  Motor Planning: Hand over hand to sequence tasks  Perseveration: Perseverates during conversation     ADL  Feeding  Assistance Level: Increased time to complete; Moderate assistance;Verbal cues    Grooming/Oral Hygiene  Assistance Level: Increased time to complete;Maximum assistance  Skilled Clinical Factors: washing face and brushing hair sitting EOB mod-maxA for balance while OT assisted    Upper Extremity Bathing  Assistance Level: Maximum assistance  Skilled Clinical Factors: maxA sitting EOB    Lower Extremity Dressing  Assistance Level: Dependent  Skilled Clinical Factors: donning shoes sitting EOB maxA x1-2, maxA x2 sit <>s tand in steady to manage pants/brief up down    Putting On/Taking Off Footwear  Assistance Level: Dependent  Skilled Clinical Factors: donning shoes    Toileting  Assistance Level: Increased time to complete; Requires x 2 assistance;Dependent;Maximum assistance  Skilled Clinical Factors: max-depA x2 sit <>  steady for toileting tasks with poor upright posture and tolerance    Toilet Transfers  Technique:  (steady)  Equipment: Beside commode (over toilet)  Additional Factors: Verbal cues;Cues for hand placement; Increased time to complete; With handrails  Assistance Level: Maximum assistance; Requires x 2 assistance  Skilled Clinical Factors: max-depA x2 sit <>  steady for toileting tasks with poor upright posture and tolerance     Functional Mobility  Device: Wheelchair  Activity: Transport items; To/From bathroom; To/From therapy gym  Assistance Level: Maximum assistance; Requires x 2 assistance;Dependent    Skilled Clinical Factors: steady EOB to WC, WC <> BSC, WC to gym, steady WC <> EOM; maxA x2 sit <> Stand to steady and then mod-maxA x1 with mod cues for  mobility room to gym    Bed Mobility  Overall Assistance Level: Maximum Assistance; Moderate Assistance; Requires x 2 Assistance  Additional Factors: Head of bed raised; Increased time to complete;Set-up; Verbal cues    Bridging  Assistance Level: Maximum assistance; Requires x 2 assistance    Roll Right  Assistance Level: Moderate assistance; Requires x 2 assistance  Supine to Sit  Assistance Level: Moderate assistance; Requires x 2 assistance    Scooting  Assistance Level: Moderate assistance;Maximum assistance; Requires x 2 assistance    Transfers  Surface: Wheelchair; To chair with arms; Bedside commode;From chair with arms;From bed  Additional Factors: With handrails;Verbal cues; Hand placement cues; Increased time to complete; Mat raised  Device: Lift equipment;Walker    Sit to Stand  Assistance Level: Maximum assistance; Requires x 2 assistance  Skilled Clinical Factors: steady EOB to WC, WC to gym, sit <> stand to // bars from [facility], steady WC to [facility] over toilet and then to recliner    Stand to Sit  Assistance Level: Moderate assistance;Maximum assistance; Requires x 2 assistance  Skilled Clinical Factors: steady EOB to WC, WC <> BSC, WC to gym, steady WC <> EOM; maxA x2 sit <> Stand to steady and then mod-maxA x1 with mod cues for [facility] mobility room to gym    Bed To/From Chair  Technique:  (steady)  Assistance Level: Maximum assistance; Requires x 2 assistance  Skilled Clinical Factors: steady EOB to WC, WC <> BSC, WC to gym, steady WC <> EOM; maxA x2 sit <> Stand to steady and then mod-maxA x1 with mod cues for  mobility room to gym     Neuromuscular Education    Neuromuscular education: Yes    NDT Treatment: Upper extremity; Lower extremity;Standing    Head/Neck Control: OT providing hand over hand for turning head L vs R to scan for objects; continues to demo severe L sided neglect deficits but increased carryover for IDing object on L side during EOM WBing activities    Trunk Control: poor trunk control in sitting but does well with max tactile and verbal cues as well as mirror visual feedback; pt needing facilitation at trunk for weight shifting and turning to sit upright for x10 seconds unsupported, completed 5 reps of sitting unsupported sitting EOM and then x6 supported to unsupported crunches; using wedge on R side for visual and tactile feedback during tasks. Pt does demo increased midline alignment with prolonged repititions of sitting EOM. Tolerated ~30 minutes of EOM activities    Weight Bearing  Weight Bearing Technique: Yes  RUE Weight Bearing: Extended arm standing; Extended arm seated;Forearm seated  LUE Weight Bearing: Extended arm standing; Extended arm seated;Forearm seated    Response To Weight Bearing Technique: WBing LUE through steady during sit <> stands for transfers and ADL management; pt then complete LUE WBing sitting EOM forearm to extended with support to elbow and wrist extension. Once WBing through LUE pt scanning towards L side with max cues to gather cones with RUE reaching out of JACINTA once back to midline positiong to place various locations    OT Exercises    Exercise Treatment: AAROM with RUE interlaced to LUE reaching out of JACINTA to complete elbow flexion/extension with shoulder flexion/extension to locate target out of AJCINTA and L vs R. Pt needing rest breaks throughout, PT providing support at shoulders and trunk and OT providing mod-max cues for scanning, upright midline alignment and propioception during task    PROM Exercises: LUE flaccid this date. Educated pt on self PROM with RUE assist for elbow, wrist, and digital flex/ext.  Pt able to perform with good return in recliner, left handout on tabletop to go over with spouse when present. Instructed pt to complete 2-3 times during the day. Static Sitting Balance Exercises: Pt tolerated sitting at EOM for ~25 mins. Initially pt requiring min A for static sitting and progressed to max A with fatigue. Postural Correction Exercises: Cues for orienting to midline. Forward trunk flexion/extension x5     Assessment     Safety Devices  Safety Devices in place: Yes  Type of devices: Left in chair;Call light within reach; Chair alarm in place;Nurse notified; Patient at risk for falls; All fall risk precautions in place;Gait belt  Restraints  Initially in place: No    Patient Education       Plan  Plan  Times per Week: 5 out of 7 days  Times per Day: Daily  Plan Weeks: 3 weeks  Current Treatment Recommendations: Strengthening;ROM;Balance training;Functional mobility training; Endurance training; Safety education & training;Neuromuscular re-education;Patient/Caregiver education & training;Equipment evaluation, education, & procurement;Self-Care / ADL;Cognitive/Perceptual training    Goals  Patient Goals   Patient goals :  \"Go home\" \"Get stronger\" \"walk better\"  Short Term Goals  Time Frame for Short term goals: 10 days (5/20/22)  Short Term Goal 1: Pt will complete functional transfers with Mita x2 with LRAD  Short Term Goal 2: Pt will complete toileting/transfer modA x2 with LRAD and DME PRN  Short Term Goal 3: Pt will complete LUE AAROM/AROM exercises to LUE to increase strength/endurance for ADLs/transfers  Short Term Goal 4: Pt will be able to locate 3/5 object in L visual field with min cues during ADLs/activities  Additional Goals?: No  Long Term Goals  Time Frame for Long term goals : 21 days (3/31/22)  Long Term Goal 1: Pt will complete functional transfers/mobility SPV with LRAD  Long Term Goal 2: Pt will complete UE dressing/bathing sitting setup with min cues for papo techniques  Long Term Goal 3: Pt will complete toileting/transfers with LRAD and DME PRN SPV  Long Term Goal 4: Pt will complete opening 3/5 containers with compensatory techniques sitting setup/SPV    Therapy Time   Individual Concurrent Group Co-treatment   Time In       1230   Time Out       1330   Minutes       60   Timed Code Treatment Minutes: 60 Minutes     Second Session Therapy Time:   Individual Concurrent Group Co-treatment   Time In 1400        Time Out 1430        Minutes 30          Timed Code Treatment Minutes:  30 Minutes    Total Treatment Minutes:  [address], OT

## 2022-05-13 NOTE — PROGRESS NOTES
Physical Therapy  Facility/Department: [facility]  Rehabilitation Physical Therapy Treatment Note    NAME: Tejas Cruz  : 1950 (89 y.o.)  MRN: [id number]  CODE STATUS: Full Code    Date of Service: 22       Restrictions:  Restrictions/Precautions: Fall Risk;Contact Precautions; Up as Tolerated  Position Activity Restriction  Other position/activity restrictions: Contact precautions--purewick, telemetry, 2 L O 2     SUBJECTIVE  Subjective  Subjective: found in bed, requesting to use commode  Pain: denies pain. OBJECTIVE  Cognition  Overall Cognitive Status: Exceptions  Arousal/Alertness: Appropriate responses to stimuli  Following Commands: Follows one step commands with repetition; Follows multistep commands with repitition  Attention Span: Attends with cues to redirect  Memory: Decreased recall of precautions  Safety Judgement: Decreased awareness of need for safety;Decreased awareness of need for assistance  Problem Solving: Assistance required to correct errors made;Assistance required to identify errors made  Insights: Decreased awareness of deficits  Initiation: Requires cues for all  Sequencing: Requires cues for all  Cognition Comment: L sided neglect, poor sequencing at times needing max cues for upright posture sitting BSC, WC, EOB, EOM  Orientation  Overall Orientation Status: Within Functional Limits  Orientation Level: Oriented X4    Functional Mobility     Supine to sit with max a of 2 and bed controls. Sit to stand EOB to STEDY with mod A of 2. Max a for sitting balance in STEDY while other therapist maneuvered to commode     Commode transfer with total dependence (mod A of 2)   Pt requires max a of 1 for standing balance with BLEs blocked in STEDY while OT assisted with pericare/clothing management.      TD in STEDY to transfer to w/c (max A of 1 for static sitting balance in STEDY)    TD in STEDY w/c <> low mat    Pt sat on low mat x 25-30 min with grossly max a to maintain midline. Therapist placed wedge on R side of body for tactile cue of midline with improved results however once tactile cue removed, pt returns to L pushing/ lateral lean    Dyn sitting activity: L elbow WB -> RUE reach across for cone-> return to midline and place cone to target out of JACINTA on R x 4 reps with max A of 1+_ min A of 1 to return to midline. Pt with L neglect and requires at times hand over hand to guide head to scan L for cone. Pt completed x15 BUE (RUE holding onto LUE with interlaced grasp) for target in various planes, max a of 1 for dyn sitting balance. Therapist positioning pt into midline with mirror for visual cue and pt holding position for 10 sec with CGA_min A and max VC (RUE resting on wedge for tactile cue)    Pt completed 10 seated abdominal crunches with mod-max A of 1_ min A of 1.     TD via STEDY to return to recliner . Pt in recliner with alarm donned and call light/needs within reach at end of session. ASSESSMENT/PROGRESS TOWARDS GOALS  Vital Signs  Pulse: 61  Heart Rate Source: Monitor  BP: (!) 196/75  BP Location: Right upper arm  MAP (Calculated): 115.33  SpO2: 93 %  O2 Device: None (Room air)    Assessment  Assessment: pt seen as co treat with OT for increased safety progressing functional mobility. pt continues to present with L lateral lean/neglect and pushing. utilized commode at start of session. A of 2 required for all transfers. pt maintian midline better with tactile cue to R side. once removed, pt can require up to max A to maintain midine. continue to progress mobility as tolerated  Activity Tolerance: Patient tolerated treatment well  Discharge Recommendations: Continue to assess pending progress  PT Equipment Recommendations  Equipment Needed: Yes    Goals  Patient Goals   Patient goals : \"go on my mission trip in [place]"  Short Term Goals  Time Frame for Short term goals: 11 days 5/20  Short term goal 1: pt will complete bed mobility with mod A.   Short term goal 2: pt will complete functional transfer with max A. Short term goal 3: pt will tolerate gait assessment and set goal when appropriate. Short term goal 4: pt will tolerate stair assessment and set goal when appropriate  Short term goal 5: pt will propel w/c x 150 ft with supv. Long Term Goals  Time Frame for Long term goals : 21 days 5/31  Long term goal 1: pt will complete bed mobility with CGA. Long term goal 2: pt will complete functional transfer with min a and LRAD. Long term goal 3: pt will propel w/c x 150 ft with CGA    PLAN OF CARE/SAFETY  Plan  Plan: 5-7 times per week  Current Treatment Recommendations: Strengthening;ROM;Balance training;Functional mobility training;Transfer training; Endurance training;Gait training; Therapeutic activities; Home exercise program;Wheelchair mobility training;Equipment evaluation, education, & procurement;Cognitive reorientation;Stair training;Positioning; Safety education & training;Patient/Caregiver education & training;Cognitive/Perceptual training;ADL/Self-care training;IADL training;Pain management  Safety Devices  Type of Devices: All fall risk precautions in place;Call light within reach; Patient at risk for falls;Nurse notified; All yaima prominences offloaded;Gait belt;Left in bed;Bed alarm in place      Therapy Time   Individual Concurrent Group Co-treatment   Time In       1230   Time Out       1330   Minutes       60     Timed Code Treatment Minutes: [address] Ferny Weller PT, 05/13/22 at 3:22 PM

## 2022-05-13 NOTE — PROGRESS NOTES
[facility]  5/13/2022  [id number]    Chief Complaint: Acute CVA (cerebrovascular accident) [facility])    Subjective:   No acute events overnight. Today Jesenia Marmolejo is seen in her room with therapy. She reports cough and chest pain when she takes a deep breath. XR showing pneumonia and patient started on abx. She denies other acute complaints. ROS: denies f/c, n/v, cp, sob    Objective:  Patient Vitals for the past 24 hrs:   BP Temp Temp src Pulse Resp SpO2   05/13/22 1344 (!) 193/78 -- -- 60 -- 92 %   05/13/22 0915 (!) 184/71 98.1 °F (36.7 °C) Oral 58 16 91 %   05/12/22 2115 (!) 164/71 97.9 °F (36.6 °C) Oral 68 18 95 %   05/12/22 1830 (!) 167/52 -- -- 63 18 --     Gen: No distress, pleasant. HEENT: Normocephalic, atraumatic. CV: Regular rate and rhythm. Resp: No respiratory distress. Lungs clear. Abd: Soft, nontender   Ext: No edema. Neuro: Alert, oriented, appropriately interactive. Left hemiparesis.     Wt Readings from Last 3 Encounters:   05/10/22 150 lb 12.7 oz (68.4 kg)   05/02/22 143 lb 9.6 oz (65.1 kg)   09/09/21 150 lb 12.8 oz (68.4 kg)       Laboratory data:   Lab Results   Component Value Date    WBC 7.3 05/13/2022    HGB 10.3 (L) 05/13/2022    HCT 30.3 (L) 05/13/2022    MCV 94.2 05/13/2022     05/13/2022       Lab Results   Component Value Date     05/13/2022    K 3.8 05/13/2022     05/13/2022    CO2 25 05/13/2022    BUN 23 05/13/2022    CREATININE 1.1 05/13/2022    GLUCOSE 107 05/13/2022    GLUCOSE 129 07/26/2017    CALCIUM 8.8 05/13/2022        Therapy progress:  PT  Position Activity Restriction  Other position/activity restrictions: Contact precautions--purewick, telemetry, 2 L O 2  Objective     Sit to Stand: 2 Person Assistance,Dependent/Total  Stand to sit: Dependent/Total,2 Person Assistance  Bed to Chair: Dependent/Total     OT  PT Equipment Recommendations  Equipment Needed: Yes (tbd)  Toilet - Technique:  (steady to e-tac chair over toilet)  Equipment Used:  (e-tac chair)  Toilet Transfers Comments: maxA x2 sit <>  steady, progressing at times to [address] X2 with max verbal/tactile and demo cues  Assessment        SLP                Body mass index is 27.14 kg/m². Assessment and Plan:  Shady Flores is a 66year old female with a past medical history significant for recent CVA with left hemiparesis, CML, HTN, and HLD who presented to [facility] on 5/3/22 with abnormal labs, admitted with rhabdomyolysis and ARF, found to have acute CVA. She was [facility] to [facility] on 5/10/22 due to functional deficits below her baseline.     Acute Right CVA  - MRI showing acute infarct in right cerebral hemisphere in a watershed distribution  - CTA showing 90-95% stenosis of right ICA  - Vascular and Neurology in agreement on waiting for CEA, needs follow up in 4 weeks  - asa, plavix, statin  - PT, OT, ST     Dysphagia  - on modified diet  - ST    RLL Pneumonia  - concern for aspiration in setting of dysphagia  - levaquin   - IS      Rhabdomyolysis  Acute Renal Injury  - Nephrology followed during acute stay  - CPK trending down  - monitor      HTN  - patient with episode of hypotension while in acute care so isosorbid mononitrate and hydralazine held  - metoprolol 25 mg   - increase lisinopril to 40 mg daily     DM2  - Hba1c 6.6  - SSI     Liver Injury with elevated LFTs  - suspected to be due to statin induced rhabdo  - statin stopped  - LFTs trending down  - monitor intermittently     CML  - follows with Dr. Santosh Ang  - on nilitinib at home, can consider resuming as able  - follow up with Heme/Onc OP     Enlarged heterogeneous appearance of the thyroid  - Thyroid ultrasound outpatient  - follow up with PCP     Hemorrhoids  - hydrocortisone cream      Bowels: Schedule stool softener. Follow bowel movements. Enema or suppository if needed.      Bladder: Check PVR x 3.   [facility] if PVR > 200ml or if any volume is > 500 ml.      Sleep: Trazodone provided prn.      Follow up appointments: Vascular, PCP    Evelyn Mcdonough.  Lynne Grajeda MD 5/13/2022, 2:14 PM

## 2022-05-13 NOTE — PROGRESS NOTES
Speech Language Pathology  [facility]: ACUTE REHAB UNIT  SPEECH-LANGUAGE PATHOLOGY      [x] Daily  [] Weekly Care Conference Note  [] Discharge    Patient:Jennifer Rowell      :1950  ZJF:[id number]  Rehab Dx/Hx: Acute CVA (cerebrovascular accident) ([facility] [address] [I63.9]    Precautions: falls and aspirations; severe left neglect  Home situation: Lives with . Indep with med management. Share finances, cooking, laundtry, grocery shopping. Has not driven since CVA in  Dx: [] Aphasia  [x] Dysarthria  [] Apraxia   [x] Oropharyngeal dysphagia [x] Cognitive Impairment  [] Other:   Date of Admit: 5/10/2022  Room #: 2976/5229-93    Current functional status (updated daily):         Pt being seen for : [x] Speech/Language Treatment  [x] Dysphagia Treatment [x] Cognitive Treatment  [] Other:  Communication: []WFL  [] Aphasia  [x] Dysarthria  [] Apraxia  [] Pragmatic Impairment [] Non-verbal  [] Hearing Loss  [] Other:   Cognition: [] WFL  [x] Mild  [] Moderate  [] Severe [] Unable to Assess  [] Other:  Memory: [] WFL  [x] Mild  [] Moderate  [] Severe [] Unable to Assess  [] Other:  Behavior: [x] Alert  [x] Cooperative  [x]  Pleasant  [] Confused  [] Agitated  [] Uncooperative  [] Distractible [] Motivated  [] Self-Limiting [] Anxious  [] Other:  Endurance:  [x] Adequate for participation in SLP sessions  [] Reduced overall  [] Lethargic  [] Other:  Safety: [] No concerns at this time  [x] Reduced insight into deficits  [x]  Reduced safety awareness [] Not following call light procedures  [] Unable to Assess  [] Other:    Current Diet Order:ADULT DIET;  Dysphagia - Soft and Bite Sized; 3 carb choices (45 gm/meal)  Swallowing Precautions: upright for all intake, stay upright for at least 30 mins after intake, small bites/sips, assist feed, oral care 2-3x/day to reduce adverse affects in the event of aspiration, alternate bites/sips, slow rate of intake         Date: 2022      Tx session 1  9890 - 5891 Tx session 2  All tx needs met in session 1   Total Timed Code Min 45 0   Total Treatment Minutes 60 0   Individual Treatment Minutes 60 0   Group Treatment Minutes 0 0   Co-Treat Minutes 0 0   Variance/Reason:  n/a n/a   Pain Denies     Pain Intervention [] RN notified  [] Repositioned  [] Intervention offered and patient declined  [x] N/A  [] Other: [] RN notified  [] Repositioned  [] Intervention offered and patient declined  [] N/A  [] Other:   Subjective     Pt alert and cooperative, agreeable to tx. Pt fully upright in bed for session. Objective:     Dysphagia Goals  Short-term Goals  Timeframe for Short-term Goals: 18 days (05/28/2022)    Goal 1: The patient will tolerate recommended diet with no clinical s/s of aspiration    When SLP entered room, pt with mild oral residue throughout oral cavity (no pocketing - on lingual surface)  -pt required multiple sips of thin liquid to clear    RN provided pt with meds:  -trials of meds whole with water, 1 at a time  -larger pills cut in half  -pt taking sips of water after each pill - occ taking multiple sips  -no overt s/s of aspiration  -no coughing, throat clearing, wet vocal quality  -pt overall tolerated - recommend pills 1 at a time whole with water or whole in puree       Goal 2: The patient/caregiver will demonstrate understanding of compensatory swallow strategies, for improved swallow safety   SLP edu pt re: safe swallow strategies - small bites and sips, slow rate, alternate liquids and solids, fully upright. Edu re: lingual sweep. Pt verbalized understanding, needs ongoing edu. Goal 3: The patient will tolerate instrumental assessment when able Not clinically indicated at this time. Goal 4: The pt will complete oral motor exercises to improve labial and lingual strength, ROM, and coordination in 10/10 opportunities given min cues    Goal not directly targeted.       Cognitive-linguistic Goals:   Short-term Goals  Timeframe for Short-term Goals: 18 days (05/28/2022)    Goal 1: Pt will effectively use compensatory visual strategies for improved attention to left side during structured tasks with 80% acc given mod cues. Visual Scanning Task   -pt instructed to complete scanning task to find all the Rs in rows and columns  -pt completed with 27% acc indep  -improved to 73% acc given min cues, and then 100% acc given mod cues  -pt benefited from black line on left to draw attention all the way to the left   -pt is aware \"I need to look to the left\"       Goal 2: Pt will complete graded recall tasks using compensatory strategies with 90% acc given min cues   Goal not directly targeted. Goal 3: Pt will complete executive function tasks (e.g. meds, time, money, etc) with 90% acc givgen min cues   Time Word Problems  -e.g. you can read 4 articles in 30 minutes. How many can you read in 90 minutes?  -pt completed task with 50% acc indep, improved to 70% acc given mod cues      Goal 4: Pt will complete problem solving and thought organization tasks with 90% acc given min cues   Category Members: [place]  -pt given a category and a set of letters; asked to name an item from the category that begins with the letters  -e.g. colors that begin with B, F, S, and T  -pt completed with 84% acc indep, improved to 94% acc given mod cues       Goal 5: Pt will complete verbal and visual reasoning tasks with 90% acc given min cues   Planning Task re: Meals  -pt completed with just min cues today  -significant improvement from yesterday - required max cues then       Other areas targeted: N/a    Education:   SLP edu pt re: rationale of tx tasks, safe swallow strategies, recs for taking pills, attention strategies     Safety Devices: [x] Call light within reach  [] Chair alarm activated  [x] Bed alarm activated  [] Other: [] Call light within reach  [] Chair alarm activated  [] Bed alarm activated  [] Other:    Assessment: Pt alert and cooperative, agreeable to tx.  Pt with mild oral residue on lingual surface from breakfast meal when SLP arrived - multiple liquid washes to clear. Pt grossly tolerated meds 1 at a time with water (larger ones cut in half). Recommends meds either whole in puree or 1 at a time with water. Pt with improved thought organization this date - benefited from mod cues in order to increase acc, but overall did well with structured task. Edu re: attention strategies to left - pt with insight re: need to look more towards left. Pt required mod cues to complete visual scanning task and benefited from black line drawn on left of paper to help attend to left. Pt required increased cueing in order to complete higher level time word problems. Pt is motivated to participate. Continue goals above. Plan: Continue as per plan of care. Additional Information:     Barriers toward progress: Limited safety awareness, Limited insight into deficits, Decreased proprioception, Upper extremity weakness and Lower extremity weakness  Discharge recommendations:  [] Home independently  [] Home with assistance [x]  24 hour supervision  [] ECF [] Other:  Continued Tx Upon Discharge: ? [x] Yes [] No [] TBD based on progress while on ARU [] Vital Stim indicated [] Other:   Estimated discharge date: 05/31/2022    Interventions used this date:  [] Speech/Language Treatment  [] Instruction in HEP [] Group [x] Dysphagia Treatment [x] Cognitive Treatment   [] Other:       Total Time Breakdown / Charges    Time in Time out Total Time / units   Cognitive Tx 0945 1030 45 min / 3 units    Speech Tx -- -- --   Dysphagia Tx 0930 0945 15 min / 1 unit       Electronically Signed by     Lisa Carrillo MA CCC-SLP #[id number]  Speech Language Pathologist

## 2022-05-14 LAB
GLUCOSE BLD-MCNC: 107 MG/DL (ref 70–99)
GLUCOSE BLD-MCNC: 111 MG/DL (ref 70–99)
GLUCOSE BLD-MCNC: 118 MG/DL (ref 70–99)
GLUCOSE BLD-MCNC: 119 MG/DL (ref 70–99)
PERFORMED ON: ABNORMAL

## 2022-05-14 PROCEDURE — 92526 ORAL FUNCTION THERAPY: CPT

## 2022-05-14 PROCEDURE — 6370000000 HC RX 637 (ALT 250 FOR IP): Performed by: STUDENT IN AN ORGANIZED HEALTH CARE EDUCATION/TRAINING PROGRAM

## 2022-05-14 PROCEDURE — 97112 NEUROMUSCULAR REEDUCATION: CPT

## 2022-05-14 PROCEDURE — 1280000000 HC REHAB R&B

## 2022-05-14 PROCEDURE — 97530 THERAPEUTIC ACTIVITIES: CPT

## 2022-05-14 PROCEDURE — 97535 SELF CARE MNGMENT TRAINING: CPT

## 2022-05-14 PROCEDURE — 97130 THER IVNTJ EA ADDL 15 MIN: CPT

## 2022-05-14 PROCEDURE — 6360000002 HC RX W HCPCS: Performed by: STUDENT IN AN ORGANIZED HEALTH CARE EDUCATION/TRAINING PROGRAM

## 2022-05-14 PROCEDURE — 97129 THER IVNTJ 1ST 15 MIN: CPT

## 2022-05-14 RX ADMIN — HEPARIN SODIUM 5000 UNITS: 5000 INJECTION INTRAVENOUS; SUBCUTANEOUS at 08:39

## 2022-05-14 RX ADMIN — CARBOXYMETHYLCELLULOSE SODIUM 1 DROP: 10 GEL OPHTHALMIC at 20:55

## 2022-05-14 RX ADMIN — DICLOFENAC SODIUM 4 G: 10 GEL TOPICAL at 08:37

## 2022-05-14 RX ADMIN — LEVOTHYROXINE SODIUM 50 MCG: 0.05 TABLET ORAL at 08:39

## 2022-05-14 RX ADMIN — LISINOPRIL 40 MG: 20 TABLET ORAL at 08:39

## 2022-05-14 RX ADMIN — DICLOFENAC SODIUM 4 G: 10 GEL TOPICAL at 11:07

## 2022-05-14 RX ADMIN — HEPARIN SODIUM 5000 UNITS: 5000 INJECTION INTRAVENOUS; SUBCUTANEOUS at 20:55

## 2022-05-14 RX ADMIN — FAMOTIDINE 10 MG: 20 TABLET, FILM COATED ORAL at 08:39

## 2022-05-14 RX ADMIN — HYDROCORTISONE: 1 CREAM TOPICAL at 08:37

## 2022-05-14 RX ADMIN — WITCH HAZEL 40 EACH: 500 SOLUTION RECTAL; TOPICAL at 21:16

## 2022-05-14 RX ADMIN — ASPIRIN 81 MG: 81 TABLET, COATED ORAL at 08:39

## 2022-05-14 RX ADMIN — POLYETHYLENE GLYCOL 3350 17 G: 17 POWDER, FOR SOLUTION ORAL at 08:40

## 2022-05-14 RX ADMIN — DICLOFENAC SODIUM 4 G: 10 GEL TOPICAL at 16:31

## 2022-05-14 RX ADMIN — HEPARIN SODIUM 5000 UNITS: 5000 INJECTION INTRAVENOUS; SUBCUTANEOUS at 16:31

## 2022-05-14 RX ADMIN — METOPROLOL TARTRATE 25 MG: 25 TABLET, FILM COATED ORAL at 20:55

## 2022-05-14 RX ADMIN — CARBOXYMETHYLCELLULOSE SODIUM 1 DROP: 10 GEL OPHTHALMIC at 08:39

## 2022-05-14 RX ADMIN — METOPROLOL TARTRATE 25 MG: 25 TABLET, FILM COATED ORAL at 08:39

## 2022-05-14 RX ADMIN — HYDROCORTISONE: 1 CREAM TOPICAL at 21:16

## 2022-05-14 RX ADMIN — TRAZODONE HYDROCHLORIDE 50 MG: 50 TABLET ORAL at 21:23

## 2022-05-14 RX ADMIN — Medication 400 MG: at 08:40

## 2022-05-14 RX ADMIN — WITCH HAZEL 40 EACH: 500 SOLUTION RECTAL; TOPICAL at 08:38

## 2022-05-14 RX ADMIN — CLOPIDOGREL BISULFATE 75 MG: 75 TABLET, FILM COATED ORAL at 08:39

## 2022-05-14 RX ADMIN — DICLOFENAC SODIUM 4 G: 10 GEL TOPICAL at 21:24

## 2022-05-14 ASSESSMENT — PAIN DESCRIPTION - LOCATION: LOCATION: THROAT

## 2022-05-14 ASSESSMENT — PAIN SCALES - GENERAL: PAINLEVEL_OUTOF10: 0

## 2022-05-14 NOTE — PLAN OF CARE
Problem: Safety - Adult  Goal: Free from fall injury  5/14/2022 0939 by Camryn Moss RN  Outcome: Progressing

## 2022-05-14 NOTE — PROGRESS NOTES
Speech Language Pathology  [facility]: ACUTE REHAB UNIT  SPEECH-LANGUAGE PATHOLOGY      [x] Daily  [] Weekly Care Conference Note  [] Discharge    Patient:Jennifer Nayak      :1950  [facility]:[id number]  Rehab Dx/Hx: Acute CVA (cerebrovascular accident) ([facility] [address] [I63.9]    Precautions: falls and aspirations; severe left neglect  Home situation: Lives with . Indep with med management. Share finances, cooking, laundtry, grocery shopping. Has not driven since CVA in March. ST Dx: [] Aphasia  [x] Dysarthria  [] Apraxia   [x] Oropharyngeal dysphagia [x] Cognitive Impairment  [] Other:   Date of Admit: 5/10/2022  Room #: 9111/6650-05    Current functional status (updated daily):         Pt being seen for : [x] Speech/Language Treatment  [x] Dysphagia Treatment [x] Cognitive Treatment  [] Other:  Communication: []WFL  [] Aphasia  [x] Dysarthria  [] Apraxia  [] Pragmatic Impairment [] Non-verbal  [] Hearing Loss  [] Other:   Cognition: [] WFL  [x] Mild  [] Moderate  [] Severe [] Unable to Assess  [] Other:  Memory: [] WFL  [x] Mild  [] Moderate  [] Severe [] Unable to Assess  [] Other:  Behavior: [x] Alert  [x] Cooperative  [x]  Pleasant  [] Confused  [] Agitated  [] Uncooperative  [] Distractible [] Motivated  [] Self-Limiting [] Anxious  [] Other:  Endurance:  [x] Adequate for participation in SLP sessions  [] Reduced overall  [] Lethargic  [] Other:  Safety: [] No concerns at this time  [x] Reduced insight into deficits  [x]  Reduced safety awareness [] Not following call light procedures  [] Unable to Assess  [] Other:    Current Diet Order:ADULT DIET;  Dysphagia - Soft and Bite Sized; 3 carb choices (45 gm/meal)  Swallowing Precautions: upright for all intake, stay upright for at least 30 mins after intake, small bites/sips, assist feed, oral care 2-3x/day to reduce adverse affects in the event of aspiration, alternate bites/sips, slow rate of intake         Date: 2022      Tx session 1  2894-6673 Tx session 2  All tx needs met in session 1   Total Timed Code Min 40 0   Total Treatment Minutes 60 0   Individual Treatment Minutes 60 0   Group Treatment Minutes 0 0   Co-Treat Minutes 0 0   Variance/Reason:  n/a n/a   Pain Denies     Pain Intervention [] RN notified  [] Repositioned  [] Intervention offered and patient declined  [x] N/A  [] Other: [] RN notified  [] Repositioned  [] Intervention offered and patient declined  [] N/A  [] Other:   Subjective     Pt alert and cooperative, agreeable to tx. Pt upright in bed for session. Objective:     Dysphagia Goals  Short-term Goals  Timeframe for Short-term Goals: 18 days (05/28/2022)    Goal 1: The patient will tolerate recommended diet with no clinical s/s of aspiration    Pt asked for 2 mints prior to breakfast. Pt then began eating oatmeal w/ mints still in oral cavity; pt required mod cues to masticate and swallow minutes before taking bites of breakfast; pt w/ immediate cough x1 w/ mints    Observed pt eating oatmeal, omelet and drinking apple juice; no overt s/s dysphagia      Goal 2: The patient/caregiver will demonstrate understanding of compensatory swallow strategies, for improved swallow safety   SLP edu pt re: safe swallow strategies - small bites and sips, slow rate, alternate liquids and solids, fully upright, one bite at a time      Goal 3: The patient will tolerate instrumental assessment when able Not clinically indicated at this time. Goal 4: The pt will complete oral motor exercises to improve labial and lingual strength, ROM, and coordination in 10/10 opportunities given min cues    Goal not directly targeted. Cognitive-linguistic Goals:   Short-term Goals  Timeframe for Short-term Goals: 18 days (05/28/2022)    Goal 1: Pt will effectively use compensatory visual strategies for improved attention to left side during structured tasks with 80% acc given mod cues.    Visual Scanning Task   -pt instructed to complete scanning task to find all the Os in rows and columns  -pt completed w/ 30% acc ind, inc to 82% acc w/ mod-max cues  -pt required cues to only christopher the Os (marking some Us, Cs)  -pt continuously scanning up and down at other sets of letters, requiring cues to only search in middle section; pt ultimately benefited from paper folded over to cover other sections  -pt benefited from cues to scan all the way to left    Visual Scanning Task  -pt asked to find numbers in order 1-10  -pt completed w/ 50% acc w/ min cues, inc to 100% acc w/ mod cues  -pt benefited from cues to scan all the way to left      Goal 2: Pt will complete graded recall tasks using compensatory strategies with 90% acc given min cues   Mental manipulation: word progression  -pt given 3 words verbally and asked to repeat them in the order they would occur  -pt recalled words w/ 93% acc ind      Goal 3: Pt will complete executive function tasks (e.g. meds, time, money, etc) with 90% acc givgen min cues   Goal not directly targeted      Goal 4: Pt will complete problem solving and thought organization tasks with 90% acc given min cues   Mental manipulation: word progression  -pt given 3 words verbally and asked to repeat them in the order they would occur  -pt placed words in order w/ 60% acc ind, inc to 93% acc w/ min cues and 100% acc w/ mod cues      Goal 5: Pt will complete verbal and visual reasoning tasks with 90% acc given min cues   Goal not directly targeted    Other areas targeted: N/a    Education:   SLP edu pt re: rationale of tx tasks, safe swallow strategies, attention strategies     Safety Devices: [x] Call light within reach  [] Chair alarm activated  [x] Bed alarm activated  [] Other: [] Call light within reach  [] Chair alarm activated  [] Bed alarm activated  [] Other:    Assessment: Pt alert and cooperative, agreeable to tx.  Pt asked for 2 hard mints prior to breakfast and began eating oatmeal w/ mints still in oral cavity; pt required mod cues to masticate and swallow mints prior to eating breakfast. Educated pt re: one bite at a time, small bites. Pt w/ immediate cough x1 w/ mints. No overt s/s dysphagia observed w/ pt eating breakfast. Pt showed difficulty w/ visual scanning this date, requiring mod-max cues to complete activities, specifically to scan all the way to left. Pt did well w/ thought organization and recall tasks. Pt is motivated to participate. Continue goals above. Plan: Continue as per plan of care. Additional Information:     Barriers toward progress: Limited safety awareness, Limited insight into deficits, Decreased proprioception, Upper extremity weakness and Lower extremity weakness  Discharge recommendations:  [] Home independently  [] Home with assistance [x]  24 hour supervision  [] ECF [] Other:  Continued Tx Upon Discharge: ? [x] Yes [] No [] TBD based on progress while on ARU [] Vital Stim indicated [] Other:   Estimated discharge date: 05/31/2022    Interventions used this date:  [] Speech/Language Treatment  [] Instruction in HEP [] Group [x] Dysphagia Treatment [x] Cognitive Treatment   [] Other: Total Time Breakdown / Charges    Time in Time out Total Time / units   Cognitive Tx 0730 0810 40 min / 2 units    Speech Tx -- -- --   Dysphagia Tx 0810 0830 20 min / 1 unit       Electronically Signed by     SOBIA Medina  Speech Language Pathologist

## 2022-05-14 NOTE — PROGRESS NOTES
Occupational Therapy  Facility/Department: [facility]  Rehabilitation Occupational Therapy Daily Treatment Note    Date: 22  Patient Name: Justine Lynn       Room: [facility]/2613-88  MRN: [id number]  Account: [de-identified]   : 1950  (76 y.o.) Gender: female                    Past Medical History:  has a past medical history of Anemia, Arthritis, Asthma, Bowel dysfunction, CML (chronic myelocytic leukemia) ([facility] [address] Hyperlipidemia, Hypertension, Nuclear senile cataract of both eyes, Obesity, Risk of myocardial infarction or stroke 7.5% or greater in next 10 years, Skin cancer of face, Stroke (cerebrum) ([facility] [address] Thyroid disease, and TIA (transient ischemic attack). Past Surgical History:   has a past surgical history that includes Breast biopsy; fine needle aspiration; Breast lumpectomy; Tonsillectomy (Bilateral); Paracentesis; Anus surgery (); Elbow surgery (); and Colonoscopy. Restrictions  Restrictions/Precautions: Fall Risk;Contact Precautions; Up as Tolerated  Other position/activity restrictions: Contact precautions--purewick, telemetry, 2 L O 2    Subjective  Subjective: Pt supine in bed and agreeable to OT/PT cotx. Pt reports 3/10 pain in L shoulder, does well with repositioning on pillow end of session    Restrictions/Precautions: Fall Risk;Contact Precautions; Up as Tolerated             Objective     Cognition  Overall Cognitive Status: Exceptions  Arousal/Alertness: Appropriate responses to stimuli  Following Commands: Follows one step commands with repetition; Follows multistep commands with repitition  Attention Span: Attends with cues to redirect  Memory: Decreased recall of precautions  Safety Judgement: Decreased awareness of need for safety;Decreased awareness of need for assistance  Problem Solving: Assistance required to correct errors made;Assistance required to identify errors made  Insights: Decreased awareness of deficits  Initiation: Requires cues for all  Sequencing: Requires cues for all  Cognition Comment: L sided neglect, poor sequencing at times needing max cues for upright posture sitting BSC, WC, EOB, EOM  Orientation  Overall Orientation Status: Within Functional Limits  Orientation Level: Oriented X4   Perception  Overall Perceptual Status: Impaired  Unilateral Attention: Cues to attend left visual field;Cues to attend to left side of body;Cues to maintain midline in sitting  Initiation: Cues to initiate tasks  Motor Planning: Hand over hand to sequence tasks  Perseveration: Perseverates during conversation     ADL  Feeding  Assistance Level: Increased time to complete; Moderate assistance;Verbal cues  Grooming/Oral Hygiene  Assistance Level: Increased time to complete;Maximum assistance  Skilled Clinical Factors: washing face and brushing hair sitting EOB mod-maxA for balance while OT assisted  Upper Extremity Dressing  Assistance Level: Maximum assistance; Requires x 2 assistance  Skilled Clinical Factors: maxA x1 with PT to promote upright balance, maxA x1 for OT to provide assistance/hand over hand and cues for papo techniques assist with all tasks  Lower Extremity Dressing  Assistance Level: Dependent  Skilled Clinical Factors: donning shoes sitting EOB maxA x1-2, maxA x2 sit <>s tand in steady to manage pants/brief up down  Putting On/Taking Off Footwear  Assistance Level: Dependent  Skilled Clinical Factors: donning shoes  Toileting  Assistance Level: Increased time to complete; Requires x 2 assistance;Maximum assistance  Skilled Clinical Factors: modA x2 (PT providing upright supported in stance with grab bar to R side and LLE blocked, OT providing assist with pants/desi-care)  Toilet Transfers  Technique: Sit pivot  Equipment: Beside commode  Additional Factors: Verbal cues;Cues for hand placement; Increased time to complete; With handrails  Assistance Level: Maximum assistance; Requires x 2 assistance; Moderate assistance  Skilled Clinical Factors: max A x2 towards L side, modA x2 to R side sit pivot vs stand pivot with LLE blocked by PT, OT providing upright posture, support, and max cues for midline alignment, safety, and sequencing     Functional Mobility  Device: Wheelchair  Activity: Transport items; To/From bathroom; To/From therapy gym  Assistance Level: Maximum assistance; Moderate assistance; Requires x 2 assistance  Skilled Clinical Factors: max A x2 towards L side, modA x2 to R side sit pivot vs stand pivot with LLE blocked by PT, OT providing upright posture, support, and max cues for midline alignment, safety, and sequencing  Bed Mobility  Overall Assistance Level: Maximum Assistance; Moderate Assistance; Requires x 2 Assistance  Additional Factors: Head of bed raised; Increased time to complete;Set-up; Verbal cues  Bridging  Assistance Level: Maximum assistance; Requires x 2 assistance  Roll Right  Assistance Level: Moderate assistance; Requires x 2 assistance  Supine to Sit  Assistance Level: Moderate assistance; Requires x 2 assistance  Scooting  Assistance Level: Moderate assistance;Maximum assistance; Requires x 2 assistance  Transfers  Surface: Wheelchair; To chair with arms; Bedside commode;From chair with arms;From bed  Additional Factors: With handrails;Verbal cues; Hand placement cues; Increased time to complete; Mat raised  Sit to Stand  Assistance Level: Moderate assistance;Maximum assistance; Requires x 2 assistance  Skilled Clinical Factors: max A x2 towards L side, modA x2 to R side sit pivot vs stand pivot with LLE blocked by PT, OT providing upright posture, support, and max cues for midline alignment, safety, and sequencing  Stand to Sit  Assistance Level: Moderate assistance; Requires x 2 assistance  Skilled Clinical Factors: max A x2 towards L side, modA x2 to R side sit pivot vs stand pivot with LLE blocked by PT, OT providing upright posture, support, and max cues for midline alignment, safety, and sequencing  Bed To/From Chair  Technique: Sit pivot  Assistance Level: Maximum assistance; Requires x 2 assistance; Moderate assistance  Skilled Clinical Factors: max A x2 towards L side, modA x2 to R side sit pivot vs stand pivot with LLE blocked by PT, OT providing upright posture, support, and max cues for midline alignment, safety, and sequencing  Stand Pivot  Assistance Level: Moderate assistance;Maximum assistance; Requires x 2 assistance  Skilled Clinical Factors: max A x2 towards L side, modA x2 to R side sit pivot vs stand pivot with LLE blocked by PT, OT providing upright posture, support, and max cues for midline alignment, safety, and sequencing  Sit Pivot  Assistance Level: Moderate assistance;Maximum assistance; Requires x 2 assistance  Skilled Clinical Factors: max A x2 towards L side, modA x2 to R side sit pivot vs stand pivot with LLE blocked by PT, OT providing upright posture, support, and max cues for midline alignment, safety, and sequencing   Neuromuscular Education  Neuromuscular education: Yes  NDT Treatment: Upper extremity; Lower extremity;Standing  Head/Neck Control: OT providing hand over hand for turning head L vs R to scan for objects; continues to demo severe L sided neglect deficits but increased carryover for IDing object on L side during EOM WBing activities  Trunk Control: fair trunk control sitting EOM with PT behind on theraball for upright support, OT providing support and cues for midline alignment, does well without a mirror for feedback but does well with hand over hand and increased time, sitting unsupported EOM wiht increased time  Weight Bearing  Weight Bearing Technique: Yes  RUE Weight Bearing: Extended arm standing; Extended arm seated;Forearm seated  LUE Weight Bearing: Extended arm standing; Extended arm seated;Forearm seated  Response To Weight Bearing Technique: WBing LUE through steady during sit <> stands for transfers and ADL management; pt then complete LUE WBing sitting EOM extended with support to elbow and wrist extension.   Once [facility] through LUE pt scanning towards L side with mod cues to gather bean bags with RUE reaching out of JACINTA once back to midline positiong to place various locations; first trial reaching towads L side x10, then reaching up over head x10. OT Exercises  PROM Exercises: DELANEY flaccid this date. Educated pt on self PROM with RUE assist for elbow, wrist, and digital flex/ext. Pt able to perform with good return in recliner, left handout on tabletop to go over with spouse when present. Instructed pt to complete 2-3 times during the day. Static Sitting Balance Exercises: Pt tolerated sitting at EOM for ~25 mins. Initially pt requiring min A for static sitting and progressed to max A with fatigue. Postural Correction Exercises: Cues for orienting to midline. Forward trunk flexion/extension x5     Assessment  Assessment  Assessment: Patient remains pleasant and agreeable to cotx wiht PT to address functional tranfsers, WBing/NMR, core strengthening, EOM unsupported sitting. Pt continues to need mod-maxA x2 for sit pivot transfers vs maxA x1 modA x1 for dynamic vs static standing balance during ADL tasks. Pt was grossly Mita x1-2 sitting EOM with WBing through LLE/LUE OT assistance for cues, sequencing and scanning. Pt wiht increased sitting balance unsupported and able to self-correct ~50% of time without mod-max cues from PT or OT. Pt does fatigue quickly needing cues for safety, energy conservation and body positioning during sit pivot transfer trials EOB to WC <> BSC <> WC <> EOM  to recliner. Cont to assess pending progress DC recommendations;  is elderly and is on O2 so unsure if will be able to provide 24 hour physical asisstance. Cont OT POC.   Activity Tolerance: Patient tolerated treatment well  Discharge Recommendations: Continue to assess pending progress;24 hour supervision or assist;Home with nursing aide;Home with Home health OT;S Level 3;Home with assist PRN  OT Equipment Recommendations  Equipment Needed: No  Other: CTA  Safety Devices  Safety Devices in place: Yes  Type of devices: Left in chair;Call light within reach; Chair alarm in place;Nurse notified; Patient at risk for falls; All fall risk precautions in place;Gait belt  Restraints  Initially in place: No    Patient Education  Education  Education Given To: Patient  Education Provided: Role of Therapy;Cognition;Plan of Care;Family Education;Home Exercise Program;Low Vision Education;ADL Function;Mobility Training;Equipment;Precautions;Transfer Training;DME/Home Modifications; Safety; Energy Conservation; Fall Prevention Strategies  Education Method: Demonstration;Verbal  Barriers to Learning: Cognition  Education Outcome: Verbalized understanding;Demonstrated understanding;Continued education needed    Plan  Plan  Times per Week: 5 out of 7 days  Times per Day: Daily  Plan Weeks: 3 weeks  Current Treatment Recommendations: Strengthening;ROM;Balance training;Functional mobility training; Endurance training; Safety education & training;Neuromuscular re-education;Patient/Caregiver education & training;Equipment evaluation, education, & procurement;Self-Care / ADL;Cognitive/Perceptual training    Goals  Patient Goals   Patient goals :  \"Go home\" \"Get stronger\" \"walk better\"  Short Term Goals  Time Frame for Short term goals: 10 days (5/20/22)  Short Term Goal 1: Pt will complete functional transfers with Mita x2 with LRAD- progressing, maxA x2 sit pivot vs stand pivot transfer  Short Term Goal 2: Pt will complete toileting/transfer modA x2 with LRAD and DME PRN-progressing, maxA x2 sit pivot transfer  Short Term Goal 3: Pt will complete LUE AAROM/AROM exercises to LUE to increase strength/endurance for ADLs/transfers  Short Term Goal 4: Pt will be able to locate 3/5 object in L visual field with min cues during ADLs/activities-progressing, mod-max cues  Additional Goals?: No  Long Term Goals  Time Frame for Long term goals : 21 days (3/31/22)  Long Term Goal 1: Pt will complete functional transfers/mobility SPV with LRAD  Long Term Goal 2: Pt will complete UE dressing/bathing sitting setup with min cues for papo techniques  Long Term Goal 3: Pt will complete toileting/transfers with LRAD and DME PRN SPV  Long Term Goal 4: Pt will complete opening 3/5 containers with compensatory techniques sitting setup/SPV    Therapy Time   Individual Concurrent Group Co-treatment   Time In       0900   Time Out       1000   Minutes       60   Timed Code Treatment Minutes: [address], OTR/L

## 2022-05-14 NOTE — PROGRESS NOTES
Physical Therapy  Facility/Department: [facility]  Rehabilitation Physical Therapy Treatment Note    NAME: Loren Owens  : 1950 (66 y.o.)  MRN: [id number]  CODE STATUS: Full Code    Date of Service: 22       Restrictions:  Restrictions/Precautions: Fall Risk;Up as Tolerated  Position Activity Restriction  Other position/activity restrictions: Contact precautions--purewick, telemetry, 2 L O 2     SUBJECTIVE  Subjective  Subjective: Pt supine in bed at start of session, reports abdominal pain and wanting to get up to commode  Pain: Pt reports abdominal pain but does not provide a pain score            OBJECTIVE  Cognition  Overall Cognitive Status: Exceptions  Arousal/Alertness: Appropriate responses to stimuli  Following Commands: Follows one step commands with repetition; Follows multistep commands with repitition  Attention Span: Attends with cues to redirect  Memory: Decreased recall of precautions  Safety Judgement: Decreased awareness of need for safety;Decreased awareness of need for assistance  Problem Solving: Assistance required to correct errors made;Assistance required to identify errors made  Insights: Decreased awareness of deficits  Initiation: Requires cues for all  Sequencing: Requires cues for all  Cognition Comment: L sided neglect, poor sequencing at times needing max cues for upright posture sitting BSC, WC, EOB, EOM  Orientation  Overall Orientation Status: Within Functional Limits  Orientation Level: Oriented X4    Functional Mobility  Supine to Sit  Assistance Level: Moderate assistance; Requires x 2 assistance  Skilled Clinical Factors: modA x 2, HOB elevated, use of BR, cues for sequence, increased time to complete  Scooting  Assistance Level: Maximum assistance (to scoot to EOB and maxA for scooting in chair)  Balance  Sitting Balance: Maximum assistance (Variable CGA up to maxA)  Pt sitting EOB x 6 minutes with variable CGA up to maxA for balance, poor midline awareness and posture, maintains posterior pelvic tilt, lumbar/thoracic extension, forward head, rounded shoulders, downward gaze. Completed with RUE support on railing with cues to improve midline, multi-directional LOB but most frequently to R and posterior. Standing Balance: Dependent/Total (modA to maxA x 2)  Standing Balance  Pt standing at commode with maxA x 1-2 for balance while pt assist with trunk positioning, blocking to L knee in stance and cues for hip extension. OT assisting with trunk and completing desi-care and clothing management, cues for upright posture. Transfers  Additional Factors: Verbal cues; Hand placement cues; Increased time to complete  Sit to Stand  Assistance Level: Requires x 2 assistance;Maximum assistance  Pt completes sit to/from stand from commode with grab bar maxA x 2, x 2 reps STS from EOM to table maxA x 2, cues for hand placement and sequence  Stand to Sit  Assistance Level: Maximum assistance; Requires x 2 assistance  maxA x 2, cues for hand placement, sequence and anterior WS  Bed To/From Chair  Technique: Sit pivot  Assistance Level: Maximum assistance; Requires x 2 assistance  Cues for sequence, PT anterior assisting with anterior and lateral WS and LE positioning, OT assisting with trunk stability and UE placement/management, cues for technique, increased time. Completed EOB to w/c, w/c to/from commode. Stand Pivot  Assistance Level: Maximum assistance; Requires x 2 assistance  Completed w/c to/from EOM and w/c to recliner, maxA x 2, cues for sequence and WS. PT anterior assisting with anterior and lateral WS and LE positioning, OT assisting with trunk stability and UE placement/management, cues for technique, increased time. Pt requires assisting for LLE advancement and blocking to L knee in stance. Neuromuscular Education  Neuromuscular Education: Yes  NDT Treatment: Upper extremity; Lower extremity;Standing;Sitting  Pt sitting EOM x 12 minutes with variable Mita to modA for Pt demonstrates poor midline and proprioceptive awareness but responds well to tactile cues for improved positioning. Pt will benefit from continued skilled PT in ARU to address above deficits, will continue to progress mobility as tolerated  Activity Tolerance: Patient tolerated treatment well  Discharge Recommendations: Continue to assess pending progress  PT Equipment Recommendations  Equipment Needed: Yes  Other: CTA pending progress with mobility    Goals  Patient Goals   Patient goals : \"go on my mission trip in [place]"  Short Term Goals  Time Frame for Short term goals: 11 days 5/20  Short term goal 1: pt will complete bed mobility with mod A. Short term goal 2: pt will complete functional transfer with max A. Short term goal 3: pt will tolerate gait assessment and set goal when appropriate. Short term goal 4: pt will tolerate stair assessment and set goal when appropriate  Short term goal 5: pt will propel w/c x 150 ft with supv. Long Term Goals  Time Frame for Long term goals : 21 days 5/31  Long term goal 1: pt will complete bed mobility with CGA. Long term goal 2: pt will complete functional transfer with min a and LRAD. Long term goal 3: pt will propel w/c x 150 ft with CGA    PLAN OF CARE/SAFETY  Plan  Plan: 5-7 times per week  Current Treatment Recommendations: Strengthening;ROM;Balance training;Functional mobility training;Transfer training; Endurance training;Gait training; Therapeutic activities; Home exercise program;Wheelchair mobility training;Equipment evaluation, education, & procurement;Cognitive reorientation;Stair training;Positioning; Safety education & training;Patient/Caregiver education & training;Cognitive/Perceptual training;ADL/Self-care training;IADL training;Pain management  Safety Devices  Type of Devices: All fall risk precautions in place;Call light within reach; Patient at risk for falls;Nurse notified; All yaima prominences offloaded;Gait belt;Left in chair;Chair alarm in place      Therapy Time   Individual Concurrent Group Co-treatment   Time In       0900   Time Out       1000   Minutes       60     Timed Code Treatment Minutes: [facility], DPT C/NDT 05/14/22 at 5:12 PM

## 2022-05-15 LAB
GLUCOSE BLD-MCNC: 106 MG/DL (ref 70–99)
GLUCOSE BLD-MCNC: 112 MG/DL (ref 70–99)
GLUCOSE BLD-MCNC: 132 MG/DL (ref 70–99)
GLUCOSE BLD-MCNC: 99 MG/DL (ref 70–99)
PERFORMED ON: ABNORMAL
PERFORMED ON: NORMAL

## 2022-05-15 PROCEDURE — 1280000000 HC REHAB R&B

## 2022-05-15 PROCEDURE — 6370000000 HC RX 637 (ALT 250 FOR IP): Performed by: STUDENT IN AN ORGANIZED HEALTH CARE EDUCATION/TRAINING PROGRAM

## 2022-05-15 PROCEDURE — 6360000002 HC RX W HCPCS: Performed by: STUDENT IN AN ORGANIZED HEALTH CARE EDUCATION/TRAINING PROGRAM

## 2022-05-15 RX ORDER — GUAIFENESIN 600 MG/1
600 TABLET, EXTENDED RELEASE ORAL 2 TIMES DAILY
Status: DISCONTINUED | OUTPATIENT
Start: 2022-05-15 | End: 2022-05-20

## 2022-05-15 RX ORDER — HYDRALAZINE HYDROCHLORIDE 10 MG/1
10 TABLET, FILM COATED ORAL
Status: DISCONTINUED | OUTPATIENT
Start: 2022-05-15 | End: 2022-06-02

## 2022-05-15 RX ADMIN — HYDROCORTISONE: 1 CREAM TOPICAL at 21:11

## 2022-05-15 RX ADMIN — DICLOFENAC SODIUM 4 G: 10 GEL TOPICAL at 21:20

## 2022-05-15 RX ADMIN — METOPROLOL TARTRATE 25 MG: 25 TABLET, FILM COATED ORAL at 08:45

## 2022-05-15 RX ADMIN — Medication 400 MG: at 08:45

## 2022-05-15 RX ADMIN — FAMOTIDINE 10 MG: 20 TABLET, FILM COATED ORAL at 08:44

## 2022-05-15 RX ADMIN — GUAIFENESIN 600 MG: 600 TABLET, EXTENDED RELEASE ORAL at 11:33

## 2022-05-15 RX ADMIN — POLYETHYLENE GLYCOL 3350 17 G: 17 POWDER, FOR SOLUTION ORAL at 08:45

## 2022-05-15 RX ADMIN — HEPARIN SODIUM 5000 UNITS: 5000 INJECTION INTRAVENOUS; SUBCUTANEOUS at 21:11

## 2022-05-15 RX ADMIN — ACETAMINOPHEN 650 MG: 325 TABLET ORAL at 06:34

## 2022-05-15 RX ADMIN — DICLOFENAC SODIUM 4 G: 10 GEL TOPICAL at 15:48

## 2022-05-15 RX ADMIN — CARBOXYMETHYLCELLULOSE SODIUM 1 DROP: 10 GEL OPHTHALMIC at 08:46

## 2022-05-15 RX ADMIN — DICLOFENAC SODIUM 4 G: 10 GEL TOPICAL at 08:45

## 2022-05-15 RX ADMIN — ASPIRIN 81 MG: 81 TABLET, COATED ORAL at 08:44

## 2022-05-15 RX ADMIN — HEPARIN SODIUM 5000 UNITS: 5000 INJECTION INTRAVENOUS; SUBCUTANEOUS at 08:44

## 2022-05-15 RX ADMIN — LISINOPRIL 40 MG: 20 TABLET ORAL at 08:45

## 2022-05-15 RX ADMIN — GUAIFENESIN 600 MG: 600 TABLET, EXTENDED RELEASE ORAL at 21:11

## 2022-05-15 RX ADMIN — TRAZODONE HYDROCHLORIDE 50 MG: 50 TABLET ORAL at 21:11

## 2022-05-15 RX ADMIN — CARBOXYMETHYLCELLULOSE SODIUM 1 DROP: 10 GEL OPHTHALMIC at 21:11

## 2022-05-15 RX ADMIN — DICLOFENAC SODIUM 4 G: 10 GEL TOPICAL at 11:34

## 2022-05-15 RX ADMIN — HEPARIN SODIUM 5000 UNITS: 5000 INJECTION INTRAVENOUS; SUBCUTANEOUS at 15:47

## 2022-05-15 RX ADMIN — HYDROCORTISONE: 1 CREAM TOPICAL at 08:45

## 2022-05-15 RX ADMIN — LEVOTHYROXINE SODIUM 50 MCG: 0.05 TABLET ORAL at 08:45

## 2022-05-15 RX ADMIN — LEVOFLOXACIN 750 MG: 500 TABLET, FILM COATED ORAL at 08:44

## 2022-05-15 RX ADMIN — CLOPIDOGREL BISULFATE 75 MG: 75 TABLET, FILM COATED ORAL at 08:44

## 2022-05-15 ASSESSMENT — PAIN SCALES - GENERAL
PAINLEVEL_OUTOF10: 10
PAINLEVEL_OUTOF10: 0
PAINLEVEL_OUTOF10: 3

## 2022-05-15 NOTE — PLAN OF CARE
Problem: Discharge Planning  Goal: Discharge to home or other facility with appropriate resources  Outcome: Progressing     Problem: Safety - Adult  Goal: Free from fall injury  Outcome: Progressing     Problem: Skin/Tissue Integrity  Goal: Absence of new skin breakdown  Description: 1. Monitor for areas of redness and/or skin breakdown  2. Assess vascular access sites hourly  3. Every 4-6 hours minimum:  Change oxygen saturation probe site  4. Every 4-6 hours:  If on nasal continuous positive airway pressure, respiratory therapy assess nares and determine need for appliance change or resting period.   Outcome: Progressing     Problem: ABCDS Injury Assessment  Goal: Absence of physical injury  Outcome: Progressing     Problem: Pain  Goal: Verbalizes/displays adequate comfort level or baseline comfort level  Outcome: Progressing  Flowsheets (Taken 5/14/2022 2045)  Verbalizes/displays adequate comfort level or baseline comfort level: Encourage patient to monitor pain and request assistance

## 2022-05-15 NOTE — PLAN OF CARE
Problem: Safety - Adult  Goal: Free from fall injury  5/15/2022 1113 by Marina Estrada RN  Outcome: Progressing

## 2022-05-15 NOTE — PROGRESS NOTES
Otis Brand  5/15/2022  [id number]    Chief Complaint: Acute CVA (cerebrovascular accident) [facility])    Subjective:   No acute events overnight. Today Albert Bowman is seen in her room, resting in bed. She reports congestion and would like something for cough. She denies other acute complaints at this time. ROS: denies f/c, n/v, cp, sob    Objective:  Patient Vitals for the past 24 hrs:   BP Temp Temp src Pulse Resp SpO2   05/15/22 0830 (!) 176/74 98.6 °F (37 °C) Oral 71 16 93 %   05/14/22 2045 (!) 173/65 98.1 °F (36.7 °C) Oral 63 16 94 %   05/14/22 1447 (!) 199/81 -- -- 93 -- 93 %     Gen: No distress, pleasant. Supine in bed  HEENT: Normocephalic, atraumatic. CV: extremities well perfused  Resp: No respiratory distress. On room air   Abd: Soft, nondistended  Ext: No edema. Neuro: Alert, oriented, appropriately interactive. Left hemiparesis.     Wt Readings from Last 3 Encounters:   05/10/22 150 lb 12.7 oz (68.4 kg)   05/02/22 143 lb 9.6 oz (65.1 kg)   09/09/21 150 lb 12.8 oz (68.4 kg)       Laboratory data:   Lab Results   Component Value Date    WBC 7.3 05/13/2022    HGB 10.3 (L) 05/13/2022    HCT 30.3 (L) 05/13/2022    MCV 94.2 05/13/2022     05/13/2022       Lab Results   Component Value Date     05/13/2022    K 3.8 05/13/2022     05/13/2022    CO2 25 05/13/2022    BUN 23 05/13/2022    CREATININE 1.1 05/13/2022    GLUCOSE 107 05/13/2022    GLUCOSE 129 07/26/2017    CALCIUM 8.8 05/13/2022        Therapy progress:  PT  Position Activity Restriction  Other position/activity restrictions: Contact precautions--purewick, telemetry, 2 L O 2  Objective     Sit to Stand: 2 Person Assistance,Dependent/Total  Stand to sit: Dependent/Total,2 Person Assistance  Bed to Chair: Dependent/Total     OT  PT Equipment Recommendations  Equipment Needed: Yes  Other: CTA pending progress with mobility  Toilet - Technique:  (steady to e-tac chair over toilet)  Equipment Used:  (e-tac chair)  Toilet Transfers Comments: maxA x2 sit <>  steady, progressing at times to [employer] with max verbal/tactile and demo cues  Assessment        SLP                Body mass index is 27.14 kg/m². Assessment and Plan:  Maricruz Sinclair is a 66year old female with a past medical history significant for recent CVA with left hemiparesis, CML, HTN, and HLD who presented to [facility] on 5/3/22 with abnormal labs, admitted with rhabdomyolysis and ARF, found to have acute CVA. She was admitted to [facility] on 5/10/22 due to functional deficits below her baseline.     Acute Right CVA  - MRI showing acute infarct in right cerebral hemisphere in a watershed distribution  - CTA showing 90-95% stenosis of right ICA  - Vascular and Neurology in agreement on waiting for CEA, needs follow up in 4 weeks  - asa, plavix, statin  - PT, OT, ST     Dysphagia  - on modified diet  - ST    RLL Pneumonia  - concern for aspiration in setting of dysphagia  - levaquin   - add mucinex  - IS      Rhabdomyolysis  Acute Renal Injury  - Nephrology followed during acute stay  - CPK trending down  - monitor      HTN  - patient with episode of hypotension while in acute care so isosorbid mononitrate and hydralazine held  - metoprolol 25 mg   - increased lisinopril to 40 mg daily for 5/14  - want to avoid hypotension    DM2  - Hba1c 6.6  - SSI     Liver Injury with elevated LFTs  - suspected to be due to statin induced rhabdo  - statin stopped  - LFTs trending down  - monitor intermittently     CML  - follows with Dr. David Jones  - on nilitinib at home, can consider resuming as able  - follow up with Heme/Onc OP     Enlarged heterogeneous appearance of the thyroid  - Thyroid ultrasound outpatient  - follow up with PCP     Hemorrhoids  - hydrocortisone cream   - bowel regimen to avoid constipation     Bowels: Schedule stool softener. Follow bowel movements. Enema or suppository if needed.      Bladder: Check PVR x 3. [facility] if PVR > 200ml or if any volume is > 500 ml.    Sleep: Trazodone provided prn.      Follow up appointments: Vascular, PCP    Laurence Santos.  Amber Rodriguez MD 5/15/2022, 11:59 AM

## 2022-05-16 ENCOUNTER — APPOINTMENT (OUTPATIENT)
Dept: GENERAL RADIOLOGY | Age: 72
DRG: 056 | End: 2022-05-16
Attending: STUDENT IN AN ORGANIZED HEALTH CARE EDUCATION/TRAINING PROGRAM
Payer: MEDICARE

## 2022-05-16 LAB
ANION GAP SERPL CALCULATED.3IONS-SCNC: 12 MMOL/L (ref 3–16)
BASOPHILS ABSOLUTE: 0 K/UL (ref 0–0.2)
BASOPHILS RELATIVE PERCENT: 0.6 %
BUN BLDV-MCNC: 26 MG/DL (ref 7–20)
CALCIUM SERPL-MCNC: 9.3 MG/DL (ref 8.3–10.6)
CHLORIDE BLD-SCNC: 100 MMOL/L (ref 99–110)
CO2: 24 MMOL/L (ref 21–32)
CREAT SERPL-MCNC: 1.1 MG/DL (ref 0.6–1.2)
EOSINOPHILS ABSOLUTE: 0.2 K/UL (ref 0–0.6)
EOSINOPHILS RELATIVE PERCENT: 2.9 %
GFR AFRICAN AMERICAN: 59
GFR NON-AFRICAN AMERICAN: 49
GLUCOSE BLD-MCNC: 104 MG/DL (ref 70–99)
GLUCOSE BLD-MCNC: 109 MG/DL (ref 70–99)
GLUCOSE BLD-MCNC: 141 MG/DL (ref 70–99)
HCT VFR BLD CALC: 34.2 % (ref 36–48)
HEMOGLOBIN: 11.4 G/DL (ref 12–16)
LYMPHOCYTES ABSOLUTE: 1.2 K/UL (ref 1–5.1)
LYMPHOCYTES RELATIVE PERCENT: 16.2 %
MCH RBC QN AUTO: 31.7 PG (ref 26–34)
MCHC RBC AUTO-ENTMCNC: 33.2 G/DL (ref 31–36)
MCV RBC AUTO: 95.3 FL (ref 80–100)
MONOCYTES ABSOLUTE: 0.7 K/UL (ref 0–1.3)
MONOCYTES RELATIVE PERCENT: 9.1 %
NEUTROPHILS ABSOLUTE: 5.2 K/UL (ref 1.7–7.7)
NEUTROPHILS RELATIVE PERCENT: 71.2 %
PDW BLD-RTO: 14.2 % (ref 12.4–15.4)
PERFORMED ON: ABNORMAL
PERFORMED ON: ABNORMAL
PLATELET # BLD: 242 K/UL (ref 135–450)
PMV BLD AUTO: 9.1 FL (ref 5–10.5)
POTASSIUM REFLEX MAGNESIUM: 3.8 MMOL/L (ref 3.5–5.1)
RBC # BLD: 3.59 M/UL (ref 4–5.2)
SODIUM BLD-SCNC: 136 MMOL/L (ref 136–145)
WBC # BLD: 7.3 K/UL (ref 4–11)

## 2022-05-16 PROCEDURE — 85025 COMPLETE CBC W/AUTO DIFF WBC: CPT

## 2022-05-16 PROCEDURE — 97112 NEUROMUSCULAR REEDUCATION: CPT

## 2022-05-16 PROCEDURE — 97530 THERAPEUTIC ACTIVITIES: CPT

## 2022-05-16 PROCEDURE — 1280000000 HC REHAB R&B

## 2022-05-16 PROCEDURE — 6370000000 HC RX 637 (ALT 250 FOR IP): Performed by: INTERNAL MEDICINE

## 2022-05-16 PROCEDURE — 97130 THER IVNTJ EA ADDL 15 MIN: CPT

## 2022-05-16 PROCEDURE — 97129 THER IVNTJ 1ST 15 MIN: CPT

## 2022-05-16 PROCEDURE — 97535 SELF CARE MNGMENT TRAINING: CPT

## 2022-05-16 PROCEDURE — 6360000002 HC RX W HCPCS: Performed by: STUDENT IN AN ORGANIZED HEALTH CARE EDUCATION/TRAINING PROGRAM

## 2022-05-16 PROCEDURE — 71045 X-RAY EXAM CHEST 1 VIEW: CPT

## 2022-05-16 PROCEDURE — 6370000000 HC RX 637 (ALT 250 FOR IP): Performed by: STUDENT IN AN ORGANIZED HEALTH CARE EDUCATION/TRAINING PROGRAM

## 2022-05-16 PROCEDURE — 36415 COLL VENOUS BLD VENIPUNCTURE: CPT

## 2022-05-16 PROCEDURE — 80048 BASIC METABOLIC PNL TOTAL CA: CPT

## 2022-05-16 RX ORDER — LACTULOSE 10 G/15ML
20 SOLUTION ORAL 2 TIMES DAILY
Status: DISCONTINUED | OUTPATIENT
Start: 2022-05-16 | End: 2022-06-07 | Stop reason: HOSPADM

## 2022-05-16 RX ORDER — BISACODYL 10 MG
10 SUPPOSITORY, RECTAL RECTAL ONCE
Status: COMPLETED | OUTPATIENT
Start: 2022-05-17 | End: 2022-05-17

## 2022-05-16 RX ORDER — NIFEDIPINE 30 MG/1
30 TABLET, EXTENDED RELEASE ORAL DAILY
Status: DISCONTINUED | OUTPATIENT
Start: 2022-05-16 | End: 2022-06-07 | Stop reason: HOSPADM

## 2022-05-16 RX ORDER — BISACODYL 10 MG
10 SUPPOSITORY, RECTAL RECTAL ONCE
Status: DISCONTINUED | OUTPATIENT
Start: 2022-05-16 | End: 2022-05-16

## 2022-05-16 RX ADMIN — HEPARIN SODIUM 5000 UNITS: 5000 INJECTION INTRAVENOUS; SUBCUTANEOUS at 21:26

## 2022-05-16 RX ADMIN — LISINOPRIL 40 MG: 20 TABLET ORAL at 08:32

## 2022-05-16 RX ADMIN — HYDROCORTISONE: 1 CREAM TOPICAL at 21:27

## 2022-05-16 RX ADMIN — GUAIFENESIN 600 MG: 600 TABLET, EXTENDED RELEASE ORAL at 08:32

## 2022-05-16 RX ADMIN — HYDROCORTISONE: 1 CREAM TOPICAL at 08:33

## 2022-05-16 RX ADMIN — METOPROLOL TARTRATE 25 MG: 25 TABLET, FILM COATED ORAL at 08:31

## 2022-05-16 RX ADMIN — GUAIFENESIN 600 MG: 600 TABLET, EXTENDED RELEASE ORAL at 21:26

## 2022-05-16 RX ADMIN — ASPIRIN 81 MG: 81 TABLET, COATED ORAL at 08:32

## 2022-05-16 RX ADMIN — DICLOFENAC SODIUM 4 G: 10 GEL TOPICAL at 15:11

## 2022-05-16 RX ADMIN — DICLOFENAC SODIUM 4 G: 10 GEL TOPICAL at 21:27

## 2022-05-16 RX ADMIN — NIFEDIPINE 30 MG: 30 TABLET, FILM COATED, EXTENDED RELEASE ORAL at 16:20

## 2022-05-16 RX ADMIN — LEVOTHYROXINE SODIUM 50 MCG: 0.05 TABLET ORAL at 08:32

## 2022-05-16 RX ADMIN — CARBOXYMETHYLCELLULOSE SODIUM 1 DROP: 10 GEL OPHTHALMIC at 08:32

## 2022-05-16 RX ADMIN — HEPARIN SODIUM 5000 UNITS: 5000 INJECTION INTRAVENOUS; SUBCUTANEOUS at 15:11

## 2022-05-16 RX ADMIN — Medication 400 MG: at 08:32

## 2022-05-16 RX ADMIN — DICLOFENAC SODIUM 4 G: 10 GEL TOPICAL at 11:06

## 2022-05-16 RX ADMIN — CARBOXYMETHYLCELLULOSE SODIUM 1 DROP: 10 GEL OPHTHALMIC at 21:35

## 2022-05-16 RX ADMIN — HEPARIN SODIUM 5000 UNITS: 5000 INJECTION INTRAVENOUS; SUBCUTANEOUS at 08:32

## 2022-05-16 RX ADMIN — WITCH HAZEL 40 EACH: 500 SOLUTION RECTAL; TOPICAL at 00:39

## 2022-05-16 RX ADMIN — TRAZODONE HYDROCHLORIDE 50 MG: 50 TABLET ORAL at 21:26

## 2022-05-16 RX ADMIN — LACTULOSE 20 G: 20 SOLUTION ORAL at 10:01

## 2022-05-16 RX ADMIN — FAMOTIDINE 10 MG: 20 TABLET, FILM COATED ORAL at 08:32

## 2022-05-16 RX ADMIN — LACTULOSE 20 G: 20 SOLUTION ORAL at 21:26

## 2022-05-16 RX ADMIN — CLOPIDOGREL BISULFATE 75 MG: 75 TABLET, FILM COATED ORAL at 08:32

## 2022-05-16 RX ADMIN — METOPROLOL TARTRATE 25 MG: 25 TABLET, FILM COATED ORAL at 21:26

## 2022-05-16 RX ADMIN — DICLOFENAC SODIUM 4 G: 10 GEL TOPICAL at 08:33

## 2022-05-16 ASSESSMENT — PAIN SCALES - GENERAL
PAINLEVEL_OUTOF10: 0

## 2022-05-16 NOTE — PROGRESS NOTES
Occupational Therapy  Facility/Department: [facility] ARU  Rehabilitation Occupational Therapy Daily Treatment Note    Date: 22  Patient Name: Mel Balderas       Room: 6327/0261-79  MRN: [id number]  Account: [de-identified]   : 1950  (76 y.o.) Gender: female                    Past Medical History:  has a past medical history of Anemia, Arthritis, Asthma, Bowel dysfunction, CML (chronic myelocytic leukemia) ([facility] [address] Hyperlipidemia, Hypertension, Nuclear senile cataract of both eyes, Obesity, Risk of myocardial infarction or stroke 7.5% or greater in next 10 years, Skin cancer of face, Stroke (cerebrum) ([facility] [address] Thyroid disease, and TIA (transient ischemic attack). Past Surgical History:   has a past surgical history that includes Breast biopsy; fine needle aspiration; Breast lumpectomy; Tonsillectomy (Bilateral); Paracentesis; Anus surgery (); Elbow surgery (); and Colonoscopy. Restrictions  Restrictions/Precautions: Fall Risk;Up as Tolerated  Other position/activity restrictions: . Subjective  Subjective: Pt supine in bed and agreeable to OT, requesting to use bathroom and then sit in recliner for lunch. Pt reports 5/10 pain in R shoulder, RN aware and administering pain cream to R shoulder. Restrictions/Precautions: Fall Risk;Up as Tolerated             Objective     Cognition  Overall Cognitive Status: Exceptions  Arousal/Alertness: Appropriate responses to stimuli  Following Commands: Follows one step commands with repetition; Follows multistep commands with repitition  Attention Span: Attends with cues to redirect  Memory: Decreased recall of precautions  Safety Judgement: Decreased awareness of need for safety;Decreased awareness of need for assistance  Problem Solving: Assistance required to correct errors made;Assistance required to identify errors made  Insights: Decreased awareness of deficits  Initiation: Requires cues for all  Sequencing: Requires cues for all  Cognition Comment: L sided neglect, poor sequencing at times needing max cues for upright posture sitting BSC, WC, EOB, EOM  Orientation  Overall Orientation Status: Within Functional Limits  Orientation Level: Oriented X4   Perception  Overall Perceptual Status: Impaired  Unilateral Attention: Cues to attend left visual field;Cues to attend to left side of body;Cues to maintain midline in sitting  Initiation: Cues to initiate tasks  Motor Planning: Hand over hand to sequence tasks  Perseveration: Perseverates during conversation     ADL  Feeding  Assistance Level: Increased time to complete; Moderate assistance;Verbal cues  Grooming/Oral Hygiene  Assistance Level: Increased time to complete;Maximum assistance  Skilled Clinical Factors: washing face and brushing hair sitting EOB mod-maxA for balance while OT assisted  Upper Extremity Bathing  Assistance Level: Maximum assistance  Skilled Clinical Factors: maxA sitting on BSC  Upper Extremity Dressing  Assistance Level: Maximum assistance  Skilled Clinical Factors: maxA x1 with mod cues for papo teechniques, assist with threading LUE, mod-maxA for upright balance threading RUE, able to manage over head, assist with management around back  Lower Extremity Dressing  Assistance Level: Dependent  Skilled Clinical Factors: donning shoes sitting EOB maxA x1-2, maxA x2 sit <>s tand in steady to manage pants/brief up down  Putting On/Taking Off Footwear  Assistance Level: Dependent  Skilled Clinical Factors: donning shoes  Toileting  Assistance Level: Increased time to complete; Requires x 2 assistance;Maximum assistance  Skilled Clinical Factors: modA x2 sit <> stand to steady with PCA assist  Toilet Transfers  Technique:  (steady)  Equipment: Beside commode (over toilet)  Additional Factors: Verbal cues;Cues for hand placement; Increased time to complete; With handrails  Assistance Level: Requires x 2 assistance; Moderate assistance  Skilled Clinical Factors: modA x2 sit <> stand to steady with PCA assist     Functional Mobility  Device:  (steady)  Activity: To/From bathroom  Assistance Level: Maximum assistance; Moderate assistance; Requires x 2 assistance  Skilled Clinical Factors: modA x2 sit <> stand to steady with PCA assist to bathroom and back to recliner  Bed Mobility  Overall Assistance Level: Maximum Assistance; Moderate Assistance; Requires x 2 Assistance  Additional Factors: Head of bed raised; Increased time to complete;Set-up; Verbal cues  Bridging  Assistance Level: Maximum assistance; Requires x 2 assistance  Roll Right  Assistance Level: Moderate assistance; Requires x 2 assistance  Supine to Sit  Assistance Level: Moderate assistance; Requires x 2 assistance  Scooting  Assistance Level: Moderate assistance;Maximum assistance; Requires x 2 assistance  Transfers  Surface: Wheelchair; To chair with arms; Bedside commode;From chair with arms;From bed  Additional Factors: With handrails;Verbal cues; Hand placement cues; Increased time to complete; Mat raised  Device: Lift equipment  Sit to Stand  Assistance Level: Moderate assistance; Requires x 2 assistance  Skilled Clinical Factors: modA x2 sit <> stand to steady with PCA assist  Stand to Sit  Assistance Level: Moderate assistance; Requires x 2 assistance  Skilled Clinical Factors: modA x2 sit <> stand to steady with PCA assist  Bed To/From Chair  Technique:  (steady)  Assistance Level: Moderate assistance; Requires x 2 assistance  Skilled Clinical Factors: modA x2 sit <> stand to steady with PCA assist   Neuromuscular Education  NDT Treatment: Upper extremity; Lower extremity;Standing;Sitting  Head/Neck Control: OT providing hand over hand for turning head L vs R to scan for objects; continues to demo severe L sided neglect deficits but increased carryover for IDing object on L side during toileting tasks  Trunk Control: increased midline alignment when given verbal and tacitle cues from OT and PCA assisting with weight shifting for sit <> Stand to steady and ADL tasks in stance  Weight Bearing  LUE Weight Bearing: Extended arm standing  Response To Weight Bearing Technique: WBing through LUE AAROM and hand over hand to steady during sit <> stands     Assessment  Assessment  Assessment: Patinet remains pleasant and agreeable, upon arrival to OT individual session pt requesting to use the bathroom therefore steady was used with PCA. Supine to sit mod-maxA , maxA x1 for scooting to EOB, modA for midline alignment. Pt needing cues for upright posture in steady but increased ability to self correct without cues this date. Pt does demo poor L sided awareess and insight to deficits needing hand over hand and step by step cues for safety with tranfsers. Pt was placed in recliner, LUE elevated on 2 pillows, educated patient and PCA on positioning in chair for support and comfort. Cont OT POC. Second session: Pt in recliner,  present. Pt is agreeable to a cotx with PT to address functional transfers, NMR, and ADL management. Pt requesting to use the bathroom prior to going to the gym. PT providing support at trunk while OT providing cues for sequencing/mechanics and LLE support during sit pivot transfers, grossly modA x2 for all sit pivot R vs L. Pt needing min cues and modA x1-2 for midline alignment but able to tolerate sitting unsupported vs supported on BSC vs WC during tasks. Continues to be maxA x2 for LB ADLs, modA x2 for static standing with grab bar to R side, PT in front LLE blocking/support and OT providing support at LUE and pants management. Once in gym, completed sit pivot WC to EOM modA x2. Pt tolerated sitting EOM x25 minutes for WBing through LUE forearm vs extended arm Mita x2, reaching across midline with R hand to locate bean bags with less cues for scanning L vs R. Pt was then able to sit upright in midline with modA x2 at trunk and hips to toss into corn hole board.   Continues to need max cues, increased time, and modA x2 for midline positioning. Pt then tolerated 2x2-3 minutes of standing from WC to tabletop with WBing through BUE, OT assist with trunk lateral weight shift and shoulder placement to midline before reaching with RUE to gather squiz on table top. Poor upright tolerance when fatigued but does well with redirection, mod tactile cues, and max verbal cues. Pt with increased standing balance as activity progressed, PT providing support at L knee and JACINTA management. Pt is progressing towards goals but still is very limited by L sided neglect. Cont to assess pending progress DC recs. Cont OT POC. Activity Tolerance: Patient tolerated treatment well  Discharge Recommendations: Continue to assess pending progress;24 hour supervision or assist;Home with nursing aide;Home with Home health OT;S Level 3;Home with assist PRN  OT Equipment Recommendations  Equipment Needed: No  Other: CTA  Safety Devices  Safety Devices in place: Yes  Type of devices: Left in chair;Call light within reach; Chair alarm in place;Nurse notified; Patient at risk for falls; All fall risk precautions in place;Gait belt  Restraints  Initially in place: No    Patient Education  Education  Education Given To: Patient  Education Provided: Role of Therapy;Cognition;Plan of Care;Family Education;Home Exercise Program;Low Vision Education;ADL Function;Mobility Training;Equipment;Precautions;Transfer Training;DME/Home Modifications; Safety; Energy Conservation; Fall Prevention Strategies  Education Provided Comments: NMR, positoning, importance of OOB activities, AAROM vs SROM  Education Method: Demonstration;Verbal  Barriers to Learning: Cognition  Education Outcome: Verbalized understanding;Demonstrated understanding;Continued education needed    Plan  Plan  Times per Week: 5 out of 7 days  Times per Day: Daily  Plan Weeks: 3 weeks  Current Treatment Recommendations: Strengthening;ROM;Balance training;Functional mobility training; Endurance training; Safety education & training;Neuromuscular re-education;Patient/Caregiver education & training;Equipment evaluation, education, & procurement;Self-Care / ADL;Cognitive/Perceptual training    Goals  Patient Goals   Patient goals :  \"Go home\" \"Get stronger\" \"walk better\"  Short Term Goals  Time Frame for Short term goals: 10 days (5/20/22)  Short Term Goal 1: Pt will complete functional transfers with Mita x2 with LRAD- progressing, maxA x2 sit pivot vs stand pivot transfer  Short Term Goal 2: Pt will complete toileting/transfer modA x2 with LRAD and DME PRN-GOAL MET 5/16, modA x2 steady, CTA  Short Term Goal 3: Pt will complete LUE AAROM/AROM exercises to LUE to increase strength/endurance for ADLs/transfers  Short Term Goal 4: Pt will be able to locate 3/5 object in L visual field with min cues during ADLs/activities-progressing, mod-max cues  Additional Goals?: No  Long Term Goals  Time Frame for Long term goals : 21 days (3/31/22)  Long Term Goal 1: Pt will complete functional transfers/mobility SPV with LRAD  Long Term Goal 2: Pt will complete UE dressing/bathing sitting setup with min cues for papo techniques  Long Term Goal 3: Pt will complete toileting/transfers with LRAD and DME PRN SPV  Long Term Goal 4: Pt will complete opening 3/5 containers with compensatory techniques sitting setup/SPV    Therapy Time   Individual Concurrent Group Co-treatment   Time In 1100         Time Out 1130         Minutes 30         Timed Code Treatment Minutes: 30 Minutes     Second Session Therapy Time:   Individual Concurrent Group Co-treatment   Time In      1230   Time Out      1330   Minutes      60     Timed Code Treatment Minutes:  60 Minutes    Total Treatment Minutes:  [address] Sharen Kawasaki, OTR/L

## 2022-05-16 NOTE — PROGRESS NOTES
Speech Language Pathology  [facility]: ACUTE REHAB UNIT  SPEECH-LANGUAGE PATHOLOGY      [x] Daily  [] Weekly Care Conference Note  [] Discharge    Patient:Jennifer Olsen      :1950  NFN:[id number]  Rehab Dx/Hx: Acute CVA (cerebrovascular accident) ([facility] [address] [I63.9]    Precautions: falls and aspirations; severe left neglect  Home situation: Lives with . Indep with med management. Share finances, cooking, laundtry, grocery shopping. Has not driven since CVA in March. ST Dx: [] Aphasia  [x] Dysarthria  [] Apraxia   [x] Oropharyngeal dysphagia [x] Cognitive Impairment  [] Other:   Date of Admit: 5/10/2022  Room #: 3392/9527-85    Current functional status (updated daily):         Pt being seen for : [x] Speech/Language Treatment  [x] Dysphagia Treatment [x] Cognitive Treatment  [] Other:  Communication: []WFL  [] Aphasia  [x] Dysarthria  [] Apraxia  [] Pragmatic Impairment [] Non-verbal  [] Hearing Loss  [] Other:   Cognition: [] WFL  [x] Mild  [] Moderate  [] Severe [] Unable to Assess  [] Other:  Memory: [] WFL  [x] Mild  [] Moderate  [] Severe [] Unable to Assess  [] Other:  Behavior: [x] Alert  [x] Cooperative  [x]  Pleasant  [] Confused  [] Agitated  [] Uncooperative  [] Distractible [] Motivated  [] Self-Limiting [] Anxious  [] Other:  Endurance:  [x] Adequate for participation in SLP sessions  [] Reduced overall  [] Lethargic  [] Other:  Safety: [] No concerns at this time  [x] Reduced insight into deficits  [x]  Reduced safety awareness [] Not following call light procedures  [] Unable to Assess  [] Other:    Current Diet Order:ADULT DIET;  Dysphagia - Soft and Bite Sized; 3 carb choices (45 gm/meal)  Swallowing Precautions: upright for all intake, stay upright for at least 30 mins after intake, small bites/sips, assist feed, oral care 2-3x/day to reduce adverse affects in the event of aspiration, alternate bites/sips, slow rate of intake         Date: 2022      Tx session 1  7508 - 3022 Tx session 2  All tx needs met in session 1   Total Timed Code Min 60 0   Total Treatment Minutes 60 0   Individual Treatment Minutes 60 0   Group Treatment Minutes 0 0   Co-Treat Minutes 0 0   Variance/Reason:  n/a n/a   Pain Denies     Pain Intervention [] RN notified  [] Repositioned  [] Intervention offered and patient declined  [x] N/A  [] Other: [] RN notified  [] Repositioned  [] Intervention offered and patient declined  [] N/A  [] Other:   Subjective     Pt alert and cooperative, agreeable to tx. Pt fully upright in bed for session. Objective:     Dysphagia Goals  Short-term Goals  Timeframe for Short-term Goals: 18 days (05/28/2022)    Goal 1: The patient will tolerate recommended diet with no clinical s/s of aspiration    Goal not directly targeted. Goal 2: The patient/caregiver will demonstrate understanding of compensatory swallow strategies, for improved swallow safety   Goal not directly targeted. Goal 3: The patient will tolerate instrumental assessment when able Not clinically indicated at this time. Goal 4: The pt will complete oral motor exercises to improve labial and lingual strength, ROM, and coordination in 10/10 opportunities given min cues    Goal not directly targeted. Cognitive-linguistic Goals:   Short-term Goals  Timeframe for Short-term Goals: 18 days (05/28/2022)    Goal 1: Pt will effectively use compensatory visual strategies for improved attention to left side during structured tasks with 80% acc given mod cues. Writing Checks   -pt required MAX cues to attend fully to the left on a check  -pt would begin writing the \"pay to the order of\" and money amount in words around the middle of the check (rather than all the way to the left)    Attention strategies: SLP showed pt a picture scene; SLP fernando dark black line on the left as strategy for pt    SLP also sat on pt's left for duration of session.  Pt made eye contact with SLP 1x      Goal 2: Pt will complete graded recall tasks using compensatory strategies with 90% acc given min cues   Picture Retention Task   -pt shown picture scene; SLP reviewed details with pt  -immediate recall: pt recalled details with 73% acc given min cues, improved to 82% acc given mod cues   -10 min delay: pt recalled details with 100% acc given min cues       Goal 3: Pt will complete executive function tasks (e.g. meds, time, money, etc) with 90% acc givgen min cues   Writing Checks  -pt required mod-max cues in order to correctly write with 75% acc  -pt required cueing to attend to left, to add the date, and correctly put written amount in words   -pt also writing off the line; difficult to read (reduced insight into difficulty staying on line)      Goal 4: Pt will complete problem solving and thought organization tasks with 90% acc given min cues   Divergent Naming Task   -pt asked to name 10 items that belong to a concrete group  -e.g. name 10 holidays  -pt completed task with 80% acc indep, improved to 100% acc give mod cues       Goal 5: Pt will complete verbal and visual reasoning tasks with 90% acc given min cues   Word Deduction Task   -pt given 3 clues that describe an item; asked to name the item being described  -e.g. time, hands, wristband = watch  -pt completed with 100% acc indep      Other areas targeted: N/a    Education:   SLP edu pt re: left attention strategies, recall strategies, writing checks     Safety Devices: [x] Call light within reach  [] Chair alarm activated  [x] Bed alarm activated  [] Other: [] Call light within reach  [] Chair alarm activated  [] Bed alarm activated  [] Other:    Assessment: Pt alert and cooperative, flat but agreeable to participate in session. Pt attempting to write a check when SLP entered room. SLP then provided blank sample checks for pt to practice on. Pt with difficulty completing 2/2 left neglect - staying midline on the check, and not filling it out on left side.  Pt also with errors with the date, writing the money amount in words. Pt also with reduced insight re: errors, writing off the lines on check (above or below it; writing on top of other words). SLP recommends assist from  with writing checks. Pt demonstrated improvement with a picture scene recall task following a 10 min delay given min cues. Pt indep completed a word deduction task. Pt benefited from mod cues to improve thought org. Pt is making good progress towards cognitive goals; left neglect is biggest barrier. Continue goals above. Plan: Continue as per plan of care. Additional Information:     Barriers toward progress: Limited safety awareness, Limited insight into deficits, Decreased proprioception, Upper extremity weakness and Lower extremity weakness  Discharge recommendations:  [] Home independently  [] Home with assistance [x]  24 hour supervision  [] ECF [] Other:  Continued Tx Upon Discharge: ? [x] Yes [] No [] TBD based on progress while on ARU [] Vital Stim indicated [] Other:   Estimated discharge date: 05/31/2022    Interventions used this date:  [] Speech/Language Treatment  [] Instruction in HEP [] Group [x] Dysphagia Treatment [x] Cognitive Treatment   [] Other:       Total Time Breakdown / Charges    Time in Time out Total Time / units   Cognitive Tx 0930 1030 60 min / 4 units   Speech Tx -- -- --   Dysphagia Tx   [       Electronically Signed by     Leonel Spurling MA CCC-SLP #[id number]  Speech Language Pathologist

## 2022-05-16 NOTE — PROGRESS NOTES
Physical Therapy  Facility/Department: [facility]  Rehabilitation Physical Therapy Treatment Note    NAME: Kim Curtis  : 1950 (66 y.o.)  MRN: [id number]  CODE STATUS: Full Code    Date of Service: 22       Restrictions:  Restrictions/Precautions: Fall Risk;Up as Tolerated     SUBJECTIVE  Subjective  Subjective: pt found in recliner  Pain: denies pain at rest, reports N pain in L wrist with WB activities. OBJECTIVE  Cognition  Overall Cognitive Status: Exceptions  Arousal/Alertness: Appropriate responses to stimuli  Following Commands: Follows one step commands with repetition; Follows multistep commands with repitition  Attention Span: Attends with cues to redirect  Memory: Decreased recall of precautions  Safety Judgement: Decreased awareness of need for safety;Decreased awareness of need for assistance  Problem Solving: Assistance required to correct errors made;Assistance required to identify errors made  Insights: Decreased awareness of deficits  Initiation: Requires cues for all  Sequencing: Requires cues for all  Cognition Comment: L sided neglect, poor sequencing at times needing max cues for upright posture sitting BSC, WC, EOB, EOM  Orientation  Overall Orientation Status: Within Functional Limits  Orientation Level: Oriented X4    Functional Mobility     Pt found in recliner. Max a of 2 to reposition upright in recliner (pt slid to edge of chair and L)  Sit pivot recliner to w/c with mod A of 2. Sit pivot w/c <> commode with mod A of 2 and grab bar   Pt utilized commode. Static standing x 30-60 sec with RUE support on grab bar and max a of 1 for balance with assist from OT for pericare/clothing management. Sit pivot w/c <> low mat with mod A of 2. Cues for sequencing. Dyn sitting : pt completed dyn sitting task x 15-18 min with assist ranging from SBA-mod A to maintain midline.  Pt began with 5 R seated weight shift with tactile cues for trunk extension/ anterior pelvic tilts for improved midline position. Begin upright-> L elbow WB and reaching RUE across midline for beanbag scanning to L-> return to midline and throw beanbag to target-> repeat x 10 reps with seated rest half way. Pt requires min A of 1+ mod A of 1 to return to midline from L side-lying and tactile cues for upright midline posture . Sit to stand w/c to table top x 2 reps with max a of 2   Pt standing for 2 min 30 sec with max a of PT and max a of OT. PT facilitated LLE stability/ position, trunk / hip extension and OT facilitating UE posture . Pt then reaching for 4 squigs with RUE to place to target on R to facilitate R weight shift as pt demo's increased L lateral lean with increased trunk/hip flexion. Following seated rest, pt completed x 1 min 30 sec of activity again with max a of 2 for balance/posture. Sit pivot w/c to recliner with mod A of 2 and TD to scoot back in chair  Pt in chair at end of session with alarm donned and call light/needs within reach. ASSESSMENT/PROGRESS TOWARDS GOALS  Vital Signs  Pulse: 62  Heart Rate Source: Monitor  BP: (!) 170/68  BP Location: Right upper arm  MAP (Calculated): 102  SpO2: 95 %  O2 Device: None (Room air)    Assessment  Assessment: pt seen as co treat with OT for increased safety progressing mobility. pt benefits from 2 skilled therapists to provide increased cues/ feedback wiht mobility. flat affect but motivated to participate. pt demo's improved static/dyn sitting balance and ability to find/maintain midline posture seated. pt requries max A of 2 for static/dyn standing due to L latearl lean/L LE flaccidity and difficulty obtaining midline (presents with L trunk rotation, R shoulder hike and increased trunk/hip flexion).  continue to progress mobility as pt tolerate  Activity Tolerance: Patient tolerated treatment well  Discharge Recommendations: Continue to assess pending progress  PT Equipment Recommendations  Equipment Needed: Yes    Goals  Patient Goals   Patient goals : \"go on my mission trip in [place]"  Short Term Goals  Time Frame for Short term goals: 11 days 5/20  Short term goal 1: pt will complete bed mobility with mod A. Short term goal 2: pt will complete functional transfer with max A. Short term goal 3: pt will tolerate gait assessment and set goal when appropriate. Short term goal 4: pt will tolerate stair assessment and set goal when appropriate  Short term goal 5: pt will propel w/c x 150 ft with supv. Long Term Goals  Time Frame for Long term goals : 21 days 5/31  Long term goal 1: pt will complete bed mobility with CGA. Long term goal 2: pt will complete functional transfer with min a and LRAD. Long term goal 3: pt will propel w/c x 150 ft with CGA    PLAN OF CARE/SAFETY  Plan  Plan: 5-7 times per week  Current Treatment Recommendations: Strengthening;ROM;Balance training;Functional mobility training;Transfer training; Endurance training;Gait training; Therapeutic activities; Home exercise program;Wheelchair mobility training;Equipment evaluation, education, & procurement;Cognitive reorientation;Stair training;Positioning; Safety education & training;Patient/Caregiver education & training;Cognitive/Perceptual training;ADL/Self-care training;IADL training;Pain management  Safety Devices  Type of Devices: All fall risk precautions in place;Call light within reach; Patient at risk for falls;Nurse notified; All yaima prominences offloaded;Gait belt;Left in chair;Chair alarm in place      Therapy Time   Individual Concurrent Group Co-treatment   Time In       1230   Time Out       1330   Minutes       60     Timed Code Treatment Minutes: 400 [facility]       Jonathan Cornejo PT, 05/16/22 at 2:21 PM

## 2022-05-16 NOTE — PROGRESS NOTES
Nicole Flores  5/16/2022  [id number]    Chief Complaint: Acute CVA (cerebrovascular accident) [facility])    Subjective:   No acute events overnight. Today Lillian Adamson is seen in her room, resting in bed. She reports continued chest congestion. XR shows improving pneumonia. She is encouraged to use IS more frequently. She denies other acute complaints at this time. ROS: denies f/c, n/v, cp, sob    Objective:  Patient Vitals for the past 24 hrs:   BP Temp Temp src Pulse Resp SpO2   05/16/22 0815 (!) 170/68 98.4 °F (36.9 °C) Oral 62 16 95 %   05/15/22 2100 (!) 168/70 97.8 °F (36.6 °C) Oral 57 18 92 %     Gen: No distress, pleasant. Supine in bed  HEENT: Normocephalic, atraumatic. CV: extremities well perfused  Resp: No respiratory distress. Lungs CTAB  Abd: Soft, nondistended  Ext: No edema. Neuro: Alert, oriented, appropriately interactive. Left hemiparesis. Wt Readings from Last 3 Encounters:   05/10/22 150 lb 12.7 oz (68.4 kg)   05/02/22 143 lb 9.6 oz (65.1 kg)   09/09/21 150 lb 12.8 oz (68.4 kg)       Laboratory data:   Lab Results   Component Value Date    WBC 7.3 05/16/2022    HGB 11.4 (L) 05/16/2022    HCT 34.2 (L) 05/16/2022    MCV 95.3 05/16/2022     05/16/2022       Lab Results   Component Value Date     05/16/2022    K 3.8 05/16/2022     05/16/2022    CO2 24 05/16/2022    BUN 26 05/16/2022    CREATININE 1.1 05/16/2022    GLUCOSE 141 05/16/2022    GLUCOSE 129 07/26/2017    CALCIUM 9.3 05/16/2022        Therapy progress:  PT  Position Activity Restriction  Other position/activity restrictions: .   Objective     Sit to Stand: 2 Person Assistance,Dependent/Total  Stand to sit: Dependent/Total,2 Person Assistance  Bed to Chair: Dependent/Total     OT  PT Equipment Recommendations  Equipment Needed: Yes  Other: CTA pending progress with mobility  Toilet - Technique:  (steady to e-tac chair over toilet)  Equipment Used:  (e-tac chair)  Toilet Transfers Comments: maxA x2 sit <>  steady, bowel movements. Enema or suppository if needed.      Bladder: Check PVR x 3. [facility] if PVR > 200ml or if any volume is > 500 ml.      Sleep: Trazodone provided prn.      Follow up appointments: Vascular, PCP    Chantel Shaw.  Mervin Barron MD 5/16/2022, 1:42 PM

## 2022-05-16 NOTE — CONSULTS
Thanks for consulting [facility] for the care of Nicole Flores. BP is high  Has 1.1 cr  Lisinopril 40 mg a day    Start nifedipine 30 mg a day    Full consult will follow  Please call with questions at-  24 Hrs Answering service [phone]  Perfect serve, or cell phone 7 am - 5pm  Philomena Gonzales MD   [facility] nephrology  [facility]. [facility]

## 2022-05-17 PROCEDURE — 97112 NEUROMUSCULAR REEDUCATION: CPT

## 2022-05-17 PROCEDURE — 1280000000 HC REHAB R&B

## 2022-05-17 PROCEDURE — 6360000002 HC RX W HCPCS: Performed by: STUDENT IN AN ORGANIZED HEALTH CARE EDUCATION/TRAINING PROGRAM

## 2022-05-17 PROCEDURE — 97535 SELF CARE MNGMENT TRAINING: CPT

## 2022-05-17 PROCEDURE — 97130 THER IVNTJ EA ADDL 15 MIN: CPT

## 2022-05-17 PROCEDURE — 97129 THER IVNTJ 1ST 15 MIN: CPT

## 2022-05-17 PROCEDURE — 92526 ORAL FUNCTION THERAPY: CPT

## 2022-05-17 PROCEDURE — 6370000000 HC RX 637 (ALT 250 FOR IP): Performed by: INTERNAL MEDICINE

## 2022-05-17 PROCEDURE — 97530 THERAPEUTIC ACTIVITIES: CPT

## 2022-05-17 PROCEDURE — 6370000000 HC RX 637 (ALT 250 FOR IP): Performed by: STUDENT IN AN ORGANIZED HEALTH CARE EDUCATION/TRAINING PROGRAM

## 2022-05-17 RX ADMIN — METOPROLOL TARTRATE 25 MG: 25 TABLET, FILM COATED ORAL at 08:22

## 2022-05-17 RX ADMIN — BISACODYL 10 MG: 10 SUPPOSITORY RECTAL at 13:09

## 2022-05-17 RX ADMIN — ASPIRIN 81 MG: 81 TABLET, COATED ORAL at 08:21

## 2022-05-17 RX ADMIN — Medication 400 MG: at 08:23

## 2022-05-17 RX ADMIN — LISINOPRIL 40 MG: 20 TABLET ORAL at 08:22

## 2022-05-17 RX ADMIN — DICLOFENAC SODIUM 4 G: 10 GEL TOPICAL at 11:41

## 2022-05-17 RX ADMIN — LEVOFLOXACIN 750 MG: 500 TABLET, FILM COATED ORAL at 08:21

## 2022-05-17 RX ADMIN — HEPARIN SODIUM 5000 UNITS: 5000 INJECTION INTRAVENOUS; SUBCUTANEOUS at 14:21

## 2022-05-17 RX ADMIN — TRAZODONE HYDROCHLORIDE 50 MG: 50 TABLET ORAL at 20:35

## 2022-05-17 RX ADMIN — HEPARIN SODIUM 5000 UNITS: 5000 INJECTION INTRAVENOUS; SUBCUTANEOUS at 08:20

## 2022-05-17 RX ADMIN — DICLOFENAC SODIUM 4 G: 10 GEL TOPICAL at 20:35

## 2022-05-17 RX ADMIN — LEVOTHYROXINE SODIUM 50 MCG: 0.05 TABLET ORAL at 08:20

## 2022-05-17 RX ADMIN — METOPROLOL TARTRATE 25 MG: 25 TABLET, FILM COATED ORAL at 20:35

## 2022-05-17 RX ADMIN — FAMOTIDINE 10 MG: 20 TABLET, FILM COATED ORAL at 08:23

## 2022-05-17 RX ADMIN — LACTULOSE 20 G: 20 SOLUTION ORAL at 20:35

## 2022-05-17 RX ADMIN — GUAIFENESIN 600 MG: 600 TABLET, EXTENDED RELEASE ORAL at 20:35

## 2022-05-17 RX ADMIN — CARBOXYMETHYLCELLULOSE SODIUM 1 DROP: 10 GEL OPHTHALMIC at 08:20

## 2022-05-17 RX ADMIN — GUAIFENESIN 600 MG: 600 TABLET, EXTENDED RELEASE ORAL at 08:20

## 2022-05-17 RX ADMIN — CLOPIDOGREL BISULFATE 75 MG: 75 TABLET, FILM COATED ORAL at 08:22

## 2022-05-17 RX ADMIN — DICLOFENAC SODIUM 4 G: 10 GEL TOPICAL at 18:18

## 2022-05-17 RX ADMIN — NIFEDIPINE 30 MG: 30 TABLET, FILM COATED, EXTENDED RELEASE ORAL at 08:20

## 2022-05-17 RX ADMIN — LACTULOSE 20 G: 20 SOLUTION ORAL at 08:21

## 2022-05-17 RX ADMIN — HYDROCORTISONE: 1 CREAM TOPICAL at 11:41

## 2022-05-17 RX ADMIN — CARBOXYMETHYLCELLULOSE SODIUM 1 DROP: 10 GEL OPHTHALMIC at 20:34

## 2022-05-17 RX ADMIN — HEPARIN SODIUM 5000 UNITS: 5000 INJECTION INTRAVENOUS; SUBCUTANEOUS at 20:35

## 2022-05-17 RX ADMIN — HYDROCORTISONE: 1 CREAM TOPICAL at 20:36

## 2022-05-17 ASSESSMENT — PAIN SCALES - GENERAL
PAINLEVEL_OUTOF10: 0

## 2022-05-17 NOTE — PLAN OF CARE
Problem: Skin/Tissue Integrity  Goal: Absence of new skin breakdown  Description: 1. Monitor for areas of redness and/or skin breakdown  2. Assess vascular access sites hourly  3. Every 4-6 hours minimum:  Change oxygen saturation probe site  4. Every 4-6 hours:  If on nasal continuous positive airway pressure, respiratory therapy assess nares and determine need for appliance change or resting period.   Outcome: Progressing     Problem: Pain  Goal: Verbalizes/displays adequate comfort level or baseline comfort level  Outcome: Progressing

## 2022-05-17 NOTE — PROGRESS NOTES
James Doing  5/17/2022  [id number]    Chief Complaint: Acute CVA (cerebrovascular accident) [facility])    Subjective:   No acute events overnight. Today Fuentes Dotson is seen in her room, resting in bed. She denies acute complaints. She reports having a bowel movement yesterday. ROS: denies f/c, n/v, cp, sob    Objective:  Patient Vitals for the past 24 hrs:   BP Temp Temp src Pulse Resp SpO2   05/17/22 0822 (!) 143/65 98 °F (36.7 °C) Oral 66 20 95 %   05/16/22 1615 (!) 150/59 -- -- 69 -- --   05/16/22 1416 (!) 170/68 -- -- 62 -- 95 %     Gen: No distress, pleasant. Supine in bed  HEENT: Normocephalic, atraumatic. CV: extremities well perfused  Resp: No respiratory distress. Abd: Soft, nondistended  Ext: No edema. Neuro: Alert, oriented, appropriately interactive. Left hemiparesis. Independently hernandez hair with right hand    Wt Readings from Last 3 Encounters:   05/10/22 150 lb 12.7 oz (68.4 kg)   05/02/22 143 lb 9.6 oz (65.1 kg)   09/09/21 150 lb 12.8 oz (68.4 kg)       Laboratory data:   Lab Results   Component Value Date    WBC 7.3 05/16/2022    HGB 11.4 (L) 05/16/2022    HCT 34.2 (L) 05/16/2022    MCV 95.3 05/16/2022     05/16/2022       Lab Results   Component Value Date     05/16/2022    K 3.8 05/16/2022     05/16/2022    CO2 24 05/16/2022    BUN 26 05/16/2022    CREATININE 1.1 05/16/2022    GLUCOSE 141 05/16/2022    GLUCOSE 129 07/26/2017    CALCIUM 9.3 05/16/2022        Therapy progress:  PT  Position Activity Restriction  Other position/activity restrictions: .   Objective     Sit to Stand: 2 Person Assistance,Dependent/Total  Stand to sit: Dependent/Total,2 Person Assistance  Bed to Chair: Dependent/Total     OT  PT Equipment Recommendations  Equipment Needed: Yes  Other: CTA pending progress with mobility  Toilet - Technique:  (steady to e-tac chair over toilet)  Equipment Used:  (e-tac chair)  Toilet Transfers Comments: maxA x2 sit <>  steady, progressing at times to [employer] with max verbal/tactile and demo cues  Assessment        SLP                Body mass index is 27.14 kg/m². Assessment and Plan:  Obdulia Danielle is a 66year old female with a past medical history significant for recent CVA with left hemiparesis, CML, HTN, and HLD who presented to [facility] on 5/3/22 with abnormal labs, admitted with rhabdomyolysis and ARF, found to have acute CVA. She was admitted to [facility] on 5/10/22 due to functional deficits below her baseline.     Acute Right CVA  - MRI showing acute infarct in right cerebral hemisphere in a watershed distribution  - CTA showing 90-95% stenosis of right ICA  - Vascular and Neurology in agreement on waiting for CEA, needs follow up in 4 weeks  - asa, plavix, statin  - PT, OT, ST     Dysphagia  - on modified diet  - ST    RLL Pneumonia  - concern for aspiration in setting of dysphagia  - repeat CXR showing improvement 5/16  - completed course of levaquin   - added mucinex  - IS      Rhabdomyolysis  Acute Renal Injury  - Nephrology followed during acute stay  - CPK trending down  - repeat CPK tomorrow  - monitor      HTN  - patient with episode of hypotension while in acute care so isosorbid mononitrate and hydralazine held  - metoprolol 25 mg, lisinopril to 40 mg daily   - want to avoid hypotension  - Nephrology following, appreciate assistance. Added nifedipine    DM2  - Hba1c 6.6  - glucose well controlled without SSI, will discontinue  - follow up with PCP     Liver Injury with elevated LFTs  - suspected to be due to statin induced rhabdo  - statin stopped  - LFTs trending down  - monitor intermittently     CML  - follows with Dr. Edin Gan  - on nilitinib at home, can consider resuming as able  - follow up with Heme/Onc OP     Enlarged heterogeneous appearance of the thyroid  - Thyroid ultrasound outpatient  - follow up with PCP     Hemorrhoids  - hydrocortisone cream   - bowel regimen to avoid constipation     Bowels: Schedule stool softener.  Follow bowel movements. Enema or suppository if needed.      Bladder: Check PVR x 3. [address] if PVR > 200ml or if any volume is > 500 ml.      Sleep: Trazodone provided prn.      Follow up appointments: Vascular, PCP    [address] [place] [address].  Rosario Apley, MD 5/17/2022, 11:19 AM

## 2022-05-17 NOTE — PROGRESS NOTES
Interval History and plan:      Creatinine now is stable at 1.1  Electrolytes are stable  Blood pressure as high as 717 systolic couple of days ago  Coming down with changes in medicine now    Plan:  Continue with current medicine- started on nifedipine yesterday  Check CPK a.m. Assessment :     Acute Kidney Injury  Creatinine 1.1 at the time of consult, as she might have chronic kidney disease  EDYTA likely due to -rhabdomyolysis/poor p.o. intake  Cr on consultation 2.1 when she was in the acute hospital  Baseline Cr-0.8 on 9/21  No recent baseline available-she was admitted to [facility] March 2022 with the stroke and was told that she has high creatinine    UA-5/22-large blood, trace LE  Renal Imaging:-5/22-right-10.7 cm, simple 2.2 cm right renal cyst  No mention of left kidney  Echo: 10/18-EF normal, no mention of diastolic dysfunction    Hypertension   BP: (143)/(65)  Pulse:  [66]   BP goal inpatient [phone] systolic inpatient    Rhabdomyolysis  CPK more than 10,000 on arrival-down to 8.2   Thought to be induced by statin  Has elevated AST and ALT    CML  Diabetes mellitus type 2 new  Carotid artery stenosis-plan for outpatient procedure    [facility] would like to thank Natalia Jules MD   for opportunity to serve this patient      Please call with questions at-   24 Hrs Answering service [phone] or  7 am- 5 pm via Perfect serve or cell phone  Adonay Stovall MD          CC/reason for consult :     Severe hypertension   HPI :     Justine Lynn is a 66 y.o. female presented to   the hospital on 5/10/2022 with EDYTA and statin induced rhabdomyolysis to the acute hospital.  She was treated for both. She recently had a stroke and he may paralysis for which she was seen by neurologist.  She was found to have carotid artery stenosis for which she was seen by vascular surgeon. Plan for endarterectomy as an outpatient.     Once her renal function was a stable and rhabdomyolysis improved she was admitted to acute rehab unit for further care    At the rehab. Her blood pressure has gone up significantly because of which we are consulted    ROS:     Seen with-no family    positives in bold   Constitutional:  fever, chills, weakness, weight change, fatigue  Skin:  rash, pruritus, hair loss, bruising, dry skin, petechiae  Head, Face, Neck   headaches, swelling,  cervical adenopathy  Respiratory: shortness of breath, cough, or wheezing  Cardiovascular: chest pain, palpitations, dizzy, edema  Gastrointestinal: nausea, vomiting, diarrhea, constipation,belly pain    Yellow skin, blood in stool  Musculoskeletal:  back pain, muscle weakness, gait problems,       joint pain or swelling. Genitourinary:  dysuria, poor urine flow, flank pain, blood in urine  Neurologic:  vertigo, TIA'S, syncope, seizures, focal weakness  Psychosocial:  insomnia, anxiety, or depression.   Additional positive findings:                    All other remaining systems are negative or unable to obtain        PMH/PSH/SH/Family History:     Past Medical History:   Diagnosis Date    Anemia     Arthritis     Asthma     Bowel dysfunction     CML (chronic myelocytic leukemia) ([facility] [address] 01/2006    leukemia    Hyperlipidemia     Hypertension     Nuclear senile cataract of both eyes 11/25/2019    Obesity     Risk of myocardial infarction or stroke 7.5% or greater in next 10 years 9/15/2021    Skin cancer of face     left cheek, squamous    Stroke (cerebrum) ([facility] [address]     Thyroid disease     TIA (transient ischemic attack)     mini ones-from chemo       Past Surgical History:   Procedure Laterality Date    ANUS SURGERY  1998    fissure    BREAST BIOPSY      BREAST LUMPECTOMY      benign    COLONOSCOPY      numerous polyps    ELBOW SURGERY  1960    removal of foreign body ([place])    FINE NEEDLE ASPIRATION      PARACENTESIS      x 2 fluid in lung from Chemo    TONSILLECTOMY Bilateral         reports that she has never smoked. She has never used smokeless tobacco. She reports that she does not drink alcohol and does not use drugs. family history includes Arrhythmia in her sister; Cancer in her brother, father, mother, and sister.          Medication:     Current Facility-Administered Medications: lactulose (CHRONULAC) 10 GM/15ML solution 20 g, 20 g, Oral, BID  bisacodyl (DULCOLAX) suppository 10 mg, 10 mg, Rectal, Once  NIFEdipine (PROCARDIA XL) extended release tablet 30 mg, 30 mg, Oral, Daily  hydrALAZINE (APRESOLINE) tablet 10 mg, 10 mg, Oral, Q2H PRN  guaiFENesin (MUCINEX) extended release tablet 600 mg, 600 mg, Oral, BID  lisinopril (PRINIVIL;ZESTRIL) tablet 40 mg, 40 mg, Oral, Daily  traZODone (DESYREL) tablet 50 mg, 50 mg, Oral, Nightly PRN  metoprolol tartrate (LOPRESSOR) tablet 25 mg, 25 mg, Oral, BID  acetaminophen (TYLENOL) tablet 650 mg, 650 mg, Oral, Q6H PRN **OR** acetaminophen (TYLENOL) suppository 650 mg, 650 mg, Rectal, Q6H PRN  heparin (porcine) injection 5,000 Units, 5,000 Units, SubCUTAneous, TID  ondansetron (ZOFRAN-ODT) disintegrating tablet 4 mg, 4 mg, Oral, Q8H PRN **OR** ondansetron (ZOFRAN) injection 4 mg, 4 mg, IntraVENous, Q6H PRN  aluminum & magnesium hydroxide-simethicone (MAALOX) 200-200-20 MG/5ML suspension 30 mL, 30 mL, Oral, Q6H PRN  bisacodyl (DULCOLAX) suppository 10 mg, 10 mg, Rectal, Daily PRN  aspirin EC tablet 81 mg, 81 mg, Oral, Daily  carboxymethylcellulose PF (THERATEARS) 1 % ophthalmic gel 1 drop, 1 drop, Both Eyes, Q12H  clopidogrel (PLAVIX) tablet 75 mg, 75 mg, Oral, Daily  dextrose 5 % solution, 100 mL/hr, IntraVENous, PRN  dextrose bolus 10% 125 mL, 125 mL, IntraVENous, PRN **OR** dextrose bolus 10% 250 mL, 250 mL, IntraVENous, PRN  diclofenac sodium (VOLTAREN) 1 % gel 4 g, 4 g, Topical, 4x Daily  famotidine (PEPCID) tablet 10 mg, 10 mg, Oral, Daily  glucagon (rDNA) injection 1 mg, 1 mg, IntraMUSCular, PRN  glucose (GLUTOSE) 40 % oral gel 15 g, 15 g, Oral, PRN  hydrocortisone 1 % cream, , Topical, BID  levothyroxine (SYNTHROID) tablet 50 mcg, 50 mcg, Oral, Daily  magnesium oxide (MAG-OX) tablet 400 mg, 400 mg, Oral, Daily  nilotinib (TASIGNA) capsule 200 mg, 200 mg, Oral, Q12H  witch hazel-glycerin (TUCKS) pad, , Topical, PRN       Vitals :     Vitals:    05/17/22 0822   BP: (!) 143/65   Pulse: 66   Resp: 20   Temp: 98 °F (36.7 °C)   SpO2: 95%       I & O :       Intake/Output Summary (Last 24 hours) at 5/17/2022 1016  Last data filed at 5/17/2022 0908  Gross per 24 hour   Intake 720 ml   Output 700 ml   Net 20 ml        Physical Examination :     General appearance: Anxious- no, distressed- no, in good spirits-  Yes  HEENT: Lips- normal, teeth- ok , oral mucosa- moist  Neck : Mass- no, appears symmetrical, JVD- not visible  Respiratory: Respiratory effort-  normal, wheeze- no, crackles -   Cardiovascular:  Ausculation- No M/R/G, Edema none  Abdomen: visible mass- no, distention- no, scar- no, tenderness- no                            hepatosplenomegaly-  no  Musculoskeletal:  clubbing no,cyanosis- no , digital ischemia- no                           muscle strength- grossly normal , tone - grossly normal  Skin: rashes- no , ulcers- no, induration- no, tightening - no  Psychiatric:  Judgement and insight- normal           AAO X 3  Additional finding:   Left sided weakness       LABS:     Recent Labs     05/16/22  0939   WBC 7.3   HGB 11.4*   HCT 34.2*        Recent Labs     05/16/22  0939      K 3.8      CO2 24   BUN 26*   CREATININE 1.1   GLUCOSE 141*

## 2022-05-17 NOTE — PROGRESS NOTES
Speech Language Pathology  [facility]: ACUTE REHAB UNIT  SPEECH-LANGUAGE PATHOLOGY      [x] Daily  [] Weekly Care Conference Note  [] Discharge    Patient:Jennifer Bush      :1950  JKI:[id number]  Rehab Dx/Hx: Acute CVA (cerebrovascular accident) ([facility] [address] [I63.9]    Precautions: falls and aspirations; severe left neglect  Home situation: Lives with . Indep with med management. Share finances, cooking, laundtry, grocery shopping. Has not driven since CVA in March. ST Dx: [] Aphasia  [x] Dysarthria  [] Apraxia   [x] Oropharyngeal dysphagia [x] Cognitive Impairment  [] Other:   Date of Admit: 5/10/2022  Room #: 5898/4582-02    Current functional status (updated daily):         Pt being seen for : [x] Speech/Language Treatment  [x] Dysphagia Treatment [x] Cognitive Treatment  [] Other:  Communication: []WFL  [] Aphasia  [x] Dysarthria  [] Apraxia  [] Pragmatic Impairment [] Non-verbal  [] Hearing Loss  [] Other:   Cognition: [] WFL  [x] Mild  [] Moderate  [] Severe [] Unable to Assess  [] Other:  Memory: [] WFL  [x] Mild  [] Moderate  [] Severe [] Unable to Assess  [] Other:  Behavior: [x] Alert  [x] Cooperative  [x]  Pleasant  [] Confused  [] Agitated  [] Uncooperative  [] Distractible [] Motivated  [] Self-Limiting [] Anxious  [] Other:  Endurance:  [x] Adequate for participation in SLP sessions  [] Reduced overall  [] Lethargic  [] Other:  Safety: [] No concerns at this time  [x] Reduced insight into deficits  [x]  Reduced safety awareness [] Not following call light procedures  [] Unable to Assess  [] Other:    Current Diet Order:ADULT DIET;  Dysphagia - Soft and Bite Sized; 3 carb choices (45 gm/meal)  Swallowing Precautions: upright for all intake, stay upright for at least 30 mins after intake, small bites/sips, assist feed, oral care 2-3x/day to reduce adverse affects in the event of aspiration, alternate bites/sips, slow rate of intake         Date: 2022      Tx session 1  [id number] Tx session 2  All tx needs met in session 1   Total Timed Code Min 30    Total Treatment Minutes 60    Individual Treatment Minutes 60    Group Treatment Minutes 0    Co-Treat Minutes 0    Variance/Reason:  N/a    Pain Denies     Pain Intervention [] RN notified  [] Repositioned  [] Intervention offered and patient declined  [x] N/A  [] Other: [] RN notified  [] Repositioned  [] Intervention offered and patient declined  [] N/A  [] Other:   Subjective     Pt alert and oriented, cooperative and agreeable to participate in therapy. Pt seen sitting upright in bed. Objective:     Dysphagia Goals  Short-term Goals  Timeframe for Short-term Goals: 18 days (05/28/2022)    Goal 1: The patient will tolerate recommended diet with no clinical s/s of aspiration    Pt observed with breakfast meal:  -pancakes, oatmeal and blueberries with TL via cup and straw  -adequate mastication, occasional L sided anterior loss of bolus (both solids and liquids), minimal residue in oral cavity post swallow that pt was able to clear with use of liquid wash  -no s/s of aspiration  -no coughing, wet vocal quality, or throat clearing observed      Goal 2: The patient/caregiver will demonstrate understanding of compensatory swallow strategies, for improved swallow safety   SLP and pt discussed and reviewed. Pt verbalized and demonstrated appropriate use of safe swallow strategies during meal.     Goal 3: The patient will tolerate instrumental assessment when able Goal discontinued. Not clinically warranted at this time. Pt tolerating diet without any clinical s/s of aspiration. Goal 4: The pt will complete oral motor exercises to improve labial and lingual strength, ROM, and coordination in 10/10 opportunities given min cues    Did not target.      Cognitive-linguistic Goals:   Short-term Goals  Timeframe for Short-term Goals: 18 days (05/28/2022)    Goal 1: Pt will effectively use compensatory visual strategies for improved attention to

## 2022-05-17 NOTE — PROGRESS NOTES
Occupational Therapy  Facility/Department: [facility]  Rehabilitation Occupational Therapy Daily Treatment Note    Date: 22  Patient Name: Henderson Fabry       Room: 7410/8279-36  MRN: [id number]  Account: [de-identified]   : 1950  (76 y.o.) Gender: female                    Past Medical History:  has a past medical history of Anemia, Arthritis, Asthma, Bowel dysfunction, CML (chronic myelocytic leukemia) ([facility] [address] Hyperlipidemia, Hypertension, Nuclear senile cataract of both eyes, Obesity, Risk of myocardial infarction or stroke 7.5% or greater in next 10 years, Skin cancer of face, Stroke (cerebrum) ([facility] [address] Thyroid disease, and TIA (transient ischemic attack). Past Surgical History:   has a past surgical history that includes Breast biopsy; fine needle aspiration; Breast lumpectomy; Tonsillectomy (Bilateral); Paracentesis; Anus surgery (); Elbow surgery (); and Colonoscopy. Restrictions  Restrictions/Precautions: Fall Risk;Up as Tolerated    Subjective  Subjective: Pt in bed, agreeable to OT/PT, requesting a shower. Does not report pain. Restrictions/Precautions: Fall Risk;Up as Tolerated             Objective     Cognition  Overall Cognitive Status: Exceptions  Arousal/Alertness: Appropriate responses to stimuli  Following Commands: Follows one step commands with repetition; Follows multistep commands with repitition  Attention Span: Attends with cues to redirect  Memory: Decreased recall of precautions  Safety Judgement: Decreased awareness of need for safety;Decreased awareness of need for assistance  Problem Solving: Assistance required to correct errors made;Assistance required to identify errors made  Insights: Decreased awareness of deficits  Initiation: Requires cues for all  Sequencing: Requires cues for all  Cognition Comment: L sided neglect, poor sequencing at times needing max cues for upright posture sitting BSC, WC, EOB, EOM  Orientation  Overall Orientation Status: Within Functional Limits  Orientation Level: Oriented X4   Perception  Overall Perceptual Status: Impaired  Unilateral Attention: Cues to attend left visual field;Cues to attend to left side of body;Cues to maintain midline in sitting  Initiation: Cues to initiate tasks  Motor Planning: Hand over hand to sequence tasks  Perseveration: Perseverates during conversation     ADL  Feeding  Assistance Level: Increased time to complete; Moderate assistance;Verbal cues  Grooming/Oral Hygiene  Assistance Level: Increased time to complete;Maximum assistance  Skilled Clinical Factors: washing face and brushing hair sitting EOB mod-maxA for balance while OT assisted  Upper Extremity Bathing  Assistance Level: Maximum assistance  Skilled Clinical Factors: maxA sitting on BSC  Upper Extremity Dressing  Assistance Level: Maximum assistance  Skilled Clinical Factors: maxA x1 with mod cues for papo teechniques, assist with threading LUE, mod-maxA for upright balance threading RUE, able to manage over head, assist with management around back  Lower Extremity Dressing  Assistance Level: Dependent  Skilled Clinical Factors: donning shoes sitting EOB maxA x1-2, maxA x2 sit <>s tand in steady to manage pants/brief up down  Putting On/Taking Off Footwear  Assistance Level: Dependent  Skilled Clinical Factors: donning shoes  Toileting  Assistance Level: Increased time to complete; Requires x 2 assistance;Maximum assistance  Skilled Clinical Factors: modA x2 sit <> stand to steady with PCA assist  Toilet Transfers  Technique: To the right; To the left;Stand pivot; Sit pivot;Squat pivot  Equipment: Beside commode  Additional Factors: Verbal cues;Cues for hand placement; Increased time to complete; With handrails  Skilled Clinical Factors: modA x2 sit <> stand to steady with PCA assist          Functional Mobility  Device: Wheelchair (lorrie plus)  Activity: To/From bathroom; Retrieve items;Transport items; To/From therapy gym  Assistance Level: Maximum assistance; Moderate assistance; Requires x 2 assistance  Skilled Clinical Factors: modA x2 sit <> stand to steady EOB to BSC to WC, sit <>  // bars multiple trials maxA x2, modA Mita lorrie plus sit <> stand trial x2 from [facility] and then to recliner  Bed Mobility  Overall Assistance Level: Maximum Assistance; Moderate Assistance; Requires x 2 Assistance  Bridging  Assistance Level: Maximum assistance; Requires x 2 assistance  Roll Left  Assistance Level: Maximum assistance; Requires x 2 assistance  Roll Right  Assistance Level: Moderate assistance; Requires x 2 assistance  Sit to Supine  Assistance Level: Maximum assistance; Requires x 2 assistance  Supine to Sit  Assistance Level: Moderate assistance; Requires x 2 assistance  Scooting  Assistance Level: Moderate assistance;Maximum assistance; Requires x 2 assistance  Transfers  Surface: Wheelchair; To chair with arms; Bedside commode;From chair with arms;From bed  Additional Factors: With handrails;Verbal cues; Hand placement cues; Increased time to complete; Mat raised  Device: Lift equipment  Sit to Stand  Assistance Level: Moderate assistance; Requires x 2 assistance  Skilled Clinical Factors: modA x2 sit <> stand to steady EOB to BSC to WC, sit <>  // bars multiple trials maxA x2, modA Mita lorrie plus sit <> stand trial x2 from [facility] and then to recliner  Stand to Sit  Assistance Level: Moderate assistance; Requires x 2 assistance  Skilled Clinical Factors: modA x2 sit <> stand to steady with PCA assist  Bed To/From Chair  Assistance Level: Maximum assistance; Moderate assistance; Requires x 2 assistance  Skilled Clinical Factors: modA x2 sit <> stand to steady EOB to BSC to WC, sit <>  // bars multiple trials maxA x2, modA Mita lorrie plus sit <> stand trial x2 from [facility] and then to recliner  Stand Pivot  Assistance Level: Moderate assistance;Maximum assistance; Requires x 2 assistance  Skilled Clinical Factors: modA x2 sit <> stand to steady EOB to BSC to WC, sit <>  // bars multiple trials maxA x2, modA Mita lorrie plus sit <> stand trial x2 from [facility] and then to [address] [place] [address] Level: Moderate assistance;Maximum assistance; Requires x 2 assistance  Skilled Clinical Factors: modA x2 sit <> stand to steady EOB to BSC to , sit <>  // bars multiple trials maxA x2, modA Mita lorrie plus sit <> stand trial x2 from [facility] and then to recliner   Neuromuscular Education  Neuromuscular education: Yes  NDT Treatment: Upper extremity  Head/Neck Control: OT providing hand over hand for turning head L vs R to scan for objects; continues to demo severe L sided neglect deficits but increased carryover for IDing object on L side during toileting tasks  Trunk Control: increased midline alignment when given verbal and tacitle cues from OT and PT assisting with weight shifting for sit <> Stand to steady and ADL tasks in stance; increased ability to maintain WBing in lorrie plus for prolonged time  Weight Bearing  Weight Bearing Technique: Yes  RUE Weight Bearing: Extended arm standing  LUE Weight Bearing: Extended arm standing  Response To Weight Bearing Technique: WBing through LUE AAROM and hand over hand to steady during sit <> stands     Assessment  Assessment  Assessment: Patient remains needing x2A for all sit <> stand trials and stand pivot vs sit pivot to various surfaces for ADL management. Pt does need prolonged time for sequencing along with hand over hand and tactile cues for scanning L vs R. Pt needing cues for upright midline alignment, use of R arm to push towars L side and consistent cues for redirection/midline alignment during transfers in // bars,  Increased success with sit <> stand and standing tolerance in lorrie plus this date with increased WBing through R vs L LE.  OT applied sling to patients LUE during transfers only to protect shoulder and UE.   Pt with increased ability to maintain midline alignment when holding onto dowel cailin vertically up/down x10 reps in lorrie plus. Continues to need consistent rest breaks,cues for safety/body mechanics, L side attn and maxA 2 for all sit <> stand transfers therefore cotx is continued to be indicated at [facility] stime. Cont to assess pending progress DC plan. Cont OT POC. Activity Tolerance: Patient tolerated treatment well  Discharge Recommendations: Continue to assess pending progress;24 hour supervision or assist;Home with nursing aide;Home with Home health OT;S Level 3;Home with assist PRN  OT Equipment Recommendations  Other: CTA  Safety Devices  Safety Devices in place: Yes  Type of devices: Left in chair;Call light within reach; Chair alarm in place;Nurse notified; Patient at risk for falls; All fall risk precautions in place;Gait belt  Restraints  Initially in place: No    Patient Education  Education  Education Given To: Patient  Education Provided: Role of Therapy;Cognition;Plan of Care;Family Education;Home Exercise Program;Low Vision Education;ADL Function;Mobility Training;Equipment;Precautions;Transfer Training;DME/Home Modifications; Safety; Energy Conservation; Fall Prevention Strategies  Education Provided Comments: NMR, positoning, importance of OOB activities, AAROM vs SROM  Education Method: Demonstration;Verbal  Barriers to Learning: Cognition  Education Outcome: Verbalized understanding;Demonstrated understanding;Continued education needed    Plan  Plan  Times per Week: 5 out of 7 days  Times per Day: Daily  Plan Weeks: 3 weeks  Current Treatment Recommendations: Strengthening;ROM;Balance training;Functional mobility training; Endurance training; Safety education & training;Neuromuscular re-education;Patient/Caregiver education & training;Equipment evaluation, education, & procurement;Self-Care / ADL;Cognitive/Perceptual training    Goals  Patient Goals   Patient goals :  \"Go home\" \"Get stronger\" \"walk better\"  Short Term Goals  Time Frame for Short term goals: 10 days (5/20/22)  Short Term Goal 1: Pt will complete functional transfers with Mita x2 with LRAD- progressing, maxA x2 sit pivot vs stand pivot transfer  Short Term Goal 2: Pt will complete toileting/transfer modA x2 with LRAD and DME PRN-GOAL MET 5/16, modA x2 steady, CTA  Short Term Goal 3: Pt will complete LUE AAROM/AROM exercises to LUE to increase strength/endurance for ADLs/transfers  Short Term Goal 4: Pt will be able to locate 3/5 object in L visual field with min cues during ADLs/activities-progressing, mod-max cues  Additional Goals?: No  Long Term Goals  Time Frame for Long term goals : 21 days (3/31/22)  Long Term Goal 1: Pt will complete functional transfers/mobility SPV with LRAD  Long Term Goal 2: Pt will complete UE dressing/bathing sitting setup with min cues for papo techniques  Long Term Goal 3: Pt will complete toileting/transfers with LRAD and DME PRN SPV  Long Term Goal 4: Pt will complete opening 3/5 containers with compensatory techniques sitting setup/SPV    Therapy Time   Individual Concurrent Group Co-treatment   Time In       1030   Time Out       1130   Minutes       60   Timed Code Treatment Minutes: 400 [facility] Kyree, OTR/L

## 2022-05-17 NOTE — PROGRESS NOTES
Physical Therapy  Facility/Department: [address] [place] [address]  Rehabilitation Physical Therapy Treatment Note    NAME: Slava Tompkins  : 1950 (66 y.o.)  MRN: [id number]  CODE STATUS: Full Code    Date of Service: 22       Restrictions:  Restrictions/Precautions: Fall Risk;Up as Tolerated     SUBJECTIVE  Subjective  Subjective: pt found supine in bed  Pain: denies pain          OBJECTIVE  Cognition  Overall Cognitive Status: Exceptions  Arousal/Alertness: Appropriate responses to stimuli  Following Commands: Follows one step commands with repetition; Follows multistep commands with repitition  Attention Span: Attends with cues to redirect  Memory: Decreased recall of precautions  Safety Judgement: Decreased awareness of need for safety;Decreased awareness of need for assistance  Problem Solving: Assistance required to correct errors made;Assistance required to identify errors made  Insights: Decreased awareness of deficits  Initiation: Requires cues for all  Sequencing: Requires cues for all  Cognition Comment: L sided neglect, poor sequencing at times needing max cues for upright posture sitting BSC, WC, EOB, EOM  Orientation  Overall Orientation Status: Within Functional Limits  Orientation Level: Oriented X4    Functional Mobility     Supine to sit with mod A of 2    Sit EOB x 5 min with max A of 1 with pt pushing self L with RUE despite cues/ visual cues. Sit pivot EOB to w/c with max a of 2  Sit pivot w/c to commode with max a of 2   Max a of 1+ min-mod A of 1 for static standing balance while therapist assisted with clothing management. Pt utilized commode but unable to void   Stand pivot commode to w/c with grab bar and max A of 2    Sit to stand w/c to // bars x 2 reps with max A of 2 and cues for anterior/forward weight shift.     Pt standing for 1.5 min, 2.5 min with 1-0 UE support on // bar (improved midline when RUE is placed on PT shoulder anterior to pt) with max a from PT to stabilize LLE, obtain neutral hip/trunk posture and OT facilitating UE posture/ balance. Pt with poor awareness of midline despite mirror. Pt attempted to complete reaching activity with RUE however significant LOB with max A of 2 to regain balance. Pt demos R shoulder hike, L lateral / posterior lean/ L trunk rotation without physical correction from therapists. Sit to stand w/c to lorrie stedy plus with TD. Pt standing in machine for 2-3 min with PT facilitating LLE stability and OT facilitating RUE reaching with additional R weight shift    TD via lorrie stedy plus to return from w/c to recliner  Pt in recliner with alarm donned and call light/needs within reach at end of session    ASSESSMENT/PROGRESS TOWARDS GOALS  Vital Signs  Pulse: 71  Heart Rate Source: Monitor  BP: (!) 140/64  BP Location: Right upper arm  MAP (Calculated): 89.33  SpO2: 94 %  O2 Device: None (Room air)    Assessment  Assessment: pt seen as co treat with OT for increased safety progressing functional mobility. pt exhinbitng increased diffiuclty obtaining / maintaining midline position, mod-max a to maintain midline sitting on commode/ EOB. max a of 2 for static standing balance. use of lorrie stedy plus for increased stability while completing static standing activities/ weight shifts. continue to progress mobility as tolerated. Activity Tolerance: Patient tolerated treatment well  Discharge Recommendations: Continue to assess pending progress  PT Equipment Recommendations  Equipment Needed: Yes    Goals  Short Term Goals  Time Frame for Short term goals: 11 days 5/20  Short term goal 1: pt will complete bed mobility with mod A. Short term goal 2: pt will complete functional transfer with max A. Short term goal 3: pt will tolerate gait assessment and set goal when appropriate. Short term goal 4: pt will tolerate stair assessment and set goal when appropriate  Short term goal 5: pt will propel w/c x 150 ft with supv.   Long Term Goals  Time Frame for Long term goals : 21 days 5/31  Long term goal 1: pt will complete bed mobility with CGA. Long term goal 2: pt will complete functional transfer with min a and LRAD. Long term goal 3: pt will propel w/c x 150 ft with CGA    PLAN OF CARE/SAFETY  Plan  Plan: 5-7 times per week  Current Treatment Recommendations: Strengthening;ROM;Balance training;Functional mobility training;Transfer training; Endurance training;Gait training; Therapeutic activities; Home exercise program;Wheelchair mobility training;Equipment evaluation, education, & procurement;Cognitive reorientation;Stair training;Positioning; Safety education & training;Patient/Caregiver education & training;Cognitive/Perceptual training;ADL/Self-care training;IADL training;Pain management  Safety Devices  Type of Devices: All fall risk precautions in place;Call light within reach; Patient at risk for falls;Nurse notified; All yaima prominences offloaded;Gait belt;Left in chair;Chair alarm in place      Therapy Time   Individual Concurrent Group Co-treatment   Time In       1030   Time Out       1130   Minutes       60     Timed Code Treatment Minutes: 400 [facility]       Rajiv Acosta PT, 05/17/22 at 3:26 PM

## 2022-05-17 NOTE — PATIENT CARE CONFERENCE
[facility]  Inpatient Rehabilitation  Weekly Team Conference Note    Date: 2022  Patient Name: Machelle Vieyra        MRN: [id number]    : 1950  (66 y.o.)  Gender: female   Referring Practitioner: Kiley Lilly MD  Diagnosis: CVA      Interventions to be utilized toward barriers to discharge, per discipline:  [address] observed barriers to dc: Lower extremity weakness and Long standing deficits  Nursing interventions:Assist with transfers    Family Education: Yes   Fall Risk:  Yes      Physical therapy observed barriers to dc:    Baseline: ind with no AD, at times assist  with mobility    Current level: mod A of 2 bed mobility, mod A of 2 sit pivot transfers, TD standing balance, SBA-mod a sitting balance, min A w/c mobility    Barriers to DC: L neglect, L papo, poor proprioception ,limited assist at home   Needs in order to achieve dc home/next level of care: pt will have to be ind with mobility to return home safely with . At this time pt requires A of 2 for all mobility tasks. Recommend family training prior to d/c for safety. If family unable to provide appropriate levels of assist, rec SNF upon d/c. Physical therapy interventions:   Current Treatment Recommendations: Strengthening,ROM,Balance training,Functional mobility training,Transfer training,Endurance training,Gait training,Therapeutic activities,Home exercise program,Wheelchair mobility training,Equipment evaluation, education, & procurement,Cognitive reorientation,Stair training,Positioning,Safety education & training,Patient/Caregiver education & training,Cognitive/Perceptual training,ADL/Self-care training,IADL training,Pain management      PHYSICAL THERAPY  PT Equipment Recommendations  Equipment Needed: Yes  Other: CTA pending progress with mobility    Assessment: pt seen as co treat with OT for increased safety progressing mobility.  pt benefits from 2 skilled therapists to provide increased cues/ feedback wiht mobility. flat affect but motivated to participate. pt demo's improved static/dyn sitting balance and ability to find/maintain midline posture seated. pt requries max A of 2 for static/dyn standing due to L latearl lean/L LE flaccidity and difficulty obtaining midline (presents with L trunk rotation, R shoulder hike and increased trunk/hip flexion). continue to progress mobility as pt tolerate      Occupational therapy observed barriers to dc:    Baseline: independent with ADLs, IADLs,  was assisting PRN for mobility with no device   Current level: maxA x2 for all transfers and LB dressing   Barriers to DC: L neglect, L papo, poor cognition/proprioception, limited assist from  at home   Needs in order to achieve dc home/next level of care: pt will have to be ind with mobility to return home safely with . At this time pt requires A of 2 for all mobility tasks. Recommend family training prior to d/c for safety. If family unable to provide appropriate levels of assist, rec SNF upon d/c.        Occupational Therapy interventions:  Current Treatment Recommendations: Strengthening,ROM,Balance training,Functional mobility training,Endurance training,Safety education & training,Neuromuscular re-education,Patient/Caregiver education & training,Equipment evaluation, education, & procurement,Self-Care / ADL,Cognitive/Perceptual training      OCCUPATIONAL THERAPY  Assessment: Patient remains needing x2A for all sit <> stand trials and stand pivot vs sit pivot to various surfaces for ADL management. Pt does need prolonged time for sequencing along with hand over hand and tactile cues for scanning L vs R.   Pt needing cues for upright midline alignment, use of R arm to push towars L side and consistent cues for redirection/midline alignment during transfers in // bars,  Increased success with sit <> stand and standing tolerance in lorrie plus this date with increased WBing through R vs L LE.  OT applied sling to patients LUE during transfers only to protect shoulder and UE. Pt with increased ability to maintain midline alignment when holding onto dowel cailin vertically up/down x10 reps in lorrie plus. Continues to need consistent rest breaks,cues for safety/body mechanics, L side attn and maxA 2 for all sit <> stand transfers therefore cotx is continued to be indicated at [facility] stime. Cont to assess pending progress DC plan. Cont OT POC. Speech therapy observed barriers to dc:    Baseline: lives with , severe left neglect, shares finances, cooking, laundry, and grocery shopping, has not driven since March    Current level: mild-mod cognitive linguistic deficit, severe L neglect    Barriers to DC: severity of L neglect    Needs in order to achieve dc home/next level of care: 24 hour assist, improved L neglect and visual attention to L side, carryover of compensatory strategies, assist with meds/finances     Speech Therapy interventions:  Dysphagia: diet tolerance monitoring, safe swallow strategies, education   Speech/Language/Cognition: Compensatory strategy training and carryover, recall/STM, problem solving, reasoning, exec function, thought organization, attention. SPEECH THERAP:  Pt pleasant and cooperative, agreeable to participate. Pt tolerated breakfast meal (soft solids and thin liquids via cup/straw, meds whole with water one pill at a time) without any overt s/s of aspiration. Pt indep implemented use of safe swallow strategies. Pt continues to present with severe L neglect requiring increased cueing for appropriate visual scanning and attention strategies. Pt completed time concept problems with 77% acc indep improving to 100% acc given min cues. Pt completed higher level thought organization/alternating attention task with 61% acc.  Pt continues to present with reduced immediate and functional recall, benefiting from increased repetition of stimuli for improved recall/comprehension. Recommending 24 hour assist and ongoing ST services. NUTRITION  Weight: 150 lb 12.7 oz (68.4 kg) / Body mass index is 27.14 kg/m². Diet Order: ADULT DIET; Dysphagia - Soft and Bite Sized; 3 carb choices (45 gm/meal)  PO Meals Eaten (%): 76 - 100%  Education: Declined       CASE MANAGEMENT  Assessment: 66 yr old female. DX:Acute CVA (cerebrovascular accident). Nephrology following. Therapy recommendations are 24 hour supervision or assist;Home with nursing aide;Home with Home health PT/OT;S Level 3;Home with assist PRN pending progress. [address] following for services. DME:TBD. CM will continue to support for discharge needs. Interdisciplinary Goals:   1.) pt will complete functional transfer with mod A of 1  2.) pt will appropriately attend and scan to L side given min cues for improved L neglect   3.) Pt will complete functional transfers with Mita x2 with LRAD      Discharge Plan   Estimated discharge date: 5/31/2022  Destination: home health  Pass:No  Services at Discharge: [address], Occupational Therapy, Speech Therapy and Nursing  Equipment at Discharge: TBD pending progress.        Team Members Present at Conference:  : Norma Larose    Occupational Therapist: Elias Ruby, OTR/L  Physical Therapist: Kacey Sheehan, PT   Speech Therapist: Daniella Fleming, [facility] SLP   Nurse: Keira Swartz, RN  Dietician: Doug Guzman RD, LD  : Elkin Elise, OTR/L  Psychiatry: N/A    Family members present at conference: No      I led this team conference and I approve the established interdisciplinary plan of care as documented within the medical record of Addison Stubbs.     MD: Janiya Peterson MD  5/18/2022  11:52 AM

## 2022-05-18 LAB — TOTAL CK: 33 U/L (ref 26–192)

## 2022-05-18 PROCEDURE — 6370000000 HC RX 637 (ALT 250 FOR IP): Performed by: STUDENT IN AN ORGANIZED HEALTH CARE EDUCATION/TRAINING PROGRAM

## 2022-05-18 PROCEDURE — 97535 SELF CARE MNGMENT TRAINING: CPT

## 2022-05-18 PROCEDURE — 97530 THERAPEUTIC ACTIVITIES: CPT

## 2022-05-18 PROCEDURE — 6370000000 HC RX 637 (ALT 250 FOR IP): Performed by: INTERNAL MEDICINE

## 2022-05-18 PROCEDURE — 6360000002 HC RX W HCPCS: Performed by: STUDENT IN AN ORGANIZED HEALTH CARE EDUCATION/TRAINING PROGRAM

## 2022-05-18 PROCEDURE — 97129 THER IVNTJ 1ST 15 MIN: CPT

## 2022-05-18 PROCEDURE — 97130 THER IVNTJ EA ADDL 15 MIN: CPT

## 2022-05-18 PROCEDURE — 97112 NEUROMUSCULAR REEDUCATION: CPT

## 2022-05-18 PROCEDURE — 92526 ORAL FUNCTION THERAPY: CPT

## 2022-05-18 PROCEDURE — 1280000000 HC REHAB R&B

## 2022-05-18 PROCEDURE — 36415 COLL VENOUS BLD VENIPUNCTURE: CPT

## 2022-05-18 PROCEDURE — 97110 THERAPEUTIC EXERCISES: CPT

## 2022-05-18 PROCEDURE — 82550 ASSAY OF CK (CPK): CPT

## 2022-05-18 RX ORDER — SENNA AND DOCUSATE SODIUM 50; 8.6 MG/1; MG/1
2 TABLET, FILM COATED ORAL DAILY
Status: DISCONTINUED | OUTPATIENT
Start: 2022-05-18 | End: 2022-06-07 | Stop reason: HOSPADM

## 2022-05-18 RX ADMIN — CARBOXYMETHYLCELLULOSE SODIUM 1 DROP: 10 GEL OPHTHALMIC at 08:59

## 2022-05-18 RX ADMIN — DICLOFENAC SODIUM 4 G: 10 GEL TOPICAL at 16:58

## 2022-05-18 RX ADMIN — METOPROLOL TARTRATE 25 MG: 25 TABLET, FILM COATED ORAL at 21:14

## 2022-05-18 RX ADMIN — HYDROCORTISONE: 1 CREAM TOPICAL at 21:24

## 2022-05-18 RX ADMIN — DICLOFENAC SODIUM 4 G: 10 GEL TOPICAL at 21:24

## 2022-05-18 RX ADMIN — DICLOFENAC SODIUM 4 G: 10 GEL TOPICAL at 09:17

## 2022-05-18 RX ADMIN — LISINOPRIL 40 MG: 20 TABLET ORAL at 08:59

## 2022-05-18 RX ADMIN — LACTULOSE 20 G: 20 SOLUTION ORAL at 09:02

## 2022-05-18 RX ADMIN — Medication 400 MG: at 09:02

## 2022-05-18 RX ADMIN — HYDROCORTISONE: 1 CREAM TOPICAL at 09:14

## 2022-05-18 RX ADMIN — CARBOXYMETHYLCELLULOSE SODIUM 1 DROP: 10 GEL OPHTHALMIC at 21:19

## 2022-05-18 RX ADMIN — FAMOTIDINE 10 MG: 20 TABLET, FILM COATED ORAL at 08:59

## 2022-05-18 RX ADMIN — GUAIFENESIN 600 MG: 600 TABLET, EXTENDED RELEASE ORAL at 21:14

## 2022-05-18 RX ADMIN — METOPROLOL TARTRATE 25 MG: 25 TABLET, FILM COATED ORAL at 09:02

## 2022-05-18 RX ADMIN — TRAZODONE HYDROCHLORIDE 50 MG: 50 TABLET ORAL at 21:14

## 2022-05-18 RX ADMIN — HEPARIN SODIUM 5000 UNITS: 5000 INJECTION INTRAVENOUS; SUBCUTANEOUS at 09:03

## 2022-05-18 RX ADMIN — ASPIRIN 81 MG: 81 TABLET, COATED ORAL at 09:01

## 2022-05-18 RX ADMIN — DOCUSATE SODIUM 50 MG AND SENNOSIDES 8.6 MG 2 TABLET: 8.6; 5 TABLET, FILM COATED ORAL at 17:02

## 2022-05-18 RX ADMIN — NIFEDIPINE 30 MG: 30 TABLET, FILM COATED, EXTENDED RELEASE ORAL at 09:02

## 2022-05-18 RX ADMIN — CLOPIDOGREL BISULFATE 75 MG: 75 TABLET, FILM COATED ORAL at 09:02

## 2022-05-18 RX ADMIN — HEPARIN SODIUM 5000 UNITS: 5000 INJECTION INTRAVENOUS; SUBCUTANEOUS at 23:11

## 2022-05-18 RX ADMIN — LACTULOSE 20 G: 20 SOLUTION ORAL at 21:14

## 2022-05-18 RX ADMIN — LEVOTHYROXINE SODIUM 50 MCG: 0.05 TABLET ORAL at 09:01

## 2022-05-18 RX ADMIN — GUAIFENESIN 600 MG: 600 TABLET, EXTENDED RELEASE ORAL at 08:59

## 2022-05-18 RX ADMIN — HEPARIN SODIUM 5000 UNITS: 5000 INJECTION INTRAVENOUS; SUBCUTANEOUS at 16:57

## 2022-05-18 ASSESSMENT — PAIN SCALES - GENERAL
PAINLEVEL_OUTOF10: 0

## 2022-05-18 NOTE — PROGRESS NOTES
Speech Language Pathology  [facility]: ACUTE REHAB UNIT  SPEECH-LANGUAGE PATHOLOGY      [x] Daily  [] Weekly Care Conference Note  [] Discharge    Patient:Jennifer Cantu      :1950  [facility]:[id number]  Rehab Dx/Hx: Acute CVA (cerebrovascular accident) ([facility] [address] [I63.9]    Precautions: falls and aspirations; severe left neglect  Home situation: Lives with . Indep with med management. Share finances, cooking, laundtry, grocery shopping. Has not driven since CVA in March. ST Dx: [] Aphasia  [x] Dysarthria  [] Apraxia   [x] Oropharyngeal dysphagia [x] Cognitive Impairment  [] Other:   Date of Admit: 5/10/2022  Room #: 5911/7685-77    Current functional status (updated daily):         Pt being seen for : [x] Speech/Language Treatment  [x] Dysphagia Treatment [x] Cognitive Treatment  [] Other:  Communication: []WFL  [] Aphasia  [x] Dysarthria  [] Apraxia  [] Pragmatic Impairment [] Non-verbal  [] Hearing Loss  [] Other:   Cognition: [] WFL  [x] Mild  [] Moderate  [] Severe [] Unable to Assess  [] Other:  Memory: [] WFL  [x] Mild  [] Moderate  [] Severe [] Unable to Assess  [] Other:  Behavior: [x] Alert  [x] Cooperative  [x]  Pleasant  [] Confused  [] Agitated  [] Uncooperative  [] Distractible [] Motivated  [] Self-Limiting [] Anxious  [] Other:  Endurance:  [x] Adequate for participation in SLP sessions  [] Reduced overall  [] Lethargic  [] Other:  Safety: [] No concerns at this time  [x] Reduced insight into deficits  [x]  Reduced safety awareness [] Not following call light procedures  [] Unable to Assess  [] Other:    Current Diet Order:ADULT DIET;  Dysphagia - Soft and Bite Sized; 3 carb choices (45 gm/meal)  Swallowing Precautions: upright for all intake, stay upright for at least 30 mins after intake, small bites/sips, assist feed, oral care 2-3x/day to reduce adverse affects in the event of aspiration, alternate bites/sips, slow rate of intake         Date: 2022      Tx session 1  [id number] Tx session 2  All tx needs met in session 1   Total Timed Code Min 30    Total Treatment Minutes 60    Individual Treatment Minutes 60    Group Treatment Minutes 0    Co-Treat Minutes 0    Variance/Reason:  N/a    Pain Denies     Pain Intervention [] RN notified  [] Repositioned  [] Intervention offered and patient declined  [x] N/A  [] Other: [] RN notified  [] Repositioned  [] Intervention offered and patient declined  [] N/A  [] Other:   Subjective     Pt alert and oriented, cooperative and agreeable to participate in therapy. Pt seen sitting upright in bed. Objective:     Dysphagia Goals  Short-term Goals  Timeframe for Short-term Goals: 18 days (05/28/2022)    Goal 1: The patient will tolerate recommended diet with no clinical s/s of aspiration    Pt observed with parts of breakfast meal:  -oatmeal and scrambled eggs  -adequate mastication, A-P transit, minimal residue in oral cavity post swallow that pt was able to clear with use of liquid wash, no s/s of aspiration    TL via cup and straw:  -timely swallow initiation, no overt s/s of aspiration  -no coughing, wet vocal quality, or throat clearing observed    Regular solid dina cracker trials:  -prolonged mastication, mild residue in oral cavity and pocketing noted in left cheek, occasional anterior bolus loss on L side  -mod cues for use of lingual sweep and liquid wash to clear residue    Recommending pt continue with current diet recommendations, will continue to assess advanced textures. Goal 2: The patient/caregiver will demonstrate understanding of compensatory swallow strategies, for improved swallow safety   SLP and pt discussed and reviewed current diet recs and safe swallow strategies during all meals. Goal 3: The patient will tolerate instrumental assessment when able Goal discontinued 05/17/22.      Goal 4: The pt will complete oral motor exercises to improve labial and lingual strength, ROM, and coordination in 10/10 opportunities given min cues    Did not target. Cognitive-linguistic Goals:   Short-term Goals  Timeframe for Short-term Goals: 18 days (05/28/2022)    Goal 1: Pt will effectively use compensatory visual strategies for improved attention to left side during structured tasks with 80% acc given mod cues. Visual scanning exercise:  -significant improvement compared to yesterday given mod cues    Pt continues to demonstrate increased difficulty locating items on meal tray on her left side, mod cues for appropriately scanning and attending to L side. Goal 2: Pt will complete graded recall tasks using compensatory strategies with 90% acc given min cues   Functional recall/word progression task:  -pt given three words and asked to rearrange them in order that they occur  -ex: Sept, May, Nov= May, Sept, Nov  -80% acc indep improving to 100% acc given min cues       Goal 3: Pt will complete executive function tasks (e.g. meds, time, money, etc) with 90% acc givgen min cues   Time word problems:  -70% acc indep improving to 100% acc given min cues     Goal 4: Pt will complete problem solving and thought organization tasks with 90% acc given min cues   Thought organization indirectly targeted during word progression task, see goal 2 above. Goal 5: Pt will complete verbal and visual reasoning tasks with 90% acc given min cues   Did not directly target. Other areas targeted: N/a    Education:   edu provided re: continuing current diet recs, visual scanning/attention strategies, compensatory memory strategies      Safety Devices: [x] Call light within reach  [] Chair alarm activated  [x] Bed alarm activated  [] Other: [] Call light within reach  [] Chair alarm activated  [] Bed alarm activated  [] Other:    Assessment: Pt pleasant and cooperative, agreeable to participate. Pt observed with breakfast meal tolerating without any overt s/s of aspiration.  Advanced textures of regular solids were assessed today, increased residue and pocketing noted, with mod cues required for lingual sweep and liquid wash. Recommending continuing current diet recs with advanced textures to be trialed with SLP in future tx sessions. Improved L neglect and attention during visual scanning task today compared to yesterday. Pt completed time word problems with 100% acc given min cues, and functional recall/thought organization task with 100% acc given min cues. Pt is making consistent gains towards goals. Pt continues to present with flat affect throughout tx session. Continue goals above. Plan: Continue as per plan of care. Additional Information:     Barriers toward progress: Limited safety awareness, Limited insight into deficits, Decreased proprioception, Upper extremity weakness and Lower extremity weakness  Discharge recommendations:  [] Home independently  [] Home with assistance [x]  24 hour supervision  [] ECF [] Other:  Continued Tx Upon Discharge: ? [x] Yes [] No [] TBD based on progress while on ARU [] Vital Stim indicated [] Other:   Estimated discharge date: 05/31/2022    Interventions used this date:  [] Speech/Language Treatment  [] Instruction in HEP [] Group [x] Dysphagia Treatment [x] Cognitive Treatment   [] Other: Total Time Breakdown / Charges    Time in Time out Total Time / units   Cognitive Tx 0830 0900 30 min/ 2 units    Speech Tx -- -- --   Dysphagia Tx 0800 0830 30 min/ 1 unit        Electronically Signed by     Tabitha Swanson. A CCC-SLP  Speech-Language Pathologist  [id number]

## 2022-05-18 NOTE — PROGRESS NOTES
Occupational Therapy  Facility/Department: [facility]  Rehabilitation Occupational Therapy Daily Treatment Note    Date: 22  Patient Name: Lorelei High       Room: [facility]6/8128-68  MRN: [id number]  Account: [de-identified]   : 1950  (76 y.o.) Gender: female                    Past Medical History:  has a past medical history of Anemia, Arthritis, Asthma, Bowel dysfunction, CML (chronic myelocytic leukemia) ([facility] [address] Hyperlipidemia, Hypertension, Nuclear senile cataract of both eyes, Obesity, Risk of myocardial infarction or stroke 7.5% or greater in next 10 years, Skin cancer of face, Stroke (cerebrum) ([facility] [address] Thyroid disease, and TIA (transient ischemic attack). Past Surgical History:   has a past surgical history that includes Breast biopsy; fine needle aspiration; Breast lumpectomy; Tonsillectomy (Bilateral); Paracentesis; Anus surgery (); Elbow surgery (); and Colonoscopy. Restrictions  Restrictions/Precautions: Fall Risk;Up as Tolerated    Subjective  Subjective: Pt in bed, agreeable to OT and then OT/PT cotx; Pt reports 3/10 pain in L side of neck  Restrictions/Precautions: Fall Risk;Up as Tolerated             Objective     Cognition  Overall Cognitive Status: Exceptions  Arousal/Alertness: Appropriate responses to stimuli  Following Commands: Follows one step commands with repetition; Follows multistep commands with repitition  Attention Span: Attends with cues to redirect  Memory: Decreased recall of precautions  Safety Judgement: Decreased awareness of need for safety;Decreased awareness of need for assistance  Problem Solving: Assistance required to correct errors made;Assistance required to identify errors made  Insights: Decreased awareness of deficits  Initiation: Requires cues for all  Sequencing: Requires cues for all  Cognition Comment: L sided neglect, poor sequencing at times needing max cues for upright posture sitting BSC, WC, EOB, EOM  Orientation  Overall Orientation Status: Within Functional Limits  Orientation Level: Oriented X4   Perception  Overall Perceptual Status: Impaired  Unilateral Attention: Cues to attend left visual field;Cues to attend to left side of body;Cues to maintain midline in sitting  Initiation: Cues to initiate tasks  Motor Planning: Hand over hand to sequence tasks  Perseveration: Perseverates during conversation     ADL  Feeding  Assistance Level: Increased time to complete;Supervision;Set-up; Verbal cues  Grooming/Oral Hygiene  Assistance Level: Increased time to complete  Skilled Clinical Factors: washing face and brushing hair sitting on BSC vs WC  Upper Extremity Bathing  Assistance Level: Increased time to complete; Moderate assistance;Verbal cues; Requires x 2 assistance  Skilled Clinical Factors: modA for UE bathing, Mita-modA with max cues for upright posture  Lower Extremity Bathing  Assistance Level: Increased time to complete; Moderate assistance;Verbal cues; Requires x 2 assistance  Skilled Clinical Factors: modA in sitting for balance progressing to SBA at times, modA for B feet and lower legs, modA weight shifting L vs R for bathing buttocks  Upper Extremity Dressing  Assistance Level: Maximum assistance  Skilled Clinical Factors: maxA x1 with mod cues for papo teechniques, assist with threading LUE, mod-maxA for upright balance threading RUE, able to manage over head, assist with management around back  Lower Extremity Dressing  Assistance Level: Dependent;Verbal cues  Skilled Clinical Factors: modA x2 for sitting balance and assist for all LB threading, abl eto doff R sock; A x2 for standing management  Putting On/Taking Off Footwear  Assistance Level: Maximum assistance  Skilled Clinical Factors: able to doff L sock, assist with R sock  Toileting  Assistance Level: Dependent  Skilled Clinical Factors: maxA x1 sit <>  steady and then x2 in stance [facility] over toilet to [facility]  Toilet Transfers  Technique:  (steady)  Equipment: Beside commode (over toilet)  Additional Factors: Verbal cues;Cues for hand placement; Increased time to complete; With handrails  Assistance Level: Maximum assistance  Skilled Clinical Factors: maxA x1 sit <>  steady  Tub/Shower Transfers  Type: Shower  Transfer From:  (steady)  Transfer To: Tub transfer bench  Additional Factors: Increased time to complete;Verbal cues; Set-up; With handrails  Assistance Level: Maximum assistance; Requires x 2 assistance;Verbal cues  Skilled Clinical Factors: maxA x2 sit <>S tand to steady from [facility] to TTB, maxA x2 sit pivot TTB to WC towards L side          Functional Mobility  Device: Wheelchair  Activity: To/From bathroom; Retrieve items;Transport items; To/From therapy gym  Assistance Level: Maximum assistance; Moderate assistance; Requires x 2 assistance  Skilled Clinical Factors: modA x2 sit <> stand to steady EOB to BSC to WC, sit <>  shwoer with R grab bar and RUE for bathing/dressing maxA x2, modA Mita lorrie plus sit <> stand trial x2 from [facility] and then to [facility]  Bed Mobility  Overall Assistance Level: Maximum Assistance; Moderate Assistance; Requires x 2 Assistance  Additional Factors: Head of bed raised; Increased time to complete;Set-up; Verbal cues  Bridging  Assistance Level: Maximum assistance  Roll Left  Assistance Level: Maximum assistance  Roll Right  Assistance Level: Moderate assistance  Supine to Sit  Assistance Level: Moderate assistance; Requires x 2 assistance  Scooting  Assistance Level: Moderate assistance;Maximum assistance; Requires x 2 assistance  Transfers  Surface: Wheelchair; To chair with arms; Bedside commode;From chair with arms;From bed  Additional Factors: With handrails;Verbal cues; Hand placement cues; Increased time to complete; Mat raised  Device: Lift equipment  Sit to Stand  Assistance Level: Moderate assistance; Requires x 2 assistance  Skilled Clinical Factors: modA x2 sit <> stand to steady EOB to BSC to WC, sit <>  shwoer with R grab bar and RUE for bathing/dressing maxA x2, modA Mita lorrie plus sit <> stand trial x2 from [facility] and then to recliner  Stand to Sit  Assistance Level: Moderate assistance; Requires x 2 assistance  Skilled Clinical Factors: modA x2 sit <> stand to steady EOB to BSC to WC, sit <>  shwoer with R grab bar and RUE for bathing/dressing maxA x2, modA Mita lorrie plus sit <> stand trial x2 from [facility] and then to recliner  Bed To/From Chair  Assistance Level: Maximum assistance; Moderate assistance; Requires x 2 assistance  Skilled Clinical Factors: modA x2 sit <> stand to steady EOB to BSC to WC, sit <>  shwoer with R grab bar and RUE for bathing/dressing maxA x2, modA Mita lorrie plus sit <> stand trial x2 from [facility] and then to recliner  Stand Pivot  Assistance Level: Moderate assistance;Maximum assistance; Requires x 2 assistance  Skilled Clinical Factors: modA x2 sit <> stand to steady EOB to BSC to WC, sit <>  shwoer with R grab bar and RUE for bathing/dressing maxA x2, modA Mita lorrie plus sit <> stand trial x2 from [facility] and then to [address] [place] [address] Level: Moderate assistance;Maximum assistance; Requires x 2 assistance  Skilled Clinical Factors: modA x2 sit <> stand to steady EOB to BSC to WC, sit <>  shwoer with R grab bar and RUE for bathing/dressing maxA x2, modA Mita lorrie plus sit <> stand trial x2 from [facility] and then to recliner   Neuromuscular Education  Neuromuscular education: Yes  NDT Treatment: Upper extremity  Head/Neck Control: OT providing hand over hand for turning head L vs R to scan for objects; continues to demo severe L sided neglect deficits but increased carryover for IDing object on L side during toileting tasks  Trunk Control: increased midline alignment when given verbal and tacitle cues from OT and PT assisting with weight shifting for sit <> Stand to steady and ADL tasks in stance  Weight Bearing  Weight Bearing Technique: Yes  RUE Weight Bearing: Extended arm standing; Extended arm seated  LUE Weight Bearing: Extended arm standing; Extended arm seated  Response To Weight Bearing Technique: WBing through LUE AAROM and hand over hand to steady during sit <> stands and then WBing through LUE on TTB during bathing tasks  OT Exercises  Exercise Treatment: AAROM with RUE interlaced to LUE reaching out of JACINTA to complete elbow flexion/extension with shoulder flexion/extension to locate target out of JACINTA and L vs R. Pt needing rest breaks throughout, PT providing support at shoulders and trunk and OT providing mod-max cues for scanning, upright midline alignment and propioception during task  PROM Exercises: LUE flaccid this date. Educated pt on self PROM with RUE assist for elbow, wrist, and digital flex/ext. Pt able to perform with good return in recliner, left handout on tabletop to go over with spouse when present. Instructed pt to complete 2-3 times during the day. Postural Correction Exercises: Cues for orienting to midline. Forward trunk flexion/extension x5  Motor Control/Coordination: while standing in lorrie plus, reaching across midline, to R vs L and above head with RUE to gather cones and stack on R side to promote trunk rotation, upright posture/midline alginment and balance. Assessment  Assessment  Assessment: Patient continues to progress towards tolerance goals however does need x2 A for all sitting unsupported vs standing during ADLs and tranfsers. Pt needs cues for upright posture and midline alignment but increased ability to complete sit <> Stand with R side grab bar and cues. Pt was able to complete UE dressing/bathing modA-maxA for papo techniques; poor L sided neglect; PT providing support during shower and tranfsers of LLE and posture/midline alignment while OT focusing on safety/sequencing, WBing/NMR, papo techniques during tranfsers and ADLs.   Pt was able to toelrate prolonged standing this date x8 minutes in lorrie plus with mod cues for reaching upwards towards cones 3x10 reps and then knee extension with PT tactile cues 3x5 reps. Pt does need increased cues for shifting R vs L in stance and then shoulder to R vs L for horixonatl midline alignment to prepare for tranfsers/mobility. Pt left in recliner with all needs, educated on importance of OOB activities, repositioning of LUE for support, and use of R hand to bring LUE to objects for ADL management and stroke recovery. COnt OT POC. Activity Tolerance: Patient tolerated treatment well  Discharge Recommendations: Continue to assess pending progress;24 hour supervision or assist;Home with nursing aide;Home with Home health OT;S Level 3;Home with assist PRN  OT Equipment Recommendations  Equipment Needed: No  Other: CTA  Safety Devices  Safety Devices in place: Yes  Type of devices: Left in chair;Call light within reach; Chair alarm in place;Nurse notified; Patient at risk for falls; All fall risk precautions in place;Gait belt  Restraints  Initially in place: No    Patient Education  Education  Education Given To: Patient  Education Provided: Role of Therapy;Cognition;Plan of Care;Family Education;Home Exercise Program;Low Vision Education;ADL Function;Mobility Training;Equipment;Precautions;Transfer Training;DME/Home Modifications; Safety; Energy Conservation; Fall Prevention Strategies  Education Provided Comments: NMR, positoning, importance of OOB activities, AAROM vs SROM  Education Method: Demonstration;Verbal  Barriers to Learning: Cognition  Education Outcome: Verbalized understanding;Demonstrated understanding;Continued education needed    Plan  Plan  Times per Week: 5 out of 7 days  Times per Day: Daily  Plan Weeks: 3 weeks  Current Treatment Recommendations: Strengthening;ROM;Balance training;Functional mobility training; Endurance training; Safety education & training;Neuromuscular re-education;Patient/Caregiver education & training;Equipment evaluation, education, & procurement;Self-Care / ADL;Cognitive/Perceptual training    Goals  Patient Goals   Patient goals :  \"Go home\" \"Get stronger\" \"walk better\"  Short Term Goals  Time Frame for Short term goals: 10 days (5/20/22)  Short Term Goal 1: Pt will complete functional transfers with Mita x2 with LRAD- progressing, maxA x2 sit pivot vs stand pivot transfer  Short Term Goal 2: Pt will complete toileting/transfer modA x2 with LRAD and DME PRN-GOAL MET 5/16, modA x2 steady, CTA  Short Term Goal 3: Pt will complete LUE AAROM/AROM exercises to LUE to increase strength/endurance for ADLs/transfers-GOAL MET 5/18, CTA  Short Term Goal 4: Pt will be able to locate 3/5 object in L visual field with min cues during ADLs/activities-GOAL MET 5/18,CTA  Additional Goals?: No  Long Term Goals  Time Frame for Long term goals : 21 days (3/31/22)  Long Term Goal 1: Pt will complete functional transfers/mobility SPV with LRAD  Long Term Goal 2: Pt will complete UE dressing/bathing sitting setup with min cues for papo techniques  Long Term Goal 3: Pt will complete toileting/transfers with LRAD and DME PRN SPV  Long Term Goal 4: Pt will complete opening 3/5 containers with compensatory techniques sitting setup/SPV    Therapy Time   Individual Concurrent Group Co-treatment   Time In 0930     1000   Time Out 1000     1100   Minutes 30     60   Timed Code Treatment Minutes: [address], OTR/L

## 2022-05-18 NOTE — PROGRESS NOTES
Interval History and plan:      Creatinine is stable at 1.1  Electrolytes are stable  Blood pressure is stable  Total CK 33    Plan:  Systolic normal   diastolic on low side  Will not reduce blood pressure further because of low diastolic                     Assessment :     Acute Kidney Injury  Creatinine 1.1 at the time of consult, as she might have chronic kidney disease  EDYTA likely due to -rhabdomyolysis/poor p.o. intake  Cr on consultation 2.1 when she was in the acute hospital  Baseline Cr-0.8 on 9/21  No recent baseline available-she was admitted to [facility] March 2022 with the stroke and was told that she has high creatinine    UA-5/22-large blood, trace LE  Renal Imaging:-5/22-right-10.7 cm, simple 2.2 cm right renal cyst  No mention of left kidney  Echo: 10/18-EF normal, no mention of diastolic dysfunction    Hypertension   BP: (139-142)/(54-56)  Pulse:  [66-69]   BP goal inpatient [id number] systolic inpatient    Rhabdomyolysis  CPK more than 10,000 on arrival-down to 8.2   Thought to be induced by statin  Has elevated AST and ALT    CML  Diabetes mellitus type 2 new  Carotid artery stenosis-plan for outpatient procedure    [facility] would like to thank Valentina Jasso MD   for opportunity to serve this patient      Please call with questions at-   24 Hrs Answering service [phone] or  7 am- 5 pm via Perfect serve or cell phone  Luberta Kawasaki, MD          CC/reason for consult :     Severe hypertension   HPI :     Dane Wiggins is a 66 y.o. female presented to   the hospital on 5/10/2022 with EDYTA and statin induced rhabdomyolysis to the acute hospital.  She was treated for both. She recently had a stroke and he may paralysis for which she was seen by neurologist.  She was found to have carotid artery stenosis for which she was seen by vascular surgeon. Plan for endarterectomy as an outpatient.     Once her renal function was a stable and rhabdomyolysis improved she was admitted to acute rehab unit for further care    At the rehab. Her blood pressure has gone up significantly because of which we are consulted    ROS:     Seen with-no family    positives in bold   Constitutional:  fever, chills, weakness, weight change, fatigue  Skin:  rash, pruritus, hair loss, bruising, dry skin, petechiae  Head, Face, Neck   headaches, swelling,  cervical adenopathy  Respiratory: shortness of breath, cough, or wheezing  Cardiovascular: chest pain, palpitations, dizzy, edema  Gastrointestinal: nausea, vomiting, diarrhea, constipation,belly pain    Yellow skin, blood in stool  Musculoskeletal:  back pain, muscle weakness, gait problems,       joint pain or swelling. Genitourinary:  dysuria, poor urine flow, flank pain, blood in urine  Neurologic:  vertigo, TIA'S, syncope, seizures, focal weakness  Psychosocial:  insomnia, anxiety, or depression. Additional positive findings:                    All other remaining systems are negative or unable to obtain        PMH/PSH/SH/Family History:     Past Medical History:   Diagnosis Date    Anemia     Arthritis     Asthma     Bowel dysfunction     CML (chronic myelocytic leukemia) ([facility] [address] 01/2006    leukemia    Hyperlipidemia     Hypertension     Nuclear senile cataract of both eyes 11/25/2019    Obesity     Risk of myocardial infarction or stroke 7.5% or greater in next 10 years 9/15/2021    Skin cancer of face     left cheek, squamous    Stroke (cerebrum) ([facility] [address]     Thyroid disease     TIA (transient ischemic attack)     mini ones-from chemo       Past Surgical History:   Procedure Laterality Date    ANUS SURGERY  1998    fissure    BREAST BIOPSY      BREAST LUMPECTOMY      benign    COLONOSCOPY      numerous polyps    ELBOW SURGERY  1960    removal of foreign body ([place])    FINE NEEDLE ASPIRATION      PARACENTESIS      x 2 fluid in lung from Chemo    TONSILLECTOMY Bilateral         reports that she has never smoked.  She has never used smokeless tobacco. She reports that she does not drink alcohol and does not use drugs. family history includes Arrhythmia in her sister; Cancer in her brother, father, mother, and sister.          Medication:     Current Facility-Administered Medications: sennosides-docusate sodium (SENOKOT-S) 8.6-50 MG tablet 2 tablet, 2 tablet, Oral, Daily  lactulose (CHRONULAC) 10 GM/15ML solution 20 g, 20 g, Oral, BID  NIFEdipine (PROCARDIA XL) extended release tablet 30 mg, 30 mg, Oral, Daily  hydrALAZINE (APRESOLINE) tablet 10 mg, 10 mg, Oral, Q2H PRN  guaiFENesin (MUCINEX) extended release tablet 600 mg, 600 mg, Oral, BID  lisinopril (PRINIVIL;ZESTRIL) tablet 40 mg, 40 mg, Oral, Daily  traZODone (DESYREL) tablet 50 mg, 50 mg, Oral, Nightly PRN  metoprolol tartrate (LOPRESSOR) tablet 25 mg, 25 mg, Oral, BID  acetaminophen (TYLENOL) tablet 650 mg, 650 mg, Oral, Q6H PRN **OR** acetaminophen (TYLENOL) suppository 650 mg, 650 mg, Rectal, Q6H PRN  heparin (porcine) injection 5,000 Units, 5,000 Units, SubCUTAneous, TID  ondansetron (ZOFRAN-ODT) disintegrating tablet 4 mg, 4 mg, Oral, Q8H PRN **OR** ondansetron (ZOFRAN) injection 4 mg, 4 mg, IntraVENous, Q6H PRN  aluminum & magnesium hydroxide-simethicone (MAALOX) 200-200-20 MG/5ML suspension 30 mL, 30 mL, Oral, Q6H PRN  bisacodyl (DULCOLAX) suppository 10 mg, 10 mg, Rectal, Daily PRN  aspirin EC tablet 81 mg, 81 mg, Oral, Daily  carboxymethylcellulose PF (THERATEARS) 1 % ophthalmic gel 1 drop, 1 drop, Both Eyes, Q12H  clopidogrel (PLAVIX) tablet 75 mg, 75 mg, Oral, Daily  dextrose 5 % solution, 100 mL/hr, IntraVENous, PRN  dextrose bolus 10% 125 mL, 125 mL, IntraVENous, PRN **OR** dextrose bolus 10% 250 mL, 250 mL, IntraVENous, PRN  diclofenac sodium (VOLTAREN) 1 % gel 4 g, 4 g, Topical, 4x Daily  famotidine (PEPCID) tablet 10 mg, 10 mg, Oral, Daily  glucagon (rDNA) injection 1 mg, 1 mg, IntraMUSCular, PRN  glucose (GLUTOSE) 40 % oral gel 15 g, 15 g, Oral,

## 2022-05-18 NOTE — PROGRESS NOTES
Otis Brand  5/18/2022  [id number]    Chief Complaint: Acute CVA (cerebrovascular accident) [facility])    Subjective:   No acute events overnight. Today Girish is seen in her room. She reports being able to have a bowel movement after suppository yesterday. She denies acute complaints at this time. ROS: denies f/c, n/v, cp, sob    Objective:  Patient Vitals for the past 24 hrs:   BP Temp Temp src Pulse Resp SpO2   05/18/22 0949 (!) 142/56 -- -- 69 -- 95 %   05/18/22 0745 (!) 139/54 98 °F (36.7 °C) Oral 66 16 96 %   05/17/22 2015 (!) 122/57 98.4 °F (36.9 °C) Oral 72 16 95 %   05/17/22 1519 (!) 140/64 -- -- 71 -- 94 %     Gen: No distress, pleasant. Seated up in chair  HEENT: Normocephalic, atraumatic. CV: extremities well perfused  Resp: No respiratory distress. Abd: Soft, nondistended  Ext: No edema. Neuro: Alert, oriented, appropriately interactive. Left hemiparesis. Psych: flat affect    Wt Readings from Last 3 Encounters:   05/10/22 150 lb 12.7 oz (68.4 kg)   05/02/22 143 lb 9.6 oz (65.1 kg)   09/09/21 150 lb 12.8 oz (68.4 kg)       Laboratory data:   Lab Results   Component Value Date    WBC 7.3 05/16/2022    HGB 11.4 (L) 05/16/2022    HCT 34.2 (L) 05/16/2022    MCV 95.3 05/16/2022     05/16/2022       Lab Results   Component Value Date     05/16/2022    K 3.8 05/16/2022     05/16/2022    CO2 24 05/16/2022    BUN 26 05/16/2022    CREATININE 1.1 05/16/2022    GLUCOSE 141 05/16/2022    GLUCOSE 129 07/26/2017    CALCIUM 9.3 05/16/2022        Therapy progress:  PT  Position Activity Restriction  Other position/activity restrictions: .   Objective     Sit to Stand: 2 Person Assistance,Dependent/Total  Stand to sit: Dependent/Total,2 Person Assistance  Bed to Chair: Dependent/Total     OT  PT Equipment Recommendations  Equipment Needed: Yes  Other: CTA pending progress with mobility  Toilet - Technique:  (steady to e-tac chair over toilet)  Equipment Used:  (e-tac chair)  Toilet Transfers Comments: maxA x2 sit <>  steady, progressing at times to [employer] with max verbal/tactile and demo cues  Assessment        SLP                Body mass index is 27.14 kg/m². Assessment and Plan:  Obdulia Danielle is a 66year old female with a past medical history significant for recent CVA with left hemiparesis, CML, HTN, and HLD who presented to [facility] on 5/3/22 with abnormal labs, admitted with rhabdomyolysis and ARF, found to have acute CVA. She was admitted to [facility] on 5/10/22 due to functional deficits below her baseline.     Acute Right CVA  - MRI showing acute infarct in right cerebral hemisphere in a watershed distribution  - CTA showing 90-95% stenosis of right ICA  - Vascular and Neurology in agreement on waiting for CEA, needs follow up in 4 weeks  - asa, plavix, statin  - PT, OT, ST     Dysphagia  - on modified diet  - ST    RLL Pneumonia, resolved  - concern for aspiration in setting of dysphagia  - repeat CXR showing improvement 5/16  - completed course of levaquin   - mucinex  - IS      Rhabdomyolysis  Acute Renal Injury  - Nephrology followed during acute stay  - CPK trending down  - monitor      HTN  - patient with episode of hypotension while in acute care so isosorbid mononitrate and hydralazine held  - metoprolol 25 mg, lisinopril to 40 mg daily   - want to avoid hypotension  - Nephrology following, appreciate assistance.  Added nifedipine    DM2  - Hba1c 6.6  - glucose well controlled without SSI, discontinued  - follow up with PCP     Liver Injury with elevated LFTs  - suspected to be due to statin induced rhabdo  - statin stopped  - LFTs trending down  - monitor intermittently     CML  - follows with Dr. Edin Gan  - on nilitinib  - chemo precautions  - follow up with Heme/Onc OP     Enlarged heterogeneous appearance of the thyroid  - Thyroid ultrasound outpatient  - follow up with PCP     Hemorrhoids  - hydrocortisone cream   - bowel regimen to avoid constipation     Bowels: Schedule stool softener. Follow bowel movements. Enema or suppository if needed.      Bladder: Check PVR x 3. [address] if PVR > 200ml or if any volume is > 500 ml.      Sleep: Trazodone provided prn. PPx  DVT: heparin  GI: not indicated     Follow up appointments: Vascular, PCP    Services: 24 hour at home versus SNF  EDOD: 5/31/22    Interdisciplinary team conference was held today with entire rehab treatment team including PT, OT, SLP, Dietician, RN, and SW. Discussion focused on progress toward rehab goals and discharge planning. Barriers: comorbidities, level of assistance. Total treatment time >35 min with greater than 50% spent in care coordination. Madonna Valdez MD 5/18/2022, 2:00 PM

## 2022-05-18 NOTE — PLAN OF CARE
Problem: Skin/Tissue Integrity  Goal: Absence of new skin breakdown  Description: 1. Monitor for areas of redness and/or skin breakdown  2. Assess vascular access sites hourly  3. Every 4-6 hours minimum:  Change oxygen saturation probe site  4. Every 4-6 hours:  If on nasal continuous positive airway pressure, respiratory therapy assess nares and determine need for appliance change or resting period.   2022 2351 by Maurice Mccauley RN  Outcome: Progressing  2022 1836 by Shoshana Welch RN  Outcome: Progressing

## 2022-05-18 NOTE — PROGRESS NOTES
Physical Therapy  Facility/Department: [facility]  Rehabilitation Physical Therapy Treatment Note    NAME: Aron Casas  : 1950 (66 y.o.)  MRN: [id number]  CODE STATUS: Full Code    Date of Service: 22       Restrictions:  Restrictions/Precautions: Fall Risk;Up as Tolerated     SUBJECTIVE  Subjective  Subjective: pt on toilet with OT present at start of session, agreeable to therapy  Pain: Pt does not indicate pain    OBJECTIVE  Cognition  Overall Cognitive Status: Exceptions  Arousal/Alertness: Appropriate responses to stimuli  Following Commands: Follows one step commands with repetition; Follows multistep commands with repitition  Attention Span: Attends with cues to redirect  Memory: Decreased recall of precautions  Safety Judgement: Decreased awareness of need for safety;Decreased awareness of need for assistance  Problem Solving: Assistance required to correct errors made;Assistance required to identify errors made  Insights: Decreased awareness of deficits  Initiation: Requires cues for all  Sequencing: Requires cues for all  Cognition Comment: L sided neglect, poor sequencing at times needing max cues for upright posture sitting BSC, WC, EOB, EOM  Orientation  Overall Orientation Status: Within Functional Limits  Orientation Level: Oriented X4    Pt completes sit<>stand from toilet with mod A x2 and lorrie stedy    Pt sits on shower chair with CGA at best, up to mod A to correct with intermittent use of grab bar on R side x20 mins. Pt demos intermittent L lateral lean and requires max cues for midline awareness and intermittent assistance for balance and posture.     Pt completes SPT toward L with L knee block and max A x2 shower chair>w/c    Pt completes STS from w/c with lorrie plus dependently    Pt completes 8 mins standing balance in lorrie plus (dependent) with emphasis on posture, midline awareness, and quad control while reaching outside JACINTA with RUE for cones and handing to therapist. Pt requires 2x 5 reps of standing L TKE quad set with cueing. Pt requires cues for scapular contraction, looking up, and glute contraction for upright posture. Pt transferred to recliner at end of session with lorrie plus. ASSESSMENT/PROGRESS TOWARDS GOALS  Assessment  Assessment: Patient seen as co-treat with OT to facilitate safe transfers, standing balance, and ADL. Pt completes SPT toward L with Ax2, STS with lorrie stedy and lorrie plus dependently. Pt tolerates 8 mins standing in lorrie plus while completing reaching task to challenge balance and emphasize posture. Pt requires significant cueing for posture, sequencing, and attending to L side. Pt will benefit from skilled PT in ARU to continue to progress strength, endurance, and mobility. Activity Tolerance: Patient tolerated treatment well  Discharge Recommendations: Continue to assess pending progress  PT Equipment Recommendations  Equipment Needed: Yes  Other: CTA pending progress with mobility    Goals  Patient Goals   Patient goals : \"go on my mission trip in [place]"  Short Term Goals  Time Frame for Short term goals: 11 days 5/20  Short term goal 1: pt will complete bed mobility with mod A. Short term goal 2: pt will complete functional transfer with max A. Short term goal 3: pt will tolerate gait assessment and set goal when appropriate. Short term goal 4: pt will tolerate stair assessment and set goal when appropriate  Short term goal 5: pt will propel w/c x 150 ft with supv. Long Term Goals  Time Frame for Long term goals : 21 days 5/31  Long term goal 1: pt will complete bed mobility with CGA. Long term goal 2: pt will complete functional transfer with min a and LRAD. Long term goal 3: pt will propel w/c x 150 ft with CGA    PLAN OF CARE/SAFETY  Plan  Plan: 5-7 times per week  Current Treatment Recommendations: Strengthening;ROM;Balance training;Functional mobility training;Transfer training; Endurance training;Gait training; Therapeutic activities; Home exercise program;Wheelchair mobility training;Equipment evaluation, education, & procurement;Cognitive reorientation;Stair training;Positioning; Safety education & training;Patient/Caregiver education & training;Cognitive/Perceptual training;ADL/Self-care training;IADL training;Pain management  Safety Devices  Type of Devices: All fall risk precautions in place;Call light within reach; Patient at risk for falls;Nurse notified; All yaima prominences offloaded;Gait belt;Left in chair;Chair alarm in place  Restraints  Restraints Initially in Place: No      Therapy Time   Individual Concurrent Group Co-treatment   Time In       1000   Time Out       1100   Minutes       60     Timed Code Treatment Minutes: [address], PT, 05/18/22 at 11:24 AM

## 2022-05-19 ENCOUNTER — CARE COORDINATION (OUTPATIENT)
Dept: CARE COORDINATION | Age: 72
End: 2022-05-19

## 2022-05-19 LAB
ALBUMIN SERPL-MCNC: 3.5 G/DL (ref 3.4–5)
ALP BLD-CCNC: 94 U/L (ref 40–129)
ALT SERPL-CCNC: 20 U/L (ref 10–40)
ANION GAP SERPL CALCULATED.3IONS-SCNC: 9 MMOL/L (ref 3–16)
AST SERPL-CCNC: 19 U/L (ref 15–37)
BASOPHILS ABSOLUTE: 0 K/UL (ref 0–0.2)
BASOPHILS RELATIVE PERCENT: 0.5 %
BILIRUB SERPL-MCNC: 0.7 MG/DL (ref 0–1)
BILIRUBIN DIRECT: <0.2 MG/DL (ref 0–0.3)
BILIRUBIN, INDIRECT: NORMAL MG/DL (ref 0–1)
BUN BLDV-MCNC: 25 MG/DL (ref 7–20)
CALCIUM SERPL-MCNC: 9.2 MG/DL (ref 8.3–10.6)
CHLORIDE BLD-SCNC: 104 MMOL/L (ref 99–110)
CO2: 26 MMOL/L (ref 21–32)
CREAT SERPL-MCNC: 0.9 MG/DL (ref 0.6–1.2)
EOSINOPHILS ABSOLUTE: 0.2 K/UL (ref 0–0.6)
EOSINOPHILS RELATIVE PERCENT: 3.2 %
GFR AFRICAN AMERICAN: >60
GFR NON-AFRICAN AMERICAN: >60
GLUCOSE BLD-MCNC: 115 MG/DL (ref 70–99)
HCT VFR BLD CALC: 31.7 % (ref 36–48)
HEMOGLOBIN: 10.8 G/DL (ref 12–16)
LYMPHOCYTES ABSOLUTE: 1.5 K/UL (ref 1–5.1)
LYMPHOCYTES RELATIVE PERCENT: 19.7 %
MCH RBC QN AUTO: 32.2 PG (ref 26–34)
MCHC RBC AUTO-ENTMCNC: 34 G/DL (ref 31–36)
MCV RBC AUTO: 94.7 FL (ref 80–100)
MONOCYTES ABSOLUTE: 0.8 K/UL (ref 0–1.3)
MONOCYTES RELATIVE PERCENT: 11.2 %
NEUTROPHILS ABSOLUTE: 4.8 K/UL (ref 1.7–7.7)
NEUTROPHILS RELATIVE PERCENT: 65.4 %
PDW BLD-RTO: 14.3 % (ref 12.4–15.4)
PLATELET # BLD: 265 K/UL (ref 135–450)
PMV BLD AUTO: 9.2 FL (ref 5–10.5)
POTASSIUM REFLEX MAGNESIUM: 3.7 MMOL/L (ref 3.5–5.1)
RBC # BLD: 3.35 M/UL (ref 4–5.2)
SODIUM BLD-SCNC: 139 MMOL/L (ref 136–145)
TOTAL PROTEIN: 6.7 G/DL (ref 6.4–8.2)
WBC # BLD: 7.4 K/UL (ref 4–11)

## 2022-05-19 PROCEDURE — 85025 COMPLETE CBC W/AUTO DIFF WBC: CPT

## 2022-05-19 PROCEDURE — 1280000000 HC REHAB R&B

## 2022-05-19 PROCEDURE — 97116 GAIT TRAINING THERAPY: CPT

## 2022-05-19 PROCEDURE — 92526 ORAL FUNCTION THERAPY: CPT

## 2022-05-19 PROCEDURE — 97530 THERAPEUTIC ACTIVITIES: CPT

## 2022-05-19 PROCEDURE — 6360000002 HC RX W HCPCS: Performed by: STUDENT IN AN ORGANIZED HEALTH CARE EDUCATION/TRAINING PROGRAM

## 2022-05-19 PROCEDURE — 6370000000 HC RX 637 (ALT 250 FOR IP): Performed by: INTERNAL MEDICINE

## 2022-05-19 PROCEDURE — 80076 HEPATIC FUNCTION PANEL: CPT

## 2022-05-19 PROCEDURE — 36415 COLL VENOUS BLD VENIPUNCTURE: CPT

## 2022-05-19 PROCEDURE — 6370000000 HC RX 637 (ALT 250 FOR IP): Performed by: STUDENT IN AN ORGANIZED HEALTH CARE EDUCATION/TRAINING PROGRAM

## 2022-05-19 PROCEDURE — 97112 NEUROMUSCULAR REEDUCATION: CPT

## 2022-05-19 PROCEDURE — 97535 SELF CARE MNGMENT TRAINING: CPT

## 2022-05-19 PROCEDURE — 97130 THER IVNTJ EA ADDL 15 MIN: CPT

## 2022-05-19 PROCEDURE — 80048 BASIC METABOLIC PNL TOTAL CA: CPT

## 2022-05-19 PROCEDURE — 97129 THER IVNTJ 1ST 15 MIN: CPT

## 2022-05-19 RX ADMIN — TRAZODONE HYDROCHLORIDE 50 MG: 50 TABLET ORAL at 21:21

## 2022-05-19 RX ADMIN — METOPROLOL TARTRATE 25 MG: 25 TABLET, FILM COATED ORAL at 08:30

## 2022-05-19 RX ADMIN — CARBOXYMETHYLCELLULOSE SODIUM 1 DROP: 10 GEL OPHTHALMIC at 08:32

## 2022-05-19 RX ADMIN — ACETAMINOPHEN 650 MG: 325 TABLET ORAL at 21:40

## 2022-05-19 RX ADMIN — GUAIFENESIN 600 MG: 600 TABLET, EXTENDED RELEASE ORAL at 08:31

## 2022-05-19 RX ADMIN — HYDROCORTISONE: 1 CREAM TOPICAL at 08:32

## 2022-05-19 RX ADMIN — NIFEDIPINE 30 MG: 30 TABLET, FILM COATED, EXTENDED RELEASE ORAL at 08:31

## 2022-05-19 RX ADMIN — DICLOFENAC SODIUM 4 G: 10 GEL TOPICAL at 08:34

## 2022-05-19 RX ADMIN — CARBOXYMETHYLCELLULOSE SODIUM 1 DROP: 10 GEL OPHTHALMIC at 21:21

## 2022-05-19 RX ADMIN — FAMOTIDINE 10 MG: 20 TABLET, FILM COATED ORAL at 08:30

## 2022-05-19 RX ADMIN — HEPARIN SODIUM 5000 UNITS: 5000 INJECTION INTRAVENOUS; SUBCUTANEOUS at 06:14

## 2022-05-19 RX ADMIN — Medication 400 MG: at 08:31

## 2022-05-19 RX ADMIN — DOCUSATE SODIUM 50 MG AND SENNOSIDES 8.6 MG 2 TABLET: 8.6; 5 TABLET, FILM COATED ORAL at 08:30

## 2022-05-19 RX ADMIN — METOPROLOL TARTRATE 25 MG: 25 TABLET, FILM COATED ORAL at 21:21

## 2022-05-19 RX ADMIN — HEPARIN SODIUM 5000 UNITS: 5000 INJECTION INTRAVENOUS; SUBCUTANEOUS at 21:22

## 2022-05-19 RX ADMIN — HEPARIN SODIUM 5000 UNITS: 5000 INJECTION INTRAVENOUS; SUBCUTANEOUS at 13:28

## 2022-05-19 RX ADMIN — ASPIRIN 81 MG: 81 TABLET, COATED ORAL at 08:31

## 2022-05-19 RX ADMIN — LISINOPRIL 40 MG: 20 TABLET ORAL at 08:31

## 2022-05-19 RX ADMIN — HYDROCORTISONE: 1 CREAM TOPICAL at 21:22

## 2022-05-19 RX ADMIN — LACTULOSE 20 G: 20 SOLUTION ORAL at 21:21

## 2022-05-19 RX ADMIN — LEVOTHYROXINE SODIUM 50 MCG: 0.05 TABLET ORAL at 08:30

## 2022-05-19 RX ADMIN — CLOPIDOGREL BISULFATE 75 MG: 75 TABLET, FILM COATED ORAL at 08:31

## 2022-05-19 RX ADMIN — DICLOFENAC SODIUM 4 G: 10 GEL TOPICAL at 13:28

## 2022-05-19 RX ADMIN — LACTULOSE 20 G: 20 SOLUTION ORAL at 08:29

## 2022-05-19 RX ADMIN — GUAIFENESIN 600 MG: 600 TABLET, EXTENDED RELEASE ORAL at 21:21

## 2022-05-19 RX ADMIN — DICLOFENAC SODIUM 4 G: 10 GEL TOPICAL at 16:57

## 2022-05-19 RX ADMIN — DICLOFENAC SODIUM 4 G: 10 GEL TOPICAL at 21:21

## 2022-05-19 ASSESSMENT — PAIN DESCRIPTION - ORIENTATION: ORIENTATION: RIGHT

## 2022-05-19 ASSESSMENT — PAIN DESCRIPTION - DESCRIPTORS: DESCRIPTORS: ACHING;DISCOMFORT

## 2022-05-19 ASSESSMENT — PAIN SCALES - GENERAL
PAINLEVEL_OUTOF10: 0
PAINLEVEL_OUTOF10: 0

## 2022-05-19 ASSESSMENT — PAIN DESCRIPTION - LOCATION: LOCATION: SHOULDER

## 2022-05-19 NOTE — PROGRESS NOTES
Physical Therapy  Facility/Department: [facility]  Rehabilitation Physical Therapy Treatment Note    NAME: Mark Blair  : 1950 (66 y.o.)  MRN: [id number]  CODE STATUS: Full Code    Date of Service: 22       Restrictions:  Restrictions/Precautions: Fall Risk;Up as Tolerated     SUBJECTIVE  Subjective  Subjective: found in bed  Pain: denies pain throughout       OBJECTIVE  Cognition  Overall Cognitive Status: Exceptions  Arousal/Alertness: Appropriate responses to stimuli  Following Commands: Follows one step commands with repetition; Follows multistep commands with repitition  Attention Span: Attends with cues to redirect  Memory: Decreased recall of precautions  Safety Judgement: Decreased awareness of need for safety;Decreased awareness of need for assistance  Problem Solving: Assistance required to correct errors made;Assistance required to identify errors made  Insights: Decreased awareness of deficits  Initiation: Requires cues for all  Sequencing: Requires cues for all  Cognition Comment: L sided neglect, poor sequencing at times needing max cues for upright posture sitting BSC, WC, EOB, EOM  Orientation  Overall Orientation Status: Within Functional Limits  Orientation Level: Oriented X4    Functional Mobility     Supine to sit with mod A of 2 and max VC for sequencing (I.e. bridge self to EOB on the R, pt attempting to roll self to L without cues). Sitting EOB x 3-4 min with mod-max a and 1-0 UE support demonstrating L lateral /posterior lean    Sit to stand EOB to HHA with max A of 2, max A of PT while OT donned pants  Stand pivot EOB to w/c with max A of 2    wc mobility x 150 ft with SBA and near constant cues for L sided awareness /obstacle awareness. Sit to stand w/c to STEDY plus with TD   Pt standing with TD in STEDY plus while completing activity: reaching across midline with RUE for cone to place to target out of JACINTA on R side with weight shift.  PT facilitating LLE stability and weight shifting, OT facilitating trunk posture (pt with increased L trunk rotation) x approx 4 min total.     Attempted gait training in STEDY plus however pt unable to progress either LE, pushing back into sling    Sit to stand w/c to // bar with mod A of 2 via pulling  Gait x 7 ft with // bars and TD with close w/c follow. PT facilitating LLE stabiliy/mobility during stance/swing phase, OT facilitating weight shift and LUE WBing. Both therapist providing postural cues/assist to facilitate neutral posture (pt with L lateral lean/L trunk rotation/posterior lean)    Pt in w/c at end of session with alarm donned and call light/needs within reach. ASSESSMENT/PROGRESS TOWARDS GOALS  Vital Signs  Pulse: 62  Heart Rate Source: Monitor  BP: (!) 154/67  BP Location: Right upper arm  MAP (Calculated): 96  SpO2: 96 %  O2 Device: None (Room air)    Assessment  Assessment: pt seen as co treat to facilitate safe transfers/standing balance and gait. Mod-max a to sit EOB with and without UE support. Max a of 2 for stand pivot transfers. ind with w/c mobility limited by L neglect, requires near constant cues. TD for standing and gait training. Poor proprioception and awareness of midline. L lateral /posterior lean with little correction when provided with visual cues. At this time anticipate pt will require SNF Upon d/c due to current mobility deficits. Activity Tolerance: Patient tolerated treatment well  Discharge Recommendations: Continue to assess pending progress  PT Equipment Recommendations  Other: CTA pending progress with mobility    Goals  Patient Goals   Patient goals : \"go on my mission trip in [place]"  Short Term Goals  Time Frame for Short term goals: 11 days 5/20  Short term goal 1: pt will complete bed mobility with mod A. Short term goal 2: pt will complete functional transfer with max A. Short term goal 3: pt will tolerate gait assessment and set goal when appropriate.   Short term goal 4: pt will tolerate stair assessment and set goal when appropriate  Short term goal 5: pt will propel w/c x 150 ft with supv. Long Term Goals  Time Frame for Long term goals : 21 days 5/31  Long term goal 1: pt will complete bed mobility with CGA. Long term goal 2: pt will complete functional transfer with min a and LRAD. Long term goal 3: pt will propel w/c x 150 ft with CGA    PLAN OF CARE/SAFETY  Plan  Plan: 5-7 times per week  Current Treatment Recommendations: Strengthening;ROM;Balance training;Functional mobility training;Transfer training; Endurance training;Gait training; Therapeutic activities; Home exercise program;Wheelchair mobility training;Equipment evaluation, education, & procurement;Cognitive reorientation;Stair training;Positioning; Safety education & training;Patient/Caregiver education & training;Cognitive/Perceptual training;ADL/Self-care training;IADL training;Pain management  Safety Devices  Type of Devices: All fall risk precautions in place;Call light within reach; Patient at risk for falls;Nurse notified; All yaima prominences offloaded;Gait belt;Left in chair;Chair alarm in place      Therapy Time   Individual Concurrent Group Co-treatment   Time In       0900   Time Out       1000   Minutes       60     Timed Code Treatment Minutes: 61 Minutes       Diya Anglin PT, 05/19/22 at 10:30 AM

## 2022-05-19 NOTE — PROGRESS NOTES
Speech Language Pathology  [facility]: ACUTE REHAB UNIT  SPEECH-LANGUAGE PATHOLOGY      [x] Daily  [] Weekly Care Conference Note  [] Discharge    Patient:Jennifer Farrell      :1950  YFP:[id number]  Rehab Dx/Hx: Acute CVA (cerebrovascular accident) ([facility] [address] [I63.9]    Precautions: falls and aspirations; severe left neglect  Home situation: Lives with . Indep with med management. Share finances, cooking, laundtry, grocery shopping. Has not driven since CVA in March. ST Dx: [] Aphasia  [x] Dysarthria  [] Apraxia   [x] Oropharyngeal dysphagia [x] Cognitive Impairment  [] Other:   Date of Admit: 5/10/2022  Room #: 1936/2723-81    Current functional status (updated daily):         Pt being seen for : [x] Speech/Language Treatment  [x] Dysphagia Treatment [x] Cognitive Treatment  [] Other:  Communication: []WFL  [] Aphasia  [x] Dysarthria  [] Apraxia  [] Pragmatic Impairment [] Non-verbal  [] Hearing Loss  [] Other:   Cognition: [] WFL  [x] Mild  [] Moderate  [] Severe [] Unable to Assess  [] Other:  Memory: [] WFL  [x] Mild  [] Moderate  [] Severe [] Unable to Assess  [] Other:  Behavior: [x] Alert  [x] Cooperative  [x]  Pleasant  [] Confused  [] Agitated  [] Uncooperative  [] Distractible [] Motivated  [] Self-Limiting [] Anxious  [] Other:  Endurance:  [x] Adequate for participation in SLP sessions  [] Reduced overall  [] Lethargic  [] Other:  Safety: [] No concerns at this time  [x] Reduced insight into deficits  [x]  Reduced safety awareness [] Not following call light procedures  [] Unable to Assess  [] Other:    Current Diet Order:ADULT DIET;  Dysphagia - Soft and Bite Sized; 3 carb choices (45 gm/meal)  Swallowing Precautions: upright for all intake, stay upright for at least 30 mins after intake, small bites/sips, assist feed, oral care 2-3x/day to reduce adverse affects in the event of aspiration, alternate bites/sips, slow rate of intake         Date: 2022      Tx session 1  [id number]  Reema Mathis MS CC-SLP Tx session 2  [id number]  Deyanira Alvarez MA CCC SLP        Total Timed Code Min 30 15   Total Treatment Minutes 30 30   Individual Treatment Minutes 30 30   Group Treatment Minutes 0 0   Co-Treat Minutes 0 0   Variance/Reason:  N/a N/A   Pain No c/o pain Denies    Pain Intervention [] RN notified  [] Repositioned  [] Intervention offered and patient declined  [x] N/A  [] Other: [] RN notified  [] Repositioned  [] Intervention offered and patient declined  [x] N/A  [] Other:   Subjective     Pt cooperative throughout session, seen upright in wheelchair at bedside. Pt alert and oriented, cooperative and agreeable to participate in therapy. Pt seen sitting upright in bedside chair. Objective:     Dysphagia Goals  Short-term Goals  Timeframe for Short-term Goals: 18 days (05/28/2022)    Goal 1: The patient will tolerate recommended diet with no clinical s/s of aspiration    Did not directly target; pt denied dysphagia Pt observed with TL via cup and straw tolerating without any overt s/s of aspiration. No coughing, wet vocal quality or throat clearing observed. Crackers:  -adequate mastication and A-P transit, minimal residue and pocketing noted however pt indep cleared with use of lingual and finger sweep, no overt s/s of aspiration    Diet upgraded today to IDDSI Level 7 regular solids, continue thin liquids via cup/straw, meds whole with PO. RN notified and diet order changed in Epic. Goal 2: The patient/caregiver will demonstrate understanding of compensatory swallow strategies, for improved swallow safety   Did not directly target; SLP encouraged pt to continue to check for possible pocketing on L side; she endorsed that this is less frequent during meals and that she is using strategies of lingual sweep as needed to clear.    SLP and pt discussed and reviewed upgraded diet recs to regular solids, ongoing use of safe swallow compensatory strategies during all meals. Pt verbalize and demonstrated understanding, specifically for removing oral cavity residue from L cheek. Goal discontinued 05/17/22. Goal discontinued 05/17/22. Goal 4: The pt will complete oral motor exercises to improve labial and lingual strength, ROM, and coordination in 10/10 opportunities given min cues    Did not target. Did not target. Cognitive-linguistic Goals:   Short-term Goals  Timeframe for Short-term Goals: 18 days (05/28/2022)    Goal 1: Pt will effectively use compensatory visual strategies for improved attention to left side during structured tasks with 80% acc given mod cues. ID words (f4-f5), find target words that follow instruction: words were written throughout paper (*emphasizing L side): 80% average accuracy, mod cues to scan L    4-step sequencing picture cards (pictures spread throughout table, encouraging visual scanning): 100% average accuracy with increased time to find all pictures, *particularly on L.  *Of note, pt required increased cues to ID target items on individual picture cards. Visual scanning task:  -pt given a sheet without visual cue provided and asked to [place] one specific item and make an x through another specific item  -80% acc indep improving to 100% acc given mod cues    Goal 2: Pt will complete graded recall tasks using compensatory strategies with 90% acc given min cues   Did not target   Did not target. Goal 3: Pt will complete executive function tasks (e.g. meds, time, money, etc) with 90% acc givgen min cues   Did not target Did not target. Goal 4: Pt will complete problem solving and thought organization tasks with 90% acc given min cues   Follow-up questions regarding sequencing cards (see goal 1 above): 90% average accuracy, min cues. Did not target. Goal 5: Pt will complete verbal and visual reasoning tasks with 90% acc given min cues   Did not target. Did not target.     Other areas targeted: N/a N/A   Education:   edu provided re: continued importance of ongoing scanning to L side during structured tasks and in environment. Edu provided re: safe swallow strategies, diet upgrade recs, visual scanning strategies     Safety Devices: [x] Call light within reach  [x] Chair alarm activated  [] Bed alarm activated  [] Other: [x] Call light within reach  [x] Chair alarm activated  [] Bed alarm activated  [] Other:    Assessment: Session 1: The pt was pleasant and cooperative throughout. She continues to benefit from consistent cues to scan to L side during structured tasks, as well as, increased time. Dysphagia goals not directly targeted this date, although importance of checking for possible L-sided pocketing during meals briefly reviewed, use strategies as needed (I.e. lingual sweep). Session 2: Pt pleasant and cooperative, agreeable to participate. Observed with regular solid trials today presenting with improved tolerance and reduced pocketing, therefore diet upgraded to IDDSI Level 7 regular solids. Continue thin liquids via cup/straw and meds whole with PO. SLP will continue to monitor diet tolerance over the next 1-2 tx sessions. Pt completed visual scanning task (without use of visual cue provided) only verbal cues provided to attend to left, with 80% acc indep improving to 100% acc given mod cues. Pt is making consistent gains towards goals within POC. Continue goals above. Plan: Continue as per plan of care.       Additional Information:     Barriers toward progress: Limited safety awareness, Limited insight into deficits, Decreased proprioception, Upper extremity weakness and Lower extremity weakness  Discharge recommendations:  [] Home independently  [] Home with assistance [x]  24 hour supervision  [] ECF [] Other:  Continued Tx Upon Discharge: ? [x] Yes [] No [] TBD based on progress while on ARU [] Vital Stim indicated [] Other:   Estimated discharge date: 05/31/2022    Interventions used this date:  []

## 2022-05-19 NOTE — PROGRESS NOTES
Alfie Wild  5/19/2022  [id number]    Chief Complaint: Acute CVA (cerebrovascular accident) [facility])    Subjective:   No acute events overnight. Patient seen this evening sitting up in bed. She reports left side weakness stable. Denies new concerns. Labs reviewed. ROS: denies f/c, n/v, cp, sob    Objective:  Patient Vitals for the past 24 hrs:   BP Temp Temp src Pulse Resp SpO2   05/19/22 1029 (!) 154/67 -- -- 62 -- 96 %   05/19/22 0800 130/64 98.1 °F (36.7 °C) Oral 55 17 95 %   05/18/22 2100 121/60 98.5 °F (36.9 °C) Oral 68 18 95 %     Gen: No distress, pleasant. Seated up in chair  HEENT: Normocephalic, atraumatic. CV: extremities well perfused  Resp: No respiratory distress. Abd: Soft, nondistended  Ext: No edema. Neuro: Alert, oriented, appropriately interactive. Left hemiparesis. Psych: flat affect    Wt Readings from Last 3 Encounters:   05/10/22 150 lb 12.7 oz (68.4 kg)   05/02/22 143 lb 9.6 oz (65.1 kg)   09/09/21 150 lb 12.8 oz (68.4 kg)       Laboratory data:   Lab Results   Component Value Date    WBC 7.4 05/19/2022    HGB 10.8 (L) 05/19/2022    HCT 31.7 (L) 05/19/2022    MCV 94.7 05/19/2022     05/19/2022       Lab Results   Component Value Date     05/19/2022    K 3.7 05/19/2022     05/19/2022    CO2 26 05/19/2022    BUN 25 05/19/2022    CREATININE 0.9 05/19/2022    GLUCOSE 115 05/19/2022    GLUCOSE 129 07/26/2017    CALCIUM 9.2 05/19/2022        Therapy progress:  PT  Position Activity Restriction  Other position/activity restrictions: .   Objective     Sit to Stand: 2 Person Assistance,Dependent/Total  Stand to sit: Dependent/Total,2 Person Assistance  Bed to Chair: Dependent/Total     OT  PT Equipment Recommendations  Equipment Needed: Yes  Other: CTA pending progress with mobility  Toilet - Technique:  (steady to e-tac chair over toilet)  Equipment Used:  (e-tac chair)  Toilet Transfers Comments: maxA x2 sit <>  steady, progressing at times to [address] [facility] chucky max verbal/tactile and demo cues  Assessment        SLP                Body mass index is 27.14 kg/m². Assessment and Plan:  Malou Canales is a 66year old female with a past medical history significant for recent CVA with left hemiparesis, CML, HTN, and HLD who presented to [facility] on 5/3/22 with abnormal labs, admitted with rhabdomyolysis and ARF, found to have acute CVA. She was admitted to [facility] on 5/10/22 due to functional deficits below her baseline.     Acute Right CVA  - MRI showing acute infarct in right cerebral hemisphere in a watershed distribution  - CTA showing 90-95% stenosis of right ICA  - Vascular and Neurology in agreement on waiting for CEA, needs follow up in 4 weeks  - asa, plavix, statin  - PT, OT, ST     Dysphagia  - on modified diet  - ST    RLL Pneumonia, resolved  - concern for aspiration in setting of dysphagia  - repeat CXR showing improvement 5/16  - completed course of levaquin   - mucinex  - IS      Rhabdomyolysis  Acute Renal Injury  - Nephrology followed during acute stay  - CPK trending down  - monitor      HTN  - patient with episode of hypotension while in acute care so isosorbid mononitrate and hydralazine held  - metoprolol 25 mg, lisinopril to 40 mg daily   - want to avoid hypotension  - Nephrology following, appreciate assistance. Added nifedipine    DM2  - Hba1c 6.6  - glucose well controlled without SSI, discontinued  - follow up with PCP     Liver Injury with elevated LFTs  - suspected to be due to statin induced rhabdo  - statin stopped  - LFTs trending down  - monitor intermittently     CML  - follows with Dr. Chet Fair  - on nilitinib  - chemo precautions  - follow up with Heme/Onc OP     Enlarged heterogeneous appearance of the thyroid  - Thyroid ultrasound outpatient  - follow up with PCP     Hemorrhoids  - hydrocortisone cream   - bowel regimen to avoid constipation     Bowels: Schedule stool softener. Follow bowel movements. Enema or suppository if needed.    Bladder: Check PVR x 3. [address] if PVR > 200ml or if any volume is > 500 ml.      Sleep: Trazodone provided prn. PPx  DVT: heparin  GI: not indicated     Follow up appointments: Vascular, PCP    Services: 24 hour at home versus SNF  EDOD: 5/31/22    Tatiana Crystal.  Cr Lu MD 5/19/2022, 6:36 PM

## 2022-05-19 NOTE — CARE COORDINATION
CM spoke with patient at chair side about discharge date and therapy recommendations. Patient wants me to wait until  arrives. CM acknowledged. 46 CM spoke with patients  in room with discharge date and plans. Osito Perez (spouse) stated that he knows he needs help with patient at home and wants 24/7 help. CM discussed that [facility] following for services. Patient ok for CM to reach out to the sons. CM will discuss situation with patients sons.   Mamadou Butt, RN

## 2022-05-19 NOTE — CARE COORDINATION
Patient currently admitted to the hospital. Chad Torres will continue to monitor and follow up after DC.

## 2022-05-19 NOTE — PLAN OF CARE
Problem: Discharge Planning  Goal: Discharge to home or other facility with appropriate resources  Outcome: Progressing     Problem: Safety - Adult  Goal: Free from fall injury  Outcome: Progressing     Problem: Skin/Tissue Integrity  Goal: Absence of new skin breakdown  Description: 1. Monitor for areas of redness and/or skin breakdown  2. Assess vascular access sites hourly  3. Every 4-6 hours minimum:  Change oxygen saturation probe site  4. Every 4-6 hours:  If on nasal continuous positive airway pressure, respiratory therapy assess nares and determine need for appliance change or resting period.   Outcome: Progressing     Problem: ABCDS Injury Assessment  Goal: Absence of physical injury  Outcome: Progressing  Flowsheets (Taken 5/18/2022 1517 by Ann Land RN)  Absence of Physical Injury: Implement safety measures based on patient assessment     Problem: Pain  Goal: Verbalizes/displays adequate comfort level or baseline comfort level  Outcome: Progressing

## 2022-05-19 NOTE — PROGRESS NOTES
Occupational Therapy  Facility/Department: [facility]  Rehabilitation Occupational Therapy Daily Treatment Note    Date: 22  Patient Name: Machelle Vieyra       Room: [facility]/0229-76  MRN: [id number]  Account: [de-identified]   : 1950  (76 y.o.) Gender: female                    Past Medical History:  has a past medical history of Anemia, Arthritis, Asthma, Bowel dysfunction, CML (chronic myelocytic leukemia) ([facility] [address] Hyperlipidemia, Hypertension, Nuclear senile cataract of both eyes, Obesity, Risk of myocardial infarction or stroke 7.5% or greater in next 10 years, Skin cancer of face, Stroke (cerebrum) ([facility] [address] Thyroid disease, and TIA (transient ischemic attack). Past Surgical History:   has a past surgical history that includes Breast biopsy; fine needle aspiration; Breast lumpectomy; Tonsillectomy (Bilateral); Paracentesis; Anus surgery (); Elbow surgery (); and Colonoscopy. Restrictions  Restrictions/Precautions: Fall Risk;Up as Tolerated    Subjective  Subjective: Pt in bed, agreeable to OT//PT cotx; Pt reports 3/10 pain in L side of neck  Restrictions/Precautions: Fall Risk;Up as Tolerated             Objective     Cognition  Overall Cognitive Status: Exceptions  Arousal/Alertness: Appropriate responses to stimuli  Following Commands: Follows one step commands with repetition; Follows multistep commands with repitition  Attention Span: Attends with cues to redirect  Memory: Decreased recall of precautions  Safety Judgement: Decreased awareness of need for safety;Decreased awareness of need for assistance  Problem Solving: Assistance required to correct errors made;Assistance required to identify errors made  Insights: Decreased awareness of deficits  Initiation: Requires cues for all  Sequencing: Requires cues for all  Cognition Comment: L sided neglect, poor sequencing at times needing max cues for upright posture sitting BSC, WC, EOB, EOM  Orientation  Overall Orientation Status: Within Functional Limits  Orientation Level: Oriented X4   Perception  Overall Perceptual Status: Impaired  Unilateral Attention: Cues to attend left visual field;Cues to attend to left side of body;Cues to maintain midline in sitting  Initiation: Cues to initiate tasks  Motor Planning: Hand over hand to sequence tasks  Perseveration: Perseverates during conversation     ADL  Feeding  Assistance Level: Increased time to complete;Supervision;Set-up; Verbal cues  Skilled Clinical Factors: WC  Grooming/Oral Hygiene  Assistance Level: Increased time to complete  Skilled Clinical Factors: washing face and brushing hair sitting  Lower Extremity Dressing  Assistance Level: Dependent;Verbal cues  Skilled Clinical Factors: modA x2 for sitting balance and assist for all LB threading, abl eto doff R sock; A x2 for standing management  Putting On/Taking Off Footwear  Assistance Level: Maximum assistance  Skilled Clinical Factors: able to doff L sock, assist with R sock          Functional Mobility  Device: Wheelchair  Activity: To/From bathroom; Retrieve items;Transport items; To/From therapy gym  Assistance Level: Maximum assistance; Moderate assistance; Requires x 2 assistance  Skilled Clinical Factors: EOB to WC modA x2 to R side; sit <> stand  for dressing maxA x2, modA Mita-modA x1-2 lorrie plus sit <> stand trial x2 from [facility] and then sit <> stand trials in // bars for mobility and weight shifting. Bed Mobility  Overall Assistance Level: Maximum Assistance; Moderate Assistance; Requires x 2 Assistance  Additional Factors: Head of bed raised; Increased time to complete;Set-up; Verbal cues  Bridging  Assistance Level: Maximum assistance  Roll Right  Assistance Level: Maximum assistance  Supine to Sit  Assistance Level: Moderate assistance; Requires x 2 assistance  Scooting  Assistance Level: Moderate assistance; Requires x 2 assistance  Transfers  Surface: Wheelchair; To chair with arms; Bedside commode;From chair with arms;From bed  Additional Factors: With handrails;Verbal cues; Hand placement cues; Increased time to complete; Mat raised  Device: Lift equipment  Sit to Stand  Assistance Level: Moderate assistance; Requires x 2 assistance  Skilled Clinical Factors: EOB to WC modA x2 to R side; sit <> stand  for dressing maxA x2, modA Mita-modA x1-2 lorrie plus sit <> stand trial x2 from [facility] and then sit <> stand trials in // bars for mobility and weight shifting. Stand to Sit  Assistance Level: Moderate assistance; Requires x 2 assistance  Skilled Clinical Factors: EOB to WC modA x2 to R side; sit <> stand  for dressing maxA x2, modA Mita-modA x1-2 lorrie plus sit <> stand trial x2 from [facility] and then sit <> stand trials in // bars for mobility and weight shifting. Bed To/From Chair  Technique: Sit pivot  Assistance Level: Maximum assistance; Moderate assistance; Requires x 2 assistance  Skilled Clinical Factors: EOB to WC modA x2 to R side; sit <> stand  for dressing maxA x2, modA Mita-modA x1-2 lorrie plus sit <> stand trial x2 from [facility] and then sit <> stand trials in // bars for mobility and weight shifting. Stand Pivot  Assistance Level: Moderate assistance;Maximum assistance; Requires x 2 assistance  Skilled Clinical Factors: EOB to WC modA x2 to R side; sit <> stand  for dressing maxA x2, modA Mita-modA x1-2 lorrie plus sit <> stand trial x2 from [facility] and then sit <> stand trials in // bars for mobility and weight shifting. Sit Pivot  Assistance Level: Moderate assistance;Maximum assistance; Requires x 2 assistance  Skilled Clinical Factors: EOB to WC modA x2 to R side; sit <> stand  for dressing maxA x2, modA Mita-modA x1-2 lorrie plus sit <> stand trial x2 from [facility] and then sit <> stand trials in // bars for mobility and weight shifting.    Neuromuscular Education  Neuromuscular education: Yes  NDT Treatment: Upper extremity  Head/Neck Control: Pt does demo increased sitting balance with less cues but does demo R sided lean at times when pulling towards R side; poor carryover of sequencing for midline alignment despite max tactile cues and demo. OT providing hand over hand for turning head L vs R to scan for objects; continues to demo severe L sided neglect deficits but increased carryover for IDing object on L side during toileting tasks  Trunk Control: increased midline alignment when given verbal and tacitle cues from OT and PT assisting with weight shifting for sit <> Stand to steady plus and ADL tasks in stance; OT providing R side hip rotation once standing in // bars, WBing through LUE during mobility trials, and max cues for upright posture  Weight Bearing  Weight Bearing Technique: Yes  RUE Weight Bearing: Extended arm standing; Extended arm seated  LUE Weight Bearing: Extended arm standing; Extended arm seated  Response To Weight Bearing Technique: WBing through LUE AAROM and hand over hand to steady during sit <> stands and then WBing through LUE during WC mobility     Assessment  Assessment  Assessment: Patient continues to progress towards tolerance goals however does need x2 A for all sitting unsupported vs standing during ADLs and tranfsers. Pt increased balance/coordination with sit <> stand using lorrie plus vs tactile facilitation at hips/trunk in // bars; pt needing mod-max cues and maxA x2 for weight shfiting L vs R, trunk alignment and midline managemnet/placement during sit <> stand trials; pt was able to take 5 steps R vs L with PT providing depA for moving LLE, OT providing max cues and assistance for weight shifting, upright posture, and max cues for midline alignment during mobiltiy trials; attempted mobility trials in lorrie plus-- pt with increased ability to WB through BUE./LE in lorrie plus however unable to progress BLE safely with PT support in lift equipment with foot/knee plates taken off therefore completion in // bars with WC follow.  Continues to need mod-max cues for upright posture and midline alignment but increased ability to complete sit <> Stand with lorrie plus vs PT facilitation during sit <>  // bars. Pt needs max cues for visual scanning L vs R with RUE in lorrie plus to gather cones on L side to promote weight shifting. Pt does need prolonged rest breaks and motivated. Due to patients consistent deficits and slow progress towards goals, DC recs are SNF unless patient can have 24 hour PHYSICAL assistance which her elderly  cannot provide at this time. Cont OT POC. Second session: Pt in [facility], agreeable and pleasant, requesting to wash face and brush hair/teeth. Pt was able to complete brushing teeth with setup using RUE and increased time. Pt was able to comb ~50% of head with increased time using RUE, OT assisting with rest of task. Pt was able to wash face with setup as well. Pt completed SROM exercises using RUE interlocked with LUE x10 chest press, elbow flexion/extension, shoulder flexion to ~70*. Pt was then fitted for resting hand splint to LUE to increase comfort/support and decrease risk of tone. Went over splint wearing schedule with patient, looking for red areas, etc.  Sign placed above patients bed for staff. Pt was then repositioned in [facility] (requests to stay in [facility]) and then pillow placed under lap tray on L side. Pt encouraged to put 2 pillows under LUE at night for support. Cont OT POC. Activity Tolerance: Patient tolerated treatment well  Discharge Recommendations: Continue to assess pending progress;24 hour supervision or assist;Home with nursing aide;Home with Home health OT;S Level 3;Subacute/Skilled Nursing Facility  OT Equipment Recommendations  Other: CTA  Safety Devices  Safety Devices in place: Yes  Type of devices: Left in chair;Call light within reach; Chair alarm in place;Nurse notified; Patient at risk for falls; All fall risk precautions in place;Gait belt  Restraints  Initially in place: No    Patient Education  Education  Education Given To: Patient  Education Provided: Role of Therapy;Cognition;Plan of Care;Family Education;Home Exercise Program;Low Vision Education;ADL Function;Mobility Training;Equipment;Precautions;Transfer Training;DME/Home Modifications; Safety; Energy Conservation; Fall Prevention Strategies  Education Provided Comments: NMR, positoning, importance of OOB activities, AAROM vs SROM  Education Method: Demonstration;Verbal  Barriers to Learning: Cognition  Education Outcome: Verbalized understanding;Demonstrated understanding;Continued education needed    Plan  Plan  Times per Week: 5 out of 7 days  Times per Day: Daily  Plan Weeks: 3 weeks  Current Treatment Recommendations: Strengthening;ROM;Balance training;Functional mobility training; Endurance training; Safety education & training;Neuromuscular re-education;Patient/Caregiver education & training;Equipment evaluation, education, & procurement;Self-Care / ADL;Cognitive/Perceptual training    Goals  Patient Goals   Patient goals :  \"Go home\" \"Get stronger\" \"walk better\"  Short Term Goals  Time Frame for Short term goals: 10 days (5/20/22)  Short Term Goal 1: Pt will complete functional transfers with Mita x2 with LRAD- progressing, maxA x2 sit pivot vs stand pivot transfer  Short Term Goal 2: Pt will complete toileting/transfer modA x2 with LRAD and DME PRN-GOAL MET 5/16, modA x2 steady, CTA  Short Term Goal 3: Pt will complete LUE AAROM/AROM exercises to LUE to increase strength/endurance for ADLs/transfers-GOAL MET 5/18, CTA  Short Term Goal 4: Pt will be able to locate 3/5 object in L visual field with min cues during ADLs/activities-GOAL MET 5/18,CTA  Additional Goals?: No  Long Term Goals  Time Frame for Long term goals : 21 days (3/31/22)  Long Term Goal 1: Pt will complete functional transfers/mobility SPV with LRAD  Long Term Goal 2: Pt will complete UE dressing/bathing sitting setup with min cues for papo techniques  Long Term Goal 3: Pt will complete toileting/transfers with LRAD and DME PRN SPV  Long Term Goal 4: Pt will complete opening 3/5 containers with compensatory techniques sitting setup/SPV    Therapy Time   Individual Concurrent Group Co-treatment   Time In       0900   Time Out       1000   Minutes       60   Timed Code Treatment Minutes: 60 Minutes     Second Session Therapy Time:   Individual Concurrent Group Co-treatment   Time In 1400        Time Out 1430        Minutes 30          Timed Code Treatment Minutes:  30 Minutes    Total Treatment Minutes:  [address], OTR/L

## 2022-05-19 NOTE — PROGRESS NOTES
Interval History and plan:      Creatinine is stable-improved to 0.7  Lites are is stable  Blood pressure is stable at 130/60 this morning     Plan:  Renal Function and blood pressure is stable  No changes today                          Assessment :     Acute Kidney Injury  Creatinine 1.1 at the time of consult, as she might have chronic kidney disease  EDYTA likely due to -rhabdomyolysis/poor p.o. intake  Cr on consultation 2.1 when she was in the acute hospital  Baseline Cr-0.8 on 9/21  No recent baseline available-she was admitted to [facility] March 2022 with the stroke and was told that she has high creatinine    UA-5/22-large blood, trace LE  Renal Imaging:-5/22-right-10.7 cm, simple 2.2 cm right renal cyst  No mention of left kidney  Echo: 10/18-EF normal, no mention of diastolic dysfunction    Hypertension   BP: (130)/(64)  Pulse:  [55]   BP goal inpatient [phone] systolic inpatient    Rhabdomyolysis  CPK more than 10,000 on arrival-down to 8.2   Thought to be induced by statin  Has elevated AST and ALT    CML  Diabetes mellitus type 2 new  Carotid artery stenosis-plan for outpatient procedure    [facility] would like to thank Niki Edouard MD   for opportunity to serve this patient      Please call with questions at-   24 Hrs Answering service [phone] or  7 am- 5 pm via Perfect serve or cell phone  Malika Schwartz MD          CC/reason for consult :     Severe hypertension   HPI :     Tatianna Bourgeois is a 66 y.o. female presented to   the hospital on 5/10/2022 with EDYTA and statin induced rhabdomyolysis to the acute hospital.  She was treated for both. She recently had a stroke and he may paralysis for which she was seen by neurologist.  She was found to have carotid artery stenosis for which she was seen by vascular surgeon. Plan for endarterectomy as an outpatient.     Once her renal function was a stable and rhabdomyolysis improved she was admitted to acute rehab unit for further care    At the rehab. Her blood pressure has gone up significantly because of which we are consulted    ROS:     Seen with-no family    positives in bold   Constitutional:  fever, chills, weakness, weight change, fatigue  Skin:  rash, pruritus, hair loss, bruising, dry skin, petechiae  Head, Face, Neck   headaches, swelling,  cervical adenopathy  Respiratory: shortness of breath, cough, or wheezing  Cardiovascular: chest pain, palpitations, dizzy, edema  Gastrointestinal: nausea, vomiting, diarrhea, constipation,belly pain    Yellow skin, blood in stool  Musculoskeletal:  back pain, muscle weakness, gait problems,       joint pain or swelling. Genitourinary:  dysuria, poor urine flow, flank pain, blood in urine  Neurologic:  vertigo, TIA'S, syncope, seizures, focal weakness  Psychosocial:  insomnia, anxiety, or depression. Additional positive findings:                    All other remaining systems are negative or unable to obtain        PMH/PSH/SH/Family History:     Past Medical History:   Diagnosis Date    Anemia     Arthritis     Asthma     Bowel dysfunction     CML (chronic myelocytic leukemia) ([facility] [address] 01/2006    leukemia    Hyperlipidemia     Hypertension     Nuclear senile cataract of both eyes 11/25/2019    Obesity     Risk of myocardial infarction or stroke 7.5% or greater in next 10 years 9/15/2021    Skin cancer of face     left cheek, squamous    Stroke (cerebrum) ([address]     Thyroid disease     TIA (transient ischemic attack)     mini ones-from chemo       Past Surgical History:   Procedure Laterality Date    ANUS SURGERY  1998    fissure    BREAST BIOPSY      BREAST LUMPECTOMY      benign    COLONOSCOPY      numerous polyps    ELBOW SURGERY  1960    [place]oval of foreign body [place]    FINE NEEDLE ASPIRATION      PARACENTESIS      x 2 fluid in lung from Chemo    TONSILLECTOMY Bilateral         reports that she has never smoked.  She has never used smokeless tobacco. She reports that she does not drink alcohol and does not use drugs. family history includes Arrhythmia in her sister; Cancer in her brother, father, mother, and sister.          Medication:     Current Facility-Administered Medications: sennosides-docusate sodium (SENOKOT-S) 8.6-50 MG tablet 2 tablet, 2 tablet, Oral, Daily  lactulose (CHRONULAC) 10 GM/15ML solution 20 g, 20 g, Oral, BID  NIFEdipine (PROCARDIA XL) extended release tablet 30 mg, 30 mg, Oral, Daily  hydrALAZINE (APRESOLINE) tablet 10 mg, 10 mg, Oral, Q2H PRN  guaiFENesin (MUCINEX) extended release tablet 600 mg, 600 mg, Oral, BID  lisinopril (PRINIVIL;ZESTRIL) tablet 40 mg, 40 mg, Oral, Daily  traZODone (DESYREL) tablet 50 mg, 50 mg, Oral, Nightly PRN  metoprolol tartrate (LOPRESSOR) tablet 25 mg, 25 mg, Oral, BID  acetaminophen (TYLENOL) tablet 650 mg, 650 mg, Oral, Q6H PRN **OR** acetaminophen (TYLENOL) suppository 650 mg, 650 mg, Rectal, Q6H PRN  heparin (porcine) injection 5,000 Units, 5,000 Units, SubCUTAneous, TID  ondansetron (ZOFRAN-ODT) disintegrating tablet 4 mg, 4 mg, Oral, Q8H PRN **OR** ondansetron (ZOFRAN) injection 4 mg, 4 mg, IntraVENous, Q6H PRN  aluminum & magnesium hydroxide-simethicone (MAALOX) 200-200-20 MG/5ML suspension 30 mL, 30 mL, Oral, Q6H PRN  bisacodyl (DULCOLAX) suppository 10 mg, 10 mg, Rectal, Daily PRN  aspirin EC tablet 81 mg, 81 mg, Oral, Daily  carboxymethylcellulose PF (THERATEARS) 1 % ophthalmic gel 1 drop, 1 drop, Both Eyes, Q12H  clopidogrel (PLAVIX) tablet 75 mg, 75 mg, Oral, Daily  dextrose 5 % solution, 100 mL/hr, IntraVENous, PRN  dextrose bolus 10% 125 mL, 125 mL, IntraVENous, PRN **OR** dextrose bolus 10% 250 mL, 250 mL, IntraVENous, PRN  diclofenac sodium (VOLTAREN) 1 % gel 4 g, 4 g, Topical, 4x Daily  famotidine (PEPCID) tablet 10 mg, 10 mg, Oral, Daily  glucagon (rDNA) injection 1 mg, 1 mg, IntraMUSCular, PRN  glucose (GLUTOSE) 40 % oral gel 15 g, 15 g, Oral, PRN  hydrocortisone 1 % cream, , Topical, BID  levothyroxine (SYNTHROID) tablet 50 mcg, 50 mcg, Oral, Daily  magnesium oxide (MAG-OX) tablet 400 mg, 400 mg, Oral, Daily  nilotinib (TASIGNA) capsule 200 mg, 200 mg, Oral, Q12H  witch hazel-glycerin (TUCKS) pad, , Topical, PRN       Vitals :     Vitals:    05/19/22 0800   BP: 130/64   Pulse: 55   Resp: 17   Temp: 98.1 °F (36.7 °C)   SpO2: 95%       I & O :       Intake/Output Summary (Last 24 hours) at 5/19/2022 9621  Last data filed at 5/19/2022 4494  Gross per 24 hour   Intake 470 ml   Output --   Net 470 ml        Physical Examination :     General appearance: Anxious- no, distressed- no, in good spirits-  Yes  HEENT: Lips- normal, teeth- ok , oral mucosa- moist  Neck : Mass- no, appears symmetrical, JVD- not visible  Respiratory: Respiratory effort-  normal, wheeze- no, crackles -   Cardiovascular:  Ausculation- No M/R/G, Edema none  Abdomen: visible mass- no, distention- no, scar- no, tenderness- no                            hepatosplenomegaly-  no  Musculoskeletal:  clubbing no,cyanosis- no , digital ischemia- no                           muscle strength- grossly normal , tone - grossly normal  Skin: rashes- no , ulcers- no, induration- no, tightening - no  Psychiatric:  Judgement and insight- normal           AAO X 3  Additional finding:   Left sided weakness       LABS:     Recent Labs     05/16/22  0939 05/19/22  0649   WBC 7.3 7.4   HGB 11.4* 10.8*   HCT 34.2* 31.7*    265     Recent Labs     05/16/22  0939 05/19/22  0649    139   K 3.8 3.7    104   CO2 24 26   BUN 26* 25*   CREATININE 1.1 0.9   GLUCOSE 141* 115*

## 2022-05-19 NOTE — PROGRESS NOTES
Comprehensive Nutrition Assessment    Type and Reason for Visit:  Reassess    Nutrition Recommendations/Plan:   1. Continue carb control diet  2. Encourage po intakes  3. Monitor po intakes, nutrition adequacy, weights, pertinent labs, BMs     Malnutrition Assessment:  Malnutrition Status:  No malnutrition (05/11/22 1422)      Nutrition Assessment:    Follow up: Pt stable from a nutrition standpoint AEB pt report of continued good appetite. Pt currently on a carb controlled diet. PO intakes % per EMR. Pt denied having any nutrition questions or needs. Will monitor. Nutrition Related Findings:    BM x2 on 5/18 Wound Type: None       Current Nutrition Intake & Therapies:    Average Meal Intake: 51-75%,%  Average Supplements Intake: None Ordered  ADULT DIET; Regular; 3 carb choices (45 gm/meal)    Anthropometric Measures:  Height: 5' 2.5\" (158.8 cm)  Ideal Body Weight (IBW): 113 lbs (51 kg)       Current Body Weight: 150 lb 12.7 oz (68.4 kg), 132.7 % IBW. Weight Source: Not Specified  Current BMI (kg/m2): 27.1                          BMI Categories: Overweight (BMI 25.0-29. 9)    Nutrition Diagnosis:   No nutrition diagnosis at this time     Nutrition Interventions:   Food and/or Nutrient Delivery: Continue Current Diet  Nutrition Education/Counseling: Education declined  Coordination of Nutrition Care: Continue to monitor while inpatient  Plan of Care discussed with: Patient    Goals:  Previous Goal Met: Progressing toward Goal(s)  Goals: PO intake 50% or greater,prior to discharge       Nutrition Monitoring and Evaluation:   Behavioral-Environmental Outcomes: None Identified  Food/Nutrient Intake Outcomes: Food and Nutrient Intake  Physical Signs/Symptoms Outcomes: Weight,Biochemical Data    Discharge Planning:    Continue current diet     Maris Alex RD, LD  Contact: [phone]

## 2022-05-20 PROCEDURE — 1280000000 HC REHAB R&B

## 2022-05-20 PROCEDURE — 6370000000 HC RX 637 (ALT 250 FOR IP): Performed by: INTERNAL MEDICINE

## 2022-05-20 PROCEDURE — 92526 ORAL FUNCTION THERAPY: CPT

## 2022-05-20 PROCEDURE — 97530 THERAPEUTIC ACTIVITIES: CPT

## 2022-05-20 PROCEDURE — 97116 GAIT TRAINING THERAPY: CPT

## 2022-05-20 PROCEDURE — 97129 THER IVNTJ 1ST 15 MIN: CPT

## 2022-05-20 PROCEDURE — 6360000002 HC RX W HCPCS: Performed by: STUDENT IN AN ORGANIZED HEALTH CARE EDUCATION/TRAINING PROGRAM

## 2022-05-20 PROCEDURE — 6370000000 HC RX 637 (ALT 250 FOR IP): Performed by: STUDENT IN AN ORGANIZED HEALTH CARE EDUCATION/TRAINING PROGRAM

## 2022-05-20 PROCEDURE — 97535 SELF CARE MNGMENT TRAINING: CPT

## 2022-05-20 PROCEDURE — 97130 THER IVNTJ EA ADDL 15 MIN: CPT

## 2022-05-20 RX ORDER — GUAIFENESIN 600 MG/1
600 TABLET, EXTENDED RELEASE ORAL 2 TIMES DAILY PRN
Status: DISCONTINUED | OUTPATIENT
Start: 2022-05-20 | End: 2022-06-07 | Stop reason: HOSPADM

## 2022-05-20 RX ADMIN — ASPIRIN 81 MG: 81 TABLET, COATED ORAL at 09:15

## 2022-05-20 RX ADMIN — LACTULOSE 20 G: 20 SOLUTION ORAL at 21:28

## 2022-05-20 RX ADMIN — HEPARIN SODIUM 5000 UNITS: 5000 INJECTION INTRAVENOUS; SUBCUTANEOUS at 06:05

## 2022-05-20 RX ADMIN — METOPROLOL TARTRATE 25 MG: 25 TABLET, FILM COATED ORAL at 21:28

## 2022-05-20 RX ADMIN — DICLOFENAC SODIUM 4 G: 10 GEL TOPICAL at 17:31

## 2022-05-20 RX ADMIN — DICLOFENAC SODIUM 4 G: 10 GEL TOPICAL at 21:46

## 2022-05-20 RX ADMIN — LISINOPRIL 40 MG: 20 TABLET ORAL at 09:15

## 2022-05-20 RX ADMIN — FAMOTIDINE 10 MG: 20 TABLET, FILM COATED ORAL at 09:16

## 2022-05-20 RX ADMIN — ACETAMINOPHEN 650 MG: 325 TABLET ORAL at 18:29

## 2022-05-20 RX ADMIN — DICLOFENAC SODIUM 4 G: 10 GEL TOPICAL at 12:35

## 2022-05-20 RX ADMIN — CARBOXYMETHYLCELLULOSE SODIUM 1 DROP: 10 GEL OPHTHALMIC at 09:17

## 2022-05-20 RX ADMIN — WITCH HAZEL 40 EACH: 500 SOLUTION RECTAL; TOPICAL at 21:46

## 2022-05-20 RX ADMIN — HYDROCORTISONE: 1 CREAM TOPICAL at 09:20

## 2022-05-20 RX ADMIN — METOPROLOL TARTRATE 25 MG: 25 TABLET, FILM COATED ORAL at 09:16

## 2022-05-20 RX ADMIN — CARBOXYMETHYLCELLULOSE SODIUM 1 DROP: 10 GEL OPHTHALMIC at 21:38

## 2022-05-20 RX ADMIN — CLOPIDOGREL BISULFATE 75 MG: 75 TABLET, FILM COATED ORAL at 09:15

## 2022-05-20 RX ADMIN — HEPARIN SODIUM 5000 UNITS: 5000 INJECTION INTRAVENOUS; SUBCUTANEOUS at 21:35

## 2022-05-20 RX ADMIN — TRAZODONE HYDROCHLORIDE 50 MG: 50 TABLET ORAL at 21:28

## 2022-05-20 RX ADMIN — LEVOTHYROXINE SODIUM 50 MCG: 0.05 TABLET ORAL at 09:16

## 2022-05-20 RX ADMIN — Medication 400 MG: at 09:17

## 2022-05-20 RX ADMIN — DICLOFENAC SODIUM 4 G: 10 GEL TOPICAL at 09:19

## 2022-05-20 RX ADMIN — HEPARIN SODIUM 5000 UNITS: 5000 INJECTION INTRAVENOUS; SUBCUTANEOUS at 14:30

## 2022-05-20 RX ADMIN — GUAIFENESIN 600 MG: 600 TABLET, EXTENDED RELEASE ORAL at 09:16

## 2022-05-20 RX ADMIN — LACTULOSE 20 G: 20 SOLUTION ORAL at 09:17

## 2022-05-20 RX ADMIN — NIFEDIPINE 30 MG: 30 TABLET, FILM COATED, EXTENDED RELEASE ORAL at 09:16

## 2022-05-20 ASSESSMENT — PAIN SCALES - GENERAL
PAINLEVEL_OUTOF10: 0
PAINLEVEL_OUTOF10: 0
PAINLEVEL_OUTOF10: 4
PAINLEVEL_OUTOF10: 0

## 2022-05-20 ASSESSMENT — PAIN DESCRIPTION - ORIENTATION: ORIENTATION: LEFT

## 2022-05-20 ASSESSMENT — PAIN DESCRIPTION - DESCRIPTORS: DESCRIPTORS: ACHING;THROBBING

## 2022-05-20 ASSESSMENT — PAIN DESCRIPTION - LOCATION: LOCATION: FOOT;LEG

## 2022-05-20 NOTE — PROGRESS NOTES
Occupational Therapy  Facility/Department: [facility]  Rehabilitation Occupational Therapy Daily Treatment Note    Date: 22  Patient Name: James Muro       Room: [facility]/9713-93  MRN: [id number]  Account: [de-identified]   : 1950  (76 y.o.) Gender: female                    Past Medical History:  has a past medical history of Anemia, Arthritis, Asthma, Bowel dysfunction, CML (chronic myelocytic leukemia) ([facility] [address] Hyperlipidemia, Hypertension, Nuclear senile cataract of both eyes, Obesity, Risk of myocardial infarction or stroke 7.5% or greater in next 10 years, Skin cancer of face, Stroke (cerebrum) ([facility] [address] Thyroid disease, and TIA (transient ischemic attack). Past Surgical History:   has a past surgical history that includes Breast biopsy; fine needle aspiration; Breast lumpectomy; Tonsillectomy (Bilateral); Paracentesis; Anus surgery (); Elbow surgery (); and Colonoscopy. Restrictions  Restrictions/Precautions: Fall Risk;Up as Tolerated  Other position/activity restrictions: chemo precautions, L inattention, LUE flaccid, resting hand splint to be worn at night. Subjective  Subjective: Pt supine in bed upon therapy arrival. Pt motivated to participate in therapy in order to regain functional independence. Pt reports that her and her family plan to remodel the bathroom to replace tub/shower with roll in shower vs WIS  Restrictions/Precautions: Fall Risk;Up as Tolerated             Objective     Cognition  Overall Cognitive Status: Exceptions  Arousal/Alertness: Appropriate responses to stimuli  Following Commands: Follows one step commands with repetition; Follows multistep commands with repitition  Attention Span: Attends with cues to redirect  Memory: Decreased recall of precautions  Safety Judgement: Decreased awareness of need for safety;Decreased awareness of need for assistance  Initiation: Requires cues for some  Sequencing: Requires cues for some  Cognition Comment: L inattention  Orientation  Overall Orientation Status: Within Functional Limits  Orientation Level: Oriented X4         ADL  Feeding  Assistance Level: Increased time to complete;Set-up; Verbal cues;Stand by assist  Skilled Clinical Factors: assistance to cut food, open packages, apply condiments. Education provided on use of rocker knife and cup for soup  Upper Extremity Bathing  Assistance Level: Increased time to complete; Moderate assistance;Verbal cues  Skilled Clinical Factors: Mod A sponge bathing in supported sitting in w/c. Pt requiring full assistance to wash RUE and partial assistance to wash LUE. Pt requiring min cues for thoroughness on L side of body. Upper Extremity Dressing  Assistance Level: Maximum assistance  Skilled Clinical Factors: Moderate cues for papo techniques. Pt completes UB dressing in supported sitting in w/c. Pt requiring partial assistance to thread BUE and doff/don clothing over abdomen. Pt able to manage clothing over head with cues and extra time  Lower Extremity Dressing  Assistance Level: Dependent;Verbal cues  Skilled Clinical Factors: 2 person assistance with use of Larwence Job Steady to don pants. Pt requiring Mod A for sitting balance EOB while therapist provides assistance to thread BLE. Pt stands with 1 person assistance and Leila Montgomery while other therapist dons clothing over hips. Putting On/Taking Off Footwear  Assistance Level: Maximum assistance  Skilled Clinical Factors: able to doff L sock, assist with R sock          Functional Mobility  Assistance Level: Requires x 2 assistance; Moderate assistance  Skilled Clinical Factors: Mod A x2 people ambulation in parallel bars 3 feet x2, 6 feet x1. PT provides assistance with L knee stability, BLE advancement. OT provides assitance with weightshifting, trunk positioning, cues for use of mirror for visual feedback. Bed Mobility  Overall Assistance Level: Requires x 2 Assistance; Moderate Assistance  Additional Factors: Head of bed raised; Increased time to complete;Set-up; Verbal cues  Supine to Sit  Assistance Level: Moderate assistance; Requires x 2 assistance  Transfers  Surface: From bed; Wheelchair  Additional Factors: Increased time to complete;Hand placement cues; Verbal cues  Device: Lift equipment  Sit to Stand  Assistance Level: Moderate assistance; Requires x 2 assistance  Skilled Clinical Factors: Max+Mod A to/from EOB using Zack Clifford. Mod A x2 to/from w/c using parallel bars x4 reps  Stand to Sit  Assistance Level: Moderate assistance; Requires x 2 assistance   OT Exercises  Exercise Treatment: AAROM using RUE as active assistance for LUE 1x15 reps. Exercises completed sitting in w/c. OT provides moderate cues for body positioning and movement  Static Sitting Balance Exercises: Pt tolerated sitting at EOM for ~10 mins. Initially pt requiring mod A, progresssing to Min A. Assessment  Assessment  Assessment: Pt tolerated OT sessions well. Pt requiring Mod Ax2 for simple transfers using Zack Clifford and in parallel bars this date. Pt ambulating 3-6 feet this date in parallel bars with Mod A x2 requiring assistance for weighshifting and advancement of LLE. Pt progressing with standing tolerance in parallel bars. OT provided skin check for pt's resting hand splint. Redness noted at base of L thumb with padding applied to maintain skin integrity. Pt using papo techniques for UB bathing/dressing with Mod-Max A. Pt continues to require cues to fully attend to L side and for midline sitting/standing as pt leans to left. Recommend continued OT services to increase safety and independence with ADLs and functional transfers. Due to pt's consistent deficits and slow progress toward goals, discharge recommendations at SNF unless pt can have STRICT 24/7 [address] which her elderly  cannot provide at this time.   speaking with case management and family to help assist with seeking out 24/7 assistance at home as pt and family prefer pt to discharge home. Activity Tolerance: Patient tolerated treatment well  Discharge Recommendations: Continue to assess pending progress;24 hour supervision or assist;Home with nursing aide;Home with Home health OT;S Level 3;Subacute/Skilled [address] in place: Yes  Type of devices: Left in chair;Call light within reach; Chair alarm in place;Nurse notified; Patient at risk for falls; All fall risk precautions in place;Gait belt    Patient Education  Education  Education Given To: Patient  Education Provided: Role of Therapy;Plan of Care;Safety;Equipment;ADL Function; Fall Prevention Strategies;Transfer Training  Education Provided Comments: bed mobility, sitting balance, safety and hand placement with transfers, hemistrategies for UB bathing/dressing, skin checks for resting hand splint, AAROM LUE exercises, weightshifting during ambulation/standing, standing balance  Education Method: Demonstration;Verbal  Barriers to Learning: Cognition  Education Outcome: Verbalized understanding;Demonstrated understanding;Continued education needed    Plan  Plan  Times per Week: 5 out of 7 days  Times per Day: Daily  Current Treatment Recommendations: Strengthening;ROM;Balance training;Functional mobility training; Endurance training; Safety education & training;Neuromuscular re-education;Patient/Caregiver education & training;Equipment evaluation, education, & procurement;Self-Care / ADL;Cognitive/Perceptual training; Wheelchair mobility training;Positioning    Goals  Patient Goals   Patient goals :  \"Go home\" \"Get stronger\" \"walk better\"  Short Term Goals  Time Frame for Short term goals: 10 days (5/20/22)  Short Term Goal 1: Pt will complete functional transfers with Mita x2 with LRAD- progressing, maxA x2 sit pivot vs stand pivot transfer  Short Term Goal 2: Pt will complete toileting/transfer modA x2 with LRAD and DME PRN-GOAL MET 5/16, modA x2 steady, CTA  Short Term Goal 3: Pt will complete LUE AAROM/AROM exercises to LUE to increase strength/endurance for ADLs/transfers-GOAL MET 5/18, CTA  Short Term Goal 4: Pt will be able to locate 3/5 object in L visual field with min cues during ADLs/activities-GOAL MET 5/18,CTA  Long Term Goals  Time Frame for Long term goals : 21 days (3/31/22)  Long Term Goal 1: Pt will complete functional transfers/mobility SPV with LRAD  Long Term Goal 2: Pt will complete UE dressing/bathing sitting setup with min cues for papo techniques  Long Term Goal 3: Pt will complete toileting/transfers with LRAD and DME PRN SPV  Long Term Goal 4: Pt will complete opening 3/5 containers with compensatory techniques sitting setup/SPV    Therapy Time   Individual Concurrent Group Co-treatment   Time In 1100     0800   Time Out 1130     0900   Minutes 30     60   Timed Code Treatment Minutes: [address], OT

## 2022-05-20 NOTE — PROGRESS NOTES
Physical Therapy  Facility/Department: [facility]  Rehabilitation Physical Therapy Treatment Note    NAME: Jennifer Chinchilla  : 1950 (66 y.o.)  MRN: [id number]  CODE STATUS: Full Code    Date of Service: 22       Restrictions:  Restrictions/Precautions: Fall Risk;Up as Tolerated     SUBJECTIVE  Subjective  Subjective: pt in bed, agreeable to therapy  Pain: pt denies pain        Vitals: 72 bpm, 96%, 165/75    OBJECTIVE  Cognition  Overall Cognitive Status: Exceptions  Arousal/Alertness: Appropriate responses to stimuli  Following Commands: Follows one step commands with repetition; Follows multistep commands with repitition  Attention Span: Attends with cues to redirect  Memory: Decreased recall of precautions  Safety Judgement: Decreased awareness of need for safety;Decreased awareness of need for assistance  Problem Solving: Assistance required to correct errors made;Assistance required to identify errors made  Insights: Decreased awareness of deficits  Initiation: Requires cues for all  Sequencing: Requires cues for all  Orientation  Overall Orientation Status: Within Functional Limits    Pt completes supine>sit with HOB elevated and mod A x2    Pt completes STS with lorrie monsivais and mod A x1 + max A x1 with cues for safe hand placement and assist to place LLE on bar. Pt assisted with donning pants at EOB and pulling up while standing in stedy    Pt transferred bed>w/c dependently with lorrie loi    Pt completes STS in // bars with mod A x2 with cues to lean forward, scoot to edge of chair, and proper foot placement. Pt requires max cues in standing for upright posture, midline awareness, weight shifting, and quad control of L knee in stance. Pt ambulates 2x 3 ft + 1x 6 ft in // bars with mod A x2 with w/c following. OT assisting with trunk for balance and posture while PT facilitating L knee block in stance phase and L swing phase. ACE wrap donned LLE to assist with facilitation.  Pt requires max cues for weight shift, maintaining posture between steps, and appropriate step length. Pt demos improvement as session progresses with pt able to verbalize ~50% of the sequencing. Pt stood ~1 min at // bars with mod A x2 while completing mini squats with BUE support with assistance to place and hold LLE on bar. Pt cued for alignment, technique, and posture with squats. Completed for LE strengthening and improved quad control. Pt positioned in w/c with L arm tray at end of session. ASSESSMENT/PROGRESS TOWARDS GOALS  Assessment  Assessment: Patient seen as co-treat with OT. Patient completed transfers and ambulation with Ax2 and max cueing for posture, midline awareness, sequencing, and quad control in stance. Pt demos improved sequencing and posture as session progresses and tolerates up to 6 ft in // bars with w/c follow. Pt will continue to benefit from skilled PT in ARU to progress strength, endurance, balance, and mobility.   Activity Tolerance: Patient tolerated treatment well  Discharge Recommendations: Continue to assess pending progress  PT Equipment Recommendations  Equipment Needed: Yes  Other: CTA pending progress with mobility    Goals  Patient Goals   Patient goals : \"go on my mission trip in [place]"  Short Term Goals  Time Frame for Short term goals: 11 days 5/20  Short term goal 1: pt will complete bed mobility with mod A; not met 5/20 - pt requires mod A x2 for supine>sit  Short term goal 2: pt will complete functional transfer with max A; goal partially met 5/20 - pt completes transfers with mod A x2 with lorrie monsivais vs. // bars  Short term goal 3: pt will tolerate gait assessment and set goal when appropriate; goal met 5/20 - pt ambulates 6 ft in // bars with mod A  x2 (dependent d/t w/c follow and // bars), new goal in LTG  Short term goal 4: pt will tolerate stair assessment and set goal when appropriate; not met 5/20 - unsafe to attempt stairs  Short term goal 5: pt will propel w/c x 150 ft with supv; not assessed 5/20  Long Term Goals  Time Frame for Long term goals : 21 days 5/31  Long term goal 1: pt will complete bed mobility with CGA. Long term goal 2: pt will complete functional transfer with min a and LRAD. Long term goal 3: pt will propel w/c x 150 ft with CGA  Long term goal 4: Pt will ambulate 10 ft with LRAD and mod A. PLAN OF CARE/SAFETY  Plan  Plan: 5-7 times per week  Current Treatment Recommendations: Strengthening;ROM;Balance training;Functional mobility training;Transfer training; Endurance training;Gait training; Therapeutic activities; Home exercise program;Wheelchair mobility training;Equipment evaluation, education, & procurement;Cognitive reorientation;Stair training;Positioning; Safety education & training;Patient/Caregiver education & training;Cognitive/Perceptual training;ADL/Self-care training;IADL training;Pain management  Safety Devices  Type of Devices: All fall risk precautions in place;Call light within reach; Patient at risk for falls;Nurse notified; All yaima prominences offloaded;Gait belt;Left in chair;Chair alarm in place  Restraints  Restraints Initially in Place: No      Therapy Time   Individual Concurrent Group Co-treatment   Time In       0800   Time Out       0900   Minutes       60     Timed Code Treatment Minutes: [address], PT, 05/20/22 at 11:48 AM

## 2022-05-20 NOTE — PROGRESS NOTES
Speech Language Pathology  [facility]: ACUTE REHAB UNIT  SPEECH-LANGUAGE PATHOLOGY      [x] Daily  [] Weekly Care Conference Note  [] Discharge    Patient:Jennifer Fisher      :1950  OLX:[id number]  Rehab Dx/Hx: Acute CVA (cerebrovascular accident) ([facility] [address] [I63.9]    Precautions: falls and aspirations; severe left neglect  Home situation: Lives with . Indep with med management. Share finances, cooking, laundtry, grocery shopping. Has not driven since CVA in March. ST Dx: [] Aphasia  [x] Dysarthria  [] Apraxia   [x] Oropharyngeal dysphagia [x] Cognitive Impairment  [] Other:   Date of Admit: 5/10/2022  Room #: 1729/3323-79    Current functional status (updated daily):         Pt being seen for : [x] Speech/Language Treatment  [x] Dysphagia Treatment [x] Cognitive Treatment  [] Other:  Communication: []WFL  [] Aphasia  [x] Dysarthria  [] Apraxia  [] Pragmatic Impairment [] Non-verbal  [] Hearing Loss  [] Other:   Cognition: [] WFL  [x] Mild  [] Moderate  [] Severe [] Unable to Assess  [] Other:  Memory: [] WFL  [x] Mild  [] Moderate  [] Severe [] Unable to Assess  [] Other:  Behavior: [x] Alert  [x] Cooperative  [x]  Pleasant  [] Confused  [] Agitated  [] Uncooperative  [] Distractible [] Motivated  [] Self-Limiting [] Anxious  [] Other:  Endurance:  [x] Adequate for participation in SLP sessions  [] Reduced overall  [] Lethargic  [] Other:  Safety: [] No concerns at this time  [x] Reduced insight into deficits  [x]  Reduced safety awareness [] Not following call light procedures  [] Unable to Assess  [] Other:    Current Diet Order:ADULT DIET;  Regular; 3 carb choices (45 gm/meal)  Swallowing Precautions: upright for all intake, stay upright for at least 30 mins after intake, small bites/sips, assist feed, oral care 2-3x/day to reduce adverse affects in the event of aspiration, alternate bites/sips, slow rate of intake         Date: 2022      Tx session 1  9600 - 123 [facility] session 2  [id number]  [facility] SLP    Total Timed Code Min 20 15   Total Treatment Minutes 30 30   Individual Treatment Minutes 30 30   Group Treatment Minutes 0 0   Co-Treat Minutes 0 0   Variance/Reason:  N/a N/A   Pain No c/o pain Denies    Pain Intervention [] RN notified  [] Repositioned  [] Intervention offered and patient declined  [x] N/A  [] Other: [] RN notified  [] Repositioned  [] Intervention offered and patient declined  [x] N/A  [] Other:   Subjective     Pt cooperative throughout session, seen upright in wheelchair at bedside. Pt agreeable to tx. Pt alert and oriented, cooperative and agreeable to participate in therapy. Pt seen sitting upright in bed,  at bedside. Objective:     Dysphagia Goals  Short-term Goals  Timeframe for Short-term Goals: 18 days (05/28/2022)    Goal 1: The patient will tolerate recommended diet with no clinical s/s of aspiration    RN provided pt with meds:  -pt first attempted pills whole with water, but having some difficulty (reduced A-P transit, getting stuck on roof of mouth)  -pt requiring multiple liquid washes in order to clear  -pills then placed in puree - 2-4 at a time. Pt continued to have some difficulty with clearance, required multiple sips of thin and another bite of puree to clear  -no overt s/s of aspiration    -SLP recommends pills 1 at a time - either whole in puree or with thin liquids. Ensure oral clearance prior to giving another pill   Did not target. Goal 2: The patient/caregiver will demonstrate understanding of compensatory swallow strategies, for improved swallow safety   SLP edu pt re: strategies for taking pills - taking a bite of puree, 1 pill at a time. Pt verbalized understanding. Did not target. Goal discontinued 05/17/22. Goal discontinued 05/17/22.     Goal 4: The pt will complete oral motor exercises to improve labial and lingual strength, ROM, and coordination in 10/10 opportunities given min cues    Goal Education:   edu provided re: continued importance of ongoing scanning to L side during structured tasks and in environment, planning strategies    Edu provided re: OME's, rationale for vital stim/NMES      Safety Devices: [x] Call light within reach  [x] Chair alarm activated  [] Bed alarm activated  [] Other: [x] Call light within reach  [x] Chair alarm activated  [] Bed alarm activated  [] Other:    Assessment: Session 1: Pt alert and cooperative, agreeable to tx. Edu re: scanning to left strategies. When reading a recipe, pt required MAX cues to attend to info on the left. Pt benefited from mod cues to answer questions using thought org and reasoning abilities within the recipe task. RN provided pt with meds. Pt with increased difficulty taking meds whole with water. Pt still with some difficulty fully clearing pills whole with puree (reduced A-P transit, getting stuck on roof of mouth or on lingual surface). Edu re: strategies for taking meds. Recommend meds whole in puree or with thin liquid, but given 1 at a time only and ensuring oral clearance prior to giving another pill. Pt verbalized understanding. Continue goals above. Session 2: Pt pleasant and cooperative, agreeable to participate. Pt's  at bedside. Pt completed functional recall task with 83% acc indep improving to 100% acc given min cues. OME's completed today, pt requiring max cues to complete most exercises presented. Pt continues to present with significantly reduced L sided ROM, coordination, and strength, very minimal movement noted. Discussed and educated about rationale for vital stim/NMES to target oral motor function. Pt receptive and in agreement to complete if MD agrees. SLP to speak with MD. Continue goals above. (SLP also perfect served Oncologist regarding vital stim/NMES order)   Plan: Continue as per plan of care.       Additional Information:     Barriers toward progress: Limited safety awareness, Limited insight into deficits, Decreased proprioception, Upper extremity weakness and Lower extremity weakness  Discharge recommendations:  [] Home independently  [] Home with assistance [x]  24 hour supervision  [] ECF [] Other:  Continued Tx Upon Discharge: ? [x] Yes [] No [] TBD based on progress while on ARU [] Vital Stim indicated [] Other:   Estimated discharge date: 05/31/2022    Interventions used this date:  [] Speech/Language Treatment  [] Instruction in HEP [] Group [x] Dysphagia Treatment [x] Cognitive Treatment   [] Other: Total Time Breakdown / Charges    Time in Time out Total Time / units   Cognitive Tx 0900  1330 0920  1345 20 min / 1 unit  15 min/ 1 unit    Speech Tx -- -- --   Dysphagia Tx 0920  1345 0930  1400 10 min / 1 unit  15 min/ 1 unit        Electronically Signed by     Session 1:   Shahab Carrillo #[id number]  Speech Language Pathologist    Session 2:   Janneth Dahl. A CCC-SLP  Speech-Language Pathologist  [id number]

## 2022-05-20 NOTE — PROGRESS NOTES
Slava Solders  5/20/2022  [id number]    Chief Complaint: Acute CVA (cerebrovascular accident) [facility])    Subjective:   No acute events overnight. Today James Carrasco is seen in her room. She reports that she is feeling well. She denies shortness of breath and states that congestion has resolved. She denies other acute complaints at this time. ROS: denies f/c, n/v, cp, sob    Objective:  Patient Vitals for the past 24 hrs:   BP Temp Temp src Pulse Resp SpO2   05/20/22 0745 (!) 170/77 97.8 °F (36.6 °C) Oral 66 16 97 %   05/19/22 2100 (!) 140/61 98.7 °F (37.1 °C) Oral (!) 40 16 95 %     Gen: No distress, pleasant. Seated up in chair  HEENT: Normocephalic, atraumatic. CV: RRR  Resp: No respiratory distress. Abd: Soft, nondistended  Ext: No edema. Neuro: Alert, oriented, appropriately interactive. Left hemiparesis. Psych: flat affect    Wt Readings from Last 3 Encounters:   05/10/22 150 lb 12.7 oz (68.4 kg)   05/02/22 143 lb 9.6 oz (65.1 kg)   09/09/21 150 lb 12.8 oz (68.4 kg)       Laboratory data:   Lab Results   Component Value Date    WBC 7.4 05/19/2022    HGB 10.8 (L) 05/19/2022    HCT 31.7 (L) 05/19/2022    MCV 94.7 05/19/2022     05/19/2022       Lab Results   Component Value Date     05/19/2022    K 3.7 05/19/2022     05/19/2022    CO2 26 05/19/2022    BUN 25 05/19/2022    CREATININE 0.9 05/19/2022    GLUCOSE 115 05/19/2022    GLUCOSE 129 07/26/2017    CALCIUM 9.2 05/19/2022        Therapy progress:  PT  Position Activity Restriction  Other position/activity restrictions: chemo precautions, L inattention, LUE flaccid, resting hand splint to be worn at night.   Objective     Sit to Stand: 2 Person Assistance,Dependent/Total  Stand to sit: Dependent/Total,2 Person Assistance  Bed to Chair: Dependent/Total     OT  PT Equipment Recommendations  Equipment Needed: Yes  Other: CTA pending progress with mobility  Toilet - Technique:  (steady to e-tac chair over toilet)  Equipment Used:  (e-tac chair)  Toilet Transfers Comments: maxA x2 sit <>  steady, progressing at times to [address] X2 with max verbal/tactile and demo cues  Assessment        SLP                Body mass index is 27.14 kg/m². Assessment and Plan:  Scarlett Velazquez is a 66year old female with a past medical history significant for recent CVA with left hemiparesis, CML, HTN, and HLD who presented to [facility] on 5/3/22 with abnormal labs, admitted with rhabdomyolysis and ARF, found to have acute CVA.  She was admitted to [facility] on 5/10/22 due to functional deficits below her baseline.     Acute Right CVA  - MRI showing acute infarct in right cerebral hemisphere in a watershed distribution  - CTA showing 90-95% stenosis of right ICA  - Vascular and Neurology in agreement on waiting for CEA, needs follow up in 4 weeks  - asa, plavix, statin  - PT, OT, ST     Dysphagia  - upgraded to regular diet  - ST    RLL Pneumonia, resolved  - concern for aspiration in setting of dysphagia  - repeat CXR showing improvement 5/16  - completed course of levaquin   - make mucinex PRN and monitor symptoms  - IS      Rhabdomyolysis  Acute Renal Injury  - Nephrology followed during acute stay  - CPK trending down  - monitor      HTN  - patient with episode of hypotension while in acute care so isosorbid mononitrate and hydralazine held  - metoprolol, lisinopril, nifedipine  - Nephrology following, appreciate assistance    DM2  - Hba1c 6.6  - glucose well controlled without SSI, discontinued  - follow up with PCP     Liver Injury with elevated LFTs  - suspected to be due to statin induced rhabdo  - statin stopped  - LFTs normalized  - no need to further follow     CML  - follows with Dr. Celi Rosa  - on nilitinib  - chemo precautions  - follow up with Heme/Onc OP     Enlarged heterogeneous appearance of the thyroid  - Thyroid ultrasound outpatient  - follow up with PCP     Hemorrhoids  - hydrocortisone cream   - bowel regimen to avoid constipation     Bowels: Schedule stool softener. Follow bowel movements. Enema or suppository if needed.      Bladder: Check PVR x 3. [facility] if PVR > 200ml or if any volume is > 500 ml.      Sleep: Trazodone provided prn. PPx  DVT: heparin  GI: not indicated     Follow up appointments: Vascular, PCP    Services: 24 hour at home versus SNF  EDOD: 5/31/22    Ansel Gottron.  Savanah Delgado MD 5/20/2022, 12:52 PM

## 2022-05-21 LAB
GLUCOSE BLD-MCNC: 111 MG/DL (ref 70–99)
PERFORMED ON: ABNORMAL

## 2022-05-21 PROCEDURE — 6370000000 HC RX 637 (ALT 250 FOR IP): Performed by: STUDENT IN AN ORGANIZED HEALTH CARE EDUCATION/TRAINING PROGRAM

## 2022-05-21 PROCEDURE — 6360000002 HC RX W HCPCS: Performed by: STUDENT IN AN ORGANIZED HEALTH CARE EDUCATION/TRAINING PROGRAM

## 2022-05-21 PROCEDURE — 6370000000 HC RX 637 (ALT 250 FOR IP): Performed by: INTERNAL MEDICINE

## 2022-05-21 PROCEDURE — 1280000000 HC REHAB R&B

## 2022-05-21 RX ADMIN — LACTULOSE 20 G: 20 SOLUTION ORAL at 22:16

## 2022-05-21 RX ADMIN — DICLOFENAC SODIUM 4 G: 10 GEL TOPICAL at 16:41

## 2022-05-21 RX ADMIN — FAMOTIDINE 10 MG: 20 TABLET, FILM COATED ORAL at 09:39

## 2022-05-21 RX ADMIN — ASPIRIN 81 MG: 81 TABLET, COATED ORAL at 09:38

## 2022-05-21 RX ADMIN — CARBOXYMETHYLCELLULOSE SODIUM 1 DROP: 10 GEL OPHTHALMIC at 22:20

## 2022-05-21 RX ADMIN — DICLOFENAC SODIUM 4 G: 10 GEL TOPICAL at 09:41

## 2022-05-21 RX ADMIN — HYDROCORTISONE: 1 CREAM TOPICAL at 09:41

## 2022-05-21 RX ADMIN — HEPARIN SODIUM 5000 UNITS: 5000 INJECTION INTRAVENOUS; SUBCUTANEOUS at 22:20

## 2022-05-21 RX ADMIN — LEVOTHYROXINE SODIUM 50 MCG: 0.05 TABLET ORAL at 05:37

## 2022-05-21 RX ADMIN — HEPARIN SODIUM 5000 UNITS: 5000 INJECTION INTRAVENOUS; SUBCUTANEOUS at 05:37

## 2022-05-21 RX ADMIN — Medication 400 MG: at 09:39

## 2022-05-21 RX ADMIN — METOPROLOL TARTRATE 25 MG: 25 TABLET, FILM COATED ORAL at 09:40

## 2022-05-21 RX ADMIN — METOPROLOL TARTRATE 25 MG: 25 TABLET, FILM COATED ORAL at 22:21

## 2022-05-21 RX ADMIN — DOCUSATE SODIUM 50 MG AND SENNOSIDES 8.6 MG 2 TABLET: 8.6; 5 TABLET, FILM COATED ORAL at 09:38

## 2022-05-21 RX ADMIN — HEPARIN SODIUM 5000 UNITS: 5000 INJECTION INTRAVENOUS; SUBCUTANEOUS at 14:25

## 2022-05-21 RX ADMIN — CLOPIDOGREL BISULFATE 75 MG: 75 TABLET, FILM COATED ORAL at 09:39

## 2022-05-21 RX ADMIN — LISINOPRIL 40 MG: 20 TABLET ORAL at 09:39

## 2022-05-21 RX ADMIN — DICLOFENAC SODIUM 4 G: 10 GEL TOPICAL at 13:05

## 2022-05-21 RX ADMIN — LACTULOSE 20 G: 20 SOLUTION ORAL at 09:39

## 2022-05-21 RX ADMIN — CARBOXYMETHYLCELLULOSE SODIUM 1 DROP: 10 GEL OPHTHALMIC at 09:38

## 2022-05-21 RX ADMIN — HYDROCORTISONE: 1 CREAM TOPICAL at 22:16

## 2022-05-21 RX ADMIN — NIFEDIPINE 30 MG: 30 TABLET, FILM COATED, EXTENDED RELEASE ORAL at 09:40

## 2022-05-21 RX ADMIN — DICLOFENAC SODIUM 4 G: 10 GEL TOPICAL at 22:15

## 2022-05-21 NOTE — PLAN OF CARE
Problem: ABCDS Injury Assessment  Goal: Absence of physical injury  Outcome: Progressing     Problem: Safety - Adult  Goal: Free from fall injury  Outcome: Progressing     Problem: Skin/Tissue Integrity  Goal: Absence of new skin breakdown  Description: 1.   Monitor for areas of redness and/or skin breakdown  Outcome: Progressing

## 2022-05-22 LAB
GLUCOSE BLD-MCNC: 106 MG/DL (ref 70–99)
PERFORMED ON: ABNORMAL

## 2022-05-22 PROCEDURE — 6370000000 HC RX 637 (ALT 250 FOR IP): Performed by: INTERNAL MEDICINE

## 2022-05-22 PROCEDURE — 6370000000 HC RX 637 (ALT 250 FOR IP): Performed by: STUDENT IN AN ORGANIZED HEALTH CARE EDUCATION/TRAINING PROGRAM

## 2022-05-22 PROCEDURE — 6360000002 HC RX W HCPCS: Performed by: STUDENT IN AN ORGANIZED HEALTH CARE EDUCATION/TRAINING PROGRAM

## 2022-05-22 PROCEDURE — 1280000000 HC REHAB R&B

## 2022-05-22 RX ADMIN — HEPARIN SODIUM 5000 UNITS: 5000 INJECTION INTRAVENOUS; SUBCUTANEOUS at 14:31

## 2022-05-22 RX ADMIN — TRAZODONE HYDROCHLORIDE 50 MG: 50 TABLET ORAL at 21:13

## 2022-05-22 RX ADMIN — HYDROCORTISONE: 1 CREAM TOPICAL at 21:13

## 2022-05-22 RX ADMIN — DICLOFENAC SODIUM 4 G: 10 GEL TOPICAL at 21:13

## 2022-05-22 RX ADMIN — DICLOFENAC SODIUM 4 G: 10 GEL TOPICAL at 10:37

## 2022-05-22 RX ADMIN — DICLOFENAC SODIUM 4 G: 10 GEL TOPICAL at 17:14

## 2022-05-22 RX ADMIN — DOCUSATE SODIUM 50 MG AND SENNOSIDES 8.6 MG 2 TABLET: 8.6; 5 TABLET, FILM COATED ORAL at 10:34

## 2022-05-22 RX ADMIN — HEPARIN SODIUM 5000 UNITS: 5000 INJECTION INTRAVENOUS; SUBCUTANEOUS at 05:34

## 2022-05-22 RX ADMIN — ASPIRIN 81 MG: 81 TABLET, COATED ORAL at 10:34

## 2022-05-22 RX ADMIN — ACETAMINOPHEN 650 MG: 325 TABLET ORAL at 05:34

## 2022-05-22 RX ADMIN — LISINOPRIL 40 MG: 20 TABLET ORAL at 10:35

## 2022-05-22 RX ADMIN — LACTULOSE 20 G: 20 SOLUTION ORAL at 10:34

## 2022-05-22 RX ADMIN — LACTULOSE 20 G: 20 SOLUTION ORAL at 21:12

## 2022-05-22 RX ADMIN — FAMOTIDINE 10 MG: 20 TABLET, FILM COATED ORAL at 10:35

## 2022-05-22 RX ADMIN — HEPARIN SODIUM 5000 UNITS: 5000 INJECTION INTRAVENOUS; SUBCUTANEOUS at 21:13

## 2022-05-22 RX ADMIN — CLOPIDOGREL BISULFATE 75 MG: 75 TABLET, FILM COATED ORAL at 10:35

## 2022-05-22 RX ADMIN — WITCH HAZEL 40 EACH: 500 SOLUTION RECTAL; TOPICAL at 21:25

## 2022-05-22 RX ADMIN — CARBOXYMETHYLCELLULOSE SODIUM 1 DROP: 10 GEL OPHTHALMIC at 21:12

## 2022-05-22 RX ADMIN — DICLOFENAC SODIUM 4 G: 10 GEL TOPICAL at 14:31

## 2022-05-22 RX ADMIN — Medication 400 MG: at 10:35

## 2022-05-22 RX ADMIN — LEVOTHYROXINE SODIUM 50 MCG: 0.05 TABLET ORAL at 05:35

## 2022-05-22 RX ADMIN — METOPROLOL TARTRATE 25 MG: 25 TABLET, FILM COATED ORAL at 21:13

## 2022-05-22 RX ADMIN — NIFEDIPINE 30 MG: 30 TABLET, FILM COATED, EXTENDED RELEASE ORAL at 10:35

## 2022-05-22 RX ADMIN — CARBOXYMETHYLCELLULOSE SODIUM 1 DROP: 10 GEL OPHTHALMIC at 10:34

## 2022-05-22 RX ADMIN — HYDROCORTISONE: 1 CREAM TOPICAL at 10:36

## 2022-05-22 ASSESSMENT — PAIN SCALES - GENERAL
PAINLEVEL_OUTOF10: 0
PAINLEVEL_OUTOF10: 3

## 2022-05-22 ASSESSMENT — PAIN DESCRIPTION - LOCATION: LOCATION: NECK

## 2022-05-22 NOTE — PLAN OF CARE
Problem: Discharge Planning  Goal: Discharge to home or other facility with appropriate resources  Outcome: Progressing     Problem: Safety - Adult  Goal: Free from fall injury  Outcome: Progressing     Problem: Skin/Tissue Integrity  Goal: Absence of new skin breakdown  Description: 1. Monitor for areas of redness and/or skin breakdown  2. Assess vascular access sites hourly  3. Every 4-6 hours minimum:  Change oxygen saturation probe site  4. Every 4-6 hours:  If on nasal continuous positive airway pressure, respiratory therapy assess nares and determine need for appliance change or resting period.   Outcome: Progressing     Problem: ABCDS Injury Assessment  Goal: Absence of physical injury  Outcome: Progressing     Problem: Pain  Goal: Verbalizes/displays adequate comfort level or baseline comfort level  Outcome: Progressing  Flowsheets (Taken 5/21/2022 2055)  Verbalizes/displays adequate comfort level or baseline comfort level: Encourage patient to monitor pain and request assistance

## 2022-05-23 LAB
ANION GAP SERPL CALCULATED.3IONS-SCNC: 10 MMOL/L (ref 3–16)
BASOPHILS ABSOLUTE: 0 K/UL (ref 0–0.2)
BASOPHILS RELATIVE PERCENT: 0.6 %
BUN BLDV-MCNC: 26 MG/DL (ref 7–20)
CALCIUM SERPL-MCNC: 9.6 MG/DL (ref 8.3–10.6)
CHLORIDE BLD-SCNC: 105 MMOL/L (ref 99–110)
CO2: 25 MMOL/L (ref 21–32)
CREAT SERPL-MCNC: 0.7 MG/DL (ref 0.6–1.2)
EOSINOPHILS ABSOLUTE: 0.2 K/UL (ref 0–0.6)
EOSINOPHILS RELATIVE PERCENT: 2.4 %
GFR AFRICAN AMERICAN: >60
GFR NON-AFRICAN AMERICAN: >60
GLUCOSE BLD-MCNC: 110 MG/DL (ref 70–99)
HCT VFR BLD CALC: 34.9 % (ref 36–48)
HEMOGLOBIN: 11.7 G/DL (ref 12–16)
LYMPHOCYTES ABSOLUTE: 1.5 K/UL (ref 1–5.1)
LYMPHOCYTES RELATIVE PERCENT: 18.5 %
MCH RBC QN AUTO: 32.3 PG (ref 26–34)
MCHC RBC AUTO-ENTMCNC: 33.5 G/DL (ref 31–36)
MCV RBC AUTO: 96.5 FL (ref 80–100)
MONOCYTES ABSOLUTE: 0.8 K/UL (ref 0–1.3)
MONOCYTES RELATIVE PERCENT: 9.8 %
NEUTROPHILS ABSOLUTE: 5.7 K/UL (ref 1.7–7.7)
NEUTROPHILS RELATIVE PERCENT: 68.7 %
PDW BLD-RTO: 14.5 % (ref 12.4–15.4)
PLATELET # BLD: 273 K/UL (ref 135–450)
PMV BLD AUTO: 8.6 FL (ref 5–10.5)
POTASSIUM REFLEX MAGNESIUM: 4.3 MMOL/L (ref 3.5–5.1)
RBC # BLD: 3.62 M/UL (ref 4–5.2)
SODIUM BLD-SCNC: 140 MMOL/L (ref 136–145)
WBC # BLD: 8.3 K/UL (ref 4–11)

## 2022-05-23 PROCEDURE — 36415 COLL VENOUS BLD VENIPUNCTURE: CPT

## 2022-05-23 PROCEDURE — 80048 BASIC METABOLIC PNL TOTAL CA: CPT

## 2022-05-23 PROCEDURE — 97112 NEUROMUSCULAR REEDUCATION: CPT

## 2022-05-23 PROCEDURE — 97530 THERAPEUTIC ACTIVITIES: CPT

## 2022-05-23 PROCEDURE — 85025 COMPLETE CBC W/AUTO DIFF WBC: CPT

## 2022-05-23 PROCEDURE — 97129 THER IVNTJ 1ST 15 MIN: CPT

## 2022-05-23 PROCEDURE — 97110 THERAPEUTIC EXERCISES: CPT

## 2022-05-23 PROCEDURE — 6370000000 HC RX 637 (ALT 250 FOR IP): Performed by: INTERNAL MEDICINE

## 2022-05-23 PROCEDURE — 1280000000 HC REHAB R&B

## 2022-05-23 PROCEDURE — 97116 GAIT TRAINING THERAPY: CPT

## 2022-05-23 PROCEDURE — 6370000000 HC RX 637 (ALT 250 FOR IP): Performed by: STUDENT IN AN ORGANIZED HEALTH CARE EDUCATION/TRAINING PROGRAM

## 2022-05-23 PROCEDURE — 97535 SELF CARE MNGMENT TRAINING: CPT

## 2022-05-23 PROCEDURE — 6360000002 HC RX W HCPCS: Performed by: STUDENT IN AN ORGANIZED HEALTH CARE EDUCATION/TRAINING PROGRAM

## 2022-05-23 PROCEDURE — 92526 ORAL FUNCTION THERAPY: CPT

## 2022-05-23 RX ORDER — TRAMADOL HYDROCHLORIDE 50 MG/1
50 TABLET ORAL EVERY 6 HOURS PRN
Status: DISCONTINUED | OUTPATIENT
Start: 2022-05-23 | End: 2022-05-27

## 2022-05-23 RX ADMIN — METOPROLOL TARTRATE 25 MG: 25 TABLET, FILM COATED ORAL at 21:28

## 2022-05-23 RX ADMIN — LISINOPRIL 40 MG: 20 TABLET ORAL at 09:40

## 2022-05-23 RX ADMIN — TRAZODONE HYDROCHLORIDE 50 MG: 50 TABLET ORAL at 21:28

## 2022-05-23 RX ADMIN — METOPROLOL TARTRATE 25 MG: 25 TABLET, FILM COATED ORAL at 09:39

## 2022-05-23 RX ADMIN — Medication 400 MG: at 09:40

## 2022-05-23 RX ADMIN — LACTULOSE 20 G: 20 SOLUTION ORAL at 21:28

## 2022-05-23 RX ADMIN — DICLOFENAC SODIUM 4 G: 10 GEL TOPICAL at 09:42

## 2022-05-23 RX ADMIN — HYDROCORTISONE: 1 CREAM TOPICAL at 21:27

## 2022-05-23 RX ADMIN — LEVOTHYROXINE SODIUM 50 MCG: 0.05 TABLET ORAL at 06:24

## 2022-05-23 RX ADMIN — HEPARIN SODIUM 5000 UNITS: 5000 INJECTION INTRAVENOUS; SUBCUTANEOUS at 15:03

## 2022-05-23 RX ADMIN — FAMOTIDINE 10 MG: 20 TABLET, FILM COATED ORAL at 09:40

## 2022-05-23 RX ADMIN — CLOPIDOGREL BISULFATE 75 MG: 75 TABLET, FILM COATED ORAL at 09:40

## 2022-05-23 RX ADMIN — NIFEDIPINE 30 MG: 30 TABLET, FILM COATED, EXTENDED RELEASE ORAL at 09:40

## 2022-05-23 RX ADMIN — DICLOFENAC SODIUM 4 G: 10 GEL TOPICAL at 12:55

## 2022-05-23 RX ADMIN — DICLOFENAC SODIUM 4 G: 10 GEL TOPICAL at 21:28

## 2022-05-23 RX ADMIN — HEPARIN SODIUM 5000 UNITS: 5000 INJECTION INTRAVENOUS; SUBCUTANEOUS at 21:28

## 2022-05-23 RX ADMIN — ASPIRIN 81 MG: 81 TABLET, COATED ORAL at 09:40

## 2022-05-23 RX ADMIN — HYDROCORTISONE: 1 CREAM TOPICAL at 12:55

## 2022-05-23 RX ADMIN — CARBOXYMETHYLCELLULOSE SODIUM 1 DROP: 10 GEL OPHTHALMIC at 09:41

## 2022-05-23 RX ADMIN — CARBOXYMETHYLCELLULOSE SODIUM 1 DROP: 10 GEL OPHTHALMIC at 21:27

## 2022-05-23 RX ADMIN — LACTULOSE 20 G: 20 SOLUTION ORAL at 09:41

## 2022-05-23 RX ADMIN — HEPARIN SODIUM 5000 UNITS: 5000 INJECTION INTRAVENOUS; SUBCUTANEOUS at 06:24

## 2022-05-23 ASSESSMENT — PAIN SCALES - GENERAL: PAINLEVEL_OUTOF10: 0

## 2022-05-23 NOTE — PLAN OF CARE
Education provided regarding fall precautions and use of the call light for assistance. Call light and belongings are within reach and fall precautions remain in place.

## 2022-05-23 NOTE — PROGRESS NOTES
Occupational Therapy  Facility/Department: [facility]  Rehabilitation Occupational Therapy Daily Treatment Note    Date: 22  Patient Name: Kris Gonzalez       Room: 7279/5532-02  MRN: [id number]  Account: [de-identified]   : 1950  (76 y.o.) Gender: female                    Past Medical History:  has a past medical history of Anemia, Arthritis, Asthma, Bowel dysfunction, CML (chronic myelocytic leukemia) ([facility] [address] Hyperlipidemia, Hypertension, Nuclear senile cataract of both eyes, Obesity, Risk of myocardial infarction or stroke 7.5% or greater in next 10 years, Skin cancer of face, Stroke (cerebrum) ([facility] [address] Thyroid disease, and TIA (transient ischemic attack). Past Surgical History:   has a past surgical history that includes Breast biopsy; fine needle aspiration; Breast lumpectomy; Tonsillectomy (Bilateral); Paracentesis; Anus surgery (); Elbow surgery (); and Colonoscopy. Restrictions  Restrictions/Precautions: Fall Risk;Up as Tolerated  Other position/activity restrictions: chemo precautions, L inattention, LUE flaccid, resting hand splint to be worn at night. Subjective  Subjective: Pt in recliner upon arrival, agreeable to OT/PT cotx. Son and  present throughout. Pt denies pain  Restrictions/Precautions: Fall Risk;Up as Tolerated             Objective     Cognition  Overall Cognitive Status: Exceptions  Arousal/Alertness: Appropriate responses to stimuli  Following Commands: Follows one step commands with repetition; Follows multistep commands with repitition  Attention Span: Attends with cues to redirect  Memory: Decreased recall of precautions  Safety Judgement: Decreased awareness of need for safety;Decreased awareness of need for assistance  Problem Solving: Assistance required to correct errors made;Assistance required to identify errors made  Insights: Decreased awareness of deficits  Initiation: Requires cues for some  Sequencing: Requires cues for some  Cognition Comment: L inattention  Orientation  Overall Orientation Status: Within Functional Limits  Orientation Level: Oriented X4   Perception  Overall Perceptual Status: Impaired  Unilateral Attention: Cues to attend left visual field;Cues to attend to left side of body;Cues to maintain midline in sitting;Cues to maintain midline in standing  Initiation: Cues to initiate tasks  Motor Planning: Hand over hand to sequence tasks  Perseveration: Perseverates during conversation     ADL  Feeding  Assistance Level: Increased time to complete;Set-up; Verbal cues;Stand by assist  Skilled Clinical Factors: assistance to cut food, open packages and bring cup to hand  Grooming/Oral Hygiene  Assistance Level: Increased time to complete;Minimal assistance  Skilled Clinical Factors: washing face and brushing hair sitting  Upper Extremity Dressing  Assistance Level: Maximum assistance  Lower Extremity Dressing  Assistance Level: Dependent;Verbal cues  Skilled Clinical Factors: 2 person assistance with use of grab bar R side to don pants. Pt requiring Mod A for sitting balance BSC while therapist provides assistance to thread BLE. Pt stands with 1 person assistance (PT) while other therapist dons clothing over hips. Putting On/Taking Off Footwear  Assistance Level: Maximum assistance  Skilled Clinical Factors: donning shoes  Toileting  Assistance Level: Dependent  Skilled Clinical Factors: maxA x1 sit <> Stand from [facility] to grab bar to [facility], maxA x2 SPT and then x2 in stance BS over toilet to [facility]  Toilet Transfers  Technique: To the right; To the left;Stand step  Equipment: Beside commode  Additional Factors: Verbal cues;Cues for hand placement; Increased time to complete; With handrails  Assistance Level: Maximum assistance  Skilled Clinical Factors: maxA x1 sit <> Stand [facility], maxA x2 SPT and then x2 in stance BS over toilet to [facility]          Functional Mobility  Device: Wheelchair;Hemiwalker (// bars, grab bar/rail on R side; WC follow)  Activity: To/From bathroom; Retrieve items;Transport items; To/From therapy gym  Assistance Level: Requires x 2 assistance; Moderate assistance  Skilled Clinical Factors: Mod A x2 people ambulation in parallel bars; PT provides assistance with L knee stability, BLE advancement. OT provides assitance with weightshifting, trunk positioning, cues for use of mirror for visual feedback. Trialed multiple sit <> stands from [facility] to papo walker and then to rail on R side, increased time and poor upright tolerance/weight shifting L vs R with papo walker vs rail on R side and // bars  Transfers  Surface: From bed; Wheelchair  Additional Factors: Increased time to complete;Hand placement cues; Verbal cues  Device: Walker  Sit to Stand  Assistance Level: Moderate assistance; Requires x 2 assistance;Maximum assistance  Skilled Clinical Factors: Max+Mod A to/from WC and BSC; Mod A x2 to/from w/c using parallel bars x4 reps; maxA x2 for papo walker trials, modA x2 for rail on R side trials  Stand to Sit  Assistance Level: Moderate assistance; Requires x 2 assistance;Maximum assistance  Skilled Clinical Factors: Max+Mod A to/from WC and BSC; Mod A x2 to/from w/c using parallel bars x4 reps; maxA x2 for papo walker trials, modA x2 for rail on R side trials  Stand Pivot  Skilled Clinical Factors: Max+Mod A to/from WC and BSC; Mod A x2 to/from w/c using parallel bars x4 reps; maxA x2 for papo walker trials, modA x2 for rail on R side trials  Sit Pivot  Skilled Clinical Factors: Max+Mod A to/from WC andBSC; Mod A x2 to/from w/c using parallel bars x4 reps; maxA x2 for papo walker trials, modA x2 for rail on R side trials   Neuromuscular Education  NDT Treatment: Upper extremity  Head/Neck Control: Pt does demo increased sitting balance with less cues but does demo R sided lean at times when pulling towards R side; poor carryover of sequencing for midline alignment despite max tactile cues and demo.   OT providing hand over hand for turning head L vs R to scan for objects; continues to demo severe L sided neglect deficits but increased carryover for IDing object on L side during toileting tasks  Trunk Control: Pt demos increased midline alignment when given verbal and tacitle cues from OT and PT assisting with weight shifting for sit <> Stand to grab bar R side and ADL tasks in stance; OT providing R side hip rotation once standing in // bars, WBing through LUE during mobility trials, and max cues for upright posture; pt with poor upright tolerance, sequencing, and posture with weight shifting using papo walker, increased midline alignment and posture with rail on R side  Weight Bearing  Weight Bearing Technique: Yes  RUE Weight Bearing: Extended arm standing; Extended arm seated  LUE Weight Bearing: Extended arm standing; Extended arm seated  Response To Weight Bearing Technique: WBing through LUE AAROM and hand over hand to grab bars and // bars during sit <> stands and then WBing through LUE during WC mobility  OT Exercises  Exercise Treatment: AAROM using RUE as active assistance for LUE 1x15 reps. Exercises completed sitting in w/c. OT provides moderate cues for body positioning and movement  PROM Exercises: LUE flaccid this date. Educated pt on self PROM with RUE assist for elbow, wrist, and digital flex/ext. Pt able to perform with good return in recliner, left handout on tabletop to go over with spouse when present. Instructed pt to complete 2-3 times during the day. Postural Correction Exercises: Cues for orienting to midline. Forward trunk flexion/extension x5     Assessment  Assessment  Assessment: Patient  continues to be pleasant and agreeable to OT/PT to faciliate increased sitting vs standing balance, NMR, and functional mobility trials in AD. Pt was able to complete multiple sit <> Stand modA x2 with SPT and stand step tranfser with grab bar to R side in bathroom.   Pt does demo decreased midline posture when fatigued or multi-tasking, needing hand over hand and tactiel cues for trunk support/weight shifting L vs R in stance. Pt needing rest breaks but tolerated functional mobiltiy trials in // bars fowards/backwards mod-maxA x2 with max cues and WC follow. Trialed papo walker but unable to progress RLE vs LLE and weight shift L vs R due to poor midline alignment and anxiety upon standing. Pt was able to complete mobiltiy trials to R side with WC follow, mod cues for weight shifting L vs R. Spoke with patients family extensively on SNF recommendation due to patient maxA X2 for all transfers, poor neglect/cognition. Pts son in agreement on DC recommendations, CM provided a list of SNFs in [address] [place] [address] S and son states will tour faciltities and make a decision. Cont OT POC. Second session: Pt in [facility], agreeable and pleasant. Pt participated in SROM vs AAROM vs AROM exercises in gravity eliminated plane on tabletop 3x5 reps internal/external rotation, supination/pronation, wrist flexion/extension, chest press. Pt educated to continue to complete AAROM on tabletop and SROM with RUE assistance. Increased tone in LUE, elbow extension, wrist flexion/extension, external rotation gentle prolonged stretch x3 seconds. Pt then completed sit <> stand to steady modA X1 with max cues for midline alignment pulling towards R side with RUE. Continues to be depA for all LB dressing and toileting tasks but increased ability to stand statically Mita with mod cues. Left in WC, educated on propping LUE on lap tray/pillow. Educated patient on scanning towards L side to TV once seated in WC. Cont OT POC.     Activity Tolerance: Patient tolerated treatment well  Discharge Recommendations: Continue to assess pending progress;24 hour supervision or assist;Home with nursing aide;Home with Home health OT;S Level 3;Subacute/Skilled Nursing Facility  OT Equipment Recommendations  Equipment Needed: Yes  Other: CTA  Safety Devices  Safety Devices in place: Yes  Type of devices: Left in chair;Call light within reach; Chair alarm in place;Nurse notified; Patient at risk for falls; All fall risk precautions in place;Gait belt  Restraints  Initially in place: No    Patient Education  Education  Education Given To: Patient  Education Provided: Role of Therapy;Plan of Care;Safety;Equipment;ADL Function; Fall Prevention Strategies;Transfer Training  Education Provided Comments: bed mobility, sitting balance, safety and hand placement with transfers, hemistrategies for UB bathing/dressing, skin checks for resting hand splint, AAROM LUE exercises, weightshifting during ambulation/standing, standing balance  Education Method: Demonstration;Verbal  Barriers to Learning: Cognition  Education Outcome: Verbalized understanding;Demonstrated understanding;Continued education needed    Plan  Plan  Times per Week: 5 out of 7 days  Times per Day: Daily  Plan Weeks: 3 weeks  Current Treatment Recommendations: Strengthening;ROM;Balance training;Functional mobility training; Endurance training; Safety education & training;Neuromuscular re-education;Patient/Caregiver education & training;Equipment evaluation, education, & procurement;Self-Care / ADL;Cognitive/Perceptual training; Wheelchair mobility training;Positioning    Goals  Patient Goals   Patient goals :  \"Go home\" \"Get stronger\" \"walk better\"  Short Term Goals  Time Frame for Short term goals: 10 days (5/20/22)-EXTEND TO 5/25 2/2 PROGRESS  Short Term Goal 1: Pt will complete functional transfers with Mita x2 with LRAD- progressing, maxA x2 sit pivot vs stand pivot transfer-EXTEND TO 5/25  Short Term Goal 2: Pt will complete toileting/transfer modA x2 with LRAD and DME PRN-GOAL MET 5/16, modA x2 steady, CTA  Short Term Goal 3: Pt will complete LUE AAROM/AROM exercises to LUE to increase strength/endurance for ADLs/transfers-GOAL MET 5/18, CTA  Short Term Goal 4: Pt will be able to locate 3/5 object in L visual field with min cues during ADLs/activities-GOAL MET 5/18,CTA  Additional Goals?: No  Long Term Goals  Time Frame for Long term goals : 21 days (3/31/22)  Long Term Goal 1: Pt will complete functional transfers/mobility SPV with LRAD  Long Term Goal 2: Pt will complete UE dressing/bathing sitting setup with min cues for papo techniques  Long Term Goal 3: Pt will complete toileting/transfers with LRAD and DME PRN SPV  Long Term Goal 4: Pt will complete opening 3/5 containers with compensatory techniques sitting setup/SPV    Therapy Time   Individual Concurrent Group Co-treatment   Time In       0930   Time Out       1030   Minutes       60   Timed Code Treatment Minutes: 60 Minutes     Second Session Therapy Time:   Individual Concurrent Group Co-treatment   Time In 1430        Time Out 1500        Minutes 30          Timed Code Treatment Minutes:  30 Minutes    Total Treatment Minutes:  [address], OTR/L

## 2022-05-23 NOTE — PROGRESS NOTES
Physical Therapy  Facility/Department: [facility]  Rehabilitation Physical Therapy Treatment Note    NAME: Kathe Moreno  : 1950 (66 y.o.)  MRN: [id number]  CODE STATUS: Full Code    Date of Service: 22       Restrictions:  Restrictions/Precautions: Fall Risk;Up as Tolerated  Position Activity Restriction  Other position/activity restrictions: chemo precautions, L inattention, LUE flaccid, resting hand splint to be worn at night. SUBJECTIVE  Subjective  Subjective: found in recliner. son and  present for session  Pain: denies pain. OBJECTIVE  Cognition  Overall Cognitive Status: Exceptions  Arousal/Alertness: Appropriate responses to stimuli  Following Commands: Follows one step commands with repetition; Follows multistep commands with repitition  Attention Span: Attends with cues to redirect  Memory: Decreased recall of precautions  Safety Judgement: Decreased awareness of need for safety;Decreased awareness of need for assistance  Problem Solving: Assistance required to correct errors made;Assistance required to identify errors made  Insights: Decreased awareness of deficits  Initiation: Requires cues for some  Sequencing: Requires cues for some  Cognition Comment: L inattention  Orientation  Overall Orientation Status: Within Functional Limits  Orientation Level: Oriented X4    Functional Mobility     Max a of 2 to re-arrange in chair for safety (upon entry to room, pt about to slide out of chair)    Sit pivot recliner to w/c with R side leading and mod A of 2. Sit pivot w/c to commode with R side leading and mod A of 2. Max a of 1+ min A of 1 for balance while 2nd therapist assisted with clothing management. Pt utilize commode, voided bowel and bladder, RN made aware. Sit to stand commode to grab bar with mod A of 2, max A of 1 + min A of 1 for static standing balance while OT also assisted with pericare/ clothing management.     Stand pivot from commode to w/c with grab bar and max A of 1+ mod A of 1. Sit to stand w/c to // bars with mod A of 2 and pulling on bars. Continued cues for R anterior lean with transfer and cues for appropriate JACINTA. Pt ambulated 8 ft forward + 4 ft backwards with // bars, close w/c follow and max a from PT and mod A from OT. PT facilitating LLE swing phase/ stability during stance phase and OT facilitating posture/midline position and weight shifting. Mirror provided for visual feedback for upright midline posture with little carryover demonstrated. Sit to stand w/c to [facility] x 2 reps with max a of 2 and cues for safe sequencing/ R anterior weight shift. Pt standing at [facility] statically for 30 sec, 10 sec with RUE support and max A of 2 due to significant L posterior lean. Pt pushing self further to L when cued to \"find midline\" with mirror as visual aide. Sit to stand w/c to carranza rail with mod a of 2   Gait x 10 ft with carranza rail for support and max a from PT and min-mod A from OT. PT facilitating LLE stability during stance phase/ mobility during swing phase and OT facilitating weight shift/ midline posture. Pt extensively cued to extend trunk/ hips with fair carryover demonstrated    Pt in w/c at end of session with call light/needs within reach at end of session. ASSESSMENT/PROGRESS TOWARDS GOALS  Vital Signs  Pulse: 79  Heart Rate Source: Monitor  BP: (!) 165/69  BP Location: Right upper arm  MAP (Calculated): 101  SpO2: 94 %  O2 Device: None (Room air)    Assessment  Assessment: pt seen as co treat with OT for increased safety progressing functional mobility. pt continues to require mod-max a of 2 for sit and stand pivot transfers. attempted to progress from // bars to [facility] for gait training however pt with increased L pushing resulting in max a of 2 to safely return to chair. poor awareness of midline while standing/ sitting.  progressed gait from // bars with carranza rail with mod a of 2. pt demo's ability to state gait sequence however requires cues to follow through on sequence due to impulsivity. at this time, recommend SNF upon d/c due to pt's burden of care adn limited physical assist at home. Activity Tolerance: Patient tolerated treatment well  Discharge Recommendations: Continue to assess pending progress  PT Equipment Recommendations  Equipment Needed: Yes    Goals  Patient Goals   Patient goals : \"go on my mission trip in [place]"  Short Term Goals  Time Frame for Short term goals: 11 days 5/20  Short term goal 1: pt will complete bed mobility with mod A; not met 5/20 - pt requires mod A x2 for supine>sit  Short term goal 2: pt will complete functional transfer with max A; goal partially met 5/20 - pt completes transfers with mod A x2 with lorrie stedy vs. // bars  Short term goal 3: pt will tolerate gait assessment and set goal when appropriate; goal met 5/20 - pt ambulates 6 ft in // bars with mod A  x2 (dependent d/t w/c follow and // bars), new goal in LTG  Short term goal 4: pt will tolerate stair assessment and set goal when appropriate; not met 5/20 - unsafe to attempt stairs  Short term goal 5: pt will propel w/c x 150 ft with supv; not assessed 5/20  Long Term Goals  Time Frame for Long term goals : 21 days 5/31  Long term goal 1: pt will complete bed mobility with CGA. Long term goal 2: pt will complete functional transfer with min a and LRAD. Long term goal 3: pt will propel w/c x 150 ft with CGA  Long term goal 4: Pt will ambulate 10 ft with LRAD and mod A. PLAN OF CARE/SAFETY  Plan  Plan: 5-7 times per week  Current Treatment Recommendations: Strengthening;ROM;Balance training;Functional mobility training;Transfer training; Endurance training;Gait training; Therapeutic activities; Home exercise program;Wheelchair mobility training;Equipment evaluation, education, & procurement;Cognitive reorientation;Stair training;Positioning; Safety education & training;Patient/Caregiver education & training;Cognitive/Perceptual training;ADL/Self-care training;IADL training;Pain management  Safety Devices  Type of Devices: All fall risk precautions in place;Call light within reach; Patient at risk for falls;Nurse notified; All yaima prominences offloaded;Gait belt;Left in chair;Chair alarm in place      Therapy Time   Individual Concurrent Group Co-treatment   Time In       0930   Time Out       1030   Minutes       60     Timed Code Treatment Minutes: [address] [place], [place], 05/23/22 at 3:18 PM

## 2022-05-23 NOTE — PROGRESS NOTES
Speech Language Pathology  [facility]: ACUTE REHAB UNIT  SPEECH-LANGUAGE PATHOLOGY      [x] Daily  [] Weekly Care Conference Note  [] Discharge    Patient:Jennifer Lazo      :1950  [facility]:[id number]  Rehab Dx/Hx: Acute CVA (cerebrovascular accident) ([facility] [address] [I63.9]    Precautions: falls and aspirations; severe left neglect  Home situation: Lives with . Indep with med management. Share finances, cooking, laundtry, grocery shopping. Has not driven since CVA in March. ST Dx: [] Aphasia  [x] Dysarthria  [] Apraxia   [x] Oropharyngeal dysphagia [x] Cognitive Impairment  [] Other:   Date of Admit: 5/10/2022  Room #: 9634/6645-14    Current functional status (updated daily):         Pt being seen for : [x] Speech/Language Treatment  [x] Dysphagia Treatment [x] Cognitive Treatment  [] Other:  Communication: []WFL  [] Aphasia  [x] Dysarthria  [] Apraxia  [] Pragmatic Impairment [] Non-verbal  [] Hearing Loss  [] Other:   Cognition: [] WFL  [x] Mild  [] Moderate  [] Severe [] Unable to Assess  [] Other:  Memory: [] WFL  [x] Mild  [] Moderate  [] Severe [] Unable to Assess  [] Other:  Behavior: [x] Alert  [x] Cooperative  [x]  Pleasant  [] Confused  [] Agitated  [] Uncooperative  [] Distractible [] Motivated  [] Self-Limiting [] Anxious  [] Other:  Endurance:  [x] Adequate for participation in SLP sessions  [] Reduced overall  [] Lethargic  [] Other:  Safety: [] No concerns at this time  [x] Reduced insight into deficits  [x]  Reduced safety awareness [] Not following call light procedures  [] Unable to Assess  [] Other:    Current Diet Order:ADULT DIET;  Regular; 3 carb choices (45 gm/meal)  Swallowing Precautions: upright for all intake, stay upright for at least 30 mins after intake, small bites/sips, assist feed, oral care 2-3x/day to reduce adverse affects in the event of aspiration, alternate bites/sips, slow rate of intake         Date: 2022      Tx session 1  [id number] Tx session 2  All tx needs met in session 1     Total Timed Code Min 15    Total Treatment Minutes 60    Individual Treatment Minutes 60    Group Treatment Minutes 0    Co-Treat Minutes 0    Variance/Reason:  N/a    Pain No c/o pain    Pain Intervention [] RN notified  [] Repositioned  [] Intervention offered and patient declined  [x] N/A  [] Other: [] RN notified  [] Repositioned  [] Intervention offered and patient declined  [] N/A  [] Other:   Subjective     Pt alert and oriented, cooperative and agreeable to participate in therapy. Pt seen sitting upright in bedside chair. Pt's son from [place] at bedside. Objective:     Dysphagia Goals  Short-term Goals  Timeframe for Short-term Goals: 18 days (05/28/2022)    Goal 1: The patient will tolerate recommended diet with no clinical s/s of aspiration    Pt observed with her breakfast meal:  -pancakes, water  -adequate mastication, minimal residue noted on tongue and in left cheek but pt able to remove with use of lingual sweep and liquid wash  -no s/s of aspiration       Goal 2: The patient/caregiver will demonstrate understanding of compensatory swallow strategies, for improved swallow safety   SLP provided edu re continued use of safe swallow strategies (specifically use of lingual sweep to remove residue in L cheek) during all meals. Pt verbalized and demonstrated understanding. Goal discontinued 05/17/22. Goal 4: The pt will complete oral motor exercises to improve labial and lingual strength, ROM, and coordination in 10/10 opportunities given min cues    Vital stim/NMES placement 4a at 10. 0mA for 35 mins  -pt tolerated without any adverse reactions or side effects    OME's completed:  -smile: x20 max cues  -pucker: x20   -smile/pucker: x20 max cues  -open mouth wide: x20 max cues   -cheek puff hold for 3 secs: x20 max cues  -eye squint: x20 max cues     Oncologist and MD gave verbal ok to proceed with vital stim/NMES.          Cognitive-linguistic Goals:   Short-term Goals  Timeframe for Short-term Goals: 18 days (05/28/2022)    Goal 1: Pt will effectively use compensatory visual strategies for improved attention to left side during structured tasks with 80% acc given mod cues. Discussed visual scanning strategies with pt and pt's son. SLP sat on patients side, pt required min cues to attend to SLP. Discussed use of bold lines and provided post it notes with dark bold lines for pt to use while reading her bible. Implemented use of visual scanning strategies while filling out patients menu with her today, requiring min-mod cues to fully scan and attend left. Goal 2: Pt will complete graded recall tasks using compensatory strategies with 90% acc given min cues   Did not target. Goal 3: Pt will complete executive function tasks (e.g. meds, time, money, etc) with 90% acc givgen min cues   Did not target. Goal 4: Pt will complete problem solving and thought organization tasks with 90% acc given min cues   Did not target. Goal 5: Pt will complete verbal and visual reasoning tasks with 90% acc given min cues   Did not target. Other areas targeted: N/a    Education:   edu provided re: diet recs, safe swallow strategies, vital stim/NMES, visual scanning strategies      Safety Devices: [x] Call light within reach  [x] Chair alarm activated  [] Bed alarm activated  [x] Other: son at bedside  [] Call light within reach  [] Chair alarm activated  [] Bed alarm activated  [] Other:    Assessment: Pt pleasant and cooperative, agreeable to participate. Vital stim/NMES placement 4a completed today, at 10. 0mA for 35 mins tolerating without any adverse reactions or side effects. Pt continues to require mod-max cues to complete OME's accurately. Pt with overall significantly reduced ROM, strength, and coordination on L side, and very impulsive throughout completion of exercises. Pt benefited from cues to slow chinedu.  Also discussed and implemented use of visual scanning strategies for improved attention to L side. Pt required min cues to attend to SLP and son who was on L side throughout tx session. Pt required min-mod cues for scanning fully to the L while filling out menu. Pt will require 24 hour assist d/t severity of L neglect negatively impacting safety and independence. Continue goals above. Plan: Continue as per plan of care. Additional Information:     Barriers toward progress: Limited safety awareness, Limited insight into deficits, Decreased proprioception, Upper extremity weakness and Lower extremity weakness  Discharge recommendations:  [] Home independently  [] Home with assistance [x]  24 hour supervision  [] ECF [] Other:  Continued Tx Upon Discharge: ? [x] Yes [] No [] TBD based on progress while on ARU [] Vital Stim indicated [] Other:   Estimated discharge date: 05/31/2022    Interventions used this date:  [] Speech/Language Treatment  [] Instruction in HEP [] Group [x] Dysphagia Treatment [x] Cognitive Treatment   [] Other: Total Time Breakdown / Charges    Time in Time out Total Time / units   Cognitive Tx 0845 0900 15 min/ 1 unit    Speech Tx -- -- --   Dysphagia Tx 0800 0845 45 min/ 1 unit        Electronically Signed by     Alicia GARCIA [facility]-SLP  Speech-Language Pathologist  [id number]

## 2022-05-23 NOTE — PROGRESS NOTES
Interval History and plan:      Blood pressure is stable  On room air  Electrolytes are stable  Creatinine 0.7-normal  Seen in [facility] rehab     Plan:  No changes today                       Assessment :     Acute Kidney Injury  Creatinine 1.1 at the time of consult, as she might have chronic kidney disease  EDYTA likely due to -rhabdomyolysis/poor p.o. intake  Cr on consultation 2.1 when she was in the acute hospital  Baseline Cr-0.8 on 9/21  No recent baseline available-she was admitted to [facility] March 2022 with the stroke and was told that she has high creatinine    UA-5/22-large blood, trace LE  Renal Imaging:-5/22-right-10.7 cm, simple 2.2 cm right renal cyst  No mention of left kidney  Echo: 10/18-EF normal, no mention of diastolic dysfunction    Hypertension        BP goal inpatient [phone] systolic inpatient    Rhabdomyolysis  CPK more than 10,000 on arrival-down to 8.2   Thought to be induced by statin  Has elevated AST and ALT    CML  Diabetes mellitus type 2 new  Carotid artery stenosis-plan for outpatient procedure    [facility] would like to thank Soila Mcguire MD   for opportunity to serve this patient      Please call with questions at-   24 Hrs Answering service [phone] or  7 am- 5 pm via Perfect serve or cell phone  Boubacar Gaytan MD          CC/reason for consult :     Severe hypertension   HPI :     Matti Sanders is a 66 y.o. female presented to   the hospital on 5/10/2022 with EDYTA and statin induced rhabdomyolysis to the acute hospital.  She was treated for both. She recently had a stroke and he may paralysis for which she was seen by neurologist.  She was found to have carotid artery stenosis for which she was seen by vascular surgeon. Plan for endarterectomy as an outpatient. Once her renal function was a stable and rhabdomyolysis improved she was admitted to acute rehab unit for further care    At the rehab.   Her blood pressure has gone up significantly because of which we are consulted    ROS:     Seen with-no family    positives in bold   Constitutional:  fever, chills, weakness, weight change, fatigue  Skin:  rash, pruritus, hair loss, bruising, dry skin, petechiae  Head, Face, Neck   headaches, swelling,  cervical adenopathy  Respiratory: shortness of breath, cough, or wheezing  Cardiovascular: chest pain, palpitations, dizzy, edema  Gastrointestinal: nausea, vomiting, diarrhea, constipation,belly pain    Yellow skin, blood in stool  Musculoskeletal:  back pain, muscle weakness, gait problems,       joint pain or swelling. Genitourinary:  dysuria, poor urine flow, flank pain, blood in urine  Neurologic:  vertigo, TIA'S, syncope, seizures, focal weakness  Psychosocial:  insomnia, anxiety, or depression. Additional positive findings:                    All other remaining systems are negative or unable to obtain        PMH/PSH/SH/Family History:     Past Medical History:   Diagnosis Date    Anemia     Arthritis     Asthma     Bowel dysfunction     CML (chronic myelocytic leukemia) ([facility] [address] 01/2006    leukemia    Hyperlipidemia     Hypertension     Nuclear senile cataract of both eyes 11/25/2019    Obesity     Risk of myocardial infarction or stroke 7.5% or greater in next 10 years 9/15/2021    Skin cancer of face     left cheek, squamous    Stroke (cerebrum) ([facility] [address]     Thyroid disease     TIA (transient ischemic attack)     mini ones-from chemo       Past Surgical History:   Procedure Laterality Date    ANUS SURGERY  1998    fissure    BREAST BIOPSY      BREAST LUMPECTOMY      benign    COLONOSCOPY      numerous polyps    ELBOW SURGERY  1960    removal of foreign body ([place]    FINE NEEDLE ASPIRATION      PARACENTESIS      x 2 fluid in lung from Chemo    TONSILLECTOMY Bilateral         reports that she has never smoked.  She has never used smokeless tobacco. She reports that she does not drink alcohol and does not use drugs. family history includes Arrhythmia in her sister; Cancer in her brother, father, mother, and sister.          Medication:     Current Facility-Administered Medications: guaiFENesin (MUCINEX) extended release tablet 600 mg, 600 mg, Oral, BID PRN  sennosides-docusate sodium (SENOKOT-S) 8.6-50 MG tablet 2 tablet, 2 tablet, Oral, Daily  lactulose (CHRONULAC) 10 GM/15ML solution 20 g, 20 g, Oral, BID  NIFEdipine (PROCARDIA XL) extended release tablet 30 mg, 30 mg, Oral, Daily  hydrALAZINE (APRESOLINE) tablet 10 mg, 10 mg, Oral, Q2H PRN  lisinopril (PRINIVIL;ZESTRIL) tablet 40 mg, 40 mg, Oral, Daily  traZODone (DESYREL) tablet 50 mg, 50 mg, Oral, Nightly PRN  metoprolol tartrate (LOPRESSOR) tablet 25 mg, 25 mg, Oral, BID  acetaminophen (TYLENOL) tablet 650 mg, 650 mg, Oral, Q6H PRN **OR** acetaminophen (TYLENOL) suppository 650 mg, 650 mg, Rectal, Q6H PRN  heparin (porcine) injection 5,000 Units, 5,000 Units, SubCUTAneous, TID  ondansetron (ZOFRAN-ODT) disintegrating tablet 4 mg, 4 mg, Oral, Q8H PRN **OR** ondansetron (ZOFRAN) injection 4 mg, 4 mg, IntraVENous, Q6H PRN  aluminum & magnesium hydroxide-simethicone (MAALOX) 200-200-20 MG/5ML suspension 30 mL, 30 mL, Oral, Q6H PRN  bisacodyl (DULCOLAX) suppository 10 mg, 10 mg, Rectal, Daily PRN  aspirin EC tablet 81 mg, 81 mg, Oral, Daily  carboxymethylcellulose PF (THERATEARS) 1 % ophthalmic gel 1 drop, 1 drop, Both Eyes, Q12H  clopidogrel (PLAVIX) tablet 75 mg, 75 mg, Oral, Daily  dextrose 5 % solution, 100 mL/hr, IntraVENous, PRN  dextrose bolus 10% 125 mL, 125 mL, IntraVENous, PRN **OR** dextrose bolus 10% 250 mL, 250 mL, IntraVENous, PRN  diclofenac sodium (VOLTAREN) 1 % gel 4 g, 4 g, Topical, 4x Daily  famotidine (PEPCID) tablet 10 mg, 10 mg, Oral, Daily  glucagon (rDNA) injection 1 mg, 1 mg, IntraMUSCular, PRN  glucose (GLUTOSE) 40 % oral gel 15 g, 15 g, Oral, PRN  hydrocortisone 1 % cream, , Topical, BID  levothyroxine (SYNTHROID) tablet 50 mcg, 50 mcg, Oral, Daily  magnesium oxide (MAG-OX) tablet 400 mg, 400 mg, Oral, Daily  nilotinib (TASIGNA) capsule 200 mg, 200 mg, Oral, Q12H  witch hazel-glycerin (TUCKS) pad, , Topical, PRN       Vitals :     Vitals:    05/22/22 2100   BP: 136/64   Pulse: 77   Resp: 18   Temp: 98.1 °F (36.7 °C)   SpO2: 94%       I & O :     No intake or output data in the 24 hours ending 05/23/22 0910     Physical Examination :     General appearance: Anxious- no, distressed- no, in good spirits-  Yes  HEENT: Lips- normal, teeth- ok , oral mucosa- moist  Neck : Mass- no, appears symmetrical, JVD- not visible  Respiratory: Respiratory effort-  normal, wheeze- no, crackles -   Cardiovascular:  Ausculation- No M/R/G, Edema none  Abdomen: visible mass- no, distention- no, scar- no, tenderness- no                            hepatosplenomegaly-  no  Musculoskeletal:  clubbing no,cyanosis- no , digital ischemia- no                           muscle strength- grossly normal , tone - grossly normal  Skin: rashes- no , ulcers- no, induration- no, tightening - no  Psychiatric:  Judgement and insight- normal           AAO X 3  Additional finding:   Left sided weakness       LABS:     Recent Labs     05/23/22  0752   WBC 8.3   HGB 11.7*   HCT 34.9*        Recent Labs     05/23/22  0752      K 4.3      CO2 25   BUN 26*   CREATININE 0.7   GLUCOSE 110*

## 2022-05-23 NOTE — PROGRESS NOTES
Machelle Vieyra  5/23/2022  [id number]    Chief Complaint: Acute CVA (cerebrovascular accident) [facility])    Subjective:   No acute events overnight. Today Maria E Abdi is seen in her room with her  and son present. She reports some left shoulder pain and pain across her shoulder that is interfering with her sleep at night. Will add tramadol PRN. She denies other acute complaints at this time. ROS: denies f/c, n/v, cp, sob    Objective:  Patient Vitals for the past 24 hrs:   BP Temp Temp src Pulse Resp SpO2   05/23/22 1120 (!) 165/69 -- -- 76 -- 96 %   05/23/22 0939 (!) 165/69 -- -- 76 18 96 %   05/22/22 2100 136/64 98.1 °F (36.7 °C) Oral 77 18 94 %     Gen: No distress, pleasant. Seated in wheelchair  HEENT: Normocephalic, atraumatic. CV: extremities well perfused  Resp: No respiratory distress. Abd: Soft, nondistended  Ext: No edema. Neuro: Alert, oriented, appropriately interactive. Left hemiparesis. Psych: flat affect    Wt Readings from Last 3 Encounters:   05/10/22 150 lb 12.7 oz (68.4 kg)   05/02/22 143 lb 9.6 oz (65.1 kg)   09/09/21 150 lb 12.8 oz (68.4 kg)       Laboratory data:   Lab Results   Component Value Date    WBC 8.3 05/23/2022    HGB 11.7 (L) 05/23/2022    HCT 34.9 (L) 05/23/2022    MCV 96.5 05/23/2022     05/23/2022       Lab Results   Component Value Date     05/23/2022    K 4.3 05/23/2022     05/23/2022    CO2 25 05/23/2022    BUN 26 05/23/2022    CREATININE 0.7 05/23/2022    GLUCOSE 110 05/23/2022    GLUCOSE 129 07/26/2017    CALCIUM 9.6 05/23/2022        Therapy progress:  PT  Position Activity Restriction  Other position/activity restrictions: chemo precautions, L inattention, LUE flaccid, resting hand splint to be worn at night.   Objective     Sit to Stand: 2 Person Assistance,Dependent/Total  Stand to sit: Dependent/Total,2 Person Assistance  Bed to Chair: Dependent/Total     OT  PT Equipment Recommendations  Equipment Needed: Yes  Other: CTA pending progress with mobility  Toilet - Technique:  (steady to e-tac chair over toilet)  Equipment Used:  (e-tac chair)  Toilet Transfers Comments: maxA x2 sit <>  steady, progressing at times to [address] X2 with max verbal/tactile and demo cues  Assessment        SLP                Body mass index is 27.14 kg/m². Assessment and Plan:  Dhaval Barriga is a 66year old female with a past medical history significant for recent CVA with left hemiparesis, CML, HTN, and HLD who presented to [facility] on 5/3/22 with abnormal labs, admitted with rhabdomyolysis and ARF, found to have acute CVA.  She was admitted to [facility] on 5/10/22 due to functional deficits below her baseline.     Acute Right CVA  - MRI showing acute infarct in right cerebral hemisphere in a watershed distribution  - CTA showing 90-95% stenosis of right ICA  - Vascular and Neurology in agreement on waiting for CEA, needs follow up in 4 weeks  - asa, plavix, statin  - PT, OT, ST     Dysphagia  - upgraded to regular diet  - ST    RLL Pneumonia, resolved  - concern for aspiration in setting of dysphagia  - repeat CXR showing improvement 5/16  - completed course of levaquin   - make mucinex PRN and monitor symptoms  - IS      Rhabdomyolysis  Acute Renal Injury  - Nephrology followed during acute stay  - CPK trending down  - monitor      HTN  - patient with episode of hypotension while in acute care so isosorbid mononitrate and hydralazine held  - metoprolol, lisinopril, nifedipine  - Nephrology following, appreciate assistance    DM2  - Hba1c 6.6  - glucose well controlled without SSI, discontinued  - follow up with PCP     Liver Injury with elevated LFTs  - suspected to be due to statin induced rhabdo  - statin stopped  - LFTs normalized  - no need to further follow     CML  - follows with Dr. Carlos Jimenez  - on nilitinib  - chemo precautions  - follow up with Heme/Onc OP     Enlarged heterogeneous appearance of the thyroid  - Thyroid ultrasound outpatient  - follow up with PCP     Hemorrhoids  - hydrocortisone cream   - bowel regimen to avoid constipation     Bowels: Schedule stool softener. Follow bowel movements. Enema or suppository if needed.      Bladder: Check PVR x 3. [address] if PVR > 200ml or if any volume is > 500 ml.      Sleep: Trazodone provided prn. PPx  DVT: heparin  GI: not indicated     Follow up appointments: Vascular, PCP    Services: 24 hour at home versus SNF  EDOD: 5/31/22    [facility].  Rosario Apley, MD 5/23/2022, 2:23 PM

## 2022-05-23 NOTE — CARE COORDINATION
Case Management/Follow up:    Chart reviewed for length of stay. Hospital day # 13  Unit: IP ARU  Diagnosis and current status as per MD progress: Acute CVA (cerebrovascular accident)   Anticipated d/c date: 05/31/2022  Expected plan for discharge: Subacute Skilled Nursing Facility  Potential barriers: Patient progress  Precert required/date initiated: N/A    Comments: Nephrology,IM,Dietitian and PT/OT/SPeech following. Therapy recommendations are 24 hour supervision or assist;Home with nursing aide;Home with Home health PT/OT;S Level 3 vs Subacute/Skilled Nursing Facility. CM spoke with patient and spouse on 05/20 with DCP.  has concerns that he is not physically capable to take care of patient. Son and his family are visiting from [place] at this time. CM provided PT a SNF list that was given to son.  and son touring SNFs at this time.  Adriel Lim, RN

## 2022-05-24 PROCEDURE — 6360000002 HC RX W HCPCS: Performed by: STUDENT IN AN ORGANIZED HEALTH CARE EDUCATION/TRAINING PROGRAM

## 2022-05-24 PROCEDURE — 97535 SELF CARE MNGMENT TRAINING: CPT

## 2022-05-24 PROCEDURE — 97542 WHEELCHAIR MNGMENT TRAINING: CPT

## 2022-05-24 PROCEDURE — 97530 THERAPEUTIC ACTIVITIES: CPT

## 2022-05-24 PROCEDURE — 97130 THER IVNTJ EA ADDL 15 MIN: CPT

## 2022-05-24 PROCEDURE — 92526 ORAL FUNCTION THERAPY: CPT

## 2022-05-24 PROCEDURE — 6370000000 HC RX 637 (ALT 250 FOR IP): Performed by: STUDENT IN AN ORGANIZED HEALTH CARE EDUCATION/TRAINING PROGRAM

## 2022-05-24 PROCEDURE — 97112 NEUROMUSCULAR REEDUCATION: CPT

## 2022-05-24 PROCEDURE — 1280000000 HC REHAB R&B

## 2022-05-24 PROCEDURE — 97129 THER IVNTJ 1ST 15 MIN: CPT

## 2022-05-24 PROCEDURE — 6370000000 HC RX 637 (ALT 250 FOR IP): Performed by: INTERNAL MEDICINE

## 2022-05-24 RX ADMIN — LACTULOSE 20 G: 20 SOLUTION ORAL at 21:35

## 2022-05-24 RX ADMIN — CLOPIDOGREL BISULFATE 75 MG: 75 TABLET, FILM COATED ORAL at 08:06

## 2022-05-24 RX ADMIN — ACETAMINOPHEN 650 MG: 325 TABLET ORAL at 13:53

## 2022-05-24 RX ADMIN — DICLOFENAC SODIUM 4 G: 10 GEL TOPICAL at 16:41

## 2022-05-24 RX ADMIN — DICLOFENAC SODIUM 4 G: 10 GEL TOPICAL at 21:36

## 2022-05-24 RX ADMIN — HEPARIN SODIUM 5000 UNITS: 5000 INJECTION INTRAVENOUS; SUBCUTANEOUS at 13:53

## 2022-05-24 RX ADMIN — HYDROCORTISONE: 1 CREAM TOPICAL at 21:37

## 2022-05-24 RX ADMIN — LACTULOSE 20 G: 20 SOLUTION ORAL at 08:06

## 2022-05-24 RX ADMIN — TRAZODONE HYDROCHLORIDE 50 MG: 50 TABLET ORAL at 21:36

## 2022-05-24 RX ADMIN — FAMOTIDINE 10 MG: 20 TABLET, FILM COATED ORAL at 08:06

## 2022-05-24 RX ADMIN — CARBOXYMETHYLCELLULOSE SODIUM 1 DROP: 10 GEL OPHTHALMIC at 08:07

## 2022-05-24 RX ADMIN — TRAMADOL HYDROCHLORIDE 50 MG: 50 TABLET, COATED ORAL at 08:06

## 2022-05-24 RX ADMIN — METOPROLOL TARTRATE 25 MG: 25 TABLET, FILM COATED ORAL at 21:36

## 2022-05-24 RX ADMIN — METOPROLOL TARTRATE 25 MG: 25 TABLET, FILM COATED ORAL at 08:05

## 2022-05-24 RX ADMIN — HEPARIN SODIUM 5000 UNITS: 5000 INJECTION INTRAVENOUS; SUBCUTANEOUS at 05:59

## 2022-05-24 RX ADMIN — LISINOPRIL 40 MG: 20 TABLET ORAL at 08:06

## 2022-05-24 RX ADMIN — ASPIRIN 81 MG: 81 TABLET, COATED ORAL at 08:06

## 2022-05-24 RX ADMIN — DICLOFENAC SODIUM 4 G: 10 GEL TOPICAL at 12:43

## 2022-05-24 RX ADMIN — Medication 400 MG: at 08:06

## 2022-05-24 RX ADMIN — HEPARIN SODIUM 5000 UNITS: 5000 INJECTION INTRAVENOUS; SUBCUTANEOUS at 21:35

## 2022-05-24 RX ADMIN — NIFEDIPINE 30 MG: 30 TABLET, FILM COATED, EXTENDED RELEASE ORAL at 08:06

## 2022-05-24 RX ADMIN — DOCUSATE SODIUM 50 MG AND SENNOSIDES 8.6 MG 2 TABLET: 8.6; 5 TABLET, FILM COATED ORAL at 08:06

## 2022-05-24 RX ADMIN — CARBOXYMETHYLCELLULOSE SODIUM 1 DROP: 10 GEL OPHTHALMIC at 21:35

## 2022-05-24 RX ADMIN — LEVOTHYROXINE SODIUM 50 MCG: 0.05 TABLET ORAL at 05:59

## 2022-05-24 RX ADMIN — DICLOFENAC SODIUM 4 G: 10 GEL TOPICAL at 08:05

## 2022-05-24 ASSESSMENT — PAIN SCALES - GENERAL
PAINLEVEL_OUTOF10: 8
PAINLEVEL_OUTOF10: 0
PAINLEVEL_OUTOF10: 5
PAINLEVEL_OUTOF10: 6
PAINLEVEL_OUTOF10: 5

## 2022-05-24 NOTE — PATIENT CARE CONFERENCE
[facility]  Inpatient Rehabilitation  Weekly Team Conference Note    Date: 2022  Patient Name: Tatianna Bourgeois        MRN: [id number]    : 1950  (66 y.o.)  Gender: female   Referring Practitioner: Niki Edouard MD  Diagnosis: CVA      Interventions to be utilized toward barriers to discharge, per discipline:  [address] observed barriers to dc: Pain, Decreased endurance, Upper extremity weakness, Lower extremity weakness, Incontinence of bowel, Incontinence of bladder, Skin Care and Medication managment  Nursing interventions:Assist with ADLs, medication management   Family Education: Yes  Fall Risk:  Yes      Physical therapy observed barriers to dc:    Baseline: ind with no AD, at times assist  with mobility               Current level: mod A bed mobility, mod A of 2 sit pivot transfers, max A of 2 stand pivot trnsfers, TD standing balance, SBA-mod a sitting balance, min A w/c mobility, TD gait x 10 ft with carranza rail              Barriers to DC: L neglect, L papo, poor proprioception ,limited assist at home              Needs in order to achieve dc home/next level of care: pt will have to be ind with mobility to return home safely with . At this time pt requires A of 2 for all mobility tasks. Recommend family training prior to d/c for safety.  If family unable to provide appropriate levels of assist, rec SNF upon d/c.      Physical therapy interventions:   Current Treatment Recommendations: Strengthening,ROM,Balance training,Functional mobility training,Transfer training,Endurance training,Gait training,Therapeutic activities,Home exercise program,Wheelchair mobility training,Equipment evaluation, education, & procurement,Cognitive reorientation,Stair training,Positioning,Safety education & training,Patient/Caregiver education & training,Cognitive/Perceptual training,ADL/Self-care training,IADL training,Pain management      PHYSICAL THERAPY    PT Equipment Recommendations  Equipment Needed: Yes  Other: CTA pending progress with mobility    Assessment: pt seen as co treat with OT for increased safety progressing functional mobility. pt continues to require mod-max a of 2 for sit and stand pivot transfers. attempted to progress from // bars to [facility] for gait training however pt with increased L pushing resulting in max a of 2 to safely return to chair. poor awareness of midline while standing/ sitting. progressed gait from // bars with carranza rail with mod a of 2. pt demo's ability to state gait sequence however requires cues to follow through on sequence due to impulsivity. at this time, recommend SNF upon d/c due to pt's burden of care adn limited physical assist at home. Occupational therapy observed barriers to dc:     Baseline: independent with ADLs, IADLs,  was assisting PRN for mobility with no device              Current level: modA x2 for all transfers and LB dressing              Barriers to DC: L neglect, L papo, poor cognition/proprioception, limited assist from  at home              Needs in order to achieve dc home/next level of care: pt will have to be ind with mobility to return home safely with . At this time pt requires A of 2 for all mobility tasks. Recommend family training prior to d/c for safety. If family unable to provide appropriate levels of assist, rec SNF upon d/c.     Occupational Therapy interventions:  Current Treatment Recommendations: Strengthening,ROM,Balance training,Functional mobility training,Endurance training,Safety education & training,Neuromuscular re-education,Patient/Caregiver education & training,Equipment evaluation, education, & procurement,Self-Care / ADL,Cognitive/Perceptual training,Wheelchair mobility training,Positioning      OCCUPATIONAL THERAPY    Assessment: Patient progressing well towards goals, continues to benefit from cotx PT/OT to address functional tranfsers/standing balance.  Pt continues to demo increased L sided lean and needing RUE for alginment and posture sitting EOM/EOB with PT/OT support. Trialed standing/reaching across midline with rail on R side standing in hallway with limited ability to maintain upright posture and max A x2 for trunk alignment, LLE placement/support and WBing through LUE. Pt distracted easily this date needing consistent cues for sequencing/mechanics. Cont OT POC. Speech therapy observed barriers to dc:    Baseline: lives with , severe left neglect, shares finances, cooking, laundry, and grocery shopping, has not driven since March               Current level: mild-mod cognitive linguistic deficit, severe L neglect               Barriers to DC: severity of L neglect               Needs in order to achieve dc home/next level of care: 24 hour assist, improved L neglect and visual attention to L side, carryover of compensatory strategies, assist with meds/finances      Speech Therapy interventions:  Dysphagia: diet tolerance monitoring, safe swallow strategies, education   Speech/Language/Cognition: Compensatory strategy training and carryover, recall/STM, problem solving, reasoning, exec function, thought organization, attention. SPEECH THERAPY:  Pt pleasant and cooperative, agreeable to participate. Vital stim/NMES placement 4a completed today, at 11. 0mA for 30 mins tolerating without any adverse reactions or side effects. Pt continues to require mod-max cues to complete OME's accurately. Pt with overall significantly reduced ROM, strength, and coordination on L side, and very impulsive throughout completion of exercises. Pt benefited from cues to slow down. Pt completed functional recall and short term recall of a picture scene with 80-90% acc indep. Pt completed thought organization/alternating attention task with 76% acc indep.  Pt has met dysphagia goals related to diet, tolerating IDDSI Level 7 regular solids with TL and meds whole with PO as LRD. Continuing to recommend 24 hour assist d/t reduced insight into deficits and severity of L neglect negatively impacting safety and independence       NUTRITION  Weight: 150 lb 12.7 oz (68.4 kg) / Body mass index is 27.14 kg/m². Diet Order: ADULT DIET; Regular; 3 carb choices (45 gm/meal)  PO Meals Eaten (%): 76 - 100%  Education: Education declined    CASE MANAGEMENT  Assessment: 66 yr old female. Dx:Acute CVA (cerebrovascular accident). Nephrology following. Therapy recommendations are   Subacute/Skilled Nursing Facility. CM gave son a snf list. Son/spouse picked 1)[facility] 2)[facility] 3)[facility]. CM sent referral to their first choice awaiting response. DME defer to SNF. Plans are for patient to go to SNF short term then move to [place] and find assisted living for patient and spouse. Interdisciplinary Goals:   1.) Pt will complete functional transfer with max a of 1   2.) Pt will complete toileting/transfers with LRAD and DME PRN SPV  3.) Pt will appropriately visually attend/scan to L side across all disciplines given min cues   4.) Pt will be continent of bowel and bladder    [] Family Training discussed at conference and to be scheduled. Discharge Plan   [facility]  Pass:No  Services at Discharge: Defer to SNF  Equipment at Discharge: Defer to SNF      Team Members Present at Conference:  : Juliana Lynn    Occupational Therapist: Emy Carter OTR/L  Physical Therapist: Enrike Camacho PT, DPT   Speech Therapist: Sasha Maldonado, [facility] SLP   Nurse: Shiraz Del Toro RN  Dietician: Mirna Denney RD, LD   : Laura Mckinley OTR/L  Psychiatry: N/A    Family members present at conference: No      I led this team conference and I approve the established interdisciplinary plan of care as documented within the medical record of Hayden Savage MD: Electronically signed by Jennifer Husain MD on 5/25/2022 at 12:50 PM

## 2022-05-24 NOTE — PROGRESS NOTES
Matti Sanders  5/24/2022  [id number]    Chief Complaint: Acute CVA (cerebrovascular accident) [facility])    Subjective:   No acute events overnight. Today Stan Lewis is seen in her room with her  present. She reports that her shoulder pain is improved today. Of note she did take PRN tramadol this morning. She denies other acute complaints at this time. ROS: denies f/c, n/v, cp, sob    Objective:  Patient Vitals for the past 24 hrs:   BP Temp Temp src Pulse Resp SpO2   05/24/22 0959 (!) 158/71 -- -- 76 -- 95 %   05/24/22 0800 (!) 158/71 98.2 °F (36.8 °C) Oral 73 16 96 %   05/23/22 2115 130/62 97.6 °F (36.4 °C) Oral 78 18 94 %   05/23/22 1515 (!) 165/69 -- -- 79 -- 94 %   05/23/22 1120 (!) 165/69 -- -- 76 -- 96 %     Gen: No distress, pleasant. Seated in wheelchair  HEENT: Normocephalic, atraumatic. CV: extremities well perfused  Resp: No respiratory distress. Abd: Soft, nondistended  Ext: No edema. Neuro: Alert, oriented, appropriately interactive. Left hemiparesis. Psych: mood and affect appropriate    Wt Readings from Last 3 Encounters:   05/10/22 150 lb 12.7 oz (68.4 kg)   05/02/22 143 lb 9.6 oz (65.1 kg)   09/09/21 150 lb 12.8 oz (68.4 kg)       Laboratory data:   Lab Results   Component Value Date    WBC 8.3 05/23/2022    HGB 11.7 (L) 05/23/2022    HCT 34.9 (L) 05/23/2022    MCV 96.5 05/23/2022     05/23/2022       Lab Results   Component Value Date     05/23/2022    K 4.3 05/23/2022     05/23/2022    CO2 25 05/23/2022    BUN 26 05/23/2022    CREATININE 0.7 05/23/2022    GLUCOSE 110 05/23/2022    GLUCOSE 129 07/26/2017    CALCIUM 9.6 05/23/2022        Therapy progress:  PT  Position Activity Restriction  Other position/activity restrictions: chemo precautions, L inattention, LUE flaccid, resting hand splint to be worn at night.   Objective     Sit to Stand: 2 Person Assistance,Dependent/Total  Stand to sit: Dependent/Total,2 Person Assistance  Bed to Chair: Dependent/Total     OT  PT Equipment Recommendations  Equipment Needed: Yes  Other: CTA pending progress with mobility  Toilet - Technique:  (steady to e-tac chair over toilet)  Equipment Used:  (e-tac chair)  Toilet Transfers Comments: maxA x2 sit <>  steady, progressing at times to [address] with max verbal/tactile and demo cues  Assessment        SLP                Body mass index is 27.14 kg/m². Assessment and Plan:  Raquel Owens is a 66year old female with a past medical history significant for recent CVA with left hemiparesis, CML, HTN, and HLD who presented to [facility] on 5/3/22 with abnormal labs, admitted with rhabdomyolysis and ARF, found to have acute CVA.  She was admitted to [facility] on 5/10/22 due to functional deficits below her baseline.     Acute Right CVA  - MRI showing acute infarct in right cerebral hemisphere in a watershed distribution  - CTA showing 90-95% stenosis of right ICA  - Vascular and Neurology in agreement on waiting for CEA, needs follow up in 4 weeks  - asa, plavix, statin  - PT, OT, ST     Dysphagia  - upgraded to regular diet  - ST    RLL Pneumonia, resolved  - concern for aspiration in setting of dysphagia  - repeat CXR showing improvement 5/16  - completed course of levaquin   - make mucinex PRN and monitor symptoms  - IS      Rhabdomyolysis  Acute Renal Injury  - Nephrology followed during acute stay  - CPK trending down  - monitor      HTN  - patient with episode of hypotension while in acute care so isosorbid mononitrate and hydralazine held  - metoprolol, lisinopril, nifedipine  - Nephrology following, appreciate assistance    DM2  - Hba1c 6.6  - glucose well controlled without SSI, discontinued  - follow up with PCP     Liver Injury with elevated LFTs  - suspected to be due to statin induced rhabdo  - statin stopped  - LFTs normalized  - no need to further follow     CML  - follows with Dr. Edgardo Javier  - on nilitinib  - chemo precautions  - follow up with Heme/Onc OP     Enlarged heterogeneous appearance of the thyroid  - Thyroid ultrasound outpatient  - follow up with PCP     Hemorrhoids  - hydrocortisone cream   - bowel regimen to avoid constipation     Bowels: Schedule stool softener. Follow bowel movements. Enema or suppository if needed.      Bladder: Check PVR x 3. [facility] if PVR > 200ml or if any volume is > 500 ml.      Sleep: Trazodone provided prn. PPx  DVT: heparin  GI: not indicated     Follow up appointments: Vascular, PCP    Services: 24 hour at home versus SNF  EDOD: 5/31/22    [facility] Montes.  Alvina Greene MD 5/24/2022, 11:05 AM

## 2022-05-24 NOTE — PROGRESS NOTES
in session 1     Total Timed Code Min 30    Total Treatment Minutes 60    Individual Treatment Minutes 60    Group Treatment Minutes 0    Co-Treat Minutes 0    Variance/Reason:  N/a    Pain No c/o pain    Pain Intervention [] RN notified  [] Repositioned  [] Intervention offered and patient declined  [x] N/A  [] Other: [] RN notified  [] Repositioned  [] Intervention offered and patient declined  [] N/A  [] Other:   Subjective     Pt alert and oriented, cooperative and agreeable to participate in therapy. Pt seen sitting upright in wheelchair,  at bedside. Objective:     Dysphagia Goals  Short-term Goals  Timeframe for Short-term Goals: 18 days (05/28/2022)    Goal 1: The patient will tolerate recommended diet with no clinical s/s of aspiration    Pt observed with thin liquids via water bottle:  -no overt s/s of aspiration   -no coughing, wet vocal quality, or throat clearing observed     Goal met 05/24/22. Per chart review, speaking with pt and RN, pt tolerating IDDSI Level 7 regular solids with TL via cup meds whole with PO as LRD. Goal 2: The patient/caregiver will demonstrate understanding of compensatory swallow strategies, for improved swallow safety   SLP provided edu re continued use of safe swallow strategies (specifically use of lingual sweep to remove residue in L cheek) during all meals. Pt verbalized and demonstrated understanding. Goal met 05/24/22. Goal discontinued 05/17/22. Goal 4: The pt will complete oral motor exercises to improve labial and lingual strength, ROM, and coordination in 10/10 opportunities given min cues    Vital stim/NMES placement 4a at 11. 0mA for 30 mins  -pt tolerated without any adverse reactions or side effects    OME's completed:  -smile: x20 max cues  -pucker: x20 mod cues  -smile/pucker: x20 mod cues  -open mouth wide: x20 max cues   -cheek puff hold for 3 secs: x20 max cues  -eye squint: x20 max cues   -lingual protrusion hold for 3 secs: x20 max cues  -lingual lateralization corner to corner of lips: x20 indep  -lingual lateralization cheek to cheek: x20 mod cues             Cognitive-linguistic Goals:   Short-term Goals  Timeframe for Short-term Goals: 18 days (05/28/2022)    Goal 1: Pt will effectively use compensatory visual strategies for improved attention to left side during structured tasks with 80% acc given mod cues. Did not target. Goal 2: Pt will complete graded recall tasks using compensatory strategies with 90% acc given min cues   Functional recall of a picture scene:  -80% acc indep improving to 100% acc given min cues     Short term recall of the same picture after a 10 min delay:  -90% acc indep improving to 100% acc given min cues       Goal 3: Pt will complete executive function tasks (e.g. meds, time, money, etc) with 90% acc givgen min cues   Did not target. Goal 4: Pt will complete problem solving and thought organization tasks with 90% acc given min cues   Thought organization/alternating attention task:  -pt given two categories (animals and cities) and asked to alternate between the two categories  -A- ant, B- [place]  -76% acc indep improving to 100% acc given mod cues       Goal 5: Pt will complete verbal and visual reasoning tasks with 90% acc given min cues   Did not target. Other areas targeted: N/a    Education:   edu provided re: vital stim/NMES, attention strategies      Safety Devices: [x] Call light within reach  [x] Chair alarm activated  [] Bed alarm activated  [x] Other:  at bedside  [] Call light within reach  [] Chair alarm activated  [] Bed alarm activated  [] Other:    Assessment: Pt pleasant and cooperative, agreeable to participate. Vital stim/NMES placement 4a completed today, at 11. 0mA for 30 mins tolerating without any adverse reactions or side effects. Pt continues to require mod-max cues to complete OME's accurately.  Pt with overall significantly reduced ROM, strength, and coordination on L side, and very impulsive throughout completion of exercises. Pt benefited from cues to slow down. Pt completed functional recall and short term recall of a picture scene with 80-90% acc indep. Pt completed thought organization/alternating attention task with 76% acc indep. Pt has met dysphagia goals related to diet, tolerating IDDSI Level 7 regular solids with TL and meds whole with PO as LRD. Continuing to recommend 24 hour assist d/t reduced insight into deficits and severity of L neglect negatively impacting safety and independence. Continue goals above. Plan: Continue as per plan of care. Additional Information:     Barriers toward progress: Limited safety awareness, Limited insight into deficits, Decreased proprioception, Upper extremity weakness and Lower extremity weakness  Discharge recommendations:  [] Home independently  [] Home with assistance [x]  24 hour supervision  [] ECF [] Other:  Continued Tx Upon Discharge: ? [x] Yes [] No [] TBD based on progress while on ARU [] Vital Stim indicated [] Other:   Estimated discharge date: 05/31/2022    Interventions used this date:  [] Speech/Language Treatment  [] Instruction in HEP [] Group [x] Dysphagia Treatment [x] Cognitive Treatment   [] Other: Total Time Breakdown / Charges    Time in Time out Total Time / units   Cognitive Tx 1030 1100 30 min/ 2 units    Speech Tx -- -- --   Dysphagia Tx 1000 1030 30 min/ 1 unit        Electronically Signed by     Dario Rai. A CCC-SLP  Speech-Language Pathologist  [id number]

## 2022-05-24 NOTE — PROGRESS NOTES
Hospitalist Progress Note      PCP: Emiliano Lynch MD    Date of Admission: 5/10/2022    Chief Complaint: Weakness    Hospital Course:     Col[age] Sun y. o.  female with past medical history of right parietal stroke with left upper and lower extremities residual neurological deficits (in March 2022) presented to the ED on 5/3/2022 complaining of generalized weakness in the setting of rhabdomyolysis, EDYTA and elevated liver enzymes found during PCP office visit. Hospital course was complicated by acute ischemic stroke with left sided deficits, and by vasovagal episode.      Patient was seen and examined at bedside this morning. Patient was complaining of hemorrhoids pain and requested cream as she had a presyncope reaction yesterday from the hydrocortisone suppository. Patient had no other concerns. Patient denies any SOB, cough, chest pain, abdominal pain, fevers, or N/V/D. Acute right hemispheric MCA stroke with left sided weakness and neglect  - CT head 5/5/2022 showed increased conspicuity of ill-defined hypodensity in the right frontal corona radiata white matter concerning for evolving acute to subacute ischemic infarct.   - MRI brain 5/6/2022 showed areas of acute infarct involving the right cerebral hemisphere predominantly in a somewhat watershed distribution.  - CTA neck 5/9/2022 showed significant stenosis within the right proximal cervical ICA with approximately 90-95% stenosis per NASCET criteria. It also showed high grade stenoses throughout bilateral cervical vertebral arteries.   - neurology and vascular consulted and both feel like risk of urgent CEA outweighs the benefit given high risk for reperfusion injury or hemorrhagic conversion. Vascular intervention postponed for next month.  Patient will need to follow up with vascular in 4 weeks for further management.   - Continue stroke management DAPT, plavix and aspirin.   - Follow up with PCP after discharge.      Rhabdomyolysis   Acute renal injury, resolved  - CPK trending down   - Creatinine level 1.1 with good urinary output.   - Nephrology consulted. - Follow up with PCP after discharge.      Hypertension  - blood pressure medications held for now as patient had an episode of hypotension at noon. - Held hydralazyne, isosorbid mononitrate on 5/9/2022  - Continue lisinopril 20mg daily. - Consider giving metoprolol 25mg if blood pressure is not well controlled at acute rehab unit. - Follow up with PCP after discharge.      Diabetes   - Recently diagnosed with hemoglobin a1c 6.6 on 5/2/2022  - managed with insulin Humalog.  - Follow up with PCP for further management in outpatient setting.      Presyncope episode   - Most likely vasovagal as patient had presyncope episode after nurse administered suppository hydrocortisone on 5/9/2022  - Follow up with PCP after discharge.      Pharyngitis   - Resolved   - COVID-19 rapid and flu negative.  - SLP consulted      Liver injury with elevated LFT's   - most likely due to statin induced rhabdo  - Stopped statin for now  - Improving with LFTs trending down.   - Follow up with PCP after discharge to repeat CMP.    CML   - Stable  - Patient follows Dr. Ariana Denton  - on nilotinib daily at home, held initially and resumed as able   - Follow up with heme/onc as scheduled.      Enlarged heterogeneous appearance of the thyroid gland  As shown on CTA neck  - Thyroid ultrasound is recommended  - Consider following up with PCP after discharge.      Hemorrhoids  - Hydrocortisone suppository stopped as patient had presyncope reaction after administration on 5/9/2022  -  hydrocortisone cream applied.   - Follow up with PCP. Subjective:   Patient doing better. She is anticipating discharge at the end of the month. No fever or chills. No shortness of breath.     Medications:  Reviewed    Infusion Medications    dextrose       Scheduled Medications    sennosides-docusate sodium  2 tablet Oral Daily    lactulose 20 g Oral BID    NIFEdipine  30 mg Oral Daily    lisinopril  40 mg Oral Daily    metoprolol tartrate  25 mg Oral BID    heparin (porcine)  5,000 Units SubCUTAneous TID    aspirin  81 mg Oral Daily    carboxymethylcellulose PF  1 drop Both Eyes Q12H    clopidogrel  75 mg Oral Daily    diclofenac sodium  4 g Topical 4x Daily    famotidine  10 mg Oral Daily    hydrocortisone   Topical BID    levothyroxine  50 mcg Oral Daily    magnesium oxide  400 mg Oral Daily    nilotinib  200 mg Oral Q12H     PRN Meds: traMADol, guaiFENesin, hydrALAZINE, traZODone, acetaminophen **OR** acetaminophen, ondansetron **OR** ondansetron, aluminum & magnesium hydroxide-simethicone, bisacodyl, dextrose, dextrose bolus **OR** dextrose bolus, glucagon (rDNA), glucose, witch hazel-glycerin      Intake/Output Summary (Last 24 hours) at 5/23/2022 2154  Last data filed at 5/23/2022 1723  Gross per 24 hour   Intake 300 ml   Output --   Net 300 ml       Physical Exam Performed:    /62   Pulse 78   Temp 97.6 °F (36.4 °C) (Oral)   Resp 18   Ht 5' 2.5\" (1.588 m)   Wt 150 lb 12.7 oz (68.4 kg)   SpO2 94%   BMI 27.14 kg/m²     General appearance: No apparent distress, appears stated age and cooperative. Respiratory:  Normal respiratory effort. Clear to auscultation, bilaterally without Rales/Wheezes/Rhonchi. Cardiovascular: Regular rate and rhythm with normal S1/S2 without murmurs, rubs or gallops. Neurologic:  Generalized weakness    Labs:   Recent Labs     05/23/22  0752   WBC 8.3   HGB 11.7*   HCT 34.9*        Recent Labs     05/23/22  0752      K 4.3      CO2 25   BUN 26*   CREATININE 0.7   CALCIUM 9.6     No results for input(s): AST, ALT, BILIDIR, BILITOT, ALKPHOS in the last 72 hours. No results for input(s): INR in the last 72 hours. No results for input(s): Tab [place] in the last 72 hours.     Urinalysis:      Lab Results   Component Value Date    NITRU Negative 05/03/2022    WBCUA 3-5 05/03/2022    RBCUA 5-10 05/03/2022    BLOODU LARGE 05/03/2022    SPECGRAV 1.020 05/03/2022    GLUCOSEU Negative 05/03/2022       Radiology:  XR CHEST PORTABLE   Final Result   Significantly improved infiltrate in the right lower lobe. XR CHEST 1 VIEW   Final Result   Right lower lobe airspace infiltrate compatible with pneumonia. Assessment/Plan:    Active Hospital Problems    Diagnosis     Acute CVA (cerebrovascular accident) ([facility] [address] [I63.9]      Priority: Medium     1. EDYTA - Nephrology following. Creat 0.7. 2. Hypertension - BP controlled. Continue meds. 3. DMII - Continue to monitor. 4. CVA - PMR following    IM will continue to monitor peripherally.     Tunde Rowley MD

## 2022-05-24 NOTE — PLAN OF CARE
Patient remains free from falls and injuries. Patient compliant for asking for staff assistance with transfers.   Patient has non skid footwear on.   Margaret Weber RN

## 2022-05-24 NOTE — PROGRESS NOTES
1+mod A of 1.     wc mobility x 150 ft with SBA and near constant cues for L sided awareness / maintain linear paths. Navigates 2 doorways with increased time/ cues. Sit to stand w/c to carranza rail with mod A of 2. Static/dyn standing task for approx 2.5-3 min total. Pt consistently requires mod-max a of 2 for standing balance. Pt initially static standing with mod A of 2 with max VC for midline posture, exhibiting increased posterior lean. Therapist attempted to facilitate anterior weight shift by having pt flex at waist to reach for cone on rail with RUE however upon returning to upright standing, pt demo's significant L/ posterior LOB x 3 reps with max a fo 2 to correct. Pt also exhibiting increased flexor tone in LLE resulting in difficulty WB. Max a of 2 to return to sitting    Sit pivot w/c to low mat with L side leading with max a of 1+ mod A of 1. Dyn sitting task: with feet shoulder width apart on ground, pt flexes at waist to reach forward for clothespin-> return to midline upright sitting-> L elbow WB while placing clothespin to target across midline -> return to upright midline sitting. Mirror provided for visual feedback. Mod A to return from side-lying to upright sitting. Verbal/tactile/ visual cues provided for posture with pt attending to task for approx 30-40 sec prior to becoming distracted by gym environment resulting in L LOB with mod A to correct. Completed x 8 reps. Sit pivot low mat to w/c with mod A of 2 with R side leading. Pt in w/c with alarm donned, call light/needs within reach at end of session. ASSESSMENT/PROGRESS TOWARDS GOALS  Vital Signs  Pulse: 76  Heart Rate Source: Monitor  BP: (!) 158/71  BP Location: Right upper arm  MAP (Calculated): 100  SpO2: 95 %  O2 Device: None (Room air)    Assessment  Assessment: pt seen as co treat with OT for increased safety progressing functional mobility.  pt demo's increased difficulty with static/dyn standing tasks this am due to increased posterior/ L lateral lean. pt also presenting with increased LLE flexion tone. [facility] with empahsis on WB throuh LLE and LUE. pt requires SBA-mod A for dyn sitting EOM without UE support, increased time for pt to identify / obtain midline with tactile/verbal/visual cues.  present for session. pt ind with w/c mobility limited by L neglect, 75% of time requires cues to attend to L sided obstacles and to maintain linear path. rec SNF upon dc due to pt's current burden of care adn limited physical assist at home. Activity Tolerance: Patient tolerated treatment well  Discharge Recommendations: Subacute/Skilled Nursing Facility  PT Equipment Recommendations  Equipment Needed: Yes  Other: CTA pending progress with mobility    Goals  Patient Goals   Patient goals : \"go on my mission trip in [place]"  Short Term Goals  Time Frame for Short term goals: 11 days 5/20  Short term goal 1: pt will complete bed mobility with mod A; not met 5/20 - pt requires mod A x2 for supine>sit  Short term goal 2: pt will complete functional transfer with max A; goal partially met 5/20 - pt completes transfers with mod A x2 with lorrie stedy vs. // bars  Short term goal 3: pt will tolerate gait assessment and set goal when appropriate; goal met 5/20 - pt ambulates 6 ft in // bars with mod A  x2 (dependent d/t w/c follow and // bars), new goal in LTG  Short term goal 4: pt will tolerate stair assessment and set goal when appropriate; not met 5/20 - unsafe to attempt stairs  Short term goal 5: pt will propel w/c x 150 ft with supv; not assessed 5/20  Long Term Goals  Time Frame for Long term goals : 21 days 5/31  Long term goal 1: pt will complete bed mobility with CGA. Long term goal 2: pt will complete functional transfer with min a and LRAD. Long term goal 3: pt will propel w/c x 150 ft with CGA  Long term goal 4: Pt will ambulate 10 ft with LRAD and mod A.     PLAN OF CARE/SAFETY  Plan  Plan: 5-7 times per week  Current Treatment Recommendations: Strengthening;ROM;Balance training;Functional mobility training;Transfer training; Endurance training;Gait training; Therapeutic activities; Home exercise program;Wheelchair mobility training;Equipment evaluation, education, & procurement;Cognitive reorientation;Stair training;Positioning; Safety education & training;Patient/Caregiver education & training;Cognitive/Perceptual training;ADL/Self-care training;IADL training;Pain management  Safety Devices  Type of Devices: All fall risk precautions in place;Call light within reach; Patient at risk for falls;Nurse notified; All yaima prominences offloaded;Gait belt;Left in chair;Chair alarm in place      Therapy Time   Individual Concurrent Group Co-treatment   Time In       0800   Time Out       0900   Minutes       60     Timed Code Treatment Minutes: 400 [facility]       Rajiv Acosta PT, 05/24/22 at 2:43 PM

## 2022-05-24 NOTE — PROGRESS NOTES
Interval History and plan:      Blood pressure is stable  On room air  Electrolytes are stable  Creatinine 0.7-normal     Plan:  No changes today                       Assessment :     Acute Kidney Injury  Creatinine 1.1 at the time of consult, as she might have chronic kidney disease  EDYTA likely due to -rhabdomyolysis/poor p.o. intake  Cr on consultation 2.1 when she was in the acute hospital  Baseline Cr-0.8 on 9/21  No recent baseline available-she was admitted to [facility] March 2022 with the stroke and was told that she has high creatinine    UA-5/22-large blood, trace LE  Renal Imaging:-5/22-right-10.7 cm, simple 2.2 cm right renal cyst  No mention of left kidney  Echo: 10/18-EF normal, no mention of diastolic dysfunction    Hypertension        BP goal inpatient [phone] systolic inpatient    Rhabdomyolysis  CPK more than 10,000 on arrival-down to 8.2   Thought to be induced by statin  Has elevated AST and ALT    CML  Diabetes mellitus type 2 new  Carotid artery stenosis-plan for outpatient procedure    [facility] would like to thank Bianka Spencer MD   for opportunity to serve this patient      Please call with questions at-   24 Hrs Answering service [phone] or  7 am- 5 pm via Perfect serve or cell phone  Addi Sampson MD          CC/reason for consult :     Severe hypertension   HPI :     Mel Balderas is a 66 y.o. female presented to   the hospital on 5/10/2022 with EDYTA and statin induced rhabdomyolysis to the acute hospital.  She was treated for both. She recently had a stroke and he may paralysis for which she was seen by neurologist.  She was found to have carotid artery stenosis for which she was seen by vascular surgeon. Plan for endarterectomy as an outpatient. Once her renal function was a stable and rhabdomyolysis improved she was admitted to acute rehab unit for further care    At the rehab.   Her blood pressure has gone up significantly because of which we are consulted    ROS:     Seen with-no family    positives in bold   Constitutional:  fever, chills, weakness, weight change, fatigue  Skin:  rash, pruritus, hair loss, bruising, dry skin, petechiae  Head, Face, Neck   headaches, swelling,  cervical adenopathy  Respiratory: shortness of breath, cough, or wheezing  Cardiovascular: chest pain, palpitations, dizzy, edema  Gastrointestinal: nausea, vomiting, diarrhea, constipation,belly pain    Yellow skin, blood in stool  Musculoskeletal:  back pain, muscle weakness, gait problems,       joint pain or swelling. Genitourinary:  dysuria, poor urine flow, flank pain, blood in urine  Neurologic:  vertigo, TIA'S, syncope, seizures, focal weakness  Psychosocial:  insomnia, anxiety, or depression. Additional positive findings:                    All other remaining systems are negative or unable to obtain        PMH/PSH/SH/Family History:     Past Medical History:   Diagnosis Date    Anemia     Arthritis     Asthma     Bowel dysfunction     CML (chronic myelocytic leukemia) ([facility] [address] 01/2006    leukemia    Hyperlipidemia     Hypertension     Nuclear senile cataract of both eyes 11/25/2019    Obesity     Risk of myocardial infarction or stroke 7.5% or greater in next 10 years 9/15/2021    Skin cancer of face     left cheek, squamous    Stroke (cerebrum) ([facility] [address]     Thyroid disease     TIA (transient ischemic attack)     mini ones-from chemo       Past Surgical History:   Procedure Laterality Date    ANUS SURGERY  1998    fissure    BREAST BIOPSY      BREAST LUMPECTOMY      benign    COLONOSCOPY      numerous polyps    ELBOW SURGERY  1960    removal of foreign body ([place])    FINE NEEDLE ASPIRATION      PARACENTESIS      x 2 fluid in lung from Chemo    TONSILLECTOMY Bilateral         reports that she has never smoked. She has never used smokeless tobacco. She reports that she does not drink alcohol and does not use drugs.     family history includes Arrhythmia in her sister; Cancer in her brother, father, mother, and sister.          Medication:     Current Facility-Administered Medications: traMADol (ULTRAM) tablet 50 mg, 50 mg, Oral, Q6H PRN  guaiFENesin (MUCINEX) extended release tablet 600 mg, 600 mg, Oral, BID PRN  sennosides-docusate sodium (SENOKOT-S) 8.6-50 MG tablet 2 tablet, 2 tablet, Oral, Daily  lactulose (CHRONULAC) 10 GM/15ML solution 20 g, 20 g, Oral, BID  NIFEdipine (PROCARDIA XL) extended release tablet 30 mg, 30 mg, Oral, Daily  hydrALAZINE (APRESOLINE) tablet 10 mg, 10 mg, Oral, Q2H PRN  lisinopril (PRINIVIL;ZESTRIL) tablet 40 mg, 40 mg, Oral, Daily  traZODone (DESYREL) tablet 50 mg, 50 mg, Oral, Nightly PRN  metoprolol tartrate (LOPRESSOR) tablet 25 mg, 25 mg, Oral, BID  acetaminophen (TYLENOL) tablet 650 mg, 650 mg, Oral, Q6H PRN **OR** acetaminophen (TYLENOL) suppository 650 mg, 650 mg, Rectal, Q6H PRN  heparin (porcine) injection 5,000 Units, 5,000 Units, SubCUTAneous, TID  ondansetron (ZOFRAN-ODT) disintegrating tablet 4 mg, 4 mg, Oral, Q8H PRN **OR** ondansetron (ZOFRAN) injection 4 mg, 4 mg, IntraVENous, Q6H PRN  aluminum & magnesium hydroxide-simethicone (MAALOX) 200-200-20 MG/5ML suspension 30 mL, 30 mL, Oral, Q6H PRN  bisacodyl (DULCOLAX) suppository 10 mg, 10 mg, Rectal, Daily PRN  aspirin EC tablet 81 mg, 81 mg, Oral, Daily  carboxymethylcellulose PF (THERATEARS) 1 % ophthalmic gel 1 drop, 1 drop, Both Eyes, Q12H  clopidogrel (PLAVIX) tablet 75 mg, 75 mg, Oral, Daily  dextrose 5 % solution, 100 mL/hr, IntraVENous, PRN  dextrose bolus 10% 125 mL, 125 mL, IntraVENous, PRN **OR** dextrose bolus 10% 250 mL, 250 mL, IntraVENous, PRN  diclofenac sodium (VOLTAREN) 1 % gel 4 g, 4 g, Topical, 4x Daily  famotidine (PEPCID) tablet 10 mg, 10 mg, Oral, Daily  glucagon (rDNA) injection 1 mg, 1 mg, IntraMUSCular, PRN  glucose (GLUTOSE) 40 % oral gel 15 g, 15 g, Oral, PRN  hydrocortisone 1 % cream, , Topical, BID  levothyroxine (SYNTHROID) tablet 50 mcg, 50 mcg, Oral, Daily  magnesium oxide (MAG-OX) tablet 400 mg, 400 mg, Oral, Daily  nilotinib (TASIGNA) capsule 200 mg, 200 mg, Oral, Q12H  witch hazel-glycerin (TUCKS) pad, , Topical, PRN       Vitals :     Vitals:    05/23/22 2115   BP: 130/62   Pulse: 78   Resp: 18   Temp: 97.6 °F (36.4 °C)   SpO2: 94%       I & O :       Intake/Output Summary (Last 24 hours) at 5/24/2022 0731  Last data filed at 5/23/2022 1723  Gross per 24 hour   Intake 300 ml   Output --   Net 300 ml        Physical Examination :     General appearance: Anxious- no, distressed- no, in good spirits-  Yes  HEENT: Lips- normal, teeth- ok , oral mucosa- moist  Neck : Mass- no, appears symmetrical, JVD- not visible  Respiratory: Respiratory effort-  normal, wheeze- no, crackles -   Cardiovascular:  Ausculation- No M/R/G, Edema none  Abdomen: visible mass- no, distention- no, scar- no, tenderness- no                            hepatosplenomegaly-  no  Musculoskeletal:  clubbing no,cyanosis- no , digital ischemia- no                           muscle strength- grossly normal , tone - grossly normal  Skin: rashes- no , ulcers- no, induration- no, tightening - no  Psychiatric:  Judgement and insight- normal           AAO X 3  Additional finding:   Left sided weakness       LABS:     Recent Labs     05/23/22  0752   WBC 8.3   HGB 11.7*   HCT 34.9*        Recent Labs     05/23/22  0752      K 4.3      CO2 25   BUN 26*   CREATININE 0.7   GLUCOSE 110*

## 2022-05-24 NOTE — PROGRESS NOTES
Occupational Therapy  Facility/Department: [facility]  Rehabilitation Occupational Therapy Daily Treatment Note    Date: 22  Patient Name: Tatianna Bourgeois       Room: 1205/5580-81  MRN: [id number]  Account: [de-identified]   : 1950  (76 y.o.) Gender: female                    Past Medical History:  has a past medical history of Anemia, Arthritis, Asthma, Bowel dysfunction, CML (chronic myelocytic leukemia) ([facility] [address] Hyperlipidemia, Hypertension, Nuclear senile cataract of both eyes, Obesity, Risk of myocardial infarction or stroke 7.5% or greater in next 10 years, Skin cancer of face, Stroke (cerebrum) ([facility] [address] Thyroid disease, and TIA (transient ischemic attack). Past Surgical History:   has a past surgical history that includes Breast biopsy; fine needle aspiration; Breast lumpectomy; Tonsillectomy (Bilateral); Paracentesis; Anus surgery (); Elbow surgery (); and Colonoscopy. Restrictions  Restrictions/Precautions: Fall Risk;Up as Tolerated  Other position/activity restrictions: chemo precautions, L inattention, LUE flaccid, resting hand splint to be worn at night. Subjective  Subjective: Pt in bed, agreeable to OT/PT cotx. Pts  present throughout. Pt denies pain  Restrictions/Precautions: Fall Risk;Up as Tolerated             Objective     Cognition  Overall Cognitive Status: Exceptions  Following Commands: Follows one step commands with repetition; Follows multistep commands with repitition  Attention Span: Attends with cues to redirect  Memory: Decreased recall of precautions  Safety Judgement: Decreased awareness of need for safety;Decreased awareness of need for assistance  Problem Solving: Assistance required to correct errors made;Assistance required to identify errors made  Insights: Decreased awareness of deficits  Initiation: Requires cues for some  Sequencing: Requires cues for some  Cognition Comment: L inattention  Orientation  Overall Orientation Status: Within Functional Limits  Orientation Level: Oriented X4   Perception  Overall Perceptual Status: Impaired  Unilateral Attention: Cues to attend left visual field;Cues to attend to left side of body;Cues to maintain midline in sitting;Cues to maintain midline in standing  Initiation: Cues to initiate tasks  Motor Planning: Hand over hand to sequence tasks  Perseveration: Perseverates during conversation     ADL  Feeding  Assistance Level: Increased time to complete;Set-up; Verbal cues;Stand by assist  Skilled Clinical Factors: assistance to cut food, open packages and bring cup to hand  Grooming/Oral Hygiene  Assistance Level: Increased time to complete;Minimal assistance  Skilled Clinical Factors: washing face and brushing hair sitting  Upper Extremity Dressing  Assistance Level: Maximum assistance  Skilled Clinical Factors: Moderate cues for papo techniques. Pt completes UB dressing in supported sitting EOB. Pt requiring partial assistance to thread BUE and doff/don clothing over abdomen. Pt able to manage clothing over head with cues and extra time  Lower Extremity Dressing  Assistance Level: Dependent;Verbal cues  Skilled Clinical Factors: 2 person assistance with use of rolling R vs L R side to don pants. Pt requiring Mod A rolling R vs L while therapist provides assistance to thread BLE. Putting On/Taking Off Footwear  Assistance Level: Maximum assistance  Skilled Clinical Factors: donning shoes          Functional Mobility  Device: Wheelchair  Activity: To/From bathroom; Retrieve items;Transport items; To/From therapy gym  Assistance Level: Requires x 2 assistance; Moderate assistance  Skilled Clinical Factors: modA x2 SPT R vs L wiht max cues, mod-maxA x2 progressing to x1 A standing with rail on R side for reaching forwards/backwards, Mita X1 modA x1 PRN for sitting EOM activties with WBing  Bed Mobility  Overall Assistance Level: Requires x 2 Assistance; Moderate Assistance  Additional Factors: Head of bed raised; Increased time to complete;Set-up; Verbal cues  Bridging  Assistance Level: Maximum assistance  Roll Right  Assistance Level: Moderate assistance  Sit to Supine  Assistance Level: Maximum assistance  Supine to Sit  Assistance Level: Moderate assistance; Requires x 2 assistance  Scooting  Assistance Level: Moderate assistance; Requires x 2 assistance  Transfers  Surface: From bed; Wheelchair; To chair with arms;From chair with arms  Additional Factors: Increased time to complete;Hand placement cues; Verbal cues  Sit to Stand  Assistance Level: Moderate assistance; Requires x 2 assistance;Maximum assistance  Skilled Clinical Factors: Max+Mod A to/from EOB to WC <> EOM going R vs L, modA x2 sit <> stand with rail on R side  Stand to Sit  Assistance Level: Moderate assistance; Requires x 2 assistance;Maximum assistance  Skilled Clinical Factors: Max+Mod A to/from EOB to WC <> EOM going R vs L, modA x2 sit <> stand with rail on R side  Bed To/From Chair  Technique: Sit pivot  Assistance Level: Maximum assistance; Moderate assistance; Requires x 2 assistance  Skilled Clinical Factors: EOB to WC modA x2 to L side; sit <> stand to rail on R side reaching forwards for cones and placing across midline with poor ability to maintain upright posture safely therefore EOM activities to address midline balance completed. Stand Pivot  Assistance Level: Moderate assistance;Maximum assistance; Requires x 2 assistance  Skilled Clinical Factors: Max+Mod A to/from EOB to WC <> EOM going R vs L, modA x2 sit <> stand with rail on R side  Sit Pivot  Assistance Level: Moderate assistance;Maximum assistance; Requires x 2 assistance  Skilled Clinical Factors: Max+Mod A to/from EOB to WC <> EOM going R vs L, modA x2 sit <> stand with rail on R side   Neuromuscular Education  Neuromuscular education: Yes  NDT Treatment: Upper extremity  Head/Neck Control: Pt does demo increased sitting balance with less cues but does demo R sided lean at times when pulling towards R side; poor carryover of sequencing for midline alignment despite max tactile cues and demo. OT providing hand over hand for turning head L vs R to scan for objects; continues to demo severe L sided neglect deficits but increased carryover for IDing object on L side during toileting tasks  Trunk Control: Pt demos increased midline alignment when given verbal and tacitle cues from OT and PT assisting with weight shifting for sit <> Stand to grab bar R side in hallway; OT providing R side hip rotation once standing to rail on R side, WBing through LUE during reaching across midline and standing tolerance while PT providing assist for LLE management/support, and max cues for upright posture; pt with poor upright tolerance, sequencing, and posture with weight shifting using rail despite max cues and time. Pt then sat EOM wiht Mita x1-2 for sitting balance (dynamic reaching forwards for clothespins wiht RUE on mirror, transitioning to [facility] through LUE forarm to visually scan to L side and place on mat). Pt wiht increased ability to maintain midline wiht verbal and min tactile cues this date  Weight Bearing  Weight Bearing Technique: Yes  RUE Weight Bearing: Extended arm standing; Extended arm seated  LUE Weight Bearing: Extended arm standing; Extended arm seated  Response To Weight Bearing Technique: WBing through LUE AAROM and hand over hand during sit <> stands with RUE support on rail reaching across midlie. WBing through LUE forearm to extension on EOM to place resistive clothespins with increased core strengh and LUE WBing during task. OT providing support at LLE while PT providing tactile cues for upright posture and trunk alignment reaching forwards to gather resitve clothespins     Assessment  Assessment  Assessment: Patient progressing well towards goals, continues to benefit from cotx PT/OT to address functional tranfsers/standing balance.  Pt continues to demo increased L sided lean and needing RUE for alginment and posture sitting EOM/EOB with PT/OT support. Trialed standing/reaching across midline with rail on R side standing in hallway with limited ability to maintain upright posture and max A x2 for trunk alignment, LLE placement/support and WBing through LUE. Pt distracted easily this date needing consistent cues for sequencing/mechanics. Cont OT POC. Activity Tolerance: Patient tolerated treatment well  Discharge Recommendations: Subacute/Skilled Nursing Facility  OT Equipment Recommendations  Equipment Needed: Yes  Other: CTA  Safety Devices  Safety Devices in place: Yes  Type of devices: Left in chair;Call light within reach; Chair alarm in place;Nurse notified; Patient at risk for falls; All fall risk precautions in place;Gait belt  Restraints  Initially in place: No    Patient Education  Education  Education Given To: Patient  Education Provided: Role of Therapy;Plan of Care;Safety;Equipment;ADL Function; Fall Prevention Strategies;Transfer Training  Education Provided Comments: bed mobility, sitting balance, safety and hand placement with transfers, hemistrategies for UB bathing/dressing, skin checks for resting hand splint, AAROM LUE exercises, weightshifting during ambulation/standing, standing balance  Education Method: Demonstration;Verbal  Barriers to Learning: Cognition  Education Outcome: Verbalized understanding;Demonstrated understanding;Continued education needed    Plan  Plan  Times per Week: 5 out of 7 days  Times per Day: Daily  Plan Weeks: 3 weeks  Current Treatment Recommendations: Strengthening;ROM;Balance training;Functional mobility training; Endurance training; Safety education & training;Neuromuscular re-education;Patient/Caregiver education & training;Equipment evaluation, education, & procurement;Self-Care / ADL;Cognitive/Perceptual training; Wheelchair mobility training;Positioning    Goals  Patient Goals   Patient goals :  \"Go home\" \"Get stronger\" \"walk better\"  Short Term Goals  Time Frame for Short term goals: 10 days (5/20/22)-EXTEND TO 5/25 2/2 PROGRESS  Short Term Goal 1: Pt will complete functional transfers with Mita x2 with LRAD- progressing, maxA x2 sit pivot vs stand pivot transfer-EXTEND TO 5/25  Short Term Goal 2: Pt will complete toileting/transfer modA x2 with LRAD and DME PRN-GOAL MET 5/16, modA x2 steady, CTA  Short Term Goal 3: Pt will complete LUE AAROM/AROM exercises to LUE to increase strength/endurance for ADLs/transfers-GOAL MET 5/18, CTA  Short Term Goal 4: Pt will be able to locate 3/5 object in L visual field with min cues during ADLs/activities-GOAL MET 5/18,CTA  Additional Goals?: No  Long Term Goals  Time Frame for Long term goals : 21 days (3/31/22)  Long Term Goal 1: Pt will complete functional transfers/mobility SPV with LRAD  Long Term Goal 2: Pt will complete UE dressing/bathing sitting setup with min cues for papo techniques  Long Term Goal 3: Pt will complete toileting/transfers with LRAD and DME PRN SPV  Long Term Goal 4: Pt will complete opening 3/5 containers with compensatory techniques sitting setup/SPV    Therapy Time   Individual Concurrent Group Co-treatment   Time In       0800   Time Out       0900   Minutes       60   Timed Code Treatment Minutes: [address] [facility] , OTR/L

## 2022-05-25 LAB
EKG ATRIAL RATE: 55 BPM
EKG DIAGNOSIS: NORMAL
EKG P AXIS: 28 DEGREES
EKG P-R INTERVAL: 170 MS
EKG Q-T INTERVAL: 432 MS
EKG QRS DURATION: 78 MS
EKG QTC CALCULATION (BAZETT): 413 MS
EKG R AXIS: -16 DEGREES
EKG T AXIS: 83 DEGREES
EKG VENTRICULAR RATE: 55 BPM
TROPONIN: <0.01 NG/ML

## 2022-05-25 PROCEDURE — 36415 COLL VENOUS BLD VENIPUNCTURE: CPT

## 2022-05-25 PROCEDURE — 97129 THER IVNTJ 1ST 15 MIN: CPT

## 2022-05-25 PROCEDURE — 93005 ELECTROCARDIOGRAM TRACING: CPT | Performed by: STUDENT IN AN ORGANIZED HEALTH CARE EDUCATION/TRAINING PROGRAM

## 2022-05-25 PROCEDURE — 97112 NEUROMUSCULAR REEDUCATION: CPT

## 2022-05-25 PROCEDURE — 6360000002 HC RX W HCPCS: Performed by: STUDENT IN AN ORGANIZED HEALTH CARE EDUCATION/TRAINING PROGRAM

## 2022-05-25 PROCEDURE — 84484 ASSAY OF TROPONIN QUANT: CPT

## 2022-05-25 PROCEDURE — 97535 SELF CARE MNGMENT TRAINING: CPT

## 2022-05-25 PROCEDURE — 6370000000 HC RX 637 (ALT 250 FOR IP): Performed by: STUDENT IN AN ORGANIZED HEALTH CARE EDUCATION/TRAINING PROGRAM

## 2022-05-25 PROCEDURE — 97530 THERAPEUTIC ACTIVITIES: CPT

## 2022-05-25 PROCEDURE — 92526 ORAL FUNCTION THERAPY: CPT

## 2022-05-25 PROCEDURE — 97130 THER IVNTJ EA ADDL 15 MIN: CPT

## 2022-05-25 PROCEDURE — 6370000000 HC RX 637 (ALT 250 FOR IP): Performed by: INTERNAL MEDICINE

## 2022-05-25 PROCEDURE — 93010 ELECTROCARDIOGRAM REPORT: CPT | Performed by: INTERNAL MEDICINE

## 2022-05-25 PROCEDURE — 1280000000 HC REHAB R&B

## 2022-05-25 RX ADMIN — LACTULOSE 20 G: 20 SOLUTION ORAL at 08:23

## 2022-05-25 RX ADMIN — HEPARIN SODIUM 5000 UNITS: 5000 INJECTION INTRAVENOUS; SUBCUTANEOUS at 13:34

## 2022-05-25 RX ADMIN — LACTULOSE 20 G: 20 SOLUTION ORAL at 21:56

## 2022-05-25 RX ADMIN — ACETAMINOPHEN 650 MG: 325 TABLET ORAL at 08:24

## 2022-05-25 RX ADMIN — CLOPIDOGREL BISULFATE 75 MG: 75 TABLET, FILM COATED ORAL at 08:25

## 2022-05-25 RX ADMIN — ASPIRIN 81 MG: 81 TABLET, COATED ORAL at 08:25

## 2022-05-25 RX ADMIN — CARBOXYMETHYLCELLULOSE SODIUM 1 DROP: 10 GEL OPHTHALMIC at 22:09

## 2022-05-25 RX ADMIN — FAMOTIDINE 10 MG: 20 TABLET, FILM COATED ORAL at 08:23

## 2022-05-25 RX ADMIN — DICLOFENAC SODIUM 4 G: 10 GEL TOPICAL at 13:35

## 2022-05-25 RX ADMIN — Medication 400 MG: at 08:25

## 2022-05-25 RX ADMIN — HEPARIN SODIUM 5000 UNITS: 5000 INJECTION INTRAVENOUS; SUBCUTANEOUS at 21:57

## 2022-05-25 RX ADMIN — METOPROLOL TARTRATE 25 MG: 25 TABLET, FILM COATED ORAL at 08:23

## 2022-05-25 RX ADMIN — LISINOPRIL 40 MG: 20 TABLET ORAL at 08:24

## 2022-05-25 RX ADMIN — DICLOFENAC SODIUM 4 G: 10 GEL TOPICAL at 18:59

## 2022-05-25 RX ADMIN — NIFEDIPINE 30 MG: 30 TABLET, FILM COATED, EXTENDED RELEASE ORAL at 08:25

## 2022-05-25 RX ADMIN — NYSTATIN 500000 UNITS: 100000 SUSPENSION ORAL at 21:57

## 2022-05-25 RX ADMIN — TRAMADOL HYDROCHLORIDE 50 MG: 50 TABLET, COATED ORAL at 05:29

## 2022-05-25 RX ADMIN — CARBOXYMETHYLCELLULOSE SODIUM 1 DROP: 10 GEL OPHTHALMIC at 08:26

## 2022-05-25 RX ADMIN — DOCUSATE SODIUM 50 MG AND SENNOSIDES 8.6 MG 2 TABLET: 8.6; 5 TABLET, FILM COATED ORAL at 08:25

## 2022-05-25 RX ADMIN — LEVOTHYROXINE SODIUM 50 MCG: 0.05 TABLET ORAL at 05:29

## 2022-05-25 RX ADMIN — DICLOFENAC SODIUM 4 G: 10 GEL TOPICAL at 08:35

## 2022-05-25 RX ADMIN — HEPARIN SODIUM 5000 UNITS: 5000 INJECTION INTRAVENOUS; SUBCUTANEOUS at 05:29

## 2022-05-25 RX ADMIN — METOPROLOL TARTRATE 25 MG: 25 TABLET, FILM COATED ORAL at 21:57

## 2022-05-25 RX ADMIN — ACETAMINOPHEN 650 MG: 325 TABLET ORAL at 13:34

## 2022-05-25 RX ADMIN — TRAMADOL HYDROCHLORIDE 50 MG: 50 TABLET, COATED ORAL at 18:58

## 2022-05-25 RX ADMIN — DICLOFENAC SODIUM 4 G: 10 GEL TOPICAL at 21:57

## 2022-05-25 RX ADMIN — NYSTATIN 500000 UNITS: 100000 SUSPENSION ORAL at 13:34

## 2022-05-25 ASSESSMENT — PAIN SCALES - GENERAL
PAINLEVEL_OUTOF10: 10
PAINLEVEL_OUTOF10: 10
PAINLEVEL_OUTOF10: 8

## 2022-05-25 NOTE — PROGRESS NOTES
Physical Therapy  Facility/Department: [facility]  Rehabilitation Physical Therapy Treatment Note    NAME: Mohsen Casas  : 1950 (66 y.o.)  MRN: [id number]  CODE STATUS: Full Code    Date of Service: 22       Restrictions:  Restrictions/Precautions: Fall Risk;Up as Tolerated  Position Activity Restriction  Other position/activity restrictions: chemo precautions, L inattention, LUE flaccid, resting hand splint to be worn at night. SUBJECTIVE  Subjective  Subjective: found in bed  Pain: denies pain         OBJECTIVE  Cognition  Overall Cognitive Status: Exceptions  Arousal/Alertness: Appropriate responses to stimuli  Following Commands: Follows one step commands with repetition; Follows multistep commands with repitition  Attention Span: Attends with cues to redirect  Memory: Decreased recall of precautions  Safety Judgement: Decreased awareness of need for safety;Decreased awareness of need for assistance  Problem Solving: Assistance required to correct errors made;Assistance required to identify errors made  Insights: Decreased awareness of deficits  Initiation: Requires cues for some  Sequencing: Requires cues for some  Cognition Comment: L inattention  Orientation  Overall Orientation Status: Within Functional Limits  Orientation Level: Oriented X4    Functional Mobility     Supine to sit with max A of 1+ min A of 1 and bed controls. Sit pivot EOB to wc with max a of 2  Stand pivot w/c <> commode with grab bar and max A of 2   Max a of 1+ min A of 1 for static standing balance with RUE support while OT assisted with pericare and clothing management. wc mobility with SBA-min a with RLE/RUE with consistent cues for L sided awareness and obstacle negotiation. Sit pivot w/c to low mat with max A of 2. Dyn sitting at low mat x 20-25 min with assist ranging from CGA-max A due to poor proprioception/ midline awareness.     Pt completed 5 reps of hand over hand forward trunk flexion seated EOM while pushing red swiss ball with max a of 2 to maintain middle/ flex at waist and return to upright sitting. Dyn sit activity: pt scanning L/R for specific beanbag to place to specific target out of JACINTA L or R and return to midline between reps x 10 total. Pt can require max a to return to midline from L or R side-lying     Pt completed 2x5 seated R weight shifts as pt pushing heavily to L and unable to correct with VC/ visual cues. Pt completed 3x5 partial sit to stands with max A of 2 and cues for midline position, holding each rep for 5 sec   Pt standing upright x 20 sec with therapist cueing pt to reach RUE upward to increase R lateral weight shift with positive result to counteract pt's significant L/posterior lean. Max A of 2 for balance. Sit pivot low mat to w/c with max A of 2. Pt in w/c at end of session with call light/needs within reach. ASSESSMENT/PROGRESS TOWARDS GOALS  Vital Signs  Heart Rate: 67  Heart Rate Source: Monitor  BP: (!) 152/67  BP Location: Right upper arm  MAP (Calculated): 95.33  SpO2: 92 %  O2 Device: None (Room air)    Assessment  Assessment: co treat with OT for increased safety progressing functional mobility. pt presents with increased difficulty finding and maintaining midline sitting and standing this pm. pt also exhibiting increased L sided neglect, requirign near constant cues to attend to L side. max a of 2 for all transfers and sitting balance ranges from CGA-max a. rec SNF upon dc due to burden of care.   Activity Tolerance: Patient tolerated treatment well  Discharge Recommendations: Subacute/Skilled Nursing Facility  PT Equipment Recommendations  Equipment Needed: Yes    Goals  Patient Goals   Patient goals : \"go on my mission trip in [place]"  Short Term Goals  Time Frame for Short term goals: 11 days 5/20  Short term goal 1: pt will complete bed mobility with mod A; not met 5/20 - pt requires mod A x2 for supine>sit  Short term goal 2: pt will complete functional transfer with max A; goal partially met 5/20 - pt completes transfers with mod A x2 with lorrie salbadordy vs. // bars  Short term goal 3: pt will tolerate gait assessment and set goal when appropriate; goal met 5/20 - pt ambulates 6 ft in // bars with mod A  x2 (dependent d/t w/c follow and // bars), new goal in LTG  Short term goal 4: pt will tolerate stair assessment and set goal when appropriate; not met 5/20 - unsafe to attempt stairs  Short term goal 5: pt will propel w/c x 150 ft with supv; not assessed 5/20  Long Term Goals  Time Frame for Long term goals : 21 days 5/31  Long term goal 1: pt will complete bed mobility with CGA. Long term goal 2: pt will complete functional transfer with min a and LRAD. Long term goal 3: pt will propel w/c x 150 ft with CGA  Long term goal 4: Pt will ambulate 10 ft with LRAD and mod A. PLAN OF CARE/SAFETY  Plan  Plan: 5-7 times per week  Current Treatment Recommendations: Strengthening;ROM;Balance training;Functional mobility training;Transfer training; Endurance training;Gait training; Therapeutic activities; Home exercise program;Wheelchair mobility training;Equipment evaluation, education, & procurement;Cognitive reorientation;Stair training;Positioning; Safety education & training;Patient/Caregiver education & training;Cognitive/Perceptual training;ADL/Self-care training;IADL training;Pain management  Safety Devices  Type of Devices: All fall risk precautions in place;Call light within reach; Patient at risk for falls;Nurse notified; All yaima prominences offloaded;Gait belt;Left in chair;Chair alarm in place      Therapy Time   Individual Concurrent Group Co-treatment   Time In       1230   Time Out       1330   Minutes       60     Timed Code Treatment Minutes: 400 [facility], [address], 05/25/22 at 3:34 PM

## 2022-05-25 NOTE — PROGRESS NOTES
Joe Oropeza  5/25/2022  [id number]    Chief Complaint: Acute CVA (cerebrovascular accident) [facility])    Subjective:   No acute events overnight. Today Daniel Delgado is seen in her room. She reports chest pain that she describes as a pressure. She denies radiation or other associated symptoms. Troponin and EKG unremarkable. She denies other acute complaints at this time. ROS: denies f/c, n/v, sob    Objective:  Patient Vitals for the past 24 hrs:   BP Temp Temp src Pulse Resp SpO2   05/25/22 0815 (!) 152/67 98.2 °F (36.8 °C) Oral 67 16 92 %   05/24/22 2130 (!) 112/54 98.2 °F (36.8 °C) Oral 63 18 94 %   05/24/22 1439 (!) 158/71 -- -- 76 -- 95 %     Gen: No distress, pleasant. Seated in wheelchair  HEENT: Normocephalic, atraumatic. CV: RRR  Resp: Lungs CTAB  Abd: Soft, nondistended  Ext: No edema. Neuro: Alert, oriented, appropriately interactive. Left hemiparesis. Psych: mood and affect appropriate    Wt Readings from Last 3 Encounters:   05/10/22 150 lb 12.7 oz (68.4 kg)   05/02/22 143 lb 9.6 oz (65.1 kg)   09/09/21 150 lb 12.8 oz (68.4 kg)       Laboratory data:   Lab Results   Component Value Date    WBC 8.3 05/23/2022    HGB 11.7 (L) 05/23/2022    HCT 34.9 (L) 05/23/2022    MCV 96.5 05/23/2022     05/23/2022       Lab Results   Component Value Date     05/23/2022    K 4.3 05/23/2022     05/23/2022    CO2 25 05/23/2022    BUN 26 05/23/2022    CREATININE 0.7 05/23/2022    GLUCOSE 110 05/23/2022    GLUCOSE 129 07/26/2017    CALCIUM 9.6 05/23/2022        Therapy progress:  PT  Position Activity Restriction  Other position/activity restrictions: chemo precautions, L inattention, LUE flaccid, resting hand splint to be worn at night.   Objective     Sit to Stand: 2 Person Assistance,Dependent/Total  Stand to sit: Dependent/Total,2 Person Assistance  Bed to Chair: Dependent/Total     OT  PT Equipment Recommendations  Equipment Needed: Yes  Other: CTA pending progress with mobility  Toilet - Technique: (steady to e-tac chair over toilet)  Equipment Used:  (e-tac chair)  Toilet Transfers Comments: maxA x2 sit <>  steady, progressing at times to [address] X2 with max verbal/tactile and demo cues  Assessment        SLP                Body mass index is 27.14 kg/m². Assessment and Plan:  Dhaval Barriga is a 66year old female with a past medical history significant for recent CVA with left hemiparesis, CML, HTN, and HLD who presented to [facility] on 5/3/22 with abnormal labs, admitted with rhabdomyolysis and ARF, found to have acute CVA.  She was admitted to [facility] on 5/10/22 due to functional deficits below her baseline.     Acute Right CVA  - MRI showing acute infarct in right cerebral hemisphere in a watershed distribution  - CTA showing 90-95% stenosis of right ICA  - Vascular and Neurology in agreement on waiting for CEA, needs follow up in 4 weeks  - asa, plavix, statin  - PT, OT, ST     Dysphagia  - upgraded to regular diet  - ST    RLL Pneumonia, resolved  - concern for aspiration in setting of dysphagia  - repeat CXR showing improvement 5/16  - completed course of levaquin   - make mucinex PRN and monitor symptoms  - IS      Rhabdomyolysis  Acute Renal Injury  - Nephrology followed during acute stay  - CPK trending down  - monitor      HTN  - patient with episode of hypotension while in acute care so isosorbid mononitrate and hydralazine held  - metoprolol, lisinopril, nifedipine  - Nephrology following, appreciate assistance    DM2  - Hba1c 6.6  - glucose well controlled without SSI, discontinued  - follow up with PCP     Liver Injury with elevated LFTs  - suspected to be due to statin induced rhabdo  - statin stopped  - LFTs normalized  - no need to further follow     CML  - follows with Dr. Carlos Jimenez  - on nilitinib  - chemo precautions  - follow up with Heme/Onc OP     Enlarged heterogeneous appearance of the thyroid  - Thyroid ultrasound outpatient  - follow up with PCP     Hemorrhoids  - hydrocortisone cream   - bowel regimen to avoid constipation     Bowels: Schedule stool softener. Follow bowel movements. Enema or suppository if needed.      Bladder: Check PVR x 3. [address] if PVR > 200ml or if any volume is > 500 ml.      Sleep: Trazodone provided prn. PPx  DVT: heparin  GI: not indicated     Follow up appointments: Vascular, PCP    Services: SNF  EDOD: 6/3/22    Interdisciplinary team conference was held today with entire rehab treatment team including PT, OT, SLP, Dietician, RN, and SW. Discussion focused on progress toward rehab goals and discharge planning. Barriers: level of assist, availability of assistance, comorbidities. Total treatment time >35 min with greater than 50% spent in care coordination. Fatemeh Gorman MD 5/25/2022, 1:27 PM

## 2022-05-25 NOTE — CARE COORDINATION
DIPIKA sent refferal to [address] [place] [address] for review. Elkin Robles RN    1230 DIPIKA received call from admissions from [address] [place] [address] stating that they have accepted patient. Writer will update son. Elkin Robles RN    1500 CM spoke with spouse and patient in room with updated DCP. CM discussed discharge date of 06/03/2022 and the acceptance to [facility]. Patient and spouse agrees with DCP.    1506 CM spoke with Elder Raphael (Child) [phone] via telephone with updated discharge plans and the new discharge date and acceptance to [facility].  Radames will notify brother of the updated DCP.  Elkin Robles RN

## 2022-05-25 NOTE — DISCHARGE INSTR - COC
Continuity of Care Form    Patient Name: Mohsen Casas   :  1950  MRN:  [id number]    Admit date:  5/10/2022  Discharge date:  2022    Code Status Order: Full Code   Advance Directives:      Admitting Physician:  Nik Faustin MD  PCP: Izaiah Lazar MD    Discharging Nurse: Raúl Hernandez 23 Unit/Room#: 7141/2145-19  Discharging Unit Phone Number: [phone]    Emergency Contact:   Extended Emergency Contact Information  Primary Emergency Contact: [address] Phone: [phone]  Relation: Child  Secondary Emergency Contact: nora negron  Mobile Phone: [phone]  Relation: Spouse  Preferred language: English   needed?  No    Past Surgical History:  Past Surgical History:   Procedure Laterality Date    ANUS SURGERY      fissure    BREAST BIOPSY      BREAST LUMPECTOMY      benign    COLONOSCOPY      numerous polyps    ELBOW SURGERY  1960    removal of foreign body ([facility])    FINE NEEDLE ASPIRATION      PARACENTESIS      x 2 fluid in lung from Chemo    TONSILLECTOMY Bilateral        Immunization History:   Immunization History   Administered Date(s) Administered    COVID-19, Pfizer Purple top, DILUTE for use, 12+ yrs, 30mcg/0.3mL dose 2021, 03/10/2021, 2021    Hepatitis A Adult (Havrix, Vaqta) 2015    Influenza Virus Vaccine 2017    Influenza, High Dose (Fluzone 65 yrs and older) 2015, 2016, 2019    Influenza, Quadv, IM, PF (6 mo and older Fluzone, Flulaval, Fluarix, and 3 yrs and older Afluria) 2014    Influenza, Quadv, adjuvanted, 65 yrs +, IM, PF (Fluad) 2020, 2021    Pneumococcal Conjugate 13-valent (Cvciuqp50) 2015    Pneumococcal Polysaccharide (Noclyyntc92) 2016    Tdap (Boostrix, Adacel) 2014       Active Problems:  Patient Active Problem List   Diagnosis Code    Acquired hypothyroidism E03.9    Adverse reaction to sulfa antibiotic T37.0X5A    Chemotherapy adverse reaction T45.1X5A    Anastomotic ulcer K28.9    Anemia D64.9    Anxiety F41.9    Campbell esophagus K22.70    Cancer (HCC) C80.1    Cellulitis of left lower extremity L03. 116    DNR (do not resuscitate) Z66    Family history of colon cancer Z80.0    Hiatal hernia K44.9    History of skin cancer Z85.828    Dyslipidemia E78.5    Localized edema R60.0    Lower abdominal pain R10.30    Lower extremity edema R60.0    Pleural effusion J90    Thyroid nodule E04.1    CML (chronic myelocytic leukemia) (HCC) C92.10    Rectal bleeding K62.5    Polyp of colon K63.5    Anatomical narrow angle, bilateral H40.033    Hx of actinic keratosis Z87.2    HTN (hypertension), benign I10    Bilateral primary osteoarthritis of knee M17.0    Chronic pain of both knees M25.561, M25.562, G89.29    Neurogenic bladder as late effect of cerebrovascular accident (CVA) I69.398, N31.9    Chronic kidney disease, stage 3a ([facility]) N18.31    Arterial ischemic stroke, ICA, right, acute ([facility]) I63.231    Hematuria R31.9    Positive depression screening Z13.31    Rhabdomyolysis M62.82    EDYTA (acute kidney injury) ([facility] N17.9    Troponin level elevated R77.8    Elevated liver enzymes R74.8    Type 2 diabetes mellitus with kidney complication, without long-term current use of insulin (HCC) E11.29    DM type 2, controlled, with complication ([facility]) K83.1    Pre-syncope R55    Hemorrhage R58    Acute CVA (cerebrovascular accident) ([facility] I63.9       Isolation/Infection:   Isolation            Chemo          Patient Infection Status       Infection Onset Added Last Indicated Last Indicated By Review Planned Expiration Resolved Resolved By    None active    Resolved    COVID-19 (Rule Out) 05/07/22 05/07/22 05/07/22 COVID-19, Rapid (Ordered)   05/07/22 Rule-Out Test Resulted            Nurse Assessment:  Last Vital Signs: BP (!) 152/67   Pulse 67   Temp 98.2 °F (36.8 °C) (Oral)   Resp 16   Ht 5' 2.5\" (1.588 m)   Wt 150 lb 12.7 oz Electronically signed by Columba Cardenas, CARMEN on 6/7/22 at 11:26 AM EDT    CASE MANAGEMENT/SOCIAL WORK SECTION    Inpatient Status Date: 05/10/2022    Readmission Risk Assessment Score:  Readmission Risk              Risk of Unplanned Readmission:  28       Discharging to Facility/ [address]  [facility], [address] [place] [address]  [phone]    / signature: Electronically signed by Kelly Alejo RN on 6/7/22 at 10:02 AM EDT    PHYSICIAN SECTION    Prognosis: Good    Condition at Discharge: Stable    Rehab Potential (if transferring to Rehab): Good    Recommended Labs or Other Treatments After Discharge:   - Follow up with PCP, Vascular    Physician Certification: I certify the above information and transfer of Tejas Cruz  is necessary for the continuing treatment of the diagnosis listed and that she requires [place] for less 30 days.      Update Admission H&P: No change in H&P    PHYSICIAN SIGNATURE:  Electronically signed by Darryl Clarke MD on 6/6/22 at 11:50 AM EDT

## 2022-05-25 NOTE — PROGRESS NOTES
Interval History and plan:      Blood pressure is stable  On room air  Electrolytes are stable  Creatinine 0.7-normal     Plan:  No changes today. Will sign off case. Call with any questions. Assessment :     Acute Kidney Injury  Creatinine 1.1 at the time of consult, as she might have chronic kidney disease  EDYTA likely due to -rhabdomyolysis/poor p.o. intake  Cr on consultation 2.1 when she was in the acute hospital  Baseline Cr-0.8 on 9/21  No recent baseline available-she was admitted to [facility] March 2022 with the stroke and was told that she has high creatinine    UA-5/22-large blood, trace LE  Renal Imaging:-5/22-right-10.7 cm, simple 2.2 cm right renal cyst  No mention of left kidney  Echo: 10/18-EF normal, no mention of diastolic dysfunction    Hypertension   BP: (152)/(67)  Heart Rate:  [67]   BP goal inpatient [phone] systolic inpatient    Rhabdomyolysis  CPK more than 10,000 on arrival-down to 8.2   Thought to be induced by statin  Has elevated AST and ALT    CML  Diabetes mellitus type 2 new  Carotid artery stenosis-plan for outpatient procedure    [facility] would like to thank Tatum Fritz MD   for opportunity to serve this patient      Please call with questions at-   24 Hrs Answering service [phone] or  7 am- 5 pm via Perfect serve or cell phone  [facility]          CC/reason for consult :     Severe hypertension   HPI :     James Muro is a 66 y.o. female presented to   the hospital on 5/10/2022 with EDYTA and statin induced rhabdomyolysis to the acute hospital.  She was treated for both. She recently had a stroke and he may paralysis for which she was seen by neurologist.  She was found to have carotid artery stenosis for which she was seen by vascular surgeon. Plan for endarterectomy as an outpatient.     Once her renal function was a stable and rhabdomyolysis improved she was admitted to acute rehab unit for further care    At the rehab. Her blood pressure has gone up significantly because of which we are consulted    ROS:     Seen with-no family    positives in bold   Constitutional:  fever, chills, weakness, weight change, fatigue  Skin:  rash, pruritus, hair loss, bruising, dry skin, petechiae  Head, Face, Neck   headaches, swelling,  cervical adenopathy  Respiratory: shortness of breath, cough, or wheezing  Cardiovascular: chest pain, palpitations, dizzy, edema  Gastrointestinal: nausea, vomiting, diarrhea, constipation,belly pain    Yellow skin, blood in stool  Musculoskeletal:  back pain, muscle weakness, gait problems,       joint pain or swelling. Genitourinary:  dysuria, poor urine flow, flank pain, blood in urine  Neurologic:  vertigo, TIA'S, syncope, seizures, focal weakness  Psychosocial:  insomnia, anxiety, or depression. Additional positive findings:                    All other remaining systems are negative or unable to obtain        PMH/PSH/SH/Family History:     Past Medical History:   Diagnosis Date    Anemia     Arthritis     Asthma     Bowel dysfunction     CML (chronic myelocytic leukemia) ([facility] [address] 01/2006    leukemia    Hyperlipidemia     Hypertension     Nuclear senile cataract of both eyes 11/25/2019    Obesity     Risk of myocardial infarction or stroke 7.5% or greater in next 10 years 9/15/2021    Skin cancer of face     left cheek, squamous    Stroke (cerebrum) ([address]     Thyroid disease     TIA (transient ischemic attack)     mini ones-from chemo       Past Surgical History:   Procedure Laterality Date    ANUS SURGERY  1998    fissure    BREAST BIOPSY      BREAST LUMPECTOMY      benign    COLONOSCOPY      numerous polyps    ELBOW SURGERY  1960    removal of foreign body ([place])    FINE NEEDLE ASPIRATION      PARACENTESIS      x 2 fluid in lung from Chemo    TONSILLECTOMY Bilateral         reports that she has never smoked.  She has never used smokeless tobacco. She reports that she does not drink alcohol and does not use drugs. family history includes Arrhythmia in her sister; Cancer in her brother, father, mother, and sister.          Medication:     Current Facility-Administered Medications: nystatin (MYCOSTATIN) 702232 UNIT/ML suspension 500,000 Units, 5 mL, Oral, 4x Daily  traMADol (ULTRAM) tablet 50 mg, 50 mg, Oral, Q6H PRN  guaiFENesin (MUCINEX) extended release tablet 600 mg, 600 mg, Oral, BID PRN  sennosides-docusate sodium (SENOKOT-S) 8.6-50 MG tablet 2 tablet, 2 tablet, Oral, Daily  lactulose (CHRONULAC) 10 GM/15ML solution 20 g, 20 g, Oral, BID  NIFEdipine (PROCARDIA XL) extended release tablet 30 mg, 30 mg, Oral, Daily  hydrALAZINE (APRESOLINE) tablet 10 mg, 10 mg, Oral, Q2H PRN  lisinopril (PRINIVIL;ZESTRIL) tablet 40 mg, 40 mg, Oral, Daily  traZODone (DESYREL) tablet 50 mg, 50 mg, Oral, Nightly PRN  metoprolol tartrate (LOPRESSOR) tablet 25 mg, 25 mg, Oral, BID  acetaminophen (TYLENOL) tablet 650 mg, 650 mg, Oral, Q6H PRN **OR** acetaminophen (TYLENOL) suppository 650 mg, 650 mg, Rectal, Q6H PRN  heparin (porcine) injection 5,000 Units, 5,000 Units, SubCUTAneous, TID  ondansetron (ZOFRAN-ODT) disintegrating tablet 4 mg, 4 mg, Oral, Q8H PRN **OR** ondansetron (ZOFRAN) injection 4 mg, 4 mg, IntraVENous, Q6H PRN  aluminum & magnesium hydroxide-simethicone (MAALOX) 200-200-20 MG/5ML suspension 30 mL, 30 mL, Oral, Q6H PRN  bisacodyl (DULCOLAX) suppository 10 mg, 10 mg, Rectal, Daily PRN  aspirin EC tablet 81 mg, 81 mg, Oral, Daily  carboxymethylcellulose PF (THERATEARS) 1 % ophthalmic gel 1 drop, 1 drop, Both Eyes, Q12H  clopidogrel (PLAVIX) tablet 75 mg, 75 mg, Oral, Daily  dextrose 5 % solution, 100 mL/hr, IntraVENous, PRN  dextrose bolus 10% 125 mL, 125 mL, IntraVENous, PRN **OR** dextrose bolus 10% 250 mL, 250 mL, IntraVENous, PRN  diclofenac sodium (VOLTAREN) 1 % gel 4 g, 4 g, Topical, 4x Daily  famotidine (PEPCID) tablet 10 mg, 10 mg, Oral, Daily  glucagon (rDNA) injection 1 mg, 1 mg, IntraMUSCular, PRN  glucose (GLUTOSE) 40 % oral gel 15 g, 15 g, Oral, PRN  hydrocortisone 1 % cream, , Topical, BID  levothyroxine (SYNTHROID) tablet 50 mcg, 50 mcg, Oral, Daily  magnesium oxide (MAG-OX) tablet 400 mg, 400 mg, Oral, Daily  nilotinib (TASIGNA) capsule 200 mg, 200 mg, Oral, Q12H  witch hazel-glycerin (TUCKS) pad, , Topical, PRN       Vitals :     Vitals:    05/25/22 0815   BP: (!) 152/67   Pulse: 67   Resp: 16   Temp: 98.2 °F (36.8 °C)   SpO2: 92%       I & O :       Intake/Output Summary (Last 24 hours) at 5/25/2022 1129  Last data filed at 5/25/2022 1020  Gross per 24 hour   Intake 720 ml   Output --   Net 720 ml        Physical Examination :     General appearance: Anxious- no, distressed- no, in good spirits-  Yes  HEENT: Lips- normal, teeth- ok , oral mucosa- moist  Neck : Mass- no, appears symmetrical, JVD- not visible  Respiratory: Respiratory effort-  normal, wheeze- no, crackles -   Cardiovascular:  Ausculation- No M/R/G, Edema none  Abdomen: visible mass- no, distention- no, scar- no, tenderness- no                            hepatosplenomegaly-  no  Musculoskeletal:  clubbing no,cyanosis- no , digital ischemia- no                           muscle strength- grossly normal , tone - grossly normal  Skin: rashes- no , ulcers- no, induration- no, tightening - no  Psychiatric:  Judgement and insight- normal           AAO X 3  Additional finding:   Left sided weakness       LABS:     Recent Labs     05/23/22  0752   WBC 8.3   HGB 11.7*   HCT 34.9*        Recent Labs     05/23/22  0752      K 4.3      CO2 25   BUN 26*   CREATININE 0.7   GLUCOSE 110*

## 2022-05-25 NOTE — PROGRESS NOTES
Occupational Therapy  Facility/Department: [facility]  Rehabilitation Occupational Therapy Daily Treatment Note    Date: 22  Patient Name: Jennifer Chinchilla       Room: 9386/3489-44  MRN: [id number]  Account: [de-identified]   : 1950  (76 y.o.) Gender: female                    Past Medical History:  has a past medical history of Anemia, Arthritis, Asthma, Bowel dysfunction, CML (chronic myelocytic leukemia) ([facility] [address] Hyperlipidemia, Hypertension, Nuclear senile cataract of both eyes, Obesity, Risk of myocardial infarction or stroke 7.5% or greater in next 10 years, Skin cancer of face, Stroke (cerebrum) ([facility] [address] Thyroid disease, and TIA (transient ischemic attack). Past Surgical History:   has a past surgical history that includes Breast biopsy; fine needle aspiration; Breast lumpectomy; Tonsillectomy (Bilateral); Paracentesis; Anus surgery (); Elbow surgery (); and Colonoscopy. Restrictions  Restrictions/Precautions: Fall Risk;Up as Tolerated  Other position/activity restrictions: chemo precautions, L inattention, LUE flaccid, resting hand splint to be worn at night. Subjective  Subjective: Pt in bed, agreeable to OT/PT cotx. Pts  present throughout. Pt denies pain  Restrictions/Precautions: Fall Risk;Up as Tolerated             Objective  Vision - Basic Assessment  Patient Visual Report: Balance difficulty  Cognition  Overall Cognitive Status: Exceptions  Arousal/Alertness: Appropriate responses to stimuli  Following Commands: Follows one step commands with repetition; Follows multistep commands with repitition  Attention Span: Attends with cues to redirect  Memory: Decreased recall of precautions  Safety Judgement: Decreased awareness of need for safety;Decreased awareness of need for assistance  Problem Solving: Assistance required to correct errors made;Assistance required to identify errors made  Insights: Decreased awareness of deficits  Initiation: Requires cues for some  Sequencing: Requires cues for some  Cognition Comment: L inattention  Orientation  Overall Orientation Status: Within Functional Limits  Orientation Level: Oriented X4   Perception  Overall Perceptual Status: Impaired  Unilateral Attention: Cues to attend left visual field;Cues to attend to left side of body;Cues to maintain midline in sitting;Cues to maintain midline in standing  Initiation: Cues to initiate tasks  Motor Planning: Hand over hand to sequence tasks  Perseveration: Perseverates during conversation     ADL  Feeding  Assistance Level: Increased time to complete;Set-up; Verbal cues;Stand by assist  Skilled Clinical Factors: assistance to cut food, open packages and bring cup to hand  Grooming/Oral Hygiene  Assistance Level: Increased time to complete;Minimal assistance  Skilled Clinical Factors: washing face and brushing hair sitting  Upper Extremity Bathing  Assistance Level: Increased time to complete; Moderate assistance;Verbal cues  Skilled Clinical Factors: Mod A sponge bathing in supported sitting in w/c. Pt requiring full assistance to wash RUE and partial assistance to wash LUE. Pt requiring min cues for thoroughness on L side of body. Upper Extremity Dressing  Assistance Level: Maximum assistance  Skilled Clinical Factors: Moderate cues for papo techniques. Pt completes UB dressing in supported sitting EOB. Pt requiring partial assistance to thread BUE and doff/don clothing over abdomen. Pt able to manage clothing over head with cues and extra time  Lower Extremity Dressing  Assistance Level: Dependent;Verbal cues  Skilled Clinical Factors: 2 person assistance with use of rolling R vs L R side to don pants. Pt requiring Mod A rolling R vs L while therapist provides assistance to thread BLE.   Putting On/Taking Off Footwear  Assistance Level: Maximum assistance  Skilled Clinical Factors: donning shoes  Toileting  Assistance Level: Dependent  Skilled Clinical Factors: maxA x1 sit <> Stand from [facility] to [facility] to BSC, maxA x2 SPT and then x2 in stance BSC over toilet to [address]  Toilet Transfers  Technique: To the right; To the left;Stand step  Equipment: Beside commode  Additional Factors: Verbal cues;Cues for hand placement; Increased time to complete; With handrails  Assistance Level: Maximum assistance; Requires x 2 assistance  Skilled Clinical Factors: maxA x1-2  sit <> Stand from [address] to grab bar to BS, maxA x2 SPT and then x2 in stance BSC over toilet to [address]          Functional Mobility  Device: Wheelchair  Activity: To/From bathroom; Retrieve items;Transport items; To/From therapy gym  Assistance Level: Requires x 2 assistance; Moderate assistance  Skilled Clinical Factors: maxA x2 sit <> stand transfers and stand step transfer vs stand pivot transfer with grab bar to/from [facility] over the toilet; maxA x2 for all stand pivot vs squat pivot transfer R vs L WC <> EOM for balance sitting EOM. Pt wiht poor sequencing this date and poor unsupported sitting at times, needing min-maxA X1-2 for sitting balance  Bed Mobility  Overall Assistance Level: Requires x 2 Assistance; Moderate Assistance  Additional Factors: Head of bed raised; Increased time to complete;Set-up; Verbal cues  Bridging  Assistance Level: Maximum assistance  Roll Left  Assistance Level: Maximum assistance  Roll Right  Assistance Level: Moderate assistance  Sit to Supine  Assistance Level: Maximum assistance  Supine to Sit  Assistance Level: Moderate assistance; Requires x 2 assistance  Scooting  Assistance Level: Moderate assistance; Requires x 2 assistance  Transfers  Surface: From bed; Wheelchair; To chair with arms;From chair with arms  Additional Factors: Increased time to complete;Hand placement cues; Verbal cues  Sit to Stand  Assistance Level: Requires x 2 assistance;Maximum assistance  Skilled Clinical Factors: maxA x2 sit pivto transfers with max cues for safety/sequencing and mechanics during sit <> stands, L sided neglect and poor upright posture at times.   Pt educated on midline alignment sitting vs standing and L sided scanning wiht transfers  Stand to Sit  Assistance Level: Maximum assistance; Requires x 2 assistance  Skilled Clinical Factors: maxA x2 sit pivto transfers with max cues for safety/sequencing and mechanics during sit <> stands, L sided neglect and poor upright posture at times. Pt educated on midline alignment sitting vs standing and L sided scanning wiht transfers  Bed To/From Chair  Technique: Sit pivot  Assistance Level: Maximum assistance; Requires x 2 assistance  Skilled Clinical Factors: maxA x2 sit pivto transfers with max cues for safety/sequencing and mechanics during sit <> stands, L sided neglect and poor upright posture at times. Pt educated on midline alignment sitting vs standing and L sided scanning wiht transfers  Stand Pivot  Assistance Level: Maximum assistance; Requires x 2 assistance  Skilled Clinical Factors: maxA x2 sit pivto transfers with max cues for safety/sequencing and mechanics during sit <> stands, L sided neglect and poor upright posture at times. Pt educated on midline alignment sitting vs standing and L sided scanning wiht transfers  Sit Pivot  Assistance Level: Maximum assistance; Requires x 2 assistance  Skilled Clinical Factors: maxA x2 sit pivto transfers with max cues for safety/sequencing and mechanics during sit <> stands, L sided neglect and poor upright posture at times. Pt educated on midline alignment sitting vs standing and L sided scanning wiht transfers   Neuromuscular Education  Head/Neck Control: Pt does demo increased sitting balance with less cues but does demo R sided lean at times when pulling towards R side; poor carryover of sequencing for midline alignment despite max tactile cues and demo.   OT providing hand over hand for turning head L vs R to scan for objects; continues to demo severe L sided neglect deficits but increased carryover for IDing object on L side during toileting tasks  Trunk Control: Pt demos L sided neglect throughout sitting EOM and transfers despite tactile cues and visual scanning towards L side. Pt needing maxA x1 for weight shifting L vs R EOM for reaching towards L side with R hand, WBing through LUE with maxA X1 from PT while OT providing maxA x1 for sitting EOM unsupported. Pt was able to complete 5 partial stands with PT in front of patient nad OT providing support/cues for upright posture. Pt was able to complete 1 full stand with WBing through RUE on PT shoulder and OT providing assistance for tactile/verbal cues for trunk alignment, weight shifting for postural control  Weight Bearing  Weight Bearing Technique: Yes  RUE Weight Bearing: Extended arm standing; Extended arm seated  LUE Weight Bearing: Extended arm standing; Extended arm seated  Response To Weight Bearing Technique: WBing through LUE AAROM and hand over hand during sit <> stands with RUE support on PT shoulder vs EOM as well as reaching across midline scanning to L side to gather bean bags, sitting upright in midlien wiht mod cues and then placement on colored dots on floor vs chair vs mat cooresponding wiht bean bag color. Pt needing maxA x1-2 for weight shifting from L side forearm to midline     Assessment  Assessment  Assessment: Pt demo'd substantial L sided lean this date with increased L sided neglect needing OT asisstance for turning head scanning obstacles in hallway with WC follow and then WBing LUE on EOM for NMR. Pt needing mod cues for safety/sequencing with body mechanics and transfers, ultimately needing maxA x2 for squat pivot vs sit pivot R vs L transfers this date. Increased LLE/LUE tone noted, educated patient and  on placement of resting hand splint on at night as well as elevation of LLE/LUE on pillows for comfort/support. Cont to rec SNF post DC to progress towards goals. Cont OT POC.   Activity Tolerance: Patient tolerated treatment well  Discharge Recommendations: Subacute/Skilled Nursing Facility  OT Equipment Recommendations  Equipment Needed: Yes  Other: CTA  Safety Devices  Safety Devices in place: Yes  Type of devices: Left in chair;Call light within reach; Chair alarm in place;Nurse notified; Patient at risk for falls; All fall risk precautions in place;Gait belt  Restraints  Initially in place: No    Patient Education  Education  Education Given To: Patient  Education Provided: Role of Therapy;Plan of Care;Safety;Equipment;ADL Function; Fall Prevention Strategies;Transfer Training  Education Provided Comments: bed mobility, sitting balance, safety and hand placement with transfers, hemistrategies for UB bathing/dressing, skin checks for resting hand splint, AAROM LUE exercises, weightshifting during ambulation/standing, standing balance  Education Method: Demonstration;Verbal  Barriers to Learning: Cognition  Education Outcome: Verbalized understanding;Demonstrated understanding;Continued education needed    Plan  Plan  Times per Week: 5 out of 7 days  Times per Day: Daily  Plan Weeks: 3 weeks  Current Treatment Recommendations: Strengthening;ROM;Balance training;Functional mobility training; Endurance training; Safety education & training;Neuromuscular re-education;Patient/Caregiver education & training;Equipment evaluation, education, & procurement;Self-Care / ADL;Cognitive/Perceptual training; Wheelchair mobility training;Positioning    Goals  Patient Goals   Patient goals :  \"Go home\" \"Get stronger\" \"walk better\"  Short Term Goals  Time Frame for Short term goals: 10 days (5/20/22)-EXTEND TO 5/25 2/2 PROGRESS  Short Term Goal 1: Pt will complete functional transfers with Mita x2 with LRAD- progressing, maxA x2 sit pivot vs stand pivot transfer-EXTEND TO 5/25  Short Term Goal 2: Pt will complete toileting/transfer modA x2 with LRAD and DME PRN-GOAL MET 5/16, modA x2 steady, CTA  Short Term Goal 3: Pt will complete LUE AAROM/AROM exercises to LUE to increase strength/endurance for ADLs/transfers-GOAL MET 5/18, CTA  Short Term Goal 4: Pt will be able to locate 3/5 object in L visual field with min cues during ADLs/activities-GOAL MET 5/18,CTA  Additional Goals?: No  Long Term Goals  Time Frame for Long term goals : 21 days (3/31/22)  Long Term Goal 1: Pt will complete functional transfers/mobility SPV with LRAD  Long Term Goal 2: Pt will complete UE dressing/bathing sitting setup with min cues for papo techniques  Long Term Goal 3: Pt will complete toileting/transfers with LRAD and DME PRN SPV  Long Term Goal 4: Pt will complete opening 3/5 containers with compensatory techniques sitting setup/SPV    Therapy Time   Individual Concurrent Group Co-treatment   Time In       1230   Time Out       1330   Minutes       60   Timed Code Treatment Minutes: 400 [facility] Her, OTR/L

## 2022-05-25 NOTE — PROGRESS NOTES
Speech Language Pathology  [facility]: ACUTE REHAB UNIT  SPEECH-LANGUAGE PATHOLOGY      [x] Daily  [] Weekly Care Conference Note  [] Discharge    Patient:Jennifer Go Holiday      :1950  CQY:[id number]  Rehab Dx/Hx: Acute CVA (cerebrovascular accident) ([facility] [address] [I63.9]    Precautions: falls and aspirations; severe left neglect  Home situation: Lives with . Indep with med management. Share finances, cooking, laundtry, grocery shopping. Has not driven since CVA in March. ST Dx: [] Aphasia  [x] Dysarthria  [] Apraxia   [x] Oropharyngeal dysphagia [x] Cognitive Impairment  [] Other:   Date of Admit: 5/10/2022  Room #: 9673/0826-19    Current functional status (updated daily):         Pt being seen for : [x] Speech/Language Treatment  [x] Dysphagia Treatment [x] Cognitive Treatment  [] Other:  Communication: []WFL  [] Aphasia  [x] Dysarthria  [] Apraxia  [] Pragmatic Impairment [] Non-verbal  [] Hearing Loss  [] Other:   Cognition: [] WFL  [x] Mild  [] Moderate  [] Severe [] Unable to Assess  [] Other:  Memory: [] WFL  [x] Mild  [] Moderate  [] Severe [] Unable to Assess  [] Other:  Behavior: [x] Alert  [x] Cooperative  [x]  Pleasant  [] Confused  [] Agitated  [] Uncooperative  [] Distractible [] Motivated  [] Self-Limiting [] Anxious  [] Other:  Endurance:  [x] Adequate for participation in SLP sessions  [] Reduced overall  [] Lethargic  [] Other:  Safety: [] No concerns at this time  [x] Reduced insight into deficits  [x]  Reduced safety awareness [] Not following call light procedures  [] Unable to Assess  [] Other:    Current Diet Order:ADULT DIET;  Regular; 3 carb choices (45 gm/meal)  Swallowing Precautions: upright for all intake, stay upright for at least 30 mins after intake, small bites/sips, assist feed, oral care 2-3x/day to reduce adverse affects in the event of aspiration, alternate bites/sips, slow rate of intake         Date: 2022      Tx session 1   Tx session 2  All tx needs met in session 1     Total Timed Code Min 30    Total Treatment Minutes 60    Individual Treatment Minutes 60    Group Treatment Minutes 0    Co-Treat Minutes 0    Variance/Reason:  N/a    Pain Back and chest    Pain Intervention [x] RN notified-administered meds  [x] Repositioned  [] Intervention offered and patient declined  [] N/A  [] Other: [] RN notified  [] Repositioned  [] Intervention offered and patient declined  [] N/A  [] Other:   Subjective     Pt alert and oriented, cooperative and agreeable to participate in therapy. Pt seen sitting upright in bed. Objective:     Dysphagia Goals  Short-term Goals  Timeframe for Short-term Goals: 18 days (05/28/2022)     Goal met 05/24/22. Per chart review, speaking with pt and RN, pt tolerating IDDSI Level 7 regular solids with TL via cup meds whole with PO as LRD. Goal met 05/24/22. Goal discontinued 05/17/22. Goal 4: The pt will complete oral motor exercises to improve labial and lingual strength, ROM, and coordination in 10/10 opportunities given min cues    Vital stim/NMES placement 4a at 9.0.0mA for 30 mins  -pt tolerated without any adverse reactions or side effects    OME's completed:  -smile: x20 max cues  -pucker: x20 mod cues  -smile/pucker: x20 mod cues  -open mouth wide: x20 max cues   -cheek puff hold for 3 secs: x20 max cues  -eye squint: x20 max cues   -lingual protrusion hold for 3 secs: x20 max cues  -lingual lateralization corner to corner of lips: x20 indep  -lingual lateralization cheek to cheek: x20 mod cues     Pt continues to present with significantly reduced L sided oral motor strength, ROM, coordination. Cognitive-linguistic Goals:   Short-term Goals  Timeframe for Short-term Goals: 18 days (05/28/2022)    Goal 1: Pt will effectively use compensatory visual strategies for improved attention to left side during structured tasks with 80% acc given mod cues.    Pt required mod cues to attend to L side where SLP was standing during completion of OME's. Goal 2: Pt will complete graded recall tasks using compensatory strategies with 90% acc given min cues   Functional recall task with 3 words:  -pt given three words and asked to repeat them in alphabetical order  -70% acc indep improving to 100% acc given min cues  -pt benefited from use of repetition strategy      Goal 3: Pt will complete executive function tasks (e.g. meds, time, money, etc) with 90% acc givgen min cues   Did not target. Goal 4: Pt will complete problem solving and thought organization tasks with 90% acc given min cues   Problem solving/safety situations:  -ex: why is it important to have a smoke detector in your home? What would you do in case of a fire?  -100% acc indep       Goal 5: Pt will complete verbal and visual reasoning tasks with 90% acc given min cues   Verbal reasoning/multiple definitions:  -pt given a word and asked to provide two definitions per word  -83% acc indep improving to 100% acc given min cues       Other areas targeted: N/a    Education:   Edu provided re: safety situations, compensatory strategies, vital stim/NMES       Safety Devices: [x] Call light within reach  [] Chair alarm activated  [x] Bed alarm activated  [] Other:  [] Call light within reach  [] Chair alarm activated  [] Bed alarm activated  [] Other:    Assessment: Pt pleasant and cooperative, agreeable to participate. Vital stim/NMES placement 4a completed today, at 9.0mA for 30 mins tolerating without any adverse reactions or side effects. Pt continues to require mod-max cues to complete OME's accurately. Pt with overall significantly reduced ROM, strength, and coordination on L side, and very impulsive throughout completion of exercises. Pt continue to require cues to slow down for precise movements. Pt completed functional recall task with 70% acc indep improving to 100% acc given min cues. Pt complete safety situations/problem solving with 100% acc.  Pt completed verbal reasoning task with 83% acc indep improving to 100% acc given min cues. Pt is making consistent progress towards goals, however pt continues to present with severe L sided neglect, therefore negatively impacting safety and independence. Recommending 24 hour assist at d/c. Continue goals above. Plan: Continue as per plan of care. Additional Information:     Barriers toward progress: Limited safety awareness, Limited insight into deficits, Decreased proprioception, Upper extremity weakness and Lower extremity weakness  Discharge recommendations:  [] Home independently  [] Home with assistance [x]  24 hour supervision  [] ECF [] Other:  Continued Tx Upon Discharge: ? [x] Yes [] No [] TBD based on progress while on ARU [] Vital Stim indicated [] Other:   Estimated discharge date: 05/31/2022    Interventions used this date:  [] Speech/Language Treatment  [] Instruction in HEP [] Group [x] Dysphagia Treatment [x] Cognitive Treatment   [] Other: Total Time Breakdown / Charges    Time in Time out Total Time / units   Cognitive Tx 0900 0930 30 min/ 2 units    Speech Tx -- -- --   Dysphagia Tx 0830 0900 30 min/ 1 unit        Electronically Signed by     Tres Tolentino. A CCC-SLP  Speech-Language Pathologist  [id number]

## 2022-05-26 ENCOUNTER — APPOINTMENT (OUTPATIENT)
Dept: GENERAL RADIOLOGY | Age: 72
DRG: 056 | End: 2022-05-26
Attending: STUDENT IN AN ORGANIZED HEALTH CARE EDUCATION/TRAINING PROGRAM
Payer: MEDICARE

## 2022-05-26 LAB
ANION GAP SERPL CALCULATED.3IONS-SCNC: 9 MMOL/L (ref 3–16)
BASOPHILS ABSOLUTE: 0 K/UL (ref 0–0.2)
BASOPHILS RELATIVE PERCENT: 0.2 %
BUN BLDV-MCNC: 29 MG/DL (ref 7–20)
CALCIUM SERPL-MCNC: 9.3 MG/DL (ref 8.3–10.6)
CHLORIDE BLD-SCNC: 103 MMOL/L (ref 99–110)
CO2: 22 MMOL/L (ref 21–32)
CREAT SERPL-MCNC: 0.8 MG/DL (ref 0.6–1.2)
EOSINOPHILS ABSOLUTE: 0.3 K/UL (ref 0–0.6)
EOSINOPHILS RELATIVE PERCENT: 3.3 %
GFR AFRICAN AMERICAN: >60
GFR NON-AFRICAN AMERICAN: >60
GLUCOSE BLD-MCNC: 126 MG/DL (ref 70–99)
HCT VFR BLD CALC: 32.9 % (ref 36–48)
HEMOGLOBIN: 10.9 G/DL (ref 12–16)
LYMPHOCYTES ABSOLUTE: 0.8 K/UL (ref 1–5.1)
LYMPHOCYTES RELATIVE PERCENT: 8.8 %
MCH RBC QN AUTO: 32.7 PG (ref 26–34)
MCHC RBC AUTO-ENTMCNC: 33.3 G/DL (ref 31–36)
MCV RBC AUTO: 98.3 FL (ref 80–100)
MONOCYTES ABSOLUTE: 0.5 K/UL (ref 0–1.3)
MONOCYTES RELATIVE PERCENT: 5.6 %
NEUTROPHILS ABSOLUTE: 7.1 K/UL (ref 1.7–7.7)
NEUTROPHILS RELATIVE PERCENT: 82.1 %
PDW BLD-RTO: 14.6 % (ref 12.4–15.4)
PLATELET # BLD: 256 K/UL (ref 135–450)
PMV BLD AUTO: 9.3 FL (ref 5–10.5)
POTASSIUM REFLEX MAGNESIUM: 4.3 MMOL/L (ref 3.5–5.1)
RBC # BLD: 3.35 M/UL (ref 4–5.2)
SODIUM BLD-SCNC: 134 MMOL/L (ref 136–145)
WBC # BLD: 8.6 K/UL (ref 4–11)

## 2022-05-26 PROCEDURE — 80048 BASIC METABOLIC PNL TOTAL CA: CPT

## 2022-05-26 PROCEDURE — 36415 COLL VENOUS BLD VENIPUNCTURE: CPT

## 2022-05-26 PROCEDURE — 6360000002 HC RX W HCPCS: Performed by: STUDENT IN AN ORGANIZED HEALTH CARE EDUCATION/TRAINING PROGRAM

## 2022-05-26 PROCEDURE — 6370000000 HC RX 637 (ALT 250 FOR IP): Performed by: STUDENT IN AN ORGANIZED HEALTH CARE EDUCATION/TRAINING PROGRAM

## 2022-05-26 PROCEDURE — 97110 THERAPEUTIC EXERCISES: CPT

## 2022-05-26 PROCEDURE — 85025 COMPLETE CBC W/AUTO DIFF WBC: CPT

## 2022-05-26 PROCEDURE — 1280000000 HC REHAB R&B

## 2022-05-26 PROCEDURE — 97129 THER IVNTJ 1ST 15 MIN: CPT

## 2022-05-26 PROCEDURE — 97116 GAIT TRAINING THERAPY: CPT

## 2022-05-26 PROCEDURE — 97530 THERAPEUTIC ACTIVITIES: CPT

## 2022-05-26 PROCEDURE — 6370000000 HC RX 637 (ALT 250 FOR IP): Performed by: INTERNAL MEDICINE

## 2022-05-26 PROCEDURE — 74018 RADEX ABDOMEN 1 VIEW: CPT

## 2022-05-26 PROCEDURE — 92526 ORAL FUNCTION THERAPY: CPT

## 2022-05-26 PROCEDURE — 97112 NEUROMUSCULAR REEDUCATION: CPT

## 2022-05-26 RX ORDER — DIPHENHYDRAMINE HCL 25 MG
25 TABLET ORAL EVERY 6 HOURS PRN
Status: DISCONTINUED | OUTPATIENT
Start: 2022-05-26 | End: 2022-05-31

## 2022-05-26 RX ADMIN — LACTULOSE 20 G: 20 SOLUTION ORAL at 10:38

## 2022-05-26 RX ADMIN — CLOPIDOGREL BISULFATE 75 MG: 75 TABLET, FILM COATED ORAL at 10:30

## 2022-05-26 RX ADMIN — HEPARIN SODIUM 5000 UNITS: 5000 INJECTION INTRAVENOUS; SUBCUTANEOUS at 17:41

## 2022-05-26 RX ADMIN — LISINOPRIL 40 MG: 20 TABLET ORAL at 10:31

## 2022-05-26 RX ADMIN — FAMOTIDINE 10 MG: 20 TABLET, FILM COATED ORAL at 10:29

## 2022-05-26 RX ADMIN — TRAMADOL HYDROCHLORIDE 50 MG: 50 TABLET, COATED ORAL at 10:32

## 2022-05-26 RX ADMIN — DICLOFENAC SODIUM 4 G: 10 GEL TOPICAL at 21:28

## 2022-05-26 RX ADMIN — CARBOXYMETHYLCELLULOSE SODIUM 1 DROP: 10 GEL OPHTHALMIC at 21:27

## 2022-05-26 RX ADMIN — DICLOFENAC SODIUM 4 G: 10 GEL TOPICAL at 10:20

## 2022-05-26 RX ADMIN — LEVOTHYROXINE SODIUM 50 MCG: 0.05 TABLET ORAL at 06:14

## 2022-05-26 RX ADMIN — METOPROLOL TARTRATE 25 MG: 25 TABLET, FILM COATED ORAL at 21:28

## 2022-05-26 RX ADMIN — WITCH HAZEL: 500 SOLUTION RECTAL; TOPICAL at 22:47

## 2022-05-26 RX ADMIN — HYDROCORTISONE: 1 CREAM TOPICAL at 10:22

## 2022-05-26 RX ADMIN — ASPIRIN 81 MG: 81 TABLET, COATED ORAL at 10:31

## 2022-05-26 RX ADMIN — DICLOFENAC SODIUM 4 G: 10 GEL TOPICAL at 17:45

## 2022-05-26 RX ADMIN — LACTULOSE 20 G: 20 SOLUTION ORAL at 21:28

## 2022-05-26 RX ADMIN — NYSTATIN 500000 UNITS: 100000 SUSPENSION ORAL at 10:39

## 2022-05-26 RX ADMIN — Medication 400 MG: at 10:30

## 2022-05-26 RX ADMIN — HEPARIN SODIUM 5000 UNITS: 5000 INJECTION INTRAVENOUS; SUBCUTANEOUS at 06:15

## 2022-05-26 RX ADMIN — NYSTATIN 500000 UNITS: 100000 SUSPENSION ORAL at 21:28

## 2022-05-26 RX ADMIN — HEPARIN SODIUM 5000 UNITS: 5000 INJECTION INTRAVENOUS; SUBCUTANEOUS at 21:28

## 2022-05-26 RX ADMIN — METOPROLOL TARTRATE 25 MG: 25 TABLET, FILM COATED ORAL at 10:32

## 2022-05-26 RX ADMIN — ACETAMINOPHEN 650 MG: 325 TABLET ORAL at 06:14

## 2022-05-26 RX ADMIN — DIPHENHYDRAMINE HCL 25 MG: 25 TABLET ORAL at 17:40

## 2022-05-26 RX ADMIN — NYSTATIN 500000 UNITS: 100000 SUSPENSION ORAL at 17:43

## 2022-05-26 RX ADMIN — CARBOXYMETHYLCELLULOSE SODIUM 1 DROP: 10 GEL OPHTHALMIC at 10:28

## 2022-05-26 RX ADMIN — NIFEDIPINE 30 MG: 30 TABLET, FILM COATED, EXTENDED RELEASE ORAL at 10:30

## 2022-05-26 ASSESSMENT — PAIN DESCRIPTION - ORIENTATION: ORIENTATION: MID

## 2022-05-26 ASSESSMENT — PAIN DESCRIPTION - LOCATION: LOCATION: BACK

## 2022-05-26 ASSESSMENT — PAIN SCALES - GENERAL
PAINLEVEL_OUTOF10: 3
PAINLEVEL_OUTOF10: 2
PAINLEVEL_OUTOF10: 8

## 2022-05-26 ASSESSMENT — PAIN - FUNCTIONAL ASSESSMENT: PAIN_FUNCTIONAL_ASSESSMENT: PREVENTS OR INTERFERES SOME ACTIVE ACTIVITIES AND ADLS

## 2022-05-26 NOTE — PROGRESS NOTES
The patient has reported a new onset itchy rash. A macular rash is noted on the anterior and posterior torso and bilateral arms. Dr. Barb Rogers notified via secure message with request for antipruritic. Awaiting reply.

## 2022-05-26 NOTE — PROGRESS NOTES
Occupational Therapy  Facility/Department: [facility]  Rehabilitation Occupational Therapy Daily Treatment Note    Date: 22  Patient Name: Reese Kitchen       Room: 9286/4668-19  MRN: [id number]  Account: [de-identified]   : 1950  (76 y.o.) Gender: female                    Past Medical History:  has a past medical history of Anemia, Arthritis, Asthma, Bowel dysfunction, CML (chronic myelocytic leukemia) ([facility] [address] Hyperlipidemia, Hypertension, Nuclear senile cataract of both eyes, Obesity, Risk of myocardial infarction or stroke 7.5% or greater in next 10 years, Skin cancer of face, Stroke (cerebrum) ([facility] [address] Thyroid disease, and TIA (transient ischemic attack). Past Surgical History:   has a past surgical history that includes Breast biopsy; fine needle aspiration; Breast lumpectomy; Tonsillectomy (Bilateral); Paracentesis; Anus surgery (); Elbow surgery (); and Colonoscopy. Restrictions  Restrictions/Precautions: Fall Risk;Up as Tolerated  Other position/activity restrictions: chemo precautions, L inattention, LUE flaccid, resting hand splint to be worn at night. Subjective  Subjective: Pt sitting EOB with PT, then agreeable to OT/PT cotx. Pts  present throughout. Pt denies pain  Restrictions/Precautions: Fall Risk;Up as Tolerated             Objective  Vision - Basic Assessment  Patient Visual Report: Balance difficulty  Cognition  Overall Cognitive Status: Exceptions  Arousal/Alertness: Appropriate responses to stimuli  Following Commands: Follows one step commands with repetition; Follows multistep commands with repitition  Attention Span: Attends with cues to redirect  Memory: Decreased recall of precautions  Safety Judgement: Decreased awareness of need for safety;Decreased awareness of need for assistance  Problem Solving: Assistance required to correct errors made;Assistance required to identify errors made  Initiation: Requires cues for some  Sequencing: Requires cues for some  Cognition Comment: L inattention  Orientation  Overall Orientation Status: Within Functional Limits  Orientation Level: Oriented X4   Perception  Overall Perceptual Status: Impaired  Unilateral Attention: Cues to attend left visual field;Cues to attend to left side of body;Cues to maintain midline in sitting;Cues to maintain midline in standing  Initiation: Cues to initiate tasks  Motor Planning: Hand over hand to sequence tasks  Perseveration: Perseverates during conversation     ADL  Feeding  Assistance Level: Increased time to complete;Set-up; Verbal cues;Stand by assist  Skilled Clinical Factors: assistance to cut food, open packages and bring cup to hand  Grooming/Oral Hygiene  Assistance Level: Increased time to complete;Minimal assistance  Skilled Clinical Factors: washing face and brushing hair sitting  Lower Extremity Dressing  Assistance Level: Dependent;Verbal cues  Skilled Clinical Factors: x2A sit <> stand with grab bar to R side to manage pants up/down  Putting On/Taking Off Footwear  Assistance Level: Maximum assistance  Skilled Clinical Factors: donning shoes  Toileting  Assistance Level: Dependent  Skilled Clinical Factors: maxA x1 sit <> Stand from [facility] to [facility] to [facility], maxA x2 SPT and then x2 in stance [facility] over toilet to [facility]  Toilet Transfers  Technique: Stand pivot;Stand step;Squat pivot; To the right  Equipment: Beside commode  Additional Factors: Verbal cues;Cues for hand placement; Increased time to complete; With handrails  Assistance Level: Maximum assistance; Requires x 2 assistance  Skilled Clinical Factors: maxA x1-2  sit <> Stand from [facility] to [facility] to [facility], maxA x2 SPT and then x2 in stance [facility] over toilet to [facility]          Functional Mobility  Device: Wheelchair  Activity: To/From bathroom; Retrieve items;Transport items; To/From therapy gym  Assistance Level: Requires x 2 assistance; Moderate assistance  Skilled Clinical Factors: maxA x2 sit <> stand transfers and stand step transfer vs stand pivot transfer with grab bar to/from [facility] over the toilet; maxA x2 for all stand pivot vs squat pivot transfers with increased tone in L side throughout; limited carryover of body mechanics/sequencing with tranfsers and hand placement. Trialed sit <> stand with grab bar to R side in hallway, OT providing trunk support, weight shifting L vs R and WBing through LUE in stance  Bed Mobility  Overall Assistance Level: Requires x 2 Assistance; Moderate Assistance  Additional Factors: Head of bed raised; Increased time to complete;Set-up; Verbal cues  Scooting  Assistance Level: Moderate assistance; Requires x 2 assistance  Transfers  Surface: From bed; Wheelchair; To chair with arms;From chair with arms  Additional Factors: Increased time to complete;Hand placement cues; Verbal cues  Device: Walker  Sit to Stand  Assistance Level: Requires x 2 assistance;Maximum assistance  Skilled Clinical Factors: maxA x2 sit pivto transfers with max cues for safety/sequencing and mechanics during sit <> stands, L sided neglect and poor upright posture at times. Pt educated on midline alignment sitting vs standing and L sided scanning wiht transfers  Stand to Sit  Assistance Level: Maximum assistance; Requires x 2 assistance  Skilled Clinical Factors: maxA x2 sit pivto transfers with max cues for safety/sequencing and mechanics during sit <> stands, L sided neglect and poor upright posture at times. Pt educated on midline alignment sitting vs standing and L sided scanning wiht transfers  Bed To/From Chair  Technique: Sit pivot  Assistance Level: Maximum assistance; Requires x 2 assistance  Skilled Clinical Factors: maxA x2 sit pivto transfers with max cues for safety/sequencing and mechanics during sit <> stands, L sided neglect and poor upright posture at times. Pt educated on midline alignment sitting vs standing and L sided scanning wiht transfers  Stand Pivot  Assistance Level: Maximum assistance; Requires x 2 assistance  Skilled Clinical Factors: maxA x2 sit pivto transfers with max cues for safety/sequencing and mechanics during sit <> stands, L sided neglect and poor upright posture at times. Pt educated on midline alignment sitting vs standing and L sided scanning wiht transfers  Sit Pivot  Assistance Level: Maximum assistance; Requires x 2 assistance  Skilled Clinical Factors: maxA x2 sit pivto transfers with max cues for safety/sequencing and mechanics during sit <> stands, L sided neglect and poor upright posture at times. Pt educated on midline alignment sitting vs standing and L sided scanning wiht transfers   Neuromuscular Education  Neuromuscular education: Yes  Head/Neck Control: Pt continues to demo decreased ability to fully turn head/scan L vs R during transfers and ADL tasks. Pt needing mod cues for scanning and asisstance for upright posture in stance with limited ability to sustain upright posture  Trunk Control: Pt continues to demo L sided neglect and consistent need for x2A weight shifting L vs R for stepping trials with grab bar to R side and LUE supported during WBing in stance by OT. Pt needing mod cues and max assistance for pelvic tilt, weight shifting and trunk support; foward flexion in stance, posterior L sided lean in sitting. Pt does need consistent tactile cues with limited ability to maintain posture  Weight Bearing  Weight Bearing Technique: Yes  LUE Weight Bearing: Extended arm seated; Extended arm standing  Response To Weight Bearing Technique: WBing through LUE AAROM and hand over hand during sit <> stands with RUE support on PT shoulder vs grab bar/rail in hallway/bathroom  during ADLs and mobility trials; pt demo's increased tone throughout with limited propioception, awareness of L side and weight shifting ability without maxA x2  OT Exercises  Exercise Treatment: AARMICHAEL using RUE as active assistance for LUE 1x15 reps.  Exercises completed sitting in w/c. OT provides moderate cues for body positioning and movement  PROM Exercises: LUE flaccid this date. Educated pt on self PROM with RUE assist for elbow, wrist, and digital flex/ext. Pt able to perform with good return in recliner, left handout on tabletop to go over with spouse when present. Instructed pt to complete 2-3 times during the day. Static Sitting Balance Exercises: Pt tolerated sitting at EOB for ~5 mins. Initially pt requiring mod A, progresssing to Min A. Postural Correction Exercises: Cues for orienting to midline. Forward trunk flexion/extension x5     Assessment  Assessment  Assessment: Pt continues to demo increased sitting balance with cues on BSC and EOB/WC however does demo poor L sided attention, sequencing despite hand over hand, and increased tone in LUE/LE limiting safe transfers and standing tolerance without maxA x2. Pt needing rest breaks throughout and limited by fatigue towards end of session but motivated. Limited insishg to deficits is noted by  and patient, re-educated both on patients progress vs deficits and continued recommendation for SNF post DC to progress towards goals. Continue OT POC. Second session: Pt in [facility],  present. Pt has increased rash all over body; RN is aware. Pt requests to use the bathroom, grossly Mita sit <> stand to steady this PM session with increased midline alignment sitting on steady paddles. Continues to need mod cues for upright posture and scanning L vs R during ADL tasks. Pt was modA for balance during desi-care and Lb dressing management this session. Pt requests to lay down due to feeling lethargic. Sit to supine maxA, x-ray arriving and assisted tech with rolling R vs L in bed to place x-ray paddle under patient. Pt then completed AAROM vs PROM to end points/prolonged stretch to reduce ton in LUE. 2x10 reps chest press, wrist flexion/extension, internal rotation, elbow flexion/extension, supination/pronation.   Pt tolerated well, placed back in resting hand splint and 2 pillows under LUE for support. Cont OT POC. Activity Tolerance: Patient tolerated treatment well  Discharge Recommendations: Subacute/Skilled Nursing Facility  OT Equipment Recommendations  Other: CTA  Safety Devices  Safety Devices in place: Yes  Type of devices: Left in chair;Call light within reach; Chair alarm in place;Nurse notified; Patient at risk for falls; All fall risk precautions in place;Gait belt  Restraints  Initially in place: No    Patient Education  Education  Education Given To: Patient  Education Provided: Role of Therapy;Plan of Care;Safety;Equipment;ADL Function; Fall Prevention Strategies;Transfer Training  Education Provided Comments: bed mobility, sitting balance, safety and hand placement with transfers, hemistrategies for UB bathing/dressing, skin checks for resting hand splint, AAROM LUE exercises, weightshifting during ambulation/standing, standing balance  Education Method: Demonstration;Verbal  Barriers to Learning: Cognition  Education Outcome: Verbalized understanding;Demonstrated understanding;Continued education needed    Plan  Plan  Times per Week: 5 out of 7 days  Times per Day: Daily  Plan Weeks: 3 weeks  Current Treatment Recommendations: Strengthening;ROM;Balance training;Functional mobility training; Endurance training; Safety education & training;Neuromuscular re-education;Patient/Caregiver education & training;Equipment evaluation, education, & procurement;Self-Care / ADL;Cognitive/Perceptual training; Wheelchair mobility training;Positioning    Goals  Patient Goals   Patient goals :  \"Go home\" \"Get stronger\" \"walk better\"  Short Term Goals  Time Frame for Short term goals: 10 days (5/20/22)-EXTEND TO 5/25 2/2 PROGRESS  Short Term Goal 1: Pt will complete functional transfers with Mita x2 with LRAD- progressing, maxA x2 sit pivot vs stand pivot transfer-EXTEND TO 5/25-GOAL MET 5/25, CTA  Short Term Goal 2: Pt will complete toileting/transfer modA x2 with LRAD and DME PRN-GOAL MET 5/16, modA x2 steady, CTA  Short Term Goal 3: Pt will complete LUE AAROM/AROM exercises to LUE to increase strength/endurance for ADLs/transfers-GOAL MET 5/18, CTA  Short Term Goal 4: Pt will be able to locate 3/5 object in L visual field with min cues during ADLs/activities-GOAL MET 5/18,CTA  Additional Goals?: No  Long Term Goals  Time Frame for Long term goals : 21 days (3/31/22)-EXTEND TO DC DATE, 6/3  Long Term Goal 1: Pt will complete functional transfers/mobility SPV with LRAD-DOWGRADE DUE TO PROGRESS: maxA x1  Long Term Goal 2: Pt will complete UE dressing/bathing sitting setup with min cues for papo techniques-DOWNGRADE DUE TO PROGRESS: Mita  Long Term Goal 3: Pt will complete toileting/transfers with LRAD and DME PRN SPV-DOWGRADE DUE TO PROGRESS: maxA x1  Long Term Goal 4: Pt will complete opening 3/5 containers with compensatory techniques sitting setup/SPV    Therapy Time   Individual Concurrent Group Co-treatment   Time In       1100   Time Out       1130   Minutes       30   Timed Code Treatment Minutes: 30 Minutes     Second Session Therapy Time:   Individual Concurrent Group Co-treatment   Time In 1430        Time Out 1500        Minutes 30          Timed Code Treatment Minutes:  30 Minutes    Total Treatment Minutes:  Nimo [address], OTR/L

## 2022-05-26 NOTE — PLAN OF CARE
Problem: Safety - Adult  Goal: Free from fall injury  Outcome: Progressing  Note: Pt high fall risk. Instructed to use call light before getting out of bed. Call light within reach. Bed in low position. Bed alarm on. Will continue to monitor. Problem: Skin/Tissue Integrity  Goal: Absence of new skin breakdown  Description: 1. Monitor for areas of redness and/or skin breakdown  2. Assess vascular access sites hourly  3. Every 4-6 hours minimum:  Change oxygen saturation probe site  4. Every 4-6 hours:  If on nasal continuous positive airway pressure, respiratory therapy assess nares and determine need for appliance change or resting period. Outcome: Progressing  Note: Pt is at risk for skin breakdown. Pt will have skin assessments every shift, Q2 turns, heels elevated off of the bed, and friction and shear prevented when possible. Will continue to monitor for signs of skin breakdown and enforce prevention measures. Problem: Pain  Goal: Verbalizes/displays adequate comfort level or baseline comfort level  Outcome: Progressing  Note: Pt will be satisfied with pain control. Pt uses numeric pain rating scale with reassessments after pain med administration. Will continue to monitor progression throughout shift.

## 2022-05-26 NOTE — PROGRESS NOTES
Comprehensive Nutrition Assessment    Type and Reason for Visit:  Reassess    Nutrition Recommendations/Plan:   1. Continue carb control diet - monitor need for liberalization   2. Encourage PO intakes   3. Obtain updated weight   4. Monitor nutrition adequacy, pertinent labs, bowel habits, wt changes, and clinical progress     Malnutrition Assessment:  Malnutrition Status: At risk for malnutrition (Comment) (05/26/22 1254)    Context:  Acute Illness     Findings of the 6 clinical characteristics of malnutrition:  Energy Intake:  Mild decrease in energy intake (Comment)    Nutrition Assessment:    Follow up: Pt nutritionally at risk AEB decreased appetite and early satiety 2/2 recent chemo. Encouraged PO intakes as tolerated. Declined need for liberalization of diet, monitor need if pt unable to get foods she enjoys d/t low carb restriction. Declined need for ONS. Will monitor. Nutrition Related Findings:    Active BS. Last BM on 5/22. Labs reviewed. BG  past 24 hrs. Wound Type: Moisture Associate Skin Damage       Current Nutrition Intake & Therapies:    Average Meal Intake: 26-50%,51-75%,%  Average Supplements Intake: None Ordered  ADULT DIET; Regular; 3 carb choices (45 gm/meal)    Anthropometric Measures:  Height: 5' 2.5\" (158.8 cm)  Ideal Body Weight (IBW): 113 lbs (51 kg)       Current Body Weight: 150 lb 12.7 oz (68.4 kg), 132.7 % IBW. Weight Source: Not Specified  Current BMI (kg/m2): 27.1                          BMI Categories: Overweight (BMI 25.0-29. 9)    Estimated Daily Nutrient Needs:  Energy Requirements Based On: Kcal/kg  Weight Used for Energy Requirements: Usual  Energy (kcal/day): 2364-3284 kcal  Weight Used for Protein Requirements: Ideal (1.0-1.2 g/kg)  Protein (g/day): 65-78 g  Method Used for Fluid Requirements: 1 ml/kcal  Fluid (ml/day): 0137-8222 mL    Nutrition Diagnosis:   · Increased nutrient needs related to increase demand for energy/nutrients,early satiety as evidenced by intake 26-50%      Nutrition Interventions:   Food and/or Nutrient Delivery: Continue Current Diet  Nutrition Education/Counseling: Education declined  Coordination of Nutrition Care: Continue to monitor while inpatient  Plan of Care discussed with: Patient    Goals:  Previous Goal Met: Progressing toward Goal(s)  Goals: PO intake 50% or greater,prior to discharge       Nutrition Monitoring and Evaluation:   Behavioral-Environmental Outcomes: None Identified  Food/Nutrient Intake Outcomes: Food and Nutrient Intake  Physical Signs/Symptoms Outcomes: Weight,Biochemical Data    Discharge Planning:    Continue current diet     Yaneth Aguilera [address], [address] [place] [address],   Contact: [phone]

## 2022-05-26 NOTE — PROGRESS NOTES
Physical Therapy  Facility/Department: [facility]  Rehabilitation Physical Therapy Treatment Note    NAME: Alpesh Pulido  : 1950 (66 y.o.)  MRN: [id number]  CODE STATUS: Full Code    Date of Service: 22       Restrictions:  Restrictions/Precautions: Fall Risk;Up as Tolerated  Position Activity Restriction  Other position/activity restrictions: chemo precautions, L inattention, LUE flaccid, resting hand splint to be worn at night. SUBJECTIVE  Subjective  Subjective: found in bed  Pain: reportin 4/10 N pain in LLE and LUE       OBJECTIVE  Cognition  Overall Cognitive Status: Exceptions  Arousal/Alertness: Appropriate responses to stimuli  Following Commands: Follows one step commands with repetition; Follows multistep commands with repitition  Attention Span: Attends with cues to redirect  Memory: Decreased recall of precautions  Safety Judgement: Decreased awareness of need for safety;Decreased awareness of need for assistance  Problem Solving: Assistance required to correct errors made;Assistance required to identify errors made  Initiation: Requires cues for some  Sequencing: Requires cues for some  Cognition Comment: L inattention  Orientation  Overall Orientation Status: Within Functional Limits  Orientation Level: Oriented X4    Functional Mobility      Rolling to L with CGA and bed rail, rolling to R with max a to don pants    Pt completed 2x10 supine bridge with therapist stabilizing LLE and providing cues to L glutes. Pt completed 2x10 cross body reach with half bridge for L side awareness/ increase trunk strength    Therapist provided x10 PROM LLE DF, heel slides, hip abduction with slow velocity to decrease tone    Therapist instructed pt to complete R hip abduction in sequence with therapist providing LLE hip abduction, pt with increased RLE movement speed, unable to match. Pt completed 15 reps RLE supine heel slides, SLR with max VC for form.      Supine to sit with HOB maximally elevated and bed rail with max A. Second Session:   pt seen as co treat with OT for increased safety progressing functional mobility. Pt utilized commode at start of session. Increased LLE HS tone noted with gait, improved with overpressure / WBing during standing tasks. Sit pivot EOB to wc with max a of 2  Stand pivot w/c <> commode with grab bar and max A of 2. Max a of 1 for static standing balance while OT assisted with pericare and clothing management. Increased L HS tone noted during static standing. Sit to stand w/c to carranza rail with mod A of 2 and cues to push from w/c, PT providing OP to assist with LLE Wbing during transfer with improved stabilization/ LLE posture. Gait x 5 ft with carranza rail, TD (max A of 2) and close wc follow. PT facilitating LLE stability during stance phase and LLE mobility during swing phase as well as maintaining appropriate JACINTA as pt with increased adductor tone resulting in scissoring JACINTA. OT facilitating LUE WB , balance and weight shifting. Upon sitting pt c/o nausea. Pt in w/c with alarm donned and call light/needs within reach at end of session. ASSESSMENT/PROGRESS TOWARDS GOALS  Vital Signs  Heart Rate: 68  Heart Rate Source: Monitor  BP: 113/60  BP Location: Right upper arm  MAP (Calculated): 77.67  SpO2: 94 %  O2 Device: None (Room air)    Assessment  Assessment: individual session with focus on strengthening. pt impulsive with movements and requires cues to complete tasks as directed as well as to attend to L side. therapist having pt complete BLE ther ex simultaneously with therapist providing PROM to LLE and pt unable to match speed/direction of movement without max VC. continue to prgoress mobility as able.   Activity Tolerance: Patient tolerated treatment well  Discharge Recommendations: Subacute/Skilled Nursing Facility  PT Equipment Recommendations  Equipment Needed: Yes    Goals  Patient Goals   Patient goals : \"go on my mission trip in June\"  Short Term Goals  Time Frame for Short term goals: 11 days 5/20  Short term goal 1: pt will complete bed mobility with mod A; not met 5/20 - pt requires mod A x2 for supine>sit  Short term goal 2: pt will complete functional transfer with max A; goal partially met 5/20 - pt completes transfers with mod A x2 with lorrie stedy vs. // bars  Short term goal 3: pt will tolerate gait assessment and set goal when appropriate; goal met 5/20 - pt ambulates 6 ft in // bars with mod A  x2 (dependent d/t w/c follow and // bars), new goal in LTG  Short term goal 4: pt will tolerate stair assessment and set goal when appropriate; not met 5/20 - unsafe to attempt stairs  Short term goal 5: pt will propel w/c x 150 ft with supv; not assessed 5/20  Long Term Goals  Time Frame for Long term goals : 21 days 5/31  Long term goal 1: pt will complete bed mobility with CGA. Long term goal 2: pt will complete functional transfer with min a and LRAD. Long term goal 3: pt will propel w/c x 150 ft with CGA  Long term goal 4: Pt will ambulate 10 ft with LRAD and mod A. PLAN OF CARE/SAFETY  Plan  Plan: 5-7 times per week  Current Treatment Recommendations: Strengthening;ROM;Balance training;Functional mobility training;Transfer training; Endurance training;Gait training; Therapeutic activities; Home exercise program;Wheelchair mobility training;Equipment evaluation, education, & procurement;Cognitive reorientation;Stair training;Positioning; Safety education & training;Patient/Caregiver education & training;Cognitive/Perceptual training;ADL/Self-care training;IADL training;Pain management  Safety Devices  Type of Devices: All fall risk precautions in place;Call light within reach; Patient at risk for falls;Nurse notified; All yaima prominences offloaded;Gait belt;Left in chair;Chair alarm in place      Therapy Time   Individual Concurrent Group Co-treatment   Time In 1030     1100   Time Out 1100     1130   Minutes 30     30     Timed Code Treatment Minutes: [address] [place], [place], 05/26/22 at 11:34 AM

## 2022-05-26 NOTE — PROGRESS NOTES
Speech Language Pathology  [facility]: ACUTE REHAB UNIT  SPEECH-LANGUAGE PATHOLOGY      [x] Daily  [] Weekly Care Conference Note  [] Discharge    Patient:Jennifer Posada      :1950  ZZF:[id number]  Rehab Dx/Hx: Acute CVA (cerebrovascular accident) ([facility] [address] [I63.9]    Precautions: falls and aspirations; severe left neglect  Home situation: Lives with . Indep with med management. Share finances, cooking, laundtry, grocery shopping. Has not driven since CVA in March. ST Dx: [] Aphasia  [x] Dysarthria  [] Apraxia   [x] Oropharyngeal dysphagia [x] Cognitive Impairment  [] Other:   Date of Admit: 5/10/2022  Room #: 4201/1947-31    Current functional status (updated daily):         Pt being seen for : [x] Speech/Language Treatment  [x] Dysphagia Treatment [x] Cognitive Treatment  [] Other:  Communication: []WFL  [] Aphasia  [x] Dysarthria  [] Apraxia  [] Pragmatic Impairment [] Non-verbal  [] Hearing Loss  [] Other:   Cognition: [] WFL  [x] Mild  [] Moderate  [] Severe [] Unable to Assess  [] Other:  Memory: [] WFL  [x] Mild  [] Moderate  [] Severe [] Unable to Assess  [] Other:  Behavior: [x] Alert  [x] Cooperative  [x]  Pleasant  [] Confused  [] Agitated  [] Uncooperative  [] Distractible [] Motivated  [] Self-Limiting [] Anxious  [] Other:  Endurance:  [x] Adequate for participation in SLP sessions  [] Reduced overall  [] Lethargic  [] Other:  Safety: [] No concerns at this time  [x] Reduced insight into deficits  [x]  Reduced safety awareness [] Not following call light procedures  [] Unable to Assess  [] Other:    Current Diet Order:ADULT DIET;  Regular; 3 carb choices (45 gm/meal)  Swallowing Precautions: upright for all intake, stay upright for at least 30 mins after intake, small bites/sips, assist feed, oral care 2-3x/day to reduce adverse affects in the event of aspiration, alternate bites/sips, slow rate of intake         Date: 2022      Tx session 1  [id number] Tx session 2  All tx needs met in session 1     Total Timed Code Min 20    Total Treatment Minutes 60    Individual Treatment Minutes 60    Group Treatment Minutes 0    Co-Treat Minutes 0    Variance/Reason:  N/a    Pain Itching on arms and chest    Pain Intervention [x] RN notified new onset of rash  [x] Repositioned  [] Intervention offered and patient declined  [] N/A  [] Other: [] RN notified  [] Repositioned  [] Intervention offered and patient declined  [] N/A  [] Other:   Subjective     Pt alert and oriented, cooperative and agreeable to participate in therapy. Pt seen sitting upright in wheelchair,  present at bedside. Objective:     Dysphagia Goals  Short-term Goals  Timeframe for Short-term Goals: 18 days (05/28/2022)     Goal met 05/24/22. Per chart review, speaking with pt and RN, pt tolerating IDDSI Level 7 regular solids with TL via cup meds whole with PO as LRD. Goal met 05/24/22. Goal discontinued 05/17/22. Goal 4: The pt will complete oral motor exercises to improve labial and lingual strength, ROM, and coordination in 10/10 opportunities given min cues    Vital stim/NMES placement 4a at 10. 5mA for 35 mins  -pt tolerated without any adverse reactions or side effects    OME's completed:  -smile: x20 max cues  -pucker: x20 mod cues  -smile/pucker: x20 mod cues  -open mouth wide: x20 max cues   -cheek puff hold for 3 secs: x20 max cues  -eye squint: x20 max cues   -lingual protrusion hold for 3 secs: x20 max cues  -lingual lateralization corner to corner of lips: x20 indep  -lingual lateralization cheek to cheek: x20 mod cues     Pt continues to present with significantly reduced L sided oral motor strength, ROM, coordination. Cognitive-linguistic Goals:   Short-term Goals  Timeframe for Short-term Goals: 18 days (05/28/2022)    Goal 1: Pt will effectively use compensatory visual strategies for improved attention to left side during structured tasks with 80% acc given mod cues.    Did not target. Goal 2: Pt will complete graded recall tasks using compensatory strategies with 90% acc given min cues   Did not target. Goal 3: Pt will complete executive function tasks (e.g. meds, time, money, etc) with 90% acc givgen min cues   Did not target. Goal 4: Pt will complete problem solving and thought organization tasks with 90% acc given min cues   Did not target. Goal 5: Pt will complete verbal and visual reasoning tasks with 90% acc given min cues   Verbal reasoning/which item doesn't belong?  -pt given a set of four words and asked to determine which one didn't belong  -62% acc indep improving to 100% acc given min cues       Other areas targeted: N/a    Education:   Edu provided re: vital stim/NMES, thought org strategies       Safety Devices: [x] Call light within reach  [x] Chair alarm activated  [] Bed alarm activated  [x] Other:  at bedside, PCA entering room [] Call light within reach  [] Chair alarm activated  [] Bed alarm activated  [] Other:    Assessment: Pt pleasant and cooperative, agreeable to participate. Vital stim/NMES placement 4a completed today, at 10. 5mA for 35 mins tolerating without any adverse reactions or side effects. Pt continues to require mod-max cues to complete OME's accurately. Pt with overall significantly reduced ROM, strength, and coordination on L side, and very impulsive throughout completion of exercises. Pt continue to require cues to slow down for precise movements. Pt lethargic throughout tx session, and distracted by new onset of rash, continuously itching. RN aware and notified MD. Pt completed thought organization/verbal reasoning task with 62% acc indep improving to 100% acc given min cues. Pt also benefited from cues from redirection to tasks. Continue goals above. Plan: Continue as per plan of care.       Additional Information:     Barriers toward progress: Limited safety awareness, Limited insight into deficits, Decreased proprioception, Upper extremity weakness and Lower extremity weakness  Discharge recommendations:  [] Home independently  [] Home with assistance [x]  24 hour supervision  [] ECF [] Other:  Continued Tx Upon Discharge: ? [x] Yes [] No [] TBD based on progress while on ARU [] Vital Stim indicated [] Other:   Estimated discharge date: 05/31/2022    Interventions used this date:  [] Speech/Language Treatment  [] Instruction in HEP [] Group [x] Dysphagia Treatment [x] Cognitive Treatment   [] Other: Total Time Breakdown / Charges    Time in Time out Total Time / units   Cognitive Tx 1340 1400 20 min/ 1 unit    Speech Tx -- -- --   Dysphagia Tx 1300 1340 40 min/ 1 unit        Electronically Signed by     Jo Coffman. A CCC-SLP  Speech-Language Pathologist  [id number]

## 2022-05-26 NOTE — PROGRESS NOTES
Machelle Vieyra  5/26/2022  [id number]    Chief Complaint: Acute CVA (cerebrovascular accident) [facility])    Subjective:   No acute events overnight. Today Mraia E Abdi is seen in her room with her  present. She reports some abdominal pain today. Will obtain XR abd to eval stool burden. She denies other acute complaints at this time. ROS: denies f/c, n/v, sob, cp    Objective:  Patient Vitals for the past 24 hrs:   BP Temp Temp src Pulse Resp SpO2   05/26/22 1132 113/60 -- -- 68 -- 94 %   05/26/22 1000 113/60 98.4 °F (36.9 °C) Oral 68 16 --   05/25/22 2145 134/78 97.6 °F (36.4 °C) Oral 61 18 94 %   05/25/22 1531 (!) 152/67 -- -- 67 -- 92 %     Gen: No distress, pleasant. Supine in bed  HEENT: Normocephalic, atraumatic. CV: extremities well perfused  Resp: No respiratory distress  Abd: Soft, nondistended  Ext: No edema. Neuro: Alert, oriented, appropriately interactive. Left hemiparesis. Psych: mood and affect appropriate    Wt Readings from Last 3 Encounters:   05/10/22 150 lb 12.7 oz (68.4 kg)   05/02/22 143 lb 9.6 oz (65.1 kg)   09/09/21 150 lb 12.8 oz (68.4 kg)       Laboratory data:   Lab Results   Component Value Date    WBC 8.6 05/26/2022    HGB 10.9 (L) 05/26/2022    HCT 32.9 (L) 05/26/2022    MCV 98.3 05/26/2022     05/26/2022       Lab Results   Component Value Date     05/26/2022    K 4.3 05/26/2022     05/26/2022    CO2 22 05/26/2022    BUN 29 05/26/2022    CREATININE 0.8 05/26/2022    GLUCOSE 126 05/26/2022    GLUCOSE 129 07/26/2017    CALCIUM 9.3 05/26/2022        Therapy progress:  PT  Position Activity Restriction  Other position/activity restrictions: chemo precautions, L inattention, LUE flaccid, resting hand splint to be worn at night.   Objective     Sit to Stand: 2 Person Assistance,Dependent/Total  Stand to sit: Dependent/Total,2 Person Assistance  Bed to Chair: Dependent/Total     OT  PT Equipment Recommendations  Equipment Needed: Yes  Other: CTA pending progress with mobility  Toilet - Technique:  (steady to e-tac chair over toilet)  Equipment Used:  (e-tac chair)  Toilet Transfers Comments: maxA x2 sit <>  steady, progressing at times to [address] X2 with max verbal/tactile and demo cues  Assessment        SLP                Body mass index is 27.14 kg/m². Assessment and Plan:  Rafa Miguel is a 66year old female with a past medical history significant for recent CVA with left hemiparesis, CML, HTN, and HLD who presented to [facility] on 5/3/22 with abnormal labs, admitted with rhabdomyolysis and ARF, found to have acute CVA.  She was admitted to [facility] on 5/10/22 due to functional deficits below her baseline.     Acute Right CVA  - MRI showing acute infarct in right cerebral hemisphere in a watershed distribution  - CTA showing 90-95% stenosis of right ICA  - Vascular and Neurology in agreement on waiting for CEA, needs follow up in 4 weeks  - asa, plavix, statin  - PT, OT, ST     Dysphagia  - upgraded to regular diet  - ST    Chest Pain  - EKG and troponin unremarkable  - continue to monitor    Abd pain  - obtain XR abd to eval stool burden  - may need daily bowel program with suppository    RLL Pneumonia, resolved  - concern for aspiration in setting of dysphagia  - repeat CXR showing improvement 5/16  - completed course of levaquin   - make mucinex PRN and monitor symptoms  - IS      Rhabdomyolysis  Acute Renal Injury  - Nephrology followed during acute stay  - CPK trending down  - monitor      HTN  - patient with episode of hypotension while in acute care so isosorbid mononitrate and hydralazine held  - metoprolol, lisinopril, nifedipine  - Nephrology following, appreciate assistance    DM2  - Hba1c 6.6  - glucose well controlled without SSI, discontinued  - follow up with PCP     Liver Injury with elevated LFTs  - suspected to be due to statin induced rhabdo  - statin stopped  - LFTs normalized  - no need to further follow     CML  - follows with Dr. Jennifer Wu  - on nilitinib  - chemo precautions  - follow up with Heme/Onc OP     Enlarged heterogeneous appearance of the thyroid  - Thyroid ultrasound outpatient  - follow up with PCP     Hemorrhoids  - hydrocortisone cream   - bowel regimen to avoid constipation     Bowels: Schedule stool softener. Follow bowel movements. Enema or suppository if needed.      Bladder: Check PVR x 3. [address] if PVR > 200ml or if any volume is > 500 ml.      Sleep: Trazodone provided prn. PPx  DVT: heparin  GI: not indicated     Follow up appointments: Vascular, PCP    Services: SNF  EDOD: 6/3/22      Fatuma Herrera.  Yvette Magallon MD 5/26/2022, 12:37 PM

## 2022-05-27 ENCOUNTER — APPOINTMENT (OUTPATIENT)
Dept: GENERAL RADIOLOGY | Age: 72
DRG: 056 | End: 2022-05-27
Attending: STUDENT IN AN ORGANIZED HEALTH CARE EDUCATION/TRAINING PROGRAM
Payer: MEDICARE

## 2022-05-27 LAB
ANION GAP SERPL CALCULATED.3IONS-SCNC: 13 MMOL/L (ref 3–16)
BANDED NEUTROPHILS RELATIVE PERCENT: 27 % (ref 0–7)
BASOPHILS ABSOLUTE: 0 K/UL (ref 0–0.2)
BASOPHILS ABSOLUTE: 0.1 K/UL (ref 0–0.2)
BASOPHILS RELATIVE PERCENT: 0 %
BASOPHILS RELATIVE PERCENT: 0.4 %
BUN BLDV-MCNC: 38 MG/DL (ref 7–20)
CALCIUM SERPL-MCNC: 9.6 MG/DL (ref 8.3–10.6)
CHLORIDE BLD-SCNC: 98 MMOL/L (ref 99–110)
CO2: 22 MMOL/L (ref 21–32)
CREAT SERPL-MCNC: 1.2 MG/DL (ref 0.6–1.2)
EOSINOPHILS ABSOLUTE: 0.6 K/UL (ref 0–0.6)
EOSINOPHILS ABSOLUTE: 0.6 K/UL (ref 0–0.6)
EOSINOPHILS RELATIVE PERCENT: 3 %
EOSINOPHILS RELATIVE PERCENT: 3.4 %
GFR AFRICAN AMERICAN: 53
GFR NON-AFRICAN AMERICAN: 44
GLUCOSE BLD-MCNC: 149 MG/DL (ref 70–99)
HCT VFR BLD CALC: 34.8 % (ref 36–48)
HCT VFR BLD CALC: 37.6 % (ref 36–48)
HEMOGLOBIN: 11.3 G/DL (ref 12–16)
HEMOGLOBIN: 12.5 G/DL (ref 12–16)
LYMPHOCYTES ABSOLUTE: 0 K/UL (ref 1–5.1)
LYMPHOCYTES ABSOLUTE: 0.6 K/UL (ref 1–5.1)
LYMPHOCYTES RELATIVE PERCENT: 0 %
LYMPHOCYTES RELATIVE PERCENT: 3.1 %
MCH RBC QN AUTO: 31.8 PG (ref 26–34)
MCH RBC QN AUTO: 32.8 PG (ref 26–34)
MCHC RBC AUTO-ENTMCNC: 32.4 G/DL (ref 31–36)
MCHC RBC AUTO-ENTMCNC: 33.3 G/DL (ref 31–36)
MCV RBC AUTO: 98 FL (ref 80–100)
MCV RBC AUTO: 98.3 FL (ref 80–100)
MONOCYTES ABSOLUTE: 0.4 K/UL (ref 0–1.3)
MONOCYTES ABSOLUTE: 0.8 K/UL (ref 0–1.3)
MONOCYTES RELATIVE PERCENT: 2 %
MONOCYTES RELATIVE PERCENT: 4.4 %
NEUTROPHILS ABSOLUTE: 16.4 K/UL (ref 1.7–7.7)
NEUTROPHILS ABSOLUTE: 18.4 K/UL (ref 1.7–7.7)
NEUTROPHILS RELATIVE PERCENT: 68 %
NEUTROPHILS RELATIVE PERCENT: 88.7 %
PDW BLD-RTO: 14.6 % (ref 12.4–15.4)
PDW BLD-RTO: 14.8 % (ref 12.4–15.4)
PLATELET # BLD: 266 K/UL (ref 135–450)
PLATELET # BLD: 318 K/UL (ref 135–450)
PMV BLD AUTO: 8.6 FL (ref 5–10.5)
PMV BLD AUTO: 9 FL (ref 5–10.5)
POTASSIUM REFLEX MAGNESIUM: 4.8 MMOL/L (ref 3.5–5.1)
RBC # BLD: 3.55 M/UL (ref 4–5.2)
RBC # BLD: 3.83 M/UL (ref 4–5.2)
RBC # BLD: NORMAL 10*6/UL
SODIUM BLD-SCNC: 133 MMOL/L (ref 136–145)
WBC # BLD: 18.5 K/UL (ref 4–11)
WBC # BLD: 19.4 K/UL (ref 4–11)

## 2022-05-27 PROCEDURE — 36415 COLL VENOUS BLD VENIPUNCTURE: CPT

## 2022-05-27 PROCEDURE — 97129 THER IVNTJ 1ST 15 MIN: CPT

## 2022-05-27 PROCEDURE — 6370000000 HC RX 637 (ALT 250 FOR IP): Performed by: STUDENT IN AN ORGANIZED HEALTH CARE EDUCATION/TRAINING PROGRAM

## 2022-05-27 PROCEDURE — 85025 COMPLETE CBC W/AUTO DIFF WBC: CPT

## 2022-05-27 PROCEDURE — 97110 THERAPEUTIC EXERCISES: CPT

## 2022-05-27 PROCEDURE — 71045 X-RAY EXAM CHEST 1 VIEW: CPT

## 2022-05-27 PROCEDURE — 1280000000 HC REHAB R&B

## 2022-05-27 PROCEDURE — 97535 SELF CARE MNGMENT TRAINING: CPT

## 2022-05-27 PROCEDURE — 80048 BASIC METABOLIC PNL TOTAL CA: CPT

## 2022-05-27 PROCEDURE — 6360000002 HC RX W HCPCS: Performed by: STUDENT IN AN ORGANIZED HEALTH CARE EDUCATION/TRAINING PROGRAM

## 2022-05-27 PROCEDURE — 97530 THERAPEUTIC ACTIVITIES: CPT

## 2022-05-27 PROCEDURE — 97112 NEUROMUSCULAR REEDUCATION: CPT

## 2022-05-27 PROCEDURE — 6370000000 HC RX 637 (ALT 250 FOR IP): Performed by: INTERNAL MEDICINE

## 2022-05-27 PROCEDURE — 97130 THER IVNTJ EA ADDL 15 MIN: CPT

## 2022-05-27 RX ADMIN — LISINOPRIL 40 MG: 20 TABLET ORAL at 08:43

## 2022-05-27 RX ADMIN — Medication 400 MG: at 08:43

## 2022-05-27 RX ADMIN — LACTULOSE 20 G: 20 SOLUTION ORAL at 08:43

## 2022-05-27 RX ADMIN — DOCUSATE SODIUM 50 MG AND SENNOSIDES 8.6 MG 2 TABLET: 8.6; 5 TABLET, FILM COATED ORAL at 08:42

## 2022-05-27 RX ADMIN — LACTULOSE 20 G: 20 SOLUTION ORAL at 21:46

## 2022-05-27 RX ADMIN — ASPIRIN 81 MG: 81 TABLET, COATED ORAL at 08:42

## 2022-05-27 RX ADMIN — FAMOTIDINE 10 MG: 20 TABLET, FILM COATED ORAL at 08:42

## 2022-05-27 RX ADMIN — ACETAMINOPHEN 650 MG: 325 TABLET ORAL at 14:07

## 2022-05-27 RX ADMIN — DIPHENHYDRAMINE HCL 25 MG: 25 TABLET ORAL at 01:39

## 2022-05-27 RX ADMIN — NYSTATIN 500000 UNITS: 100000 SUSPENSION ORAL at 17:10

## 2022-05-27 RX ADMIN — DIPHENHYDRAMINE HCL 25 MG: 25 TABLET ORAL at 08:43

## 2022-05-27 RX ADMIN — CARBOXYMETHYLCELLULOSE SODIUM 1 DROP: 10 GEL OPHTHALMIC at 21:52

## 2022-05-27 RX ADMIN — HEPARIN SODIUM 5000 UNITS: 5000 INJECTION INTRAVENOUS; SUBCUTANEOUS at 05:33

## 2022-05-27 RX ADMIN — CARBOXYMETHYLCELLULOSE SODIUM 1 DROP: 10 GEL OPHTHALMIC at 08:43

## 2022-05-27 RX ADMIN — METOPROLOL TARTRATE 25 MG: 25 TABLET, FILM COATED ORAL at 08:42

## 2022-05-27 RX ADMIN — DICLOFENAC SODIUM 4 G: 10 GEL TOPICAL at 08:42

## 2022-05-27 RX ADMIN — DIPHENHYDRAMINE HCL 25 MG: 25 TABLET ORAL at 14:08

## 2022-05-27 RX ADMIN — LEVOTHYROXINE SODIUM 50 MCG: 0.05 TABLET ORAL at 05:33

## 2022-05-27 RX ADMIN — HEPARIN SODIUM 5000 UNITS: 5000 INJECTION INTRAVENOUS; SUBCUTANEOUS at 14:08

## 2022-05-27 RX ADMIN — CLOPIDOGREL BISULFATE 75 MG: 75 TABLET, FILM COATED ORAL at 08:42

## 2022-05-27 RX ADMIN — DIPHENHYDRAMINE HCL 25 MG: 25 TABLET ORAL at 21:50

## 2022-05-27 RX ADMIN — HYDROCORTISONE: 1 CREAM TOPICAL at 21:51

## 2022-05-27 RX ADMIN — HEPARIN SODIUM 5000 UNITS: 5000 INJECTION INTRAVENOUS; SUBCUTANEOUS at 21:46

## 2022-05-27 RX ADMIN — NYSTATIN 500000 UNITS: 100000 SUSPENSION ORAL at 08:43

## 2022-05-27 RX ADMIN — NYSTATIN 500000 UNITS: 100000 SUSPENSION ORAL at 21:46

## 2022-05-27 RX ADMIN — METOPROLOL TARTRATE 25 MG: 25 TABLET, FILM COATED ORAL at 21:46

## 2022-05-27 RX ADMIN — NIFEDIPINE 30 MG: 30 TABLET, FILM COATED, EXTENDED RELEASE ORAL at 08:43

## 2022-05-27 RX ADMIN — HYDROCORTISONE: 1 CREAM TOPICAL at 08:44

## 2022-05-27 RX ADMIN — DICLOFENAC SODIUM 4 G: 10 GEL TOPICAL at 21:45

## 2022-05-27 ASSESSMENT — PAIN SCALES - GENERAL
PAINLEVEL_OUTOF10: 4
PAINLEVEL_OUTOF10: 5

## 2022-05-27 ASSESSMENT — PAIN DESCRIPTION - LOCATION: LOCATION: SHOULDER;BACK

## 2022-05-27 NOTE — PROGRESS NOTES
Speech Language Pathology  [facility]: ACUTE REHAB UNIT  SPEECH-LANGUAGE PATHOLOGY      [x] Daily  [] Weekly Care Conference Note  [] Discharge    Patient:Jennifer Noguera      :1950  PQI:[id number]  Rehab Dx/Hx: Acute CVA (cerebrovascular accident) ([facility] [address] [I63.9]    Precautions: falls and aspirations; severe left neglect  Home situation: Lives with . Indep with med management. Share finances, cooking, laundtry, grocery shopping. Has not driven since CVA in March. ST Dx: [] Aphasia  [x] Dysarthria  [] Apraxia   [x] Oropharyngeal dysphagia [x] Cognitive Impairment  [] Other:   Date of Admit: 5/10/2022  Room #: 5008/3868-50    Current functional status (updated daily):         Pt being seen for : [x] Speech/Language Treatment  [x] Dysphagia Treatment [x] Cognitive Treatment  [] Other:  Communication: []WFL  [] Aphasia  [x] Dysarthria  [] Apraxia  [] Pragmatic Impairment [] Non-verbal  [] Hearing Loss  [] Other:   Cognition: [] WFL  [x] Mild  [] Moderate  [] Severe [] Unable to Assess  [] Other:  Memory: [] WFL  [x] Mild  [] Moderate  [] Severe [] Unable to Assess  [] Other:  Behavior: [x] Alert  [x] Cooperative  [x]  Pleasant  [] Confused  [] Agitated  [] Uncooperative  [] Distractible [] Motivated  [] Self-Limiting [] Anxious  [] Other:  Endurance:  [x] Adequate for participation in SLP sessions  [] Reduced overall  [] Lethargic  [] Other:  Safety: [] No concerns at this time  [x] Reduced insight into deficits  [x]  Reduced safety awareness [] Not following call light procedures  [] Unable to Assess  [] Other:    Current Diet Order:ADULT DIET;  Regular; 3 carb choices (45 gm/meal)  Swallowing Precautions: upright for all intake, stay upright for at least 30 mins after intake, small bites/sips, assist feed, oral care 2-3x/day to reduce adverse affects in the event of aspiration, alternate bites/sips, slow rate of intake         Date: 2022      Tx session 1  1030 - 1130 Tx session 2  All tx needs met in session 1     Total Timed Code Min 60    Total Treatment Minutes 60    Individual Treatment Minutes 60    Group Treatment Minutes 0    Co-Treat Minutes 0    Variance/Reason:  N/a    Pain Itching on arms and chest    Pain Intervention [x] RN notified new onset of rash  [x] Repositioned  [] Intervention offered and patient declined  [] N/A  [] Other: [] RN notified  [] Repositioned  [] Intervention offered and patient declined  [] N/A  [] Other:   Subjective     Pt alert and oriented, cooperative and agreeable to participate in therapy. Pt seen sitting upright in wheelchair,  present at bedside. Pt appeared very fatigued, required verbal cueing in order to maintain alertness. Objective:     Dysphagia Goals  Short-term Goals  Timeframe for Short-term Goals: 18 days (05/28/2022)     Goal met 05/24/22. Per chart review, speaking with pt and RN, pt tolerating IDDSI Level 7 regular solids with TL via cup meds whole with PO as LRD. Goal met 05/24/22. Goal discontinued 05/17/22. Goal 4: The pt will complete oral motor exercises to improve labial and lingual strength, ROM, and coordination in 10/10 opportunities given min cues    Goal not directly targeted. Cognitive-linguistic Goals:   Short-term Goals  Timeframe for Short-term Goals: 18 days (05/28/2022)    Goal 1: Pt will effectively use compensatory visual strategies for improved attention to left side during structured tasks with 80% acc given mod cues.    Visual Scanning Task to Left  -difficult for pt to complete today due to reduced attention to left, but also because of reduced alertness / fatigue  -pt completed with 30% acc despite max cues    Pt shown other pieces of paper; pt required mod cues to attend to left    Goal 2: Pt will complete graded recall tasks using compensatory strategies with 90% acc given min cues   5 Grocery List Item Recall Task   -pt given 5 items from the grocery store to recall  -edu re: recall strategies - writing info down, repetition, association  -immediate recall trial 1: pt recalled 4/5 items indep  -immediate recall trial 2: pt recalled 4/5 items indep  -5 min delay: pt recalled 0/5 indep; +3 given category cue, +1 given additional mod cues       Goal 3: Pt will complete executive function tasks (e.g. meds, time, money, etc) with 90% acc given min cues   Counting Money  -pt given bills and coins; required SLP to go one by one to count  -pt counted with 50% acc given min cues, improved to 90% acc given mod cues       Goal 4: Pt will complete problem solving and thought organization tasks with 90% acc given min cues   Divergent Naming Task   -pt given a concrete category; asked to name 10 items that belong to the category  -e.g. name 10 drinks  -pt completed task with 45% acc given min cues, improved to 88% but required max cues       Goal 5: Pt will complete verbal and visual reasoning tasks with 90% acc given min cues   Comparison Task   -pt given 2 items; asked to name a similarity and a difference  -pt named a similarity with 56% acc indep, improved to 78% acc given max cues  -pt named a difference with 22% acc indep, improved to 67% acc given max cues       Other areas targeted: N/a    Education:   SLP edu pt re: recall strategies, thought org strategies, counting money strategies     Safety Devices: [x] Call light within reach  [x] Chair alarm activated  [] Bed alarm activated  [x] Other:  at bedside [] Call light within reach  [] Chair alarm activated  [] Bed alarm activated  [] Other:    Assessment:` Pt extremely fatigued this date - pt with new rash (MD aware), and has since bene started on Benadryl. SLP suspects this could be impacting overall alertness. Thus, pt required verbal cueing to stay awake during session. Pt with significantly more difficulty completing cognitive tasks this date. Pt required mod-max cues to increase acc with all cognitive tasks.  Pt required step by step cueing in order to count bills and coins. Reduced thought org and recall. SLP attempted a visual scanning task; pt completed with just 30% acc despite max cues - pt with reduced attention to left, but also negatively impacted due to fatigue. Continue goals above. Plan: Continue as per plan of care. Additional Information:     Barriers toward progress: Limited safety awareness, Limited insight into deficits, Decreased proprioception, Upper extremity weakness and Lower extremity weakness  Discharge recommendations:  [] Home independently  [] Home with assistance [x]  24 hour supervision  [] ECF [] Other:  Continued Tx Upon Discharge: ? [x] Yes [] No [] TBD based on progress while on ARU [] Vital Stim indicated [] Other:   Estimated discharge date: 06/03/2022    Interventions used this date:  [] Speech/Language Treatment  [] Instruction in HEP [] Group [x] Dysphagia Treatment [x] Cognitive Treatment   [] Other:       Total Time Breakdown / Charges    Time in Time out Total Time / units   Cognitive Tx 1030 1130 60 min / 4 units    Speech Tx -- -- --   Dysphagia Tx          Electronically Signed by     Naila Monroy MA CCC-SLP #[id number]  Speech Language Pathologist

## 2022-05-27 NOTE — PROGRESS NOTES
Henderson Fabry  5/27/2022  [id number]    Chief Complaint: Acute CVA (cerebrovascular accident) [facility])    Subjective:   No acute events overnight. Yesterday nursing noticed erythematous rash over her chest, arms, and back. Patient endorsing itching. Given PRN benadryl.  notes rash started several days ago. Only two new medications are tramadol and nystatin. Discussed with pharmacy and tramadol likely culprit. Stop tramadol and continue PRN benadryl. She denies other acute complaints at this time. ROS: denies f/c, n/v, sob, cp    Objective:  Patient Vitals for the past 24 hrs:   BP Temp Temp src Pulse SpO2   05/27/22 1359 116/64 -- -- 95 95 %   05/27/22 0854 116/64 -- -- 95 95 %   05/27/22 0830 116/64 -- -- 94 93 %   05/26/22 2115 112/70 98.6 °F (37 °C) Oral 69 93 %     Gen: No distress, pleasant. Seated in wheelchair  HEENT: Normocephalic, atraumatic. CV: extremities well perfused  Resp: No respiratory distress. Lungs CTAB  Abd: Soft, nondistended  Ext: No edema. Neuro: Alert, oriented, appropriately interactive. Left hemiparesis. Psych: mood and affect appropriate  Skin: diffuse erythematous rash over chest, back, arms    Wt Readings from Last 3 Encounters:   05/10/22 150 lb 12.7 oz (68.4 kg)   05/02/22 143 lb 9.6 oz (65.1 kg)   09/09/21 150 lb 12.8 oz (68.4 kg)       Laboratory data:   Lab Results   Component Value Date    WBC 19.4 (H) 05/27/2022    HGB 12.5 05/27/2022    HCT 37.6 05/27/2022    MCV 98.3 05/27/2022     05/27/2022       Lab Results   Component Value Date     05/27/2022    K 4.8 05/27/2022    CL 98 05/27/2022    CO2 22 05/27/2022    BUN 38 05/27/2022    CREATININE 1.2 05/27/2022    GLUCOSE 149 05/27/2022    GLUCOSE 129 07/26/2017    CALCIUM 9.6 05/27/2022        Therapy progress:  PT  Position Activity Restriction  Other position/activity restrictions: chemo precautions, L inattention, LUE flaccid, resting hand splint to be worn at night.   Objective     Sit to Stand: 2 Person Assistance,Dependent/Total  Stand to sit: Dependent/Total,2 Person Assistance  Bed to Chair: Dependent/Total     OT  PT Equipment Recommendations  Equipment Needed: Yes  Other: CTA pending progress with mobility  Toilet - Technique:  (steady to e-tac chair over toilet)  Equipment Used:  (e-tac chair)  Toilet Transfers Comments: maxA x2 sit <>  steady, progressing at times to [address] with max verbal/tactile and demo cues  Assessment        SLP                Body mass index is 27.14 kg/m². Assessment and Plan:  Cyn Siddiqui is a 66year old female with a past medical history significant for recent CVA with left hemiparesis, CML, HTN, and HLD who presented to [facility] on 5/3/22 with abnormal labs, admitted with rhabdomyolysis and ARF, found to have acute CVA. She was admitted to [facility] on 5/10/22 due to functional deficits below her baseline.     Acute Right CVA  - MRI showing acute infarct in right cerebral hemisphere in a watershed distribution  - CTA showing 90-95% stenosis of right ICA  - Vascular and Neurology in agreement on waiting for CEA, needs follow up in 4 weeks  - asa, plavix, statin  - PT, OT, ST     Dysphagia  - upgraded to regular diet  - ST    Leukocytosis  - etiology unclear, possibly due to allergic reaction.    - XR chest non acute  - obtain UA  - repeat lab tomorrow    Drug Rash  - suspect due to tramadol  - stop tramadol  - discussed with Pharmacy  - continue PRN benadryl    Chest Pain, resolved  - EKG and troponin unremarkable  - continue to monitor    Abd pain  - XR abdomen with moderate stool burden  - continue bowel regimen    RLL Pneumonia, resolved  - concern for aspiration in setting of dysphagia  - repeat CXR showing improvement 5/16  - completed course of levaquin   - make mucinex PRN and monitor symptoms  - IS      Rhabdomyolysis  Acute Renal Injury  - Nephrology followed during acute stay  - CPK trending down  - monitor      HTN  - patient with episode of hypotension while in acute care so isosorbid mononitrate and hydralazine held  - metoprolol, lisinopril, nifedipine  - Nephrology following, appreciate assistance    DM2  - Hba1c 6.6  - glucose well controlled without SSI, discontinued  - follow up with PCP     Liver Injury with elevated LFTs  - suspected to be due to statin induced rhabdo  - statin stopped  - LFTs normalized  - no need to further follow     CML  - follows with Dr. Fide Rhodes  - on nilitinib  - chemo precautions  - follow up with Heme/Onc OP     Enlarged heterogeneous appearance of the thyroid  - Thyroid ultrasound outpatient  - follow up with PCP     Hemorrhoids  - hydrocortisone cream   - bowel regimen to avoid constipation     Bowels: Schedule stool softener. Follow bowel movements. Enema or suppository if needed.      Bladder: Check PVR x 3. [facility] if PVR > 200ml or if any volume is > 500 ml.      Sleep: Trazodone provided prn. PPx  DVT: heparin  GI: not indicated     Follow up appointments: Vascular, PCP    Services: [facility]  EDOD: 6/3/22      [address] [facility].  Rehan Mirza MD 5/27/2022, 3:42 PM

## 2022-05-27 NOTE — PROGRESS NOTES
Denies pain but expressing increased fatigue this pm    Sit to stand w/c to STEDY via pulling with max A of 1  TD via STEDY to commode   Pt requires assist for pericare/ clothing management. TD from commode to EOB  Max a of 2 sit to supine  Pt completed 15 reps of RLE supine ankle pumps, heel slides, hip abduction, SLR with AAROM due to poor motor planning/ form. Therapist provided x10 reps of PROM LLE ankle DF/pF, knee flexion/extension, hip abduction/adduction. Pt supine in bed with alarm donned, call light/needs within reach and bony prominences elevated. ASSESSMENT/PROGRESS TOWARDS GOALS  Vital Signs  Heart Rate: 95  Heart Rate Source: Monitor  BP: 116/64  BP Location: Right upper arm  MAP (Calculated): 81.33  SpO2: 95 %  O2 Device: None (Room air)    Assessment  Assessment: co treat with OT for increased safety progressing functional mobility. pt with poor attention span this am with increased diffiuclty redirecting pt to therapy tasks. therapist conferred with MD regarding pt's poor attention/ command following and awarenss of self. grossly max A of 2 for pivot trnasfers. and mod A of 2 wht max VC for scooting. poor propriocetion throughout. conitnue to rec SNF upon d/c.   Activity Tolerance: Patient tolerated treatment well  Discharge Recommendations: Subacute/Skilled Nursing Facility    Goals  Patient Goals   Patient goals : \"go on my mission trip in [place]"  Short Term Goals  Time Frame for Short term goals: 11 days 5/20  Short term goal 1: pt will complete bed mobility with mod A; not met 5/20 - pt requires mod A x2 for supine>sit  Short term goal 2: pt will complete functional transfer with max A; goal partially met 5/20 - pt completes transfers with mod A x2 with lorrie stedy vs. // bars  Short term goal 3: pt will tolerate gait assessment and set goal when appropriate; goal met 5/20 - pt ambulates 6 ft in // bars with mod A  x2 (dependent d/t w/c follow and // bars), new goal in LTG  Short term goal 4: pt will tolerate stair assessment and set goal when appropriate; not met 5/20 - unsafe to attempt stairs  Short term goal 5: pt will propel w/c x 150 ft with supv; not assessed 5/20  Long Term Goals  Time Frame for Long term goals : 21 days 5/31  Long term goal 1: pt will complete bed mobility with CGA. Long term goal 2: pt will complete functional transfer with min a and LRAD. Long term goal 3: pt will propel w/c x 150 ft with CGA  Long term goal 4: Pt will ambulate 10 ft with LRAD and mod A. PLAN OF CARE/SAFETY  Plan  Plan: 5-7 times per week  Current Treatment Recommendations: Strengthening;ROM;Balance training;Functional mobility training;Transfer training; Endurance training;Gait training; Therapeutic activities; Home exercise program;Wheelchair mobility training;Equipment evaluation, education, & procurement;Cognitive reorientation;Stair training;Positioning; Safety education & training;Patient/Caregiver education & training;Cognitive/Perceptual training;ADL/Self-care training;IADL training;Pain management  Safety Devices  Type of Devices: All fall risk precautions in place;Call light within reach; Patient at risk for falls;Nurse notified; All yaima prominences offloaded;Gait belt;Left in chair;Chair alarm in place      Therapy Time   Individual Concurrent Group Co-treatment   Time In 1330     0900   Time Out 1400     0930   Minutes 30     30     Timed Code Treatment Minutes: 61 Minutes       Disha Bucio PT, 05/27/22 at 2:02 PM

## 2022-05-27 NOTE — PLAN OF CARE
Problem: Discharge Planning  Goal: Discharge to home or other facility with appropriate resources  Outcome: Progressing     Problem: Safety - Adult  Goal: Free from fall injury  5/26/2022 2342 by Nikki Libman, RN  Outcome: Progressing  5/26/2022 1942 by Miladis De La Paz RN  Outcome: Progressing  Note: Fall precautions in place, bed alarm on, nonskid foot wear applied, bed in lowest position, and call light within reach. Will continue to monitor. Problem: Skin/Tissue Integrity  Goal: Absence of new skin breakdown  Description: 1. Monitor for areas of redness and/or skin breakdown  2. Assess vascular access sites hourly  3. Every 4-6 hours minimum:  Change oxygen saturation probe site  4. Every 4-6 hours:  If on nasal continuous positive airway pressure, respiratory therapy assess nares and determine need for appliance change or resting period.   Outcome: Progressing     Problem: ABCDS Injury Assessment  Goal: Absence of physical injury  Outcome: Progressing     Problem: Pain  Goal: Verbalizes/displays adequate comfort level or baseline comfort level  Outcome: Progressing     Problem: Nutrition Deficit:  Goal: Optimize nutritional status  Outcome: Progressing

## 2022-05-27 NOTE — PROGRESS NOTES
Occupational Therapy  Facility/Department: [facility]  Rehabilitation Occupational Therapy Daily Treatment Note    Date: 22  Patient Name: Laurie Nieves       Room: 0984/4231-16  MRN: [id number]  Account: [de-identified]   : 1950  (76 y.o.) Gender: female                    Past Medical History:  has a past medical history of Anemia, Arthritis, Asthma, Bowel dysfunction, CML (chronic myelocytic leukemia) ([facility] [address] Hyperlipidemia, Hypertension, Nuclear senile cataract of both eyes, Obesity, Risk of myocardial infarction or stroke 7.5% or greater in next 10 years, Skin cancer of face, Stroke (cerebrum) ([facility] [address] Thyroid disease, and TIA (transient ischemic attack). Past Surgical History:   has a past surgical history that includes Breast biopsy; fine needle aspiration; Breast lumpectomy; Tonsillectomy (Bilateral); Paracentesis; Anus surgery (); Elbow surgery (); and Colonoscopy. Restrictions  Restrictions/Precautions: Fall Risk;Up as Tolerated  Other position/activity restrictions: chemo precautions, L inattention, LUE flaccid, resting hand splint to be worn at night. Subjective  Subjective: Pt in bed, breakfast arriving. Agreeable to OT.  and dog visiting. Pt denies pain  Restrictions/Precautions: Fall Risk;Up as Tolerated             Objective  Vision - Basic Assessment  Patient Visual Report: Balance difficulty  Cognition  Overall Cognitive Status: Exceptions  Arousal/Alertness: Appropriate responses to stimuli  Following Commands: Follows one step commands with repetition; Follows multistep commands with repitition  Attention Span: Attends with cues to redirect  Memory: Decreased recall of precautions  Safety Judgement: Decreased awareness of need for safety;Decreased awareness of need for assistance  Problem Solving: Assistance required to correct errors made;Assistance required to identify errors made  Insights: Decreased awareness of deficits  Initiation: Requires cues for some  Sequencing: Requires cues for some  Cognition Comment: L inattention  Orientation  Overall Orientation Status: Within Functional Limits  Orientation Level: Oriented X4   Perception  Overall Perceptual Status: Impaired  Unilateral Attention: Cues to attend left visual field;Cues to attend to left side of body;Cues to maintain midline in sitting;Cues to maintain midline in standing  Initiation: Cues to initiate tasks  Motor Planning: Hand over hand to sequence tasks  Perseveration: Perseverates during conversation     ADL  Feeding  Assistance Level: Increased time to complete;Set-up; Verbal cues;Stand by assist  Skilled Clinical Factors: assistance to cut food, open packages and bring cup to hand  Grooming/Oral Hygiene  Assistance Level: Increased time to complete;Minimal assistance  Skilled Clinical Factors: washing face and brushing hair sitting  Upper Extremity Bathing  Assistance Level: Increased time to complete; Moderate assistance;Verbal cues  Skilled Clinical Factors: Mod A sponge bathing in supported sitting in w/c. Pt requiring full assistance to wash RUE and partial assistance to wash LUE. Pt requiring min cues for thoroughness on L side of body. Upper Extremity Dressing  Assistance Level: Maximum assistance  Skilled Clinical Factors: Moderate cues for papo techniques. Pt completes UB dressing in supported sitting in WC. Pt requiring partial assistance to thread BUE and doff/don clothing over abdomen.  Pt able to manage clothing over head with cues and extra time  Lower Extremity Dressing  Assistance Level: Dependent;Verbal cues  Skilled Clinical Factors: x2A sit <> stand with grab bar to R side to manage pants up/down in steady  Putting On/Taking Off Footwear  Assistance Level: Maximum assistance  Toileting  Assistance Level: Dependent  Skilled Clinical Factors: maxA x1 sit <> Stand from [facility] to [facility] to [facility] with steady  Toilet Transfers  Equipment: Beside commode  Additional Factors: Verbal cues;Cues for hand placement; Increased time to complete; With handrails  Assistance Level: Maximum assistance  Skilled Clinical Factors: maxA x1-2  sit <> Stand from [address] to grab bar to [facility] with steady          Functional Mobility  Device: Wheelchair  Activity: To/From bathroom; Retrieve items;Transport items; To/From therapy gym  Assistance Level: Maximum assistance  Skilled Clinical Factors: maxA x1 in steady EOB to WC, maxA X2 sit <> stand with OT/PT support for NMR and standing tolerance trials in hallway vs grab bar; mod cues for upright posture, poor attn to L side and R sided lean throughout needing maxA x2 for weight shifting L vs R  Bed Mobility  Overall Assistance Level: Requires x 2 Assistance;Maximum Assistance  Additional Factors: Head of bed raised; Increased time to complete;Set-up; Verbal cues  Bridging  Assistance Level: Maximum assistance  Roll Left  Assistance Level: Maximum assistance  Roll Right  Assistance Level: Moderate assistance  Supine to Sit  Assistance Level: Moderate assistance; Requires x 2 assistance  Scooting  Assistance Level: Moderate assistance; Requires x 2 assistance;Maximum assistance  Transfers  Surface: From bed; Wheelchair; To chair with arms;From chair with arms  Additional Factors: Increased time to complete;Hand placement cues; Verbal cues  Device: Walker (steady vs grab bar vs rail in hallway)  Sit to Stand  Assistance Level: Requires x 2 assistance;Maximum assistance  Skilled Clinical Factors: maxA x1-2 sit <>  steady from EOB to WC, WC <> BSC, sit <> stand with PT cotx standing to grab bar vs rail on R side, max cues and maxA x2 for trunk/pelvic support and midline alignment, poor sequencing and attn throughout  Stand to Sit  Skilled Clinical Factors: maxA x1-2 sit <>  steady from EOB to WC, WC <> BSC, sit <> stand with PT cotx standing to grab bar vs rail on R side, max cues and maxA x2 for trunk/pelvic support and midline alignment, poor sequencing and attn throughout  Bed To/From Chair  Skilled Clinical Factors: maxA x1-2 sit <>  steady from EOB to WC, WC <> BSC, sit <> stand with PT cotx standing to grab bar vs rail on R side, max cues and maxA x2 for trunk/pelvic support and midline alignment, poor sequencing and attn throughout  Stand Pivot  Skilled Clinical Factors: maxA x1-2 sit <>  steady from EOB to WC, WC <> BSC, sit <> stand with PT cotx standing to grab bar vs rail on R side, max cues and maxA x2 for trunk/pelvic support and midline alignment, poor sequencing and attn throughout   Neuromuscular Education  NDT Treatment: Upper extremity  Head/Neck Control: Pt continues to demo decreased ability to fully turn head/scan L vs R during transfers and ADL tasks. Pt needing mod cues for scanning and asisstance for upright posture in stance with limited ability to sustain upright posture  Trunk Control: Pt continues to demo L sided neglect and consistent need for x2A weight shifting L vs R for scooting L vs R on EOB to gather bean bags and place R vs L across midline with RUE. Poor sequencing throughout and needing consistnt hand over hand, max cues, and demo for which way to weight shift prior to completion of all scooting. Pt needing mod cues and max assistance for pelvic tilt, weight shifting and trunk support; foward flexion in stance, posterior L sided lean in sitting EOB with OT for ADLs and transfers. Pt does need consistent tactile cues with limited ability to maintain posture  Weight Bearing  RUE Weight Bearing: Extended arm standing; Extended arm seated  LUE Weight Bearing: Extended arm seated; Extended arm standing  Response To Weight Bearing Technique: WBing through LUE AAROM and hand over hand during sit <> stands with RUE support on steady and to grab bar vs rail R side, WBing through LUE wiht elbow extension and wrist support from OT while PT providing WBing/support of LLE during tasks. WBing through LUE forearm vs elbow extension.   Mod cues for upright posture in midline wiht mirror for visual feedback. Assessment  Assessment  Assessment: Pt very lethargic this AM, needing maxA x2 for bed mobility, scooting to EOB and depA at times wiht max cues (tactile and verbal) for upright posture. Once in [facility], pt more alert but poor sitting balance noted, therefore shower task was not completed due to safety concerns and only x1A for individual 60 min session. ModA with max cues for grooming, Mita wiht max cues for visual scanning L vs R for eating/container management. Pt depA x1-2 sit <> stand with steady for pants management. Cotx: PT/OT session focusing on dynamic vs static sitting balance EOM due to poor upright posture during OT session this date. Continues to need x2A for all sit pivot transfers R vs L, max cues for visual scanning towards L side before transfers. Pt tolerated sitting EOM but needing consistent max cues from OT and PT providing upright support behind as well as tactile assist for midline alignment with mirror feedback. Pt remains lethargic and distratable throughout session, spoke with MD. Nany Hidalgo OT POC. Activity Tolerance: Patient tolerated treatment well  Discharge Recommendations: Subacute/Skilled Nursing Facility  OT Equipment Recommendations  Other: CTA  Safety Devices  Safety Devices in place: Yes  Type of devices: Left in chair;Call light within reach; Chair alarm in place;Nurse notified; Patient at risk for falls; All fall risk precautions in place;Gait belt  Restraints  Initially in place: No    Patient Education  Education  Education Given To: Patient  Education Provided: Role of Therapy;Plan of Care;Safety;Equipment;ADL Function; Fall Prevention Strategies;Transfer Training  Education Provided Comments: bed mobility, sitting balance, safety and hand placement with transfers, hemistrategies for UB bathing/dressing, skin checks for resting hand splint, AAROM LUE exercises, weightshifting during ambulation/standing, standing balance  Education Method: Demonstration;Verbal  Barriers to Learning: Cognition  Education Outcome: Verbalized understanding;Demonstrated understanding;Continued education needed    Plan  Plan  Times per Week: 5 out of 7 days  Times per Day: Daily  Plan Weeks: 3 weeks  Current Treatment Recommendations: Strengthening;ROM;Balance training;Functional mobility training; Endurance training; Safety education & training;Neuromuscular re-education;Patient/Caregiver education & training;Equipment evaluation, education, & procurement;Self-Care / ADL;Cognitive/Perceptual training; Wheelchair mobility training;Positioning    Goals       Therapy Time   Individual Concurrent Group Co-treatment   Time In 0800     0900   Time Out 0900 0930   Minutes 60     30   Timed Code Treatment Minutes: [address], OTR/L

## 2022-05-28 LAB
BACTERIA: ABNORMAL /HPF
BASOPHILS ABSOLUTE: 0 K/UL (ref 0–0.2)
BASOPHILS RELATIVE PERCENT: 0.1 %
BILIRUBIN URINE: NEGATIVE
BLOOD, URINE: ABNORMAL
CLARITY: ABNORMAL
COLOR: YELLOW
EOSINOPHILS ABSOLUTE: 0.5 K/UL (ref 0–0.6)
EOSINOPHILS RELATIVE PERCENT: 3.4 %
EPITHELIAL CELLS, UA: ABNORMAL /HPF (ref 0–5)
GLUCOSE URINE: NEGATIVE MG/DL
HCT VFR BLD CALC: 33 % (ref 36–48)
HEMOGLOBIN: 10.9 G/DL (ref 12–16)
KETONES, URINE: ABNORMAL MG/DL
LEUKOCYTE ESTERASE, URINE: ABNORMAL
LYMPHOCYTES ABSOLUTE: 0.3 K/UL (ref 1–5.1)
LYMPHOCYTES RELATIVE PERCENT: 2.3 %
MCH RBC QN AUTO: 32.3 PG (ref 26–34)
MCHC RBC AUTO-ENTMCNC: 33 G/DL (ref 31–36)
MCV RBC AUTO: 97.8 FL (ref 80–100)
MICROSCOPIC EXAMINATION: YES
MONOCYTES ABSOLUTE: 0.8 K/UL (ref 0–1.3)
MONOCYTES RELATIVE PERCENT: 5.6 %
NEUTROPHILS ABSOLUTE: 13.5 K/UL (ref 1.7–7.7)
NEUTROPHILS RELATIVE PERCENT: 88.6 %
NITRITE, URINE: NEGATIVE
PDW BLD-RTO: 14.5 % (ref 12.4–15.4)
PH UA: 5 (ref 5–8)
PLATELET # BLD: 237 K/UL (ref 135–450)
PMV BLD AUTO: 9 FL (ref 5–10.5)
PROTEIN UA: NEGATIVE MG/DL
RBC # BLD: 3.37 M/UL (ref 4–5.2)
RBC UA: ABNORMAL /HPF (ref 0–4)
SPECIFIC GRAVITY UA: 1.02 (ref 1–1.03)
URINE REFLEX TO CULTURE: YES
URINE TYPE: ABNORMAL
UROBILINOGEN, URINE: 1 E.U./DL
WBC # BLD: 15.2 K/UL (ref 4–11)
WBC UA: ABNORMAL /HPF (ref 0–5)

## 2022-05-28 PROCEDURE — 97130 THER IVNTJ EA ADDL 15 MIN: CPT

## 2022-05-28 PROCEDURE — 97535 SELF CARE MNGMENT TRAINING: CPT

## 2022-05-28 PROCEDURE — 97112 NEUROMUSCULAR REEDUCATION: CPT

## 2022-05-28 PROCEDURE — 97129 THER IVNTJ 1ST 15 MIN: CPT

## 2022-05-28 PROCEDURE — 81001 URINALYSIS AUTO W/SCOPE: CPT

## 2022-05-28 PROCEDURE — 97530 THERAPEUTIC ACTIVITIES: CPT

## 2022-05-28 PROCEDURE — 85025 COMPLETE CBC W/AUTO DIFF WBC: CPT

## 2022-05-28 PROCEDURE — 87086 URINE CULTURE/COLONY COUNT: CPT

## 2022-05-28 PROCEDURE — 6370000000 HC RX 637 (ALT 250 FOR IP): Performed by: INTERNAL MEDICINE

## 2022-05-28 PROCEDURE — 6360000002 HC RX W HCPCS: Performed by: STUDENT IN AN ORGANIZED HEALTH CARE EDUCATION/TRAINING PROGRAM

## 2022-05-28 PROCEDURE — 1280000000 HC REHAB R&B

## 2022-05-28 PROCEDURE — 6370000000 HC RX 637 (ALT 250 FOR IP): Performed by: STUDENT IN AN ORGANIZED HEALTH CARE EDUCATION/TRAINING PROGRAM

## 2022-05-28 PROCEDURE — 36415 COLL VENOUS BLD VENIPUNCTURE: CPT

## 2022-05-28 RX ORDER — CIPROFLOXACIN 500 MG/1
500 TABLET, FILM COATED ORAL EVERY 12 HOURS SCHEDULED
Status: DISCONTINUED | OUTPATIENT
Start: 2022-05-28 | End: 2022-05-30

## 2022-05-28 RX ADMIN — CARBOXYMETHYLCELLULOSE SODIUM 1 DROP: 10 GEL OPHTHALMIC at 22:36

## 2022-05-28 RX ADMIN — NYSTATIN 500000 UNITS: 100000 SUSPENSION ORAL at 18:05

## 2022-05-28 RX ADMIN — CARBOXYMETHYLCELLULOSE SODIUM 1 DROP: 10 GEL OPHTHALMIC at 09:26

## 2022-05-28 RX ADMIN — HEPARIN SODIUM 5000 UNITS: 5000 INJECTION INTRAVENOUS; SUBCUTANEOUS at 05:27

## 2022-05-28 RX ADMIN — Medication 400 MG: at 09:25

## 2022-05-28 RX ADMIN — CIPROFLOXACIN 500 MG: 500 TABLET, FILM COATED ORAL at 20:32

## 2022-05-28 RX ADMIN — METOPROLOL TARTRATE 25 MG: 25 TABLET, FILM COATED ORAL at 09:23

## 2022-05-28 RX ADMIN — ACETAMINOPHEN 650 MG: 325 TABLET ORAL at 14:22

## 2022-05-28 RX ADMIN — LISINOPRIL 40 MG: 20 TABLET ORAL at 09:22

## 2022-05-28 RX ADMIN — LACTULOSE 20 G: 20 SOLUTION ORAL at 20:30

## 2022-05-28 RX ADMIN — NYSTATIN 500000 UNITS: 100000 SUSPENSION ORAL at 09:22

## 2022-05-28 RX ADMIN — CLOPIDOGREL BISULFATE 75 MG: 75 TABLET, FILM COATED ORAL at 09:25

## 2022-05-28 RX ADMIN — HYDROCORTISONE: 1 CREAM TOPICAL at 20:35

## 2022-05-28 RX ADMIN — WITCH HAZEL 40 EACH: 500 SOLUTION RECTAL; TOPICAL at 20:46

## 2022-05-28 RX ADMIN — NYSTATIN 500000 UNITS: 100000 SUSPENSION ORAL at 20:30

## 2022-05-28 RX ADMIN — DICLOFENAC SODIUM 4 G: 10 GEL TOPICAL at 09:28

## 2022-05-28 RX ADMIN — METOPROLOL TARTRATE 25 MG: 25 TABLET, FILM COATED ORAL at 20:34

## 2022-05-28 RX ADMIN — NIFEDIPINE 30 MG: 30 TABLET, FILM COATED, EXTENDED RELEASE ORAL at 09:23

## 2022-05-28 RX ADMIN — LEVOTHYROXINE SODIUM 50 MCG: 0.05 TABLET ORAL at 08:22

## 2022-05-28 RX ADMIN — FAMOTIDINE 10 MG: 20 TABLET, FILM COATED ORAL at 09:40

## 2022-05-28 RX ADMIN — HEPARIN SODIUM 5000 UNITS: 5000 INJECTION INTRAVENOUS; SUBCUTANEOUS at 20:32

## 2022-05-28 RX ADMIN — ACETAMINOPHEN 650 MG: 325 TABLET ORAL at 20:30

## 2022-05-28 RX ADMIN — HYDROCORTISONE: 1 CREAM TOPICAL at 09:32

## 2022-05-28 RX ADMIN — CIPROFLOXACIN 500 MG: 500 TABLET, FILM COATED ORAL at 09:23

## 2022-05-28 RX ADMIN — DICLOFENAC SODIUM 4 G: 10 GEL TOPICAL at 12:28

## 2022-05-28 RX ADMIN — DIPHENHYDRAMINE HCL 25 MG: 25 TABLET ORAL at 22:41

## 2022-05-28 RX ADMIN — ACETAMINOPHEN 650 MG: 325 TABLET ORAL at 05:27

## 2022-05-28 RX ADMIN — DICLOFENAC SODIUM 4 G: 10 GEL TOPICAL at 18:11

## 2022-05-28 RX ADMIN — DIPHENHYDRAMINE HCL 25 MG: 25 TABLET ORAL at 18:05

## 2022-05-28 RX ADMIN — DIPHENHYDRAMINE HCL 25 MG: 25 TABLET ORAL at 09:40

## 2022-05-28 RX ADMIN — LACTULOSE 20 G: 20 SOLUTION ORAL at 09:22

## 2022-05-28 RX ADMIN — ASPIRIN 81 MG: 81 TABLET, COATED ORAL at 09:25

## 2022-05-28 RX ADMIN — DOCUSATE SODIUM 50 MG AND SENNOSIDES 8.6 MG 2 TABLET: 8.6; 5 TABLET, FILM COATED ORAL at 09:23

## 2022-05-28 RX ADMIN — HEPARIN SODIUM 5000 UNITS: 5000 INJECTION INTRAVENOUS; SUBCUTANEOUS at 14:21

## 2022-05-28 RX ADMIN — DICLOFENAC SODIUM 4 G: 10 GEL TOPICAL at 20:45

## 2022-05-28 ASSESSMENT — PAIN DESCRIPTION - FREQUENCY
FREQUENCY: CONTINUOUS
FREQUENCY: INTERMITTENT

## 2022-05-28 ASSESSMENT — PAIN DESCRIPTION - LOCATION
LOCATION: BACK
LOCATION: BACK
LOCATION: BACK;NECK
LOCATION: BACK

## 2022-05-28 ASSESSMENT — PAIN - FUNCTIONAL ASSESSMENT
PAIN_FUNCTIONAL_ASSESSMENT: PREVENTS OR INTERFERES WITH MANY ACTIVE NOT PASSIVE ACTIVITIES
PAIN_FUNCTIONAL_ASSESSMENT: ACTIVITIES ARE NOT PREVENTED

## 2022-05-28 ASSESSMENT — PAIN DESCRIPTION - PAIN TYPE
TYPE: CHRONIC PAIN
TYPE: ACUTE PAIN

## 2022-05-28 ASSESSMENT — PAIN SCALES - GENERAL
PAINLEVEL_OUTOF10: 4
PAINLEVEL_OUTOF10: 2
PAINLEVEL_OUTOF10: 1
PAINLEVEL_OUTOF10: 1
PAINLEVEL_OUTOF10: 4
PAINLEVEL_OUTOF10: 8
PAINLEVEL_OUTOF10: 1
PAINLEVEL_OUTOF10: 1

## 2022-05-28 ASSESSMENT — PAIN DESCRIPTION - ORIENTATION
ORIENTATION: UPPER
ORIENTATION: RIGHT
ORIENTATION: MID

## 2022-05-28 ASSESSMENT — PAIN DESCRIPTION - ONSET
ONSET: ON-GOING
ONSET: ON-GOING

## 2022-05-28 ASSESSMENT — PAIN DESCRIPTION - DESCRIPTORS
DESCRIPTORS: ACHING;THROBBING
DESCRIPTORS: ACHING
DESCRIPTORS: DISCOMFORT;NAGGING

## 2022-05-28 NOTE — PROGRESS NOTES
Physical Therapy  Facility/Department: [facility]  Rehabilitation Physical Therapy Treatment Note    NAME: Jennifer Chinchilla  : 1950 (66 y.o.)  MRN: [id number]  CODE STATUS: Full Code    Date of Service: 22       Restrictions:  Restrictions/Precautions: Fall Risk;Up as Tolerated;Isolation  Position Activity Restriction  Other position/activity restrictions: chemo precautions, L inattention, LUE flaccid, resting hand splint to be worn at night. SUBJECTIVE  Subjective  Subjective: Pt seated EOB with OT on arrival. Agreeable to PT/OT co-tx. Large rash over trunk and superior thigh, RN aware  Pain: Pt denies c/o pain                 OBJECTIVE  Cognition  Overall Cognitive Status: Exceptions  Arousal/Alertness: Appropriate responses to stimuli  Following Commands: Follows one step commands with repetition; Follows multistep commands with repitition  Attention Span: Attends with cues to redirect  Memory: Decreased recall of precautions  Safety Judgement: Decreased awareness of need for safety;Decreased awareness of need for assistance  Problem Solving: Assistance required to correct errors made;Assistance required to identify errors made  Insights: Decreased awareness of deficits  Initiation: Requires cues for some  Sequencing: Requires cues for some  Cognition Comment: L inattention  Orientation  Overall Orientation Status: Within Functional Limits  Orientation Level: Oriented X4    Functional Mobility  Balance  Sitting Balance: Minimal assistance (Mita to SBA)  Standing Balance: Dependent/Total    Transfers  Additional Factors: Verbal cues; Hand placement cues; Increased time to complete  Sit to Stand  Assistance Level: Moderate assistance; Requires x 2 assistance;Minimal assistance  Skilled Clinical Factors: Cotx: STS EOB no AD modA x 2, STS w/c to // bars modA x 2 first bout, Mita x 2 trials 2-6; cues for lumbar/thoracic extension and anterior WS with improved performance with increased anterior WS; individual session: STS w/c to STEDY and commode to STEDY modA x 1  Stand to Sit  Assistance Level: Minimal assistance; Moderate assistance; Requires x 2 assistance  Skilled Clinical Factors: Multiple bouts with grossly Mita/modA x 2, cues for increased anterior WS with lumbar/thoracic extension, decreased eccentric control; individual session: STS w/c to STEDY and commode to STEDY modA x 1  Stand to Sit  Bed To/From Chair  Technique: Sit pivot  Assistance Level: Moderate assistance; Requires x 2 assistance;Maximum assistance  Skilled Clinical Factors: Sit pivot EOB to w/c modA x 2 (co-tx); sit pivot w/c to/from EOM grossly maxA x 1      Environmental Mobility  Ambulation  Surface: Level surface  Device: Parallel Groupiter  Distance: 3' + 5'  Activity: Within Unit  Additional Factors: Set-up; Verbal cues; Hand placement cues; Increased time to complete  Assistance Level: Moderate assistance;Maximum assistance; Requires x 2 assistance (TD d/t // bars with w/c follow, modA/maxA x 2 for balance)  Gait Deviations: Slow arabella;Narrow base of support; Unsteady gait; Decreased step length bilateral;Decreased trunk rotation;Decreased heel strike right;Decreased heel strike left;Decreased weight shift left  Skilled Clinical Factors: Pt completes in // bars during co-tx. Mirror placed in front of pt for visual feedback. PT anterior to PT assisting with LLE blocking in stance to prevent buckling and maxA for LLE advancement in swing. PT providing cues for positioning and maintain L knee extended without hyperextension in stance and promotion of increased lumbar and thoracic extension. OT posterior to pt assisting with promotion of B WS and cues for increased upright posture. Distances limited by fatigue, max cues for sequence and promotion of R leading limb and anterior WS to allow for improved LLE swing.       Neuromuscular Education  Neuromuscular Education: Yes  Trunk Control: Pt sitting EOM high perch position x 15 minutes with LUE in closed chain ER positioning. PT providing frequent cues to increased lumbar and thoracic extension to promote improved activity and upright posture. While seated EOM pt completes x 6 reps partial STS from EOM with grossly modA/maxA. Pt requires frequent cues to maintain narrowed JACINTA with RLE improved positioning, RUE pushing through mat or knee, LUE pushing through knee for closed chain strengthening. Max cues to maintain lumbar/thoracic extension and anterior WS to promote improved performance. Standing in // bars grossly modA/maxA x 2 for balance with RUE support on // bars, intermittent LUE support on // bars with OT assisting with UE management. Pt anterior to pt providing cues through LLE to maintain heel on ground and promote knee extension without hyperextension. Facilitation of lumbar and thoracic extension   X 5 reps heel in/out   X 5 reps toe taps   X 5 reps RLE step forward/backwards   X 3 reps R step forward with heel in/out  Cues provided for upright posture and positioning. Completed to promote WB and closed chain strengthening in active support of LUE and LLE, frequent cues for positioning, cues to maintain abdominal activation and promote upright posture. ASSESSMENT/PROGRESS TOWARDS GOALS      Assessment  Assessment: Pt seen for 30 minute co-tx with OT followed by 30 minute individual session. Pt requires co-tx secondary to complexity of condition, severity of deficits and decreased activity tolerance. Pt benefits from x 2 skilled hands to provide increased cues and feedback and promote improved performance with mobility. Co-tx session focused on progression of functional t/f, standing activities and gait in // bars. Individual session with focus on neuro re-ed and progression of t/f and mobility. Pt with grossly flat affect during session but demonstrates good participation and overall improved performance with mobility and t/f at this time.  Pt demonstrates sit pivot t/f with modA x 2 to maxA x 1, STS in // bars with modA x 2 to Mita x 2, ambulation up to 5' in // bars with grossly modA/maxA x 2. Pt is limited by decreased activity tolerance, strength, balance and severity of deficits, continued tone noted to LLE with minimal active purposeful movement. Pt will benefit from continued skilled PT in ARU to address above deficits, will continue to progress mobility as tolerated. Activity Tolerance: Patient tolerated treatment well  Discharge Recommendations: Subacute/Skilled Nursing Facility  PT Equipment Recommendations  Equipment Needed: Yes  Other: CTA pending progress with mobility    Goals  Patient Goals   Patient goals : \"go on my mission trip in [place]"  Short Term Goals  Time Frame for Short term goals: 11 days 5/20  Short term goal 1: pt will complete bed mobility with mod A; not met 5/20 - pt requires mod A x2 for supine>sit  Short term goal 2: pt will complete functional transfer with max A; goal partially met 5/20 - pt completes transfers with mod A x2 with lorrie stedy vs. // bars  Short term goal 3: pt will tolerate gait assessment and set goal when appropriate; goal met 5/20 - pt ambulates 6 ft in // bars with mod A  x2 (dependent d/t w/c follow and // bars), new goal in LTG  Short term goal 4: pt will tolerate stair assessment and set goal when appropriate; not met 5/20 - unsafe to attempt stairs  Short term goal 5: pt will propel w/c x 150 ft with supv; not assessed 5/20  Long Term Goals  Time Frame for Long term goals : 21 days 5/31  Long term goal 1: pt will complete bed mobility with CGA. Long term goal 2: pt will complete functional transfer with min a and LRAD. Long term goal 3: pt will propel w/c x 150 ft with CGA  Long term goal 4: Pt will ambulate 10 ft with LRAD and mod A.     PLAN OF CARE/SAFETY  Plan  Plan: 5-7 times per week  Specific Instructions for Next Treatment: Progress mobility as tolerated  Current Treatment Recommendations: Strengthening;ROM;Balance training;Functional mobility training;Transfer training; Endurance training;Gait training; Therapeutic activities; Home exercise program;Wheelchair mobility training;Equipment evaluation, education, & procurement;Cognitive reorientation;Stair training;Positioning; Safety education & training;Patient/Caregiver education & training;Cognitive/Perceptual training;ADL/Self-care training;IADL training;Pain management  Safety Devices  Type of Devices: All fall risk precautions in place;Call light within reach; Patient at risk for falls;Nurse notified; All yaima prominences offloaded;Gait belt;Left in chair;Chair alarm in place      Therapy Time   Individual Concurrent Group Co-treatment   Time In 1100     1030   Time Out 1130     1100   Minutes 30     30     Timed Code Treatment Minutes: [facility], PT, DPT C/NDT  05/28/22 at 4:29 PM

## 2022-05-28 NOTE — PROGRESS NOTES
Speech Language Pathology  [facility]: ACUTE REHAB UNIT  SPEECH-LANGUAGE PATHOLOGY      [x] Daily  [] Weekly Care Conference Note  [] Discharge    Patient:Jennifer Reddy      :1950  GJR:[id number]  Rehab Dx/Hx: Acute CVA (cerebrovascular accident) ([facility] [address] [I63.9]    Precautions: falls and aspirations; severe left neglect  Home situation: Lives with . Indep with med management. Share finances, cooking, laundtry, grocery shopping. Has not driven since CVA in March. ST Dx: [] Aphasia  [x] Dysarthria  [] Apraxia   [x] Oropharyngeal dysphagia [x] Cognitive Impairment  [] Other:   Date of Admit: 5/10/2022  Room #: 5748/5930-92    Current functional status (updated daily):         Pt being seen for : [x] Speech/Language Treatment  [x] Dysphagia Treatment [x] Cognitive Treatment  [] Other:  Communication: []WFL  [] Aphasia  [x] Dysarthria  [] Apraxia  [] Pragmatic Impairment [] Non-verbal  [] Hearing Loss  [] Other:   Cognition: [] WFL  [x] Mild  [] Moderate  [] Severe [] Unable to Assess  [] Other:  Memory: [] WFL  [x] Mild  [] Moderate  [] Severe [] Unable to Assess  [] Other:  Behavior: [x] Alert  [x] Cooperative  [x]  Pleasant  [] Confused  [] Agitated  [] Uncooperative  [] Distractible [] Motivated  [] Self-Limiting [] Anxious  [] Other:  Endurance:  [x] Adequate for participation in SLP sessions  [] Reduced overall  [] Lethargic  [] Other:  Safety: [] No concerns at this time  [x] Reduced insight into deficits  [x]  Reduced safety awareness [] Not following call light procedures  [] Unable to Assess  [] Other:    Current Diet Order:ADULT DIET;  Regular; 3 carb choices (45 gm/meal)  Swallowing Precautions: upright for all intake, stay upright for at least 30 mins after intake, small bites/sips, assist feed, oral care 2-3x/day to reduce adverse affects in the event of aspiration, alternate bites/sips, slow rate of intake         Date: 2022      Tx session 1  2390-2283 Tx session 2  All tx needs met in session 1     Total Timed Code Min 60    Total Treatment Minutes 60    Individual Treatment Minutes 60    Group Treatment Minutes 0    Co-Treat Minutes 0    Variance/Reason:  N/a    Pain 2, back and Right arm pain    Pain Intervention [x] RN notified   [x] Repositioned  [] Intervention offered and patient declined  [] N/A  [] Other: [] RN notified  [] Repositioned  [] Intervention offered and patient declined  [] N/A  [] Other:   Subjective     Pt agreeable to participate in therapy and accepted repositioning to upright and midline in her bed. Spouse present and seated in front on her on the couch. Objective:     Dysphagia Goals  Short-term Goals  Timeframe for Short-term Goals: 18 days (05/28/2022)     Goal met 05/24/22. Goal met 05/24/22. Goal discontinued 05/17/22. Goal 4: The pt will complete oral motor exercises to improve labial and lingual strength, ROM, and coordination in 10/10 opportunities given min cues    Following pill administration by RN, pt identified her stronger side as \"Right\". She corrected this with cues. Pt demonstrated functional lingual ROM for lingual sweeps to ensure no stasis of pills in Left lateral sulci X2. Cognitive-linguistic Goals:   Short-term Goals  Timeframe for Short-term Goals: 18 days (05/28/2022)    Goal 1: Pt will effectively use compensatory visual strategies for improved attention to left side during structured tasks with 80% acc given mod cues. Pt connected letters in sequence presented Right of midline with 20% acc given max cues. Pt often kept her eyes closed while stating she could see. Pt followed max verbal and visual cues to read clock times with 30% acc    Goal 2: Pt will complete graded recall tasks using compensatory strategies with 90% acc given min cues   Pt required repetitions of math tasks below as she able to recall her answer after a few seconds delay only 50% of the time.       Goal 3: Pt will complete executive function tasks (e.g. meds, time, money, etc) with 90% acc given min cues     Pt solved simple math related to time and quantities with 50% acc given mod-max cueing      Goal 4: Pt will complete problem solving and thought organization tasks with 90% acc given min cues   Not directly targeted      Goal 5: Pt will complete verbal and visual reasoning tasks with 90% acc given min cues   Pt able to reason what time the clock read when verbally given the location of the hands (when unable to visually locate them) with 50% acc      Other areas targeted: N/a    Education:   SLP edu pt and spouse re Left neglect and verbal/visual cueing to encourage eye contact and scanning Left    Safety Devices: [x] Call light within reach  [x] Chair alarm activated  [] Bed alarm activated  [x] Other:  at bedside [] Call light within reach  [] Chair alarm activated  [] Bed alarm activated  [] Other:    Assessment:` Pt demonstrated severe Left visual inattention, easily fatigued by visually presented tasks, often closing her eyes. Her sustained alertness improved with auditorily presented tasks. SLP provided education to pt and spouse, spouse responded with \"I didn't realize she can't see it\" Pt required mod-max cues to increase acc with all cognitive tasks. She required repetitions of stimuli and mod-max cueing to figure amounts of time and quantities. Continue above. Plan: Continue as per plan of care.       Additional Information:     Barriers toward progress: Limited safety awareness, Limited insight into deficits, Decreased proprioception, Upper extremity weakness and Lower extremity weakness  Discharge recommendations:  [] Home independently  [] Home with assistance [x]  24 hour supervision  [] ECF [] Other:  Continued Tx Upon Discharge: ? [x] Yes [] No [] TBD based on progress while on ARU [] Vital Stim indicated [] Other:   Estimated discharge date: 06/03/2022    Interventions used this date:  [] Speech/Language Treatment [] Instruction in HEP [] Group [] Dysphagia Treatment [x] Cognitive Treatment   [] Other: Total Time Breakdown / Charges    Time in Time out Total Time / units   Cognitive Tx 1330 1430 60 min / 4 units    Speech Tx -- -- --   Dysphagia Tx          Electronically Signed by     Stefan Browne.  Seth BOOGIE CCC-SLP  Speech Language Pathologist

## 2022-05-28 NOTE — PROGRESS NOTES
Occupational Therapy  Facility/Department: [facility]  Rehabilitation Occupational Therapy Daily Treatment Note    Date: 22  Patient Name: Chance Glover       Room: 9845/1750-02  MRN: [id number]  Account: [de-identified]   : 1950  (76 y.o.) Gender: female         Past Medical History:  has a past medical history of Anemia, Arthritis, Asthma, Bowel dysfunction, CML (chronic myelocytic leukemia) ([facility] [address] Hyperlipidemia, Hypertension, Nuclear senile cataract of both eyes, Obesity, Risk of myocardial infarction or stroke 7.5% or greater in next 10 years, Skin cancer of face, Stroke (cerebrum) ([facility] [address] Thyroid disease, and TIA (transient ischemic attack). Past Surgical History:   has a past surgical history that includes Breast biopsy; fine needle aspiration; Breast lumpectomy; Tonsillectomy (Bilateral); Paracentesis; Anus surgery (); Elbow surgery (); and Colonoscopy. Restrictions  Restrictions/Precautions: Fall Risk;Up as Tolerated;Isolation  Other position/activity restrictions: chemo precautions, L inattention, LUE flaccid, resting hand splint to be worn at night. Subjective  Subjective: Pt in bed agreeable to OT.  present for session. Restrictions/Precautions: Fall Risk;Up as Tolerated;Isolation        Objective     Cognition  Overall Cognitive Status: Exceptions  Arousal/Alertness: Appropriate responses to stimuli  Following Commands: Follows one step commands with repetition; Follows multistep commands with repitition  Attention Span: Attends with cues to redirect  Memory: Decreased recall of precautions  Safety Judgement: Decreased awareness of need for safety;Decreased awareness of need for assistance  Problem Solving: Assistance required to correct errors made;Assistance required to identify errors made  Insights: Decreased awareness of deficits  Initiation: Requires cues for some  Sequencing: Requires cues for some  Cognition Comment: L inattention  Orientation  Overall Orientation Status: Within Functional Limits  Orientation Level: Oriented X4         ADL  Grooming/Oral Hygiene  Assistance Level: Increased time to complete;Minimal assistance  Skilled Clinical Factors: washing face, brushing teeth, and brushing hair seated EOB, left side lean when using RUE to complete tasks  Lower Extremity Dressing  Assistance Level: Dependent;Verbal cues  Skilled Clinical Factors: x2 assist sit<>stand to manage pants up/down  Putting On/Taking Off Footwear  Assistance Level: Maximum assistance  Skilled Clinical Factors: donning socks          Functional Mobility  Device: Wheelchair  Activity: To/From therapy gym  Assistance Level: Maximum assistance  Skilled Clinical Factors: maxA X2 sit <> stand with OT/PT support fpr stand pivot from EOB<>w/c, mod cues for upright posture  Bed Mobility  Overall Assistance Level: Maximum Assistance  Additional Factors: Head of bed raised; Increased time to complete;Set-up; Verbal cues  Roll Right  Assistance Level: Moderate assistance  Supine to Sit  Assistance Level: Maximum assistance  Scooting  Assistance Level: Moderate assistance  Transfers  Surface: From bed; Wheelchair; To mat  Additional Factors: Increased time to complete;Hand placement cues; Verbal cues  Stand to Sit  Assistance Level: Moderate assistance; Requires x 2 assistance  Skilled Clinical Factors: EOB<>w/c<>parallel bars <>w/c<>mat during cotx with PT, needing PT to block LLE, max cues  Stand Pivot  Assistance Level: Moderate assistance; Requires x 2 assistance  Skilled Clinical Factors: modAx2 for stand pivots from EOB<>w/c<>parallel bars <>w/c<>mat during cotx with PT, needing PT to block LLE, max cues         Assessment  Assessment  Assessment: Pt completed bed mobility with maxA, sitting EOB for ~25 minutes completing ADL grooming tasks, brushing teeth, washing face, and combing hair with grossly Mita and max cues to compelte visual scanning for objects.  Pt needed minimal assist staying seated EOB, listing to the left when preoccupied with work task and unable to hold onto bedrail. Cotx: OT/PT session focusing on dynamic standing and functional mobility within parallel bars. Pt needed minAx2 for sit to stand transfers and min-modAx2 while taking steps or completing dynamic standing tasks inside bars. Pt complete stand pivot transfer from w/c<>mat in gym with modAx2  Activity Tolerance: Patient tolerated treatment well  Discharge Recommendations: Subacute/Skilled Nursing Facility  OT Equipment Recommendations  Other: CTA  Safety Devices  Safety Devices in place: Yes  Type of devices: Gait belt; All fall risk precautions in place (Left with PT in therapy gym)    Patient Education  Education  Education Given To: Patient  Education Provided: Role of Therapy;Plan of Care;Safety;Equipment;ADL Function; Fall Prevention Strategies;Transfer Training  Education Provided Comments: bed mobility, sitting balance, safety and hand placement with transfers, hemistrategies for UB bathing/dressing, skin checks for resting hand splint, AAROM LUE exercises, weightshifting during ambulation/standing, standing balance  Education Method: Demonstration;Verbal  Barriers to Learning: Cognition  Education Outcome: Verbalized understanding;Demonstrated understanding;Continued education needed    Plan  Plan  Times per Week: 5 out of 7 days  Times per Day: Daily  Plan Weeks: 3 weeks  Current Treatment Recommendations: Strengthening;ROM;Balance training;Functional mobility training; Endurance training; Safety education & training;Neuromuscular re-education;Patient/Caregiver education & training;Equipment evaluation, education, & procurement;Self-Care / ADL;Cognitive/Perceptual training; Wheelchair mobility training;Positioning    Goals       Therapy Time   Individual Concurrent Group Co-treatment   Time In 1000     1030   Time Out 1030     1100   Minutes 30     30   Timed Code Treatment Minutes: JANAE Baum/L

## 2022-05-28 NOTE — PLAN OF CARE
Problem: Safety - Adult  Goal: Free from fall injury  5/28/2022 1308 by LILIYA PEREZ  Outcome: Progressing  5/28/2022 0239 by Bing Aaron RN  Outcome: Progressing     Problem: Skin/Tissue Integrity  Goal: Absence of new skin breakdown  Description: 1. Monitor for areas of redness and/or skin breakdown  2. Assess vascular access sites hourly  3. Every 4-6 hours minimum:  Change oxygen saturation probe site  4. Every 4-6 hours:  If on nasal continuous positive airway pressure, respiratory therapy assess nares and determine need for appliance change or resting period.   5/28/2022 1308 by Gregory Dhaliwal  Outcome: Progressing  5/28/2022 0239 by Bing Aaron RN  Outcome: Progressing     Problem: ABCDS Injury Assessment  Goal: Absence of physical injury  5/28/2022 1308 by Gregory Dhaliwal  Outcome: Progressing  5/28/2022 0239 by Bing Aaron RN  Outcome: Progressing     Problem: Pain  Goal: Verbalizes/displays adequate comfort level or baseline comfort level  5/28/2022 0239 by Bing Aaron RN  Outcome: Progressing  Flowsheets (Taken 5/27/2022 2145)  Verbalizes/displays adequate comfort level or baseline comfort level: Assess pain using appropriate pain scale

## 2022-05-28 NOTE — PLAN OF CARE
Problem: Discharge Planning  Goal: Discharge to home or other facility with appropriate resources  Outcome: Progressing  Flowsheets (Taken 5/27/2022 2145)  Discharge to home or other facility with appropriate resources: Identify barriers to discharge with patient and caregiver     Problem: Safety - Adult  Goal: Free from fall injury  Outcome: Progressing     Problem: Skin/Tissue Integrity  Goal: Absence of new skin breakdown  Description: 1. Monitor for areas of redness and/or skin breakdown  2. Assess vascular access sites hourly  3. Every 4-6 hours minimum:  Change oxygen saturation probe site  4. Every 4-6 hours:  If on nasal continuous positive airway pressure, respiratory therapy assess nares and determine need for appliance change or resting period.   Outcome: Progressing     Problem: ABCDS Injury Assessment  Goal: Absence of physical injury  Outcome: Progressing     Problem: Pain  Goal: Verbalizes/displays adequate comfort level or baseline comfort level  Outcome: Progressing  Flowsheets (Taken 5/27/2022 2145)  Verbalizes/displays adequate comfort level or baseline comfort level: Assess pain using appropriate pain scale

## 2022-05-28 NOTE — PROGRESS NOTES
[address] [facility]      Patient:Jennifer CHAUDHRAY [facility]     Rehab Dx/Hx: Acute CVA (cerebrovascular accident) [facility]) [I63.9]   :1950  RGT:[id number]  Date of Admit: 5/10/2022  Room #: 1238/3360-65    Honey Almaraz is on Physical Therapy's weekend walker list.     Patient participated in [facility]?:   [x] Yes; see information below. [] No; patient refused participation due to      Scheduled Therapies this weekend?:  [x] Yes, patient received scheduled therapies in addition to weekend walker activity  [] No, weekend therapy was not regularly scheduled for this patient    Activity Completed:   [] Walking:  feet  [x] Wheelchair mobility: 50 feet  [x] Sat up in chair: 120 minutes (amount of time)  [x] Sat up in chair for 1 meal  [] Other:     Assistance needed:   [] No assist / independent  [] Supervision / Minimal assist  [x] Max assistance  [] Other:     Device used:   [] Darien Truong:  type of walker  [] Cane  [x] Wheelchair  [] None  [] Other    Patient tolerated activity well. Will monitor.      Raymon Allen RN

## 2022-05-29 LAB
BASOPHILS ABSOLUTE: 0 K/UL (ref 0–0.2)
BASOPHILS RELATIVE PERCENT: 0.1 %
EOSINOPHILS ABSOLUTE: 0.4 K/UL (ref 0–0.6)
EOSINOPHILS RELATIVE PERCENT: 3.3 %
HCT VFR BLD CALC: 32.2 % (ref 36–48)
HEMOGLOBIN: 10.6 G/DL (ref 12–16)
LYMPHOCYTES ABSOLUTE: 0.5 K/UL (ref 1–5.1)
LYMPHOCYTES RELATIVE PERCENT: 3.5 %
MCH RBC QN AUTO: 32.4 PG (ref 26–34)
MCHC RBC AUTO-ENTMCNC: 32.9 G/DL (ref 31–36)
MCV RBC AUTO: 98.4 FL (ref 80–100)
MONOCYTES ABSOLUTE: 0.7 K/UL (ref 0–1.3)
MONOCYTES RELATIVE PERCENT: 5.2 %
NEUTROPHILS ABSOLUTE: 11.8 K/UL (ref 1.7–7.7)
NEUTROPHILS RELATIVE PERCENT: 87.9 %
PDW BLD-RTO: 15 % (ref 12.4–15.4)
PLATELET # BLD: 214 K/UL (ref 135–450)
PMV BLD AUTO: 9 FL (ref 5–10.5)
RBC # BLD: 3.27 M/UL (ref 4–5.2)
WBC # BLD: 13.5 K/UL (ref 4–11)

## 2022-05-29 PROCEDURE — 36415 COLL VENOUS BLD VENIPUNCTURE: CPT

## 2022-05-29 PROCEDURE — 6370000000 HC RX 637 (ALT 250 FOR IP): Performed by: INTERNAL MEDICINE

## 2022-05-29 PROCEDURE — 6360000002 HC RX W HCPCS: Performed by: STUDENT IN AN ORGANIZED HEALTH CARE EDUCATION/TRAINING PROGRAM

## 2022-05-29 PROCEDURE — 6370000000 HC RX 637 (ALT 250 FOR IP): Performed by: STUDENT IN AN ORGANIZED HEALTH CARE EDUCATION/TRAINING PROGRAM

## 2022-05-29 PROCEDURE — 1280000000 HC REHAB R&B

## 2022-05-29 PROCEDURE — 85025 COMPLETE CBC W/AUTO DIFF WBC: CPT

## 2022-05-29 RX ADMIN — DICLOFENAC SODIUM 4 G: 10 GEL TOPICAL at 17:46

## 2022-05-29 RX ADMIN — METOPROLOL TARTRATE 25 MG: 25 TABLET, FILM COATED ORAL at 21:21

## 2022-05-29 RX ADMIN — DIPHENHYDRAMINE HCL 25 MG: 25 TABLET ORAL at 09:07

## 2022-05-29 RX ADMIN — NYSTATIN 500000 UNITS: 100000 SUSPENSION ORAL at 09:03

## 2022-05-29 RX ADMIN — FAMOTIDINE 10 MG: 20 TABLET, FILM COATED ORAL at 09:06

## 2022-05-29 RX ADMIN — CIPROFLOXACIN 500 MG: 500 TABLET, FILM COATED ORAL at 21:21

## 2022-05-29 RX ADMIN — ASPIRIN 81 MG: 81 TABLET, COATED ORAL at 09:07

## 2022-05-29 RX ADMIN — HEPARIN SODIUM 5000 UNITS: 5000 INJECTION INTRAVENOUS; SUBCUTANEOUS at 06:45

## 2022-05-29 RX ADMIN — Medication 400 MG: at 09:06

## 2022-05-29 RX ADMIN — CLOPIDOGREL BISULFATE 75 MG: 75 TABLET, FILM COATED ORAL at 09:06

## 2022-05-29 RX ADMIN — ACETAMINOPHEN 650 MG: 325 TABLET ORAL at 08:57

## 2022-05-29 RX ADMIN — NYSTATIN 500000 UNITS: 100000 SUSPENSION ORAL at 13:33

## 2022-05-29 RX ADMIN — NYSTATIN 500000 UNITS: 100000 SUSPENSION ORAL at 17:46

## 2022-05-29 RX ADMIN — NYSTATIN 500000 UNITS: 100000 SUSPENSION ORAL at 21:20

## 2022-05-29 RX ADMIN — DICLOFENAC SODIUM 4 G: 10 GEL TOPICAL at 09:07

## 2022-05-29 RX ADMIN — NIFEDIPINE 30 MG: 30 TABLET, FILM COATED, EXTENDED RELEASE ORAL at 09:07

## 2022-05-29 RX ADMIN — DIPHENHYDRAMINE HCL 25 MG: 25 TABLET ORAL at 21:20

## 2022-05-29 RX ADMIN — DICLOFENAC SODIUM 4 G: 10 GEL TOPICAL at 21:23

## 2022-05-29 RX ADMIN — HYDROCORTISONE: 1 CREAM TOPICAL at 21:25

## 2022-05-29 RX ADMIN — METOPROLOL TARTRATE 25 MG: 25 TABLET, FILM COATED ORAL at 09:06

## 2022-05-29 RX ADMIN — LEVOTHYROXINE SODIUM 50 MCG: 0.05 TABLET ORAL at 06:46

## 2022-05-29 RX ADMIN — CIPROFLOXACIN 500 MG: 500 TABLET, FILM COATED ORAL at 09:03

## 2022-05-29 RX ADMIN — CARBOXYMETHYLCELLULOSE SODIUM 1 DROP: 10 GEL OPHTHALMIC at 21:21

## 2022-05-29 RX ADMIN — HEPARIN SODIUM 5000 UNITS: 5000 INJECTION INTRAVENOUS; SUBCUTANEOUS at 13:33

## 2022-05-29 RX ADMIN — DICLOFENAC SODIUM 4 G: 10 GEL TOPICAL at 12:54

## 2022-05-29 RX ADMIN — LACTULOSE 20 G: 20 SOLUTION ORAL at 09:03

## 2022-05-29 RX ADMIN — LISINOPRIL 40 MG: 20 TABLET ORAL at 09:06

## 2022-05-29 RX ADMIN — LACTULOSE 20 G: 20 SOLUTION ORAL at 21:20

## 2022-05-29 RX ADMIN — CARBOXYMETHYLCELLULOSE SODIUM 1 DROP: 10 GEL OPHTHALMIC at 09:03

## 2022-05-29 RX ADMIN — HEPARIN SODIUM 5000 UNITS: 5000 INJECTION INTRAVENOUS; SUBCUTANEOUS at 21:21

## 2022-05-29 RX ADMIN — ACETAMINOPHEN 650 MG: 325 TABLET ORAL at 17:46

## 2022-05-29 RX ADMIN — HYDROCORTISONE: 1 CREAM TOPICAL at 09:08

## 2022-05-29 ASSESSMENT — PAIN - FUNCTIONAL ASSESSMENT: PAIN_FUNCTIONAL_ASSESSMENT: PREVENTS OR INTERFERES SOME ACTIVE ACTIVITIES AND ADLS

## 2022-05-29 ASSESSMENT — PAIN DESCRIPTION - ORIENTATION
ORIENTATION: MID
ORIENTATION: MID
ORIENTATION: LEFT

## 2022-05-29 ASSESSMENT — PAIN DESCRIPTION - DESCRIPTORS
DESCRIPTORS: ACHING

## 2022-05-29 ASSESSMENT — PAIN DESCRIPTION - PAIN TYPE: TYPE: ACUTE PAIN

## 2022-05-29 ASSESSMENT — PAIN SCALES - GENERAL
PAINLEVEL_OUTOF10: 3
PAINLEVEL_OUTOF10: 4
PAINLEVEL_OUTOF10: 10

## 2022-05-29 ASSESSMENT — PAIN DESCRIPTION - LOCATION
LOCATION: HAND
LOCATION: BACK
LOCATION: BACK

## 2022-05-29 ASSESSMENT — PAIN DESCRIPTION - ONSET: ONSET: ON-GOING

## 2022-05-29 ASSESSMENT — PAIN DESCRIPTION - FREQUENCY: FREQUENCY: CONTINUOUS

## 2022-05-29 NOTE — PLAN OF CARE
Problem: Safety - Adult  Goal: Free from fall injury  Outcome: Progressing     Problem: Skin/Tissue Integrity  Goal: Absence of new skin breakdown  Description: 1. Monitor for areas of redness and/or skin breakdown  2. Assess vascular access sites hourly  3. Every 4-6 hours minimum:  Change oxygen saturation probe site  4. Every 4-6 hours:  If on nasal continuous positive airway pressure, respiratory therapy assess nares and determine need for appliance change or resting period.   Outcome: Progressing     Problem: ABCDS Injury Assessment  Goal: Absence of physical injury  Outcome: Progressing     Problem: Pain  Goal: Verbalizes/displays adequate comfort level or baseline comfort level  Outcome: Progressing

## 2022-05-29 NOTE — PROGRESS NOTES
[address] [facility]      Patient:Jennifer CHAUDHARY [facility]     Rehab Dx/Hx: Acute CVA (cerebrovascular accident) [facility]) [I63.9]   :1950  GDU:[id number]  Date of Admit: 5/10/2022  Room #: 9156/6652-01    Daly Sifuentes is on Physical Therapy's weekend walker list.     Patient participated in [facility]?:   [x] Yes; see information below. [] No; patient refused participation due to      Scheduled Therapies this weekend?:  [] Yes, patient received scheduled therapies in addition to weekend walker activity  [x] No, weekend therapy was not regularly scheduled for this patient    Activity Completed:   [] Walking:  feet  [] Wheelchair mobility: feet  [] Sat up in chair:  (amount of time)  [x] Sat up in chair for meal (Lunch)  [] Other:     Assistance needed:   [] No assist / independent  [] Supervision / Minimal assist  [x] Max assistance  [] Other:     Device used:   [] George Francois: type of walker  [] Cane  [x] Wheelchair  [] None  [] Other    Patient tolerated activity well. Will monitor.      Kris Jones RN

## 2022-05-29 NOTE — PLAN OF CARE
At risk for skin breakdown, see Graham scale. Unable to repositin self in bed. Turn  and reposition every 2 hours. Heels elevated off bed. Protective barrier placed as needed. Patient kept clean and dry. Pillows used for positioning. Will continue to monitor for skin breakdown.   Allergic red rash treated as ordered

## 2022-05-30 LAB
ANION GAP SERPL CALCULATED.3IONS-SCNC: 14 MMOL/L (ref 3–16)
ANION GAP SERPL CALCULATED.3IONS-SCNC: 15 MMOL/L (ref 3–16)
BANDED NEUTROPHILS RELATIVE PERCENT: 32 % (ref 0–7)
BANDED NEUTROPHILS RELATIVE PERCENT: 41 % (ref 0–7)
BASOPHILS ABSOLUTE: 0 K/UL (ref 0–0.2)
BASOPHILS ABSOLUTE: 0 K/UL (ref 0–0.2)
BASOPHILS RELATIVE PERCENT: 0 %
BASOPHILS RELATIVE PERCENT: 0 %
BUN BLDV-MCNC: 59 MG/DL (ref 7–20)
BUN BLDV-MCNC: 59 MG/DL (ref 7–20)
CALCIUM SERPL-MCNC: 9.2 MG/DL (ref 8.3–10.6)
CALCIUM SERPL-MCNC: 9.2 MG/DL (ref 8.3–10.6)
CHLORIDE BLD-SCNC: 100 MMOL/L (ref 99–110)
CHLORIDE BLD-SCNC: 100 MMOL/L (ref 99–110)
CO2: 20 MMOL/L (ref 21–32)
CO2: 22 MMOL/L (ref 21–32)
CREAT SERPL-MCNC: 1.8 MG/DL (ref 0.6–1.2)
CREAT SERPL-MCNC: 1.8 MG/DL (ref 0.6–1.2)
EOSINOPHILS ABSOLUTE: 0.2 K/UL (ref 0–0.6)
EOSINOPHILS ABSOLUTE: 0.8 K/UL (ref 0–0.6)
EOSINOPHILS RELATIVE PERCENT: 1 %
EOSINOPHILS RELATIVE PERCENT: 5 %
GFR AFRICAN AMERICAN: 33
GFR AFRICAN AMERICAN: 33
GFR NON-AFRICAN AMERICAN: 28
GFR NON-AFRICAN AMERICAN: 28
GLUCOSE BLD-MCNC: 138 MG/DL (ref 70–99)
GLUCOSE BLD-MCNC: 171 MG/DL (ref 70–99)
GLUCOSE BLD-MCNC: 175 MG/DL (ref 70–99)
HCT VFR BLD CALC: 32.8 % (ref 36–48)
HCT VFR BLD CALC: 35.8 % (ref 36–48)
HEMOGLOBIN: 10.9 G/DL (ref 12–16)
HEMOGLOBIN: 11.5 G/DL (ref 12–16)
LYMPHOCYTES ABSOLUTE: 0.6 K/UL (ref 1–5.1)
LYMPHOCYTES ABSOLUTE: 1.4 K/UL (ref 1–5.1)
LYMPHOCYTES RELATIVE PERCENT: 4 %
LYMPHOCYTES RELATIVE PERCENT: 7 %
MCH RBC QN AUTO: 32 PG (ref 26–34)
MCH RBC QN AUTO: 32.1 PG (ref 26–34)
MCHC RBC AUTO-ENTMCNC: 32 G/DL (ref 31–36)
MCHC RBC AUTO-ENTMCNC: 33.1 G/DL (ref 31–36)
MCV RBC AUTO: 96.9 FL (ref 80–100)
MCV RBC AUTO: 99.9 FL (ref 80–100)
MONOCYTES ABSOLUTE: 0.6 K/UL (ref 0–1.3)
MONOCYTES ABSOLUTE: 0.8 K/UL (ref 0–1.3)
MONOCYTES RELATIVE PERCENT: 4 %
MONOCYTES RELATIVE PERCENT: 4 %
MYELOCYTE PERCENT: 2 %
MYELOCYTE PERCENT: 3 %
NEUTROPHILS ABSOLUTE: 13.7 K/UL (ref 1.7–7.7)
NEUTROPHILS ABSOLUTE: 17.5 K/UL (ref 1.7–7.7)
NEUTROPHILS RELATIVE PERCENT: 44 %
NEUTROPHILS RELATIVE PERCENT: 53 %
PDW BLD-RTO: 15 % (ref 12.4–15.4)
PDW BLD-RTO: 15.7 % (ref 12.4–15.4)
PERFORMED ON: ABNORMAL
PLATELET # BLD: 216 K/UL (ref 135–450)
PLATELET # BLD: 327 K/UL (ref 135–450)
PLATELET SLIDE REVIEW: ADEQUATE
PLATELET SLIDE REVIEW: ADEQUATE
PMV BLD AUTO: 10 FL (ref 5–10.5)
PMV BLD AUTO: 9.7 FL (ref 5–10.5)
POTASSIUM REFLEX MAGNESIUM: 4.5 MMOL/L (ref 3.5–5.1)
POTASSIUM REFLEX MAGNESIUM: 4.9 MMOL/L (ref 3.5–5.1)
RBC # BLD: 3.39 M/UL (ref 4–5.2)
RBC # BLD: 3.59 M/UL (ref 4–5.2)
RBC # BLD: NORMAL 10*6/UL
RBC # BLD: NORMAL 10*6/UL
SLIDE REVIEW: ABNORMAL
SLIDE REVIEW: ABNORMAL
SODIUM BLD-SCNC: 135 MMOL/L (ref 136–145)
SODIUM BLD-SCNC: 136 MMOL/L (ref 136–145)
URINE CULTURE, ROUTINE: NORMAL
WBC # BLD: 15.7 K/UL (ref 4–11)
WBC # BLD: 19.9 K/UL (ref 4–11)

## 2022-05-30 PROCEDURE — 1280000000 HC REHAB R&B

## 2022-05-30 PROCEDURE — 6360000002 HC RX W HCPCS: Performed by: STUDENT IN AN ORGANIZED HEALTH CARE EDUCATION/TRAINING PROGRAM

## 2022-05-30 PROCEDURE — 6370000000 HC RX 637 (ALT 250 FOR IP): Performed by: STUDENT IN AN ORGANIZED HEALTH CARE EDUCATION/TRAINING PROGRAM

## 2022-05-30 PROCEDURE — 80048 BASIC METABOLIC PNL TOTAL CA: CPT

## 2022-05-30 PROCEDURE — 36415 COLL VENOUS BLD VENIPUNCTURE: CPT

## 2022-05-30 PROCEDURE — 85025 COMPLETE CBC W/AUTO DIFF WBC: CPT

## 2022-05-30 PROCEDURE — 87040 BLOOD CULTURE FOR BACTERIA: CPT

## 2022-05-30 PROCEDURE — 2580000003 HC RX 258: Performed by: STUDENT IN AN ORGANIZED HEALTH CARE EDUCATION/TRAINING PROGRAM

## 2022-05-30 PROCEDURE — 6370000000 HC RX 637 (ALT 250 FOR IP): Performed by: INTERNAL MEDICINE

## 2022-05-30 RX ORDER — SODIUM CHLORIDE 0.9 % (FLUSH) 0.9 %
5-40 SYRINGE (ML) INJECTION EVERY 12 HOURS SCHEDULED
Status: DISCONTINUED | OUTPATIENT
Start: 2022-05-30 | End: 2022-06-07 | Stop reason: HOSPADM

## 2022-05-30 RX ORDER — LIDOCAINE HYDROCHLORIDE 10 MG/ML
5 INJECTION, SOLUTION INFILTRATION; PERINEURAL ONCE
Status: DISCONTINUED | OUTPATIENT
Start: 2022-05-30 | End: 2022-06-07 | Stop reason: HOSPADM

## 2022-05-30 RX ORDER — SODIUM CHLORIDE 9 MG/ML
25 INJECTION, SOLUTION INTRAVENOUS PRN
Status: DISCONTINUED | OUTPATIENT
Start: 2022-05-30 | End: 2022-06-07 | Stop reason: HOSPADM

## 2022-05-30 RX ORDER — SODIUM CHLORIDE 0.9 % (FLUSH) 0.9 %
5-40 SYRINGE (ML) INJECTION PRN
Status: DISCONTINUED | OUTPATIENT
Start: 2022-05-30 | End: 2022-06-07 | Stop reason: HOSPADM

## 2022-05-30 RX ORDER — SODIUM CHLORIDE 9 MG/ML
INJECTION, SOLUTION INTRAVENOUS CONTINUOUS
Status: ACTIVE | OUTPATIENT
Start: 2022-05-30 | End: 2022-05-30

## 2022-05-30 RX ADMIN — DICLOFENAC SODIUM 4 G: 10 GEL TOPICAL at 12:46

## 2022-05-30 RX ADMIN — CLOPIDOGREL BISULFATE 75 MG: 75 TABLET, FILM COATED ORAL at 09:12

## 2022-05-30 RX ADMIN — NYSTATIN 500000 UNITS: 100000 SUSPENSION ORAL at 09:11

## 2022-05-30 RX ADMIN — ASPIRIN 81 MG: 81 TABLET, COATED ORAL at 09:11

## 2022-05-30 RX ADMIN — ACETAMINOPHEN 650 MG: 325 TABLET ORAL at 22:51

## 2022-05-30 RX ADMIN — DICLOFENAC SODIUM 4 G: 10 GEL TOPICAL at 16:25

## 2022-05-30 RX ADMIN — Medication 400 MG: at 09:12

## 2022-05-30 RX ADMIN — AZITHROMYCIN MONOHYDRATE 500 MG: 500 INJECTION, POWDER, LYOPHILIZED, FOR SOLUTION INTRAVENOUS at 16:25

## 2022-05-30 RX ADMIN — LISINOPRIL 40 MG: 20 TABLET ORAL at 09:11

## 2022-05-30 RX ADMIN — WITCH HAZEL: 500 SOLUTION RECTAL; TOPICAL at 09:25

## 2022-05-30 RX ADMIN — ACETAMINOPHEN 650 MG: 325 TABLET ORAL at 09:11

## 2022-05-30 RX ADMIN — DICLOFENAC SODIUM 4 G: 10 GEL TOPICAL at 09:24

## 2022-05-30 RX ADMIN — LACTULOSE 20 G: 20 SOLUTION ORAL at 09:11

## 2022-05-30 RX ADMIN — METOPROLOL TARTRATE 25 MG: 25 TABLET, FILM COATED ORAL at 09:12

## 2022-05-30 RX ADMIN — HEPARIN SODIUM 5000 UNITS: 5000 INJECTION INTRAVENOUS; SUBCUTANEOUS at 14:11

## 2022-05-30 RX ADMIN — HEPARIN SODIUM 5000 UNITS: 5000 INJECTION INTRAVENOUS; SUBCUTANEOUS at 22:51

## 2022-05-30 RX ADMIN — SODIUM CHLORIDE: 9 INJECTION, SOLUTION INTRAVENOUS at 13:49

## 2022-05-30 RX ADMIN — DIPHENHYDRAMINE HCL 25 MG: 25 TABLET ORAL at 12:45

## 2022-05-30 RX ADMIN — DICLOFENAC SODIUM 4 G: 10 GEL TOPICAL at 22:58

## 2022-05-30 RX ADMIN — METOPROLOL TARTRATE 25 MG: 25 TABLET, FILM COATED ORAL at 22:51

## 2022-05-30 RX ADMIN — FAMOTIDINE 10 MG: 20 TABLET, FILM COATED ORAL at 09:12

## 2022-05-30 RX ADMIN — DIPHENHYDRAMINE HCL 25 MG: 25 TABLET ORAL at 22:51

## 2022-05-30 RX ADMIN — NIFEDIPINE 30 MG: 30 TABLET, FILM COATED, EXTENDED RELEASE ORAL at 09:12

## 2022-05-30 RX ADMIN — HEPARIN SODIUM 5000 UNITS: 5000 INJECTION INTRAVENOUS; SUBCUTANEOUS at 05:11

## 2022-05-30 RX ADMIN — HYDROCORTISONE: 1 CREAM TOPICAL at 09:24

## 2022-05-30 RX ADMIN — CARBOXYMETHYLCELLULOSE SODIUM 1 DROP: 10 GEL OPHTHALMIC at 09:12

## 2022-05-30 RX ADMIN — VANCOMYCIN HYDROCHLORIDE 1500 MG: 10 INJECTION, POWDER, LYOPHILIZED, FOR SOLUTION INTRAVENOUS at 13:50

## 2022-05-30 RX ADMIN — CIPROFLOXACIN 500 MG: 500 TABLET, FILM COATED ORAL at 09:12

## 2022-05-30 RX ADMIN — CARBOXYMETHYLCELLULOSE SODIUM 1 DROP: 10 GEL OPHTHALMIC at 22:51

## 2022-05-30 RX ADMIN — HYDROCORTISONE: 1 CREAM TOPICAL at 22:57

## 2022-05-30 RX ADMIN — LEVOTHYROXINE SODIUM 50 MCG: 0.05 TABLET ORAL at 05:15

## 2022-05-30 ASSESSMENT — PAIN DESCRIPTION - FREQUENCY: FREQUENCY: CONTINUOUS

## 2022-05-30 ASSESSMENT — PAIN DESCRIPTION - LOCATION
LOCATION: HAND
LOCATION: NECK

## 2022-05-30 ASSESSMENT — PAIN DESCRIPTION - PAIN TYPE: TYPE: ACUTE PAIN

## 2022-05-30 ASSESSMENT — PAIN SCALES - GENERAL
PAINLEVEL_OUTOF10: 2
PAINLEVEL_OUTOF10: 3
PAINLEVEL_OUTOF10: 6

## 2022-05-30 ASSESSMENT — PAIN DESCRIPTION - DESCRIPTORS: DESCRIPTORS: ACHING

## 2022-05-30 ASSESSMENT — PAIN DESCRIPTION - ORIENTATION
ORIENTATION: LEFT
ORIENTATION: MID

## 2022-05-30 ASSESSMENT — PAIN - FUNCTIONAL ASSESSMENT: PAIN_FUNCTIONAL_ASSESSMENT: PREVENTS OR INTERFERES SOME ACTIVE ACTIVITIES AND ADLS

## 2022-05-30 ASSESSMENT — PAIN DESCRIPTION - ONSET: ONSET: ON-GOING

## 2022-05-30 NOTE — CONSULTS
Pharmacy Note  Vancomycin Consult    Justine Lynn is a 66 y.o. female started on Vancomycin for HCAP; consult received from Dr. Joseph Killian to manage therapy. Also receiving the following antibiotics: Azithromycin. Allergies:  Latex, Mangifera indica, Penicillin g, Shellfish-derived products, Sulfa antibiotics, Grapefruit concentrate, Omeprazole, Shrimp extract allergy skin test, and Tramadol     Tmax: 99.6 F    Recent Labs     05/30/22  0758   CREATININE 1.8*       Recent Labs     05/29/22  0723 05/30/22  0758   WBC 13.5* 15.7*       Estimated Creatinine Clearance: 26 mL/min (A) (based on SCr of 1.8 mg/dL (H)). Intake/Output Summary (Last 24 hours) at 5/30/2022 1212  Last data filed at 5/30/2022 0057  Gross per 24 hour   Intake 360 ml   Output --   Net 360 ml       Wt Readings from Last 1 Encounters:   05/10/22 150 lb 12.7 oz (68.4 kg)         Body mass index is 27.14 kg/m². Culture Date      Source                       Results  5/28                     urine                         >50,000 CFU/ml mixed skin/urogenital allen  5/30                     blood                        ordered    Loading dose (critically ill or in ICU, require dialysis or renal replacement therapy): Vancomycin 25 mg/kg IVPB x 1 (maximum 3000 mg). Maintenance dose: 15 mg/kg (maximum: 2000 mg/dose and 4500 mg/day) starting at the next dosing interval determined by renal function  Pulse dose: fluctuating renal function, EDYTA, ESRD   Goal Vancomycin trough: 10-15 mcg/mL or 15-20 mcg/mL   Goal Vancomycin AUC: 400-600     Assessment/Plan:  SCr has been fluctuating & steadily increasing over last few days; for this reason will pulse dose patient for now. Will initiate Vancomycin with a one time loading dose of 1500 mg x1. Vancomycin random level ordered for tomorrow with morning labs. Timing of trough level will be determined based on culture results, renal function, and clinical response.      Thank you for the consult.     Ameya Phoenix, WilD 5/30/2022  12:15 PM

## 2022-05-30 NOTE — PLAN OF CARE
At risk for skin breakdown, see Graham scale. Unable to repositin self in bed. Turn  and reposition every 2 hours. Heels elevated off bed. Protective barrier placed as needed. Patient kept clean and dry. Pillows used for positioning. Will continue to monitor for skin breakdown.

## 2022-05-30 NOTE — PLAN OF CARE
Problem: Safety - Adult  Goal: Free from fall injury  Outcome: Progressing   Patient remains free from fall injury this shift. Call light within reach. Will continue to monitor.

## 2022-05-30 NOTE — FLOWSHEET NOTE
05/30/22 1201   Encounter Summary   Encounter Overview/Reason  Crisis   Service Provided For: Patient and family together   Support System Spouse   Last Encounter    (5/30 rapid response, sppt and prayer w/ Pt and hb)   Complexity of Encounter High   Begin Time 1149   End Time  1201   Total Time Calculated 12 min   Crisis   Type Rapid Response   Assessment/Intervention/Outcome   Assessment Anxious; Tearful   Intervention Active listening;Discussed belief system/Mu-ism practices/susan;Discussed illness injury and its impact; Explored/Affirmed feelings, thoughts, concerns;Prayer (assurance of)/[place];Sustaining Presence/Ministry of presence   Outcome Comfort;Expressed feelings, needs, and concerns;Expressed Gratitude

## 2022-05-30 NOTE — PROGRESS NOTES
James Doing  5/30/2022  [id number]    Chief Complaint: Acute CVA (cerebrovascular accident) [facility])    Subjective:   No acute events overnight. This morning patient had LOC while on the toilet with staff. Rapid response was called. Patient put back in bed. Vitals wnl and blood sugar ok. Patient reporting feeling light headed prior to LOC. She reports feeling very tired now. She denies other acute complaints at this time. ROS: denies f/c, n/v, sob, cp    Objective:  Patient Vitals for the past 24 hrs:   BP Temp Temp src Pulse Resp SpO2   05/30/22 1154 (!) 111/47 -- -- 69 -- 98 %   05/30/22 0911 (!) 112/55 98.6 °F (37 °C) Oral 87 16 95 %   05/29/22 2121 (!) 122/55 98.2 °F (36.8 °C) Oral 83 16 96 %     Gen: No distress, pleasant. Supine in bed  HEENT: Normocephalic, atraumatic. CV: extremities well perfused  Resp: No respiratory distress. Abd: Soft, nondistended  Ext: No edema. Neuro: Alert, oriented, appropriately interactive. Left hemiparesis. Psych: mood and affect appropriate  Skin: diffuse erythematous rash over chest, back, arms and legs. Does not appear improved from prior    Wt Readings from Last 3 Encounters:   05/10/22 150 lb 12.7 oz (68.4 kg)   05/02/22 143 lb 9.6 oz (65.1 kg)   09/09/21 150 lb 12.8 oz (68.4 kg)       Laboratory data:   Lab Results   Component Value Date    WBC 19.9 (H) 05/30/2022    HGB 11.5 (L) 05/30/2022    HCT 35.8 (L) 05/30/2022    MCV 99.9 05/30/2022     05/30/2022       Lab Results   Component Value Date     05/30/2022    K 4.9 05/30/2022     05/30/2022    CO2 20 05/30/2022    BUN 59 05/30/2022    CREATININE 1.8 05/30/2022    GLUCOSE 171 05/30/2022    GLUCOSE 129 07/26/2017    CALCIUM 9.2 05/30/2022        Therapy progress:  PT  Position Activity Restriction  Other position/activity restrictions: chemo precautions, L inattention, LUE flaccid, resting hand splint to be worn at night.   Objective     Sit to Stand: 2 Person Assistance,Dependent/Total  Stand to sit: Dependent/Total,2 Person Assistance  Bed to Chair: Dependent/Total     OT  PT Equipment Recommendations  Equipment Needed: Yes  Other: CTA pending progress with mobility  Toilet - Technique:  (steady to e-tac chair over toilet)  Equipment Used:  (e-tac chair)  Toilet Transfers Comments: maxA x2 sit <>  steady, progressing at times to [address] with max verbal/tactile and demo cues  Assessment        SLP                Body mass index is 27.14 kg/m². Assessment and Plan:  Obdulia Danielle is a 66year old female with a past medical history significant for recent CVA with left hemiparesis, CML, HTN, and HLD who presented to [facility] on 5/3/22 with abnormal labs, admitted with rhabdomyolysis and ARF, found to have acute CVA. She was admitted to [facility] on 5/10/22 due to functional deficits below her baseline.     Acute Right CVA  - MRI showing acute infarct in right cerebral hemisphere in a watershed distribution  - CTA showing 90-95% stenosis of right ICA  - Vascular and Neurology in agreement on waiting for CEA, needs follow up in 4 weeks  - asa, plavix, statin  - PT, OT, ST    Dysphagia  - upgraded to regular diet  - ST    LOC  - suspect vasovagal as patient was on toilet. Vitals normal, although patient does have severe stenosis of right ICA so low normal blood pressure for her may be too low. Patient also appearing dehydrated on labs with new EDYTA. Also with worsening leukocytosis. - discontinue lisinopril to keep patient blood pressure higher, also discontinued in setting of EDYTA  - giving fluids    Leukocytosis  - etiology unclear, possibly due to allergic reaction.    - XR chest non acute, UA appeared positive but nothing significant on culture  - leukocytosis appeared to be trending down but now trending up  - broadened abx to vanc and IV azithromycin after talking with pharmacy as patient is allergic to several abx  - giving fluids  - Consult placed to Medicine, appreciate assistance    Drug Rash  - suspected due to tramadol, but has not improved over the weekend since being off of tramadol  - hold nystatin, this is the only other drug that correlates with timing of rash  - hold chemo pill  - continue PRN benadryl    Chest Pain, resolved  - EKG and troponin unremarkable  - continue to monitor    Abd pain  - XR abdomen with moderate stool burden  - continue bowel regimen    RLL Pneumonia, resolved  - concern for aspiration in setting of dysphagia  - completed course of levaquin   - IS      Rhabdomyolysis  Acute Renal Injury  - Nephrology followed during acute stay  - CPK trending down  - monitor      HTN  - patient with episode of hypotension while in acute care so isosorbid mononitrate and hydralazine held  - metoprolol, nifedipine  - Nephrology signed off, if no improvement in Cr with fluids, will consider reconsulting  - discontinued lisinopril with EDYTA and lower BP    DM2  - Hba1c 6.6  - glucose well controlled without SSI, discontinued  - follow up with PCP     Liver Injury with elevated LFTs  - suspected to be due to statin induced rhabdo  - statin stopped  - LFTs normalized  - no need to further follow     CML  - follows with Dr. Viral Hill  - hold nilitinib  - chemo precautions  - follow up with Heme/Onc OP     Enlarged heterogeneous appearance of the thyroid  - Thyroid ultrasound outpatient  - follow up with PCP     Hemorrhoids  - hydrocortisone cream   - bowel regimen to avoid constipation     Bowels: Schedule stool softener. Follow bowel movements. Enema or suppository if needed.      Bladder: Check PVR x 3. [facility] if PVR > 200ml or if any volume is > 500 ml.      Sleep: Trazodone provided prn. PPx  DVT: heparin  GI: not indicated     Follow up appointments: Vascular, PCP    Services: [facility]  EDOD: 6/3/22    Amado Burns.  Kishan Newton MD 5/30/2022, 2:05 PM

## 2022-05-31 LAB
ANION GAP SERPL CALCULATED.3IONS-SCNC: 9 MMOL/L (ref 3–16)
BANDED NEUTROPHILS RELATIVE PERCENT: 19 % (ref 0–7)
BASOPHILS ABSOLUTE: 0 K/UL (ref 0–0.2)
BASOPHILS RELATIVE PERCENT: 0 %
BUN BLDV-MCNC: 47 MG/DL (ref 7–20)
CALCIUM SERPL-MCNC: 8.5 MG/DL (ref 8.3–10.6)
CHLORIDE BLD-SCNC: 105 MMOL/L (ref 99–110)
CO2: 23 MMOL/L (ref 21–32)
CREAT SERPL-MCNC: 1.2 MG/DL (ref 0.6–1.2)
EOSINOPHILS ABSOLUTE: 0.6 K/UL (ref 0–0.6)
EOSINOPHILS RELATIVE PERCENT: 3 %
GFR AFRICAN AMERICAN: 53
GFR NON-AFRICAN AMERICAN: 44
GLUCOSE BLD-MCNC: 124 MG/DL (ref 70–99)
GLUCOSE BLD-MCNC: 168 MG/DL (ref 70–99)
HCT VFR BLD CALC: 28.5 % (ref 36–48)
HEMOGLOBIN: 9.6 G/DL (ref 12–16)
LACTIC ACID: 1.4 MMOL/L (ref 0.4–2)
LACTIC ACID: 2.8 MMOL/L (ref 0.4–2)
LYMPHOCYTES ABSOLUTE: 1.9 K/UL (ref 1–5.1)
LYMPHOCYTES RELATIVE PERCENT: 10 %
MCH RBC QN AUTO: 32.6 PG (ref 26–34)
MCHC RBC AUTO-ENTMCNC: 33.9 G/DL (ref 31–36)
MCV RBC AUTO: 96.3 FL (ref 80–100)
METAMYELOCYTES RELATIVE PERCENT: 1 %
MONOCYTES ABSOLUTE: 1 K/UL (ref 0–1.3)
MONOCYTES RELATIVE PERCENT: 5 %
MYELOCYTE PERCENT: 1 %
NEUTROPHILS ABSOLUTE: 15.9 K/UL (ref 1.7–7.7)
NEUTROPHILS RELATIVE PERCENT: 61 %
PDW BLD-RTO: 14.7 % (ref 12.4–15.4)
PERFORMED ON: ABNORMAL
PLATELET # BLD: 211 K/UL (ref 135–450)
PLATELET SLIDE REVIEW: ADEQUATE
PMV BLD AUTO: 9.3 FL (ref 5–10.5)
POTASSIUM REFLEX MAGNESIUM: 4.2 MMOL/L (ref 3.5–5.1)
RBC # BLD: 2.96 M/UL (ref 4–5.2)
SLIDE REVIEW: ABNORMAL
SODIUM BLD-SCNC: 137 MMOL/L (ref 136–145)
VANCOMYCIN RANDOM: 13.6 UG/ML
WBC # BLD: 19.4 K/UL (ref 4–11)

## 2022-05-31 PROCEDURE — 6370000000 HC RX 637 (ALT 250 FOR IP): Performed by: INTERNAL MEDICINE

## 2022-05-31 PROCEDURE — 97130 THER IVNTJ EA ADDL 15 MIN: CPT

## 2022-05-31 PROCEDURE — 83605 ASSAY OF LACTIC ACID: CPT

## 2022-05-31 PROCEDURE — 97535 SELF CARE MNGMENT TRAINING: CPT

## 2022-05-31 PROCEDURE — 80202 ASSAY OF VANCOMYCIN: CPT

## 2022-05-31 PROCEDURE — 97530 THERAPEUTIC ACTIVITIES: CPT

## 2022-05-31 PROCEDURE — 6360000002 HC RX W HCPCS: Performed by: INTERNAL MEDICINE

## 2022-05-31 PROCEDURE — 6370000000 HC RX 637 (ALT 250 FOR IP): Performed by: STUDENT IN AN ORGANIZED HEALTH CARE EDUCATION/TRAINING PROGRAM

## 2022-05-31 PROCEDURE — 92526 ORAL FUNCTION THERAPY: CPT

## 2022-05-31 PROCEDURE — 6360000002 HC RX W HCPCS: Performed by: STUDENT IN AN ORGANIZED HEALTH CARE EDUCATION/TRAINING PROGRAM

## 2022-05-31 PROCEDURE — 80048 BASIC METABOLIC PNL TOTAL CA: CPT

## 2022-05-31 PROCEDURE — 2500000003 HC RX 250 WO HCPCS: Performed by: INTERNAL MEDICINE

## 2022-05-31 PROCEDURE — 36573 INSJ PICC RS&I 5 YR+: CPT

## 2022-05-31 PROCEDURE — 2580000003 HC RX 258: Performed by: STUDENT IN AN ORGANIZED HEALTH CARE EDUCATION/TRAINING PROGRAM

## 2022-05-31 PROCEDURE — 1280000000 HC REHAB R&B

## 2022-05-31 PROCEDURE — 02HV33Z INSERTION OF INFUSION DEVICE INTO SUPERIOR VENA CAVA, PERCUTANEOUS APPROACH: ICD-10-PCS | Performed by: STUDENT IN AN ORGANIZED HEALTH CARE EDUCATION/TRAINING PROGRAM

## 2022-05-31 PROCEDURE — 97129 THER IVNTJ 1ST 15 MIN: CPT

## 2022-05-31 PROCEDURE — 85025 COMPLETE CBC W/AUTO DIFF WBC: CPT

## 2022-05-31 PROCEDURE — 2580000003 HC RX 258: Performed by: NURSE PRACTITIONER

## 2022-05-31 RX ORDER — SODIUM CHLORIDE 9 MG/ML
INJECTION, SOLUTION INTRAVENOUS CONTINUOUS
Status: DISCONTINUED | OUTPATIENT
Start: 2022-05-31 | End: 2022-06-02

## 2022-05-31 RX ORDER — CETIRIZINE HYDROCHLORIDE 10 MG/1
10 TABLET ORAL DAILY
Status: DISCONTINUED | OUTPATIENT
Start: 2022-05-31 | End: 2022-06-07 | Stop reason: HOSPADM

## 2022-05-31 RX ORDER — METHYLPREDNISOLONE SODIUM SUCCINATE 40 MG/ML
40 INJECTION, POWDER, LYOPHILIZED, FOR SOLUTION INTRAMUSCULAR; INTRAVENOUS ONCE
Status: COMPLETED | OUTPATIENT
Start: 2022-05-31 | End: 2022-05-31

## 2022-05-31 RX ORDER — 0.9 % SODIUM CHLORIDE 0.9 %
500 INTRAVENOUS SOLUTION INTRAVENOUS ONCE
Status: COMPLETED | OUTPATIENT
Start: 2022-05-31 | End: 2022-06-01

## 2022-05-31 RX ORDER — METHYLPREDNISOLONE SODIUM SUCCINATE 40 MG/ML
40 INJECTION, POWDER, LYOPHILIZED, FOR SOLUTION INTRAMUSCULAR; INTRAVENOUS 2 TIMES DAILY
Status: DISCONTINUED | OUTPATIENT
Start: 2022-05-31 | End: 2022-06-03

## 2022-05-31 RX ORDER — HYDROXYZINE HYDROCHLORIDE 10 MG/1
10 TABLET, FILM COATED ORAL 3 TIMES DAILY PRN
Status: DISCONTINUED | OUTPATIENT
Start: 2022-05-31 | End: 2022-06-07 | Stop reason: HOSPADM

## 2022-05-31 RX ORDER — FAMOTIDINE 10 MG/ML
20 INJECTION, SOLUTION INTRAVENOUS DAILY
Status: DISCONTINUED | OUTPATIENT
Start: 2022-05-31 | End: 2022-06-02

## 2022-05-31 RX ORDER — INSULIN LISPRO 100 [IU]/ML
0-12 INJECTION, SOLUTION INTRAVENOUS; SUBCUTANEOUS
Status: DISCONTINUED | OUTPATIENT
Start: 2022-06-01 | End: 2022-06-07 | Stop reason: HOSPADM

## 2022-05-31 RX ORDER — INSULIN LISPRO 100 [IU]/ML
0-6 INJECTION, SOLUTION INTRAVENOUS; SUBCUTANEOUS NIGHTLY
Status: DISCONTINUED | OUTPATIENT
Start: 2022-05-31 | End: 2022-06-07 | Stop reason: HOSPADM

## 2022-05-31 RX ADMIN — DICLOFENAC SODIUM 4 G: 10 GEL TOPICAL at 12:28

## 2022-05-31 RX ADMIN — METOPROLOL TARTRATE 25 MG: 25 TABLET, FILM COATED ORAL at 09:38

## 2022-05-31 RX ADMIN — LEVOTHYROXINE SODIUM 50 MCG: 0.05 TABLET ORAL at 05:00

## 2022-05-31 RX ADMIN — CLOPIDOGREL BISULFATE 75 MG: 75 TABLET, FILM COATED ORAL at 09:36

## 2022-05-31 RX ADMIN — METHYLPREDNISOLONE SODIUM SUCCINATE 40 MG: 40 INJECTION, POWDER, FOR SOLUTION INTRAMUSCULAR; INTRAVENOUS at 16:32

## 2022-05-31 RX ADMIN — HYDROXYZINE HYDROCHLORIDE 10 MG: 10 TABLET ORAL at 22:26

## 2022-05-31 RX ADMIN — Medication 400 MG: at 09:36

## 2022-05-31 RX ADMIN — CARBOXYMETHYLCELLULOSE SODIUM 1 DROP: 10 GEL OPHTHALMIC at 09:36

## 2022-05-31 RX ADMIN — DIPHENHYDRAMINE HCL 25 MG: 25 TABLET ORAL at 10:52

## 2022-05-31 RX ADMIN — DICLOFENAC SODIUM 4 G: 10 GEL TOPICAL at 16:32

## 2022-05-31 RX ADMIN — SODIUM CHLORIDE 500 ML: 9 INJECTION, SOLUTION INTRAVENOUS at 23:08

## 2022-05-31 RX ADMIN — HYDROCORTISONE: 1 CREAM TOPICAL at 22:32

## 2022-05-31 RX ADMIN — HEPARIN SODIUM 5000 UNITS: 5000 INJECTION INTRAVENOUS; SUBCUTANEOUS at 05:00

## 2022-05-31 RX ADMIN — DICLOFENAC SODIUM 4 G: 10 GEL TOPICAL at 09:35

## 2022-05-31 RX ADMIN — ASPIRIN 81 MG: 81 TABLET, COATED ORAL at 09:36

## 2022-05-31 RX ADMIN — AZITHROMYCIN MONOHYDRATE 500 MG: 500 INJECTION, POWDER, LYOPHILIZED, FOR SOLUTION INTRAVENOUS at 12:32

## 2022-05-31 RX ADMIN — METHYLPREDNISOLONE SODIUM SUCCINATE 40 MG: 40 INJECTION, POWDER, FOR SOLUTION INTRAMUSCULAR; INTRAVENOUS at 22:28

## 2022-05-31 RX ADMIN — ACETAMINOPHEN 650 MG: 325 TABLET ORAL at 22:27

## 2022-05-31 RX ADMIN — INSULIN LISPRO 1 UNITS: 100 INJECTION, SOLUTION INTRAVENOUS; SUBCUTANEOUS at 22:37

## 2022-05-31 RX ADMIN — DICLOFENAC SODIUM 4 G: 10 GEL TOPICAL at 22:26

## 2022-05-31 RX ADMIN — NIFEDIPINE 30 MG: 30 TABLET, FILM COATED, EXTENDED RELEASE ORAL at 09:36

## 2022-05-31 RX ADMIN — FAMOTIDINE 10 MG: 20 TABLET, FILM COATED ORAL at 09:36

## 2022-05-31 RX ADMIN — SODIUM CHLORIDE: 9 INJECTION, SOLUTION INTRAVENOUS at 14:21

## 2022-05-31 RX ADMIN — HEPARIN SODIUM 5000 UNITS: 5000 INJECTION INTRAVENOUS; SUBCUTANEOUS at 14:21

## 2022-05-31 RX ADMIN — CARBOXYMETHYLCELLULOSE SODIUM 1 DROP: 10 GEL OPHTHALMIC at 22:26

## 2022-05-31 RX ADMIN — SODIUM CHLORIDE, PRESERVATIVE FREE 10 ML: 5 INJECTION INTRAVENOUS at 22:28

## 2022-05-31 RX ADMIN — CETIRIZINE HYDROCHLORIDE 10 MG: 10 TABLET, FILM COATED ORAL at 16:32

## 2022-05-31 RX ADMIN — HEPARIN SODIUM 5000 UNITS: 5000 INJECTION INTRAVENOUS; SUBCUTANEOUS at 22:27

## 2022-05-31 RX ADMIN — SODIUM CHLORIDE, PRESERVATIVE FREE 10 ML: 5 INJECTION INTRAVENOUS at 12:32

## 2022-05-31 RX ADMIN — DIPHENHYDRAMINE HCL 25 MG: 25 TABLET ORAL at 05:00

## 2022-05-31 RX ADMIN — HYDROCORTISONE: 1 CREAM TOPICAL at 09:38

## 2022-05-31 RX ADMIN — FAMOTIDINE 20 MG: 10 INJECTION, SOLUTION INTRAVENOUS at 16:32

## 2022-05-31 ASSESSMENT — PAIN SCALES - GENERAL: PAINLEVEL_OUTOF10: 1

## 2022-05-31 NOTE — PLAN OF CARE
Problem: Safety - Adult  Goal: Free from fall injury  5/30/2022 2346 by Martinez Sánchez RN  Outcome: Progressing  5/30/2022 1845 by Hortensia Ortega RN  Outcome: Progressing

## 2022-05-31 NOTE — PROGRESS NOTES
Upon arrival to place PICC line assessed chart for issues related to picc placement, check for consent, and did time out with RN Lorie Son. Pt. Tolerated PICC placement well, no difficulty accessing basilic vein and 3CG technology used to verify PICC tip placement. Positive P wave with no negative deflection. Printed wave form and placed In chart.  Reported off to [employer]

## 2022-05-31 NOTE — PROGRESS NOTES
Speech Language Pathology  [facility]: ACUTE REHAB UNIT  SPEECH-LANGUAGE PATHOLOGY      [x] Daily  [] Weekly Care Conference Note  [] Discharge    Patient:Jennifer Lauren      :1950  PMO:[id number]  Rehab Dx/Hx: Acute CVA (cerebrovascular accident) ([facility] [address] [I63.9]    Precautions: falls and aspirations; severe left neglect  Home situation: Lives with . Indep with med management. Share finances, cooking, laundtry, grocery shopping. Has not driven since CVA in March. ST Dx: [] Aphasia  [x] Dysarthria  [] Apraxia   [x] Oropharyngeal dysphagia [x] Cognitive Impairment  [] Other:   Date of Admit: 5/10/2022  Room #: 3059/9415-03    Current functional status (updated daily):         Pt being seen for : [x] Speech/Language Treatment  [x] Dysphagia Treatment [x] Cognitive Treatment  [] Other:  Communication: []WFL  [] Aphasia  [x] Dysarthria  [] Apraxia  [] Pragmatic Impairment [] Non-verbal  [] Hearing Loss  [] Other:   Cognition: [] WFL  [x] Mild  [] Moderate  [] Severe [] Unable to Assess  [] Other:  Memory: [] WFL  [x] Mild  [] Moderate  [] Severe [] Unable to Assess  [] Other:  Behavior: [x] Alert  [x] Cooperative  [x]  Pleasant  [] Confused  [] Agitated  [] Uncooperative  [] Distractible [] Motivated  [] Self-Limiting [] Anxious  [] Other:  Endurance:  [x] Adequate for participation in SLP sessions  [] Reduced overall  [] Lethargic  [] Other:  Safety: [] No concerns at this time  [x] Reduced insight into deficits  [x]  Reduced safety awareness [] Not following call light procedures  [] Unable to Assess  [] Other:    Current Diet Order:ADULT DIET;  Regular; 3 carb choices (45 gm/meal)  Swallowing Precautions: upright for all intake, stay upright for at least 30 mins after intake, small bites/sips, assist feed, oral care 2-3x/day to reduce adverse affects in the event of aspiration, alternate bites/sips, slow rate of intake         Date: 2022      Tx session 1  830 Tx session 2  All tx needs met in session 1     Total Timed Code Min 45    Total Treatment Minutes 60    Individual Treatment Minutes 60    Group Treatment Minutes 0    Co-Treat Minutes 0    Variance/Reason:  N/a    Pain Pt reports feeling \"itchy\"; buttocks pain     Pain Intervention [x] RN notified   [] Repositioned  [] Intervention offered and patient declined  [] N/A  [] Other: [] RN notified  [] Repositioned  [] Intervention offered and patient declined  [] N/A  [] Other:   Subjective     Pt appeared fatigued and lethargic throughout session, requiring max cues to stay awake. Pt upright in bed; agreeable to tx. Pt's  present at bedside. Objective:     Dysphagia Goals  Short-term Goals  Timeframe for Short-term Goals: 18 days (05/28/2022)     Goal met 05/24/22. Pt previously met goal; pt's  concerned re: difficulty masticating solids, and he had stated \"I think she needs to be on the ground up food. \" Pt's breakfast tray present at bedside. Pt required max cues to maintain alertness / stay awake when eating. Attempting to self-feed, but pt having increased difficulty due to fatigue and keeping eyes closed. SLP assisted pt in feeding. Thin liquid via bottle, puree, and soft solid (pancakes) PO trials presented. Pt with adequate mastication; mild oral residue, cleared with liquid wash. No overt s/s of aspiration. Pt's biggest barrier with PO intake is decreased alertness / fatigue. Thus, pt requiring cues in order to stay awake to continue to masticate, but overall tolerated breakfast meal.     Continue to recommend regular solids, thin liquids, meds whole in puree or whole with thin liquids. Assist feed. Goal met 05/24/22. Due to pt being lethargic, she required max cues in order to implement safe swallow strategies. Recommend assist with all PO.  edu re: safe swallow strategies - verbalized understanding. Goal discontinued 05/17/22.      Goal 4: The pt will complete oral motor exercises to improve labial and lingual strength, ROM, and coordination in 10/10 opportunities given min cues    Goal not directly targeted. Cognitive-linguistic Goals:   Short-term Goals  Timeframe for Short-term Goals: 18 days (05/28/2022)    Goal 1: Pt will effectively use compensatory visual strategies for improved attention to left side during structured tasks with 80% acc given mod cues. During breakfast meal, pt required MAX cues to scan to left. Pt keeping eyes closed for majority of session, thus overall attention negatively impacted by fatigue and lethargy. Goal 2: Pt will complete graded recall tasks using compensatory strategies with 90% acc given min cues   Mental Manipulation: Word Progression   -pt given 3 words; asked to rank in the order in which they occur  -e.g. eat, prepare, clean up = prepare, eat, clean up  -pt used recall abilities to complete task with 77% acc given min cues, improved to 83% acc given mod cues       Goal 3: Pt will complete executive function tasks (e.g. meds, time, money, etc) with 90% acc given min cues   Goal not directly targeted.      Goal 4: Pt will complete problem solving and thought organization tasks with 90% acc given min cues   Mental Manipulation: Word Progression   -pt given 3 words; asked to rank in the order in which they occur  -e.g. eat, prepare, clean up = prepare, eat, clean up  -pt used thought org abilities to complete task with 40% acc given min cues, improved to 73% acc given mod-max cues       Goal 5: Pt will complete verbal and visual reasoning tasks with 90% acc given min cues   Word Deduction Task   -pt given 3 clues that describe an item; asked to name the item being described  -e.g. fingers, palm, wrist = hand  -pt completed task with 40% acc indep, improved to 80% acc given max cues       Other areas targeted: N/a    Education:   SLP edu pt re; diet recs, safe swallow strategies, attention strategies, thought org strategies     Safety Devices: [x] Call light within reach  [] Chair alarm activated  [x] Bed alarm activated  [x] Other:  at bedside [] Call light within reach  [] Chair alarm activated  [] Bed alarm activated  [] Other:    Assessment:` Pt appeared fatigued and lethargic during session, requiring max cues to maintain alertness / stay awake. Pt upright in bed. Despite pt previously meeting dysphagia goals, pt's  reported concerns re: solid foods - thought she may need to be back on \"ground up food. \" Pt's fatigue and lethargy are negatively impacting overall PO intake, thus will require assistance with all meals and cues to maintain alertness. Pt overall tolerated PO with no overt s/s of aspiration, thus continue to recommend regular solids and thin liquids with assistance during meals. Pt required max cues to attend / scan to left during breakfast meal. Overall throughout cognitive tasks, pt with reduced thought organization and slow thought processing - thus, required frequent repetition. Pt benefited from mod cues to improve recall, mod-max cues to improve thought org, and max cues to improve reasoning. Pt will require 24 hr assist and ongoing ST at d/c. Continue goals above. Plan: Continue as per plan of care. Additional Information:     Barriers toward progress: Limited safety awareness, Limited insight into deficits, Decreased proprioception, Upper extremity weakness and Lower extremity weakness  Discharge recommendations:  [] Home independently  [] Home with assistance [x]  24 hour supervision  [] ECF [] Other:  Continued Tx Upon Discharge: ? [x] Yes [] No [] TBD based on progress while on ARU [] Vital Stim indicated [] Other:   Estimated discharge date: 06/03/2022    Interventions used this date:  [] Speech/Language Treatment  [] Instruction in HEP [] Group [] Dysphagia Treatment [x] Cognitive Treatment   [] Other:       Total Time Breakdown / Charges    Time in Time out Total Time / units   Cognitive Tx 1277 6999 29 min / 3 units    Speech Tx -- -- --   Dysphagia Tx 0861 [id number] 15 min / 1 unit       Electronically Signed by     Nabeel Burton MA CCC-SLP #[id number]  Speech Language Pathologist

## 2022-05-31 NOTE — PROGRESS NOTES
Physical Therapy  Facility/Department: [facility]  Rehabilitation Physical Therapy Treatment Note    NAME: Machelle Vieyra  : 1950 (66 y.o.)  MRN: [id number]  CODE STATUS: Full Code    Date of Service: 22       Restrictions:  Restrictions/Precautions: Fall Risk;Up as Tolerated;Isolation  Position Activity Restriction  Other position/activity restrictions: chemo precautions, L inattention, LUE flaccid, resting hand splint to be worn at night. SUBJECTIVE  Subjective  Subjective: Pt lying in bed upon arrival; agreeable to PT session. Pain: Pt reports pain in legs and buttocks d/t a hemorrhoid. OBJECTIVE  Orientation  Overall Orientation Status: Within Functional Limits    Functional Mobility  Roll Right  Assistance Level: Moderate assistance; Requires x 2 assistance  Supine to Sit  Assistance Level: Moderate assistance; Requires x 2 assistance  Skilled Clinical Factors: modA x 2, HOB elevated, use of BR, increased time to complete  Scooting  Assistance Level: Maximum assistance  Skilled Clinical Factors: pt requires maxA to scoot EOB and to scoot in chair. Balance  Sitting Balance: Contact guard assistance (with UE on bed)  Standing Balance  Activity: Sitting EOB x5 min with UE support on bed    Transfers  Additional Factors: Verbal cues; Hand placement cues; Increased time to complete  Device: Lift equipment [employer])  Sit to Stand  Assistance Level: Moderate assistance; Requires x 2 assistance  Skilled Clinical Factors: Pt completes 3 sit-to-stand transfers with modA x2 with [employer] this date from EOB, toilet, and w/c. Pt requires mod VC for hand placement and foot placement. Pt also requires tactile cues for upright posture in standing. Stand to Sit  Assistance Level: Moderate assistance; Requires x 2 assistance  Skilled Clinical Factors: Pt completes 3 stand-to-sit transfers from EOB, w/c, and toilet with [employer].  Pt requires mod VC for hand placement and upright posture. ASSESSMENT/PROGRESS TOWARDS GOALS  Vital Signs  Heart Rate: 95  BP: (!) 128/51  BP Location: Left upper arm  MAP (Calculated): 76.67  SpO2: 98 %  O2 Device: None (Room air)    Assessment  Assessment: Pt found lying in bed upon arrival and is agreeable to PT session. Pt reports pain in buttocks and LEs d/t rash and hemorrhoid. Pt intermittently reports pain in buttocks throughout session when sitting for prolonged period of time. PT examined rash and notified nurse about the spreading of the rash and the pain reported by pt. Session focused on bed mobility and transfers using the [employer]. Pt requires mod Verbal and tactile cues for foot and hand placement and upright posture with transfers using the [employer]. Pt is modA x2 for bed mobility and modA x2 for STS transfers using the [employer]. Pt continues to be limited by her LE strength, sitting balance, pain and activity tolerance. Pt will continue to benefit from skilled PT to address deficits; will progress mobility as tolerated by pt. Activity Tolerance: Patient limited by fatigue;Patient limited by pain  Discharge Recommendations: Subacute/Skilled Nursing Facility  PT Equipment Recommendations  Equipment Needed: Yes  Other: CTA pending progress with mobility    Second Session:  Pt seen as co treat with OT for increased safety progressing functional mobility. Pt drowsy during session from recent dose of benadryl but agreeable to participate as able. Grossly A of 2 for transfers and use of STEDY for increased safety with commode transfers. Pt incontinent of urine, but also voided urine once more on commode. Pt in bed at end of session with bony prominences off loaded and call light/needs within reach. Sit pivot transfer EOB to w/c with R side leading and max a of 2  TD via STEDY to transfer w/c to commode and commode to EOB    Pt standing in STEDY with max A of 1 from PT while OT assisted with clothing management and pericare.  Pt requires consistent cues for trunk/ hip extension, forward directed gaze and midline posture. Sitting EOB x 5-8 min with assist ranging from SBA-mod a to maintain balance while completing RUE reaching task with LUE WB. Pt requires cues to scan L side, cues for anterior pelvic tilt and upright posture to counteract L lateral / posterior lean. Sit to supine with mod A of 2. Goals  Patient Goals   Patient goals : \"go on my mission trip in [place]"  Short Term Goals  Time Frame for Short term goals: 11 days 5/20  Short term goal 1: pt will complete bed mobility with mod A; not met 5/20 - pt requires mod A x2 for supine>sit  Short term goal 2: pt will complete functional transfer with max A; goal partially met 5/20 - pt completes transfers with mod A x2 with lorrie stedy vs. // bars  Short term goal 3: pt will tolerate gait assessment and set goal when appropriate; goal met 5/20 - pt ambulates 6 ft in // bars with mod A  x2 (dependent d/t w/c follow and // bars), new goal in LTG  Short term goal 4: pt will tolerate stair assessment and set goal when appropriate; not met 5/20 - unsafe to attempt stairs  Short term goal 5: pt will propel w/c x 150 ft with supv; not assessed 5/20  Long Term Goals  Time Frame for Long term goals : 21 days 5/31: GOALS UPDATED TO 6/2 due to discharge date  Long term goal 1: pt will complete bed mobility with CGA. Long term goal 2: pt will complete functional transfer with min a and LRAD. Long term goal 3: pt will propel w/c x 150 ft with CGA  Long term goal 4: Pt will ambulate 10 ft with LRAD and mod A. PLAN OF CARE/SAFETY  Plan  Plan: 5-7 times per week  Specific Instructions for Next Treatment: Progress mobility as tolerated  Current Treatment Recommendations: Strengthening;ROM;Balance training;Functional mobility training;Transfer training; Endurance training;Gait training; Therapeutic activities; Home exercise program;Wheelchair mobility training;Equipment evaluation, education, & procurement;Cognitive reorientation;Stair training;Positioning; Safety education & training;Patient/Caregiver education & training;Cognitive/Perceptual training;ADL/Self-care training;IADL training;Pain management  Safety Devices  Type of Devices: All fall risk precautions in place;Call light within reach; Patient at risk for falls;Nurse notified; All yaima prominences offloaded;Gait belt;Bed alarm in place; Left in bed      Therapy Time   Individual Concurrent Group Co-treatment   Time In 0800         Time Out 0830         Minutes 30           Timed Code Treatment Minutes: 30 Minutes       PERI DAVIS 05/31/22 at 1:08 PM       Second Session Therapy Time: Disha Bucio PT, DPT (Second session only)   Individual Concurrent Group Co-treatment   Time In      1330   Time Out      1400   Minutes      30     Timed Code Treatment Minutes:  30    Total Treatment Minutes:  60

## 2022-05-31 NOTE — PROGRESS NOTES
Lorelei High  5/31/2022  [id number]    Chief Complaint: Acute CVA (cerebrovascular accident) [facility])    Subjective:   No acute events overnight. Today Mike Garcia is seen in her room with nursing and her  present. She reports feeling improved from yesterday. Kidney function improved with IV fluids. She is encouraged to drink more. She continued to have rash over chest, back, arms, and legs that appears unchanged. Continued leukocytosis despite broad spectrum antibiotics. Medicine consulted. ROS: denies f/c, n/v, sob, cp    Objective:  Patient Vitals for the past 24 hrs:   BP Temp Temp src Pulse Resp SpO2   05/31/22 0800 (!) 112/52 99 °F (37.2 °C) Oral 82 17 95 %   05/30/22 2245 124/60 98.3 °F (36.8 °C) Oral 99 16 98 %   05/30/22 1154 (!) 111/47 -- -- 69 -- 98 %     Gen: No distress, pleasant. Supine in bed  HEENT: Normocephalic, atraumatic. CV: extremities well perfused  Resp: No respiratory distress. Abd: Soft, nondistended  Ext: No edema. Neuro: Alert, oriented, appropriately interactive. Left hemiparesis. Psych: mood and affect appropriate  Skin: diffuse erythematous rash over chest, back, arms and legs stable from prior    Wt Readings from Last 3 Encounters:   05/10/22 150 lb 12.7 oz (68.4 kg)   05/02/22 143 lb 9.6 oz (65.1 kg)   09/09/21 150 lb 12.8 oz (68.4 kg)       Laboratory data:   Lab Results   Component Value Date    WBC 19.4 (H) 05/31/2022    HGB 9.6 (L) 05/31/2022    HCT 28.5 (L) 05/31/2022    MCV 96.3 05/31/2022     05/31/2022       Lab Results   Component Value Date     05/31/2022    K 4.2 05/31/2022     05/31/2022    CO2 23 05/31/2022    BUN 47 05/31/2022    CREATININE 1.2 05/31/2022    GLUCOSE 124 05/31/2022    GLUCOSE 129 07/26/2017    CALCIUM 8.5 05/31/2022        Therapy progress:  PT  Position Activity Restriction  Other position/activity restrictions: chemo precautions, L inattention, LUE flaccid, resting hand splint to be worn at night.   Objective     Sit to Stand: 2 Person Assistance,Dependent/Total  Stand to sit: Dependent/Total,2 Person Assistance  Bed to Chair: Dependent/Total     OT  PT Equipment Recommendations  Equipment Needed: Yes  Other: CTA pending progress with mobility  Toilet - Technique:  (steady to e-tac chair over toilet)  Equipment Used:  (e-tac chair)  Toilet Transfers Comments: maxA x2 sit <>  steady, progressing at times to [address] with max verbal/tactile and demo cues  Assessment        SLP                Body mass index is 27.14 kg/m². Assessment and Plan:  Cm King is a 66year old female with a past medical history significant for recent CVA with left hemiparesis, CML, HTN, and HLD who presented to [facility] on 5/3/22 with abnormal labs, admitted with rhabdomyolysis and ARF, found to have acute CVA. She was admitted to [facility] on 5/10/22 due to functional deficits below her baseline.     Acute Right CVA  - MRI showing acute infarct in right cerebral hemisphere in a watershed distribution  - CTA showing 90-95% stenosis of right ICA  - Vascular and Neurology in agreement on waiting for CEA, needs follow up in 4 weeks  - asa, plavix, statin  - PT, OT, ST    Dysphagia  - upgraded to regular diet  - ST    LOC 5/30  - suspect vasovagal as patient was on toilet. Vitals normal, although patient does have severe stenosis of right ICA so low normal blood pressure for her may be too low. Patient also appearing dehydrated on labs with new EDYTA. Also with worsening leukocytosis. - discontinued lisinopril to keep patient blood pressure higher, also discontinued in setting of EDYTA  - s/p 1 L NS  - no further epsiodes    Leukocytosis  - etiology unclear, possibly due to allergic reaction.    - XR chest non acute, UA appeared positive but nothing significant on culture  - leukocytosis appeared to be trending down but now trending up  - broadened abx to vanc and IV azithromycin after talking with pharmacy as patient is allergic to several abx  - s/p 1 L NS  - Consult placed to Medicine, appreciate assistance    Drug Rash  - suspected due to tramadol, but has not improved over the weekend since being off of tramadol  - hold nystatin, this is the only other drug that correlates with timing of rash  - hold chemo pill  - continue PRN benadryl    Chest Pain, resolved  - EKG and troponin unremarkable  - continue to monitor    Abd pain  - XR abdomen with moderate stool burden  - continue bowel regimen    RLL Pneumonia, resolved  - concern for aspiration in setting of dysphagia  - completed course of levaquin   - IS      Rhabdomyolysis  Acute Renal Injury  - Nephrology followed during acute stay  - CPK trending down  - monitor      HTN  - patient with episode of hypotension while in acute care so isosorbid mononitrate and hydralazine held  - metoprolol, nifedipine  - Nephrology signed off, if no improvement in Cr with fluids, will consider reconsulting  - discontinued lisinopril with EDYTA and lower BP    DM2  - Hba1c 6.6  - glucose well controlled without SSI, discontinued  - follow up with PCP     Liver Injury with elevated LFTs  - suspected to be due to statin induced rhabdo  - statin stopped  - LFTs normalized  - no need to further follow     CML  - follows with Dr. Chinyere Wilson  - hold nilitinib  - chemo precautions  - follow up with Heme/Onc OP     Enlarged heterogeneous appearance of the thyroid  - Thyroid ultrasound outpatient  - follow up with PCP     Hemorrhoids  - hydrocortisone cream   - bowel regimen to avoid constipation     Bowels: Schedule stool softener. Follow bowel movements. Enema or suppository if needed.      Bladder: Check PVR x 3. [facility] if PVR > 200ml or if any volume is > 500 ml.      Sleep: Trazodone provided prn. PPx  DVT: heparin  GI: not indicated     Follow up appointments: Vascular, PCP    Services: [facility]  EDOD: 6/3/22    Ansel Gottron.  Savanah Delgado MD 5/31/2022, 11:45 AM

## 2022-05-31 NOTE — CONSULTS
Hospital Medicine  Consult History & Physical        Chief Complaint:  Rash    Date of Service: Pt seen/examined in consultation on 5/31/2022    History Of Present Illness:      66 y.o. female who we are asked to see/evaluate by Leila Hastings MD for medical management. Patient was recently hospitalized to our service appointment today for acute CVA and subsequently discharged to the nursing medicine ARU on 5/10/2022 for rehabilitation. Recently her rehab course has been complicated by diffuse rash. This was first noted on the evening of Thursday, 5/26/2022. Rash consisted of diffuse erythema affecting the trunk back and extremities. The rash was diffusely itchy. There was initial concern that this may be a drug reaction related to simply start medication. Most recently started medications with tramadol and nystatin, tramadol seeming more likely was discontinued first, subsequently a statin was discontinued as well. Around the same time they became concerned for possible infection as she was noted to have a marked change in her white blood cell count. She was started on several antibiotics, however all of these were started after rash was noted. Of note she was treated with a course of antibiotics for aspiration pneumonia earlier in her rehab stay, however, most recent infectious work-up was unrevealing with normal urinalysis, negative chest x-ray, no other focal signs or infection. She has been treated with p.o. Benadryl for the last few days. On 5/30/2022 he had a brief syncopal episode while toileting. Patient was thought to be vasovagal induced. Hospital medicine was consulted for further input. She is somewhat drowsy upon my evaluation. I did discuss with care team and the patient's . She currently reports diffuse itching symptoms and tenderness. The rash is starting to exfoliate. She also notes some soreness in the mouth although no sores or ulcerations are noted.   She both eyes every 12 hours 12/15/21 12/10/22  Laney Headley DO   TASIGNA 200 MG capsule  3/26/21   Historical Provider, MD   polyethylene glycol (GLYCOLAX) powder Take 17 g by mouth daily    Historical Provider, MD   acetaminophen (TYLENOL) 325 MG tablet Take 2 tablets by mouth every 6 hours as needed for Pain 7/17/17   Aarti Nesbitt MD   aspirin 81 MG tablet Take 81 mg by mouth     Historical Provider, MD       Allergies:  Latex, Mangifera indica, Penicillin g, Shellfish-derived products, Sulfa antibiotics, Grapefruit concentrate, Omeprazole, Shrimp extract allergy skin test, and Tramadol    Social History:     TOBACCO:   reports that she has never smoked. She has never used smokeless tobacco.  ETOH:   reports no history of alcohol use. Family History:        Problem Relation Age of Onset    Cancer Mother         non hodgkins lymphoma    Cancer Father         colon    Cancer Sister         colon    Cancer Brother         mesothelioma    Arrhythmia Sister        REVIEW OF SYSTEMS COMPLETED:   Pertinent positives as noted in the HPI. All other systems reviewed and negative. PHYSICAL EXAM PERFORMED:  BP (!) 101/47   Pulse 80   Temp 98.5 °F (36.9 °C) (Oral)   Resp 17   Ht 5' 2.5\" (1.588 m)   Wt 150 lb 12.7 oz (68.4 kg)   SpO2 95%   BMI 27.14 kg/m²   General appearance: No apparent distress, appears stated age and cooperative. HEENT: Normal cephalic, atraumatic without obvious deformity. Pupils equal, round, and reactive to light. Extra ocular muscles intact. Mild conjunctival injection bilaterally. No obvious oral ulcerations are noted. Neck: Supple, with full range of motion. No jugular venous distention. Trachea midline. Respiratory:  Normal respiratory effort. Clear to auscultation, bilaterally without Rales/Wheezes/Rhonchi. Cardiovascular: Regular rate and rhythm with normal S1/S2 without murmurs, rubs or gallops.   Abdomen: Soft, non-tender, non-distended with normal bowel sounds. Musculoskeletal: No clubbing, cyanosis or edema bilaterally. Skin: Diffuse erythematous rash over the trunk, back, extremities and face. There is swelling on sparing. Neurologic: Stable left-sided weakness. Cranial nerves: II-XII intact, grossly non-focal.  Psychiatric: Somnolent although oriented will follow insight. Capillary Refill: Brisk,3 seconds, normal   Peripheral Pulses: +2 palpable, equal bilaterally     Labs:     Recent Labs     05/30/22  0758 05/30/22  1211 05/31/22  0530   WBC 15.7* 19.9* 19.4*   HGB 10.9* 11.5* 9.6*   HCT 32.8* 35.8* 28.5*    327 211     Recent Labs     05/30/22 0758 05/30/22  1211 05/31/22  0530    135* 137   K 4.5 4.9 4.2    100 105   CO2 22 20* 23   BUN 59* 59* 47*   CREATININE 1.8* 1.8* 1.2   CALCIUM 9.2 9.2 8.5     No results for input(s): AST, ALT, BILIDIR, BILITOT, ALKPHOS in the last 72 hours. No results for input(s): INR in the last 72 hours. No results for input(s): [place] in the last 72 hours. Urinalysis:    Lab Results   Component Value Date    NITRU Negative 05/28/2022    WBCUA 10-20 05/28/2022    BACTERIA 3+ 05/28/2022    RBCUA 3-4 05/28/2022    BLOODU TRACE-INTACT 05/28/2022    SPECGRAV 1.025 05/28/2022    GLUCOSEU Negative 05/28/2022       ASSESSMENT:    Active Hospital Problems    Diagnosis Date Noted    Acute CVA (cerebrovascular accident) ([facility] [address] [I63.9] 05/10/2022     Priority: Medium       PLAN:    Drug Rash: Agree with prior assessment but this is most consistent with a drug rash. Although the offending agent is somewhat unclear given the timeline, to most closely associated medications were tramadol and nystatin. Agree with avoiding these medications. There are no other obvious medication culprits of medications have been given for a week or longer prior to the onset of symptoms.  Nilotinib will be held empirically given the evolving drug rash, although given long standing use, would consider this a less likely culprit. While she is currently receiving some antibiotics these have all been initiated after rash. Given that active infection was considered less likely at this time would favor avoidance of further antibiotics so as not to further confuse the situation if the drug rash continues to evolve. Likewise, given the low suspicion for infection blood advocate for steroid use which is possible allergic reaction. Although Benadryl as helpful and is likely contributing to her somnolence. We can use Atarax for itching as needed. We will also start IV Solu-Medrol, Pepcid IV, Zyrtec once daily. Agree with volume expansion given the likely component of hypovolemia related to the allergic reaction. Although she does have some mild conjunctivitis and oral soreness, I do not appreciate any oral ulcerations at this time to suggest a diagnosis of Xie-Floyd type syndrome although vigilance is warranted given the current severity of her rash. Leukocytosis: Given the strong correlation of the Lleukocytosis with the onset of the rash, suspect this is all related. Although she was previously treated for aspiration pneumonia during her rehab stay there is currently no objective evidence for respiratory infection. Likewise the UA/UCx and BCx results also argue against active infection. A mild lactic acidosis may be related to relative hypotension/hypovolemia setting of ongoing allergic reaction. As above, favor avoiding further antibiotics at this time. Recent Acute right hemispheric MCA stroke with left sided weakness and neglect  - MRI brain 5/6/2022 showed areas of acute infarct involving the right cerebral hemisphere predominantly in a somewhat watershed distribution.  - CTA neck 5/9/2022 showed significant stenosis within the right proximal cervical ICA with approximately 90-95% stenosis per NASCET criteria. It also showed high grade stenoses throughout bilateral cervical vertebral arteries.    - neurology and vascular were previously consulted and agreed risk of urgent CEA outweighed the benefit given high risk for reperfusion injury or hemorrhagic conversion. Plans are for her to follow-up with Vascular in 4 weeks for further management.   - Continue stroke management with plavix and aspirin. Statin remains on hold d/t LFT changes and rhabdomyolysis episode recently.      Recent EDYTA/Rhabdo episode  -Resolved  -Nephrology has been following intermittently in ARU  -Resume volume expansion as above  -Monitor    Hypertension  -As above, concern for hypovolemia related to Allergic Reaction  -Hold parameters placed for Atenolol  -Monitor.      DM2  - Recently diagnosed with hemoglobin a1c 6.6 on 5/2/2022  - Will resume monitoring with medium SSI after starting IV steroids    Syncopal episodes  - Most recently 5/30/22.  - Consistent with vasovagal episodes although now with some concern for hypovolemia in setting of allergic reaction    Liver injury with elevated LFT's   - Occurred during recent admission; thought to be most likely due to statin induced rhabdo  - Statin remains on hold  - LFTs improved previously; will recheck   - Follow up with PCP after discharge to repeat CMP.       CML   - Stable  - Patient follows Dr. Katherleen Skiff home, held initially but resumed for past few weeks  - Nilotinib held again given evolving drug rash, although given long standing use, would consider less likely  - Follow up with heme/onc as scheduled.      Enlarged heterogeneous appearance of the thyroid gland  -Previously noted on CTA  - Thyroid ultrasound is recommended  - Consider following up with PCP after discharge.      Hemorrhoids  -  hydrocortisone cream    Thank you for the consultation, will follow up as needed

## 2022-05-31 NOTE — PROGRESS NOTES
Occupational Therapy    Facility/Department: [facility]  Rehabilitation Occupational Therapy Daily Treatment Note    Date: 22  Patient Name: Omega Erazo       Room: 6654/6815-87  MRN: [id number]  Account: [de-identified]   : 1950  (76 y.o.) Gender: female          Past Medical History:  has a past medical history of Anemia, Arthritis, Asthma, Bowel dysfunction, CML (chronic myelocytic leukemia) ([facility] [address] Hyperlipidemia, Hypertension, Nuclear senile cataract of both eyes, Obesity, Risk of myocardial infarction or stroke 7.5% or greater in next 10 years, Skin cancer of face, Stroke (cerebrum) ([facility] [address] Thyroid disease, and TIA (transient ischemic attack). Past Surgical History:   has a past surgical history that includes Breast biopsy; fine needle aspiration; Breast lumpectomy; Tonsillectomy (Bilateral); Paracentesis; Anus surgery (); Elbow surgery (); and Colonoscopy. Restrictions  Restrictions/Precautions: Fall Risk;Up as Tolerated;Isolation  Other position/activity restrictions: chemo precautions, L inattention, LUE flaccid, resting hand splint to be worn at night. Subjective  Subjective: Pt in bed agreeable to OT. Pt very lethargic upon arrival due to recent Benadryl.  present for session. Restrictions/Precautions: Fall Risk;Up as Tolerated;Isolation             Objective     Cognition  Overall Cognitive Status: Exceptions  Arousal/Alertness: Appropriate responses to stimuli;Delayed responses to stimuli  Following Commands: Follows one step commands with repetition; Follows multistep commands with repitition  Attention Span: Attends with cues to redirect  Memory: Decreased recall of precautions  Safety Judgement: Decreased awareness of need for safety;Decreased awareness of need for assistance  Problem Solving: Assistance required to correct errors made;Assistance required to identify errors made  Insights: Decreased awareness of deficits  Initiation: Requires cues for some  Sequencing: Requires cues for some  Orientation  Overall Orientation Status: Within Functional Limits  Orientation Level: Oriented X4         ADL  Lower Extremity Dressing  Assistance Level: Dependent;Verbal cues  Skilled Clinical Factors: x2 assist sit<>stand to manage pants up/down; in STEDY  Toileting  Assistance Level: Dependent  Skilled Clinical Factors: Assistance x2; Standing in [employer]  Toilet Transfers  Technique:  (STEDY)  Equipment:  (over toilet)  Assistance Level: Dependent          Sit to Supine  Assistance Level: Maximum assistance  Supine to Sit  Assistance Level: Maximum assistance  Scooting  Assistance Level: Moderate assistance   Neuromuscular Education  Trunk Control: Pt continues to demo L sided neglect; Pt tolerated supported sitting EOB for ~10 mins in order to increase functional core strength for ADLs. Pt does need consistent tactile cues with limited ability to maintain posture  OT Exercises  Static Sitting Balance Exercises: Pt tolerated sitting at EOB for ~10 mins. Initially pt requiring mod A, progresssing to Min A/CGA; Pt completed forward reaching to heart level with RUE  Postural Correction Exercises: Cues for orienting to midline. Forward trunk flexion/extension x5     Pt seen as co treat with PT for 2nd part of session in order to increase functional status with ADLs. Grossly A of 2 for transfers and use of STEDY for increased safety with commode transfers. Pt incontinent of urine, but also voided urine once more on commode. Pt in bed at end of session with bony prominences off loaded and call light/needs within reach.      Sit pivot transfer EOB to w/c with R side leading and max a of 2  TD via STEDY to transfer w/c to commode and commode to EOB     Pt requires consistent cues for trunk/ hip extension, forward directed gaze and midline posture. Assessment  Assessment  Assessment: Pt very lethargic this session. Vitals WFls.  Today, pt required min-mod A x2 in STEDY for transfers. Pt with min A to mod A for standing balance in STEDY while pt required total A for toileting/LB dressing. Anticipate the need for ongoing OT in order to increase functional status prior to returning home. Activity Tolerance: Patient limited by fatigue;Patient limited by pain  Discharge Recommendations: Subacute/Skilled Nursing Facility  OT Equipment Recommendations  Other: defer to next d/c facility  Safety Devices  Safety Devices in place: Yes  Type of devices: Bed alarm in place; Left in bed; All fall risk precautions in place;Nurse notified;Gait belt    Patient Education  Education  Education Given To: Patient  Education Provided: Role of Therapy;Plan of Care;Safety;Equipment;ADL Function; Fall Prevention Strategies;Transfer Training  Education Provided Comments: bed mobility, sitting balance, safety and hand placement with transfers, hemistrategies for UB bathing/dressing, skin checks for resting hand splint, AAROM LUE exercises, weightshifting during ambulation/standing, standing balance  Education Method: Demonstration;Verbal  Barriers to Learning: Cognition  Education Outcome: Verbalized understanding;Demonstrated understanding;Continued education needed    Plan  Plan  Times per Week: 5 out of 7 days  Times per Day: Daily  Plan Weeks: 3 weeks  Current Treatment Recommendations: Strengthening;ROM;Balance training;Functional mobility training; Endurance training; Safety education & training;Neuromuscular re-education;Patient/Caregiver education & training;Equipment evaluation, education, & procurement;Self-Care / ADL;Cognitive/Perceptual training; Wheelchair mobility training;Positioning    Goals  Short Term Goals  Time Frame for Short term goals: 10 days (5/20/22)-EXTEND TO 5/25 2/2 PROGRESS  Short Term Goal 1: Pt will complete functional transfers with Mita x2 with LRAD- progressing, maxA x2 sit pivot vs stand pivot transfer-EXTEND TO 5/25-GOAL MET 5/25, CTA  Short Term Goal 2: Pt will complete toileting/transfer modA x2 with LRAD and DME PRN-GOAL MET 5/16, modA x2 steady, CTA  Short Term Goal 3: Pt will complete LUE AAROM/AROM exercises to LUE to increase strength/endurance for ADLs/transfers-GOAL MET 5/18, CTA  Short Term Goal 4: Pt will be able to locate 3/5 object in L visual field with min cues during ADLs/activities-GOAL MET 5/18,CTA  Long Term Goals  Time Frame for Long term goals : 21 days (3/31/22)-EXTEND TO DC DATE, 6/3  Long Term Goal 1: Pt will complete functional transfers/mobility SPV with LRAD-DOWGRADE DUE TO PROGRESS: maxA x1  Long Term Goal 2: Pt will complete UE dressing/bathing sitting setup with min cues for papo techniques-DOWNGRADE DUE TO PROGRESS: Mita  Long Term Goal 3: Pt will complete toileting/transfers with LRAD and DME PRN SPV-DOWGRADE DUE TO PROGRESS: maxA x1  Long Term Goal 4: Pt will complete opening 3/5 containers with compensatory techniques sitting setup/SPV        Therapy Time   Individual Concurrent Group Co-treatment   Time In 1300     1330   Time Out 1330     1400   Minutes 30     30   Timed Code Treatment Minutes: [address], OTR/L

## 2022-05-31 NOTE — PATIENT CARE CONFERENCE
[facility]  Inpatient Rehabilitation  Weekly Team Conference Note    Date: 2022  Patient Name: Gage Jordan        MRN: [id number]    : 1950  (66 y.o.)  Gender: female   Referring Practitioner: Eleanor Brittle, MD  Diagnosis: CVA      Interventions to be utilized toward barriers to discharge, per discipline:  [address] observed barriers to dc: generalized rash/peeling skin, poor mobility, needs assist with ADL's, oral chemo therapy on hold  Nursing interventions: monitor for skin breakdown, encourage OOB activites and ADL's, isolation precautions for chemo therapy  Family Education:  [place] Nazario involved in care and supportive  Fall Risk:  yes    Physical therapy observed barriers to dc:        Baseline: ind with no AD, at times assist  with mobility               Current level: max A bed mobility, mod A of 2 sit pivot transfers, max A of 2 stand pivot trnsfers, TD standing balance, SBA-mod a sitting balance, min A w/c mobility, TD gait x 10 ft with carranza rail              Barriers to DC: L neglect, L papo, poor proprioception ,limited assist at home              Needs in order to achieve dc home/next level of care: pt will have to be ind with mobility to return home safely with . At this time pt requires A of 2 for all mobility tasks. Recommend family training prior to d/c for safety.  If family unable to provide appropriate levels of assist, rec SNF upon d/c.     Physical therapy interventions:   Current Treatment Recommendations: Strengthening,ROM,Balance training,Functional mobility training,Transfer training,Endurance training,Gait training,Therapeutic activities,Home exercise program,Wheelchair mobility training,Equipment evaluation, education, & procurement,Cognitive reorientation,Stair training,Positioning,Safety education & training,Patient/Caregiver education & training,Cognitive/Perceptual training,ADL/Self-care training,IADL training,Pain management      PHYSICAL THERAPY  Recommendation:   PT Equipment Recommendations  Equipment Needed: Yes  Other: CTA pending progress with mobility    Assessment: Pt seen for 30 minute co-tx with OT followed by 30 minute individual session. Pt requires co-tx secondary to complexity of condition, severity of deficits and decreased activity tolerance. Pt benefits from x 2 skilled hands to provide increased cues and feedback and promote improved performance with mobility. Co-tx session focused on progression of functional t/f, standing activities and gait in // bars. Individual session with focus on neuro re-ed and progression of t/f and mobility. Pt with grossly flat affect during session but demonstrates good participation and overall improved performance with mobility and t/f at this time. Pt demonstrates sit pivot t/f with modA x 2 to maxA x 1, STS in // bars with modA x 2 to Mita x 2, ambulation up to 5' in // bars with grossly modA/maxA x 2. Pt is limited by decreased activity tolerance, strength, balance and severity of deficits, continued tone noted to LLE with minimal active purposeful movement. Pt will benefit from continued skilled PT in ARU to address above deficits, will continue to progress mobility as tolerated. Occupational therapy observed barriers to dc:      Baseline: ind with no AD, at times assist  with mobility               Current level: max A bed mobility, mod A of 2 sit pivot transfers, max A of 2 stand pivot, MAX A for UB ADLs; Total A for standing ADLs              Barriers to DC: L neglect, L papo, poor proprioception ,limited assist at home              Needs in order to achieve dc home/next level of care: pt will have to be sup or SBA for ADLs to return home safely with . At this time pt requires A of 2 for all mobility or ADLs tasks.  Anticipate the need for SNF at d/c in order to increase functional status prior to returning home    Occupational Therapy interventions:  Current Treatment Recommendations: Strengthening,ROM,Balance training,Functional mobility training,Endurance training,Safety education & training,Neuromuscular re-education,Patient/Caregiver education & training,Equipment evaluation, education, & procurement,Self-Care / ADL,Cognitive/Perceptual training,Wheelchair mobility training,Positioning      OCCUPATIONAL THERAPY  Pt very lethargic this session. Vitals WFls. Today, pt required min-mod A x2 in STEDY for transfers. Pt with min A to mod A for standing balance in STEDY while pt required total A for toileting/LB dressing. Anticipate the need for ongoing OT in order to increase functional status prior to returning home. Speech therapy observed barriers to dc:               Baseline: lives with , shares finances, cooking, laundry, and grocery shopping, has not driven since [facility] level: mod cognitive linguistic deficit, severe L neglect; pt with overall more difficulty with cognitive tasks in recent sessions               Barriers to DC: severity of L neglect, fatigue and reduced endurance / lethargy, inconsistent with progress, requiring increased cueing due to difficulty in recent sessions               Needs in order to achieve dc home/next level of care: 24 hour assist, improved L neglect and visual attention to L side, carryover of compensatory strategies, assist with meds/finances     Speech Therapy interventions:  Dysphagia: diet tolerance monitoring, safe swallow strategies, education   Speech/Language/Cognition: Compensatory strategy training and carryover, recall/STM, problem solving, reasoning, exec function, thought organization, attention. SPEECH THERAPY:  Pt appeared fatigued and lethargic during session, requiring max cues to maintain alertness / stay awake. Pt upright in bed.  Despite pt previously meeting dysphagia goals, pt's  reported concerns re: solid foods - thought she may need to be back on \"ground up food. \" Pt's fatigue and lethargy are negatively impacting overall PO intake, thus will require assistance with all meals and cues to maintain alertness. Pt overall tolerated PO with no overt s/s of aspiration, thus continue to recommend regular solids and thin liquids with assistance during meals. Pt required max cues to attend / scan to left during breakfast meal. Overall throughout cognitive tasks, pt with reduced thought organization and slow thought processing - thus, required frequent repetition. Pt benefited from mod cues to improve recall, mod-max cues to improve thought org, and max cues to improve reasoning. Pt will require 24 hr assist and ongoing ST at d/c. Continue goals above. NUTRITION  Weight: 150 lb 12.7 oz (68.4 kg) / Body mass index is 27.14 kg/m². Diet Order: ADULT DIET; Regular; 3 carb choices (45 gm/meal)  PO Meals Eaten (%): 76 - 100%  Education: Declined       CASE MANAGEMENT  Assessment: 66 yr old female. Dx:Acute CVA (cerebrovascular accident). Therapy recommendations are Subacute skilled nursing facility. [facility] accepted patient. DME:deferred to SNF. CM will continue to support for discharge needs. Interdisciplinary Goals:   1.) pt will complete sit pivot transfer with max a of 1.  2.) Pt will appropriately visually attend/scan to L side across all disciplines given min cues   3.) Pt will complete UB dressing with mAx A     []  Family Training discussed at conference and to be scheduled.     Discharge Plan   Estimated discharge date: 6/7/2022  [address]  Pass:No  Services at Discharge: Defer to SNF  Equipment at Discharge: Defer to SNF        Team Members Present at Conference:  : Amy Lilly    Occupational Therapist: Ric Ayoub OTR/L   Physical Therapist: Tatiana Ashraf PT, DPT   Speech Therapist: Valentina Nelson, [facility] SLP  Nurse: Suzan No RN  Dietician: Charmayne Balint, HUYENN, LD  ARU Supervisor: Blake Metcalf

## 2022-05-31 NOTE — PROGRESS NOTES
Message to ARU MD: ryanne, Lactic 2.8.    /47 HR 80 Temp 98.5 sats 95% RA. Hospitalist had not yet seen pt.

## 2022-05-31 NOTE — PROGRESS NOTES
Patient with fluid infusing through R thumb IV at beginning of shift, IV infiltrated. Due to continuing need for fluids and antibiotics with poor venous access, MD ordered PICC line to be inserted. Acknowledged order and completed questionnaire. Pt GFR currently <45; re-verified with Dr. Rehan Mirza that PICC line is appropriate at this time, she confirmed to proceed. Placed call to patient  F[employer] [employer], consent gotten and verified with second RN due to patient's intermittent confusion. Patient educated on need and procedure. Call placed to DIT for placement.

## 2022-05-31 NOTE — PLAN OF CARE
Received [facility] page for medical consultation.   Sent to Dr. Marcia Burgess consulting hospitalist.

## 2022-06-01 LAB
ALBUMIN SERPL-MCNC: 2.6 G/DL (ref 3.4–5)
ALP BLD-CCNC: 321 U/L (ref 40–129)
ALT SERPL-CCNC: 93 U/L (ref 10–40)
ANION GAP SERPL CALCULATED.3IONS-SCNC: 11 MMOL/L (ref 3–16)
AST SERPL-CCNC: 38 U/L (ref 15–37)
BILIRUB SERPL-MCNC: 0.3 MG/DL (ref 0–1)
BILIRUBIN DIRECT: <0.2 MG/DL (ref 0–0.3)
BILIRUBIN, INDIRECT: ABNORMAL MG/DL (ref 0–1)
BUN BLDV-MCNC: 37 MG/DL (ref 7–20)
CALCIUM SERPL-MCNC: 8.4 MG/DL (ref 8.3–10.6)
CHLORIDE BLD-SCNC: 110 MMOL/L (ref 99–110)
CO2: 21 MMOL/L (ref 21–32)
CREAT SERPL-MCNC: 0.8 MG/DL (ref 0.6–1.2)
GFR AFRICAN AMERICAN: >60
GFR NON-AFRICAN AMERICAN: >60
GLUCOSE BLD-MCNC: 159 MG/DL (ref 70–99)
GLUCOSE BLD-MCNC: 189 MG/DL (ref 70–99)
GLUCOSE BLD-MCNC: 197 MG/DL (ref 70–99)
GLUCOSE BLD-MCNC: 199 MG/DL (ref 70–99)
GLUCOSE BLD-MCNC: 212 MG/DL (ref 70–99)
HCT VFR BLD CALC: 27.1 % (ref 36–48)
HEMOGLOBIN: 8.9 G/DL (ref 12–16)
MCH RBC QN AUTO: 31.8 PG (ref 26–34)
MCHC RBC AUTO-ENTMCNC: 32.8 G/DL (ref 31–36)
MCV RBC AUTO: 96.9 FL (ref 80–100)
PDW BLD-RTO: 14.7 % (ref 12.4–15.4)
PERFORMED ON: ABNORMAL
PLATELET # BLD: 189 K/UL (ref 135–450)
PMV BLD AUTO: 8.8 FL (ref 5–10.5)
POTASSIUM REFLEX MAGNESIUM: 4.4 MMOL/L (ref 3.5–5.1)
RBC # BLD: 2.8 M/UL (ref 4–5.2)
SODIUM BLD-SCNC: 142 MMOL/L (ref 136–145)
TOTAL PROTEIN: 5 G/DL (ref 6.4–8.2)
WBC # BLD: 17.3 K/UL (ref 4–11)

## 2022-06-01 PROCEDURE — 6360000002 HC RX W HCPCS: Performed by: INTERNAL MEDICINE

## 2022-06-01 PROCEDURE — 6370000000 HC RX 637 (ALT 250 FOR IP): Performed by: STUDENT IN AN ORGANIZED HEALTH CARE EDUCATION/TRAINING PROGRAM

## 2022-06-01 PROCEDURE — 6370000000 HC RX 637 (ALT 250 FOR IP): Performed by: INTERNAL MEDICINE

## 2022-06-01 PROCEDURE — 1280000000 HC REHAB R&B

## 2022-06-01 PROCEDURE — 97112 NEUROMUSCULAR REEDUCATION: CPT

## 2022-06-01 PROCEDURE — 97530 THERAPEUTIC ACTIVITIES: CPT

## 2022-06-01 PROCEDURE — 97535 SELF CARE MNGMENT TRAINING: CPT

## 2022-06-01 PROCEDURE — 6360000002 HC RX W HCPCS: Performed by: STUDENT IN AN ORGANIZED HEALTH CARE EDUCATION/TRAINING PROGRAM

## 2022-06-01 PROCEDURE — 85027 COMPLETE CBC AUTOMATED: CPT

## 2022-06-01 PROCEDURE — 80048 BASIC METABOLIC PNL TOTAL CA: CPT

## 2022-06-01 PROCEDURE — 2580000003 HC RX 258: Performed by: STUDENT IN AN ORGANIZED HEALTH CARE EDUCATION/TRAINING PROGRAM

## 2022-06-01 PROCEDURE — 97129 THER IVNTJ 1ST 15 MIN: CPT

## 2022-06-01 PROCEDURE — 2500000003 HC RX 250 WO HCPCS: Performed by: INTERNAL MEDICINE

## 2022-06-01 PROCEDURE — 97116 GAIT TRAINING THERAPY: CPT

## 2022-06-01 PROCEDURE — 80076 HEPATIC FUNCTION PANEL: CPT

## 2022-06-01 PROCEDURE — 97130 THER IVNTJ EA ADDL 15 MIN: CPT

## 2022-06-01 RX ADMIN — HEPARIN SODIUM 5000 UNITS: 5000 INJECTION INTRAVENOUS; SUBCUTANEOUS at 14:30

## 2022-06-01 RX ADMIN — DICLOFENAC SODIUM 4 G: 10 GEL TOPICAL at 08:47

## 2022-06-01 RX ADMIN — INSULIN LISPRO 2 UNITS: 100 INJECTION, SOLUTION INTRAVENOUS; SUBCUTANEOUS at 22:07

## 2022-06-01 RX ADMIN — CLOPIDOGREL BISULFATE 75 MG: 75 TABLET, FILM COATED ORAL at 08:46

## 2022-06-01 RX ADMIN — HEPARIN SODIUM 5000 UNITS: 5000 INJECTION INTRAVENOUS; SUBCUTANEOUS at 21:59

## 2022-06-01 RX ADMIN — METHYLPREDNISOLONE SODIUM SUCCINATE 40 MG: 40 INJECTION, POWDER, FOR SOLUTION INTRAMUSCULAR; INTRAVENOUS at 21:59

## 2022-06-01 RX ADMIN — HYDROCORTISONE: 1 CREAM TOPICAL at 22:00

## 2022-06-01 RX ADMIN — HYDROXYZINE HYDROCHLORIDE 10 MG: 10 TABLET ORAL at 16:37

## 2022-06-01 RX ADMIN — DICLOFENAC SODIUM 4 G: 10 GEL TOPICAL at 22:00

## 2022-06-01 RX ADMIN — TRAZODONE HYDROCHLORIDE 50 MG: 50 TABLET ORAL at 21:59

## 2022-06-01 RX ADMIN — FAMOTIDINE 20 MG: 10 INJECTION, SOLUTION INTRAVENOUS at 08:46

## 2022-06-01 RX ADMIN — SODIUM CHLORIDE: 9 INJECTION, SOLUTION INTRAVENOUS at 12:44

## 2022-06-01 RX ADMIN — HYDROCORTISONE: 1 CREAM TOPICAL at 08:48

## 2022-06-01 RX ADMIN — INSULIN LISPRO 2 UNITS: 100 INJECTION, SOLUTION INTRAVENOUS; SUBCUTANEOUS at 06:42

## 2022-06-01 RX ADMIN — LEVOTHYROXINE SODIUM 50 MCG: 0.05 TABLET ORAL at 05:33

## 2022-06-01 RX ADMIN — INSULIN LISPRO 2 UNITS: 100 INJECTION, SOLUTION INTRAVENOUS; SUBCUTANEOUS at 11:15

## 2022-06-01 RX ADMIN — Medication 400 MG: at 08:46

## 2022-06-01 RX ADMIN — SODIUM CHLORIDE: 9 INJECTION, SOLUTION INTRAVENOUS at 00:30

## 2022-06-01 RX ADMIN — LACTULOSE 20 G: 20 SOLUTION ORAL at 22:00

## 2022-06-01 RX ADMIN — LACTULOSE 20 G: 20 SOLUTION ORAL at 08:45

## 2022-06-01 RX ADMIN — CARBOXYMETHYLCELLULOSE SODIUM 1 DROP: 10 GEL OPHTHALMIC at 21:59

## 2022-06-01 RX ADMIN — METHYLPREDNISOLONE SODIUM SUCCINATE 40 MG: 40 INJECTION, POWDER, FOR SOLUTION INTRAMUSCULAR; INTRAVENOUS at 08:46

## 2022-06-01 RX ADMIN — HEPARIN SODIUM 5000 UNITS: 5000 INJECTION INTRAVENOUS; SUBCUTANEOUS at 05:34

## 2022-06-01 RX ADMIN — SODIUM CHLORIDE, PRESERVATIVE FREE 10 ML: 5 INJECTION INTRAVENOUS at 21:58

## 2022-06-01 RX ADMIN — DICLOFENAC SODIUM 4 G: 10 GEL TOPICAL at 16:35

## 2022-06-01 RX ADMIN — DICLOFENAC SODIUM 4 G: 10 GEL TOPICAL at 12:45

## 2022-06-01 RX ADMIN — DOCUSATE SODIUM 50 MG AND SENNOSIDES 8.6 MG 2 TABLET: 8.6; 5 TABLET, FILM COATED ORAL at 08:45

## 2022-06-01 RX ADMIN — ASPIRIN 81 MG: 81 TABLET, COATED ORAL at 08:45

## 2022-06-01 RX ADMIN — SODIUM CHLORIDE, PRESERVATIVE FREE 10 ML: 5 INJECTION INTRAVENOUS at 08:47

## 2022-06-01 RX ADMIN — CETIRIZINE HYDROCHLORIDE 10 MG: 10 TABLET, FILM COATED ORAL at 08:45

## 2022-06-01 RX ADMIN — INSULIN LISPRO 2 UNITS: 100 INJECTION, SOLUTION INTRAVENOUS; SUBCUTANEOUS at 16:33

## 2022-06-01 RX ADMIN — CARBOXYMETHYLCELLULOSE SODIUM 1 DROP: 10 GEL OPHTHALMIC at 08:45

## 2022-06-01 ASSESSMENT — PAIN SCALES - GENERAL
PAINLEVEL_OUTOF10: 0
PAINLEVEL_OUTOF10: 0

## 2022-06-01 NOTE — PROGRESS NOTES
Hospitalist Progress Note    Date of Admission: 5/10/2022    Chief Complaint: Rash    Hospital Course:     66 y.o. female who we are asked to see/evaluate by Juan Manuel Jeter MD for medical management. Patient was recently hospitalized to our service appointment today for acute CVA and subsequently discharged to the nursing medicine ARU on 5/10/2022 for rehabilitation. Recently her rehab course has been complicated by diffuse rash. This was first noted on the evening of Thursday, 5/26/2022. Rash consisted of diffuse erythema affecting the trunk back and extremities. The rash was diffusely itchy. There was initial concern that this may be a drug reaction related to simply start medication. Most recently started medications with tramadol and nystatin, tramadol seeming more likely was discontinued first, subsequently a statin was discontinued as well. Around the same time they became concerned for possible infection as she was noted to have a marked change in her white blood cell count. She was started on several antibiotics, however all of these were started after rash was noted. Of note she was treated with a course of antibiotics for aspiration pneumonia earlier in her rehab stay, however, most recent infectious work-up was unrevealing with normal urinalysis, negative chest x-ray, no other focal signs or infection. She has been treated with p.o. Benadryl for the last few days. On 5/30/2022 he had a brief syncopal episode while toileting. Patient was thought to be vasovagal induced. Hospital medicine was consulted for further input. She reported diffuse itching symptoms and tenderness. The rash was starting to exfoliate. She noted some soreness in the mouth although no sores or ulcerations were noted. She did have some mild conjunctivitis. She continued to have left-sided weakness as well as left-sided neglect consistent with her prior CVA.     Subjective: She is more alert and interactive today. The rash continues to evolve, now with larger areas of superficial desquamation, but without spread of rash to new areas. Still diffuse itching. BP has improved. Clinically she does look better today. Labs:   Recent Labs     05/30/22  1211 05/31/22  0530 06/01/22  0540   WBC 19.9* 19.4* 17.3*   HGB 11.5* 9.6* 8.9*   HCT 35.8* 28.5* 27.1*    211 189     Recent Labs     05/30/22  1211 05/31/22  0530 06/01/22  0540   * 137 142   K 4.9 4.2 4.4    105 110   CO2 20* 23 21   BUN 59* 47* 37*   CREATININE 1.8* 1.2 0.8   CALCIUM 9.2 8.5 8.4     Recent Labs     06/01/22  0540   AST 38*   ALT 93*   BILIDIR <0.2   BILITOT 0.3   ALKPHOS 321*     No results for input(s): INR in the last 72 hours. Physical Exam Performed:    BP (!) 113/56   Pulse 82   Temp (!) 96.4 °F (35.8 °C) (Oral)   Resp 16   Ht 5' 2.5\" (1.588 m)   Wt 150 lb 12.7 oz (68.4 kg)   SpO2 97%   BMI 27.14 kg/m²     General appearance: No apparent distress, appears stated age and cooperative. HEENT: Normal cephalic, atraumatic without obvious deformity. Pupils equal, round, and reactive to light. Extra ocular muscles intact. Mild conjunctival injection bilaterally. No obvious oral ulcerations are noted. Neck: Supple, with full range of motion. No jugular venous distention. Trachea midline. Respiratory:  Normal respiratory effort. Clear to auscultation, bilaterally without Rales/Wheezes/Rhonchi. Cardiovascular: Regular rate and rhythm with normal S1/S2 without murmurs, rubs or gallops. Abdomen: Soft, non-tender, non-distended with normal bowel sounds. Musculoskeletal: No clubbing, cyanosis or edema bilaterally. Skin: Diffuse erythematous rash over the trunk, back, extremities and face. The rash continues to evolve, now with larger areas of superficial desquamation, but without spread of rash to new areas. Neurologic: Stable left-sided weakness.   Cranial nerves: II-XII intact, grossly non-focal.  Psychiatric: Somnolent although oriented will follow insight. Capillary Refill: Brisk,3 seconds, normal   Peripheral Pulses: +2 palpable, equal bilaterally     Assessment/Plan:    Active Hospital Problems    Diagnosis     Acute CVA (cerebrovascular accident) ([facility] [address] [I63.9]      Priority: Medium     Drug Rash: Most consistent with a drug rash. Although the offending agent is somewhat unclear given the timeline, to most closely associated medications were tramadol and nystatin. Agree with avoiding these medications. There are no other obvious medication culprits of medications have been given for a week or longer prior to the onset of symptoms. Nilotinib will be held empirically given the evolving drug rash, although given long standing use, would consider this a less likely culprit. While she is currently receiving some antibiotics these have all been initiated after rash. Given that active infection was considered less likely at this time would favor avoidance of further antibiotics so as not to further confuse the situation if the drug rash continues to evolve. Likewise, given the low suspicion for infection blood advocate for steroid use which is possible allergic reaction. The rash appears to have stabilized with no new areas. She is now having larger areas of superficial desquamation, which may just be part of the evolution/resolution of this process.   -Continue Atarax for itching as needed.   -Continue IV Solu-Medrol, IV Pepcid IV, and Zyrtec  -Continue volume expansion although BP is improving. Leukocytosis: Given the strong correlation of the Lleukocytosis with the onset of the rash, suspect this is all related. Although she was previously treated for aspiration pneumonia during her rehab stay there is currently no objective evidence for respiratory infection. Likewise the UA/UCx and BCx results also argue against active infection.   Mild lactic acidosis was likely related to relative hypotension/hypovolemia setting of ongoing allergic reaction. As above, favor avoiding further antibiotics at this time. Recent Acute right hemispheric MCA stroke with left sided weakness and neglect  - MRI brain 5/6/2022 showed areas of acute infarct involving the right cerebral hemisphere predominantly in a somewhat watershed distribution.  - CTA neck 5/9/2022 showed significant stenosis within the right proximal cervical ICA with approximately 90-95% stenosis per NASCET criteria. It also showed high grade stenoses throughout bilateral cervical vertebral arteries.   - neurology and vascular were previously consulted and agreed risk of urgent CEA outweighed the benefit given high risk for reperfusion injury or hemorrhagic conversion. Plans are for her to follow-up with Vascular in 4 weeks for further management.   - Continue stroke management with plavix and aspirin. Statin remains on hold d/t LFT changes and rhabdomyolysis episode recently.      Recent EDYTA/Rhabdo episode  -Resolved  -Nephrology has been following intermittently in ARU  -Continue IVF  -Monitor    Hypertension  -As above, concern for hypovolemia related to Allergic Reaction, although improving BP  -Hold parameters placed for Atenolol  -Monitor.      DM2  - Recently diagnosed with hemoglobin a1c 6.6 on 5/2/2022  - Continue monitoring with medium SSI after starting IV steroids    Syncopal episodes  - Most recently 5/30/22.  - Consistent with vasovagal episodes although now with some concern for hypovolemia in setting of allergic reaction    Liver injury with elevated LFT's   - Occurred during recent admission; thought to be most likely due to statin induced rhabdo  - Statin remains on hold  - LFTs improved previously; will recheck   - Follow up with PCP after discharge to repeat CMP.       CML   - Stable  - Patient follows Dr. Sultana Madden home, held initially but resumed for past few weeks  - Nilotinib held again given evolving drug rash, although given long standing use, would consider less likely  - Follow up with heme/onc as scheduled.      Enlarged heterogeneous appearance of the thyroid gland  -Previously noted on CTA  - Thyroid ultrasound is recommended  - Consider following up with PCP after discharge.      Hemorrhoids  -  hydrocortisone cream    Casper Hussein MD

## 2022-06-01 NOTE — PROGRESS NOTES
Addison Stubbs  6/1/2022  [id number]    Chief Complaint: Acute CVA (cerebrovascular accident) [facility])    Subjective:   No acute events overnight. Today Sangeeta Kay is seen in her room with her  present. She reports feeling improved from yesterday. She denies acute complaints at this time. ROS: denies f/c, n/v, sob, cp    Objective:  Patient Vitals for the past 24 hrs:   BP Temp Temp src Pulse Resp SpO2   06/01/22 1351 (!) 129/54 -- -- 86 -- --   06/01/22 0900 (!) 113/56 (!) 96.4 °F (35.8 °C) Oral 82 16 97 %   06/01/22 0530 (!) 104/51 -- -- 61 -- --   06/01/22 0515 (!) 97/46 97.5 °F (36.4 °C) Oral 62 16 98 %   06/01/22 0100 (!) 112/53 -- -- 90 -- --   05/31/22 2300 (!) 90/45 -- -- 78 -- --   05/31/22 2200 (!) 97/45 97.1 °F (36.2 °C) Oral 82 16 94 %     Gen: No distress, pleasant. Supine in bed  HEENT: Normocephalic, atraumatic. CV: extremities well perfused  Resp: No respiratory distress. Abd: Soft, nondistended  Ext: No edema. Neuro: Alert, oriented, appropriately interactive. Left hemiparesis. Psych: mood and affect appropriate  Skin: diffuse erythematous rash over chest, back, arms and legs improving from prior, [place]     Wt Readings from Last 3 Encounters:   05/10/22 150 lb 12.7 oz (68.4 kg)   05/02/22 143 lb 9.6 oz (65.1 kg)   09/09/21 150 lb 12.8 oz (68.4 kg)       Laboratory data:   Lab Results   Component Value Date    WBC 17.3 (H) 06/01/2022    HGB 8.9 (L) 06/01/2022    HCT 27.1 (L) 06/01/2022    MCV 96.9 06/01/2022     06/01/2022       Lab Results   Component Value Date     06/01/2022    K 4.4 06/01/2022     06/01/2022    CO2 21 06/01/2022    BUN 37 06/01/2022    CREATININE 0.8 06/01/2022    GLUCOSE 197 06/01/2022    GLUCOSE 129 07/26/2017    CALCIUM 8.4 06/01/2022        Therapy progress:  PT  Position Activity Restriction  Other position/activity restrictions: chemo precautions, L inattention, LUE flaccid, resting hand splint to be worn at night.   Objective     Sit to Stand: 2 Person Assistance,Dependent/Total  Stand to sit: Dependent/Total,2 Person Assistance  Bed to Chair: Dependent/Total     OT  PT Equipment Recommendations  Equipment Needed: Yes  Other: CTA pending progress with mobility  Toilet - Technique:  (steady to e-tac chair over toilet)  Equipment Used:  (e-tac chair)  Toilet Transfers Comments: maxA x2 sit <>  steady, progressing at times to [address] with max verbal/tactile and demo cues  Assessment        SLP                Body mass index is 27.14 kg/m². Assessment and Plan:  Maricruz Sinclair is a 66year old female with a past medical history significant for recent CVA with left hemiparesis, CML, HTN, and HLD who presented to [facility] on 5/3/22 with abnormal labs, admitted with rhabdomyolysis and ARF, found to have acute CVA. She was admitted to [facility] on 5/10/22 due to functional deficits below her baseline.     Acute Right CVA  - MRI showing acute infarct in right cerebral hemisphere in a watershed distribution  - CTA showing 90-95% stenosis of right ICA  - Vascular and Neurology in agreement on waiting for CEA, needs follow up in 4 weeks  - asa, plavix, statin  - PT, OT, ST    Dysphagia  - upgraded to regular diet  - ST    LOC 5/30  - suspect vasovagal as patient was on toilet. Vitals normal, although patient does have severe stenosis of right ICA so low normal blood pressure for her may be too low. Patient also appearing dehydrated on labs with new EDYTA. Also with worsening leukocytosis.    - discontinued lisinopril to keep patient blood pressure higher, also discontinued in setting of EDYTA  - s/p IVF  - no further epsiodes    Leukocytosis  - Medicine consulted, appreciate assistance  - suspected to be due to allergic reaction  - abx discontinued  - continue to monitor    Drug Rash  - suspected due to tramadol, but has not improved over the weekend since being off of tramadol  - hold nystatin, this is the only other drug that correlates with timing of rash  - management per Medicine, appreciate assistance     Rhabdomyolysis  Acute Renal Injury  - Nephrology followed during acute stay  - CPK trending down  - monitor      History of HTN  - now with hypotension  - BP meds discontinued  - medicine following    DM2  - Hba1c 6.6  - SSI resumed while on steroids, per medicine  - follow up with PCP     Liver Injury with elevated LFTs  - suspected to be due to statin induced rhabdo  - statin stopped     CML  - follows with Dr. Kaci Ayala  - consider unholding nilitinib  - chemo precautions  - follow up with Heme/Onc OP     Enlarged heterogeneous appearance of the thyroid  - Thyroid ultrasound outpatient  - follow up with PCP     Hemorrhoids  - hydrocortisone cream   - bowel regimen to avoid constipation    Chest Pain, resolved  - EKG and troponin unremarkable  - continue to monitor    Abd pain, resolved  - XR abdomen with moderate stool burden  - continue bowel regimen    RLL Pneumonia, resolved  - concern for aspiration in setting of dysphagia  - completed course of levaquin   - IS      Bowels: Schedule stool softener. Follow bowel movements. Enema or suppository if needed.      Bladder: Check PVR x 3. [facility] if PVR > 200ml or if any volume is > 500 ml.      Sleep: Trazodone provided prn. PPx  DVT: heparin  GI: not indicated     Follow up appointments: Vascular, PCP    Services: SNF  EDOD: 6/7/22    Interdisciplinary team conference was held today with entire rehab treatment team including PT, OT, SLP, Dietician, RN, and SW. Discussion focused on progress toward rehab goals and discharge planning. Barriers: severity of L neglect, fatigue and reduced endurance / lethargy, inconsistent with progress, requiring increased cueing due to difficulty in recent sessions. Total treatment time >35 min with greater than 50% spent in care coordination. [facility] Valdez MD 6/1/2022, 2:59 PM

## 2022-06-01 NOTE — PROGRESS NOTES
Occupational Therapy  Facility/Department: [facility]  Rehabilitation Occupational Therapy Daily Treatment Note    Date: 22  Patient Name: Hayden Walter       Room: [facility]/0010-  MRN: [id number]  Account: [de-identified]   : 1950  (76 y.o.) Gender: female                    Past Medical History:  has a past medical history of Anemia, Arthritis, Asthma, Bowel dysfunction, CML (chronic myelocytic leukemia) ([facility] [address] Hyperlipidemia, Hypertension, Nuclear senile cataract of both eyes, Obesity, Risk of myocardial infarction or stroke 7.5% or greater in next 10 years, Skin cancer of face, Stroke (cerebrum) ([facility] [address] Thyroid disease, and TIA (transient ischemic attack). Past Surgical History:   has a past surgical history that includes Breast biopsy; fine needle aspiration; Breast lumpectomy; Tonsillectomy (Bilateral); Paracentesis; Anus surgery (); Elbow surgery (); and Colonoscopy. Restrictions  Restrictions/Precautions: Fall Risk;Up as Tolerated;Isolation  Other position/activity restrictions: chemo precautions, L inattention, LUE flaccid, resting hand splint to be worn at night. Subjective  Subjective: Pt in bed agreeable to OT/PT.  present for session. Pt denies pain, just \"really itchy\"  Restrictions/Precautions: Fall Risk;Up as Tolerated;Isolation             Objective  Vision - Basic Assessment  Patient Visual Report: Balance difficulty  Cognition  Overall Cognitive Status: Exceptions  Arousal/Alertness: Appropriate responses to stimuli;Delayed responses to stimuli  Following Commands: Follows one step commands with repetition; Follows multistep commands with repitition  Attention Span: Attends with cues to redirect  Memory: Decreased recall of precautions  Safety Judgement: Decreased awareness of need for safety;Decreased awareness of need for assistance  Problem Solving: Assistance required to correct errors made;Assistance required to identify errors made  Insights: Decreased awareness of deficits  Initiation: Requires cues for some  Sequencing: Requires cues for some  Cognition Comment: L inattention  Orientation  Overall Orientation Status: Within Functional Limits  Orientation Level: Oriented X4   Perception  Overall Perceptual Status: Impaired  Unilateral Attention: Cues to attend left visual field;Cues to attend to left side of body;Cues to maintain midline in sitting;Cues to maintain midline in standing  Initiation: Cues to initiate tasks  Motor Planning: Hand over hand to sequence tasks  Perseveration: Perseverates during conversation     ADL  Feeding  Assistance Level: Stand by assist  Skilled Clinical Factors: assistance to cut food, open packages and bring cup to hand  Grooming/Oral Hygiene  Assistance Level: Increased time to complete;Minimal assistance  Skilled Clinical Factors: washing face, brushing teeth, and brushing hair seated EOB  Upper Extremity Dressing  Assistance Level: Maximum assistance  Skilled Clinical Factors: Moderate cues for papo techniques. Pt completes UB dressing in supported sitting in WC. Pt requiring partial assistance to thread BUE and doff/don clothing over abdomen. Pt able to manage clothing over head with cues and extra time  Lower Extremity Dressing  Assistance Level: Dependent;Verbal cues  Skilled Clinical Factors: x2 assist sit<>stand to manage pants up/down; in STEDY  Putting On/Taking Off Footwear  Assistance Level: Maximum assistance  Skilled Clinical Factors: donning socks  Toileting  Assistance Level: Dependent  Skilled Clinical Factors: Assistance x2; Standing in [address]: Beside commode  Additional Factors: Verbal cues;Cues for hand placement; Increased time to complete; With handrails  Skilled Clinical Factors: maxA x1-2  sit <> Stand from [facility] to [facility] to [facility] with steady          Functional Mobility  Device: Wheelchair  Activity: To/From therapy gym;Retrieve items;Transport items; To/From bathroom  Assistance Level: Maximum assistance  Skilled Clinical Factors: maxA X2 sit <> stand with OT/PT support fpr stand pivot from EOB<>w/c, mod cues for upright posture  Bed Mobility  Overall Assistance Level: Maximum Assistance  Additional Factors: Head of bed raised; Increased time to complete;Set-up; Verbal cues  Bridging  Assistance Level: Maximum assistance  Roll Left  Assistance Level: Maximum assistance  Roll Right  Assistance Level: Moderate assistance  Sit to Supine  Assistance Level: Maximum assistance  Supine to Sit  Assistance Level: Maximum assistance  Scooting  Assistance Level: Moderate assistance  Transfers  Surface: From bed; Wheelchair; To mat  Additional Factors: Increased time to complete;Hand placement cues; Verbal cues  Device:  (sit pivot EOB to WC, sit <>  steady EOB to BSC to WC, sit <>  // bars)  Sit to Stand  Assistance Level: Requires x 2 assistance;Maximum assistance  Skilled Clinical Factors: modA x1 Mita x1 sit <>  steady from EOB to WC, WC <> BSC, sit <> stand with PT cotx standing to grab bar vs rail on R side and use of steady for support, max cues and maxA x2 for trunk/pelvic support and midline alignment, poor sequencing and attn throughout  Stand to Sit  Assistance Level: Moderate assistance; Requires x 2 assistance  Skilled Clinical Factors: modA x1 Mita x1 sit <>  steady from EOB to WC, WC <> BSC, sit <> stand with PT cotx standing to grab bar vs rail on R side and use of steady for support, max cues and maxA x2 for trunk/pelvic support and midline alignment, poor sequencing and attn throughout  Bed To/From Chair  Technique: Sit pivot  Assistance Level: Maximum assistance; Requires x 2 assistance  Skilled Clinical Factors: modA x1 Mita x1 sit <>  steady from EOB to WC, WC <> BSC, sit <> stand with PT cotx standing to grab bar vs rail on R side and use of steady for support, max cues and maxA x2 for trunk/pelvic support and midline alignment, poor sequencing and attn throughout  Stand Pivot  Assistance Level: Moderate assistance; Requires x 2 assistance  Skilled Clinical Factors: modA x1 Mita x1 sit <>  steady from EOB to WC, WC <> BSC, sit <> stand with PT cotx standing to grab bar vs rail on R side and use of steady for support, max cues and maxA x2 for trunk/pelvic support and midline alignment, poor sequencing and attn throughout  Sit Pivot  Assistance Level: Maximum assistance; Requires x 2 assistance  Skilled Clinical Factors: modA x1 Mita x1 sit <>  steady from EOB to WC, WC <> BSC, sit <> stand with PT cotx standing to grab bar vs rail on R side and use of steady for support, max cues and maxA x2 for trunk/pelvic support and midline alignment, poor sequencing and attn throughout   Neuromuscular Education  Neuromuscular education: Yes  NDT Treatment: Upper extremity  Head/Neck Control: Pt continues to demo decreased ability to fully turn head/scan L vs R during transfers and ADL tasks. Pt needing mod cues for scanning and asisstance for upright posture in stance with limited ability to sustain upright posture  Trunk Control: Pt continues to demo L sided neglect; Pt tolerated supported sitting EOB for ~10 mins in order to increase functional core strength for ADLs. Pt does need consistent tactile cues with limited ability to maintain posture  Weight Bearing  Weight Bearing Technique: Yes  RUE Weight Bearing: Extended arm standing; Extended arm seated  LUE Weight Bearing: Extended arm seated; Extended arm standing  Response To Weight Bearing Technique: WBing through LUE AAROM and hand over hand during sit <> stands with RUE support on steady and to grab bar vs rail R side, WBing through LUE wiht elbow extension and wrist support from OT while PT providing WBing/support of LLE during tasks. WBing through LUE forearm vs elbow extension. Mod cues for upright posture in midline wiht mirror for visual feedback.      Assessment  Assessment  Assessment: Patient continues to be lmited by attn, sequencing, L sied neglect, poor upright posture, and endurance needing x2A for all bedm obiliy, sit <> stand tranfsers, and sitting balance trials EOB vs WC vs BSC. Pt needing consistent cues for visual scanning L vs R sitting vs standing in // bars. Poor midline alignment but progressing at tiems with max cues and assistance at trunk and facilitation of LLE. Cont to rec SNF post DC. Cont OT POC. Activity Tolerance: Patient limited by fatigue;Patient limited by pain  Discharge Recommendations: Subacute/Skilled Nursing Facility  OT Equipment Recommendations  Equipment Needed: Yes  Other: defer to next d/c facility  Safety Devices  Safety Devices in place: Yes  Type of devices: Gait belt; All fall risk precautions in place; Patient at risk for falls; Left in chair;Call light within reach; Chair alarm in place;Nurse notified  Restraints  Initially in place: No    Patient Education  Education  Education Given To: Patient  Education Provided: Role of Therapy;Plan of Care;Safety;Equipment;ADL Function; Fall Prevention Strategies;Transfer Training  Education Provided Comments: bed mobility, sitting balance, safety and hand placement with transfers, hemistrategies for UB bathing/dressing, skin checks for resting hand splint, AAROM LUE exercises, weightshifting during ambulation/standing, standing balance  Barriers to Learning: Cognition  Education Outcome: Verbalized understanding;Demonstrated understanding;Continued education needed    Plan  Plan  Times per Week: 5 out of 7 days  Times per Day: Daily  Plan Weeks: 3 weeks  Current Treatment Recommendations: Strengthening;ROM;Balance training;Functional mobility training; Endurance training; Safety education & training;Neuromuscular re-education;Patient/Caregiver education & training;Equipment evaluation, education, & procurement;Self-Care / ADL;Cognitive/Perceptual training; Wheelchair mobility training;Positioning    Goals  Patient Goals Patient goals :  \"Go home\" \"Get stronger\" \"walk better\"  Short Term Goals  Time Frame for Short term goals: 10 days (5/20/22)-EXTEND TO 5/25 2/2 PROGRESS  Short Term Goal 1: Pt will complete functional transfers with Mita x2 with LRAD- progressing, maxA x2 sit pivot vs stand pivot transfer-EXTEND TO 5/25-GOAL MET 5/25, CTA  Short Term Goal 2: Pt will complete toileting/transfer modA x2 with LRAD and DME PRN-GOAL MET 5/16, modA x2 steady, CTA  Short Term Goal 3: Pt will complete LUE AAROM/AROM exercises to LUE to increase strength/endurance for ADLs/transfers-GOAL MET 5/18, CTA  Short Term Goal 4: Pt will be able to locate 3/5 object in L visual field with min cues during ADLs/activities-GOAL MET 5/18,CTA  Additional Goals?: No  Long Term Goals  Time Frame for Long term goals : 21 days (3/31/22)-EXTEND TO DC DATE, 6/3  Long Term Goal 1: Pt will complete functional transfers/mobility SPV with LRAD-DOWGRADE DUE TO PROGRESS: maxA x1  Long Term Goal 2: Pt will complete UE dressing/bathing sitting setup with min cues for papo techniques-DOWNGRADE DUE TO PROGRESS: Mita  Long Term Goal 3: Pt will complete toileting/transfers with LRAD and DME PRN SPV-DOWGRADE DUE TO PROGRESS: maxA x1  Long Term Goal 4: Pt will complete opening 3/5 containers with compensatory techniques sitting setup/SPV      Therapy Time   Individual Concurrent Group Co-treatment   Time In       1230   Time Out       1330   Minutes       60   Timed Code Treatment Minutes: [address], OTR/L

## 2022-06-01 NOTE — PROGRESS NOTES
Speech Language Pathology  [facility]: ACUTE REHAB UNIT  SPEECH-LANGUAGE PATHOLOGY      [x] Daily  [] Weekly Care Conference Note  [] Discharge    Patient:Jennifer Pereira      :1950  EST:[id number]  Rehab Dx/Hx: Acute CVA (cerebrovascular accident) ([facility] [address] [I63.9]    Precautions: falls and aspirations; severe left neglect  Home situation: Lives with . Indep with med management. Share finances, cooking, laundtry, grocery shopping. Has not driven since CVA in March. ST Dx: [] Aphasia  [x] Dysarthria  [] Apraxia   [x] Oropharyngeal dysphagia [x] Cognitive Impairment  [] Other:   Date of Admit: 5/10/2022  Room #: 2894/7634-65    Current functional status (updated daily):         Pt being seen for : [x] Speech/Language Treatment  [x] Dysphagia Treatment [x] Cognitive Treatment  [] Other:  Communication: []WFL  [] Aphasia  [x] Dysarthria  [] Apraxia  [] Pragmatic Impairment [] Non-verbal  [] Hearing Loss  [] Other:   Cognition: [] WFL  [x] Mild  [] Moderate  [] Severe [] Unable to Assess  [] Other:  Memory: [] WFL  [x] Mild  [] Moderate  [] Severe [] Unable to Assess  [] Other:  Behavior: [x] Alert  [x] Cooperative  [x]  Pleasant  [] Confused  [] Agitated  [] Uncooperative  [] Distractible [] Motivated  [] Self-Limiting [] Anxious  [] Other:  Endurance:  [x] Adequate for participation in SLP sessions  [] Reduced overall  [] Lethargic  [] Other:  Safety: [] No concerns at this time  [x] Reduced insight into deficits  [x]  Reduced safety awareness [] Not following call light procedures  [] Unable to Assess  [] Other:    Current Diet Order:ADULT DIET;  Regular; 3 carb choices (45 gm/meal)  Swallowing Precautions: upright for all intake, stay upright for at least 30 mins after intake, small bites/sips, assist feed, oral care 2-3x/day to reduce adverse affects in the event of aspiration, alternate bites/sips, slow rate of intake         Date: 2022      Tx session 1  0900 - 1000 Tx session 2  All tx needs met in session 1     Total Timed Code Min 60    Total Treatment Minutes 60    Individual Treatment Minutes 60    Group Treatment Minutes 0    Co-Treat Minutes 0    Variance/Reason:  N/a    Pain No pain, just \"itching\"    Pain Intervention [] RN notified   [] Repositioned  [] Intervention offered and patient declined  [x] N/A  [] Other: [] RN notified  [] Repositioned  [] Intervention offered and patient declined  [] N/A  [] Other:   Subjective     Pt appeared lethargic throughout session, but more alert than yesterday's session. Pt upright in bed; agreeable to tx. Pt's  present at bedside. Objective:     Dysphagia Goals  Short-term Goals  Timeframe for Short-term Goals: 18 days (05/28/2022)     Goal met 05/24/22. Goal met 05/24/22. Goal discontinued 05/17/22. Goal 4: The pt will complete oral motor exercises to improve labial and lingual strength, ROM, and coordination in 10/10 opportunities given min cues    Goal not directly targeted. Cognitive-linguistic Goals:   Short-term Goals  Timeframe for Short-term Goals: 18 days (05/28/2022)    Goal 1: Pt will effectively use compensatory visual strategies for improved attention to left side during structured tasks with 80% acc given mod cues.    Pt given a sentence; instructed to find and cross out the Fs  -pt completed with 30% acc despite max cues  -pt appeared to have difficulty focusing on page / using pen on paper     SLP attempted to have pt read a recipe:  -left side of paper marked; pt able to find following instruction  -pt with significant difficulty reading recipe - not attending all the way to left despite MAX cues; reading words aloud incorrectly because missing first few letters of the word on the left      Goal 2: Pt will complete graded recall tasks using compensatory strategies with 90% acc given min cues   Word List Retention: Category Inclusion with 4 items   -pt given 4 items, then asked a question re: the items  -e.g. which ones are sports?  -pt completed with 72% acc given min cues, improved to 84% acc given mod cues       Goal 3: Pt will complete executive function tasks (e.g. meds, time, money, etc) with 90% acc given min cues   Math Word Problems  -e.g. if you bake cookies once every 3 weeks, how many times will you have baked cookies in 9 weeks?  -pt completed task with 67% acc given mod-max uces       Goal 4: Pt will complete problem solving and thought organization tasks with 90% acc given min cues   Functional Problem Solving Questions  -e.g. who do you call in an emergency?  -pt answered with 50% acc indep, improved to 70% acc given min cues    Divergent Naming Task  -pt given concrete categories (e.g. fruits, states); asked to name 10 items that belong to each group  -pt completed with 80% acc indep, improved to 97% acc given min cues       Goal 5: Pt will complete verbal and visual reasoning tasks with 90% acc given min cues   Word Deduction Task   -pt given 3 clues that describe an item; asked to name the item being described  -e.g. hot, melt, wax = candle  -improvement from yesterday;s session - 92% acc indep, improved to 100% acc given min cues       Other areas targeted: N/a    Education:   SLP edu pt re: Recall strategies, attention to L strategies, thought org    Safety Devices: [x] Call light within reach  [] Chair alarm activated  [x] Bed alarm activated  [x] Other:  at bedside [] Call light within reach  [] Chair alarm activated  [] Bed alarm activated  [] Other:    Assessment:` Pt more alert today than yesterday's session, but overall still lethargic. Pt continues to have severe left sided neglect, and significant difficulty attending to the left despite max cues. Pt appeared to be having difficulty even focusing on a paper this date, and was not accurate with use of pen on paper. Pt reading words aloud incorrectly because of missing first few letters on the left.  Pt required mod cues to complete a word list retention task. Pt did well with problem solving, thought org, and reasoning abilities this date when given min cues. Pt with more difficulty with math word problems; required mod-max cues. Pt continues to have more difficulty in recent sessions due to fatigue and not feeling well. Continue goals above. Plan: Continue as per plan of care. Additional Information:     Barriers toward progress: Limited safety awareness, Limited insight into deficits, Decreased proprioception, Upper extremity weakness and Lower extremity weakness  Discharge recommendations:  [] Home independently  [] Home with assistance [x]  24 hour supervision  [] ECF [] Other:  Continued Tx Upon Discharge: ? [x] Yes [] No [] TBD based on progress while on ARU [] Vital Stim indicated [] Other:   Estimated discharge date: 06/07/2022    Interventions used this date:  [] Speech/Language Treatment  [] Instruction in HEP [] Group [] Dysphagia Treatment [x] Cognitive Treatment   [] Other:       Total Time Breakdown / Charges    Time in Time out Total Time / units   Cognitive Tx 0900 1000 60 min / 4 units   Speech Tx -- -- --   Dysphagia Tx -- -- --       Electronically Signed by     Shahab Frausto MA CCC-SLP #[id number]  Speech Language Pathologist

## 2022-06-01 NOTE — PROGRESS NOTES
Lactic resulted 1.4, blood pressure improved post bolus to 112/53, MAP 72.67. Patient denies any symptoms of hypotension. Continuous  mL/hr still running.

## 2022-06-01 NOTE — PLAN OF CARE
Problem: Safety - Adult  Goal: Free from fall injury  Outcome: Progressing   Patient remains free from fall injury this shift. Call light within reach. Bed/chair alarms in place and on. Will continue to monitor.

## 2022-06-01 NOTE — PROGRESS NOTES
Physical Therapy  Facility/Department: [facility]  Rehabilitation Physical Therapy Treatment Note    NAME: Daly Sifuentes  : 1950 (66 y.o.)  MRN: [id number]  CODE STATUS: Full Code    Date of Service: 22       Restrictions:  Restrictions/Precautions: Fall Risk;Up as Tolerated;Isolation  Position Activity Restriction  Other position/activity restrictions: chemo precautions, L inattention, LUE flaccid, resting hand splint to be worn at night. SUBJECTIVE  Subjective  Subjective: found in bed, incontinent of urine  Pain: reports \"pain and itchiness\" from rash       OBJECTIVE  Cognition  Overall Cognitive Status: Exceptions  Arousal/Alertness: Appropriate responses to stimuli;Delayed responses to stimuli  Following Commands: Follows one step commands with repetition; Follows multistep commands with repitition  Attention Span: Attends with cues to redirect  Memory: Decreased recall of precautions  Safety Judgement: Decreased awareness of need for safety;Decreased awareness of need for assistance  Problem Solving: Assistance required to correct errors made;Assistance required to identify errors made  Insights: Decreased awareness of deficits  Initiation: Requires cues for some  Sequencing: Requires cues for some  Cognition Comment: L inattention  Orientation  Overall Orientation Status: Within Functional Limits  Orientation Level: Oriented X4    Functional Mobility     Supine to sit with HOB elevated and mod A of 2    Pt sat EOB x 8-10 min while donning new shirt with max A froM OT to don shirt and max A from PT for dyn sitting balance. Poor sequencing of task noted with multiple posterior LOB    Sit to stand EOB to HHA with max A of 2 however pt found to be incontinent before donning new pants    TD via STEDY to commode    Max a of 1 for static balance in commode while OT assisted with percare and clothing management following commode use.      wc mobility x 150 ft with assist ranging from SBA-min a for safety with turns and obstacle negotiation. Sit to stand w/c to // bars with min-mod A of 2 and cues for sequencing. Pt standing statically with as little as min A of 2 with max VC (mirror provided for visual feedback) for midline posture. Pt demos L trunk rotation, increased hip flexion unless cues , standing for 1.5 min prior to fatigue. Gait training: pt ambulated 3 ft x 2 reps with // bars and close w/c follow thus TD. Requires max a of PT to facilitate LLE stability and mobility and max a from OT for weight shifting / sequencing. Pt limited by RLE fatigue and decreased endurance, difficulty maintaining upright extended posture throughout. Seated rest between. Pt in w/c at end of session per request with call light/needs within reach. ASSESSMENT/PROGRESS TOWARDS GOALS  Vital Signs  Heart Rate: 89  Heart Rate Source: Monitor  BP: (!) 129/58  BP Location: Left upper arm  MAP (Calculated): 81.67  SpO2: 98 %  O2 Device: None (Room air)    Assessment  Assessment: pt seen as co treat wiht OT for increased safety progressing functional mobility. improved midline when sitting on STEDY and commode this am, up to max A for dyn sitting EOB. TD for gait training x 3 ft, limited by RLE fatigue and poor sequenicng/ proprioception. continue to rec SNF Upon dc.   Activity Tolerance: Patient limited by fatigue;Patient limited by pain  Discharge Recommendations: Subacute/Skilled Nursing Facility    Goals  Patient Goals   Patient goals : [employer] on my mission trip in [place]"  Short Term Goals  Time Frame for Short term goals: 11 days 5/20  Short term goal 1: pt will complete bed mobility with mod A; not met 5/20 - pt requires mod A x2 for supine>sit  Short term goal 2: pt will complete functional transfer with max A; goal partially met 5/20 - pt completes transfers with mod A x2 with lorrie stedy vs. // bars  Short term goal 3: pt will tolerate gait assessment and set goal when appropriate; goal met 5/20 - pt ambulates 6

## 2022-06-01 NOTE — PROGRESS NOTES
Comprehensive Nutrition Assessment    Type and Reason for Visit:  Reassess    Nutrition Recommendations/Plan:   1. Continue carb control diet - per pt request  2. Add Ensure HP daily - prefers vanilla   3. Encourage PO intakes   4. Assist with meals as needed   5. Monitor nutrition adequacy, pertinent labs, bowel habits, wt changes, and clinical progress     Malnutrition Assessment:  Malnutrition Status: At risk for malnutrition (Comment) (06/01/22 5668)    Context:  Acute Illness     Findings of the 6 clinical characteristics of malnutrition:  Energy Intake:  75% or less of estimated energy requirements for 7 or more days  Weight Loss:  Unable to assess       Nutrition Assessment:    Follow up: Pt nutritionally declining AEB decreased PO intakes. Pt now has a new rash possibly r/t medication. Pt with decreased alertness and fatigue, decreasing ability for pt to eat and increased pocketing per SLP. Now requiring assistance with meals. Pt reports appetite fair today. Encouraged PO intakes, pt able to eat ~50% of lunch today. Family at bedside, encouraging pt to eat. Pt agreeable to trial ONS today, RD to add lower carb ONS per pt request. Will continue to monitor. Nutrition Related Findings:    BM x1 on 5/31. Active BS. Trace generalized edema. Labs reviewed. BG stable, +steroids. Wound Type: Moisture Associate Skin Damage       Current Nutrition Intake & Therapies:    Average Meal Intake: 1-25%,26-50%,51-75%,%  Average Supplements Intake: None Ordered  ADULT DIET; Regular; 3 carb choices (45 gm/meal)    Anthropometric Measures:  Height: 5' 2.5\" (158.8 cm)  Ideal Body Weight (IBW): 113 lbs (51 kg)       Current Body Weight: 150 lb 12.7 oz (68.4 kg), 132.7 % IBW. Weight Source: Not Specified  Current BMI (kg/m2): 27.1                          BMI Categories: Overweight (BMI 25.0-29. 9)    Estimated Daily Nutrient Needs:  Energy Requirements Based On: Kcal/kg  Weight Used for Energy Requirements: Usual  Energy (kcal/day): 6142-6752 kcal  Weight Used for Protein Requirements: Ideal (1.0-1.2 g/kg)  Protein (g/day): 65-78 g  Method Used for Fluid Requirements: 1 ml/kcal  Fluid (ml/day): 9105-3635 mL    Nutrition Diagnosis:   · Inadequate oral intake related to inadequate protein-energy intake as evidenced by intake 0-25%,intake 26-50%,intake 51-75%      Nutrition Interventions:   Food and/or Nutrient Delivery: Continue Current Diet,Start Oral Nutrition Supplement  Nutrition Education/Counseling: Education declined  Coordination of Nutrition Care: Continue to monitor while inpatient,Interdisciplinary Rounds  Plan of Care discussed with: Patient and family    Goals:  Previous Goal Met: No Progress toward Goal(s)  Goals: PO intake 50% or greater,prior to discharge       Nutrition Monitoring and Evaluation:   Behavioral-Environmental Outcomes: None Identified  Food/Nutrient Intake Outcomes: Food and Nutrient Intake,Supplement Intake,Diet Advancement/Tolerance  Physical Signs/Symptoms Outcomes: Biochemical Data,Chewing or Swallowing,Nutrition Focused Physical Findings,Weight    Discharge Planning:    Continue current diet,Continue Oral Nutrition Supplement     Allison Gutierrez MS, RD, LD  Contact: [phone]

## 2022-06-02 LAB
ANION GAP SERPL CALCULATED.3IONS-SCNC: 11 MMOL/L (ref 3–16)
BUN BLDV-MCNC: 36 MG/DL (ref 7–20)
CALCIUM SERPL-MCNC: 8.4 MG/DL (ref 8.3–10.6)
CHLORIDE BLD-SCNC: 113 MMOL/L (ref 99–110)
CO2: 20 MMOL/L (ref 21–32)
CREAT SERPL-MCNC: 0.9 MG/DL (ref 0.6–1.2)
GFR AFRICAN AMERICAN: >60
GFR NON-AFRICAN AMERICAN: >60
GLUCOSE BLD-MCNC: 136 MG/DL (ref 70–99)
GLUCOSE BLD-MCNC: 138 MG/DL (ref 70–99)
GLUCOSE BLD-MCNC: 153 MG/DL (ref 70–99)
GLUCOSE BLD-MCNC: 165 MG/DL (ref 70–99)
GLUCOSE BLD-MCNC: 167 MG/DL (ref 70–99)
HCT VFR BLD CALC: 26.2 % (ref 36–48)
HEMOGLOBIN: 8.5 G/DL (ref 12–16)
MCH RBC QN AUTO: 32 PG (ref 26–34)
MCHC RBC AUTO-ENTMCNC: 32.6 G/DL (ref 31–36)
MCV RBC AUTO: 98.2 FL (ref 80–100)
PDW BLD-RTO: 14.9 % (ref 12.4–15.4)
PERFORMED ON: ABNORMAL
PLATELET # BLD: 199 K/UL (ref 135–450)
PMV BLD AUTO: 8.5 FL (ref 5–10.5)
POTASSIUM REFLEX MAGNESIUM: 3.7 MMOL/L (ref 3.5–5.1)
RBC # BLD: 2.67 M/UL (ref 4–5.2)
SODIUM BLD-SCNC: 144 MMOL/L (ref 136–145)
WBC # BLD: 19.1 K/UL (ref 4–11)

## 2022-06-02 PROCEDURE — 97110 THERAPEUTIC EXERCISES: CPT

## 2022-06-02 PROCEDURE — 6360000002 HC RX W HCPCS: Performed by: INTERNAL MEDICINE

## 2022-06-02 PROCEDURE — 1280000000 HC REHAB R&B

## 2022-06-02 PROCEDURE — 2500000003 HC RX 250 WO HCPCS: Performed by: STUDENT IN AN ORGANIZED HEALTH CARE EDUCATION/TRAINING PROGRAM

## 2022-06-02 PROCEDURE — 97130 THER IVNTJ EA ADDL 15 MIN: CPT

## 2022-06-02 PROCEDURE — 80048 BASIC METABOLIC PNL TOTAL CA: CPT

## 2022-06-02 PROCEDURE — 6370000000 HC RX 637 (ALT 250 FOR IP): Performed by: INTERNAL MEDICINE

## 2022-06-02 PROCEDURE — 97530 THERAPEUTIC ACTIVITIES: CPT

## 2022-06-02 PROCEDURE — 6360000002 HC RX W HCPCS: Performed by: STUDENT IN AN ORGANIZED HEALTH CARE EDUCATION/TRAINING PROGRAM

## 2022-06-02 PROCEDURE — 97535 SELF CARE MNGMENT TRAINING: CPT

## 2022-06-02 PROCEDURE — 85027 COMPLETE CBC AUTOMATED: CPT

## 2022-06-02 PROCEDURE — 2500000003 HC RX 250 WO HCPCS: Performed by: INTERNAL MEDICINE

## 2022-06-02 PROCEDURE — 6370000000 HC RX 637 (ALT 250 FOR IP): Performed by: STUDENT IN AN ORGANIZED HEALTH CARE EDUCATION/TRAINING PROGRAM

## 2022-06-02 PROCEDURE — 2580000003 HC RX 258: Performed by: STUDENT IN AN ORGANIZED HEALTH CARE EDUCATION/TRAINING PROGRAM

## 2022-06-02 PROCEDURE — 97112 NEUROMUSCULAR REEDUCATION: CPT

## 2022-06-02 PROCEDURE — 97129 THER IVNTJ 1ST 15 MIN: CPT

## 2022-06-02 RX ORDER — FAMOTIDINE 10 MG/ML
20 INJECTION, SOLUTION INTRAVENOUS 2 TIMES DAILY
Status: DISCONTINUED | OUTPATIENT
Start: 2022-06-02 | End: 2022-06-07 | Stop reason: HOSPADM

## 2022-06-02 RX ADMIN — LEVOTHYROXINE SODIUM 50 MCG: 0.05 TABLET ORAL at 05:47

## 2022-06-02 RX ADMIN — LACTULOSE 20 G: 20 SOLUTION ORAL at 22:31

## 2022-06-02 RX ADMIN — SODIUM CHLORIDE: 9 INJECTION, SOLUTION INTRAVENOUS at 00:06

## 2022-06-02 RX ADMIN — FAMOTIDINE 20 MG: 10 INJECTION, SOLUTION INTRAVENOUS at 09:11

## 2022-06-02 RX ADMIN — DICLOFENAC SODIUM 4 G: 10 GEL TOPICAL at 13:08

## 2022-06-02 RX ADMIN — INSULIN LISPRO 2 UNITS: 100 INJECTION, SOLUTION INTRAVENOUS; SUBCUTANEOUS at 16:32

## 2022-06-02 RX ADMIN — NYSTATIN 5 ML: 100000 SUSPENSION ORAL at 17:56

## 2022-06-02 RX ADMIN — DOCUSATE SODIUM 50 MG AND SENNOSIDES 8.6 MG 2 TABLET: 8.6; 5 TABLET, FILM COATED ORAL at 09:09

## 2022-06-02 RX ADMIN — SODIUM CHLORIDE, PRESERVATIVE FREE 10 ML: 5 INJECTION INTRAVENOUS at 09:11

## 2022-06-02 RX ADMIN — CARBOXYMETHYLCELLULOSE SODIUM 1 DROP: 10 GEL OPHTHALMIC at 09:10

## 2022-06-02 RX ADMIN — NYSTATIN 5 ML: 100000 SUSPENSION ORAL at 22:31

## 2022-06-02 RX ADMIN — DICLOFENAC SODIUM 4 G: 10 GEL TOPICAL at 09:12

## 2022-06-02 RX ADMIN — CLOPIDOGREL BISULFATE 75 MG: 75 TABLET, FILM COATED ORAL at 09:10

## 2022-06-02 RX ADMIN — DICLOFENAC SODIUM 4 G: 10 GEL TOPICAL at 22:30

## 2022-06-02 RX ADMIN — HEPARIN SODIUM 5000 UNITS: 5000 INJECTION INTRAVENOUS; SUBCUTANEOUS at 13:09

## 2022-06-02 RX ADMIN — HEPARIN SODIUM 5000 UNITS: 5000 INJECTION INTRAVENOUS; SUBCUTANEOUS at 05:47

## 2022-06-02 RX ADMIN — HEPARIN SODIUM 5000 UNITS: 5000 INJECTION INTRAVENOUS; SUBCUTANEOUS at 22:32

## 2022-06-02 RX ADMIN — METHYLPREDNISOLONE SODIUM SUCCINATE 40 MG: 40 INJECTION, POWDER, FOR SOLUTION INTRAMUSCULAR; INTRAVENOUS at 09:09

## 2022-06-02 RX ADMIN — FAMOTIDINE 20 MG: 10 INJECTION INTRAVENOUS at 22:31

## 2022-06-02 RX ADMIN — CARBOXYMETHYLCELLULOSE SODIUM 1 DROP: 10 GEL OPHTHALMIC at 22:30

## 2022-06-02 RX ADMIN — CETIRIZINE HYDROCHLORIDE 10 MG: 10 TABLET, FILM COATED ORAL at 09:09

## 2022-06-02 RX ADMIN — HYDROCORTISONE: 1 CREAM TOPICAL at 09:13

## 2022-06-02 RX ADMIN — LACTULOSE 20 G: 20 SOLUTION ORAL at 09:10

## 2022-06-02 RX ADMIN — WITCH HAZEL 40 EACH: 500 SOLUTION RECTAL; TOPICAL at 05:49

## 2022-06-02 RX ADMIN — INSULIN LISPRO 2 UNITS: 100 INJECTION, SOLUTION INTRAVENOUS; SUBCUTANEOUS at 06:19

## 2022-06-02 RX ADMIN — Medication 400 MG: at 09:10

## 2022-06-02 RX ADMIN — ASPIRIN 81 MG: 81 TABLET, COATED ORAL at 09:10

## 2022-06-02 RX ADMIN — SODIUM CHLORIDE, PRESERVATIVE FREE 10 ML: 5 INJECTION INTRAVENOUS at 22:32

## 2022-06-02 RX ADMIN — HYDROCORTISONE: 1 CREAM TOPICAL at 22:33

## 2022-06-02 RX ADMIN — DICLOFENAC SODIUM 4 G: 10 GEL TOPICAL at 16:30

## 2022-06-02 RX ADMIN — METHYLPREDNISOLONE SODIUM SUCCINATE 40 MG: 40 INJECTION, POWDER, FOR SOLUTION INTRAMUSCULAR; INTRAVENOUS at 22:32

## 2022-06-02 ASSESSMENT — PAIN SCALES - GENERAL
PAINLEVEL_OUTOF10: 0

## 2022-06-02 NOTE — PROGRESS NOTES
Pt alert and oriented. VSS. Shift assessment completed. Pt is still with a rash and skin peeling off but getting better. Turned Q 2 hrs with pillow support, heels off of bed. Left arm in a brace( CVA). Checked and changed as needed.  at bedside. Call light within reach, bed in lowest position, wheel locked. Bed alarm on and audible.

## 2022-06-02 NOTE — PLAN OF CARE
Problem: Safety - Adult  Goal: Free from fall injury  6/2/2022 0319 by Benoit Sanchez RN  Outcome: Progressing  6/1/2022 1853 by Cecy Lazar RN  Outcome: Progressing

## 2022-06-02 NOTE — PROGRESS NOTES
[facility]  6/2/2022  [id number]    Chief Complaint: Acute CVA (cerebrovascular accident) [facility])    Subjective:   No acute events overnight. Today Jai Weiss is seen in her room with her  present. She reports she is feeling well. She has increased peeling from rash but reports itching is improved. She reports some abdominal pain and reports last bowel movement was 2 days ago. ROS: denies f/c, n/v, sob, cp    Objective:  Patient Vitals for the past 24 hrs:   BP Temp Temp src Pulse Resp SpO2   06/02/22 0900 (!) 144/71 97.4 °F (36.3 °C) Axillary 84 18 97 %   06/01/22 2145 (!) 129/59 97.8 °F (36.6 °C) Oral 83 16 99 %   06/01/22 1507 (!) 129/58 -- -- 89 -- 98 %   06/01/22 1351 (!) 129/54 -- -- 86 -- --     Gen: No distress, pleasant. Supine in bed  HEENT: Normocephalic, atraumatic. CV: RRR  Resp: No respiratory distress. Lungs CTAB  Abd: Soft, nondistended  Ext: No edema. Neuro: Alert, oriented, appropriately interactive. Left hemiparesis. Psych: mood and affect appropriate  Skin: diffuse erythematous rash over chest, back, arms and legs improving from prior, [place]     Wt Readings from Last 3 Encounters:   05/10/22 150 lb 12.7 oz (68.4 kg)   05/02/22 143 lb 9.6 oz (65.1 kg)   09/09/21 150 lb 12.8 oz (68.4 kg)       Laboratory data:   Lab Results   Component Value Date    WBC 19.1 (H) 06/02/2022    HGB 8.5 (L) 06/02/2022    HCT 26.2 (L) 06/02/2022    MCV 98.2 06/02/2022     06/02/2022       Lab Results   Component Value Date     06/02/2022    K 3.7 06/02/2022     06/02/2022    CO2 20 06/02/2022    BUN 36 06/02/2022    CREATININE 0.9 06/02/2022    GLUCOSE 165 06/02/2022    GLUCOSE 129 07/26/2017    CALCIUM 8.4 06/02/2022        Therapy progress:  PT  Position Activity Restriction  Other position/activity restrictions: chemo precautions, L inattention, LUE flaccid, resting hand splint to be worn at night.   Objective     Sit to Stand: 2 Person Assistance,Dependent/Total  Stand to sit: Dependent/Total,2 Person Assistance  Bed to Chair: Dependent/Total     OT  PT Equipment Recommendations  Equipment Needed: Yes  Other: CTA pending progress with mobility  Toilet - Technique:  (steady to e-tac chair over toilet)  Equipment Used:  (e-tac chair)  Toilet Transfers Comments: maxA x2 sit <>  steady, progressing at times to [address] with max verbal/tactile and demo cues  Assessment        SLP                Body mass index is 27.14 kg/m². Assessment and Plan:  Jose Cloud is a 66year old female with a past medical history significant for recent CVA with left hemiparesis, CML, HTN, and HLD who presented to [facility] on 5/3/22 with abnormal labs, admitted with rhabdomyolysis and ARF, found to have acute CVA. She was admitted to [facility] on 5/10/22 due to functional deficits below her baseline.     Acute Right CVA  - MRI showing acute infarct in right cerebral hemisphere in a watershed distribution  - CTA showing 90-95% stenosis of right ICA  - Vascular and Neurology in agreement on waiting for CEA, needs follow up in 4 weeks  - asa, plavix, statin  - PT, OT, ST    Dysphagia  - upgraded to regular diet  - ST    LOC 5/30  - suspect vasovagal as patient was on toilet. Vitals normal, although patient does have severe stenosis of right ICA so low normal blood pressure for her may be too low. Patient also appearing dehydrated on labs with new EDYTA. Also with worsening leukocytosis.    - discontinued lisinopril to keep patient blood pressure higher, also discontinued in setting of EDYTA  - s/p IVF  - no further epsiodes    Leukocytosis  - Medicine consulted, appreciate assistance  - suspected to be due to allergic reaction and now steroids  - abx discontinued  - continue to monitor    Drug Rash  - suspected due to tramadol, but has not improved over the weekend since being off of tramadol  - hold nystatin, this is the only other drug that correlates with timing of rash  - management per Medicine, appreciate assistance     Rhabdomyolysis  Acute Renal Injury  - Nephrology followed during acute stay  - CPK trending down  - monitor      History of HTN  - now with hypotension  - BP meds discontinued  - medicine and nephro following    DM2  - Hba1c 6.6  - SSI resumed while on steroids, per medicine  - follow up with PCP     Liver Injury with elevated LFTs  - suspected to be due to statin induced rhabdo  - statin stopped     CML  - follows with Dr. Laure Davila  - consider unholding nilitinib  - chemo precautions  - follow up with Heme/Onc OP     Enlarged heterogeneous appearance of the thyroid  - Thyroid ultrasound outpatient  - follow up with PCP     Hemorrhoids  - hydrocortisone cream   - bowel regimen to avoid constipation    Chest Pain, resolved  - EKG and troponin unremarkable  - continue to monitor    Abd pain, resolved  - XR abdomen with moderate stool burden  - continue bowel regimen    RLL Pneumonia, resolved  - concern for aspiration in setting of dysphagia  - completed course of levaquin   - IS      Bowels: Schedule stool softener. Follow bowel movements. Enema or suppository if needed.      Bladder: Check PVR x 3. [facility] if PVR > 200ml or if any volume is > 500 ml.      Sleep: Trazodone provided prn. PPx  DVT: heparin  GI: not indicated     Follow up appointments: Vascular, PCP    Services: SNF  EDOD: 6/7/22    [address] [facility].  Ondina Chand MD 6/2/2022, 11:29 AM

## 2022-06-02 NOTE — PROGRESS NOTES
Interval History and plan:      Renal function is stable  BP very low two days ago  Coming up  Now off BP meds  On iv fluids  Symptomatic      Plan:  Agree with holding BP meds  Agree with fluids  She likely has autonomic instability due to CVA causing swings in BP                        Assessment :     Acute Kidney Injury  Creatinine 1.1 at the time of consult, as she might have chronic kidney disease  EDYTA likely due to -rhabdomyolysis/poor p.o. intake  Cr on consultation 2.1 when she was in the acute hospital  Baseline Cr-0.8 on 9/21  No recent baseline available-she was admitted to [facility] March 2022 with the stroke and was told that she has high creatinine    UA-5/22-large blood, trace LE  Renal Imaging:-5/22-right-10.7 cm, simple 2.2 cm right renal cyst  No mention of left kidney  Echo: 10/18-EF normal, no mention of diastolic dysfunction    Hypertension        BP goal inpatient [id number] systolic inpatient    Rhabdomyolysis  CPK more than 10,000 on arrival-down to 8.2   Thought to be induced by statin  Has elevated AST and ALT    CML  Diabetes mellitus type 2 new  Carotid artery stenosis-plan for outpatient procedure    [facility] would like to thank Soila Mcguire MD   for opportunity to serve this patient      Please call with questions at-   24 Hrs Answering service [phone] or  7 am- 5 pm via Perfect serve or cell phone  Boubacar Gaytan MD          CC/reason for consult :     Severe hypertension   HPI :     Matti Sanders is a 66 y.o. female presented to   the hospital on 5/10/2022 with EDYTA and statin induced rhabdomyolysis to the acute hospital.  She was treated for both. She recently had a stroke and he may paralysis for which she was seen by neurologist.  She was found to have carotid artery stenosis for which she was seen by vascular surgeon. Plan for endarterectomy as an outpatient.     Once her renal function was a stable and rhabdomyolysis improved she was admitted to acute rehab unit for further care    At the rehab. Her blood pressure has gone up significantly because of which we are consulted    ROS:     Seen with-no family    positives in bold   Constitutional:  fever, chills, weakness, weight change, fatigue  Skin:  rash, pruritus, hair loss, bruising, dry skin, petechiae  Head, Face, Neck   headaches, swelling,  cervical adenopathy  Respiratory: shortness of breath, cough, or wheezing  Cardiovascular: chest pain, palpitations, dizzy, edema  Gastrointestinal: nausea, vomiting, diarrhea, constipation,belly pain    Yellow skin, blood in stool  Musculoskeletal:  back pain, muscle weakness, gait problems,       joint pain or swelling. Genitourinary:  dysuria, poor urine flow, flank pain, blood in urine  Neurologic:  vertigo, TIA'S, syncope, seizures, focal weakness  Psychosocial:  insomnia, anxiety, or depression. Additional positive findings:                    All other remaining systems are negative or unable to obtain        PMH/PSH/SH/Family History:     Past Medical History:   Diagnosis Date    Anemia     Arthritis     Asthma     Bowel dysfunction     CML (chronic myelocytic leukemia) ([facility] 01/2006    leukemia    Hyperlipidemia     Hypertension     Nuclear senile cataract of both eyes 11/25/2019    Obesity     Risk of myocardial infarction or stroke 7.5% or greater in next 10 years 9/15/2021    Skin cancer of face     left cheek, squamous    Stroke (cerebrum) ([facility]     Thyroid disease     TIA (transient ischemic attack)     mini ones-from chemo       Past Surgical History:   Procedure Laterality Date    ANUS SURGERY  1998    fissure    BREAST BIOPSY      BREAST LUMPECTOMY      benign    COLONOSCOPY      numerous polyps    ELBOW SURGERY  1960    removal of foreign body ([place])    FINE NEEDLE ASPIRATION      PARACENTESIS      x 2 fluid in lung from Chemo    TONSILLECTOMY Bilateral         reports that she has never smoked.  She has never used smokeless tobacco. She reports that she does not drink alcohol and does not use drugs. family history includes Arrhythmia in her sister; Cancer in her brother, father, mother, and sister.          Medication:     Current Facility-Administered Medications: 0.9 % sodium chloride infusion, , IntraVENous, Continuous  hydrOXYzine (ATARAX) tablet 10 mg, 10 mg, Oral, TID PRN  methylPREDNISolone sodium (SOLU-MEDROL) injection 40 mg, 40 mg, IntraVENous, BID  famotidine (PEPCID) injection 20 mg, 20 mg, IntraVENous, Daily  cetirizine (ZYRTEC) tablet 10 mg, 10 mg, Oral, Daily  insulin lispro (HUMALOG) injection vial 0-12 Units, 0-12 Units, SubCUTAneous, TID WC  insulin lispro (HUMALOG) injection vial 0-6 Units, 0-6 Units, SubCUTAneous, Nightly  lidocaine 1 % injection 5 mL, 5 mL, IntraDERmal, Once  sodium chloride flush 0.9 % injection 5-40 mL, 5-40 mL, IntraVENous, 2 times per day  sodium chloride flush 0.9 % injection 5-40 mL, 5-40 mL, IntraVENous, PRN  0.9 % sodium chloride infusion, 25 mL, IntraVENous, PRN  guaiFENesin (MUCINEX) extended release tablet 600 mg, 600 mg, Oral, BID PRN  sennosides-docusate sodium (SENOKOT-S) 8.6-50 MG tablet 2 tablet, 2 tablet, Oral, Daily  lactulose (CHRONULAC) 10 GM/15ML solution 20 g, 20 g, Oral, BID  [Held by provider] NIFEdipine (PROCARDIA XL) extended release tablet 30 mg, 30 mg, Oral, Daily  traZODone (DESYREL) tablet 50 mg, 50 mg, Oral, Nightly PRN  [Held by provider] metoprolol tartrate (LOPRESSOR) tablet 25 mg, 25 mg, Oral, BID  acetaminophen (TYLENOL) tablet 650 mg, 650 mg, Oral, Q6H PRN **OR** acetaminophen (TYLENOL) suppository 650 mg, 650 mg, Rectal, Q6H PRN  heparin (porcine) injection 5,000 Units, 5,000 Units, SubCUTAneous, TID  ondansetron (ZOFRAN-ODT) disintegrating tablet 4 mg, 4 mg, Oral, Q8H PRN **OR** ondansetron (ZOFRAN) injection 4 mg, 4 mg, IntraVENous, Q6H PRN  aluminum & magnesium hydroxide-simethicone (MAALOX) 200-200-20 MG/5ML suspension 30 mL, 30 mL, Oral, Q6H PRN  bisacodyl (DULCOLAX) suppository 10 mg, 10 mg, Rectal, Daily PRN  aspirin EC tablet 81 mg, 81 mg, Oral, Daily  carboxymethylcellulose PF (THERATEARS) 1 % ophthalmic gel 1 drop, 1 drop, Both Eyes, Q12H  clopidogrel (PLAVIX) tablet 75 mg, 75 mg, Oral, Daily  dextrose 5 % solution, 100 mL/hr, IntraVENous, PRN  dextrose bolus 10% 125 mL, 125 mL, IntraVENous, PRN **OR** dextrose bolus 10% 250 mL, 250 mL, IntraVENous, PRN  diclofenac sodium (VOLTAREN) 1 % gel 4 g, 4 g, Topical, 4x Daily  glucagon (rDNA) injection 1 mg, 1 mg, IntraMUSCular, PRN  glucose (GLUTOSE) 40 % oral gel 15 g, 15 g, Oral, PRN  hydrocortisone 1 % cream, , Topical, BID  levothyroxine (SYNTHROID) tablet 50 mcg, 50 mcg, Oral, Daily  magnesium oxide (MAG-OX) tablet 400 mg, 400 mg, Oral, Daily  [Held by provider] nilotinib (TASIGNA) capsule 200 mg, 200 mg, Oral, Q12H  witch hazel-glycerin (TUCKS) pad, , Topical, PRN       Vitals :     Vitals:    06/01/22 2145   BP: (!) 129/59   Pulse: 83   Resp: 16   Temp: 97.8 °F (36.6 °C)   SpO2: 99%       I & O :       Intake/Output Summary (Last 24 hours) at 6/2/2022 0847  Last data filed at 6/1/2022 1859  Gross per 24 hour   Intake 1600 ml   Output --   Net 1600 ml        Physical Examination :     General appearance: Anxious- no, distressed- no, in good spirits-  Yes  HEENT: Lips- normal, teeth- ok , oral mucosa- moist  Neck : Mass- no, appears symmetrical, JVD- not visible  Respiratory: Respiratory effort-  normal, wheeze- no, crackles -   Cardiovascular:  Ausculation- No M/R/G, Edema none  Abdomen: visible mass- no, distention- no, scar- no, tenderness- no                            hepatosplenomegaly-  no  Musculoskeletal:  clubbing no,cyanosis- no , digital ischemia- no                           muscle strength- grossly normal , tone - grossly normal  Skin: rashes- no , ulcers- no, induration- no, tightening - no  Psychiatric:  Judgement and insight- normal           AAO X 3  Additional finding:   Left sided weakness       LABS:     Recent Labs     05/31/22  0530 06/01/22  0540 06/02/22  0550   WBC 19.4* 17.3* 19.1*   HGB 9.6* 8.9* 8.5*   HCT 28.5* 27.1* 26.2*    189 199     Recent Labs     05/31/22  0530 06/01/22  0540 06/02/22  0550    142 144   K 4.2 4.4 3.7    110 113*   CO2 23 21 20*   BUN 47* 37* 36*   CREATININE 1.2 0.8 0.9   GLUCOSE 124* 197* 165*

## 2022-06-02 NOTE — PROGRESS NOTES
Occupational Therapy  Facility/Department: [facility]  Rehabilitation Occupational Therapy Daily Treatment Note    Date: 22  Patient Name: Renita Draper       Room: [facility]/3650-30  MRN: [id number]  Account: [de-identified]   : 1950  (76 y.o.) Gender: female                    Past Medical History:  has a past medical history of Anemia, Arthritis, Asthma, Bowel dysfunction, CML (chronic myelocytic leukemia) ([facility] Hyperlipidemia, Hypertension, Nuclear senile cataract of both eyes, Obesity, Risk of myocardial infarction or stroke 7.5% or greater in next 10 years, Skin cancer of face, Stroke (cerebrum) ([facility] Thyroid disease, and TIA (transient ischemic attack). Past Surgical History:   has a past surgical history that includes Breast biopsy; fine needle aspiration; Breast lumpectomy; Tonsillectomy (Bilateral); Paracentesis; Anus surgery (); Elbow surgery (); and Colonoscopy. Restrictions  Restrictions/Precautions: Fall Risk;Up as Tolerated;Isolation  Other position/activity restrictions: chemo precautions, L inattention, LUE flaccid, resting hand splint to be worn at night. Subjective  Subjective: Pt in bed agreeable to OT/PT.  present for session. Agreeable to shower, MD and RN cleared. Denies pain, just itchiness  Restrictions/Precautions: Fall Risk;Up as Tolerated;Isolation             Objective  Vision - Basic Assessment  Patient Visual Report: Balance difficulty  Cognition  Overall Cognitive Status: Exceptions  Arousal/Alertness: Appropriate responses to stimuli;Delayed responses to stimuli  Following Commands: Follows one step commands with repetition; Follows multistep commands with repitition  Attention Span: Attends with cues to redirect  Memory: Decreased recall of precautions  Safety Judgement: Decreased awareness of need for safety;Decreased awareness of need for assistance  Problem Solving: Assistance required to correct errors made;Assistance required to identify errors made  Insights: Decreased awareness of deficits  Initiation: Requires cues for some  Sequencing: Requires cues for some  Cognition Comment: L inattention  Orientation  Overall Orientation Status: Within Functional Limits  Orientation Level: Oriented X4   Perception  Overall Perceptual Status: Impaired  Unilateral Attention: Cues to attend left visual field;Cues to attend to left side of body;Cues to maintain midline in sitting;Cues to maintain midline in standing  Initiation: Cues to initiate tasks  Motor Planning: Hand over hand to sequence tasks  Perseveration: Perseverates during conversation     ADL  Feeding  Assistance Level: Stand by assist  Skilled Clinical Factors: assistance to cut food, open packages and bring cup to hand  Grooming/Oral Hygiene  Assistance Level: Increased time to complete;Minimal assistance  Skilled Clinical Factors: washing face, brushing teeth, and brushing hair seated in WC  Upper Extremity Bathing  Assistance Level: Increased time to complete; Moderate assistance;Verbal cues  Skilled Clinical Factors: Mod A sponge bathing in supported sitting on TTB, PT providing PRN cues for upright midline alignment. Pt requiring full assistance to wash RUE and partial assistance to wash LUE. Pt requiring min cues for thoroughness on L side of body. Lower Extremity Bathing  Assistance Level: Increased time to complete; Moderate assistance;Verbal cues; Requires x 2 assistance  Skilled Clinical Factors: modA in sitting for balance progressing to SBA at times, modA for B feet and lower legs, modA weight shifting L vs R for bathing buttocks  Upper Extremity Dressing  Assistance Level: Maximum assistance  Skilled Clinical Factors: Moderate cues for papo techniques. Pt completes UB dressing in supported sitting in WC. Pt requiring partial assistance to thread BUE and doff/don clothing over abdomen.  Pt able to manage clothing over head with cues and extra time  Lower Extremity Dressing  Assistance Level: Dependent;Verbal cues  Skilled Clinical Factors: x2 assist sit<>stand to manage pants up/down; in STEDY  Putting On/Taking Off Footwear  Assistance Level: Dependent  Skilled Clinical Factors: donning socks  Toileting  Assistance Level: Dependent  Skilled Clinical Factors: Assistance x2; Standing in [employer]  Toilet Transfers  Technique:  (steady)  Equipment: Beside commode (over toilet)  Additional Factors: Verbal cues;Cues for hand placement; Increased time to complete; With handrails  Assistance Level: Dependent  Skilled Clinical Factors: maxA x1-2  sit <> Stand from [facility] to [facility] to [facility] with steady  Tub/Shower Transfers  Type: Shower  Transfer To: Tub transfer bench  Additional Factors: Increased time to complete;Verbal cues; Set-up; With handrails  Assistance Level: Maximum assistance; Requires x 2 assistance;Verbal cues  Skilled Clinical Factors: Mita x2 sit <> Stand to steady from [facility] to TTB, modA TTB to [facility]          Functional Mobility  Device: Wheelchair  Activity: To/From therapy gym;Retrieve items;Transport items; To/From bathroom  Assistance Level: Maximum assistance  Skilled Clinical Factors: modA X2 sit <> stand to steady EOB to BSC to TTB to WC, WC mobility room <> gym modA wiht max cues for L sided attn and sequencing  Bed Mobility  Overall Assistance Level: Maximum Assistance  Additional Factors: Head of bed raised; Increased time to complete;Set-up; Verbal cues  Roll Left  Assistance Level: Maximum assistance  Roll Right  Assistance Level: Moderate assistance  Supine to Sit  Assistance Level: Maximum assistance  Scooting  Assistance Level: Moderate assistance  Transfers  Surface: From bed; Wheelchair; To mat  Additional Factors: Increased time to complete;Hand placement cues; Verbal cues  Device: Lift equipment  Sit to Stand  Assistance Level: Requires x 2 assistance;Maximum assistance  Skilled Clinical Factors: modA x1 Mita x1 sit <>  steady from EOB to [facility], sit <> stand with PT cotx standing to grab bar vs rail on R side and use of steady for support, max cues and Mita x2 for trunk/pelvic support and midline alignment, poor sequencing and attn throughout  Stand to Sit  Assistance Level: Moderate assistance; Requires x 2 assistance  Skilled Clinical Factors: modA x1 Mita x1 sit <>  steady from EOB to [facility], sit <> stand with PT cotx standing to grab bar vs rail on R side and use of steady for support, max cues and Mita x2 for trunk/pelvic support and midline alignment, poor sequencing and attn throughout  Bed To/From Chair  Assistance Level: Maximum assistance; Requires x 2 assistance; Moderate assistance  Skilled Clinical Factors: modA x1 Mita x1 sit <>  steady from EOB to [facility], sit <> stand with PT cotx standing to grab bar vs rail on R side and use of steady for support, max cues and Mita x2 for trunk/pelvic support and midline alignment, poor sequencing and attn throughout  Stand Pivot  Assistance Level: Moderate assistance; Requires x 2 assistance  Skilled Clinical Factors: modA x1 Mita x1 sit <>  steady from EOB to [facility], sit <> stand with PT cotx standing to grab bar vs rail on R side and use of steady for support, max cues and Mita x2 for trunk/pelvic support and midline alignment, poor sequencing and attn throughout   Neuromuscular Education  Neuromuscular education: Yes  NDT Treatment: Upper extremity  Head/Neck Control: Pt continues to demo decreased ability to fully turn head/scan L vs R during transfers and ADL tasks. Pt needing mod cues for scanning and asisstance for upright posture in stance with limited ability to sustain upright posture  Trunk Control: Pt continues to demo L sided neglect; Pt tolerated supported sitting EOB, BSC, and TTB in order to increase functional core strength for ADLs. Pt does need consistent tactile cues with limited ability to maintain posture  Weight Bearing  Weight Bearing Technique: Yes  RUE Weight Bearing: Extended arm standing; Extended arm seated  LUE Weight Bearing: Extended arm seated; Extended arm standing  Response To Weight Bearing Technique: WBing through LUE AAROM and hand over hand during sit <> stands with RUE support on steady and to grab bar vs rail R side, WBing through LUE wiht elbow extension and wrist support from OT while PT providing WBing/support of LLE during tasks. ROM to // bars provided due to increased tone in LUE; subluxation sling placed on patients LUE to provide support  OT Exercises  Exercise Treatment: AAROM using RUE as active assistance for LUE 1x15 reps. Exercises completed sitting in w/c. OT provides moderate cues for body positioning and movement  PROM Exercises: LUE flaccid this date. Educated pt on self PROM with RUE assist for elbow, wrist, and digital flex/ext. Dynamic Standing Balance Exercises: Once standing in // bars using RUE reaching to R side for bean bagsand toss, poor upright tolerance needing mod cues for midline alignment  Postural Correction Exercises: Cues for orienting to midline. Forward trunk flexion/extension x5  Motor Control/Coordination: while standing in // bars, reaching across midline, to R vs L and above head with RUE to gather bean bags and stack on R side to promote trunk rotation, upright posture/midline alginment and balance. Assessment  Assessment  Assessment: Patient demonstrated increased tolerance to static vs dynamic sitting (unsupported vs supported) during ADLs; needing Mita x1-2 for reaching across mdlien and promote upright tolerance in shower. Pt educated on upright posture and alignment standing in // bars, does demo decreased attn to L side and ability to maintain midline when reaching across midline however able to stand 3x3-4 minutes each. Pt is still quite limited by endurance, balance, cognition and L sided neglect. Cont OT POC.   Activity Tolerance: Patient limited by fatigue;Patient limited by pain  Discharge Recommendations: [address]  OT Equipment Recommendations  Equipment Needed: Yes  Other: defer to next d/c facility  Safety Devices  Safety Devices in place: Yes  Type of devices: Gait belt; All fall risk precautions in place; Patient at risk for falls; Left in chair;Call light within reach; Chair alarm in place;Nurse notified    Patient Education  Education  Education Given To: Patient  Education Provided: Role of Therapy;Plan of Care;Safety;Equipment;ADL Function; Fall Prevention Strategies;Transfer Training  Education Provided Comments: bed mobility, sitting balance, safety and hand placement with transfers, hemistrategies for UB bathing/dressing, skin checks for resting hand splint, AAROM LUE exercises, weightshifting during ambulation/standing, standing balance  Education Method: Demonstration;Verbal  Barriers to Learning: Cognition  Education Outcome: Verbalized understanding;Demonstrated understanding;Continued education needed    Plan  Plan  Times per Week: 5 out of 7 days  Times per Day: Daily  Plan Weeks: 3 weeks  Current Treatment Recommendations: Strengthening;ROM;Balance training;Functional mobility training; Endurance training; Safety education & training;Neuromuscular re-education;Patient/Caregiver education & training;Equipment evaluation, education, & procurement;Self-Care / ADL;Cognitive/Perceptual training; Wheelchair mobility training;Positioning    Goals  Patient Goals   Patient goals :  \"Go home\" \"Get stronger\" \"walk better\"  Short Term Goals  Time Frame for Short term goals: 10 days (5/20/22)-EXTEND TO 5/25 2/2 PROGRESS  Short Term Goal 1: Pt will complete functional transfers with Mita x2 with LRAD- progressing, maxA x2 sit pivot vs stand pivot transfer-EXTEND TO 5/25-GOAL MET 5/25, CTA  Short Term Goal 2: Pt will complete toileting/transfer modA x2 with LRAD and DME PRN-GOAL MET 5/16, modA x2 steady, CTA  Short Term Goal 3: Pt will complete LUE AAROM/AROM exercises to LUE to increase strength/endurance for ADLs/transfers-GOAL MET 5/18, CTA  Short Term Goal 4: Pt will be able to locate 3/5 object in L visual field with min cues during ADLs/activities-GOAL MET 5/18,CTA  Additional Goals?: No  Long Term Goals  Time Frame for Long term goals : 21 days (3/31/22)-EXTEND TO DC DATE, 6/3  Long Term Goal 1: Pt will complete functional transfers/mobility SPV with LRAD-DOWGRADE DUE TO PROGRESS: maxA x1  Long Term Goal 2: Pt will complete UE dressing/bathing sitting setup with min cues for papo techniques-DOWNGRADE DUE TO PROGRESS: Mita  Long Term Goal 3: Pt will complete toileting/transfers with LRAD and DME PRN SPV-DOWGRADE DUE TO PROGRESS: maxA x1  Long Term Goal 4: Pt will complete opening 3/5 containers with compensatory techniques sitting setup/SPV           Therapy Time   Individual Concurrent Group Co-treatment   Time In 1000     1030   Time Out 1030     1130   Minutes 30     60   Timed Code Treatment Minutes: [address] Denise Marquez, OTR/L

## 2022-06-02 NOTE — PROGRESS NOTES
Physical Therapy  Facility/Department: [facility]  Rehabilitation Physical Therapy Treatment Note    NAME: Gage Jordan  : 1950 (66 y.o.)  MRN: [id number]  CODE STATUS: Full Code    Date of Service: 22       Restrictions:  Restrictions/Precautions: Fall Risk;Up as Tolerated;Isolation  Position Activity Restriction  Other position/activity restrictions: chemo precautions, L inattention, LUE flaccid, resting hand splint to be worn at night. SUBJECTIVE  Subjective  Subjective: found on commode with OT  Pain: denies pain this am         OBJECTIVE  Cognition  Overall Cognitive Status: Exceptions  Arousal/Alertness: Appropriate responses to stimuli;Delayed responses to stimuli  Following Commands: Follows one step commands with repetition; Follows multistep commands with repitition  Attention Span: Attends with cues to redirect  Memory: Decreased recall of precautions  Safety Judgement: Decreased awareness of need for safety;Decreased awareness of need for assistance  Problem Solving: Assistance required to correct errors made;Assistance required to identify errors made  Insights: Decreased awareness of deficits  Initiation: Requires cues for some  Sequencing: Requires cues for some  Cognition Comment: L inattention  Orientation  Overall Orientation Status: Within Functional Limits  Orientation Level: Oriented X4    Functional Mobility     TD via STEDY from commode to shower chair. Pt with improved midline sitting on STEDY with upright trunk with REU support. Pt completed lengthy dyn sitting balance activity x 20-25 min with 1-0 UE Support and assist ranging from SBA (when utilizing 1 UE support) to max a to maintain midline position with no UE support. Pt most often loses balance posterior and to L with poor righting reactions    TD via STEDY shower chair to w/c    wc mobility with improved attention and linear path in carranza.  Grossly SBA with use of RUE/RLE to propel x 150 ft with moderate vC to maintain linear path but able to  Correct without assist.     Sit to stand w/c to // bars x 3 reps with mod A of 2 via pulling    Pt standing statically x 3.5 min with BUE support and PT facilitating LLE stability as well as neutral hip/trunk alignment and OT facilitating LUE WB with neutral shoulder posture. Pt able to progress to min A of 2 for static standing in // bars with shoulder width JACINTA. Pt completed 10 RUE reaches to target within JACINTA with LUE support on // bars facilitated by OT and PT facilitating LLE stability. Pt requires cues to attend to task as well as to attend to balance, to not reach for target before pt has appropriate COG/balance. Dyn stand activity: pt retrieves beanbag from table on R side -> returns to midline-> throws to target and repeat x 5 reps with min A of 2 for static balance and mod A of 2 for dyn standing balance while reaching. PT facilitating weight shift while reaching and LLE stability with OT facilitating LUE WB and posture/balance. Pt requires cues to slow down and regain balance prior to completing next step of sequence as pt impulsive and attempts to reach with poor JACINTA/ COG. Pt in w/c with alarm donned and call light/needs within reach at end of session. ASSESSMENT/PROGRESS TOWARDS GOALS  Vital Signs  Heart Rate: 84  Heart Rate Source: Monitor  BP: (!) 144/71  BP Location: Left upper arm  MAP (Calculated): 95.33  SpO2: 97 %  O2 Device: None (Room air)    Assessment  Assessment: co treat wiht OT for increased safety progressing functional mobility. rash appears less red this date, pt denies pain. pt completed lengthy dyn sitting balance actiivty with assist ranging from SBA-max a for midline posture. pt demo's improved midline sitting while in STEDY and statically n // bars, requires A of 2 for dyn standing balance.  conitnue to rec SNF Upon d/c  Activity Tolerance: Patient limited by fatigue;Patient limited by pain  Discharge Recommendations: Subacute/Skilled Nursing Facility    Goals  Patient Goals   Patient goals : Jim Flores on my mission trip in [place]"  Short Term Goals  Time Frame for Short term goals: 11 days 5/20  Short term goal 1: pt will complete bed mobility with mod A; not met 5/20 - pt requires mod A x2 for supine>sit  Short term goal 2: pt will complete functional transfer with max A; goal partially met 5/20 - pt completes transfers with mod A x2 with lorrie stedy vs. // bars  Short term goal 3: pt will tolerate gait assessment and set goal when appropriate; goal met 5/20 - pt ambulates 6 ft in // bars with mod A  x2 (dependent d/t w/c follow and // bars), new goal in LTG  Short term goal 4: pt will tolerate stair assessment and set goal when appropriate; not met 5/20 - unsafe to attempt stairs  Short term goal 5: pt will propel w/c x 150 ft with supv; not assessed 5/20  Long Term Goals  Time Frame for Long term goals : 21 days 5/31: GOALS UPDATED TO 6/2 due to discharge date  Long term goal 1: pt will complete bed mobility with CGA. Long term goal 2: pt will complete functional transfer with min a and LRAD. Long term goal 3: pt will propel w/c x 150 ft with CGA  Long term goal 4: Pt will ambulate 10 ft with LRAD and mod A. PLAN OF CARE/SAFETY  Plan  Plan: 5-7 times per week  Specific Instructions for Next Treatment: Progress mobility as tolerated  Current Treatment Recommendations: Strengthening;ROM;Balance training;Functional mobility training;Transfer training; Endurance training;Gait training; Therapeutic activities; Home exercise program;Wheelchair mobility training;Equipment evaluation, education, & procurement;Cognitive reorientation;Stair training;Positioning; Safety education & training;Patient/Caregiver education & training;Cognitive/Perceptual training;ADL/Self-care training;IADL training;Pain management  Safety Devices  Type of Devices: All fall risk precautions in place;Call light within reach; Patient at risk for falls;Nurse notified; All yaima prominences offloaded;Gait belt;Bed alarm in place; Left in bed    EDUCATION  Education  Education Given To: Patient  Education Provided: Role of Therapy; Visual Perceptual Function;Plan of Care; Mobility Training;Precautions;Transfer Training; Safety; Energy Conservation; Fall Prevention Strategies  Education Outcome: Verbalized understanding;Demonstrated understanding;Continued education needed        Therapy Time   Individual Concurrent Group Co-treatment   Time In       1030   Time Out       1130   Minutes       60     Timed Code Treatment Minutes: 400 [facility]       Idalia Schulte PT, 06/02/22 at 11:38 AM

## 2022-06-02 NOTE — CARE COORDINATION
DIPIKA spoke with Dino Saleh from [facility] to update on discharge date. Charito Drake RN

## 2022-06-02 NOTE — PROGRESS NOTES
Hospitalist Progress Note    Date of Admission: 5/10/2022    Chief Complaint: Rash    Hospital Course:     66 y.o. female who we are asked to see/evaluate by Valentina Jasso MD for medical management. Patient was recently hospitalized to our service appointment today for acute CVA and subsequently discharged to the nursing medicine ARU on 5/10/2022 for rehabilitation. Recently her rehab course has been complicated by diffuse rash. This was first noted on the evening of Thursday, 5/26/2022. Rash consisted of diffuse erythema affecting the trunk back and extremities. The rash was diffusely itchy. There was initial concern that this may be a drug reaction related to simply start medication. Most recently started medications with tramadol and nystatin, tramadol seeming more likely was discontinued first, subsequently a statin was discontinued as well. Around the same time they became concerned for possible infection as she was noted to have a marked change in her white blood cell count. She was started on several antibiotics, however all of these were started after rash was noted. Of note she was treated with a course of antibiotics for aspiration pneumonia earlier in her rehab stay, however, most recent infectious work-up was unrevealing with normal urinalysis, negative chest x-ray, no other focal signs or infection. She has been treated with p.o. Benadryl for the last few days. On 5/30/2022 he had a brief syncopal episode while toileting. Patient was thought to be vasovagal induced. Hospital medicine was consulted for further input. She reported diffuse itching symptoms and tenderness. The rash was starting to exfoliate. She noted some soreness in the mouth although no sores or ulcerations were noted. She did have some mild conjunctivitis. She continued to have left-sided weakness as well as left-sided neglect consistent with her prior CVA. Subjective: Feels well. BP improved.  Larger areas of skin starting to exfoliate. Labs:   Recent Labs     05/31/22  0530 06/01/22  0540 06/02/22  0550   WBC 19.4* 17.3* 19.1*   HGB 9.6* 8.9* 8.5*   HCT 28.5* 27.1* 26.2*    189 199     Recent Labs     05/31/22  0530 06/01/22  0540 06/02/22  0550    142 144   K 4.2 4.4 3.7    110 113*   CO2 23 21 20*   BUN 47* 37* 36*   CREATININE 1.2 0.8 0.9   CALCIUM 8.5 8.4 8.4     Recent Labs     06/01/22  0540   AST 38*   ALT 93*   BILIDIR <0.2   BILITOT 0.3   ALKPHOS 321*     No results for input(s): INR in the last 72 hours. Physical Exam Performed:    BP (!) 144/71   Pulse 84   Temp 97.4 °F (36.3 °C) (Axillary)   Resp 18   Ht 5' 2.5\" (1.588 m)   Wt 150 lb 12.7 oz (68.4 kg)   SpO2 97%   BMI 27.14 kg/m²     General appearance: No apparent distress, appears stated age and cooperative. HEENT: Normal cephalic, atraumatic without obvious deformity. Pupils equal, round, and reactive to light. Extra ocular muscles intact. Mild conjunctival injection bilaterally. No obvious oral ulcerations are noted. Neck: Supple, with full range of motion. No jugular venous distention. Trachea midline. Respiratory:  Normal respiratory effort. Clear to auscultation, bilaterally without Rales/Wheezes/Rhonchi. Cardiovascular: Regular rate and rhythm with normal S1/S2 without murmurs, rubs or gallops. Abdomen: Soft, non-tender, non-distended with normal bowel sounds. Musculoskeletal: No clubbing, cyanosis or edema bilaterally. Skin: Diffuse erythematous rash over the trunk, back, extremities and face. The rash continues to evolve (healing phase), with new areas of superficial desquamation. Neurologic: Stable left-sided weakness. Cranial nerves: II-XII intact, grossly non-focal.  Psychiatric: Alert and oriented with normal insight.    Capillary Refill: Brisk,3 seconds, normal   Peripheral Pulses: +2 palpable, equal bilaterally     Assessment/Plan:    Active Hospital Problems    Diagnosis     Acute CVA (cerebrovascular accident) [facility]) [I63.9]      Priority: Medium     Drug Rash: Most consistent with a drug rash. Although the offending agent is somewhat unclear given the timeline, to most closely associated medications were tramadol and nystatin. Agree with avoiding these medications. There are no other obvious medication culprits of medications have been given for a week or longer prior to the onset of symptoms. Nilotinib will be held empirically given the evolving drug rash, although given long standing use, would consider this a less likely culprit. While she is currently receiving some antibiotics these have all been initiated after rash. Given that active infection was considered less likely at this time would favor avoidance of further antibiotics so as not to further confuse the situation if the drug rash continues to evolve. Likewise, given the low suspicion for infection blood advocate for steroid use which is possible allergic reaction. The rash appears to have stabilized with no new areas. She is now having larger areas of superficial desquamation, which may just be part of the evolution/resolution of this process.   -Continue Atarax for itching as needed.   -Continue IV Solu-Medrol, IV Pepcid IV, and Zyrtec  -BP stable, ok to hold IVF. Leukocytosis: Given the strong correlation of the Lleukocytosis with the onset of the rash, suspect this is all related. Although she was previously treated for aspiration pneumonia during her rehab stay there is currently no objective evidence for respiratory infection. Likewise the UA/UCx and BCx results also argue against active infection. Mild lactic acidosis was likely related to relative hypotension/hypovolemia setting of ongoing allergic reaction. As above, favor avoiding further antibiotics at this time.      Recent Acute right hemispheric MCA stroke with left sided weakness and neglect  - MRI brain 5/6/2022 showed areas of acute infarct involving the right cerebral hemisphere predominantly in a somewhat watershed distribution.  - CTA neck 5/9/2022 showed significant stenosis within the right proximal cervical ICA with approximately 90-95% stenosis per NASCET criteria. It also showed high grade stenoses throughout bilateral cervical vertebral arteries.   - neurology and vascular were previously consulted and agreed risk of urgent CEA outweighed the benefit given high risk for reperfusion injury or hemorrhagic conversion. Plans are for her to follow-up with Vascular in 4 weeks for further management.   - Continue stroke management with plavix and aspirin. Statin remains on hold d/t LFT changes and rhabdomyolysis episode recently.      Recent EDYTA/Rhabdo episode  -Resolved  -Nephrology has been following intermittently in ARU  -Renal function and BP stable. Hold IVF  -Monitor    Hypertension  -As above, concern for hypovolemia related to Allergic Reaction, although improving BP  -Hold parameters placed for Atenolol  -Monitor.      DM2  - Recently diagnosed with hemoglobin a1c 6.6 on 5/2/2022  - Continue monitoring with medium SSI after starting IV steroids    Syncopal episodes  - Most recently 5/30/22.  - Consistent with vasovagal episodes although now with some concern for hypovolemia in setting of allergic reaction    Liver injury with elevated LFT's   - Occurred during recent admission; thought to be most likely due to statin induced rhabdo  - Statin remains on hold  - LFTs improved previously; recheck shows mild elevation.   - Follow up with PCP after discharge to repeat CMP.       CML   - Stable  - Patient follows Dr. Kallie hussein, held initially but resumed for past few weeks  - Nilotinib held again given evolving drug rash, although given long standing use, would consider less likely  - Follow up with heme/onc as scheduled.      Enlarged heterogeneous appearance of the thyroid gland  -Previously noted on CTA  - Thyroid ultrasound is recommended  - Consider following up with PCP after discharge.      Hemorrhoids  -  hydrocortisone cream    Nichole Saldana MD

## 2022-06-02 NOTE — PROGRESS NOTES
Speech Language Pathology  [facility]: ACUTE REHAB UNIT  SPEECH-LANGUAGE PATHOLOGY      [x] Daily  [] Weekly Care Conference Note  [] Discharge    Patient:Jennifer Parker      :1950  MXW:[id number]  Rehab Dx/Hx: Acute CVA (cerebrovascular accident) ([facility] [address] [I63.9]    Precautions: falls and aspirations; severe left neglect  Home situation: Lives with . Indep with med management. Share finances, cooking, laundtry, grocery shopping. Has not driven since CVA in March. ST Dx: [] Aphasia  [x] Dysarthria  [] Apraxia   [x] Oropharyngeal dysphagia [x] Cognitive Impairment  [] Other:   Date of Admit: 5/10/2022  Room #: 3953/3851-48    Current functional status (updated daily):         Pt being seen for : [x] Speech/Language Treatment  [x] Dysphagia Treatment [x] Cognitive Treatment  [] Other:  Communication: []WFL  [] Aphasia  [x] Dysarthria  [] Apraxia  [] Pragmatic Impairment [] Non-verbal  [] Hearing Loss  [] Other:   Cognition: [] WFL  [x] Mild  [] Moderate  [] Severe [] Unable to Assess  [] Other:  Memory: [] WFL  [x] Mild  [] Moderate  [] Severe [] Unable to Assess  [] Other:  Behavior: [x] Alert  [x] Cooperative  [x]  Pleasant  [] Confused  [] Agitated  [] Uncooperative  [] Distractible [] Motivated  [] Self-Limiting [] Anxious  [] Other:  Endurance:  [x] Adequate for participation in SLP sessions  [] Reduced overall  [] Lethargic  [] Other:  Safety: [] No concerns at this time  [x] Reduced insight into deficits  [x]  Reduced safety awareness [] Not following call light procedures  [] Unable to Assess  [] Other:    Current Diet Order:ADULT DIET;  Regular; 3 carb choices (45 gm/meal)  ADULT ORAL NUTRITION SUPPLEMENT; Breakfast; Low Calorie/High Protein Oral Supplement  Swallowing Precautions: upright for all intake, stay upright for at least 30 mins after intake, small bites/sips, assist feed, oral care 2-3x/day to reduce adverse affects in the event of aspiration, alternate bites/sips, slow rate of intake         Date: 6/2/2022      Tx session 1  [id number] Tx session 2  All tx needs met in session 1     Total Timed Code Min 60    Total Treatment Minutes 60    Individual Treatment Minutes 60    Group Treatment Minutes 0    Co-Treat Minutes 0    Variance/Reason:  N/a    Pain No c/o pain    Pain Intervention [] RN notified   [] Repositioned  [] Intervention offered and patient declined  [x] N/A  [] Other: [] RN notified  [] Repositioned  [] Intervention offered and patient declined  [] N/A  [] Other:   Subjective     Pt alert and cooperative throughout tx session, pt's  present. Objective:     Dysphagia Goals  Short-term Goals  Timeframe for Short-term Goals: 18 days (05/28/2022)     Goal met 05/24/22. Goal met 05/24/22. Goal discontinued 05/17/22. Goal 4: The pt will complete oral motor exercises to improve labial and lingual strength, ROM, and coordination in 10/10 opportunities given min cues    Goal not directly targeted. Cognitive-linguistic Goals:   Short-term Goals  Timeframe for Short-term Goals: 18 days (05/28/2022)    Goal 1: Pt will effectively use compensatory visual strategies for improved attention to left side during structured tasks with 80% acc given mod cues. Pt asked to ID items in picture scene:  -left side of paper marked, pt independently using finger to guide/scan across paper  -Pt successfully ID'd 1/5 objects independently. Consistent max cues and increased time required. -ID target time on clock/visual aid using scanning strategies: <50% average accuracy, max cues        Goal 2: Pt will complete graded recall tasks using compensatory strategies with 90% acc given min cues   5-item grocery list:   >20-minute delay: 5/5, no cues to recall; min-no cues to encode.    -Strategies of visualization, grouping, and association were reviewed for improved recall.        Goal 3: Pt will complete executive function tasks (e.g. meds, time, money, etc) with 90% acc given min cues   Did not directly target      Goal 4: Pt will complete problem solving and thought organization tasks with 90% acc given min cues   Higher-level naming task with ID item in given category that starts with a particular letter: 80% average accuracy, no cues      Goal 5: Pt will complete verbal and visual reasoning tasks with 90% acc given min cues   Did not directly target       Other areas targeted: N/a    Education:   SLP edu pt re: visual scanning strategies    Safety Devices: [x] Call light within reach  [x] Chair alarm activated  [] Bed alarm activated  [x] Other:  at bedside [] Call light within reach  [] Chair alarm activated  [] Bed alarm activated  [] Other:    Assessment:` Pt was alert and engaged the majority of today's session. She began to fatigue during final ~10 minutes. She continues to require consistent max cues to utilize visual strategies for improved attention to left side during structured tasks. Pt required min-no cues during graded recall task after significantly delay. Pt is receptive and appreciative of all reviewed information/strategies. Pt will require continued ST after d/c to address above goals. Plan: Continue as per plan of care. Additional Information:     Barriers toward progress: Limited safety awareness, Limited insight into deficits, Decreased proprioception, Upper extremity weakness and Lower extremity weakness  Discharge recommendations:  [] Home independently  [] Home with assistance [x]  24 hour supervision  [] ECF [] Other:  Continued Tx Upon Discharge: ? [x] Yes [] No [] TBD based on progress while on ARU [] Vital Stim indicated [] Other:   Estimated discharge date: 06/07/2022    Interventions used this date:  [] Speech/Language Treatment  [] Instruction in HEP [] Group [] Dysphagia Treatment [x] Cognitive Treatment   [] Other:       Total Time Breakdown / Charges    Time in Time out Total Time / units   Cognitive Tx [id number] [id number] 60 min / 4 units   Speech Tx -- -- --   Dysphagia Tx -- -- --       Electronically Signed by     Wolfgang Mckinley M.S. [id number] [facility]  Speech-language pathologist  [phone]

## 2022-06-03 ENCOUNTER — CARE COORDINATION (OUTPATIENT)
Dept: CARE COORDINATION | Age: 72
End: 2022-06-03

## 2022-06-03 LAB
ANION GAP SERPL CALCULATED.3IONS-SCNC: 10 MMOL/L (ref 3–16)
BLOOD CULTURE, ROUTINE: NORMAL
BUN BLDV-MCNC: 52 MG/DL (ref 7–20)
CALCIUM SERPL-MCNC: 8.6 MG/DL (ref 8.3–10.6)
CHLORIDE BLD-SCNC: 112 MMOL/L (ref 99–110)
CO2: 21 MMOL/L (ref 21–32)
CREAT SERPL-MCNC: 1.2 MG/DL (ref 0.6–1.2)
CULTURE, BLOOD 2: NORMAL
GFR AFRICAN AMERICAN: 53
GFR NON-AFRICAN AMERICAN: 44
GLUCOSE BLD-MCNC: 137 MG/DL (ref 70–99)
GLUCOSE BLD-MCNC: 152 MG/DL (ref 70–99)
GLUCOSE BLD-MCNC: 99 MG/DL (ref 70–99)
HCT VFR BLD CALC: 25.8 % (ref 36–48)
HEMOGLOBIN: 8.5 G/DL (ref 12–16)
MCH RBC QN AUTO: 32.6 PG (ref 26–34)
MCHC RBC AUTO-ENTMCNC: 32.9 G/DL (ref 31–36)
MCV RBC AUTO: 99.2 FL (ref 80–100)
PDW BLD-RTO: 15.4 % (ref 12.4–15.4)
PERFORMED ON: ABNORMAL
PERFORMED ON: NORMAL
PLATELET # BLD: 193 K/UL (ref 135–450)
PMV BLD AUTO: 8.4 FL (ref 5–10.5)
POTASSIUM REFLEX MAGNESIUM: 4.8 MMOL/L (ref 3.5–5.1)
RBC # BLD: 2.6 M/UL (ref 4–5.2)
SODIUM BLD-SCNC: 143 MMOL/L (ref 136–145)
WBC # BLD: 15.7 K/UL (ref 4–11)

## 2022-06-03 PROCEDURE — 2580000003 HC RX 258: Performed by: STUDENT IN AN ORGANIZED HEALTH CARE EDUCATION/TRAINING PROGRAM

## 2022-06-03 PROCEDURE — 6370000000 HC RX 637 (ALT 250 FOR IP): Performed by: INTERNAL MEDICINE

## 2022-06-03 PROCEDURE — 6370000000 HC RX 637 (ALT 250 FOR IP): Performed by: STUDENT IN AN ORGANIZED HEALTH CARE EDUCATION/TRAINING PROGRAM

## 2022-06-03 PROCEDURE — 97116 GAIT TRAINING THERAPY: CPT

## 2022-06-03 PROCEDURE — 97535 SELF CARE MNGMENT TRAINING: CPT

## 2022-06-03 PROCEDURE — 2500000003 HC RX 250 WO HCPCS: Performed by: STUDENT IN AN ORGANIZED HEALTH CARE EDUCATION/TRAINING PROGRAM

## 2022-06-03 PROCEDURE — 97130 THER IVNTJ EA ADDL 15 MIN: CPT

## 2022-06-03 PROCEDURE — 80048 BASIC METABOLIC PNL TOTAL CA: CPT

## 2022-06-03 PROCEDURE — 97112 NEUROMUSCULAR REEDUCATION: CPT

## 2022-06-03 PROCEDURE — 97530 THERAPEUTIC ACTIVITIES: CPT

## 2022-06-03 PROCEDURE — 6360000002 HC RX W HCPCS: Performed by: STUDENT IN AN ORGANIZED HEALTH CARE EDUCATION/TRAINING PROGRAM

## 2022-06-03 PROCEDURE — 1280000000 HC REHAB R&B

## 2022-06-03 PROCEDURE — 85027 COMPLETE CBC AUTOMATED: CPT

## 2022-06-03 PROCEDURE — 97129 THER IVNTJ 1ST 15 MIN: CPT

## 2022-06-03 RX ORDER — PREDNISONE 20 MG/1
40 TABLET ORAL DAILY
Status: COMPLETED | OUTPATIENT
Start: 2022-06-04 | End: 2022-06-06

## 2022-06-03 RX ORDER — BISACODYL 10 MG
10 SUPPOSITORY, RECTAL RECTAL ONCE
Status: DISCONTINUED | OUTPATIENT
Start: 2022-06-03 | End: 2022-06-07 | Stop reason: HOSPADM

## 2022-06-03 RX ADMIN — HYDROCORTISONE: 1 CREAM TOPICAL at 20:43

## 2022-06-03 RX ADMIN — HEPARIN SODIUM 5000 UNITS: 5000 INJECTION INTRAVENOUS; SUBCUTANEOUS at 20:39

## 2022-06-03 RX ADMIN — CARBOXYMETHYLCELLULOSE SODIUM 1 DROP: 10 GEL OPHTHALMIC at 09:31

## 2022-06-03 RX ADMIN — METOPROLOL TARTRATE 25 MG: 25 TABLET, FILM COATED ORAL at 20:39

## 2022-06-03 RX ADMIN — FAMOTIDINE 20 MG: 10 INJECTION INTRAVENOUS at 20:39

## 2022-06-03 RX ADMIN — NIFEDIPINE 30 MG: 30 TABLET, FILM COATED, EXTENDED RELEASE ORAL at 09:31

## 2022-06-03 RX ADMIN — NYSTATIN 5 ML: 100000 SUSPENSION ORAL at 09:38

## 2022-06-03 RX ADMIN — HEPARIN SODIUM 5000 UNITS: 5000 INJECTION INTRAVENOUS; SUBCUTANEOUS at 13:30

## 2022-06-03 RX ADMIN — CLOPIDOGREL BISULFATE 75 MG: 75 TABLET, FILM COATED ORAL at 09:31

## 2022-06-03 RX ADMIN — METOPROLOL TARTRATE 25 MG: 25 TABLET, FILM COATED ORAL at 09:31

## 2022-06-03 RX ADMIN — SODIUM CHLORIDE, PRESERVATIVE FREE 10 ML: 5 INJECTION INTRAVENOUS at 09:33

## 2022-06-03 RX ADMIN — TRAZODONE HYDROCHLORIDE 50 MG: 50 TABLET ORAL at 20:39

## 2022-06-03 RX ADMIN — NYSTATIN 5 ML: 100000 SUSPENSION ORAL at 13:31

## 2022-06-03 RX ADMIN — HEPARIN SODIUM 5000 UNITS: 5000 INJECTION INTRAVENOUS; SUBCUTANEOUS at 06:13

## 2022-06-03 RX ADMIN — ASPIRIN 81 MG: 81 TABLET, COATED ORAL at 09:31

## 2022-06-03 RX ADMIN — LEVOTHYROXINE SODIUM 50 MCG: 0.05 TABLET ORAL at 06:13

## 2022-06-03 RX ADMIN — SODIUM CHLORIDE, PRESERVATIVE FREE 10 ML: 5 INJECTION INTRAVENOUS at 20:45

## 2022-06-03 RX ADMIN — DOCUSATE SODIUM 50 MG AND SENNOSIDES 8.6 MG 2 TABLET: 8.6; 5 TABLET, FILM COATED ORAL at 09:31

## 2022-06-03 RX ADMIN — NYSTATIN 5 ML: 100000 SUSPENSION ORAL at 20:39

## 2022-06-03 RX ADMIN — FAMOTIDINE 20 MG: 10 INJECTION INTRAVENOUS at 09:32

## 2022-06-03 RX ADMIN — CARBOXYMETHYLCELLULOSE SODIUM 1 DROP: 10 GEL OPHTHALMIC at 20:43

## 2022-06-03 RX ADMIN — Medication 400 MG: at 09:31

## 2022-06-03 RX ADMIN — LACTULOSE 20 G: 20 SOLUTION ORAL at 09:31

## 2022-06-03 RX ADMIN — CETIRIZINE HYDROCHLORIDE 10 MG: 10 TABLET, FILM COATED ORAL at 09:31

## 2022-06-03 ASSESSMENT — PAIN SCALES - GENERAL
PAINLEVEL_OUTOF10: 0
PAINLEVEL_OUTOF10: 1
PAINLEVEL_OUTOF10: 0
PAINLEVEL_OUTOF10: 0
PAINLEVEL_OUTOF10: 6

## 2022-06-03 ASSESSMENT — PAIN DESCRIPTION - ORIENTATION: ORIENTATION: LEFT

## 2022-06-03 ASSESSMENT — PAIN DESCRIPTION - LOCATION: LOCATION: SHOULDER

## 2022-06-03 NOTE — PROGRESS NOTES
Speech Language Pathology  [facility]: ACUTE REHAB UNIT  SPEECH-LANGUAGE PATHOLOGY      [x] Daily  [] Weekly Care Conference Note  [] Discharge    Patient:Jennifer Lazo      :1950  RP  Rehab Dx/Hx: Acute CVA (cerebrovascular accident) ([facility] [address] [I63.9]    Precautions: falls and aspirations; severe left neglect  Home situation: Lives with . Indep with med management. Share finances, cooking, laundtry, grocery shopping. Has not driven since CVA in March. ST Dx: [] Aphasia  [x] Dysarthria  [] Apraxia   [x] Oropharyngeal dysphagia [x] Cognitive Impairment  [] Other:   Date of Admit: 5/10/2022  Room #: 1598/0271-87    Current functional status (updated daily):         Pt being seen for : [x] Speech/Language Treatment  [x] Dysphagia Treatment [x] Cognitive Treatment  [] Other:  Communication: []WFL  [] Aphasia  [x] Dysarthria  [] Apraxia  [] Pragmatic Impairment [] Non-verbal  [] Hearing Loss  [] Other:   Cognition: [] WFL  [x] Mild  [] Moderate  [] Severe [] Unable to Assess  [] Other:  Memory: [] WFL  [x] Mild  [] Moderate  [] Severe [] Unable to Assess  [] Other:  Behavior: [x] Alert  [x] Cooperative  [x]  Pleasant  [] Confused  [] Agitated  [] Uncooperative  [] Distractible [] Motivated  [] Self-Limiting [] Anxious  [] Other:  Endurance:  [x] Adequate for participation in SLP sessions  [] Reduced overall  [] Lethargic  [] Other:  Safety: [] No concerns at this time  [x] Reduced insight into deficits  [x]  Reduced safety awareness [] Not following call light procedures  [] Unable to Assess  [] Other:    Current Diet Order:ADULT DIET;  Regular; 3 carb choices (45 gm/meal)  ADULT ORAL NUTRITION SUPPLEMENT; Breakfast; Low Calorie/High Protein Oral Supplement  Swallowing Precautions: upright for all intake, stay upright for at least 30 mins after intake, small bites/sips, assist feed, oral care 2-3x/day to reduce adverse affects in the event of aspiration, alternate bites/sips, slow rate of intake         Date: 6/3/2022      Tx session 1  0800 - 0900 Tx session 2  All tx needs met in session 1     Total Timed Code Min 60    Total Treatment Minutes 60    Individual Treatment Minutes 60    Group Treatment Minutes 0    Co-Treat Minutes 0    Variance/Reason:  N/a    Pain No c/o pain    Pain Intervention [] RN notified   [] Repositioned  [] Intervention offered and patient declined  [x] N/A  [] Other: [] RN notified  [] Repositioned  [] Intervention offered and patient declined  [] N/A  [] Other:   Subjective     Pt alert and cooperative throughout tx session, pt's  present. Objective:     Dysphagia Goals  Short-term Goals  Timeframe for Short-term Goals: 18 days (05/28/2022)     Goal met 05/24/22. Goal met 05/24/22. Goal discontinued 05/17/22. Goal 4: The pt will complete oral motor exercises to improve labial and lingual strength, ROM, and coordination in 10/10 opportunities given min cues    Goal not directly targeted. Cognitive-linguistic Goals:   Short-term Goals  Timeframe for Short-term Goals: 18 days (05/28/2022)    Goal 1: Pt will effectively use compensatory visual strategies for improved attention to left side during structured tasks with 80% acc given mod cues.    Majority of session targeted attention to the left re:  -meal tray / plate  -items on the wall in front of her in the room  -making eye contact with SLP  -being able to state what numbers SLP was holding up  -what was on pt's window   -telling time on clock in front of her    Edu re: attention to left strategies - finding the edge of the tray, fully turning head to lfet    Overall, pt completed with 75% acc indep, improved to 100% acc given mod cues       Goal 2: Pt will complete graded recall tasks using compensatory strategies with 90% acc given min cues   Functional Recall   -pt recalled names of staff, therapy schedule, tasks completed in therapy d/c date, date of when last care conference was       Goal 3: Pt will complete executive function tasks (e.g. meds, time, money, etc) with 90% acc given min cues   Telling Time  -pt with difficulty telling time, particularly with identifying the hands when on the left side of the clock  -pt's answers would be close - for example, instead of 8:30, pt would state 9:30  -pt overall completed with 75% acc given max cues      Goal 4: Pt will complete problem solving and thought organization tasks with 90% acc given min cues   [facility] edu pt re: problem solving re: left neglect - if pt feels she is missing something / not able to find it, to look further left      Goal 5: Pt will complete verbal and visual reasoning tasks with 90% acc given min cues   Identifying objects by characteristics   -e.g. which item is the heaviest? Which items have 4 wheels?  -pt completed with 84% acc indep, improved to 94% acc given min cues     Other areas targeted: N/a    Education:   SLP edu pt re: attention to left strategies     Safety Devices: [x] Call light within reach  [] Chair alarm activated  [x] Bed alarm activated  [x] Other:  at bedside [] Call light within reach  [] Chair alarm activated  [] Bed alarm activated  [] Other:    Assessment:` Pt alert and cooperative, agreeable to tx. Majority of session did target left neglect. Pt overall with improved insight and problem solving re: needing to look to left if she cannot find something. Pt required mod cues to increase acc to 100%, which is improvement from previous sessions. Pt with most difficulty telling time due to left neglect - required MAX cues to increase acc. Pt often with an answer that was close (see above) with telling time, but not accurate. Continue goals above. Plan: Continue as per plan of care.       Additional Information:     Barriers toward progress: Limited safety awareness, Limited insight into deficits, Decreased proprioception, Upper extremity weakness and Lower extremity weakness  Discharge recommendations:  [] Home independently  [] Home with assistance [x]  24 hour supervision  [] ECF [] Other:  Continued Tx Upon Discharge: ? [x] Yes [] No [] TBD based on progress while on ARU [] Vital Stim indicated [] Other:   Estimated discharge date: 06/07/2022    Interventions used this date:  [] Speech/Language Treatment  [] Instruction in HEP [] Group [] Dysphagia Treatment [x] Cognitive Treatment   [] Other:       Total Time Breakdown / Charges    Time in Time out Total Time / units   Cognitive Tx 0800 0900 60 min / 4 units   Speech Tx -- -- --   Dysphagia Tx -- -- --       Electronically Signed by     Demian Wilson MA CCC-SLP #[id number]  Speech Language Pathologist

## 2022-06-03 NOTE — PROGRESS NOTES
Interval History and plan:      Renal function is stable  BP is high now  Skin peeling up     Plan:  Agree with resuming BP meds                         Assessment :     Acute Kidney Injury  Creatinine 1.1 at the time of consult, as she might have chronic kidney disease  EDYTA likely due to -rhabdomyolysis/poor p.o. intake  Cr on consultation 2.1 when she was in the acute hospital  Baseline Cr-0.8 on 9/21  No recent baseline available-she was admitted to [facility] March 2022 with the stroke and was told that she has high creatinine    UA-5/22-large blood, trace LE  Renal Imaging:-5/22-right-10.7 cm, simple 2.2 cm right renal cyst  No mention of left kidney  Echo: 10/18-EF normal, no mention of diastolic dysfunction    Hypertension   BP: (171)/(77)  Heart Rate:  [78]   BP goal inpatient [id number] systolic inpatient    Rhabdomyolysis  CPK more than 10,000 on arrival-down to 8.2   Thought to be induced by statin  Has elevated AST and ALT    CML  Diabetes mellitus type 2 new  Carotid artery stenosis-plan for outpatient procedure    [facility] would like to thank Eri Dennis MD   for opportunity to serve this patient      Please call with questions at-   24 Hrs Answering service [phone] or  7 am- 5 pm via Perfect serve or cell phone  Ed MD Terry          CC/reason for consult :     Severe hypertension   HPI :     Laurie Nieves is a 66 y.o. female presented to   the hospital on 5/10/2022 with EDYTA and statin induced rhabdomyolysis to the acute hospital.  She was treated for both. She recently had a stroke and he may paralysis for which she was seen by neurologist.  She was found to have carotid artery stenosis for which she was seen by vascular surgeon. Plan for endarterectomy as an outpatient. Once her renal function was a stable and rhabdomyolysis improved she was admitted to acute rehab unit for further care    At the rehab.   Her blood pressure has gone up significantly because of which we are consulted    ROS:     Seen with-no family    positives in bold   Constitutional:  fever, chills, weakness, weight change, fatigue  Skin:  rash, pruritus, hair loss, bruising, dry skin, petechiae  Head, Face, Neck   headaches, swelling,  cervical adenopathy  Respiratory: shortness of breath, cough, or wheezing  Cardiovascular: chest pain, palpitations, dizzy, edema  Gastrointestinal: nausea, vomiting, diarrhea, constipation,belly pain    Yellow skin, blood in stool  Musculoskeletal:  back pain, muscle weakness, gait problems,       joint pain or swelling. Genitourinary:  dysuria, poor urine flow, flank pain, blood in urine  Neurologic:  vertigo, TIA'S, syncope, seizures, focal weakness  Psychosocial:  insomnia, anxiety, or depression. Additional positive findings:                    All other remaining systems are negative or unable to obtain        PMH/PSH/SH/Family History:     Past Medical History:   Diagnosis Date    Anemia     Arthritis     Asthma     Bowel dysfunction     CML (chronic myelocytic leukemia) ([facility] [address] 01/2006    leukemia    Hyperlipidemia     Hypertension     Nuclear senile cataract of both eyes 11/25/2019    Obesity     Risk of myocardial infarction or stroke 7.5% or greater in next 10 years 9/15/2021    Skin cancer of face     left cheek, squamous    Stroke (cerebrum) ([facility] [address]     Thyroid disease     TIA (transient ischemic attack)     mini ones-from chemo       Past Surgical History:   Procedure Laterality Date    ANUS SURGERY  1998    fissure    BREAST BIOPSY      BREAST LUMPECTOMY      benign    COLONOSCOPY      numerous polyps    ELBOW SURGERY  1960    [place]oval of foreign body [place]    FINE NEEDLE ASPIRATION      PARACENTESIS      x 2 fluid in lung from Chemo    TONSILLECTOMY Bilateral         reports that she has never smoked. She has never used smokeless tobacco. She reports that she does not drink alcohol and does not use drugs.     family history includes Arrhythmia in her sister; Cancer in her brother, father, mother, and sister.          Medication:     Current Facility-Administered Medications: [START ON 6/4/2022] predniSONE (DELTASONE) tablet 40 mg, 40 mg, Oral, Daily  bisacodyl (DULCOLAX) suppository 10 mg, 10 mg, Rectal, Once  famotidine (PEPCID) injection 20 mg, 20 mg, IntraVENous, BID  magic (miracle) mouthwash with nystatin, 5 mL, Swish & Spit, 4x Daily  hydrOXYzine (ATARAX) tablet 10 mg, 10 mg, Oral, TID PRN  cetirizine (ZYRTEC) tablet 10 mg, 10 mg, Oral, Daily  insulin lispro (HUMALOG) injection vial 0-12 Units, 0-12 Units, SubCUTAneous, TID WC  insulin lispro (HUMALOG) injection vial 0-6 Units, 0-6 Units, SubCUTAneous, Nightly  lidocaine 1 % injection 5 mL, 5 mL, IntraDERmal, Once  sodium chloride flush 0.9 % injection 5-40 mL, 5-40 mL, IntraVENous, 2 times per day  sodium chloride flush 0.9 % injection 5-40 mL, 5-40 mL, IntraVENous, PRN  0.9 % sodium chloride infusion, 25 mL, IntraVENous, PRN  guaiFENesin (MUCINEX) extended release tablet 600 mg, 600 mg, Oral, BID PRN  sennosides-docusate sodium (SENOKOT-S) 8.6-50 MG tablet 2 tablet, 2 tablet, Oral, Daily  lactulose (CHRONULAC) 10 GM/15ML solution 20 g, 20 g, Oral, BID  NIFEdipine (PROCARDIA XL) extended release tablet 30 mg, 30 mg, Oral, Daily  traZODone (DESYREL) tablet 50 mg, 50 mg, Oral, Nightly PRN  metoprolol tartrate (LOPRESSOR) tablet 25 mg, 25 mg, Oral, BID  acetaminophen (TYLENOL) tablet 650 mg, 650 mg, Oral, Q6H PRN **OR** acetaminophen (TYLENOL) suppository 650 mg, 650 mg, Rectal, Q6H PRN  heparin (porcine) injection 5,000 Units, 5,000 Units, SubCUTAneous, TID  ondansetron (ZOFRAN-ODT) disintegrating tablet 4 mg, 4 mg, Oral, Q8H PRN **OR** ondansetron (ZOFRAN) injection 4 mg, 4 mg, IntraVENous, Q6H PRN  aluminum & magnesium hydroxide-simethicone (MAALOX) 200-200-20 MG/5ML suspension 30 mL, 30 mL, Oral, Q6H PRN  bisacodyl (DULCOLAX) suppository 10 mg, 10 mg, Rectal, Daily PRN  aspirin EC tablet 81 mg, 81 mg, Oral, Daily  carboxymethylcellulose PF (THERATEARS) 1 % ophthalmic gel 1 drop, 1 drop, Both Eyes, Q12H  clopidogrel (PLAVIX) tablet 75 mg, 75 mg, Oral, Daily  dextrose 5 % solution, 100 mL/hr, IntraVENous, PRN  dextrose bolus 10% 125 mL, 125 mL, IntraVENous, PRN **OR** dextrose bolus 10% 250 mL, 250 mL, IntraVENous, PRN  diclofenac sodium (VOLTAREN) 1 % gel 4 g, 4 g, Topical, 4x Daily  glucagon (rDNA) injection 1 mg, 1 mg, IntraMUSCular, PRN  glucose (GLUTOSE) 40 % oral gel 15 g, 15 g, Oral, PRN  hydrocortisone 1 % cream, , Topical, BID  levothyroxine (SYNTHROID) tablet 50 mcg, 50 mcg, Oral, Daily  magnesium oxide (MAG-OX) tablet 400 mg, 400 mg, Oral, Daily  nilotinib (TASIGNA) capsule 200 mg, 200 mg, Oral, Q12H  witch hazel-glycerin (TUCKS) pad, , Topical, PRN       Vitals :     Vitals:    06/03/22 0925   BP: (!) 171/77   Pulse: 78   Resp: 18   Temp: 97.4 °F (36.3 °C)   SpO2: 96%       I & O :       Intake/Output Summary (Last 24 hours) at 6/3/2022 1154  Last data filed at 6/3/2022 1911  Gross per 24 hour   Intake 1020 ml   Output --   Net 1020 ml        Physical Examination :     General appearance: Anxious- no, distressed- no, in good spirits-  Yes  HEENT: Lips- normal, teeth- ok , oral mucosa- moist  Neck : Mass- no, appears symmetrical, JVD- not visible  Respiratory: Respiratory effort-  normal, wheeze- no, crackles -   Cardiovascular:  Ausculation- No M/R/G, Edema none  Abdomen: visible mass- no, distention- no, scar- no, tenderness- no                            hepatosplenomegaly-  no  Musculoskeletal:  clubbing no,cyanosis- no , digital ischemia- no                           muscle strength- grossly normal , tone - grossly normal  Skin: rashes- no , ulcers- no, induration- no, tightening - no  Psychiatric:  Judgement and insight- normal           AAO X 3  Additional finding:   Left sided weakness       LABS:     Recent Labs     06/01/22  0540 06/02/22  0550 06/03/22  0650   WBC 17.3* 19.1* 15.7*   HGB 8.9* 8.5* 8.5*   HCT 27.1* 26.2* 25.8*    199 193     Recent Labs     06/01/22  0540 06/02/22  0550 06/03/22  0650    144 143   K 4.4 3.7 4.8    113* 112*   CO2 21 20* 21   BUN 37* 36* 52*   CREATININE 0.8 0.9 1.2   GLUCOSE 197* 165* 152*

## 2022-06-03 NOTE — CARE COORDINATION
No call made to patient at this time. Patient is currently inpatient rehab at [facility].   Will continue to monitor for D/C

## 2022-06-03 NOTE — PROGRESS NOTES
Hospitalist Progress Note    Date of Admission: 5/10/2022    Chief Complaint: Rash    Hospital Course:     66 y.o. female who we are asked to see/evaluate by Marissa Johnson MD for medical management. Patient was recently hospitalized to our service appointment today for acute CVA and subsequently discharged to the nursing medicine ARU on 5/10/2022 for rehabilitation. Recently her rehab course has been complicated by diffuse rash. This was first noted on the evening of Thursday, 5/26/2022. Rash consisted of diffuse erythema affecting the trunk back and extremities. The rash was diffusely itchy. There was initial concern that this may be a drug reaction related to simply start medication. Most recently started medications with tramadol and nystatin, tramadol seeming more likely was discontinued first, subsequently a statin was discontinued as well. Around the same time they became concerned for possible infection as she was noted to have a marked change in her white blood cell count. She was started on several antibiotics, however all of these were started after rash was noted. Of note she was treated with a course of antibiotics for aspiration pneumonia earlier in her rehab stay, however, most recent infectious work-up was unrevealing with normal urinalysis, negative chest x-ray, no other focal signs or infection. She has been treated with p.o. Benadryl for the last few days. On 5/30/2022 he had a brief syncopal episode while toileting. Patient was thought to be vasovagal induced. Hospital medicine was consulted for further input. She reported diffuse itching symptoms and tenderness. The rash was starting to exfoliate. She noted some soreness in the mouth although no sores or ulcerations were noted. She did have some mild conjunctivitis. She continued to have left-sided weakness as well as left-sided neglect consistent with her prior CVA. Subjective: Feels well. BP high.  Larger areas of skin starting to exfoliate. Labs:   Recent Labs     06/01/22  0540 06/02/22  0550 06/03/22  0650   WBC 17.3* 19.1* 15.7*   HGB 8.9* 8.5* 8.5*   HCT 27.1* 26.2* 25.8*    199 193     Recent Labs     06/01/22  0540 06/02/22  0550 06/03/22  0650    144 143   K 4.4 3.7 4.8    113* 112*   CO2 21 20* 21   BUN 37* 36* 52*   CREATININE 0.8 0.9 1.2   CALCIUM 8.4 8.4 8.6     Recent Labs     06/01/22  0540   AST 38*   ALT 93*   BILIDIR <0.2   BILITOT 0.3   ALKPHOS 321*     No results for input(s): INR in the last 72 hours. Physical Exam Performed:    BP (!) 171/81   Pulse 73   Temp (!) 96.6 °F (35.9 °C) (Axillary)   Resp 18   Ht 5' 2.5\" (1.588 m)   Wt 150 lb 12.7 oz (68.4 kg)   SpO2 96%   BMI 27.14 kg/m²     General appearance: No apparent distress, appears stated age and cooperative. HEENT: Normal cephalic, atraumatic without obvious deformity. Pupils equal, round, and reactive to light. Extra ocular muscles intact. Mild conjunctival injection bilaterally. No obvious oral ulcerations are noted. Neck: Supple, with full range of motion. No jugular venous distention. Trachea midline. Respiratory:  Normal respiratory effort. Clear to auscultation, bilaterally without Rales/Wheezes/Rhonchi. Cardiovascular: Regular rate and rhythm with normal S1/S2 without murmurs, rubs or gallops. Abdomen: Soft, non-tender, non-distended with normal bowel sounds. Musculoskeletal: No clubbing, cyanosis or edema bilaterally. Skin: Diffuse erythematous rash over the trunk, back, extremities and face. The rash continues to evolve (healing phase), with new areas of superficial desquamation. Neurologic: Stable left-sided weakness. Cranial nerves: II-XII intact, grossly non-focal.  Psychiatric: Alert and oriented with normal insight.    Capillary Refill: Brisk,3 seconds, normal   Peripheral Pulses: +2 palpable, equal bilaterally     Assessment/Plan:    Active Hospital Problems    Diagnosis     Acute CVA (cerebrovascular accident) [facility]) [I63.9]      Priority: Medium     Drug Rash: Most consistent with a drug rash. Although the offending agent is somewhat unclear given the timeline, to most closely associated medications were tramadol and nystatin. Agree with avoiding these medications. There are no other obvious medication culprits of medications have been given for a week or longer prior to the onset of symptoms. Nilotinib was held empirically given the evolving drug rash, although given long standing use, would consider this a less likely culprit. While she was receiving some antibiotics they were all initiated after rash onset. Given that active infection was considered less likely at this time would favor avoidance of further antibiotics so as not to further confuse the situation if the drug rash continues to evolve. The rash appears to have stabilized with no new areas. She is now having larger areas of superficial desquamation, which may just be part of the evolution/resolution of this process.   -Continue Atarax for itching as needed.   -Change Solumedrol to Prednisone 40 mg PO daily x 3 more days.   -Continue IV Pepcid IV and Zyrtec for a few more days  -BP stable and now high, will unhold BP regimen and avoid additional IVF  -OK to unhold Nilotinib    Leukocytosis: Given the strong correlation of the Lleukocytosis with the onset of the rash, suspect this is all related. Although she was previously treated for aspiration pneumonia during her rehab stay there is currently no objective evidence for respiratory infection. Likewise the UA/UCx and BCx results also argue against active infection. Mild lactic acidosis was likely related to relative hypotension/hypovolemia setting of ongoing allergic reaction. As above, favor avoiding further antibiotics at this time.      Recent Acute right hemispheric MCA stroke with left sided weakness and neglect  - MRI brain 5/6/2022 showed areas of acute infarct involving the right cerebral hemisphere predominantly in a somewhat watershed distribution.  - CTA neck 5/9/2022 showed significant stenosis within the right proximal cervical ICA with approximately 90-95% stenosis per NASCET criteria. It also showed high grade stenoses throughout bilateral cervical vertebral arteries.   - neurology and vascular were previously consulted and agreed risk of urgent CEA outweighed the benefit given high risk for reperfusion injury or hemorrhagic conversion. Plans are for her to follow-up with Vascular in 4 weeks for further management.   - Continue stroke management with plavix and aspirin. Statin remains on hold d/t LFT changes and rhabdomyolysis episode recently.      Recent EDYTA/Rhabdo episode  -Resolved  -Nephrology has been following intermittently in ARU  -Renal function and BP stable.  -Holding IVF  -Monitor    Hypertension  -As above, concern for hypovolemia related to Allergic Reaction, although now improved and BP above goal  -Resume BP regimen with hold parameters  -Monitor      DM2  - Recently diagnosed with hemoglobin a1c 6.6 on 5/2/2022  - Continue monitoring with medium SSI while on steroids    Syncopal episodes  - Most recently 5/30/22.  - Consistent with vasovagal episodes although now with some concern for hypovolemia in setting of allergic reaction    Liver injury with elevated LFT's   - Occurred during recent admission; thought to be most likely due to statin induced rhabdo  - Statin remains on hold  - LFTs improved previously; recheck shows mild elevation.   - Follow up with PCP after discharge to repeat CMP.       CML   - Stable  - Patient follows Dr. Aj Mcdermott home, held initially but resumed for past few weeks  - Nilotinib held again given evolving drug rash, although given long standing use, was considered less likely  - Nilotinib can be resumed  - Follow up with heme/onc as scheduled.      Enlarged heterogeneous appearance of the thyroid gland  -Previously noted on CTA  - Thyroid ultrasound is recommended  - Consider following up with PCP after discharge.      Hemorrhoids  -  hydrocortisone cream    Appears stable/improving. No objections to transition to SNF in the next few days if she continues to improve. Will follow PRN, call with questions.      Eric Ashby MD

## 2022-06-03 NOTE — PROGRESS NOTES
Occupational Therapy  Facility/Department: [facility]  Rehabilitation Occupational Therapy Daily Treatment Note    Date: 6/3/22  Patient Name: Emy Cobb       Room: [facility]/0431-85  MRN: [id number]  Account: [de-identified]   : 1950  (76 y.o.) Gender: female                    Past Medical History:  has a past medical history of Anemia, Arthritis, Asthma, Bowel dysfunction, CML (chronic myelocytic leukemia) ([facility] [address] Hyperlipidemia, Hypertension, Nuclear senile cataract of both eyes, Obesity, Risk of myocardial infarction or stroke 7.5% or greater in next 10 years, Skin cancer of face, Stroke (cerebrum) ([facility] [address] Thyroid disease, and TIA (transient ischemic attack). Past Surgical History:   has a past surgical history that includes Breast biopsy; fine needle aspiration; Breast lumpectomy; Tonsillectomy (Bilateral); Paracentesis; Anus surgery (); Elbow surgery (); and Colonoscopy. Restrictions  Restrictions/Precautions: Fall Risk;Up as Tolerated;Isolation  Other position/activity restrictions: chemo precautions, L inattention, LUE flaccid, resting hand splint to be worn at night. Subjective  Subjective: Pt in bed agreeable to OT/PT.  present for session. Restrictions/Precautions: Fall Risk;Up as Tolerated;Isolation             Objective  Vision - Basic Assessment  Patient Visual Report: Balance difficulty  Cognition  Overall Cognitive Status: Exceptions  Arousal/Alertness: Appropriate responses to stimuli;Delayed responses to stimuli  Following Commands: Follows one step commands with repetition; Follows multistep commands with repitition  Attention Span: Attends with cues to redirect  Memory: Decreased recall of precautions  Safety Judgement: Decreased awareness of need for safety;Decreased awareness of need for assistance  Problem Solving: Assistance required to correct errors made;Assistance required to identify errors made  Insights: Decreased awareness of deficits  Initiation: Requires cues for some  Sequencing: Requires cues for some  Cognition Comment: L inattention  Orientation  Overall Orientation Status: Within Functional Limits  Orientation Level: Oriented X4   Perception  Overall Perceptual Status: Impaired  Unilateral Attention: Cues to attend left visual field;Cues to attend to left side of body;Cues to maintain midline in sitting;Cues to maintain midline in standing  Initiation: Cues to initiate tasks  Motor Planning: Hand over hand to sequence tasks  Perseveration: Perseverates during conversation     ADL  Feeding  Assistance Level: Stand by assist  Skilled Clinical Factors: assistance to cut food, open packages and bring cup to hand  Grooming/Oral Hygiene  Assistance Level: Increased time to complete;Stand by assist;Set-up  Skilled Clinical Factors: washing face, brushing teeth, and brushing hair seated in WC  Upper Extremity Dressing  Assistance Level: Minimal assistance; Moderate assistance  Skilled Clinical Factors: Moderate cues for papo techniques. Pt completes UB dressing in supported sitting in WC. Pt requiring partial assistance to thread BUE and doff/don clothing over abdomen. Pt able to manage clothing over head with cues and extra time  Lower Extremity Dressing  Assistance Level: Dependent;Verbal cues  Skilled Clinical Factors: x2 assist sit<>stand to manage pants up/down; in STEDY  Putting On/Taking Off Footwear  Assistance Level: Dependent  Skilled Clinical Factors: donning socks  Toileting  Assistance Level: Dependent  Skilled Clinical Factors: Assistance x2; Standing in [employer]  Toilet Transfers  Technique:  (steady)  Equipment: Beside commode  Additional Factors: Verbal cues;Cues for hand placement; Increased time to complete; With handrails  Assistance Level: Dependent  Skilled Clinical Factors: maxA x1-2  sit <> Stand from [facility] to [facility] to [facility] with steady          Functional Mobility  Device: Wheelchair  Activity: To/From therapy gym;Retrieve items;Transport items; To/From bathroom  Assistance Level: Minimal assistance; Moderate assistance  Skilled Clinical Factors: modA X2 sit <> stand to steady EOB to BSC to TTB to WC, WC mobility room <> gym modA wiht max cues for L sided attn and sequencing  Bed Mobility  Overall Assistance Level: Maximum Assistance  Additional Factors: Head of bed raised; Increased time to complete;Set-up; Verbal cues  Bridging  Assistance Level: Moderate assistance  Roll Left  Assistance Level: Moderate assistance  Roll Right  Assistance Level: Moderate assistance  Sit to Supine  Assistance Level: Moderate assistance  Supine to Sit  Assistance Level: Moderate assistance  Scooting  Assistance Level: Moderate assistance  Transfers  Surface: From bed; Wheelchair; To mat  Additional Factors: Increased time to complete;Hand placement cues; Verbal cues  Device: Lift equipment  Sit to Stand  Assistance Level: Requires x 2 assistance;Maximum assistance  Skilled Clinical Factors: modA x1 Mita x1 sit <>  steady from EOB to [facility], sit <> stand with PT cotx standing to grab bar vs rail on R side and use of steady for support, max cues and Mita x2 for trunk/pelvic support and midline alignment, poor sequencing and attn throughout  Stand to Sit  Assistance Level: Moderate assistance; Requires x 2 assistance  Skilled Clinical Factors: modA x1 Mita x1 sit <>  steady from EOB to [facility], sit <> stand with PT cotx standing to grab bar vs rail on R side and use of steady for support, max cues and Mita x2 for trunk/pelvic support and midline alignment, poor sequencing and attn throughout  Bed To/From Chair  Technique: Sit pivot  Assistance Level: Maximum assistance; Requires x 2 assistance; Moderate assistance  Skilled Clinical Factors: modA x1 Mita x1 sit <>  steady from EOB to [facility], sit <> stand with PT cotx standing to grab bar vs rail on R side and use of steady for support, max cues and Mita x2 for trunk/pelvic support and midline alignment, poor sequencing and attn throughout  Stand Pivot  Assistance Level: Moderate assistance; Requires x 2 assistance  Skilled Clinical Factors: modA x1 Mita x1 sit <>  steady from EOB to [facility], sit <> stand with PT cotx standing to grab bar vs rail on R side and use of steady for support, max cues and Mita x2 for trunk/pelvic support and midline alignment, poor sequencing and attn throughout  Sit Pivot  Assistance Level: Maximum assistance; Requires x 2 assistance  Skilled Clinical Factors: modA x1 Mita x1 sit <>  steady from EOB to WC, WC <> BSC, sit <> stand with PT cotx standing to grab bar vs rail on R side and use of steady for support, max cues and maxA x2 for trunk/pelvic support and midline alignment, poor sequencing and attn throughout   Neuromuscular Education  NDT Treatment: Upper extremity  Head/Neck Control: Pt continues to demo decreased ability to fully turn head/scan L vs R during transfers and ADL tasks. Pt needing mod cues for scanning and asisstance for upright posture in stance with limited ability to sustain upright posture  Trunk Control: Pt continues to demo L sided neglect; Pt tolerated supported sitting EOB, BSC, and TTB in order to increase functional core strength for ADLs. Pt does need consistent tactile cues with limited ability to maintain posture  Weight Bearing  RUE Weight Bearing: Extended arm standing; Extended arm seated  LUE Weight Bearing: Extended arm seated; Extended arm standing  Response To Weight Bearing Technique: WBing through LUE AAROM and hand over hand during sit <> stands with RUE support on steady and to grab bar vs rail R side, WBing through LUE wiht elbow extension and wrist support from OT while PT providing WBing/support of LLE during tasks. ROM to // bars provided due to increased tone in LUE; subluxation sling placed on patients LUE to provide support  OT Exercises  Exercise Treatment: AAROM using RUE as active assistance for LUE 1x15 reps. Exercises completed sitting in w/c.  OT provides moderate cues for body positioning and movement  PROM Exercises: LUE flaccid this date. Educated pt on self PROM with RUE assist for elbow, wrist, and digital flex/ext. Static Sitting Balance Exercises: Pt tolerated sitting at EOB for ~10 mins. Initially pt requiring mod A, progresssing to Min A/CGA; Pt completed forward reaching to heart level with RUE  Dynamic Standing Balance Exercises: Standing in hallway to grab bar R side, mirror for visual feedback reaching across midline to resistive clothespins with R hand, upright posture increased with static vs dynamic standing, able to get to midline with less tactile and verbal cueing from therapy. Postural Correction Exercises: Cues for orienting to midline. Forward trunk flexion/extension x5     Assessment  Assessment  Assessment: Patient demonstrating increased sitting midline posture in BSC, WC, and EOB this date with less need for assistance and cues for midline alignment. Pt was able to tolerate prolonged and multiple sit <>  steady for ADLs as well as in hallway for functional mobility/dynamic reaching and NMR. Pt needing modA x2 for weight shifting L vs R, increased LLE ROM and placement this date. Pt needing PRN rest breaks but tolerated session well. Cont OT POC. Activity Tolerance: Patient limited by fatigue;Patient limited by pain  Discharge Recommendations: Subacute/Skilled Nursing Facility  OT Equipment Recommendations  Other: defer to next d/c facility  Safety Devices  Safety Devices in place: Yes  Type of devices: Gait belt; All fall risk precautions in place; Patient at risk for falls; Left in chair;Call light within reach; Chair alarm in place;Nurse notified  Restraints  Initially in place: No    Patient Education  Education  Education Given To: Patient  Education Provided: Role of Therapy;Plan of Care;Safety;Equipment;ADL Function; Fall Prevention Strategies;Transfer Training  Education Provided Comments: bed mobility, sitting balance, safety and hand placement with transfers, hemistrategies for UB bathing/dressing, skin checks for resting hand splint, AAROM LUE exercises, weightshifting during ambulation/standing, standing balance  Education Method: Demonstration;Verbal  Barriers to Learning: Cognition  Education Outcome: Verbalized understanding;Demonstrated understanding;Continued education needed    Plan  Plan  Times per Week: 5 out of 7 days  Times per Day: Daily  Plan Weeks: 3 weeks  Current Treatment Recommendations: Strengthening;ROM;Balance training;Functional mobility training; Endurance training; Safety education & training;Neuromuscular re-education;Patient/Caregiver education & training;Equipment evaluation, education, & procurement;Self-Care / ADL;Cognitive/Perceptual training; Wheelchair mobility training;Positioning    Goals  Patient Goals   Patient goals :  \"Go home\" \"Get stronger\" \"walk better\"  Short Term Goals  Time Frame for Short term goals: 10 days (5/20/22)-EXTEND TO 5/25 2/2 PROGRESS  Short Term Goal 1: Pt will complete functional transfers with Mita x2 with LRAD- progressing, maxA x2 sit pivot vs stand pivot transfer-EXTEND TO 5/25-GOAL MET 5/25, CTA  Short Term Goal 2: Pt will complete toileting/transfer modA x2 with LRAD and DME PRN-GOAL MET 5/16, modA x2 steady, CTA  Short Term Goal 3: Pt will complete LUE AAROM/AROM exercises to LUE to increase strength/endurance for ADLs/transfers-GOAL MET 5/18, CTA  Short Term Goal 4: Pt will be able to locate 3/5 object in L visual field with min cues during ADLs/activities-GOAL MET 5/18,CTA  Additional Goals?: No  Long Term Goals  Time Frame for Long term goals : 21 days (3/31/22)-EXTEND TO DC DATE, 6/7  Long Term Goal 1: Pt will complete functional transfers/mobility SPV with LRAD-DOWGRADE DUE TO PROGRESS: maxA x1  Long Term Goal 2: Pt will complete UE dressing/bathing sitting setup with min cues for papo techniques-DOWNGRADE DUE TO PROGRESS: Mita  Long Term Goal 3: Pt will complete toileting/transfers with LRAD and DME PRN SPV-DOWGRADE DUE TO PROGRESS: maxA x1  Long Term Goal 4: Pt will complete opening 3/5 containers with compensatory techniques sitting setup/SPV        Therapy Time   Individual Concurrent Group Co-treatment   Time In       0930   Time Out       1030   Minutes       60   Timed Code Treatment Minutes: [address], OTR/L

## 2022-06-03 NOTE — PROGRESS NOTES
Nicole Flores  6/3/2022  [id number]    Chief Complaint: Acute CVA (cerebrovascular accident) [facility])    Subjective:   No acute events overnight. Today Lillian Adamson is seen working with therapy. She is now able to raise her left leg. She reports continued itching and skin peeling. She denies other acute complaints at this time. ROS: denies f/c, n/v, sob, cp    Objective:  Patient Vitals for the past 24 hrs:   BP Temp Temp src Pulse Resp SpO2   06/03/22 0925 (!) 171/77 97.4 °F (36.3 °C) Oral 78 18 96 %   06/02/22 2215 (!) 171/81 (!) 96.6 °F (35.9 °C) Axillary 73 18 96 %   06/02/22 1136 (!) 144/71 -- -- 84 -- 97 %     Gen: No distress, pleasant. Seated in wheelchair  HEENT: Normocephalic, atraumatic. CV: extremities well perfused  Resp: No respiratory distress. On room air  Abd: Soft, nondistended  Ext: No edema. Neuro: Alert, oriented, appropriately interactive. Left hemiparesis. Able to extend knee  Psych: mood and affect appropriate  Skin: diffuse erythematous rash over chest, back, arms and legs improving from prior, [place]     Wt Readings from Last 3 Encounters:   05/10/22 150 lb 12.7 oz (68.4 kg)   05/02/22 143 lb 9.6 oz (65.1 kg)   09/09/21 150 lb 12.8 oz (68.4 kg)       Laboratory data:   Lab Results   Component Value Date    WBC 15.7 (H) 06/03/2022    HGB 8.5 (L) 06/03/2022    HCT 25.8 (L) 06/03/2022    MCV 99.2 06/03/2022     06/03/2022       Lab Results   Component Value Date     06/03/2022    K 4.8 06/03/2022     06/03/2022    CO2 21 06/03/2022    BUN 52 06/03/2022    CREATININE 1.2 06/03/2022    GLUCOSE 152 06/03/2022    GLUCOSE 129 07/26/2017    CALCIUM 8.6 06/03/2022        Therapy progress:  PT  Position Activity Restriction  Other position/activity restrictions: chemo precautions, L inattention, LUE flaccid, resting hand splint to be worn at night.   Objective     Sit to Stand: 2 Person Assistance,Dependent/Total  Stand to sit: Dependent/Total,2 Person Assistance  Bed to Chair: Dependent/Total     OT  PT Equipment Recommendations  Equipment Needed: Yes  Other: CTA pending progress with mobility  Toilet - Technique:  (steady to e-tac chair over toilet)  Equipment Used:  (e-tac chair)  Toilet Transfers Comments: maxA x2 sit <>  steady, progressing at times to modA X2 with max verbal/tactile and demo cues  Assessment        SLP                Body mass index is 27.14 kg/m². Assessment and Plan:  Ellie Torres is a 66year old female with a past medical history significant for recent CVA with left hemiparesis, CML, HTN, and HLD who presented to [facility] on 5/3/22 with abnormal labs, admitted with rhabdomyolysis and ARF, found to have acute CVA. She was admitted to [facility] on 5/10/22 due to functional deficits below her baseline.     Acute Right CVA  - MRI showing acute infarct in right cerebral hemisphere in a watershed distribution  - CTA showing 90-95% stenosis of right ICA  - Vascular and Neurology in agreement on waiting for CEA, needs follow up in 4 weeks  - asa, plavix, statin  - PT, OT, ST    Dysphagia  - upgraded to regular diet  - ST    LOC 5/30  - suspect vasovagal as patient was on toilet. Vitals normal, although patient does have severe stenosis of right ICA so low normal blood pressure for her may be too low. Patient also appearing dehydrated on labs with new EDYTA. Also with worsening leukocytosis.    - discontinued lisinopril to keep patient blood pressure higher, also discontinued in setting of EDYTA  - s/p IVF  - no further epsiodes    Leukocytosis  - Medicine consulted, appreciate assistance  - suspected to be due to allergic reaction and steroids  - abx discontinued  - trending down  - continue to monitor    Drug Rash  - suspected due to tramadol, but had not improved over the weekend since being off of tramadol  - hold nystatin, this is the only other drug that correlates with timing of rash  - management per Medicine, appreciate assistance  - transitioning to oral steroids     Rhabdomyolysis  Acute Renal Injury  - Nephrology followed during acute stay  - worsening kidney function associated with hypotension, now improving  - Medicine and Nephro following     History of HTN  - now with hypotension  - BP meds per Medicine and Nephro    DM2  - Hba1c 6.6  - SSI resumed while on steroids, per medicine  - follow up with PCP     Liver Injury with elevated LFTs  - suspected to be due to statin induced rhabdo  - statin stopped  - mild increased suspected due to hypotension     CML  - follows with Dr. Laure Davila  - nilitinib  - chemo precautions  - follow up with Heme/Onc OP     Enlarged heterogeneous appearance of the thyroid  - Thyroid ultrasound outpatient  - follow up with PCP     Hemorrhoids  - hydrocortisone cream   - bowel regimen to avoid constipation    Chest Pain, resolved  - EKG and troponin unremarkable  - continue to monitor    Abd pain, resolved  - XR abdomen with moderate stool burden  - continue bowel regimen    RLL Pneumonia, resolved  - concern for aspiration in setting of dysphagia  - completed course of levaquin   - IS      Bowels: Schedule stool softener. Follow bowel movements. Enema or suppository if needed.      Bladder: Check PVR x 3. [address] if PVR > 200ml or if any volume is > 500 ml.      Sleep: Trazodone provided prn. PPx  DVT: heparin  GI: not indicated     Follow up appointments: Vascular, PCP    Services: SNF  EDOD: 6/7/22    Steph Latif.  Ondina Chand MD 6/3/2022, 11:31 AM

## 2022-06-03 NOTE — PROGRESS NOTES
Physical Therapy  Facility/Department: [facility]  Rehabilitation Physical Therapy Treatment Note    NAME: Aron Casas  : 1950 (66 y.o.)  MRN: [id number]  CODE STATUS: Full Code    Date of Service: 6/3/22       Restrictions:  Restrictions/Precautions: Fall Risk;Up as Tolerated;Isolation  Position Activity Restriction  Other position/activity restrictions: chemo precautions, L inattention, LUE flaccid, resting hand splint to be worn at night. SUBJECTIVE  Subjective  Subjective: found in bed  Pain: denies pain       OBJECTIVE  Cognition  Overall Cognitive Status: Exceptions  Arousal/Alertness: Appropriate responses to stimuli;Delayed responses to stimuli  Following Commands: Follows one step commands with repetition; Follows multistep commands with repitition  Attention Span: Attends with cues to redirect  Memory: Decreased recall of precautions  Safety Judgement: Decreased awareness of need for safety;Decreased awareness of need for assistance  Problem Solving: Assistance required to correct errors made;Assistance required to identify errors made  Insights: Decreased awareness of deficits  Initiation: Requires cues for some  Sequencing: Requires cues for some  Cognition Comment: L inattention  Orientation  Overall Orientation Status: Within Functional Limits  Orientation Level: Oriented X4    Functional Mobility      Supine to sit with mod A of 2 and bed controls  Sit pivot EOB to wc with mod A of 2. Stand pivot w/c <> commode with mod-max a of 2 and grab bar. Pt requires cues to attend to L side (pt attempting to sit back on commode vs w/c when done on toilet 2* L side neglect)   Max a of 1 for static standing balance at commode while OT assisted with pericare and clothing management. Pt demo's marked improvement in w/c mobility. Propelling wc x 150 ft with SBA, minimal cues to maintain linear path and avoid obstacles with use of RLE to propel.     Sit to stand w/c to carranza rail x 3 reps with min A of 2 and pulling on rail   Pt initially requires mod A of 2 for static standing balance progressing to CGA+ min A of 1 with improved midline posture, improved COG with RUE support   Pt ambulated x 8 ft with carranza rail x 3 reps with mod A from PT for LLE stability/ mobility during swing/stance phase and min-mod A from OT for weight shifting and balance however pt demo's improved upright posture during gait trials. Pt completed 3 bouts of reaching for clothespin with RUE placed anterior and placing to target across midline x 3 reps each. Pt initially demo's improved midline upright posture however as fatigue sets in, demo's increased trunk flexion with posterior COG resulting in LOB and requiring increased assistance. Pt in w/c at end of session with call light/needs within reach. ASSESSMENT/PROGRESS TOWARDS GOALS  Vital Signs  Heart Rate: 78  Heart Rate Source: Monitor  BP: (!) 171/77  BP Location: Left upper arm  MAP (Calculated): 108.33  SpO2: 96 %  O2 Device: None (Room air)    Assessment  Assessment: co treat with OT for increased safety progressing functional mobility. pt continus to require A of 2 for mobility however showed progress this am in her midline orientation sitting and standing. pt also progressing her L quad strength, demo's 2-/5 L quad strength. pt initially mod a of 2 for static standing balance but able to progress to CGA of 1+ Mita of 1 . continue to rec SNF upon dc.   Activity Tolerance: Patient limited by fatigue  Discharge Recommendations: Subacute/Skilled Nursing Facility    Goals  Patient Goals   Patient goals : Yasmin Valdez on my mission trip in [place]"  Short Term Goals  Time Frame for Short term goals: 11 days 5/20  Short term goal 1: pt will complete bed mobility with mod A; not met 5/20 - pt requires mod A x2 for supine>sit  Short term goal 2: pt will complete functional transfer with max A; goal partially met 5/20 - pt completes transfers with mod A x2 with lorrie monsivais vs. // bars  Short term goal 3: pt will tolerate gait assessment and set goal when appropriate; goal met 5/20 - pt ambulates 6 ft in // bars with mod A  x2 (dependent d/t w/c follow and // bars), new goal in LTG  Short term goal 4: pt will tolerate stair assessment and set goal when appropriate; not met 5/20 - unsafe to attempt stairs  Short term goal 5: pt will propel w/c x 150 ft with supv; not assessed 5/20  Long Term Goals  Time Frame for Long term goals : 21 days 5/31: GOALS UPDATED TO 6/2 due to discharge date  Long term goal 1: pt will complete bed mobility with CGA. Long term goal 2: pt will complete functional transfer with min a and LRAD. Long term goal 3: pt will propel w/c x 150 ft with CGA  Long term goal 4: Pt will ambulate 10 ft with LRAD and mod A. PLAN OF CARE/SAFETY  Plan  Plan: 5-7 times per week  Current Treatment Recommendations: Strengthening;ROM;Balance training;Functional mobility training;Transfer training; Endurance training;Gait training; Therapeutic activities; Home exercise program;Wheelchair mobility training;Equipment evaluation, education, & procurement;Cognitive reorientation;Stair training;Positioning; Safety education & training;Patient/Caregiver education & training;Cognitive/Perceptual training;ADL/Self-care training;IADL training;Pain management  Safety Devices  Type of Devices: All fall risk precautions in place;Call light within reach; Patient at risk for falls;Nurse notified; All yaima prominences offloaded;Gait belt;Bed alarm in place; Left in bed    EDUCATION  Education  Education Given To: Patient  Education Provided: Role of Therapy; Visual Perceptual Function;Plan of Care; Mobility Training;Precautions;Transfer Training; Safety; Energy Conservation; Fall Prevention Strategies  Education Outcome: Verbalized understanding;Demonstrated understanding;Continued education needed        Therapy Time   Individual Concurrent Group Co-treatment   Time In       0930   Time Out       1030   Minutes       60 Timed Code Treatment Minutes: 61 Minutes       Ferny Weller PT, 06/03/22 at 3:12 PM

## 2022-06-04 LAB
ANION GAP SERPL CALCULATED.3IONS-SCNC: 10 MMOL/L (ref 3–16)
BUN BLDV-MCNC: 30 MG/DL (ref 7–20)
CALCIUM SERPL-MCNC: 8.6 MG/DL (ref 8.3–10.6)
CHLORIDE BLD-SCNC: 108 MMOL/L (ref 99–110)
CO2: 25 MMOL/L (ref 21–32)
CREAT SERPL-MCNC: 0.7 MG/DL (ref 0.6–1.2)
GFR AFRICAN AMERICAN: >60
GFR NON-AFRICAN AMERICAN: >60
GLUCOSE BLD-MCNC: 173 MG/DL (ref 70–99)
GLUCOSE BLD-MCNC: 217 MG/DL (ref 70–99)
GLUCOSE BLD-MCNC: 230 MG/DL (ref 70–99)
GLUCOSE BLD-MCNC: 93 MG/DL (ref 70–99)
GLUCOSE BLD-MCNC: 97 MG/DL (ref 70–99)
HCT VFR BLD CALC: 26.7 % (ref 36–48)
HEMOGLOBIN: 8.8 G/DL (ref 12–16)
MCH RBC QN AUTO: 32.2 PG (ref 26–34)
MCHC RBC AUTO-ENTMCNC: 33.1 G/DL (ref 31–36)
MCV RBC AUTO: 97.2 FL (ref 80–100)
PDW BLD-RTO: 15 % (ref 12.4–15.4)
PERFORMED ON: ABNORMAL
PERFORMED ON: NORMAL
PLATELET # BLD: 207 K/UL (ref 135–450)
PMV BLD AUTO: 8.4 FL (ref 5–10.5)
POTASSIUM REFLEX MAGNESIUM: 4 MMOL/L (ref 3.5–5.1)
RBC # BLD: 2.75 M/UL (ref 4–5.2)
SODIUM BLD-SCNC: 143 MMOL/L (ref 136–145)
WBC # BLD: 17 K/UL (ref 4–11)

## 2022-06-04 PROCEDURE — 6370000000 HC RX 637 (ALT 250 FOR IP): Performed by: STUDENT IN AN ORGANIZED HEALTH CARE EDUCATION/TRAINING PROGRAM

## 2022-06-04 PROCEDURE — 2580000003 HC RX 258: Performed by: STUDENT IN AN ORGANIZED HEALTH CARE EDUCATION/TRAINING PROGRAM

## 2022-06-04 PROCEDURE — 2500000003 HC RX 250 WO HCPCS: Performed by: STUDENT IN AN ORGANIZED HEALTH CARE EDUCATION/TRAINING PROGRAM

## 2022-06-04 PROCEDURE — 80048 BASIC METABOLIC PNL TOTAL CA: CPT

## 2022-06-04 PROCEDURE — 6370000000 HC RX 637 (ALT 250 FOR IP): Performed by: INTERNAL MEDICINE

## 2022-06-04 PROCEDURE — 6360000002 HC RX W HCPCS: Performed by: STUDENT IN AN ORGANIZED HEALTH CARE EDUCATION/TRAINING PROGRAM

## 2022-06-04 PROCEDURE — 1280000000 HC REHAB R&B

## 2022-06-04 PROCEDURE — 85027 COMPLETE CBC AUTOMATED: CPT

## 2022-06-04 RX ADMIN — INSULIN LISPRO 2 UNITS: 100 INJECTION, SOLUTION INTRAVENOUS; SUBCUTANEOUS at 21:02

## 2022-06-04 RX ADMIN — METOPROLOL TARTRATE 25 MG: 25 TABLET, FILM COATED ORAL at 20:50

## 2022-06-04 RX ADMIN — HEPARIN SODIUM 5000 UNITS: 5000 INJECTION INTRAVENOUS; SUBCUTANEOUS at 05:42

## 2022-06-04 RX ADMIN — HEPARIN SODIUM 5000 UNITS: 5000 INJECTION INTRAVENOUS; SUBCUTANEOUS at 20:50

## 2022-06-04 RX ADMIN — ASPIRIN 81 MG: 81 TABLET, COATED ORAL at 08:41

## 2022-06-04 RX ADMIN — PREDNISONE 40 MG: 20 TABLET ORAL at 08:41

## 2022-06-04 RX ADMIN — TRAZODONE HYDROCHLORIDE 50 MG: 50 TABLET ORAL at 20:50

## 2022-06-04 RX ADMIN — HYDROXYZINE HYDROCHLORIDE 10 MG: 10 TABLET ORAL at 20:50

## 2022-06-04 RX ADMIN — NYSTATIN 5 ML: 100000 SUSPENSION ORAL at 16:28

## 2022-06-04 RX ADMIN — NYSTATIN 5 ML: 100000 SUSPENSION ORAL at 14:06

## 2022-06-04 RX ADMIN — NYSTATIN 5 ML: 100000 SUSPENSION ORAL at 08:51

## 2022-06-04 RX ADMIN — HYDROCORTISONE: 1 CREAM TOPICAL at 21:00

## 2022-06-04 RX ADMIN — CLOPIDOGREL BISULFATE 75 MG: 75 TABLET, FILM COATED ORAL at 08:41

## 2022-06-04 RX ADMIN — METOPROLOL TARTRATE 25 MG: 25 TABLET, FILM COATED ORAL at 08:41

## 2022-06-04 RX ADMIN — CETIRIZINE HYDROCHLORIDE 10 MG: 10 TABLET, FILM COATED ORAL at 08:40

## 2022-06-04 RX ADMIN — SODIUM CHLORIDE, PRESERVATIVE FREE 10 ML: 5 INJECTION INTRAVENOUS at 20:54

## 2022-06-04 RX ADMIN — WITCH HAZEL 40 EACH: 500 SOLUTION RECTAL; TOPICAL at 08:52

## 2022-06-04 RX ADMIN — FAMOTIDINE 20 MG: 10 INJECTION INTRAVENOUS at 08:42

## 2022-06-04 RX ADMIN — INSULIN LISPRO 2 UNITS: 100 INJECTION, SOLUTION INTRAVENOUS; SUBCUTANEOUS at 13:50

## 2022-06-04 RX ADMIN — HYDROCORTISONE: 1 CREAM TOPICAL at 08:53

## 2022-06-04 RX ADMIN — INSULIN LISPRO 4 UNITS: 100 INJECTION, SOLUTION INTRAVENOUS; SUBCUTANEOUS at 16:28

## 2022-06-04 RX ADMIN — HEPARIN SODIUM 5000 UNITS: 5000 INJECTION INTRAVENOUS; SUBCUTANEOUS at 14:06

## 2022-06-04 RX ADMIN — Medication 400 MG: at 08:41

## 2022-06-04 RX ADMIN — NYSTATIN 5 ML: 100000 SUSPENSION ORAL at 21:02

## 2022-06-04 RX ADMIN — FAMOTIDINE 20 MG: 10 INJECTION INTRAVENOUS at 20:54

## 2022-06-04 RX ADMIN — CARBOXYMETHYLCELLULOSE SODIUM 1 DROP: 10 GEL OPHTHALMIC at 08:42

## 2022-06-04 RX ADMIN — NIFEDIPINE 30 MG: 30 TABLET, FILM COATED, EXTENDED RELEASE ORAL at 08:41

## 2022-06-04 RX ADMIN — SODIUM CHLORIDE, PRESERVATIVE FREE 10 ML: 5 INJECTION INTRAVENOUS at 08:43

## 2022-06-04 RX ADMIN — LEVOTHYROXINE SODIUM 50 MCG: 0.05 TABLET ORAL at 05:42

## 2022-06-04 RX ADMIN — DICLOFENAC SODIUM 4 G: 10 GEL TOPICAL at 08:51

## 2022-06-04 ASSESSMENT — PAIN SCALES - GENERAL: PAINLEVEL_OUTOF10: 0

## 2022-06-04 NOTE — PLAN OF CARE
Problem: Skin/Tissue Integrity  Goal: Absence of new skin breakdown  Description: 1. Monitor for areas of redness and/or skin breakdown  2. Assess vascular access sites hourly  3. Every 4-6 hours minimum:  Change oxygen saturation probe site  4. Every 4-6 hours:  If on nasal continuous positive airway pressure, respiratory therapy assess nares and determine need for appliance change or resting period.   Outcome: Progressing     Problem: Nutrition Deficit:  Goal: Optimize nutritional status  Outcome: Progressing     Problem: Chronic Conditions and Co-morbidities  Goal: Patient's chronic conditions and co-morbidity symptoms are monitored and maintained or improved  Outcome: Progressing

## 2022-06-04 NOTE — PLAN OF CARE
Problem: Safety - Adult  Goal: Free from fall injury  6/4/2022 1135 by Mu Major RN  Outcome: Progressing  Flowsheets (Taken 6/4/2022 1135)  Free From Fall Injury: Instruct family/caregiver on patient safety  Note: Fall risk band on patient. Non skid footwear in place. Alarms used appropriately. Patient instructed to call and wait for staff before getting up. Rounding done to anticipate needs. Appropriate safety devices used for transfers. Bedside table within reach. Call light within reach. Pt denies needs at present. Will continue to monitor for changes.      6/4/2022 0110 by Juan Car, RN  Outcome: Progressing

## 2022-06-05 LAB
ANION GAP SERPL CALCULATED.3IONS-SCNC: 11 MMOL/L (ref 3–16)
BUN BLDV-MCNC: 26 MG/DL (ref 7–20)
CALCIUM SERPL-MCNC: 8.8 MG/DL (ref 8.3–10.6)
CHLORIDE BLD-SCNC: 107 MMOL/L (ref 99–110)
CO2: 25 MMOL/L (ref 21–32)
CREAT SERPL-MCNC: 0.7 MG/DL (ref 0.6–1.2)
GFR AFRICAN AMERICAN: >60
GFR NON-AFRICAN AMERICAN: >60
GLUCOSE BLD-MCNC: 107 MG/DL (ref 70–99)
GLUCOSE BLD-MCNC: 119 MG/DL (ref 70–99)
GLUCOSE BLD-MCNC: 133 MG/DL (ref 70–99)
GLUCOSE BLD-MCNC: 169 MG/DL (ref 70–99)
GLUCOSE BLD-MCNC: 238 MG/DL (ref 70–99)
HCT VFR BLD CALC: 25.7 % (ref 36–48)
HEMOGLOBIN: 8.6 G/DL (ref 12–16)
MCH RBC QN AUTO: 32.4 PG (ref 26–34)
MCHC RBC AUTO-ENTMCNC: 33.4 G/DL (ref 31–36)
MCV RBC AUTO: 97 FL (ref 80–100)
PDW BLD-RTO: 14.6 % (ref 12.4–15.4)
PERFORMED ON: ABNORMAL
PLATELET # BLD: 199 K/UL (ref 135–450)
PMV BLD AUTO: 8.3 FL (ref 5–10.5)
POTASSIUM REFLEX MAGNESIUM: 4 MMOL/L (ref 3.5–5.1)
RBC # BLD: 2.65 M/UL (ref 4–5.2)
SODIUM BLD-SCNC: 143 MMOL/L (ref 136–145)
WBC # BLD: 15.6 K/UL (ref 4–11)

## 2022-06-05 PROCEDURE — 6360000002 HC RX W HCPCS: Performed by: STUDENT IN AN ORGANIZED HEALTH CARE EDUCATION/TRAINING PROGRAM

## 2022-06-05 PROCEDURE — 2580000003 HC RX 258: Performed by: STUDENT IN AN ORGANIZED HEALTH CARE EDUCATION/TRAINING PROGRAM

## 2022-06-05 PROCEDURE — 6370000000 HC RX 637 (ALT 250 FOR IP): Performed by: STUDENT IN AN ORGANIZED HEALTH CARE EDUCATION/TRAINING PROGRAM

## 2022-06-05 PROCEDURE — 85027 COMPLETE CBC AUTOMATED: CPT

## 2022-06-05 PROCEDURE — 6370000000 HC RX 637 (ALT 250 FOR IP): Performed by: INTERNAL MEDICINE

## 2022-06-05 PROCEDURE — 80048 BASIC METABOLIC PNL TOTAL CA: CPT

## 2022-06-05 PROCEDURE — 1280000000 HC REHAB R&B

## 2022-06-05 PROCEDURE — 2500000003 HC RX 250 WO HCPCS: Performed by: STUDENT IN AN ORGANIZED HEALTH CARE EDUCATION/TRAINING PROGRAM

## 2022-06-05 RX ADMIN — NYSTATIN 5 ML: 100000 SUSPENSION ORAL at 14:36

## 2022-06-05 RX ADMIN — TRAZODONE HYDROCHLORIDE 50 MG: 50 TABLET ORAL at 20:40

## 2022-06-05 RX ADMIN — Medication 400 MG: at 10:07

## 2022-06-05 RX ADMIN — CETIRIZINE HYDROCHLORIDE 10 MG: 10 TABLET, FILM COATED ORAL at 10:07

## 2022-06-05 RX ADMIN — HYDROXYZINE HYDROCHLORIDE 10 MG: 10 TABLET ORAL at 20:40

## 2022-06-05 RX ADMIN — FAMOTIDINE 20 MG: 10 INJECTION INTRAVENOUS at 10:08

## 2022-06-05 RX ADMIN — METOPROLOL TARTRATE 25 MG: 25 TABLET, FILM COATED ORAL at 20:40

## 2022-06-05 RX ADMIN — NYSTATIN 5 ML: 100000 SUSPENSION ORAL at 17:07

## 2022-06-05 RX ADMIN — LACTULOSE 20 G: 20 SOLUTION ORAL at 10:10

## 2022-06-05 RX ADMIN — ASPIRIN 81 MG: 81 TABLET, COATED ORAL at 10:07

## 2022-06-05 RX ADMIN — PREDNISONE 40 MG: 20 TABLET ORAL at 10:07

## 2022-06-05 RX ADMIN — NYSTATIN 5 ML: 100000 SUSPENSION ORAL at 20:40

## 2022-06-05 RX ADMIN — HEPARIN SODIUM 5000 UNITS: 5000 INJECTION INTRAVENOUS; SUBCUTANEOUS at 14:32

## 2022-06-05 RX ADMIN — LACTULOSE 20 G: 20 SOLUTION ORAL at 20:40

## 2022-06-05 RX ADMIN — HYDROCORTISONE: 1 CREAM TOPICAL at 10:25

## 2022-06-05 RX ADMIN — SODIUM CHLORIDE, PRESERVATIVE FREE 10 ML: 5 INJECTION INTRAVENOUS at 10:10

## 2022-06-05 RX ADMIN — METOPROLOL TARTRATE 25 MG: 25 TABLET, FILM COATED ORAL at 10:07

## 2022-06-05 RX ADMIN — CARBOXYMETHYLCELLULOSE SODIUM 1 DROP: 10 GEL OPHTHALMIC at 10:07

## 2022-06-05 RX ADMIN — HEPARIN SODIUM 5000 UNITS: 5000 INJECTION INTRAVENOUS; SUBCUTANEOUS at 05:30

## 2022-06-05 RX ADMIN — SODIUM CHLORIDE, PRESERVATIVE FREE 10 ML: 5 INJECTION INTRAVENOUS at 20:40

## 2022-06-05 RX ADMIN — INSULIN LISPRO 2 UNITS: 100 INJECTION, SOLUTION INTRAVENOUS; SUBCUTANEOUS at 17:07

## 2022-06-05 RX ADMIN — NIFEDIPINE 30 MG: 30 TABLET, FILM COATED, EXTENDED RELEASE ORAL at 10:07

## 2022-06-05 RX ADMIN — CLOPIDOGREL BISULFATE 75 MG: 75 TABLET, FILM COATED ORAL at 10:07

## 2022-06-05 RX ADMIN — NYSTATIN 5 ML: 100000 SUSPENSION ORAL at 10:08

## 2022-06-05 RX ADMIN — HEPARIN SODIUM 5000 UNITS: 5000 INJECTION INTRAVENOUS; SUBCUTANEOUS at 20:40

## 2022-06-05 RX ADMIN — LEVOTHYROXINE SODIUM 50 MCG: 0.05 TABLET ORAL at 05:30

## 2022-06-05 RX ADMIN — INSULIN LISPRO 2 UNITS: 100 INJECTION, SOLUTION INTRAVENOUS; SUBCUTANEOUS at 20:41

## 2022-06-05 RX ADMIN — HYDROCORTISONE: 1 CREAM TOPICAL at 20:59

## 2022-06-05 RX ADMIN — FAMOTIDINE 20 MG: 10 INJECTION INTRAVENOUS at 20:41

## 2022-06-05 NOTE — PLAN OF CARE
Problem: Skin/Tissue Integrity  Goal: Absence of new skin breakdown  Description: 1. Monitor for areas of redness and/or skin breakdown  2.   Assess vascular access sites     Problem: ABCDS Injury Assessment  Goal: Absence of physical injury  Outcome: Progressing     Outcome: Progressing       Problem: Chronic Conditions and Co-morbidities  Goal: Patient's chronic conditions and co-morbidity symptoms are monitored and maintained or improved  Outcome: Progressing

## 2022-06-05 NOTE — PLAN OF CARE
Problem: Safety - Adult  Goal: Free from fall injury  6/4/2022 2159 by Lynda Gamboa RN  Outcome: Progressing  6/4/2022 1135 by Mai Khan RN  Outcome: Progressing  Flowsheets (Taken 6/4/2022 1135)  Free From Fall Injury: Instruct family/caregiver on patient safety  Note: Fall risk band on patient. Non skid footwear in place. Alarms used appropriately. Patient instructed to call and wait for staff before getting up. Rounding done to anticipate needs. Appropriate safety devices used for transfers. Bedside table within reach. Call light within reach. Pt denies needs at present. Will continue to monitor for changes.

## 2022-06-06 LAB
ANION GAP SERPL CALCULATED.3IONS-SCNC: 10 MMOL/L (ref 3–16)
BUN BLDV-MCNC: 22 MG/DL (ref 7–20)
CALCIUM SERPL-MCNC: 8.5 MG/DL (ref 8.3–10.6)
CHLORIDE BLD-SCNC: 106 MMOL/L (ref 99–110)
CO2: 25 MMOL/L (ref 21–32)
CREAT SERPL-MCNC: 0.6 MG/DL (ref 0.6–1.2)
GFR AFRICAN AMERICAN: >60
GFR NON-AFRICAN AMERICAN: >60
GLUCOSE BLD-MCNC: 111 MG/DL (ref 70–99)
GLUCOSE BLD-MCNC: 125 MG/DL (ref 70–99)
GLUCOSE BLD-MCNC: 174 MG/DL (ref 70–99)
GLUCOSE BLD-MCNC: 174 MG/DL (ref 70–99)
GLUCOSE BLD-MCNC: 191 MG/DL (ref 70–99)
HCT VFR BLD CALC: 25.8 % (ref 36–48)
HEMOGLOBIN: 8.7 G/DL (ref 12–16)
MCH RBC QN AUTO: 32.3 PG (ref 26–34)
MCHC RBC AUTO-ENTMCNC: 33.8 G/DL (ref 31–36)
MCV RBC AUTO: 95.6 FL (ref 80–100)
PDW BLD-RTO: 14.6 % (ref 12.4–15.4)
PERFORMED ON: ABNORMAL
PLATELET # BLD: 225 K/UL (ref 135–450)
PMV BLD AUTO: 8.6 FL (ref 5–10.5)
POTASSIUM REFLEX MAGNESIUM: 3.9 MMOL/L (ref 3.5–5.1)
RBC # BLD: 2.7 M/UL (ref 4–5.2)
SARS-COV-2, NAAT: NOT DETECTED
SODIUM BLD-SCNC: 141 MMOL/L (ref 136–145)
WBC # BLD: 15.9 K/UL (ref 4–11)

## 2022-06-06 PROCEDURE — 6370000000 HC RX 637 (ALT 250 FOR IP): Performed by: INTERNAL MEDICINE

## 2022-06-06 PROCEDURE — 80048 BASIC METABOLIC PNL TOTAL CA: CPT

## 2022-06-06 PROCEDURE — 2500000003 HC RX 250 WO HCPCS: Performed by: STUDENT IN AN ORGANIZED HEALTH CARE EDUCATION/TRAINING PROGRAM

## 2022-06-06 PROCEDURE — 97116 GAIT TRAINING THERAPY: CPT

## 2022-06-06 PROCEDURE — 97530 THERAPEUTIC ACTIVITIES: CPT

## 2022-06-06 PROCEDURE — 87635 SARS-COV-2 COVID-19 AMP PRB: CPT

## 2022-06-06 PROCEDURE — 97112 NEUROMUSCULAR REEDUCATION: CPT

## 2022-06-06 PROCEDURE — 2580000003 HC RX 258: Performed by: STUDENT IN AN ORGANIZED HEALTH CARE EDUCATION/TRAINING PROGRAM

## 2022-06-06 PROCEDURE — 97129 THER IVNTJ 1ST 15 MIN: CPT

## 2022-06-06 PROCEDURE — 85027 COMPLETE CBC AUTOMATED: CPT

## 2022-06-06 PROCEDURE — 6370000000 HC RX 637 (ALT 250 FOR IP): Performed by: STUDENT IN AN ORGANIZED HEALTH CARE EDUCATION/TRAINING PROGRAM

## 2022-06-06 PROCEDURE — 1280000000 HC REHAB R&B

## 2022-06-06 PROCEDURE — 97130 THER IVNTJ EA ADDL 15 MIN: CPT

## 2022-06-06 PROCEDURE — 97535 SELF CARE MNGMENT TRAINING: CPT

## 2022-06-06 PROCEDURE — 6360000002 HC RX W HCPCS: Performed by: STUDENT IN AN ORGANIZED HEALTH CARE EDUCATION/TRAINING PROGRAM

## 2022-06-06 RX ORDER — LANOLIN ALCOHOL/MO/W.PET/CERES
400 CREAM (GRAM) TOPICAL DAILY
Qty: 30 TABLET | Refills: 0
Start: 2022-06-07

## 2022-06-06 RX ORDER — DIAPER,BRIEF,INFANT-TODD,DISP
EACH MISCELLANEOUS
Qty: 30 G | Refills: 1
Start: 2022-06-06 | End: 2022-06-13

## 2022-06-06 RX ORDER — HYDROXYZINE HYDROCHLORIDE 10 MG/1
10 TABLET, FILM COATED ORAL 3 TIMES DAILY PRN
Qty: 30 TABLET | Refills: 0
Start: 2022-06-06 | End: 2022-06-16

## 2022-06-06 RX ORDER — LACTULOSE 10 G/15ML
20 SOLUTION ORAL 2 TIMES DAILY
Qty: 30 EACH | Refills: 0
Start: 2022-06-06

## 2022-06-06 RX ORDER — TRAZODONE HYDROCHLORIDE 50 MG/1
50 TABLET ORAL NIGHTLY PRN
Qty: 30 TABLET | Refills: 0
Start: 2022-06-06

## 2022-06-06 RX ORDER — SENNA AND DOCUSATE SODIUM 50; 8.6 MG/1; MG/1
2 TABLET, FILM COATED ORAL DAILY
Qty: 60 TABLET | Refills: 0
Start: 2022-06-07

## 2022-06-06 RX ORDER — BISACODYL 10 MG
10 SUPPOSITORY, RECTAL RECTAL DAILY PRN
Qty: 30 SUPPOSITORY | Refills: 0
Start: 2022-06-06 | End: 2022-07-06

## 2022-06-06 RX ORDER — HEPARIN SODIUM 5000 [USP'U]/ML
5000 INJECTION, SOLUTION INTRAVENOUS; SUBCUTANEOUS 3 TIMES DAILY
Qty: 30 EACH | Refills: 0
Start: 2022-06-06

## 2022-06-06 RX ORDER — NIFEDIPINE 30 MG/1
30 TABLET, EXTENDED RELEASE ORAL DAILY
Qty: 30 TABLET | Refills: 0
Start: 2022-06-07

## 2022-06-06 RX ORDER — ONDANSETRON 4 MG/1
4 TABLET, ORALLY DISINTEGRATING ORAL EVERY 8 HOURS PRN
Qty: 30 TABLET | Refills: 0
Start: 2022-06-06

## 2022-06-06 RX ORDER — CETIRIZINE HYDROCHLORIDE 10 MG/1
10 TABLET ORAL DAILY
Qty: 30 TABLET | Refills: 0
Start: 2022-06-07

## 2022-06-06 RX ORDER — NILOTINIB 200 MG/1
200 CAPSULE ORAL EVERY 12 HOURS
Qty: 30 CAPSULE | Refills: 0
Start: 2022-06-06

## 2022-06-06 RX ADMIN — INSULIN LISPRO 2 UNITS: 100 INJECTION, SOLUTION INTRAVENOUS; SUBCUTANEOUS at 17:15

## 2022-06-06 RX ADMIN — CETIRIZINE HYDROCHLORIDE 10 MG: 10 TABLET, FILM COATED ORAL at 09:29

## 2022-06-06 RX ADMIN — NYSTATIN 5 ML: 100000 SUSPENSION ORAL at 17:15

## 2022-06-06 RX ADMIN — INSULIN LISPRO 1 UNITS: 100 INJECTION, SOLUTION INTRAVENOUS; SUBCUTANEOUS at 20:52

## 2022-06-06 RX ADMIN — CLOPIDOGREL BISULFATE 75 MG: 75 TABLET, FILM COATED ORAL at 09:29

## 2022-06-06 RX ADMIN — LACTULOSE 20 G: 20 SOLUTION ORAL at 09:29

## 2022-06-06 RX ADMIN — TRAZODONE HYDROCHLORIDE 50 MG: 50 TABLET ORAL at 20:48

## 2022-06-06 RX ADMIN — HYDROCORTISONE: 1 CREAM TOPICAL at 20:46

## 2022-06-06 RX ADMIN — ASPIRIN 81 MG: 81 TABLET, COATED ORAL at 09:29

## 2022-06-06 RX ADMIN — HEPARIN SODIUM 5000 UNITS: 5000 INJECTION INTRAVENOUS; SUBCUTANEOUS at 20:47

## 2022-06-06 RX ADMIN — HYDROCORTISONE: 1 CREAM TOPICAL at 09:30

## 2022-06-06 RX ADMIN — NYSTATIN 5 ML: 100000 SUSPENSION ORAL at 20:47

## 2022-06-06 RX ADMIN — Medication 400 MG: at 09:29

## 2022-06-06 RX ADMIN — SODIUM CHLORIDE, PRESERVATIVE FREE 10 ML: 5 INJECTION INTRAVENOUS at 09:31

## 2022-06-06 RX ADMIN — LACTULOSE 20 G: 20 SOLUTION ORAL at 20:47

## 2022-06-06 RX ADMIN — HYDROXYZINE HYDROCHLORIDE 10 MG: 10 TABLET ORAL at 20:48

## 2022-06-06 RX ADMIN — NIFEDIPINE 30 MG: 30 TABLET, FILM COATED, EXTENDED RELEASE ORAL at 09:29

## 2022-06-06 RX ADMIN — PREDNISONE 40 MG: 20 TABLET ORAL at 09:29

## 2022-06-06 RX ADMIN — HEPARIN SODIUM 5000 UNITS: 5000 INJECTION INTRAVENOUS; SUBCUTANEOUS at 06:06

## 2022-06-06 RX ADMIN — HYDROXYZINE HYDROCHLORIDE 10 MG: 10 TABLET ORAL at 06:06

## 2022-06-06 RX ADMIN — INSULIN LISPRO 2 UNITS: 100 INJECTION, SOLUTION INTRAVENOUS; SUBCUTANEOUS at 12:09

## 2022-06-06 RX ADMIN — LEVOTHYROXINE SODIUM 50 MCG: 0.05 TABLET ORAL at 06:06

## 2022-06-06 RX ADMIN — METOPROLOL TARTRATE 25 MG: 25 TABLET, FILM COATED ORAL at 20:48

## 2022-06-06 RX ADMIN — FAMOTIDINE 20 MG: 10 INJECTION INTRAVENOUS at 20:48

## 2022-06-06 RX ADMIN — NYSTATIN 5 ML: 100000 SUSPENSION ORAL at 12:08

## 2022-06-06 RX ADMIN — FAMOTIDINE 20 MG: 10 INJECTION INTRAVENOUS at 09:29

## 2022-06-06 RX ADMIN — METOPROLOL TARTRATE 25 MG: 25 TABLET, FILM COATED ORAL at 09:31

## 2022-06-06 RX ADMIN — HEPARIN SODIUM 5000 UNITS: 5000 INJECTION INTRAVENOUS; SUBCUTANEOUS at 14:55

## 2022-06-06 RX ADMIN — SODIUM CHLORIDE, PRESERVATIVE FREE 10 ML: 5 INJECTION INTRAVENOUS at 20:51

## 2022-06-06 ASSESSMENT — PAIN SCALES - GENERAL: PAINLEVEL_OUTOF10: 0

## 2022-06-06 NOTE — PROGRESS NOTES
Physical Therapy  Discharge Summary    Name:Jennifer Lauren  BET:[id number]  :1950  Treatment Diagnosis: impaired mobility  Diagnosis: CVA    Restrictions/Precautions  Restrictions/Precautions: Fall Risk,Up as Tolerated,Isolation           Position Activity Restriction  Other position/activity restrictions: chemo precautions, L inattention, LUE flaccid, resting hand splint to be worn at night. Goals:                  Short Term Goals  Time Frame for Short term goals: 11 days   Short term goal 1: pt will complete bed mobility with mod A; not met  - pt requires mod A x2 for supine>sit  Short term goal 2: pt will complete functional transfer with max A; goal partially met  - pt completes transfers with mod A x2 with lorrie stedy vs. // bars  Short term goal 3: pt will tolerate gait assessment and set goal when appropriate; goal met  - pt ambulates 6 ft in // bars with mod A  x2 (dependent d/t w/c follow and // bars), new goal in LTG  Short term goal 4: pt will tolerate stair assessment and set goal when appropriate; not met  - unsafe to attempt stairs  Short term goal 5: pt will propel w/c x 150 ft with supv; not assessed             Long Term Goals  Time Frame for Long term goals : 21 days : GOALS UPDATED TO  due to discharge date  Long term goal 1: pt will complete bed mobility with CGA; not met  - requires mod-max A for supine<>sit  Long term goal 2: pt will complete functional transfer with min a and LRAD; goal not met  - pt requires max A for transfers  Long term goal 3: pt will propel w/c x 150 ft with CGA; not met  - pt requires min A and significant cueing for w/c propulsion  Long term goal 4: Pt will ambulate 10 ft with LRAD and mod A; not met  - pt ambulates 5 ft in // bars with mod A X2    Pt. Met 1/5 short term goals and 0/4 long term goals.      Pt. Currently ambulates 5 feet in // bars with mod A x2 and   Sit to/from stand with max A and SPT with max A  Bed mobility with mod-max A   Recommend SNF in order to progress independence with mobility    Electronically signed by Kylah Sandoval PT on 6/6/2022 at 3:22 PM

## 2022-06-06 NOTE — CARE COORDINATION
CM spoke with dispatch at [facility] ambulance to set up transport to:  [facility]  [address] [facility], [address] [place] [address] Drive  [phone] ambulance can accomodate and p/u scheduled for 11:00am on 6/07.  Bessie Lowe RN

## 2022-06-06 NOTE — PLAN OF CARE
Problem: ABCDS Injury Assessment  Goal: Absence of physical injury  Outcome: Progressing   Patient remained free from physical injury this shift. Safety precautions remain in place, bed/chair alarm, gripper socks, call light within reach. Will continue to monitor.

## 2022-06-06 NOTE — PROGRESS NOTES
Occupational Therapy  Discharge Summary     Name:Jennifer Tamez  QKQ:[id number]  :1950  Treatment Diagnosis: impaired mobility  Diagnosis: CVA    Restrictions/Precautions  Restrictions/Precautions: Fall Risk,Up as Tolerated,Isolation           Position Activity Restriction  Other position/activity restrictions: chemo precautions, L inattention, LUE flaccid, resting hand splint to be worn at night. Goals:   Patient Goals   Patient goals : \"Go home\" \"Get stronger\" \"walk better\"  Short Term Goals  Time Frame for Short term goals: 10 days (22)-EXTEND TO  PROGRESS  Short Term Goal 1: Pt will complete functional transfers with Mita x2 with LRAD- progressing, maxA x2 sit pivot vs stand pivot transfer-EXTEND TO -GOAL MET   Short Term Goal 2: Pt will complete toileting/transfer modA x2 with LRAD and DME PRN-GOAL MET , modA x2 steady  Short Term Goal 3: Pt will complete LUE AAROM/AROM exercises to LUE to increase strength/endurance for ADLs/transfers-GOAL MET   Short Term Goal 4: Pt will be able to locate 3/5 object in L visual field with min cues during ADLs/activities-GOAL MET   Additional Goals?: No  Long Term Goals  Time Frame for Long term goals : 21 days (3/31/22)-EXTEND TO DC DATE,   Long Term Goal 1: Pt will complete functional transfers/mobility SPV with LRAD-DOWGRADE DUE TO PROGRESS: maxA x1-GOAL MET   Long Term Goal 2: Pt will complete UE dressing/bathing sitting setup with min cues for papo techniques-DOWNGRADE DUE TO PROGRESS: Mita; GOAL MET   Long Term Goal 3: Pt will complete toileting/transfers with LRAD and DME PRN SPV-DOWGRADE DUE TO PROGRESS: maxA x1-GOAL MET   Long Term Goal 4: Pt will complete opening 3/5 containers with compensatory techniques sitting setup/SPV-GOAL MET     Pt. Met 4/4 short term goals and 4/4 long term goals.  Patient has met all goals however does consistently need x2 A for LB ADLs, maxA x1-2 for transfers, mod-maxA x2 for mobility trials in // bars. Cont to rec SNF post DC to progress towards goals. ADL:  Feeding  Assistance Level: Stand by assist  Skilled Clinical Factors: assistance to cut food, open packages and bring cup to hand  Grooming/Oral Hygiene  Assistance Level: Increased time to complete;Stand by assist;Set-up  Skilled Clinical Factors: washing face, brushing teeth, and brushing hair seated in WC  Upper Extremity Bathing  Skilled Clinical Factors: Mod A sponge bathing in supported sitting on BSC. Pt requiring full assistance to wash RUE and partial assistance to wash LUE. Pt requiring min cues for thoroughness on L side of body. Lower Extremity Bathing  Assistance Level: Increased time to complete; Moderate assistance;Verbal cues  Skilled Clinical Factors: modA in sitting for balance progressing to SBA at times, modA for B feet and lower legs, modA weight shifting L vs R for bathing buttocks sitting on BSC  Upper Extremity Dressing  Assistance Level: Minimal assistance  Skilled Clinical Factors: Moderate cues for papo techniques. Pt completes UB dressing in supported sitting on BSC. Pt requiring partial assistance to thread BUE and doff/don clothing over abdomen. Pt able to manage clothing over head with cues and extra time  Lower Extremity Dressing  Assistance Level: Dependent;Verbal cues  Skilled Clinical Factors: x2 assist sit<>stand to manage pants up/down; in STEDY  Putting On/Taking Off Footwear  Assistance Level: Dependent  Skilled Clinical Factors: donning socks  Toileting  Assistance Level: Dependent  Skilled Clinical Factors: Assistance x2; Standing in [address]: Beside commode  Additional Factors: Verbal cues;Cues for hand placement; Increased time to complete; With handrails  Assistance Level: Dependent  Skilled Clinical Factors: maxA x1-2  sit <> Stand from [facility] to [facility] to [facility] with steady          Functional Mobility  Device: Wheelchair  Activity: To/From therapy gym;Retrieve items; Transport items; To/From bathroom  Assistance Level: Minimal assistance; Moderate assistance  Skilled Clinical Factors: modA X2 sit <> stand to steady EOB to BSC to TTB to WC, WC mobility room <> gym modA wiht max cues for L sided attn and sequencing  Bed Mobility  Overall Assistance Level: Moderate Assistance  Additional Factors: Head of bed raised; Increased time to complete;Set-up; Verbal cues  Bridging  Assistance Level: Moderate assistance  Roll Left  Assistance Level: Moderate assistance  Roll Right  Assistance Level: Stand by assist  Sit to Supine  Assistance Level: Moderate assistance  Supine to Sit  Assistance Level: Moderate assistance  Scooting  Assistance Level: Moderate assistance  Transfers  Surface: From bed; Wheelchair; To mat  Additional Factors: Increased time to complete;Hand placement cues; Verbal cues  Device: Lift equipment  Sit to Stand  Assistance Level: Maximum assistance; Moderate assistance  Skilled Clinical Factors: modA x1 Mita x1 sit <>  steady from EOB to [facility], sit <> stand with PT cotx standing to grab bar vs rail on R side and use of steady for support, max cues and modA x1 for trunk/pelvic support and midline alignment, poor sequencing and attn throughout  Stand to Sit  Assistance Level: Moderate assistance;Maximum assistance  Skilled Clinical Factors: modA x1 Mita x1 sit <>  steady from EOB to [facility], sit <> stand with PT cotx standing to grab bar vs rail on R side and use of steady for support, max cues and modA x1 for trunk/pelvic support and midline alignment, poor sequencing and attn throughout  Bed To/From Chair  Technique: Sit pivot;Stand pivot  Assistance Level: Maximum assistance; Moderate assistance  Skilled Clinical Factors: modA x1 Mita x1 sit <>  steady from EOB to [facility], sit <> stand with PT cotx standing to grab bar vs rail on R side and use of steady for support, max cues and modA x1 for trunk/pelvic support and midline alignment, poor sequencing and attn throughout  [facility] [address] Level: Moderate assistance;Maximum assistance  Skilled Clinical Factors: modA x1 Mita x1 sit <>  steady from EOB to [facility], sit <> stand with PT cotx standing to grab bar vs rail on R side and use of steady for support, max cues and modA x1 for trunk/pelvic support and midline alignment, poor sequencing and attn throughout  Sit Pivot  Assistance Level: Maximum assistance; Moderate assistance  Skilled Clinical Factors: modA x1 Mita x1 sit <>  steady from EOB to [facility], sit <> stand with PT cotx standing to grab bar vs rail on R side and use of steady for support, max cues and modA x1 for trunk/pelvic support and midline alignment, poor sequencing and attn throughout   Neuromuscular Education  Neuromuscular education: Yes  NDT Treatment: Upper extremity  Head/Neck Control: Pt continues to demo decreased ability to fully turn head/scan L vs R during transfers and ADL tasks. Pt needing mod cues for scanning and asisstance for upright posture in stance with limited ability to sustain upright posture  Trunk Control: Pt continues to demo L sided neglect; Pt tolerated supported sitting EOB, BSC, and WC in order to increase functional core strength for ADLs. Pt does need consistent tactile cues with limited ability to maintain posture  Weight Bearing  Weight Bearing Technique: Yes  RUE Weight Bearing: Extended arm standing; Extended arm seated  LUE Weight Bearing: Extended arm seated; Extended arm standing  Response To Weight Bearing Technique: WBing through LUE AAROM and hand over hand during sit <> stands with RUE support on steady and to grab bar vs rail R side, WBing through LUE wiht elbow extension and wrist support from OT while PT providing WBing/support of LLE during tasks.  The subluxation sling placed on patients LUE to provide support       Assessment:   Assessment  Assessment: Patient has progressed with standing balance, up to modA x1 for static standing and sabrina mod-maxA. Pt was able to complete bed mobility modA x1 this date, CGA-Mita for sitting balance during ADLs. Cont to rec SNF post DC to progress towards goals. All goals have been met in IPR setting. Activity Tolerance: Patient limited by fatigue  Discharge Recommendations: Subacute/Skilled Nursing Facility  OT Equipment Recommendations  Equipment Needed: Yes  Other: defer to next d/c facility  Safety Devices  Safety Devices in place: Yes  Type of devices: Gait belt; All fall risk precautions in place; Patient at risk for falls; Left in chair;Call light within reach; Chair alarm in place;Nurse notified    Equipment Recommendations:  Defer to SNF    Electronically signed by Shannan Casey OT on 6/6/2022 at 9:30 AM

## 2022-06-06 NOTE — PROGRESS NOTES
Speech Language Pathology  [facility]: ACUTE REHAB UNIT  SPEECH-LANGUAGE PATHOLOGY      [x] Daily  [] Weekly Care Conference Note  [x] Discharge    Patient:Jennifer Pereira      :1950  U:[id number]  Rehab Dx/Hx: Acute CVA (cerebrovascular accident) ([facility] [address] [I63.9]    Precautions: falls and aspirations; severe left neglect  Home situation: Lives with . Indep with med management. Share finances, cooking, laundtry, grocery shopping. Has not driven since CVA in March. ST Dx: [] Aphasia  [x] Dysarthria  [] Apraxia   [x] Oropharyngeal dysphagia [x] Cognitive Impairment  [] Other:   Date of Admit: 5/10/2022  Room #: 1133/2569-25    Current functional status (updated daily):         Pt being seen for : [x] Speech/Language Treatment  [x] Dysphagia Treatment [x] Cognitive Treatment  [] Other:  Communication: []WFL  [] Aphasia  [x] Dysarthria  [] Apraxia  [] Pragmatic Impairment [] Non-verbal  [] Hearing Loss  [] Other:   Cognition: [] WFL  [x] Mild  [] Moderate  [] Severe [] Unable to Assess  [] Other:  Memory: [] WFL  [x] Mild  [] Moderate  [] Severe [] Unable to Assess  [] Other:  Behavior: [x] Alert  [x] Cooperative  [x]  Pleasant  [] Confused  [] Agitated  [] Uncooperative  [] Distractible [] Motivated  [] Self-Limiting [] Anxious  [] Other:  Endurance:  [x] Adequate for participation in SLP sessions  [] Reduced overall  [] Lethargic  [] Other:  Safety: [] No concerns at this time  [x] Reduced insight into deficits  [x]  Reduced safety awareness [] Not following call light procedures  [] Unable to Assess  [] Other:    Current Diet Order:ADULT DIET;  Regular; 3 carb choices (45 gm/meal)  ADULT ORAL NUTRITION SUPPLEMENT; Breakfast; Low Calorie/High Protein Oral Supplement  Swallowing Precautions: upright for all intake, stay upright for at least 30 mins after intake, small bites/sips, assist feed, oral care 2-3x/day to reduce adverse affects in the event of aspiration, alternate bites/sips, slow rate of intake         Date: 6/6/2022      Tx session 1  [id number] DISCHARGE SUMMARY    Total Timed Code Min 60    Total Treatment Minutes 60    Individual Treatment Minutes 60    Group Treatment Minutes 0    Co-Treat Minutes 0    Variance/Reason:  N/a    Pain Denies    Pain Intervention [] RN notified   [] Repositioned  [] Intervention offered and patient declined  [x] N/A  [] Other: [] RN notified  [] Repositioned  [] Intervention offered and patient declined  [] N/A  [] Other:   Subjective     Pt alert and oriented, cooperative and agreeable to participate in therapy. Pt seen upright in wheelchair. Pt's  at bedside. Objective:     Dysphagia Goals  Short-term Goals  Timeframe for Short-term Goals: 18 days (05/28/2022)     Goal met 05/24/22. Goal met 05/24/22. Goal discontinued 05/17/22. Goal 4: The pt will complete oral motor exercises to improve labial and lingual strength, ROM, and coordination in 10/10 opportunities given min cues    Did not target. Goal not met. Pt continues to present with significantly reduced L sided oral motor function, ROM, strength, and coordination. Pt requires mod-max cues for precise completion of exercises, as pt is impulsive during exercises. Cognitive-linguistic Goals:   Short-term Goals  Timeframe for Short-term Goals: 18 days (05/28/2022)    Goal 1: Pt will effectively use compensatory visual strategies for improved attention to left side during structured tasks with 80% acc given mod cues. Visual scanning task:   50% acc given max cues to consistently scan to left   Goal not met. Pt continues to require max cues for consistent use of visual/scanning strategies to L side. Pt able to attend to people sitting on L side, but unable to sustain attention for more than a few mins at a time.     Goal 2: Pt will complete graded recall tasks using compensatory strategies with 90% acc given min cues   SLUMS:  -functional recall: 100% acc indep  -short

## 2022-06-06 NOTE — PROGRESS NOTES
Physical Therapy  Facility/Department: [facility]  Rehabilitation Physical Therapy Treatment Note    NAME: Honey Almaraz  : 1950 (66 y.o.)  MRN: [id number]  CODE STATUS: Full Code    Date of Service: 22       Restrictions:  Restrictions/Precautions: Fall Risk;Up as Tolerated;Isolation  Position Activity Restriction  Other position/activity restrictions: chemo precautions, L inattention, LUE flaccid, resting hand splint to be worn at night. SUBJECTIVE  Subjective  Subjective: pt on toilet with OT at start of session  Pain: denies pain        OBJECTIVE  Cognition  Overall Cognitive Status: Exceptions  Arousal/Alertness: Appropriate responses to stimuli;Delayed responses to stimuli  Following Commands: Follows one step commands with repetition; Follows multistep commands with repitition  Attention Span: Attends with cues to redirect  Memory: Decreased recall of precautions  Safety Judgement: Decreased awareness of need for safety;Decreased awareness of need for assistance  Problem Solving: Assistance required to correct errors made;Assistance required to identify errors made  Insights: Decreased awareness of deficits  Initiation: Requires cues for some  Sequencing: Requires cues for some  Cognition Comment: L inattention  Orientation  Overall Orientation Status: Within Functional Limits  Orientation Level: Oriented X4    Functional Mobility  Roll Left  Assistance Level: Stand by assist  Roll Right  Assistance Level: Moderate assistance  Supine to Sit  Assistance Level: Moderate assistance  Sit to Stand  Assistance Level: Maximum assistance  Stand to Sit  Assistance Level: Maximum assistance  Bed To/From Chair  Technique: Sit pivot  Assistance Level: Maximum assistance  Car Transfer  Assistance Level: Maximum assistance; Requires x 2 assistance (max A X2 squat pivot)      Environmental Mobility  Ambulation  Surface: Level surface  Device: Parallel Bars  Distance: 2x 5 ft  Additional Factors: Verbal cues;Hand placement cues; Increased time to complete  Assistance Level: Moderate assistance; Requires x 2 assistance (mod A x2)  Gait Deviations: Slow arabella;Narrow base of support; Unsteady gait; Decreased step length bilateral;Decreased trunk rotation;Decreased heel strike right;Decreased heel strike left;Decreased weight shift left;Decreased step length left;Decreased step length right  Wheelchair  Surface: Level surface  Device: Standard wheelchair  Assistance Required to Manage Parts: Left armrest;Right armrest;Brakes  Assistance Level for Propulsion: Minimal assistance  Propulsion Method: Right lower extremity;Right upper extremity  Propulsion Quality: Slow velocity; Veers left  Propulsion Distance: 150 ft  Skilled Clinical Factors: Pt requires significant cueing to avoid running into items on L side and to turn wide enough                    ASSESSMENT/PROGRESS TOWARDS GOALS  Assessment  Assessment: Patient seen for gait, transfers, w/c mobility, and bed mobility training. Patient completed transfers with max A, bed mobility with mod-max A, and ambulates 5 ft with mod A x2 in // bars (dependent). Pt propels manual w/c with min A. Recommending d/c to SNF given current deficits.   Activity Tolerance: Patient limited by fatigue  Discharge Recommendations: Subacute/Skilled Nursing Facility  PT Equipment Recommendations  Equipment Needed: No  Other: defer to SNF    Goals  Patient Goals   Patient goals : \"go on my mission trip in [place]"  Short Term Goals  Time Frame for Short term goals: 11 days 5/20  Short term goal 1: pt will complete bed mobility with mod A; not met 5/20 - pt requires mod A x2 for supine>sit  Short term goal 2: pt will complete functional transfer with max A; goal partially met 5/20 - pt completes transfers with mod A x2 with lorrie monsivais vs. // bars  Short term goal 3: pt will tolerate gait assessment and set goal when appropriate; goal met 5/20 - pt ambulates 6 ft in // bars with mod A  x2 (dependent d/t w/c follow and // bars), new goal in LTG  Short term goal 4: pt will tolerate stair assessment and set goal when appropriate; not met 5/20 - unsafe to attempt stairs  Short term goal 5: pt will propel w/c x 150 ft with supv; not assessed 5/20  Long Term Goals  Time Frame for Long term goals : 21 days 5/31: GOALS UPDATED TO 6/2 due to discharge date  Long term goal 1: pt will complete bed mobility with CGA; not met 6/6 - requires mod-max A for supine<>sit  Long term goal 2: pt will complete functional transfer with min a and LRAD; goal not met 6/6 - pt requires max A for transfers  Long term goal 3: pt will propel w/c x 150 ft with CGA; not met 6/6 - pt requires min A and significant cueing for w/c propulsion  Long term goal 4: Pt will ambulate 10 ft with LRAD and mod A; not met 6/6 - pt ambulates 5 ft in // bars with mod A X2    PLAN OF CARE/SAFETY  Plan  Plan: 5-7 times per week  Specific Instructions for Next Treatment: Progress mobility as tolerated  Current Treatment Recommendations: Strengthening;ROM;Balance training;Functional mobility training;Transfer training; Endurance training;Gait training; Therapeutic activities; Home exercise program;Wheelchair mobility training;Equipment evaluation, education, & procurement;Cognitive reorientation;Stair training;Positioning; Safety education & training;Patient/Caregiver education & training;Cognitive/Perceptual training;ADL/Self-care training;IADL training;Pain management  Safety Devices  Type of Devices: All fall risk precautions in place;Call light within reach; Patient at risk for falls;Nurse notified; All yaima prominences offloaded;Gait belt;Left in chair;Chair alarm in place  Restraints  Restraints Initially in Place: No    EDUCATION  Education  Education Given To: Patient  Education Provided: Role of Therapy; Visual Perceptual Function;Plan of Care; Mobility Training;Precautions;Transfer Training; Safety; Energy Conservation; Fall Prevention Strategies  Education Provided Comments: pt educated on importance of attending to L side for safety, posture, midline awareness, and gait sequencing - requires reinforcement  Education Method: Verbal;Demonstration  Barriers to Learning: Cognition  Education Outcome: Verbalized understanding;Demonstrated understanding;Continued education needed        Therapy Time   Individual Concurrent Group Co-treatment   Time In       0800   Time Out       0900   Minutes       60     Timed Code Treatment Minutes: [address], PT, 06/06/22 at 3:19 PM

## 2022-06-06 NOTE — PROGRESS NOTES
Loren Owens  6/6/2022  [id number]    Chief Complaint: Acute CVA (cerebrovascular accident) [facility])    Subjective:   No acute events overnight. Today Viky Allison is seen in her room with her  present. She reports that she is feeling well and denies acute complaints. She reports feeling less itchy than prior. She endorses feeling ready for discharge tomorrow. ROS: denies f/c, n/v, sob, cp    Objective:  Patient Vitals for the past 24 hrs:   BP Temp Temp src Pulse Resp SpO2   06/06/22 0931 (!) 190/63 97 °F (36.1 °C) Axillary 68 16 96 %   06/06/22 0902 (!) 128/56 -- -- -- -- 95 %   06/05/22 2035 (!) 126/53 99 °F (37.2 °C) Oral 71 16 93 %     Gen: No distress, pleasant. Seated in wheelchair  HEENT: Normocephalic, atraumatic. CV: extremities well perfused  Resp: No respiratory distress. On room air  Abd: Soft, nondistended  Ext: No edema. Neuro: Alert, oriented, appropriately interactive. Left hemiparesis. Able to extend knee  Psych: mood and affect appropriate  Skin: significantly improved rash, skin peeling     Wt Readings from Last 3 Encounters:   06/05/22 145 lb 4.5 oz (65.9 kg)   05/02/22 143 lb 9.6 oz (65.1 kg)   09/09/21 150 lb 12.8 oz (68.4 kg)       Laboratory data:   Lab Results   Component Value Date    WBC 15.9 (H) 06/06/2022    HGB 8.7 (L) 06/06/2022    HCT 25.8 (L) 06/06/2022    MCV 95.6 06/06/2022     06/06/2022       Lab Results   Component Value Date     06/06/2022    K 3.9 06/06/2022     06/06/2022    CO2 25 06/06/2022    BUN 22 06/06/2022    CREATININE 0.6 06/06/2022    GLUCOSE 125 06/06/2022    GLUCOSE 129 07/26/2017    CALCIUM 8.5 06/06/2022        Therapy progress:  PT  Position Activity Restriction  Other position/activity restrictions: chemo precautions, L inattention, LUE flaccid, resting hand splint to be worn at night.   Objective     Sit to Stand: 2 Person Assistance,Dependent/Total  Stand to sit: Dependent/Total,2 Person Assistance  Bed to Chair: Dependent/Total OT  PT Equipment Recommendations  Equipment Needed: Yes  Other: CTA pending progress with mobility  Toilet - Technique:  (steady to e-tac chair over toilet)  Equipment Used:  (e-tac chair)  Toilet Transfers Comments: maxA x2 sit <>  steady, progressing at times to [address] with max verbal/tactile and demo cues  Assessment        SLP                Body mass index is 26.15 kg/m². Assessment and Plan:  Aquiles Ybarra is a 66year old female with a past medical history significant for recent CVA with left hemiparesis, CML, HTN, and HLD who presented to [facility] on 5/3/22 with abnormal labs, admitted with rhabdomyolysis and ARF, found to have acute CVA. She was admitted to [facility] on 5/10/22 due to functional deficits below her baseline.     Acute Right CVA  - MRI showing acute infarct in right cerebral hemisphere in a watershed distribution  - CTA showing 90-95% stenosis of right ICA  - Vascular and Neurology in agreement on waiting for CEA, needs follow up in 4 weeks  - asa, plavix, statin  - PT, OT, ST    Dysphagia  - upgraded to regular diet  - ST    LOC 5/30  - suspect vasovagal as patient was on toilet. Vitals normal, although patient does have severe stenosis of right ICA so low normal blood pressure for her may be too low. Patient also appearing dehydrated on labs with new EDYTA. Also with worsening leukocytosis.    - discontinued lisinopril to keep patient blood pressure higher, also discontinued in setting of EDYTA  - s/p IVF  - no further epsiodes    Leukocytosis, improving  - Medicine consulted, appreciate assistance  - suspected to be due to allergic reaction and steroids  - abx discontinued  - trending down  - continue to monitor    Drug Rash, improving  - suspected due to tramadol, but had not improved over the weekend since being off of tramadol  - completed IV steroids and transitioned to oral steroids with last dose 6/6  - IV pepcid and IV zyrtec     Rhabdomyolysis  Acute Renal Injury  -

## 2022-06-06 NOTE — PLAN OF CARE
Problem: Skin/Tissue Integrity  Goal: Absence of new skin breakdown  Description: 1. Monitor for areas of redness and/or skin breakdown  2. Assess vascular access sites hourly  3. Every 4-6 hours minimum:  Change oxygen saturation probe site  4. Every 4-6 hours:  If on nasal continuous positive airway pressure, respiratory therapy assess nares and determine need for appliance change or resting period.   6/5/2022 2235 by Greg Sheridan RN  Outcome: Progressing  6/5/2022 1331 by Gertrudis Atkinson RN  Outcome: Progressing     Problem: Chronic Conditions and Co-morbidities  Goal: Patient's chronic conditions and co-morbidity symptoms are monitored and maintained or improved  6/5/2022 2235 by Greg Sheridan RN  Outcome: Progressing  6/5/2022 1331 by Gertrudis Atkinson RN  Outcome: Progressing

## 2022-06-06 NOTE — PROGRESS NOTES
Interval History and plan:      Renal function is good  Looks better  BP generally good  1 off which is high     Plan:  No changes today                         Assessment :     Acute Kidney Injury  Creatinine 1.1 at the time of consult, as she might have chronic kidney disease  EDYTA likely due to -rhabdomyolysis/poor p.o. intake  Cr on consultation 2.1 when she was in the acute hospital  Baseline Cr-0.8 on 9/21  No recent baseline available-she was admitted to [facility] March 2022 with the stroke and was told that she has high creatinine    UA-5/22-large blood, trace LE  Renal Imaging:-5/22-right-10.7 cm, simple 2.2 cm right renal cyst  No mention of left kidney  Echo: 10/18-EF normal, no mention of diastolic dysfunction    Hypertension   BP: (128-190)/(56-63)  Heart Rate:  [68]   BP goal inpatient [phone] systolic inpatient    Rhabdomyolysis  CPK more than 10,000 on arrival-down to 8.2   Thought to be induced by statin  Has elevated AST and ALT    CML  Diabetes mellitus type 2 new  Carotid artery stenosis-plan for outpatient procedure    [facility] would like to thank Jada Urbina MD   for opportunity to serve this patient      Please call with questions at-   24 Hrs Answering service [phone] or  7 am- 5 pm via Perfect serve or cell phone  Luis Hardwick MD          CC/reason for consult :     Severe hypertension   HPI :     Kathe Moreno is a 66 y.o. female presented to   the hospital on 5/10/2022 with EDYTA and statin induced rhabdomyolysis to the acute hospital.  She was treated for both. She recently had a stroke and he may paralysis for which she was seen by neurologist.  She was found to have carotid artery stenosis for which she was seen by vascular surgeon. Plan for endarterectomy as an outpatient. Once her renal function was a stable and rhabdomyolysis improved she was admitted to acute rehab unit for further care    At the rehab.   Her blood pressure has gone up significantly because of which we are consulted    ROS:     Seen with-no family    positives in bold   Constitutional:  fever, chills, weakness, weight change, fatigue  Skin:  rash, pruritus, hair loss, bruising, dry skin, petechiae  Head, Face, Neck   headaches, swelling,  cervical adenopathy  Respiratory: shortness of breath, cough, or wheezing  Cardiovascular: chest pain, palpitations, dizzy, edema  Gastrointestinal: nausea, vomiting, diarrhea, constipation,belly pain    Yellow skin, blood in stool  Musculoskeletal:  back pain, muscle weakness, gait problems,       joint pain or swelling. Genitourinary:  dysuria, poor urine flow, flank pain, blood in urine  Neurologic:  vertigo, TIA'S, syncope, seizures, focal weakness  Psychosocial:  insomnia, anxiety, or depression. Additional positive findings:                    All other remaining systems are negative or unable to obtain        PMH/PSH/SH/Family History:     Past Medical History:   Diagnosis Date    Anemia     Arthritis     Asthma     Bowel dysfunction     CML (chronic myelocytic leukemia) ([facility] [address] 01/2006    leukemia    Hyperlipidemia     Hypertension     Nuclear senile cataract of both eyes 11/25/2019    Obesity     Risk of myocardial infarction or stroke 7.5% or greater in next 10 years 9/15/2021    Skin cancer of face     left cheek, squamous    Stroke (cerebrum) ([facility] [address]     Thyroid disease     TIA (transient ischemic attack)     mini ones-from chemo       Past Surgical History:   Procedure Laterality Date    ANUS SURGERY  1998    fissure    BREAST BIOPSY      BREAST LUMPECTOMY      benign    COLONOSCOPY      numerous polyps    ELBOW SURGERY  1960    removal of foreign body [place]    FINE NEEDLE ASPIRATION      PARACENTESIS      x 2 fluid in lung from Chemo    TONSILLECTOMY Bilateral         reports that she has never smoked.  She has never used smokeless tobacco. She reports that she does not drink alcohol and does not use drugs. family history includes Arrhythmia in her sister; Cancer in her brother, father, mother, and sister.          Medication:     Current Facility-Administered Medications: bisacodyl (DULCOLAX) suppository 10 mg, 10 mg, Rectal, Once  famotidine (PEPCID) injection 20 mg, 20 mg, IntraVENous, BID  magic (miracle) mouthwash with nystatin, 5 mL, Swish & Spit, 4x Daily  hydrOXYzine (ATARAX) tablet 10 mg, 10 mg, Oral, TID PRN  cetirizine (ZYRTEC) tablet 10 mg, 10 mg, Oral, Daily  insulin lispro (HUMALOG) injection vial 0-12 Units, 0-12 Units, SubCUTAneous, TID WC  insulin lispro (HUMALOG) injection vial 0-6 Units, 0-6 Units, SubCUTAneous, Nightly  lidocaine 1 % injection 5 mL, 5 mL, IntraDERmal, Once  sodium chloride flush 0.9 % injection 5-40 mL, 5-40 mL, IntraVENous, 2 times per day  sodium chloride flush 0.9 % injection 5-40 mL, 5-40 mL, IntraVENous, PRN  0.9 % sodium chloride infusion, 25 mL, IntraVENous, PRN  guaiFENesin (MUCINEX) extended release tablet 600 mg, 600 mg, Oral, BID PRN  sennosides-docusate sodium (SENOKOT-S) 8.6-50 MG tablet 2 tablet, 2 tablet, Oral, Daily  lactulose (CHRONULAC) 10 GM/15ML solution 20 g, 20 g, Oral, BID  NIFEdipine (PROCARDIA XL) extended release tablet 30 mg, 30 mg, Oral, Daily  traZODone (DESYREL) tablet 50 mg, 50 mg, Oral, Nightly PRN  metoprolol tartrate (LOPRESSOR) tablet 25 mg, 25 mg, Oral, BID  acetaminophen (TYLENOL) tablet 650 mg, 650 mg, Oral, Q6H PRN **OR** acetaminophen (TYLENOL) suppository 650 mg, 650 mg, Rectal, Q6H PRN  heparin (porcine) injection 5,000 Units, 5,000 Units, SubCUTAneous, TID  ondansetron (ZOFRAN-ODT) disintegrating tablet 4 mg, 4 mg, Oral, Q8H PRN **OR** ondansetron (ZOFRAN) injection 4 mg, 4 mg, IntraVENous, Q6H PRN  aluminum & magnesium hydroxide-simethicone (MAALOX) 200-200-20 MG/5ML suspension 30 mL, 30 mL, Oral, Q6H PRN  bisacodyl (DULCOLAX) suppository 10 mg, 10 mg, Rectal, Daily PRN  aspirin EC tablet 81 mg, 81 mg, Oral, Daily  carboxymethylcellulose PF (THERATEARS) 1 % ophthalmic gel 1 drop, 1 drop, Both Eyes, Q12H  clopidogrel (PLAVIX) tablet 75 mg, 75 mg, Oral, Daily  dextrose 5 % solution, 100 mL/hr, IntraVENous, PRN  dextrose bolus 10% 125 mL, 125 mL, IntraVENous, PRN **OR** dextrose bolus 10% 250 mL, 250 mL, IntraVENous, PRN  diclofenac sodium (VOLTAREN) 1 % gel 4 g, 4 g, Topical, 4x Daily  glucagon (rDNA) injection 1 mg, 1 mg, IntraMUSCular, PRN  glucose (GLUTOSE) 40 % oral gel 15 g, 15 g, Oral, PRN  hydrocortisone 1 % cream, , Topical, BID  levothyroxine (SYNTHROID) tablet 50 mcg, 50 mcg, Oral, Daily  magnesium oxide (MAG-OX) tablet 400 mg, 400 mg, Oral, Daily  nilotinib (TASIGNA) capsule 200 mg, 200 mg, Oral, Q12H  witch hazel-glycerin (TUCKS) pad, , Topical, PRN       Vitals :     Vitals:    06/06/22 0931   BP: (!) 190/63   Pulse: 68   Resp: 16   Temp: 97 °F (36.1 °C)   SpO2: 96%       I & O :       Intake/Output Summary (Last 24 hours) at 6/6/2022 1238  Last data filed at 6/5/2022 2035  Gross per 24 hour   Intake 600 ml   Output --   Net 600 ml        Physical Examination :     General appearance: Anxious- no, distressed- no, in good spirits-  Yes  HEENT: Lips- normal, teeth- ok , oral mucosa- moist  Neck : Mass- no, appears symmetrical, JVD- not visible  Respiratory: Respiratory effort-  normal, wheeze- no, crackles -   Cardiovascular:  Ausculation- No M/R/G, Edema none  Abdomen: visible mass- no, distention- no, scar- no, tenderness- no                            hepatosplenomegaly-  no  Musculoskeletal:  clubbing no,cyanosis- no , digital ischemia- no                           muscle strength- grossly normal , tone - grossly normal  Skin: rashes- no , ulcers- no, induration- no, tightening - no  Psychiatric:  Judgement and insight- normal           AAO X 3  Additional finding:   Left sided weakness       LABS:     Recent Labs     06/04/22  0640 06/05/22  0540 06/06/22  0620   WBC 17.0* 15.6* 15.9*   HGB 8.8* 8.6* 8.7* HCT 26.7* 25.7* 25.8*    199 225     Recent Labs     06/04/22  0640 06/05/22  0540 06/06/22  0620    143 141   K 4.0 4.0 3.9    107 106   CO2 25 25 25   BUN 30* 26* 22*   CREATININE 0.7 0.7 0.6   GLUCOSE 97 133* 125*

## 2022-06-06 NOTE — PROGRESS NOTES
Occupational Therapy  Facility/Department: [facility] [place]  Rehabilitation Occupational Therapy Daily Treatment Note    Date: 22  Patient Name: Laurie Nieves       Room: 6757/6705-29  MRN: [id number]  Account: [de-identified]   : 1950  (76 y.o.) Gender: female                    Past Medical History:  has a past medical history of Anemia, Arthritis, Asthma, Bowel dysfunction, CML (chronic myelocytic leukemia) ([facility] [address] Hyperlipidemia, Hypertension, Nuclear senile cataract of both eyes, Obesity, Risk of myocardial infarction or stroke 7.5% or greater in next 10 years, Skin cancer of face, Stroke (cerebrum) ([facility] [address] Thyroid disease, and TIA (transient ischemic attack). Past Surgical History:   has a past surgical history that includes Breast biopsy; fine needle aspiration; Breast lumpectomy; Tonsillectomy (Bilateral); Paracentesis; Anus surgery (); Elbow surgery (); and Colonoscopy. Restrictions  Restrictions/Precautions: Fall Risk;Up as Tolerated;Isolation  Other position/activity restrictions: chemo precautions, L inattention, LUE flaccid, resting hand splint to be worn at night. Subjective  Subjective: Pt in bed, declining a shower. Agreeable to OT individual session for ADLs, then cotx wiht PT for functional transfers. Pt denies pain. Restrictions/Precautions: Fall Risk;Up as Tolerated;Isolation             Objective  Vision - Basic Assessment  Patient Visual Report: Balance difficulty  Cognition  Overall Cognitive Status: Exceptions  Arousal/Alertness: Appropriate responses to stimuli;Delayed responses to stimuli  Following Commands: Follows one step commands with repetition; Follows multistep commands with repitition  Attention Span: Attends with cues to redirect  Memory: Decreased recall of precautions  Safety Judgement: Decreased awareness of need for safety;Decreased awareness of need for assistance  Problem Solving: Assistance required to correct errors made;Assistance required to identify errors made  Insights: Decreased awareness of deficits  Initiation: Requires cues for some  Sequencing: Requires cues for some  Cognition Comment: L inattention  Orientation  Overall Orientation Status: Within Functional Limits  Orientation Level: Oriented X4   Perception  Overall Perceptual Status: Impaired  Unilateral Attention: Cues to attend left visual field;Cues to attend to left side of body;Cues to maintain midline in sitting;Cues to maintain midline in standing  Initiation: Cues to initiate tasks  Motor Planning: Hand over hand to sequence tasks  Perseveration: Perseverates during conversation     ADL  Feeding  Assistance Level: Stand by assist  Skilled Clinical Factors: assistance to cut food, open packages and bring cup to hand  Grooming/Oral Hygiene  Assistance Level: Increased time to complete;Stand by assist;Set-up  Skilled Clinical Factors: washing face, brushing teeth, and brushing hair seated in   Upper Extremity Bathing  Skilled Clinical Factors: Mod A sponge bathing in supported sitting on BSC. Pt requiring full assistance to wash RUE and partial assistance to wash LUE. Pt requiring min cues for thoroughness on L side of body. Lower Extremity Bathing  Assistance Level: Increased time to complete; Moderate assistance;Verbal cues  Skilled Clinical Factors: modA in sitting for balance progressing to SBA at times, modA for B feet and lower legs, modA weight shifting L vs R for bathing buttocks sitting on BSC  Upper Extremity Dressing  Assistance Level: Minimal assistance  Skilled Clinical Factors: Moderate cues for papo techniques. Pt completes UB dressing in supported sitting on BSC. Pt requiring partial assistance to thread BUE and doff/don clothing over abdomen.  Pt able to manage clothing over head with cues and extra time  Lower Extremity Dressing  Assistance Level: Dependent;Verbal cues  Skilled Clinical Factors: x2 assist sit<>stand to manage pants up/down; in STEDY  Putting On/Taking Off Footwear  Assistance Level: Dependent  Skilled Clinical Factors: donning socks  Toileting  Assistance Level: Dependent  Skilled Clinical Factors: Assistance x2; Standing in [address]: Beside commode  Additional Factors: Verbal cues;Cues for hand placement; Increased time to complete; With handrails  Assistance Level: Dependent  Skilled Clinical Factors: maxA x1-2  sit <> Stand from [facility] to [facility] to [facility] with steady          Functional Mobility  Device: Wheelchair  Activity: To/From therapy gym;Retrieve items;Transport items; To/From bathroom  Assistance Level: Minimal assistance; Moderate assistance  Skilled Clinical Factors: modA X2 sit <> stand to steady EOB to BSC to TTB to WC, WC mobility room <> gym modA wiht max cues for L sided attn and sequencing  Bed Mobility  Overall Assistance Level: Moderate Assistance  Additional Factors: Head of bed raised; Increased time to complete;Set-up; Verbal cues  Bridging  Assistance Level: Moderate assistance  Roll Left  Assistance Level: Moderate assistance  Roll Right  Assistance Level: Stand by assist  Sit to Supine  Assistance Level: Moderate assistance  Supine to Sit  Assistance Level: Moderate assistance  Scooting  Assistance Level: Moderate assistance  Transfers  Surface: From bed; Wheelchair; To mat  Additional Factors: Increased time to complete;Hand placement cues; Verbal cues  Device: Lift equipment  Sit to Stand  Assistance Level: Maximum assistance; Moderate assistance  Skilled Clinical Factors: modA x1 Mita x1 sit <>  steady from EOB to [facility], sit <> stand with PT cotx standing to grab bar vs rail on R side and use of steady for support, max cues and modA x1 for trunk/pelvic support and midline alignment, poor sequencing and attn throughout  Stand to Sit  Assistance Level:  Moderate assistance;Maximum assistance  Skilled Clinical Factors: modA x1 Mita x1 sit <>  steady from EOB to [facility], sit <> stand with PT cotx standing to grab bar vs rail on R side and use of steady for support, max cues and modA x1 for trunk/pelvic support and midline alignment, poor sequencing and attn throughout  Bed To/From Chair  Technique: Sit pivot;Stand pivot  Assistance Level: Maximum assistance; Moderate assistance  Skilled Clinical Factors: modA x1 Mita x1 sit <>  steady from EOB to [facility], sit <> stand with PT cotx standing to grab bar vs rail on R side and use of steady for support, max cues and modA x1 for trunk/pelvic support and midline alignment, poor sequencing and attn throughout  Stand Pivot  Assistance Level: Moderate assistance;Maximum assistance  Skilled Clinical Factors: modA x1 Mita x1 sit <>  steady from EOB to [facility], sit <> stand with PT cotx standing to grab bar vs rail on R side and use of steady for support, max cues and modA x1 for trunk/pelvic support and midline alignment, poor sequencing and attn throughout  Sit Pivot  Assistance Level: Maximum assistance; Moderate assistance  Skilled Clinical Factors: modA x1 Mita x1 sit <>  steady from EOB to [facility], sit <> stand with PT cotx standing to grab bar vs rail on R side and use of steady for support, max cues and modA x1 for trunk/pelvic support and midline alignment, poor sequencing and attn throughout   Neuromuscular Education  Neuromuscular education: Yes  NDT Treatment: Upper extremity  Head/Neck Control: Pt continues to demo decreased ability to fully turn head/scan L vs R during transfers and ADL tasks. Pt needing mod cues for scanning and asisstance for upright posture in stance with limited ability to sustain upright posture  Trunk Control: Pt continues to demo L sided neglect; Pt tolerated supported sitting EOB, BSC, and WC in order to increase functional core strength for ADLs. Pt does need consistent tactile cues with limited ability to maintain posture  Weight Bearing  Weight Bearing Technique: Yes  RUE Weight Bearing: Extended arm standing; Extended arm seated  LUE Weight Bearing: Extended arm seated; Extended arm standing  Response To Weight Bearing Technique: WBing through LUE AAROM and hand over hand during sit <> stands with RUE support on steady and to grab bar vs rail R side, WBing through LUE wiht elbow extension and wrist support from OT while PT providing WBing/support of LLE during tasks. The subluxation sling placed on patients LUE to provide support     Assessment  Assessment  Assessment: Patient has progressed with standing balance, up to modA x1 for static standing and tranfsers mod-maxA. Pt was able to complete bed mobility modA x1 this date, CGA-Mita for sitting balance during ADLs. Cont to rec SNF post DC to progress towards goals. All goals have been met in IPR setting. Activity Tolerance: Patient limited by fatigue  Discharge Recommendations: Subacute/Skilled Nursing Facility  OT Equipment Recommendations  Equipment Needed: Yes  Other: defer to next d/c facility  Safety Devices  Safety Devices in place: Yes  Type of devices: Gait belt; All fall risk precautions in place; Patient at risk for falls; Left in chair;Call light within reach; Chair alarm in place;Nurse notified    Patient Education  Education  Education Given To: Patient  Education Provided: Role of Therapy;Plan of Care;Safety;Equipment;ADL Function; Fall Prevention Strategies;Transfer Training  Education Provided Comments: bed mobility, sitting balance, safety and hand placement with transfers, hemistrategies for UB bathing/dressing, skin checks for resting hand splint, AAROM LUE exercises, weightshifting during ambulation/standing, standing balance  Education Method: Demonstration;Verbal  Barriers to Learning: Cognition  Education Outcome: Verbalized understanding;Demonstrated understanding;Continued education needed    Plan  Plan  Times per Week: 5 out of 7 days  Times per Day: Daily  Plan Weeks: 3 weeks  Current Treatment Recommendations: Strengthening;ROM;Balance training;Functional mobility training; Endurance training; Safety education & training;Neuromuscular re-education;Patient/Caregiver education & training;Equipment evaluation, education, & procurement;Self-Care / ADL;Cognitive/Perceptual training; Wheelchair mobility training;Positioning    Goals  Patient Goals   Patient goals :  \"Go home\" \"Get stronger\" \"walk better\"  Short Term Goals  Time Frame for Short term goals: 10 days (5/20/22)-EXTEND TO 5/25 2/2 PROGRESS  Short Term Goal 1: Pt will complete functional transfers with Mita x2 with LRAD- progressing, maxA x2 sit pivot vs stand pivot transfer-EXTEND TO 5/25-GOAL MET 5/25  Short Term Goal 2: Pt will complete toileting/transfer modA x2 with LRAD and DME PRN-GOAL MET 5/16, modA x2 steady  Short Term Goal 3: Pt will complete LUE AAROM/AROM exercises to LUE to increase strength/endurance for ADLs/transfers-GOAL MET 5/18  Short Term Goal 4: Pt will be able to locate 3/5 object in L visual field with min cues during ADLs/activities-GOAL MET 5/18  Additional Goals?: No  Long Term Goals  Time Frame for Long term goals : 21 days (3/31/22)-EXTEND TO DC DATE, 6/7  Long Term Goal 1: Pt will complete functional transfers/mobility SPV with LRAD-DOWGRADE DUE TO PROGRESS: maxA x1-GOAL MET 6/06  Long Term Goal 2: Pt will complete UE dressing/bathing sitting setup with min cues for papo techniques-DOWNGRADE DUE TO PROGRESS: Mita; GOAL MET 6/06  Long Term Goal 3: Pt will complete toileting/transfers with LRAD and DME PRN SPV-DOWGRADE DUE TO PROGRESS: maxA x1-GOAL MET 6/06  Long Term Goal 4: Pt will complete opening 3/5 containers with compensatory techniques sitting setup/SPV-GOAL MET 6/06        Therapy Time   Individual Concurrent Group Co-treatment   Time In 0730 0800   Time Out 0800 0900   Minutes 30     60   Timed Code Treatment Minutes: [address], OTR/L

## 2022-06-07 VITALS
BODY MASS INDEX: 25.74 KG/M2 | DIASTOLIC BLOOD PRESSURE: 70 MMHG | RESPIRATION RATE: 20 BRPM | WEIGHT: 145.28 LBS | OXYGEN SATURATION: 98 % | TEMPERATURE: 97.9 F | HEIGHT: 63 IN | HEART RATE: 60 BPM | SYSTOLIC BLOOD PRESSURE: 171 MMHG

## 2022-06-07 LAB
GLUCOSE BLD-MCNC: 121 MG/DL (ref 70–99)
GLUCOSE BLD-MCNC: 171 MG/DL (ref 70–99)
PERFORMED ON: ABNORMAL
PERFORMED ON: ABNORMAL

## 2022-06-07 PROCEDURE — 6370000000 HC RX 637 (ALT 250 FOR IP): Performed by: STUDENT IN AN ORGANIZED HEALTH CARE EDUCATION/TRAINING PROGRAM

## 2022-06-07 PROCEDURE — 2580000003 HC RX 258: Performed by: STUDENT IN AN ORGANIZED HEALTH CARE EDUCATION/TRAINING PROGRAM

## 2022-06-07 PROCEDURE — 2500000003 HC RX 250 WO HCPCS: Performed by: STUDENT IN AN ORGANIZED HEALTH CARE EDUCATION/TRAINING PROGRAM

## 2022-06-07 PROCEDURE — 6370000000 HC RX 637 (ALT 250 FOR IP): Performed by: INTERNAL MEDICINE

## 2022-06-07 PROCEDURE — 6360000002 HC RX W HCPCS: Performed by: STUDENT IN AN ORGANIZED HEALTH CARE EDUCATION/TRAINING PROGRAM

## 2022-06-07 RX ADMIN — NIFEDIPINE 30 MG: 30 TABLET, FILM COATED, EXTENDED RELEASE ORAL at 10:15

## 2022-06-07 RX ADMIN — ACETAMINOPHEN 650 MG: 325 TABLET ORAL at 10:15

## 2022-06-07 RX ADMIN — CLOPIDOGREL BISULFATE 75 MG: 75 TABLET, FILM COATED ORAL at 10:15

## 2022-06-07 RX ADMIN — FAMOTIDINE 20 MG: 10 INJECTION INTRAVENOUS at 10:16

## 2022-06-07 RX ADMIN — LACTULOSE 20 G: 20 SOLUTION ORAL at 10:16

## 2022-06-07 RX ADMIN — Medication 400 MG: at 10:14

## 2022-06-07 RX ADMIN — ASPIRIN 81 MG: 81 TABLET, COATED ORAL at 10:14

## 2022-06-07 RX ADMIN — CETIRIZINE HYDROCHLORIDE 10 MG: 10 TABLET, FILM COATED ORAL at 10:14

## 2022-06-07 RX ADMIN — METOPROLOL TARTRATE 25 MG: 25 TABLET, FILM COATED ORAL at 10:15

## 2022-06-07 RX ADMIN — WITCH HAZEL 40 EACH: 500 SOLUTION RECTAL; TOPICAL at 10:11

## 2022-06-07 RX ADMIN — HEPARIN SODIUM 5000 UNITS: 5000 INJECTION INTRAVENOUS; SUBCUTANEOUS at 06:31

## 2022-06-07 RX ADMIN — HYDROCORTISONE: 1 CREAM TOPICAL at 10:11

## 2022-06-07 RX ADMIN — DOCUSATE SODIUM 50 MG AND SENNOSIDES 8.6 MG 2 TABLET: 8.6; 5 TABLET, FILM COATED ORAL at 10:15

## 2022-06-07 RX ADMIN — LEVOTHYROXINE SODIUM 50 MCG: 0.05 TABLET ORAL at 06:31

## 2022-06-07 RX ADMIN — SODIUM CHLORIDE, PRESERVATIVE FREE 10 ML: 5 INJECTION INTRAVENOUS at 09:00

## 2022-06-07 NOTE — CARE COORDINATION
CASE MANAGEMENT DISCHARGE SUMMARY      Discharge to: [facility] completed: no  Hospital Exemption Notification (HENS) completed: out of state: facility will complete    IMM given: 06/07/2022    New Durable Medical Equipment ordered/agency: Defer to SNF    Transportation:       Medical Transport explained to pt/family. Pt/family voice no agency preference. Agency used: [employer] [phone])  Alanna Armstrong up time:1100   Ambulance form completed: Yes    Confirmed discharge plan with:     Patient: yes     Family:  yes    Name: Tereso negron number:[phone]      [facility] [address]  [phone]:MO/AVS faxed        RN, name:  Natalya Traylor RN    Note: Discharging nurse to complete MO, reconcile AVS, and place final copy with patient's discharge packet. RN to ensure that written prescriptions for  Level II medications are sent with patient to the facility as per protocol.

## 2022-06-07 NOTE — PROGRESS NOTES
Discharge instructions reviewed and copy provided to transport team. Report given to transport team. Pt belongings collected and confirmed. Chemo drug sent in security bag. Pt discharged alert, oriented, talkative, no distress.

## 2022-06-07 NOTE — DISCHARGE SUMMARY
Physical Medicine & Rehabilitation  Discharge Summary     Patient Identification:  Dane Wiggins  : 1950  Admit date: 5/10/2022  Discharge date: 22  Attending provider: Valentina Jasso MD        Primary care provider: Bandar Palmer MD     Discharge Diagnoses:   Patient Active Problem List   Diagnosis    Acquired hypothyroidism    Adverse reaction to sulfa antibiotic    Chemotherapy adverse reaction    Anastomotic ulcer    Anemia    Anxiety    Campbell esophagus    Cancer ([address]    Cellulitis of left lower extremity    DNR (do not resuscitate)    Family history of colon cancer    Hiatal hernia    History of skin cancer    Dyslipidemia    Localized edema    Lower abdominal pain    Lower extremity edema    Pleural effusion    Thyroid nodule    CML (chronic myelocytic leukemia) ([address]    Rectal bleeding    Polyp of colon    Anatomical narrow angle, bilateral    Hx of actinic keratosis    HTN (hypertension), benign    Bilateral primary osteoarthritis of knee    Chronic pain of both knees    Neurogenic bladder as late effect of cerebrovascular accident (CVA)    Chronic kidney disease, stage 3a ([address]    Arterial ischemic stroke, ICA, right, acute ([address]    Hematuria    Positive depression screening    Rhabdomyolysis    EDYTA (acute kidney injury) ([address]    Troponin level elevated    Elevated liver enzymes    Type 2 diabetes mellitus with kidney complication, without long-term current use of insulin ([address]    DM type 2, controlled, with complication ([address]    Pre-syncope    Hemorrhage    Acute CVA (cerebrovascular accident) ([address]       History of Present Illness/Acute Hospital Course:  Dave Gr is a 66year old female with a past medical history significant for recent CVA with left hemiparesis, CML, HTN, and HLD who presented to [facility] on 5/3/22 with abnormal labs. She had recently been admitted to a hospital in [place] for stroke.  Per report she left the [facility]. She had a visit with her PCP on 5/2. Labs results from this visit showed EDYTA and transaminitis and she was admitted with rhabdomyolysis suspected to be due to statin induced myopathy. She was managed with IVF. On 5/5 she was noted to have worsening neurologic status. Stat CT head revealed possible acute to subacute CVA in the right frontal corona radiata. MRI brain reveal acute infarct in the right cerebral hemisphere. Carotid ultrasound revealed occlusion of the right internal carotid artery. Vascular surgery was consulted. CTA revealed that ANGEL patent beyond very high grade stenosis. Vascular and Neurology recommending CEA as outpatient. She was admitted to [facility] on 5/10/22 due to functional deficits below her baseline. Inpatient Rehabilitation Course:   Kim Curtis is a 66 y.o. female admitted to inpatient rehabilitation on 5/10/2022 with Acute CVA (cerebrovascular accident) ([facility] [address] The patient participated in an aggressive multidisciplinary inpatient rehabilitation program involving 3 hours of therapy per day, at least 5 days per week. She made good progress, but continues to require significant amount of assistance. She would benefit from continued therapies at a skilled level of care. Patient will follow-up with PCP and Vascular. Impairments: impaired mobility and ADLs, impaired gait and balance, left hemiparesis, left neglect, dysphagia, cognitive impairment    Medical Management:    Acute Right CVA  - MRI showing acute infarct in right cerebral hemisphere in a watershed distribution  - CTA showing 90-95% stenosis of right ICA  - Vascular and Neurology in agreement on waiting for CEA, needs follow after discharge from SNF  - asa, plavix, statin  - PT, OT, ST     Dysphagia  - tolerating regular diet  - ST     LOC 5/30  - suspect vasovagal as patient was on toilet. Vitals normal, although patient does have severe stenosis of right ICA so low normal blood pressure for her may be too low. Patient also appearing dehydrated on labs with new EDYTA. Also with worsening leukocytosis. - no further episodes following IVF     Leukocytosis, improving  - Medicine consulted, appreciate assistance  - suspected to be due to allergic reaction and steroids  - trending down    Drug Rash, improving  - suspected due to tramadol, but did not improve after this medication was discontinued  - Medicine was consulted and she was treated with IV steroids, pepcid, and zyrtec  - transitioned to oral steroids and completed course  - rash appears significantly improved     Rhabdomyolysis, resolved  Acute Renal Injury, resolved  - Nephrology followed during acute stay  - worsening kidney function associated with hypotension, now resolved     HTN  - BP meds per Medicine and Nephro  - metoprolol, procardia     DM2  - Hba1c 6.6  - SSI resumed while on steroids, per medicine. Does not need to continue on discharge  - follow up with PCP     Liver Injury with elevated LFTs  - suspected to be due to statin induced rhabdo  - statin stopped  - mild increased suspected due to hypotension     CML  - follows with Dr. Alexus Guerrier  - nilitinib  - chemo precautions  - follow up with Heme/Onc OP     Enlarged heterogeneous appearance of the thyroid  - Thyroid ultrasound outpatient  - follow up with PCP     Hemorrhoids  - hydrocortisone cream   - bowel regimen to avoid constipation     Chest Pain, resolved  - EKG and troponin unremarkable  - continue to monitor     Abd pain, resolved  - XR abdomen with moderate stool burden  - continue bowel regimen     RLL Pneumonia, resolved  - concern for aspiration in setting of dysphagia  - completed course of levaquin   - IS     Discharge Exam:  Constitutional: Alert, WDWN, Pleasant, no distress  Head: Normocephalic, atruamatic, MMM  Eyes: Conjunctiva noninjected, no icterus, no drainage  Pulm: CTA bilat. Respirations non-labored. CV: extremities well perfused  Abd: Soft, nontender.  NABS+  Ext: No edema, no varicosities  Neuro: Alert, left hemiparesis, left neglect  MSK: No joint abnormalities noted     Discharge Functional Status:    Physical therapy:  Bed Mobility:     Sit>supine:  Assistance Level: Maximum assistance  Supine>sit:  Assistance Level: Moderate assistance  Skilled Clinical Factors: modA x 2, HOB elevated, use of BR, increased time to complete  Transfers: Additional Factors: Verbal cues,Hand placement cues,Increased time to complete  Device: Lift equipment Babin Pluck)  Sit>stand:  Assistance Level: Maximum assistance  Skilled Clinical Factors: Pt completes 3 sit-to-stand transfers with modA x2 with Babin Pluck this date from EOB, toilet, and w/c. Pt requires mod VC for hand placement and foot placement. Pt also requires tactile cues for upright posture in standing. Stand>sit:  Assistance Level: Maximum assistance  Skilled Clinical Factors: Pt completes 3 stand-to-sit transfers from EOB, w/c, and toilet with Babin Pluck. Pt requires mod VC for hand placement and upright posture. Bed<>chair  Technique: Sit pivot  Assistance Level: Maximum assistance  Skilled Clinical Factors: Sit pivot EOB to w/c modA x 2 (co-tx); sit pivot w/c to/from EOM grossly maxA x 1  Stand Pivot:  Assistance Level: Maximum assistance,Requires x 2 assistance  Lateral transfer:     Car transfer:  Assistance Level: Maximum assistance,Requires x 2 assistance (max A X2 squat pivot)  Ambulation:  Surface: Level surface  Device: Parallel Bars  Distance: 2x 5 ft  Activity: Within Unit  Additional Factors: Verbal cues,Hand placement cues,Increased time to complete  Assistance Level:  Moderate assistance,Requires x 2 assistance (mod A x2)  Gait Deviations: Slow arabella,Narrow base of support,Unsteady gait,Decreased step length bilateral,Decreased trunk rotation,Decreased heel strike right,Decreased heel strike left,Decreased weight shift left,Decreased step length left,Decreased step length right  Skilled Clinical Factors: Pt completes in // bars during co-tx, PT assisting for LLE managment and cues for sequence  Stairs:     Curb: Wheelchair:  Surface: Level surface  Device: Standard wheelchair  Assistance Required to Manage Parts: Left armrest,Right armrest,Brakes  Assistance Level for Propulsion: Minimal assistance  Propulsion Method: Right lower extremity,Right upper extremity  Propulsion Quality: Slow velocity,Veers left  Propulsion Distance: 150 ft  Skilled Clinical Factors: Pt requires significant cueing to avoid running into items on L side and to turn wide enough  Assessment:  Assessment: Patient seen for gait, transfers, w/c mobility, and bed mobility training. Patient completed transfers with max A, bed mobility with mod-max A, and ambulates 5 ft with mod A x2 in // bars (dependent). Pt propels manual w/c with min A. Recommending d/c to SNF given current deficits. Activity Tolerance: Patient limited by fatigue  Discharge Recommendations: Subacute/Skilled Nursing Facility      Occupational therapy:   Feeding  Assistance Level: Stand by assist  Skilled Clinical Factors: assistance to cut food, open packages and bring cup to hand  Grooming/Oral Hygiene  Assistance Level: Increased time to complete,Stand by assist,Set-up  Skilled Clinical Factors: washing face, brushing teeth, and brushing hair seated in WC  UE Bathing  Assistance Level: Increased time to complete,Moderate assistance,Verbal cues  Skilled Clinical Factors: Mod A sponge bathing in supported sitting on BSC. Pt requiring full assistance to wash RUE and partial assistance to wash LUE. Pt requiring min cues for thoroughness on L side of body.   LE Bathing  Assistance Level: Increased time to complete,Moderate assistance,Verbal cues  Skilled Clinical Factors: modA in sitting for balance progressing to SBA at times, modA for B feet and lower legs, modA weight shifting L vs R for bathing buttocks sitting on BSC  UE Dressing  Assistance Level: Minimal assistance  Skilled Clinical Factors: Moderate cues for papo techniques. Pt completes UB dressing in supported sitting on BSC. Pt requiring partial assistance to thread BUE and doff/don clothing over abdomen. Pt able to manage clothing over head with cues and extra time  LE Dressing  Assistance Level: Dependent,Verbal cues  Skilled Clinical Factors: x2 assist sit<>stand to manage pants up/down; in STEDY  Putting On/Taking Off Footwear  Assistance Level: Dependent  Skilled Clinical Factors: donning socks  Toileting  Assistance Level: Dependent  Skilled Clinical Factors: Assistance x2; Standing in STEDY  Transfers: Toilet Transfers  Technique:  (steady)  Equipment: Beside commode  Additional Factors: Verbal cues,Cues for hand placement,Increased time to complete,With handrails  Assistance Level: Dependent  Skilled Clinical Factors: maxA x1-2  sit <> Stand from [facility] to [facility] to [facility] with steady  Tub/Shower Transfers  Type: Shower  Transfer From:  (steady)  Transfer To: Tub transfer bench  Additional Factors: Increased time to complete,Verbal cues,Set-up,With handrails  Assistance Level: Maximum assistance,Requires x 2 assistance,Verbal cues  Skilled Clinical Factors: Mita x2 sit <> Stand to steady from [facility] to TTB, modA TTB to WC  IADLs:  Meal Prep     Money Management     [place] Management     Assessment:  Assessment: Patient has progressed with standing balance, up to modA x1 for static standing and tranfsers mod-maxA. Pt was able to complete bed mobility modA x1 this date, CGA-Mita for sitting balance during ADLs. Cont to rec SNF post DC to progress towards goals. All goals have been met in IPR setting.   Activity Tolerance: Patient limited by fatigue  Discharge Recommendations: Subacute/Skilled Nursing Facility    Speech therapy:    Speech Therapy Diagnosis  Cognitive Diagnosis: L visual field cut with pt requiring consistent cues to scan to L side throughout evaluation; mild cognitive deficit, needs ongoing assessment  Speech Diagnosis: Mild dysarthria although speech considered 100% intelligible in conversation as judged by SLP. Left-sided labial weakness noted.          Significant Diagnostics:   Lab Results   Component Value Date    CREATININE 0.6 06/06/2022    BUN 22 (H) 06/06/2022     06/06/2022    K 3.9 06/06/2022     06/06/2022    CO2 25 06/06/2022       Lab Results   Component Value Date    WBC 15.9 (H) 06/06/2022    HGB 8.7 (L) 06/06/2022    HCT 25.8 (L) 06/06/2022    MCV 95.6 06/06/2022     06/06/2022       Disposition:  SNF    Services/DME: defer to next level of care    Discharge Condition: Stable    Follow-up:  See after visit summary from hospitalization    Discharge Medications:     Medication List      START taking these medications    bisacodyl 10 MG suppository  Commonly known as: DULCOLAX  Place 1 suppository rectally daily as needed for Constipation     cetirizine 10 MG tablet  Commonly known as: ZYRTEC  Take 1 tablet by mouth daily     diclofenac sodium 1 % Gel  Commonly known as: VOLTAREN  Apply 4 g topically 4 times daily     heparin (porcine) 5000 UNIT/ML injection  Inject 1 mL into the skin in the morning, at noon, and at bedtime     hydrOXYzine HCl 10 MG tablet  Commonly known as: ATARAX  Take 1 tablet by mouth 3 times daily as needed for Itching     lactulose 10 GM/15ML solution  Commonly known as: CHRONULAC  Take 30 mLs by mouth 2 times daily     magnesium oxide 400 (240 Mg) MG tablet  Commonly known as: MAG-OX  Take 1 tablet by mouth daily     metoprolol tartrate 25 MG tablet  Commonly known as: LOPRESSOR  Take 1 tablet by mouth 2 times daily     NIFEdipine 30 MG extended release tablet  Commonly known as: PROCARDIA XL  Take 1 tablet by mouth daily     ondansetron 4 MG disintegrating tablet  Commonly known as: ZOFRAN-ODT  Take 1 tablet by mouth every 8 hours as needed for Nausea or Vomiting     sennosides-docusate sodium 8.6-50 MG tablet  Commonly known as: SENOKOT-S  Take 2 tablets by mouth daily     traZODone 50 MG tablet  Commonly known as: DESYREL  Take 1 tablet by mouth nightly as needed for Sleep     witch hazel-glycerin pad  Commonly known as: TUCKS  Place rectally as needed. CHANGE how you take these medications    aspirin 81 MG tablet  Take 1 tablet by mouth daily  What changed: when to take this     Tasigna 200 MG capsule  Generic drug: nilotinib  Take 1 capsule by mouth every 12 hours  What changed:   · how much to take  · how to take this  · when to take this        CONTINUE taking these medications    acetaminophen 325 mg tablet  Commonly known as: TYLENOL  Take 2 tablets by mouth every 6 hours as needed for Pain     clopidogrel 75 MG tablet  Commonly known as: PLAVIX     cycloSPORINE 0.05 % ophthalmic emulsion  Commonly known as: RESTASIS  Place 1 drop into both eyes every 12 hours     EPINEPHrine 0.3 MG/0.3ML Soaj injection  Commonly known as: EPIPEN  USE AS DIRECTED FOR ALLERGIC REACTION     hydrocortisone 1 % cream  Apply topically 2 times daily.      levothyroxine 50 MCG tablet  Commonly known as: SYNTHROID        STOP taking these medications    lisinopril 20 MG tablet  Commonly known as: PRINIVIL;ZESTRIL     polyethylene glycol 17 GM/SCOOP powder  Commonly known as: GLYCOLAX     potassium chloride 20 MEQ extended release tablet  Commonly known as: KLOR-CON M     rosuvastatin 40 MG tablet  Commonly known as: CRESTOR           Where to Get Your Medications      Information about where to get these medications is not yet available    Ask your nurse or doctor about these medications  · aspirin 81 MG tablet  · bisacodyl 10 MG suppository  · cetirizine 10 MG tablet  · diclofenac sodium 1 % Gel  · heparin (porcine) 5000 UNIT/ML injection  · hydrocortisone 1 % cream  · hydrOXYzine HCl 10 MG tablet  · lactulose 10 GM/15ML solution  · magnesium oxide 400 (240 Mg) MG tablet  · metoprolol tartrate 25 MG tablet  · NIFEdipine 30 MG extended release tablet  · ondansetron 4 MG disintegrating tablet  · sennosides-docusate sodium 8.6-50 MG tablet  · Tasigna 200 MG capsule  · traZODone 50 MG tablet  · witch hazel-glycerin pad           I spent over 35 minutes on this discharge encounter between counseling, coordination of care, and medication reconciliation. To comply with [facility] bylaw R.II.4.1:   Discharge order placed in advance to facilitate patients discharge needs.       Kathe Arias MD'

## 2022-06-08 ENCOUNTER — CARE COORDINATION (OUTPATIENT)
Dept: CARE COORDINATION | Age: 72
End: 2022-06-08

## 2022-06-08 NOTE — CARE COORDINATION
No call made to patient at this time.  Patient is currently moved to [facility] SNF for Rehab. Annette Walker continue to monitor for D/C

## 2022-06-22 ENCOUNTER — CARE COORDINATION (OUTPATIENT)
Dept: CARE COORDINATION | Age: 72
End: 2022-06-22

## 2022-06-22 NOTE — CARE COORDINATION
CATIA attempted outreach to [facility] x2 with no success. No one answers the phone it just rings then goes to a  for corporate. ACM called patient phone and patient answered. Patient notes she is doing well and still at [facility]. She does not know when she will be released yet, but stated her therapies are going well. ACM advised patient that she will follow up with her again once she is back home. Patient thanked DAVID for the call and to check on her.    Plan     Follow up two weeks to see if patient is released from [facility]

## 2022-07-06 ENCOUNTER — CARE COORDINATION (OUTPATIENT)
Dept: CARE COORDINATION | Age: 72
End: 2022-07-06

## 2022-07-06 NOTE — CARE COORDINATION
DAVID attempted to call [facility] with no success. Phone sends you to a corporate . [facility] then called patient number, patient notes she is still at [facility] [facility] doing well and does not know when she will be back home. [facility] advised she will keep checking in for her release. Patient thanked [facility] for the call.

## 2022-07-25 ENCOUNTER — CARE COORDINATION (OUTPATIENT)
Dept: CARE COORDINATION | Age: 72
End: 2022-07-25

## 2022-07-25 NOTE — CARE COORDINATION
ACM attempted outreach to patient to see if she is back home. Patient did not answer. ACM left a message for patient to return call. Will follow up at a later date.

## 2022-08-01 ENCOUNTER — CARE COORDINATION (OUTPATIENT)
Dept: CARE COORDINATION | Age: 72
End: 2022-08-01

## 2022-08-01 NOTE — CARE COORDINATION
CATIA called patient number, patient notes she is still at [facility] doing well and does not know when she will be back home. CATIA advised she will keep checking in for her release. Patient thanked CATIA for the call.

## 2022-09-08 ENCOUNTER — CARE COORDINATION (OUTPATIENT)
Dept: CARE COORDINATION | Age: 72
End: 2022-09-08

## 2022-09-08 NOTE — CARE COORDINATION
Pt is still currently in [facility] rehab. The pt is scheduled for discharge on 9/10/22. The RN, CATIA spoke with the pt's  and was informed the pt will be transported to [place] after discharge. The  stated he and the pt have moved to [place] to be closer to their son.

## 2022-10-04 NOTE — FLOWSHEET NOTE
05/09/22 0745   Vital Signs   Temp 98.6 °F (37 °C)   Temp Source Oral   Pulse 52   Heart Rate Source Monitor   Resp 16   BP (!) 175/64   BP Location Right upper arm   MAP (Calculated) 101   Patient Position Semi fowlers   Level of Consciousness Alert (0)   MEWS Score 1   Pain Assessment   Pain Assessment 0-10   Pain Level 3   Pain Location Head   Oxygen Therapy   SpO2 94 %   O2 Device None (Room air)   see flowsheet for assessment pt is caox3 speech is clear, left side flaccid from previous CVA, noted no drift to right side, strong right  hand  , active ROM to right side , pt follows commands  . reported dull headache , given meds .  Call light within reach and  at [facility] , pt currently NPO for CTA neck verbalized understanding Addended by: Nadia Arroyo on: 10/4/2022 11:45 AM     Modules accepted: Orders, Level of Service

## (undated) DEVICE — Device: Brand: JELCO

## (undated) DEVICE — AIRLIFE™ NASAL OXYGEN CANNULA CURVED, FLARED TIP, WITH 7 FEET (2.1 M) CRUSH RESISTANT TUBING, OVER-THE-EAR STYLE: Brand: AIRLIFE™

## (undated) DEVICE — SET GRAV VENT NVENT CK VLV 3 NDL FREE PRT 10 GTT

## (undated) DEVICE — ELECTRODE ECG MONITR FOAM TEAR DROP ADLT RED

## (undated) DEVICE — 3M™ TEGADERM™ TRANSPARENT FILM DRESSING FRAME STYLE, 1624W, 2-3/8 IN X 2-3/4 IN (6 CM X 7 CM), 100/CT 4CT/CASE: Brand: 3M™ TEGADERM™

## (undated) DEVICE — Device: Brand: DISPOSABLE ELECTROSURGICAL SNARE

## (undated) DEVICE — KIT INF CTRL 2OZ LUB TBNG L12FT DBL END BRSH SYR OP4

## (undated) DEVICE — FORCEP BX STD CAP 240CM RAD JAW 4

## (undated) DEVICE — Z DISCONTINUED USE 2276105 GOWN PROTCT UNIV CHST W28IN L49IN SL 24IN BLU SPUNBOND FLM

## (undated) DEVICE — SOLUTION IV 1000ML LAC RINGERS PH 6.5 INJ USP VIAFLX PLAS

## (undated) DEVICE — ENDO CARRY-ON PROCEDURE KIT INCLUDES SUCTION TUBING, LUBRICANT, GAUZE, BIOHAZARD STICKER, TRANSPORT PAD AND INTERCEPT BEDSIDE KIT.: Brand: ENDO CARRY-ON PROCEDURE KIT